# Patient Record
Sex: MALE | Race: BLACK OR AFRICAN AMERICAN | NOT HISPANIC OR LATINO | Employment: OTHER | URBAN - METROPOLITAN AREA
[De-identification: names, ages, dates, MRNs, and addresses within clinical notes are randomized per-mention and may not be internally consistent; named-entity substitution may affect disease eponyms.]

---

## 2017-05-23 ENCOUNTER — HOSPITAL ENCOUNTER (INPATIENT)
Facility: HOSPITAL | Age: 67
LOS: 3 days | Discharge: HOME | End: 2017-05-26
Attending: EMERGENCY MEDICINE | Admitting: INTERNAL MEDICINE

## 2017-05-23 ENCOUNTER — APPOINTMENT (OUTPATIENT)
Dept: CT IMAGING | Facility: HOSPITAL | Age: 67
End: 2017-05-23

## 2017-05-23 ENCOUNTER — APPOINTMENT (OUTPATIENT)
Dept: GENERAL RADIOLOGY | Facility: HOSPITAL | Age: 67
End: 2017-05-23

## 2017-05-23 DIAGNOSIS — K29.20 ALCOHOLIC GASTRITIS WITHOUT BLEEDING, UNSPECIFIED CHRONICITY: ICD-10-CM

## 2017-05-23 DIAGNOSIS — R26.9 GAIT ABNORMALITY: ICD-10-CM

## 2017-05-23 DIAGNOSIS — K70.10 ALCOHOLIC HEPATITIS WITHOUT ASCITES: ICD-10-CM

## 2017-05-23 DIAGNOSIS — F10.929 ALCOHOL INTOXICATION, WITH UNSPECIFIED COMPLICATION (HCC): ICD-10-CM

## 2017-05-23 DIAGNOSIS — F10.20 ALCOHOLISM (HCC): ICD-10-CM

## 2017-05-23 DIAGNOSIS — E87.29 ALCOHOLIC KETOACIDOSIS: Primary | ICD-10-CM

## 2017-05-23 PROBLEM — F15.10 METHAMPHETAMINE ABUSE (HCC): Status: ACTIVE | Noted: 2017-05-23

## 2017-05-23 PROBLEM — R11.2 NAUSEA & VOMITING: Status: ACTIVE | Noted: 2017-05-23

## 2017-05-23 PROBLEM — IMO0001 ALCOHOLISM /ALCOHOL ABUSE: Chronic | Status: ACTIVE | Noted: 2017-05-23

## 2017-05-23 PROBLEM — E87.20 METABOLIC ACIDOSIS: Status: ACTIVE | Noted: 2017-05-23

## 2017-05-23 PROBLEM — K70.0 FATTY LIVER, ALCOHOLIC: Chronic | Status: ACTIVE | Noted: 2017-05-23

## 2017-05-23 PROBLEM — Z72.0 TOBACCO ABUSE: Chronic | Status: ACTIVE | Noted: 2017-05-23

## 2017-05-23 LAB
ALBUMIN SERPL-MCNC: 4.5 G/DL (ref 3.5–5.2)
ALBUMIN/GLOB SERPL: 1.1 G/DL
ALP SERPL-CCNC: 74 U/L (ref 39–117)
ALT SERPL W P-5'-P-CCNC: 103 U/L (ref 1–41)
AMPHET+METHAMPHET UR QL: POSITIVE
ANION GAP SERPL CALCULATED.3IONS-SCNC: 24.6 MMOL/L
APTT PPP: 30.4 SECONDS (ref 22.7–35.4)
AST SERPL-CCNC: 234 U/L (ref 1–40)
BACTERIA UR QL AUTO: NORMAL /HPF
BARBITURATES UR QL SCN: NEGATIVE
BASOPHILS # BLD AUTO: 0.02 10*3/MM3 (ref 0–0.2)
BASOPHILS NFR BLD AUTO: 0.8 % (ref 0–1.5)
BENZODIAZ UR QL SCN: NEGATIVE
BILIRUB SERPL-MCNC: 0.7 MG/DL (ref 0.1–1.2)
BILIRUB UR QL STRIP: NEGATIVE
BUN BLD-MCNC: 11 MG/DL (ref 8–23)
BUN/CREAT SERPL: 9.7 (ref 7–25)
CALCIUM SPEC-SCNC: 10.1 MG/DL (ref 8.6–10.5)
CANNABINOIDS SERPL QL: NEGATIVE
CHLORIDE SERPL-SCNC: 94 MMOL/L (ref 98–107)
CLARITY UR: CLEAR
CO2 SERPL-SCNC: 19.4 MMOL/L (ref 22–29)
COCAINE UR QL: NEGATIVE
COLOR UR: ABNORMAL
CREAT BLD-MCNC: 1.13 MG/DL (ref 0.76–1.27)
DEPRECATED RDW RBC AUTO: 47.5 FL (ref 37–54)
EOSINOPHIL # BLD AUTO: 0.03 10*3/MM3 (ref 0–0.7)
EOSINOPHIL NFR BLD AUTO: 1.1 % (ref 0.3–6.2)
ERYTHROCYTE [DISTWIDTH] IN BLOOD BY AUTOMATED COUNT: 13.8 % (ref 11.5–14.5)
ETHANOL BLD-MCNC: 134 MG/DL (ref 0–10)
ETHANOL UR QL: 0.13 %
GFR SERPL CREATININE-BSD FRML MDRD: 78 ML/MIN/1.73
GLOBULIN UR ELPH-MCNC: 4 GM/DL
GLUCOSE BLD-MCNC: 59 MG/DL (ref 65–99)
GLUCOSE BLDC GLUCOMTR-MCNC: 94 MG/DL (ref 70–130)
GLUCOSE UR STRIP-MCNC: ABNORMAL MG/DL
HCT VFR BLD AUTO: 40.9 % (ref 40.4–52.2)
HGB BLD-MCNC: 13.9 G/DL (ref 13.7–17.6)
HGB UR QL STRIP.AUTO: ABNORMAL
HYALINE CASTS UR QL AUTO: NORMAL /LPF
IMM GRANULOCYTES # BLD: 0 10*3/MM3 (ref 0–0.03)
IMM GRANULOCYTES NFR BLD: 0 % (ref 0–0.5)
INR PPP: 1.07 (ref 0.9–1.1)
KETONES UR QL STRIP: ABNORMAL
LEUKOCYTE ESTERASE UR QL STRIP.AUTO: NEGATIVE
LIPASE SERPL-CCNC: 65 U/L (ref 13–60)
LYMPHOCYTES # BLD AUTO: 1.35 10*3/MM3 (ref 0.9–4.8)
LYMPHOCYTES NFR BLD AUTO: 51.3 % (ref 19.6–45.3)
MAGNESIUM SERPL-MCNC: 2.5 MG/DL (ref 1.6–2.4)
MCH RBC QN AUTO: 32.3 PG (ref 27–32.7)
MCHC RBC AUTO-ENTMCNC: 34 G/DL (ref 32.6–36.4)
MCV RBC AUTO: 94.9 FL (ref 79.8–96.2)
METHADONE UR QL SCN: NEGATIVE
MONOCYTES # BLD AUTO: 0.35 10*3/MM3 (ref 0.2–1.2)
MONOCYTES NFR BLD AUTO: 13.3 % (ref 5–12)
NEUTROPHILS # BLD AUTO: 0.88 10*3/MM3 (ref 1.9–8.1)
NEUTROPHILS NFR BLD AUTO: 33.5 % (ref 42.7–76)
NITRITE UR QL STRIP: NEGATIVE
OPIATES UR QL: NEGATIVE
OXYCODONE UR QL SCN: NEGATIVE
PH UR STRIP.AUTO: 5.5 [PH] (ref 5–8)
PLATELET # BLD AUTO: 123 10*3/MM3 (ref 140–500)
PMV BLD AUTO: 10.3 FL (ref 6–12)
POTASSIUM BLD-SCNC: 4.4 MMOL/L (ref 3.5–5.2)
PROT SERPL-MCNC: 8.5 G/DL (ref 6–8.5)
PROT UR QL STRIP: ABNORMAL
PROTHROMBIN TIME: 13.5 SECONDS (ref 11.7–14.2)
RBC # BLD AUTO: 4.31 10*6/MM3 (ref 4.6–6)
RBC # UR: NORMAL /HPF
REF LAB TEST METHOD: NORMAL
SODIUM BLD-SCNC: 138 MMOL/L (ref 136–145)
SP GR UR STRIP: 1.02 (ref 1–1.03)
SQUAMOUS #/AREA URNS HPF: NORMAL /HPF
TROPONIN T SERPL-MCNC: <0.01 NG/ML (ref 0–0.03)
UROBILINOGEN UR QL STRIP: ABNORMAL
WBC NRBC COR # BLD: 2.63 10*3/MM3 (ref 4.5–10.7)
WBC UR QL AUTO: NORMAL /HPF

## 2017-05-23 PROCEDURE — 80307 DRUG TEST PRSMV CHEM ANLYZR: CPT | Performed by: EMERGENCY MEDICINE

## 2017-05-23 PROCEDURE — 25010000002 ENOXAPARIN PER 10 MG: Performed by: INTERNAL MEDICINE

## 2017-05-23 PROCEDURE — 84484 ASSAY OF TROPONIN QUANT: CPT | Performed by: EMERGENCY MEDICINE

## 2017-05-23 PROCEDURE — 85025 COMPLETE CBC W/AUTO DIFF WBC: CPT | Performed by: EMERGENCY MEDICINE

## 2017-05-23 PROCEDURE — 25810000003 SODIUM CHLORIDE 0.9 % WITH KCL 20 MEQ 20-0.9 MEQ/L-% SOLUTION: Performed by: INTERNAL MEDICINE

## 2017-05-23 PROCEDURE — 83690 ASSAY OF LIPASE: CPT | Performed by: EMERGENCY MEDICINE

## 2017-05-23 PROCEDURE — 94799 UNLISTED PULMONARY SVC/PX: CPT

## 2017-05-23 PROCEDURE — 93010 ELECTROCARDIOGRAM REPORT: CPT | Performed by: INTERNAL MEDICINE

## 2017-05-23 PROCEDURE — 80053 COMPREHEN METABOLIC PANEL: CPT | Performed by: EMERGENCY MEDICINE

## 2017-05-23 PROCEDURE — 93005 ELECTROCARDIOGRAM TRACING: CPT

## 2017-05-23 PROCEDURE — 99284 EMERGENCY DEPT VISIT MOD MDM: CPT

## 2017-05-23 PROCEDURE — 25010000002 LORAZEPAM PER 2 MG: Performed by: INTERNAL MEDICINE

## 2017-05-23 PROCEDURE — 71020 HC CHEST PA AND LATERAL: CPT

## 2017-05-23 PROCEDURE — 36415 COLL VENOUS BLD VENIPUNCTURE: CPT | Performed by: EMERGENCY MEDICINE

## 2017-05-23 PROCEDURE — 85610 PROTHROMBIN TIME: CPT | Performed by: EMERGENCY MEDICINE

## 2017-05-23 PROCEDURE — 0 IOPAMIDOL 61 % SOLUTION: Performed by: EMERGENCY MEDICINE

## 2017-05-23 PROCEDURE — 81001 URINALYSIS AUTO W/SCOPE: CPT | Performed by: EMERGENCY MEDICINE

## 2017-05-23 PROCEDURE — 82962 GLUCOSE BLOOD TEST: CPT

## 2017-05-23 PROCEDURE — 74177 CT ABD & PELVIS W/CONTRAST: CPT

## 2017-05-23 PROCEDURE — 25010000002 THIAMINE PER 100 MG: Performed by: EMERGENCY MEDICINE

## 2017-05-23 PROCEDURE — 85730 THROMBOPLASTIN TIME PARTIAL: CPT | Performed by: EMERGENCY MEDICINE

## 2017-05-23 PROCEDURE — 83735 ASSAY OF MAGNESIUM: CPT | Performed by: INTERNAL MEDICINE

## 2017-05-23 RX ORDER — DEXTROSE, SODIUM CHLORIDE, AND POTASSIUM CHLORIDE 5; .45; .15 G/100ML; G/100ML; G/100ML
125 INJECTION INTRAVENOUS
Status: DISCONTINUED | OUTPATIENT
Start: 2017-05-23 | End: 2017-05-23

## 2017-05-23 RX ORDER — NICOTINE 21 MG/24HR
1 PATCH, TRANSDERMAL 24 HOURS TRANSDERMAL EVERY 24 HOURS
Status: DISCONTINUED | OUTPATIENT
Start: 2017-05-23 | End: 2017-05-26 | Stop reason: HOSPADM

## 2017-05-23 RX ORDER — ONDANSETRON 2 MG/ML
4 INJECTION INTRAMUSCULAR; INTRAVENOUS ONCE
Status: DISCONTINUED | OUTPATIENT
Start: 2017-05-23 | End: 2017-05-26 | Stop reason: HOSPADM

## 2017-05-23 RX ORDER — ACETAMINOPHEN 325 MG/1
650 TABLET ORAL EVERY 6 HOURS PRN
Status: DISCONTINUED | OUTPATIENT
Start: 2017-05-23 | End: 2017-05-26 | Stop reason: HOSPADM

## 2017-05-23 RX ORDER — LORAZEPAM 1 MG/1
2 TABLET ORAL
Status: DISCONTINUED | OUTPATIENT
Start: 2017-05-23 | End: 2017-05-26 | Stop reason: HOSPADM

## 2017-05-23 RX ORDER — NITROGLYCERIN 0.4 MG/1
0.4 TABLET SUBLINGUAL
Status: DISCONTINUED | OUTPATIENT
Start: 2017-05-23 | End: 2017-05-26 | Stop reason: HOSPADM

## 2017-05-23 RX ORDER — MORPHINE SULFATE 2 MG/ML
2 INJECTION, SOLUTION INTRAMUSCULAR; INTRAVENOUS
Status: DISCONTINUED | OUTPATIENT
Start: 2017-05-23 | End: 2017-05-26 | Stop reason: HOSPADM

## 2017-05-23 RX ORDER — SODIUM CHLORIDE 9 MG/ML
125 INJECTION, SOLUTION INTRAVENOUS CONTINUOUS
Status: DISCONTINUED | OUTPATIENT
Start: 2017-05-23 | End: 2017-05-24

## 2017-05-23 RX ORDER — ONDANSETRON 2 MG/ML
4 INJECTION INTRAMUSCULAR; INTRAVENOUS EVERY 6 HOURS PRN
Status: DISCONTINUED | OUTPATIENT
Start: 2017-05-23 | End: 2017-05-26 | Stop reason: HOSPADM

## 2017-05-23 RX ORDER — LORAZEPAM 2 MG/ML
2 INJECTION INTRAMUSCULAR
Status: DISCONTINUED | OUTPATIENT
Start: 2017-05-23 | End: 2017-05-26 | Stop reason: HOSPADM

## 2017-05-23 RX ORDER — BISACODYL 10 MG
10 SUPPOSITORY, RECTAL RECTAL DAILY PRN
Status: DISCONTINUED | OUTPATIENT
Start: 2017-05-23 | End: 2017-05-26 | Stop reason: HOSPADM

## 2017-05-23 RX ORDER — SODIUM CHLORIDE 0.9 % (FLUSH) 0.9 %
10 SYRINGE (ML) INJECTION AS NEEDED
Status: DISCONTINUED | OUTPATIENT
Start: 2017-05-23 | End: 2017-05-26 | Stop reason: HOSPADM

## 2017-05-23 RX ORDER — DIPHENOXYLATE HYDROCHLORIDE AND ATROPINE SULFATE 2.5; .025 MG/1; MG/1
1 TABLET ORAL DAILY
Status: COMPLETED | OUTPATIENT
Start: 2017-05-24 | End: 2017-05-26

## 2017-05-23 RX ORDER — PANTOPRAZOLE SODIUM 40 MG/1
40 TABLET, DELAYED RELEASE ORAL
Status: DISCONTINUED | OUTPATIENT
Start: 2017-05-24 | End: 2017-05-26 | Stop reason: HOSPADM

## 2017-05-23 RX ORDER — DEXTROSE MONOHYDRATE 25 G/50ML
25 INJECTION, SOLUTION INTRAVENOUS ONCE
Status: COMPLETED | OUTPATIENT
Start: 2017-05-23 | End: 2017-05-23

## 2017-05-23 RX ORDER — NALOXONE HCL 0.4 MG/ML
0.4 VIAL (ML) INJECTION
Status: DISCONTINUED | OUTPATIENT
Start: 2017-05-23 | End: 2017-05-26 | Stop reason: HOSPADM

## 2017-05-23 RX ORDER — CALCIUM CARBONATE 200(500)MG
1 TABLET,CHEWABLE ORAL 2 TIMES DAILY PRN
Status: DISCONTINUED | OUTPATIENT
Start: 2017-05-23 | End: 2017-05-26 | Stop reason: HOSPADM

## 2017-05-23 RX ORDER — SODIUM CHLORIDE AND POTASSIUM CHLORIDE 150; 900 MG/100ML; MG/100ML
150 INJECTION, SOLUTION INTRAVENOUS CONTINUOUS
Status: DISCONTINUED | OUTPATIENT
Start: 2017-05-23 | End: 2017-05-24

## 2017-05-23 RX ORDER — FOLIC ACID 1 MG/1
1 TABLET ORAL DAILY
Status: COMPLETED | OUTPATIENT
Start: 2017-05-24 | End: 2017-05-26

## 2017-05-23 RX ORDER — MAGNESIUM HYDROXIDE/ALUMINUM HYDROXICE/SIMETHICONE 120; 1200; 1200 MG/30ML; MG/30ML; MG/30ML
30 SUSPENSION ORAL EVERY 6 HOURS PRN
Status: DISCONTINUED | OUTPATIENT
Start: 2017-05-23 | End: 2017-05-26 | Stop reason: HOSPADM

## 2017-05-23 RX ORDER — FAMOTIDINE 10 MG/ML
20 INJECTION, SOLUTION INTRAVENOUS ONCE
Status: DISCONTINUED | OUTPATIENT
Start: 2017-05-23 | End: 2017-05-25 | Stop reason: SDDI

## 2017-05-23 RX ORDER — LORAZEPAM 2 MG/ML
1 INJECTION INTRAMUSCULAR
Status: DISCONTINUED | OUTPATIENT
Start: 2017-05-23 | End: 2017-05-26 | Stop reason: HOSPADM

## 2017-05-23 RX ORDER — CLONIDINE HYDROCHLORIDE 0.1 MG/1
0.1 TABLET ORAL EVERY 4 HOURS PRN
Status: DISCONTINUED | OUTPATIENT
Start: 2017-05-23 | End: 2017-05-26

## 2017-05-23 RX ORDER — DEXTROSE MONOHYDRATE 25 G/50ML
INJECTION, SOLUTION INTRAVENOUS
Status: COMPLETED
Start: 2017-05-23 | End: 2017-05-23

## 2017-05-23 RX ORDER — LORAZEPAM 1 MG/1
1 TABLET ORAL
Status: DISCONTINUED | OUTPATIENT
Start: 2017-05-23 | End: 2017-05-26 | Stop reason: HOSPADM

## 2017-05-23 RX ORDER — THIAMINE MONONITRATE (VIT B1) 100 MG
100 TABLET ORAL DAILY
Status: DISCONTINUED | OUTPATIENT
Start: 2017-05-26 | End: 2017-05-26 | Stop reason: HOSPADM

## 2017-05-23 RX ORDER — DEXTROSE, SODIUM CHLORIDE, SODIUM LACTATE, POTASSIUM CHLORIDE, AND CALCIUM CHLORIDE 5; .6; .31; .03; .02 G/100ML; G/100ML; G/100ML; G/100ML; G/100ML
125 INJECTION, SOLUTION INTRAVENOUS CONTINUOUS
Status: DISCONTINUED | OUTPATIENT
Start: 2017-05-23 | End: 2017-05-23

## 2017-05-23 RX ORDER — SODIUM CHLORIDE 0.9 % (FLUSH) 0.9 %
1-10 SYRINGE (ML) INJECTION AS NEEDED
Status: DISCONTINUED | OUTPATIENT
Start: 2017-05-23 | End: 2017-05-26 | Stop reason: HOSPADM

## 2017-05-23 RX ORDER — ASPIRIN 325 MG
325 TABLET ORAL ONCE
Status: DISCONTINUED | OUTPATIENT
Start: 2017-05-23 | End: 2017-05-23

## 2017-05-23 RX ADMIN — IOPAMIDOL 85 ML: 612 INJECTION, SOLUTION INTRAVENOUS at 13:40

## 2017-05-23 RX ADMIN — NICOTINE 1 PATCH: 21 PATCH, EXTENDED RELEASE TRANSDERMAL at 20:29

## 2017-05-23 RX ADMIN — LORAZEPAM 2 MG: 2 INJECTION, SOLUTION INTRAMUSCULAR; INTRAVENOUS at 19:04

## 2017-05-23 RX ADMIN — DEXTROSE MONOHYDRATE 25 G: 25 INJECTION, SOLUTION INTRAVENOUS at 13:23

## 2017-05-23 RX ADMIN — ENOXAPARIN SODIUM 40 MG: 40 INJECTION SUBCUTANEOUS at 20:29

## 2017-05-23 RX ADMIN — SODIUM CHLORIDE 1000 ML: 9 INJECTION, SOLUTION INTRAVENOUS at 14:33

## 2017-05-23 RX ADMIN — POTASSIUM CHLORIDE AND SODIUM CHLORIDE 150 ML/HR: 900; 150 INJECTION, SOLUTION INTRAVENOUS at 20:29

## 2017-05-23 RX ADMIN — LORAZEPAM 1 MG: 1 TABLET ORAL at 21:21

## 2017-05-23 RX ADMIN — THIAMINE HYDROCHLORIDE 100 MG: 100 INJECTION, SOLUTION INTRAMUSCULAR; INTRAVENOUS at 15:47

## 2017-05-23 RX ADMIN — SODIUM CHLORIDE, SODIUM LACTATE, POTASSIUM CHLORIDE, CALCIUM CHLORIDE AND DEXTROSE MONOHYDRATE 125 ML/HR: 5; 600; 310; 30; 20 INJECTION, SOLUTION INTRAVENOUS at 15:00

## 2017-05-24 ENCOUNTER — APPOINTMENT (OUTPATIENT)
Dept: ULTRASOUND IMAGING | Facility: HOSPITAL | Age: 67
End: 2017-05-24
Attending: INTERNAL MEDICINE

## 2017-05-24 PROBLEM — R00.0 TACHYCARDIA: Status: ACTIVE | Noted: 2017-05-24

## 2017-05-24 PROBLEM — I25.10 CORONARY ARTERY DISEASE INVOLVING NATIVE CORONARY ARTERY OF NATIVE HEART WITHOUT ANGINA PECTORIS: Chronic | Status: ACTIVE | Noted: 2017-05-24

## 2017-05-24 LAB
ALBUMIN SERPL-MCNC: 4.1 G/DL (ref 3.5–5.2)
ALBUMIN/GLOB SERPL: 1.2 G/DL
ALP SERPL-CCNC: 70 U/L (ref 39–117)
ALT SERPL W P-5'-P-CCNC: 76 U/L (ref 1–41)
ANION GAP SERPL CALCULATED.3IONS-SCNC: 16 MMOL/L
AST SERPL-CCNC: 147 U/L (ref 1–40)
BASOPHILS # BLD AUTO: 0 10*3/MM3 (ref 0–0.2)
BASOPHILS NFR BLD AUTO: 0 % (ref 0–1.5)
BILIRUB SERPL-MCNC: 0.9 MG/DL (ref 0.1–1.2)
BUN BLD-MCNC: 7 MG/DL (ref 8–23)
BUN/CREAT SERPL: 7.2 (ref 7–25)
CALCIUM SPEC-SCNC: 9.2 MG/DL (ref 8.6–10.5)
CHLORIDE SERPL-SCNC: 95 MMOL/L (ref 98–107)
CO2 SERPL-SCNC: 24 MMOL/L (ref 22–29)
CREAT BLD-MCNC: 0.97 MG/DL (ref 0.76–1.27)
DEPRECATED RDW RBC AUTO: 47.5 FL (ref 37–54)
EOSINOPHIL # BLD AUTO: 0.02 10*3/MM3 (ref 0–0.7)
EOSINOPHIL NFR BLD AUTO: 0.7 % (ref 0.3–6.2)
ERYTHROCYTE [DISTWIDTH] IN BLOOD BY AUTOMATED COUNT: 14 % (ref 11.5–14.5)
GFR SERPL CREATININE-BSD FRML MDRD: 93 ML/MIN/1.73
GLOBULIN UR ELPH-MCNC: 3.4 GM/DL
GLUCOSE BLD-MCNC: 123 MG/DL (ref 65–99)
HCT VFR BLD AUTO: 36.7 % (ref 40.4–52.2)
HGB BLD-MCNC: 12.5 G/DL (ref 13.7–17.6)
IMM GRANULOCYTES # BLD: 0 10*3/MM3 (ref 0–0.03)
IMM GRANULOCYTES NFR BLD: 0 % (ref 0–0.5)
LYMPHOCYTES # BLD AUTO: 1.08 10*3/MM3 (ref 0.9–4.8)
LYMPHOCYTES NFR BLD AUTO: 39.9 % (ref 19.6–45.3)
MCH RBC QN AUTO: 32.1 PG (ref 27–32.7)
MCHC RBC AUTO-ENTMCNC: 34.1 G/DL (ref 32.6–36.4)
MCV RBC AUTO: 94.1 FL (ref 79.8–96.2)
MONOCYTES # BLD AUTO: 0.37 10*3/MM3 (ref 0.2–1.2)
MONOCYTES NFR BLD AUTO: 13.7 % (ref 5–12)
NEUTROPHILS # BLD AUTO: 1.24 10*3/MM3 (ref 1.9–8.1)
NEUTROPHILS NFR BLD AUTO: 45.7 % (ref 42.7–76)
PLATELET # BLD AUTO: 104 10*3/MM3 (ref 140–500)
PMV BLD AUTO: 10.3 FL (ref 6–12)
POTASSIUM BLD-SCNC: 4 MMOL/L (ref 3.5–5.2)
PROT SERPL-MCNC: 7.5 G/DL (ref 6–8.5)
RBC # BLD AUTO: 3.9 10*6/MM3 (ref 4.6–6)
SODIUM BLD-SCNC: 135 MMOL/L (ref 136–145)
WBC NRBC COR # BLD: 2.71 10*3/MM3 (ref 4.5–10.7)

## 2017-05-24 PROCEDURE — 25010000002 ENOXAPARIN PER 10 MG: Performed by: INTERNAL MEDICINE

## 2017-05-24 PROCEDURE — 25010000002 THIAMINE PER 100 MG: Performed by: INTERNAL MEDICINE

## 2017-05-24 PROCEDURE — 25810000003 SODIUM CHLORIDE 0.9 % WITH KCL 20 MEQ 20-0.9 MEQ/L-% SOLUTION: Performed by: INTERNAL MEDICINE

## 2017-05-24 PROCEDURE — 80053 COMPREHEN METABOLIC PANEL: CPT | Performed by: INTERNAL MEDICINE

## 2017-05-24 PROCEDURE — 76705 ECHO EXAM OF ABDOMEN: CPT

## 2017-05-24 PROCEDURE — 99222 1ST HOSP IP/OBS MODERATE 55: CPT | Performed by: INTERNAL MEDICINE

## 2017-05-24 PROCEDURE — 93005 ELECTROCARDIOGRAM TRACING: CPT | Performed by: INTERNAL MEDICINE

## 2017-05-24 PROCEDURE — 85025 COMPLETE CBC W/AUTO DIFF WBC: CPT | Performed by: INTERNAL MEDICINE

## 2017-05-24 PROCEDURE — 93010 ELECTROCARDIOGRAM REPORT: CPT | Performed by: INTERNAL MEDICINE

## 2017-05-24 RX ADMIN — FOLIC ACID 1 MG: 1 TABLET ORAL at 09:11

## 2017-05-24 RX ADMIN — LORAZEPAM 1 MG: 1 TABLET ORAL at 04:15

## 2017-05-24 RX ADMIN — LORAZEPAM 1 MG: 1 TABLET ORAL at 20:56

## 2017-05-24 RX ADMIN — POTASSIUM CHLORIDE AND SODIUM CHLORIDE 150 ML/HR: 900; 150 INJECTION, SOLUTION INTRAVENOUS at 03:53

## 2017-05-24 RX ADMIN — LORAZEPAM 1 MG: 1 TABLET ORAL at 09:11

## 2017-05-24 RX ADMIN — ENOXAPARIN SODIUM 40 MG: 40 INJECTION SUBCUTANEOUS at 09:14

## 2017-05-24 RX ADMIN — NICOTINE 1 PATCH: 21 PATCH, EXTENDED RELEASE TRANSDERMAL at 18:02

## 2017-05-24 RX ADMIN — Medication 1 TABLET: at 09:11

## 2017-05-24 RX ADMIN — LORAZEPAM 1 MG: 1 TABLET ORAL at 14:40

## 2017-05-24 RX ADMIN — LORAZEPAM 1 MG: 1 TABLET ORAL at 18:02

## 2017-05-24 RX ADMIN — THIAMINE HYDROCHLORIDE 100 MG: 100 INJECTION, SOLUTION INTRAMUSCULAR; INTRAVENOUS at 09:14

## 2017-05-25 ENCOUNTER — APPOINTMENT (OUTPATIENT)
Dept: CARDIOLOGY | Facility: HOSPITAL | Age: 67
End: 2017-05-25
Attending: INTERNAL MEDICINE

## 2017-05-25 LAB
ALBUMIN SERPL-MCNC: 4.4 G/DL (ref 3.5–5.2)
ALBUMIN/GLOB SERPL: 1.3 G/DL
ALP SERPL-CCNC: 77 U/L (ref 39–117)
ALT SERPL W P-5'-P-CCNC: 68 U/L (ref 1–41)
ANION GAP SERPL CALCULATED.3IONS-SCNC: 12.8 MMOL/L
AST SERPL-CCNC: 109 U/L (ref 1–40)
BASOPHILS # BLD AUTO: 0.01 10*3/MM3 (ref 0–0.2)
BASOPHILS NFR BLD AUTO: 0.4 % (ref 0–1.5)
BH CV ECHO MEAS - ACS: 1.9 CM
BH CV ECHO MEAS - AI DEC SLOPE: 347.5 CM/SEC^2
BH CV ECHO MEAS - AI MAX PG: 85.1 MMHG
BH CV ECHO MEAS - AI MAX VEL: 461 CM/SEC
BH CV ECHO MEAS - AI P1/2T: 388.6 MSEC
BH CV ECHO MEAS - AO MEAN PG (FULL): 1 MMHG
BH CV ECHO MEAS - AO MEAN PG: 2 MMHG
BH CV ECHO MEAS - AO ROOT AREA (BSA CORRECTED): 1.7
BH CV ECHO MEAS - AO ROOT AREA: 8 CM^2
BH CV ECHO MEAS - AO ROOT DIAM: 3.2 CM
BH CV ECHO MEAS - AO V2 MEAN: 61.8 CM/SEC
BH CV ECHO MEAS - AO V2 VTI: 14.8 CM
BH CV ECHO MEAS - BSA(HAYCOCK): 1.9 M^2
BH CV ECHO MEAS - BSA: 1.9 M^2
BH CV ECHO MEAS - BZI_BMI: 23.6 KILOGRAMS/M^2
BH CV ECHO MEAS - BZI_METRIC_HEIGHT: 175.3 CM
BH CV ECHO MEAS - BZI_METRIC_WEIGHT: 72.6 KG
BH CV ECHO MEAS - CONTRAST EF 4CH: 58.3 ML/M^2
BH CV ECHO MEAS - EDV(MOD-SP4): 36 ML
BH CV ECHO MEAS - EF(MOD-SP4): 58.3 %
BH CV ECHO MEAS - ESV(MOD-SP4): 15 ML
BH CV ECHO MEAS - LA DIMENSION: 3.3 CM
BH CV ECHO MEAS - LA/AO: 1
BH CV ECHO MEAS - LAT PEAK E' VEL: 8.7 CM/SEC
BH CV ECHO MEAS - LV DIASTOLIC VOL/BSA (35-75): 19.2 ML/M^2
BH CV ECHO MEAS - LV MEAN PG: 1 MMHG
BH CV ECHO MEAS - LV SYSTOLIC VOL/BSA (12-30): 8 ML/M^2
BH CV ECHO MEAS - LV V1 MEAN: 46 CM/SEC
BH CV ECHO MEAS - LV V1 VTI: 11 CM
BH CV ECHO MEAS - LVLD AP4: 6.8 CM
BH CV ECHO MEAS - LVLS AP4: 5.9 CM
BH CV ECHO MEAS - MED PEAK E' VEL: 3.7 CM/SEC
BH CV ECHO MEAS - MV A DUR: 0.11 SEC
BH CV ECHO MEAS - MV A MAX VEL: 106 CM/SEC
BH CV ECHO MEAS - MV DEC SLOPE: 771 CM/SEC^2
BH CV ECHO MEAS - MV DEC TIME: 0.1 SEC
BH CV ECHO MEAS - MV E MAX VEL: 57.8 CM/SEC
BH CV ECHO MEAS - MV E/A: 0.55
BH CV ECHO MEAS - MV MEAN PG: 3 MMHG
BH CV ECHO MEAS - MV P1/2T MAX VEL: 80.1 CM/SEC
BH CV ECHO MEAS - MV P1/2T: 30.4 MSEC
BH CV ECHO MEAS - MV V2 MEAN: 83 CM/SEC
BH CV ECHO MEAS - MV V2 VTI: 26 CM
BH CV ECHO MEAS - MVA P1/2T LCG: 2.7 CM^2
BH CV ECHO MEAS - MVA(P1/2T): 7.2 CM^2
BH CV ECHO MEAS - PA MAX PG (FULL): 2.5 MMHG
BH CV ECHO MEAS - PA MAX PG: 4.4 MMHG
BH CV ECHO MEAS - PA V2 MAX: 105 CM/SEC
BH CV ECHO MEAS - PULM A REVS DUR: 0.13 SEC
BH CV ECHO MEAS - PULM A REVS VEL: 29.6 CM/SEC
BH CV ECHO MEAS - PULM DIAS VEL: 29.1 CM/SEC
BH CV ECHO MEAS - PULM S/D: 2
BH CV ECHO MEAS - PULM SYS VEL: 57.5 CM/SEC
BH CV ECHO MEAS - PVA(V,A): 2.7 CM^2
BH CV ECHO MEAS - PVA(V,D): 2.7 CM^2
BH CV ECHO MEAS - RAP SYSTOLE: 10 MMHG
BH CV ECHO MEAS - RV MAX PG: 1.9 MMHG
BH CV ECHO MEAS - RV MEAN PG: 1 MMHG
BH CV ECHO MEAS - RV V1 MAX: 69.2 CM/SEC
BH CV ECHO MEAS - RV V1 MEAN: 46.1 CM/SEC
BH CV ECHO MEAS - RV V1 VTI: 12.8 CM
BH CV ECHO MEAS - RVOT AREA: 4.2 CM^2
BH CV ECHO MEAS - RVOT DIAM: 2.3 CM
BH CV ECHO MEAS - RVSP: 26 MMHG
BH CV ECHO MEAS - SI(AO): 63.4 ML/M^2
BH CV ECHO MEAS - SI(MOD-SP4): 11.2 ML/M^2
BH CV ECHO MEAS - SV(AO): 119 ML
BH CV ECHO MEAS - SV(MOD-SP4): 21 ML
BH CV ECHO MEAS - SV(RVOT): 53.2 ML
BH CV ECHO MEAS - TAPSE (>1.6): 1.1 CM2
BH CV ECHO MEAS - TR MAX VEL: 200 CM/SEC
BH CV XLRA - RV BASE: 2.7 CM
BH CV XLRA - RV LENGTH: 6 CM
BH CV XLRA - RV MID: 2.2 CM
BH CV XLRA - TDI S': 8.9 CM/SEC
BILIRUB SERPL-MCNC: 1.1 MG/DL (ref 0.1–1.2)
BUN BLD-MCNC: 7 MG/DL (ref 8–23)
BUN/CREAT SERPL: 6.9 (ref 7–25)
CALCIUM SPEC-SCNC: 10.4 MG/DL (ref 8.6–10.5)
CHLORIDE SERPL-SCNC: 93 MMOL/L (ref 98–107)
CO2 SERPL-SCNC: 26.2 MMOL/L (ref 22–29)
CREAT BLD-MCNC: 1.01 MG/DL (ref 0.76–1.27)
DEPRECATED RDW RBC AUTO: 49.5 FL (ref 37–54)
EOSINOPHIL # BLD AUTO: 0.03 10*3/MM3 (ref 0–0.7)
EOSINOPHIL NFR BLD AUTO: 1.3 % (ref 0.3–6.2)
ERYTHROCYTE [DISTWIDTH] IN BLOOD BY AUTOMATED COUNT: 14.2 % (ref 11.5–14.5)
FOLATE SERPL-MCNC: 13.4 NG/ML (ref 4.78–24.2)
GFR SERPL CREATININE-BSD FRML MDRD: 89 ML/MIN/1.73
GLOBULIN UR ELPH-MCNC: 3.5 GM/DL
GLUCOSE BLD-MCNC: 130 MG/DL (ref 65–99)
HCT VFR BLD AUTO: 39.8 % (ref 40.4–52.2)
HGB BLD-MCNC: 13.7 G/DL (ref 13.7–17.6)
IMM GRANULOCYTES # BLD: 0 10*3/MM3 (ref 0–0.03)
IMM GRANULOCYTES NFR BLD: 0 % (ref 0–0.5)
LEFT ATRIUM VOLUME INDEX: 14.4 ML/M2
LYMPHOCYTES # BLD AUTO: 1.18 10*3/MM3 (ref 0.9–4.8)
LYMPHOCYTES NFR BLD AUTO: 52 % (ref 19.6–45.3)
MCH RBC QN AUTO: 32.9 PG (ref 27–32.7)
MCHC RBC AUTO-ENTMCNC: 34.4 G/DL (ref 32.6–36.4)
MCV RBC AUTO: 95.7 FL (ref 79.8–96.2)
MONOCYTES # BLD AUTO: 0.22 10*3/MM3 (ref 0.2–1.2)
MONOCYTES NFR BLD AUTO: 9.7 % (ref 5–12)
NEUTROPHILS # BLD AUTO: 0.83 10*3/MM3 (ref 1.9–8.1)
NEUTROPHILS NFR BLD AUTO: 36.6 % (ref 42.7–76)
PLATELET # BLD AUTO: 103 10*3/MM3 (ref 140–500)
PLATELETS.RETICULATED NFR BLD AUTO: 8.7 % (ref 0.9–6.5)
PMV BLD AUTO: 10.2 FL (ref 6–12)
POTASSIUM BLD-SCNC: 3.6 MMOL/L (ref 3.5–5.2)
PROT SERPL-MCNC: 7.9 G/DL (ref 6–8.5)
RBC # BLD AUTO: 4.16 10*6/MM3 (ref 4.6–6)
SODIUM BLD-SCNC: 132 MMOL/L (ref 136–145)
VIT B12 BLD-MCNC: 994 PG/ML (ref 211–946)
WBC NRBC COR # BLD: 2.27 10*3/MM3 (ref 4.5–10.7)

## 2017-05-25 PROCEDURE — 82746 ASSAY OF FOLIC ACID SERUM: CPT | Performed by: INTERNAL MEDICINE

## 2017-05-25 PROCEDURE — 25010000002 ENOXAPARIN PER 10 MG: Performed by: INTERNAL MEDICINE

## 2017-05-25 PROCEDURE — 93306 TTE W/DOPPLER COMPLETE: CPT | Performed by: INTERNAL MEDICINE

## 2017-05-25 PROCEDURE — 25010000002 THIAMINE PER 100 MG: Performed by: INTERNAL MEDICINE

## 2017-05-25 PROCEDURE — 93306 TTE W/DOPPLER COMPLETE: CPT

## 2017-05-25 PROCEDURE — 85025 COMPLETE CBC W/AUTO DIFF WBC: CPT | Performed by: INTERNAL MEDICINE

## 2017-05-25 PROCEDURE — 85055 RETICULATED PLATELET ASSAY: CPT | Performed by: INTERNAL MEDICINE

## 2017-05-25 PROCEDURE — 90791 PSYCH DIAGNOSTIC EVALUATION: CPT | Performed by: SOCIAL WORKER

## 2017-05-25 PROCEDURE — 80053 COMPREHEN METABOLIC PANEL: CPT | Performed by: INTERNAL MEDICINE

## 2017-05-25 PROCEDURE — 99222 1ST HOSP IP/OBS MODERATE 55: CPT | Performed by: INTERNAL MEDICINE

## 2017-05-25 PROCEDURE — 99232 SBSQ HOSP IP/OBS MODERATE 35: CPT | Performed by: INTERNAL MEDICINE

## 2017-05-25 PROCEDURE — 82607 VITAMIN B-12: CPT | Performed by: INTERNAL MEDICINE

## 2017-05-25 RX ORDER — METOPROLOL SUCCINATE 25 MG/1
25 TABLET, EXTENDED RELEASE ORAL
Status: DISCONTINUED | OUTPATIENT
Start: 2017-05-25 | End: 2017-05-26 | Stop reason: HOSPADM

## 2017-05-25 RX ADMIN — METOPROLOL SUCCINATE 25 MG: 25 TABLET, FILM COATED, EXTENDED RELEASE ORAL at 12:19

## 2017-05-25 RX ADMIN — LORAZEPAM 1 MG: 1 TABLET ORAL at 20:28

## 2017-05-25 RX ADMIN — THIAMINE HYDROCHLORIDE 100 MG: 100 INJECTION, SOLUTION INTRAMUSCULAR; INTRAVENOUS at 09:54

## 2017-05-25 RX ADMIN — LORAZEPAM 1 MG: 1 TABLET ORAL at 05:46

## 2017-05-25 RX ADMIN — ACETAMINOPHEN 650 MG: 325 TABLET ORAL at 17:42

## 2017-05-25 RX ADMIN — PANTOPRAZOLE SODIUM 40 MG: 40 TABLET, DELAYED RELEASE ORAL at 05:46

## 2017-05-25 RX ADMIN — Medication 1 TABLET: at 09:54

## 2017-05-25 RX ADMIN — NICOTINE 1 PATCH: 21 PATCH, EXTENDED RELEASE TRANSDERMAL at 17:45

## 2017-05-25 RX ADMIN — LORAZEPAM 1 MG: 1 TABLET ORAL at 10:26

## 2017-05-25 RX ADMIN — ENOXAPARIN SODIUM 40 MG: 40 INJECTION SUBCUTANEOUS at 09:54

## 2017-05-25 RX ADMIN — FOLIC ACID 1 MG: 1 TABLET ORAL at 09:54

## 2017-05-26 VITALS
TEMPERATURE: 98.6 F | OXYGEN SATURATION: 98 % | DIASTOLIC BLOOD PRESSURE: 78 MMHG | RESPIRATION RATE: 16 BRPM | BODY MASS INDEX: 23.7 KG/M2 | SYSTOLIC BLOOD PRESSURE: 144 MMHG | HEIGHT: 69 IN | WEIGHT: 160 LBS | HEART RATE: 103 BPM

## 2017-05-26 LAB
ALBUMIN SERPL-MCNC: 3.6 G/DL (ref 3.5–5.2)
ALBUMIN/GLOB SERPL: 1 G/DL
ALP SERPL-CCNC: 62 U/L (ref 39–117)
ALT SERPL W P-5'-P-CCNC: 58 U/L (ref 1–41)
ANION GAP SERPL CALCULATED.3IONS-SCNC: 10.1 MMOL/L
AST SERPL-CCNC: 87 U/L (ref 1–40)
BASOPHILS # BLD AUTO: 0.02 10*3/MM3 (ref 0–0.2)
BASOPHILS NFR BLD AUTO: 0.8 % (ref 0–1.5)
BILIRUB SERPL-MCNC: 0.8 MG/DL (ref 0.1–1.2)
BUN BLD-MCNC: 8 MG/DL (ref 8–23)
BUN/CREAT SERPL: 6.9 (ref 7–25)
CALCIUM SPEC-SCNC: 10.1 MG/DL (ref 8.6–10.5)
CHLORIDE SERPL-SCNC: 100 MMOL/L (ref 98–107)
CO2 SERPL-SCNC: 25.9 MMOL/L (ref 22–29)
CREAT BLD-MCNC: 1.16 MG/DL (ref 0.76–1.27)
DEPRECATED RDW RBC AUTO: 48.2 FL (ref 37–54)
EOSINOPHIL # BLD AUTO: 0.04 10*3/MM3 (ref 0–0.7)
EOSINOPHIL NFR BLD AUTO: 1.7 % (ref 0.3–6.2)
ERYTHROCYTE [DISTWIDTH] IN BLOOD BY AUTOMATED COUNT: 14.2 % (ref 11.5–14.5)
GFR SERPL CREATININE-BSD FRML MDRD: 76 ML/MIN/1.73
GLOBULIN UR ELPH-MCNC: 3.5 GM/DL
GLUCOSE BLD-MCNC: 108 MG/DL (ref 65–99)
HCT VFR BLD AUTO: 35.8 % (ref 40.4–52.2)
HGB BLD-MCNC: 12.1 G/DL (ref 13.7–17.6)
IMM GRANULOCYTES # BLD: 0 10*3/MM3 (ref 0–0.03)
IMM GRANULOCYTES NFR BLD: 0 % (ref 0–0.5)
LYMPHOCYTES # BLD AUTO: 1.12 10*3/MM3 (ref 0.9–4.8)
LYMPHOCYTES NFR BLD AUTO: 46.7 % (ref 19.6–45.3)
MCH RBC QN AUTO: 31.5 PG (ref 27–32.7)
MCHC RBC AUTO-ENTMCNC: 33.8 G/DL (ref 32.6–36.4)
MCV RBC AUTO: 93.2 FL (ref 79.8–96.2)
MONOCYTES # BLD AUTO: 0.33 10*3/MM3 (ref 0.2–1.2)
MONOCYTES NFR BLD AUTO: 13.8 % (ref 5–12)
NEUTROPHILS # BLD AUTO: 0.89 10*3/MM3 (ref 1.9–8.1)
NEUTROPHILS NFR BLD AUTO: 37 % (ref 42.7–76)
PLATELET # BLD AUTO: 92 10*3/MM3 (ref 140–500)
PMV BLD AUTO: 10.9 FL (ref 6–12)
POTASSIUM BLD-SCNC: 3.8 MMOL/L (ref 3.5–5.2)
PROT SERPL-MCNC: 7.1 G/DL (ref 6–8.5)
RBC # BLD AUTO: 3.84 10*6/MM3 (ref 4.6–6)
SODIUM BLD-SCNC: 136 MMOL/L (ref 136–145)
WBC NRBC COR # BLD: 2.4 10*3/MM3 (ref 4.5–10.7)

## 2017-05-26 PROCEDURE — 25010000002 ENOXAPARIN PER 10 MG: Performed by: INTERNAL MEDICINE

## 2017-05-26 PROCEDURE — 80053 COMPREHEN METABOLIC PANEL: CPT | Performed by: INTERNAL MEDICINE

## 2017-05-26 PROCEDURE — 85025 COMPLETE CBC W/AUTO DIFF WBC: CPT | Performed by: INTERNAL MEDICINE

## 2017-05-26 RX ORDER — AMITRIPTYLINE HYDROCHLORIDE 25 MG/1
25 TABLET, FILM COATED ORAL NIGHTLY
Status: DISCONTINUED | OUTPATIENT
Start: 2017-05-26 | End: 2017-05-26

## 2017-05-26 RX ORDER — LANOLIN ALCOHOL/MO/W.PET/CERES
100 CREAM (GRAM) TOPICAL DAILY
Qty: 30 TABLET | Refills: 1 | Status: SHIPPED | OUTPATIENT
Start: 2017-05-26 | End: 2017-05-28

## 2017-05-26 RX ORDER — METOPROLOL SUCCINATE 25 MG/1
25 TABLET, EXTENDED RELEASE ORAL
Qty: 30 TABLET | Refills: 1 | Status: ON HOLD | OUTPATIENT
Start: 2017-05-26 | End: 2019-04-17

## 2017-05-26 RX ADMIN — Medication 1 TABLET: at 08:48

## 2017-05-26 RX ADMIN — ENOXAPARIN SODIUM 40 MG: 40 INJECTION SUBCUTANEOUS at 08:48

## 2017-05-26 RX ADMIN — LORAZEPAM 1 MG: 1 TABLET ORAL at 01:54

## 2017-05-26 RX ADMIN — METOPROLOL SUCCINATE 25 MG: 25 TABLET, FILM COATED, EXTENDED RELEASE ORAL at 08:48

## 2017-05-26 RX ADMIN — LORAZEPAM 1 MG: 1 TABLET ORAL at 05:09

## 2017-05-26 RX ADMIN — FOLIC ACID 1 MG: 1 TABLET ORAL at 08:48

## 2017-05-26 RX ADMIN — PANTOPRAZOLE SODIUM 40 MG: 40 TABLET, DELAYED RELEASE ORAL at 05:09

## 2017-05-26 RX ADMIN — Medication 100 MG: at 08:48

## 2019-04-17 ENCOUNTER — APPOINTMENT (OUTPATIENT)
Dept: GENERAL RADIOLOGY | Facility: HOSPITAL | Age: 69
End: 2019-04-17

## 2019-04-17 ENCOUNTER — APPOINTMENT (OUTPATIENT)
Dept: CT IMAGING | Facility: HOSPITAL | Age: 69
End: 2019-04-17

## 2019-04-17 ENCOUNTER — HOSPITAL ENCOUNTER (OUTPATIENT)
Facility: HOSPITAL | Age: 69
Setting detail: OBSERVATION
Discharge: HOME | End: 2019-04-18
Attending: EMERGENCY MEDICINE | Admitting: HOSPITALIST

## 2019-04-17 DIAGNOSIS — N17.9 ACUTE RENAL FAILURE, UNSPECIFIED ACUTE RENAL FAILURE TYPE (HCC): ICD-10-CM

## 2019-04-17 DIAGNOSIS — R19.7 DIARRHEA, UNSPECIFIED TYPE: ICD-10-CM

## 2019-04-17 DIAGNOSIS — R00.0 SINUS TACHYCARDIA: Primary | ICD-10-CM

## 2019-04-17 DIAGNOSIS — R51.9 NONINTRACTABLE HEADACHE, UNSPECIFIED CHRONICITY PATTERN, UNSPECIFIED HEADACHE TYPE: ICD-10-CM

## 2019-04-17 PROBLEM — E86.0 DEHYDRATION: Status: ACTIVE | Noted: 2019-04-17

## 2019-04-17 PROBLEM — N18.30 CHRONIC KIDNEY DISEASE, STAGE 3: Status: ACTIVE | Noted: 2019-04-17

## 2019-04-17 PROBLEM — K59.1 FUNCTIONAL DIARRHEA: Status: ACTIVE | Noted: 2019-04-17

## 2019-04-17 LAB
ALBUMIN SERPL-MCNC: 4.1 G/DL (ref 3.5–5.2)
ALBUMIN/GLOB SERPL: 1.1 G/DL
ALP SERPL-CCNC: 58 U/L (ref 39–117)
ALT SERPL W P-5'-P-CCNC: 19 U/L (ref 1–41)
AMPHET+METHAMPHET UR QL: NEGATIVE
ANION GAP SERPL CALCULATED.3IONS-SCNC: 16.6 MMOL/L
AST SERPL-CCNC: 25 U/L (ref 1–40)
BARBITURATES UR QL SCN: NEGATIVE
BASOPHILS # BLD AUTO: 0.05 10*3/MM3 (ref 0–0.2)
BASOPHILS NFR BLD AUTO: 0.6 % (ref 0–1.5)
BENZODIAZ UR QL SCN: NEGATIVE
BILIRUB SERPL-MCNC: <0.2 MG/DL (ref 0.2–1.2)
BILIRUB UR QL STRIP: NEGATIVE
BUN BLD-MCNC: 9 MG/DL (ref 8–23)
BUN/CREAT SERPL: 5.3 (ref 7–25)
CALCIUM SPEC-SCNC: 10.7 MG/DL (ref 8.6–10.5)
CANNABINOIDS SERPL QL: NEGATIVE
CHLORIDE SERPL-SCNC: 96 MMOL/L (ref 98–107)
CLARITY UR: CLEAR
CO2 SERPL-SCNC: 25.4 MMOL/L (ref 22–29)
COCAINE UR QL: NEGATIVE
COLOR UR: YELLOW
CREAT BLD-MCNC: 1.7 MG/DL (ref 0.76–1.27)
DEPRECATED RDW RBC AUTO: 49.4 FL (ref 37–54)
EOSINOPHIL # BLD AUTO: 0.06 10*3/MM3 (ref 0–0.4)
EOSINOPHIL NFR BLD AUTO: 0.7 % (ref 0.3–6.2)
ERYTHROCYTE [DISTWIDTH] IN BLOOD BY AUTOMATED COUNT: 13.4 % (ref 12.3–15.4)
ETHANOL BLD-MCNC: <10 MG/DL (ref 0–10)
ETHANOL UR QL: <0.01 %
GFR SERPL CREATININE-BSD FRML MDRD: 49 ML/MIN/1.73
GLOBULIN UR ELPH-MCNC: 3.8 GM/DL
GLUCOSE BLD-MCNC: 95 MG/DL (ref 65–99)
GLUCOSE UR STRIP-MCNC: NEGATIVE MG/DL
HCT VFR BLD AUTO: 39.5 % (ref 37.5–51)
HGB BLD-MCNC: 13.1 G/DL (ref 13–17.7)
HGB UR QL STRIP.AUTO: NEGATIVE
IMM GRANULOCYTES # BLD AUTO: 0.03 10*3/MM3 (ref 0–0.05)
IMM GRANULOCYTES NFR BLD AUTO: 0.3 % (ref 0–0.5)
INR PPP: 1.04 (ref 0.9–1.1)
KETONES UR QL STRIP: NEGATIVE
LEUKOCYTE ESTERASE UR QL STRIP.AUTO: NEGATIVE
LYMPHOCYTES # BLD AUTO: 2.25 10*3/MM3 (ref 0.7–3.1)
LYMPHOCYTES NFR BLD AUTO: 25.2 % (ref 19.6–45.3)
MAGNESIUM SERPL-MCNC: 2.1 MG/DL (ref 1.6–2.4)
MCH RBC QN AUTO: 33.1 PG (ref 26.6–33)
MCHC RBC AUTO-ENTMCNC: 33.2 G/DL (ref 31.5–35.7)
MCV RBC AUTO: 99.7 FL (ref 79–97)
METHADONE UR QL SCN: NEGATIVE
MONOCYTES # BLD AUTO: 0.65 10*3/MM3 (ref 0.1–0.9)
MONOCYTES NFR BLD AUTO: 7.3 % (ref 5–12)
NEUTROPHILS # BLD AUTO: 5.88 10*3/MM3 (ref 1.4–7)
NEUTROPHILS NFR BLD AUTO: 65.9 % (ref 42.7–76)
NITRITE UR QL STRIP: NEGATIVE
NRBC BLD AUTO-RTO: 0 /100 WBC (ref 0–0)
NT-PROBNP SERPL-MCNC: 426 PG/ML (ref 5–900)
OPIATES UR QL: NEGATIVE
OXYCODONE UR QL SCN: NEGATIVE
PH UR STRIP.AUTO: 5.5 [PH] (ref 5–8)
PLATELET # BLD AUTO: 300 10*3/MM3 (ref 140–450)
PMV BLD AUTO: 10 FL (ref 6–12)
POTASSIUM BLD-SCNC: 4 MMOL/L (ref 3.5–5.2)
PROT SERPL-MCNC: 7.9 G/DL (ref 6–8.5)
PROT UR QL STRIP: NEGATIVE
PROTHROMBIN TIME: 13.3 SECONDS (ref 11.7–14.2)
RBC # BLD AUTO: 3.96 10*6/MM3 (ref 4.14–5.8)
SODIUM BLD-SCNC: 138 MMOL/L (ref 136–145)
SP GR UR STRIP: 1.01 (ref 1–1.03)
TROPONIN T SERPL-MCNC: <0.01 NG/ML (ref 0–0.03)
UROBILINOGEN UR QL STRIP: NORMAL
WBC NRBC COR # BLD: 8.92 10*3/MM3 (ref 3.4–10.8)

## 2019-04-17 PROCEDURE — 80307 DRUG TEST PRSMV CHEM ANLYZR: CPT | Performed by: EMERGENCY MEDICINE

## 2019-04-17 PROCEDURE — 71045 X-RAY EXAM CHEST 1 VIEW: CPT

## 2019-04-17 PROCEDURE — 70450 CT HEAD/BRAIN W/O DYE: CPT

## 2019-04-17 PROCEDURE — G0378 HOSPITAL OBSERVATION PER HR: HCPCS

## 2019-04-17 PROCEDURE — 83880 ASSAY OF NATRIURETIC PEPTIDE: CPT | Performed by: EMERGENCY MEDICINE

## 2019-04-17 PROCEDURE — 96361 HYDRATE IV INFUSION ADD-ON: CPT

## 2019-04-17 PROCEDURE — 96360 HYDRATION IV INFUSION INIT: CPT

## 2019-04-17 PROCEDURE — 93005 ELECTROCARDIOGRAM TRACING: CPT | Performed by: EMERGENCY MEDICINE

## 2019-04-17 PROCEDURE — 81003 URINALYSIS AUTO W/O SCOPE: CPT | Performed by: EMERGENCY MEDICINE

## 2019-04-17 PROCEDURE — 80053 COMPREHEN METABOLIC PANEL: CPT | Performed by: EMERGENCY MEDICINE

## 2019-04-17 PROCEDURE — 85025 COMPLETE CBC W/AUTO DIFF WBC: CPT | Performed by: EMERGENCY MEDICINE

## 2019-04-17 PROCEDURE — 85610 PROTHROMBIN TIME: CPT | Performed by: EMERGENCY MEDICINE

## 2019-04-17 PROCEDURE — 83735 ASSAY OF MAGNESIUM: CPT | Performed by: EMERGENCY MEDICINE

## 2019-04-17 PROCEDURE — 93010 ELECTROCARDIOGRAM REPORT: CPT | Performed by: INTERNAL MEDICINE

## 2019-04-17 PROCEDURE — 84484 ASSAY OF TROPONIN QUANT: CPT | Performed by: EMERGENCY MEDICINE

## 2019-04-17 PROCEDURE — 99285 EMERGENCY DEPT VISIT HI MDM: CPT

## 2019-04-17 RX ORDER — ACETAMINOPHEN 325 MG/1
650 TABLET ORAL EVERY 4 HOURS PRN
Status: DISCONTINUED | OUTPATIENT
Start: 2019-04-17 | End: 2019-04-18 | Stop reason: HOSPADM

## 2019-04-17 RX ORDER — SODIUM CHLORIDE 9 MG/ML
125 INJECTION, SOLUTION INTRAVENOUS CONTINUOUS
Status: DISCONTINUED | OUTPATIENT
Start: 2019-04-17 | End: 2019-04-18 | Stop reason: HOSPADM

## 2019-04-17 RX ORDER — SODIUM CHLORIDE 0.9 % (FLUSH) 0.9 %
10 SYRINGE (ML) INJECTION AS NEEDED
Status: DISCONTINUED | OUTPATIENT
Start: 2019-04-17 | End: 2019-04-17

## 2019-04-17 RX ORDER — METOPROLOL SUCCINATE 25 MG/1
25 TABLET, EXTENDED RELEASE ORAL
Status: DISCONTINUED | OUTPATIENT
Start: 2019-04-18 | End: 2019-04-18 | Stop reason: HOSPADM

## 2019-04-17 RX ORDER — ONDANSETRON 2 MG/ML
4 INJECTION INTRAMUSCULAR; INTRAVENOUS EVERY 6 HOURS PRN
Status: DISCONTINUED | OUTPATIENT
Start: 2019-04-17 | End: 2019-04-18 | Stop reason: HOSPADM

## 2019-04-17 RX ORDER — UREA 10 %
3 LOTION (ML) TOPICAL NIGHTLY PRN
Status: DISCONTINUED | OUTPATIENT
Start: 2019-04-17 | End: 2019-04-18 | Stop reason: HOSPADM

## 2019-04-17 RX ORDER — LOPERAMIDE HYDROCHLORIDE 2 MG/1
2 CAPSULE ORAL 4 TIMES DAILY PRN
Status: DISCONTINUED | OUTPATIENT
Start: 2019-04-17 | End: 2019-04-18 | Stop reason: HOSPADM

## 2019-04-17 RX ADMIN — SODIUM CHLORIDE 125 ML/HR: 9 INJECTION, SOLUTION INTRAVENOUS at 23:31

## 2019-04-17 RX ADMIN — SODIUM CHLORIDE 125 ML/HR: 9 INJECTION, SOLUTION INTRAVENOUS at 18:09

## 2019-04-17 RX ADMIN — Medication 3 MG: at 23:29

## 2019-04-17 RX ADMIN — SODIUM CHLORIDE 500 ML: 9 INJECTION, SOLUTION INTRAVENOUS at 16:27

## 2019-04-17 RX ADMIN — SODIUM CHLORIDE 1000 ML: 9 INJECTION, SOLUTION INTRAVENOUS at 18:40

## 2019-04-17 NOTE — ED NOTES
/  Nursing report ED to floor  Yuval A Loja  68 y.o.  male    HPI (triage note):   Chief Complaint   Patient presents with   • Nausea   • Diarrhea       Admitting doctor:   Jimenez Garrett MD    Admitting diagnosis:   The primary encounter diagnosis was Sinus tachycardia. A diagnosis of Acute renal failure, unspecified acute renal failure type (CMS/HCC) was also pertinent to this visit.    Code status:   Current Code Status     Date Active Code Status Order ID Comments User Context       Prior          Allergies:   Atorvastatin and Sertraline    Weight:       04/17/19  1600   Weight: 72.1 kg (159 lb)       Most recent vitals:   Vitals:    04/17/19 1829 04/17/19 1900 04/17/19 1915 04/17/19 1932   BP:    146/75   Pulse: 110 105 108 107   Resp:       Temp:       TempSrc:       SpO2: 99% 100%     Weight:       Height:           Active LDAs/IV Access:   Lines, Drains & Airways    Active LDAs     Name:   Placement date:   Placement time:   Site:   Days:    Peripheral IV 04/17/19 1626 Right Antecubital   04/17/19    1626    Antecubital   less than 1                Labs (abnormal labs have a star):   Labs Reviewed   COMPREHENSIVE METABOLIC PANEL - Abnormal; Notable for the following components:       Result Value    Creatinine 1.70 (*)     Chloride 96 (*)     Calcium 10.7 (*)     Total Bilirubin <0.2 (*)     eGFR   Amer 49 (*)     BUN/Creatinine Ratio 5.3 (*)     All other components within normal limits    Narrative:     GFR Normal >60  Chronic Kidney Disease <60  Kidney Failure <15   CBC WITH AUTO DIFFERENTIAL - Abnormal; Notable for the following components:    RBC 3.96 (*)     MCV 99.7 (*)     MCH 33.1 (*)     All other components within normal limits   MAGNESIUM - Normal   URINE DRUG SCREEN - Normal    Narrative:     Negative Thresholds For Drugs Screened:     Amphetamines               500 ng/ml   Barbiturates               200 ng/ml   Benzodiazepines            100 ng/ml   Cocaine                    300  ng/ml   Methadone                  300 ng/ml   Opiates                    300 ng/ml   Oxycodone                  100 ng/ml   THC                        50 ng/ml    The Normal Value for all drugs tested is negative. This report includes final unconfirmed screening results to be used for medical treatment purposes only. Unconfirmed results must not be used for non-medical purposes such as employment or legal testing. Clinical consideration should be applied to any drug of abuse test, particulary when unconfirmed results are used.   URINALYSIS W/ MICROSCOPIC IF INDICATED (NO CULTURE) - Normal    Narrative:     Urine microscopic not indicated.   BNP (IN-HOUSE) - Normal    Narrative:     Among patients with dyspnea, NT-proBNP is highly sensitive for the detection of acute congestive heart failure. In addition NT-proBNP of <300 pg/ml effectively rules out acute congestive heart failure with 99% negative predictive value.   TROPONIN (IN-HOUSE) - Normal    Narrative:     Troponin T Reference Range:  <= 0.03 ng/mL-   Negative for AMI  >0.03 ng/mL-     Abnormal for myocardial necrosis.  Clinicians would have to utilize clinical acumen, EKG, Troponin and serial changes to determine if it is an Acute Myocardial Infarction or myocardial injury due to an underlying chronic condition.    PROTIME-INR - Normal   ETHANOL   CBC AND DIFFERENTIAL    Narrative:     The following orders were created for panel order CBC & Differential.  Procedure                               Abnormality         Status                     ---------                               -----------         ------                     CBC Auto Differential[[id number]]        Abnormal            Final result                 Please view results for these tests on the individual orders.       EKG:   ECG 12 Lead   Preliminary Result   HEART RATE= 108  bpm   RR Interval= 556  ms   MO Interval= 198  ms   P Horizontal Axis= -51  deg   P Front Axis= 35  deg   QRSD Interval=  "75  ms   QT Interval= 327  ms   QRS Axis= 24  deg   T Wave Axis= 37  deg   - BORDERLINE ECG -   Sinus tachycardia   Borderline T wave abnormalities   Electronically Signed By:    Date and Time of Study: 2019-04-17 16:39:39          Meds given in ED:   Medications   sodium chloride 0.9 % flush 10 mL (not administered)   sodium chloride 0.9 % infusion (125 mL/hr Intravenous New Bag 4/17/19 1809)   sodium chloride 0.9 % bolus 500 mL (0 mL Intravenous Stopped 4/17/19 1808)   sodium chloride 0.9 % bolus 1,000 mL (1,000 mL Intravenous New Bag 4/17/19 1840)       Imaging results:  Ct Head Without Contrast    Result Date: 4/17/2019   No evidence for acute intracranial pathology.  Radiation dose reduction techniques were utilized, including automated exposure control and exposure modulation based on body size.         Ambulatory status:   - ambulates without assistance    Social issues:   Social History     Socioeconomic History   • Marital status: Single     Spouse name: Not on file   • Number of children: Not on file   • Years of education: Not on file   • Highest education level: Not on file   Occupational History     Employer: RETIRED   Tobacco Use   • Smoking status: Current Every Day Smoker     Packs/day: 0.50   • Smokeless tobacco: Never Used   • Tobacco comment: tried to provide education declined irritated w/ attempt smoked \" depends on what I feel like.\"   Substance and Sexual Activity   • Alcohol use: Yes     Alcohol/week: 3.6 oz     Types: 6 Cans of beer per week     Comment: hx changes depending on time questioned    • Drug use: No   • Sexual activity: Defer        Myriam Her RN  04/17/19 1946    "

## 2019-04-17 NOTE — ED PROVIDER NOTES
EMERGENCY DEPARTMENT ENCOUNTER    CHIEF COMPLAINT  Chief Complaint: headache  History given by: patient  History limited by: none  Room Number: N629/1  PMD: Rebecca Miramontes MD      HPI:  Pt is a 68 y.o. male who presents complaining of a intermittent occipital HA for the past 4 weeks. He states that the HA is not abrupt in onset and is not severe in onset. Pt reports that 4 weeks ago he developed BLE weakness. He then developed intermittent nausea, diarrhea (3-4x per day), and the HA. The diarrhea happens intermittently, taking a week off and a week on. Pt also reports episodic vomiting. He denies bloody emesis and bloody stool. He also c/o decreased appetite and decreased PO intake. He denies any prior Hx of similar episodes of Sx. 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 He denies abd pain, trauma, injury, CP, SOA, neuro deficits, or any other complaints. Pt has not seen his PCP for these Sx over the past month. Pt admits to smoking cigarettes. Pt states his last EtOH consumption was 3-4 days ago.    Duration:  About 4 weeks  Onset: gradual  Timing: intermittent  Location: occipital  Radiation: none  Quality: HA  Intensity/Severity: moderate  Progression: worsened  Associated Symptoms: nausea, vomiting (episodic), diarrhea (3-4x per day, one week off, one week on), decreased appetite, BLE weakness, decreased PO intake  Aggravating Factors: increased stress  Alleviating Factors: none  Previous Episodes: none  Treatment before arrival: none    PAST MEDICAL HISTORY  Active Ambulatory Problems     Diagnosis Date Noted   • Alcoholic ketoacidosis 05/23/2017   • Alcoholism /alcohol abuse (CMS/HCC) 05/23/2017   • Methamphetamine abuse (CMS/HCC) 05/23/2017   • Fatty liver, alcoholic 05/23/2017   • Nausea & vomiting 05/23/2017   • Alcoholic hepatitis without ascites 05/23/2017   • Metabolic acidosis 05/23/2017   •  "Tobacco abuse 05/23/2017   • Coronary artery disease involving native coronary artery of native heart without angina pectoris 05/24/2017   • Tachycardia 05/24/2017     Resolved Ambulatory Problems     Diagnosis Date Noted   • No Resolved Ambulatory Problems     Past Medical History:   Diagnosis Date   • Alcohol abuse    • Myocardial infarction (CMS/HCC)    • Pacemaker    • Stroke (CMS/HCC)        PAST SURGICAL HISTORY  Past Surgical History:   Procedure Laterality Date   • PACEMAKER IMPLANTATION         FAMILY HISTORY  No family history on file.    SOCIAL HISTORY  Social History     Socioeconomic History   • Marital status: Single     Spouse name: Not on file   • Number of children: Not on file   • Years of education: Not on file   • Highest education level: Not on file   Occupational History     Employer: RETIRED   Tobacco Use   • Smoking status: Current Every Day Smoker     Packs/day: 0.50   • Smokeless tobacco: Never Used   • Tobacco comment: tried to provide education declined irritated w/ attempt smoked \" depends on what I feel like.\"   Substance and Sexual Activity   • Alcohol use: Yes     Alcohol/week: 3.6 oz     Types: 6 Cans of beer per week     Comment: hx changes depending on time questioned    • Drug use: No   • Sexual activity: Defer       ALLERGIES  Atorvastatin and Sertraline    REVIEW OF SYSTEMS  Review of Systems   Constitutional: Positive for appetite change (decreased). Negative for activity change and fever.        (+) decreased PO intake   HENT: Negative for congestion and sore throat.    Eyes: Negative.    Respiratory: Negative for cough and shortness of breath.    Cardiovascular: Negative for chest pain and leg swelling.   Gastrointestinal: Positive for diarrhea (3-4x per day, week on, week off), nausea and vomiting (episodic). Negative for abdominal pain.   Endocrine: Negative.    Genitourinary: Negative for decreased urine volume and dysuria.   Musculoskeletal: Negative for neck pain. "   Skin: Negative for rash and wound.   Allergic/Immunologic: Negative.    Neurological: Positive for weakness (BLE, x4 weeks). Negative for numbness and headaches.   Hematological: Negative.    Psychiatric/Behavioral: Negative.    All other systems reviewed and are negative.      PHYSICAL EXAM  ED Triage Vitals [04/17/19 1328]   Temp Heart Rate Resp BP SpO2   97.6 °F (36.4 °C) (!) 125 18 127/74 98 %      Temp src Heart Rate Source Patient Position BP Location FiO2 (%)   Tympanic -- -- -- --       Physical Exam   Constitutional: He is oriented to person, place, and time. No distress.   HENT:   Head: Normocephalic and atraumatic.   Eyes: EOM are normal. Pupils are equal, round, and reactive to light.   Neck: Normal range of motion. Neck supple.   Cardiovascular: Regular rhythm, normal heart sounds and intact distal pulses. Tachycardia present.   HR in the 100-110s   Pulmonary/Chest: Effort normal. No respiratory distress. He has wheezes (mild, expiratory).   Abdominal: Soft. There is no tenderness. There is no rebound and no guarding.   Musculoskeletal: Normal range of motion. He exhibits no edema.   Neurological: He is alert and oriented to person, place, and time. He has normal sensation and normal strength. No cranial nerve deficit. Coordination normal.   No focal neuro deficits.  Patient is able to move all extremities   Skin: Skin is warm and dry. No rash noted. No erythema.   Psychiatric: Mood and affect normal.   Nursing note and vitals reviewed.      LAB RESULTS  Lab Results (last 24 hours)     Procedure Component Value Units Date/Time    CBC & Differential [[id number]] Collected:  04/17/19 1626    Specimen:  Blood Updated:  04/17/19 1643    Narrative:       The following orders were created for panel order CBC & Differential.  Procedure                               Abnormality         Status                     ---------                               -----------         ------                     CBC Auto  Differential[[id number]]        Abnormal            Final result                 Please view results for these tests on the individual orders.    Comprehensive Metabolic Panel [[id number]]  (Abnormal) Collected:  04/17/19 1626    Specimen:  Blood Updated:  04/17/19 1720     Glucose 95 mg/dL      BUN 9 mg/dL      Creatinine 1.70 mg/dL      Sodium 138 mmol/L      Potassium 4.0 mmol/L      Chloride 96 mmol/L      CO2 25.4 mmol/L      Calcium 10.7 mg/dL      Total Protein 7.9 g/dL      Albumin 4.10 g/dL      ALT (SGPT) 19 U/L      AST (SGOT) 25 U/L      Comment: Specimen hemolyzed.  Results may be affected.        Alkaline Phosphatase 58 U/L      Total Bilirubin <0.2 mg/dL      eGFR   Amer 49 mL/min/1.73      Globulin 3.8 gm/dL      A/G Ratio 1.1 g/dL      BUN/Creatinine Ratio 5.3     Anion Gap 16.6 mmol/L     Narrative:       GFR Normal >60  Chronic Kidney Disease <60  Kidney Failure <15    Magnesium [[id number]]  (Normal) Collected:  04/17/19 1626    Specimen:  Blood Updated:  04/17/19 1710     Magnesium 2.1 mg/dL     Ethanol [[id number]] Collected:  04/17/19 1626    Specimen:  Blood Updated:  04/17/19 1710     Ethanol <10 mg/dL      Ethanol % <0.010 %     BNP [[id number]]  (Normal) Collected:  04/17/19 1626    Specimen:  Blood Updated:  04/17/19 1708     proBNP 426.0 pg/mL     Narrative:       Among patients with dyspnea, NT-proBNP is highly sensitive for the detection of acute congestive heart failure. In addition NT-proBNP of <300 pg/ml effectively rules out acute congestive heart failure with 99% negative predictive value.    Troponin [[id number]]  (Normal) Collected:  04/17/19 1626    Specimen:  Blood Updated:  04/17/19 1710     Troponin T <0.010 ng/mL     Narrative:       Troponin T Reference Range:  <= 0.03 ng/mL-   Negative for AMI  >0.03 ng/mL-     Abnormal for myocardial necrosis.  Clinicians would have to utilize clinical acumen, EKG, Troponin and serial changes to determine if it is an Acute Myocardial  Infarction or myocardial injury due to an underlying chronic condition.     Protime-INR [[id number]]  (Normal) Collected:  04/17/19 1626    Specimen:  Blood Updated:  04/17/19 1655     Protime 13.3 Seconds      INR 1.04    CBC Auto Differential [[id number]]  (Abnormal) Collected:  04/17/19 1626    Specimen:  Blood Updated:  04/17/19 1643     WBC 8.92 10*3/mm3      RBC 3.96 10*6/mm3      Hemoglobin 13.1 g/dL      Hematocrit 39.5 %      MCV 99.7 fL      MCH 33.1 pg      MCHC 33.2 g/dL      RDW 13.4 %      RDW-SD 49.4 fl      MPV 10.0 fL      Platelets 300 10*3/mm3      Neutrophil % 65.9 %      Lymphocyte % 25.2 %      Monocyte % 7.3 %      Eosinophil % 0.7 %      Basophil % 0.6 %      Immature Grans % 0.3 %      Neutrophils, Absolute 5.88 10*3/mm3      Lymphocytes, Absolute 2.25 10*3/mm3      Monocytes, Absolute 0.65 10*3/mm3      Eosinophils, Absolute 0.06 10*3/mm3      Basophils, Absolute 0.05 10*3/mm3      Immature Grans, Absolute 0.03 10*3/mm3      nRBC 0.0 /100 WBC     Urine Drug Screen - Urine, Clean Catch [[id number]]  (Normal) Collected:  04/17/19 1648    Specimen:  Urine, Clean Catch Updated:  04/17/19 1726     Amphet/Methamphet, Screen Negative     Barbiturates Screen, Urine Negative     Benzodiazepine Screen, Urine Negative     Cocaine Screen, Urine Negative     Opiate Screen Negative     THC, Screen, Urine Negative     Methadone Screen, Urine Negative     Oxycodone Screen, Urine Negative    Narrative:       Negative Thresholds For Drugs Screened:     Amphetamines               500 ng/ml   Barbiturates               200 ng/ml   Benzodiazepines            100 ng/ml   Cocaine                    300 ng/ml   Methadone                  300 ng/ml   Opiates                    300 ng/ml   Oxycodone                  100 ng/ml   THC                        50 ng/ml    The Normal Value for all drugs tested is negative. This report includes final unconfirmed screening results to be used for medical treatment purposes only.  Unconfirmed results must not be used for non-medical purposes such as employment or legal testing. Clinical consideration should be applied to any drug of abuse test, particulary when unconfirmed results are used.    Urinalysis With Microscopic If Indicated (No Culture) - Urine, Clean Catch [[id number]]  (Normal) Collected:  04/17/19 1648    Specimen:  Urine, Clean Catch Updated:  04/17/19 1710     Color, UA Yellow     Appearance, UA Clear     pH, UA 5.5     Specific Gravity, UA 1.012     Glucose, UA Negative     Ketones, UA Negative     Bilirubin, UA Negative     Blood, UA Negative     Protein, UA Negative     Leuk Esterase, UA Negative     Nitrite, UA Negative     Urobilinogen, UA 0.2 E.U./dL    Narrative:       Urine microscopic not indicated.          I ordered the above labs and reviewed the results    RADIOLOGY   Ct Head Without Contrast    Result Date: 4/17/2019  Narrative: CTA HEAD WITHOUT CONTRAST  CLINICAL HISTORY: Intermittent headache. Bilateral lower extremity weakness.  CT scan of the head was obtained with 2 mm axial bone algorithm and 3 mm axial soft tissue algorithm images. No intravenous contrast was administered.  FINDINGS:  There is no evidence for a skull fracture. There is no evidence for an acute extra-axial hemorrhage. Mild diffuse parenchymal atrophic changes are noted. The basal ganglia and thalami are unremarkable in appearance. Changes of chronic small vessel ischemic phenomena are noted.      Impression:  No evidence for acute intracranial pathology.  Radiation dose reduction techniques were utilized, including automated exposure control and exposure modulation based on body size.  This report was finalized on 4/17/2019 8:50 PM by Dr. Keith Beth M.D.      Xr Chest 1 View    Result Date: 4/17/2019  Narrative: ONE VIEW PORTABLE CHEST  HISTORY: Tachycardia.  FINDINGS: The lungs are well-expanded and clear. The heart is top normal in size with a coronary artery stent in place. There is no  acute disease or change from 05/23/2017.  This report was finalized on 4/17/2019 6:03 PM by Dr. Stiven Moore M.D.      I ordered the above noted radiological studies. Interpreted by radiologist. Discussed with radiologist ( ). Reviewed by me in PACS.       PROCEDURES  Procedures  EKG          EKG time: 1639  Rhythm/Rate: Sinus tach 101  QRS, axis: narrow complex, nml axis, PAC is present  ST and T waves: diffuse nonspecific ST and T wave changes  QT nml     Interpreted Contemporaneously by me, independently viewed  Unchanged compared to prior 5/2017      PROGRESS AND CONSULTS  ED Course as of Apr 17 2319 Wed Apr 17, 2019   1830 1 year ago the patient's creatinine was normal Creatinine: (!) 1.70 [MM]   1830 1 year ago the patient's calcium was 10.1 and also previous ones revealed a calcium of 10.4 Calcium: (!) 10.7 [MM]   1830 Calcium: (!) 10.7 [MM]   1834 CO2: 25.4 [MM]   1834 Anion Gap: 16.6 [MM]      ED Course User Index  [MM] Eulalio Rubi MD     1613- Ordered IVF, CXR, labs, and EKG.    1624- Ordered CT head.     1833- BP- 136/77 HR- 115 Temp- 97.6 °F (36.4 °C) (Tympanic) O2 sat- 96%  Rechecked pt. Pt is resting comfortably. Pt has had about 700 cc of fluid at this time. Notified pt of his current lab and imaging results. Discussed the plan to contact [facility] about possible pt admission. Pt understands and agrees with the plan, all questions answered.    1834- Ordered IVF. Placed call to [facility].     1908- Discussed pt with Dr. Garrett ([facility]) who agrees to admit he pt.     1932- BP- 136/77 HR- 108 Temp- 97.6 °F (36.4 °C) (Tympanic) O2 sat- 100%  Rechecked pt. Pt is resting comfortably. Pt's family is now bedside. I updated the family of the pt's condition. Notified pt of my discussion with Dr. Garrett. Discussed the plan to admit the pt for further evaluation and treatment. Pt understands and agrees with the plan, all questions answered.    The patient's headache is not concerning for any emergent etiology.   Patient has not had any fever and the patient is neck is supple with full range of motion.  The headache is gradual in onset and intermittent.  It is not abrupt in onset or severe in onset.  The patient has had no trauma and no neurologic deficit.    MEDICAL DECISION MAKING  Results were reviewed/discussed with the patient and they were also made aware of online access. Pt also made aware that some labs, such as cultures, will not be resulted during ER visit and follow up with PMD is necessary.     MDM  Number of Diagnoses or Management Options  Acute renal failure, unspecified acute renal failure type (CMS/HCC):   Sinus tachycardia:      Amount and/or Complexity of Data Reviewed  Clinical lab tests: ordered and reviewed (Creatinine 1.70)  Tests in the radiology section of CPT®: ordered and reviewed (CXR- negative  CT Head- negative)  Tests in the medicine section of CPT®: reviewed and ordered (See EKG note.)  Discussion of test results with the performing providers: yes (Dr. Beth (Radiologist))  Decide to obtain previous medical records or to obtain history from someone other than the patient: yes  Review and summarize past medical records: yes (Pt has a Hx of EtOH abuse and a Hx of methamphetamine abuse. Pt also has a Hx tachycardia and SVTs. He had an ECHO done in 5/2017 that was unremarkable.     Reviewed Dr. Turcios' (cardiologist) note from May of 2017. )  Discuss the patient with other providers: yes (Dr. Garrett ([facility]))  Independent visualization of images, tracings, or specimens: yes    Patient Progress  Patient progress: stable         DIAGNOSIS  Final diagnoses:   Sinus tachycardia   Acute renal failure, unspecified acute renal failure type (CMS/HCC)   Diarrhea, unspecified type   Nonintractable headache, unspecified chronicity pattern, unspecified headache type       DISPOSITION  ADMISSION    Discussed treatment plan and reason for admission with pt/family and admitting physician.  Pt/family voiced  understanding of the plan for admission for further testing/treatment as needed.     Latest Documented Vital Signs:  As of 11:19 PM  BP- 168/94 HR- 113 Temp- 98.1 °F (36.7 °C) (Oral) O2 sat- 99%    --  Documentation assistance provided by nuha Galeana for Dr. Rubi.  Information recorded by the scribe was done at my direction and has been verified and validated by me.       Carlos Galeana  04/17/19 1933       Eulalio Rubi MD  04/17/19 0473
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
## 2019-04-17 NOTE — ED NOTES
Report received from KENDELL Gutiérrez. Pt resting comfortably, denies pain or nausea. IVF's infusing.     Myriam Her RN  04/17/19 1926

## 2019-04-18 VITALS
SYSTOLIC BLOOD PRESSURE: 130 MMHG | TEMPERATURE: 98.1 F | HEART RATE: 94 BPM | RESPIRATION RATE: 18 BRPM | WEIGHT: 159 LBS | DIASTOLIC BLOOD PRESSURE: 63 MMHG | OXYGEN SATURATION: 100 % | BODY MASS INDEX: 23.55 KG/M2 | HEIGHT: 69 IN

## 2019-04-18 PROBLEM — K59.1 FUNCTIONAL DIARRHEA: Status: RESOLVED | Noted: 2019-04-17 | Resolved: 2019-04-18

## 2019-04-18 PROBLEM — E86.0 DEHYDRATION: Status: RESOLVED | Noted: 2019-04-17 | Resolved: 2019-04-18

## 2019-04-18 PROBLEM — Z91.199 MEDICALLY NONCOMPLIANT: Status: ACTIVE | Noted: 2019-04-18

## 2019-04-18 LAB
ALBUMIN SERPL-MCNC: 3.5 G/DL (ref 3.5–5.2)
ANION GAP SERPL CALCULATED.3IONS-SCNC: 12.4 MMOL/L
BUN BLD-MCNC: 8 MG/DL (ref 8–23)
BUN/CREAT SERPL: 5.9 (ref 7–25)
CALCIUM SPEC-SCNC: 8.8 MG/DL (ref 8.6–10.5)
CHLORIDE SERPL-SCNC: 107 MMOL/L (ref 98–107)
CO2 SERPL-SCNC: 24.6 MMOL/L (ref 22–29)
CREAT BLD-MCNC: 1.35 MG/DL (ref 0.76–1.27)
CRP SERPL-MCNC: 0.27 MG/DL (ref 0–0.5)
ERYTHROCYTE [SEDIMENTATION RATE] IN BLOOD: 30 MM/HR (ref 0–20)
GFR SERPL CREATININE-BSD FRML MDRD: 63 ML/MIN/1.73
GLUCOSE BLD-MCNC: 100 MG/DL (ref 65–99)
PHOSPHATE SERPL-MCNC: 2.6 MG/DL (ref 2.5–4.5)
POTASSIUM BLD-SCNC: 3.8 MMOL/L (ref 3.5–5.2)
SODIUM BLD-SCNC: 144 MMOL/L (ref 136–145)
TSH SERPL DL<=0.05 MIU/L-ACNC: 1.69 MIU/ML (ref 0.27–4.2)

## 2019-04-18 PROCEDURE — 96374 THER/PROPH/DIAG INJ IV PUSH: CPT

## 2019-04-18 PROCEDURE — 96361 HYDRATE IV INFUSION ADD-ON: CPT

## 2019-04-18 PROCEDURE — 85652 RBC SED RATE AUTOMATED: CPT | Performed by: HOSPITALIST

## 2019-04-18 PROCEDURE — 86140 C-REACTIVE PROTEIN: CPT | Performed by: HOSPITALIST

## 2019-04-18 PROCEDURE — 96372 THER/PROPH/DIAG INJ SC/IM: CPT

## 2019-04-18 PROCEDURE — 25010000002 ONDANSETRON PER 1 MG: Performed by: HOSPITALIST

## 2019-04-18 PROCEDURE — 80069 RENAL FUNCTION PANEL: CPT | Performed by: HOSPITALIST

## 2019-04-18 PROCEDURE — G0378 HOSPITAL OBSERVATION PER HR: HCPCS

## 2019-04-18 PROCEDURE — 84443 ASSAY THYROID STIM HORMONE: CPT | Performed by: HOSPITALIST

## 2019-04-18 PROCEDURE — 25010000002 ENOXAPARIN PER 10 MG: Performed by: HOSPITALIST

## 2019-04-18 RX ORDER — METOPROLOL SUCCINATE 25 MG/1
25 TABLET, EXTENDED RELEASE ORAL
Qty: 30 TABLET | Refills: 0
Start: 2019-04-19 | End: 2022-03-17 | Stop reason: HOSPADM

## 2019-04-18 RX ADMIN — ENOXAPARIN SODIUM 40 MG: 40 INJECTION SUBCUTANEOUS at 09:41

## 2019-04-18 RX ADMIN — SODIUM CHLORIDE 125 ML/HR: 9 INJECTION, SOLUTION INTRAVENOUS at 11:15

## 2019-04-18 RX ADMIN — METOPROLOL SUCCINATE 25 MG: 25 TABLET, FILM COATED, EXTENDED RELEASE ORAL at 09:41

## 2019-04-18 RX ADMIN — ONDANSETRON 4 MG: 2 INJECTION INTRAMUSCULAR; INTRAVENOUS at 06:15

## 2019-04-18 NOTE — PLAN OF CARE
Problem: Patient Care Overview  Goal: Plan of Care Review  Outcome: Ongoing (interventions implemented as appropriate)   04/18/19 0618   Coping/Psychosocial   Plan of Care Reviewed With patient   Plan of Care Review   Progress improving   OTHER   Outcome Summary Pt rested on and off. No c/o pain. Zofran given 1x for nausea. Tachy on tele monitor. Will continue to monitor     Goal: Individualization and Mutuality  Outcome: Ongoing (interventions implemented as appropriate)      Problem: Pain, Acute (Adult)  Goal: Identify Related Risk Factors and Signs and Symptoms  Outcome: Outcome(s) achieved Date Met: 04/18/19    Goal: Acceptable Pain Control/Comfort Level  Outcome: Ongoing (interventions implemented as appropriate)

## 2019-04-18 NOTE — ED NOTES
Awaiting transport. Pt declined changing into a gown until he gets upstairs.     Myriam Her RN  04/17/19 2003

## 2019-04-18 NOTE — DISCHARGE SUMMARY
[facility]    Date of Discharge:  4/18/2019    PCP: Rebecca Miramontes MD    Discharge Diagnosis:   Active Hospital Problems    Diagnosis  POA   • **Sinus tachycardia [R00.0]  Yes   • Medically noncompliant [Z91.19]  Not Applicable   • Chronic kidney disease, stage 3 (CMS/HCC) [N18.3]  Yes   • Fatty liver, alcoholic [K70.0]  Yes   • Tobacco abuse [Z72.0]  Yes      Resolved Hospital Problems    Diagnosis Date Resolved POA   • Dehydration [E86.0] 04/18/2019 Yes   • Functional diarrhea [K59.1] 04/18/2019 Yes     Procedures Performed       Consults     Date and Time Order Name Status Description    4/17/2019 2233 [facility] (on-call MD unless specified) Completed         Hospital Course  Please see history and physical for details. Patient is a 68 y.o. male initially admitted by 1 of my partners secondary to sinus tachycardia.  To be honest I am not exactly sure why this patient was admitted for this reason as he is obviously noncompliant with metoprolol and that is the reason why he has tachycardia.  He got some IV fluids over concern for possible dehydration due to diarrhea but I do not really feel those are all that pertinent.  He has had absolutely no diarrhea since being here so previous concern to check stool studies have been unobtained.  Patient was just restarted on Toprol at 25 mg daily and his heart rates already improved into the 90s.  I reviewed a previous discharge summary for myself when he formally was on 50 mg twice daily of Toprol-XL but for now he seems therapeutic at the current level.  When asked if he still drinking alcohol he initially says \"no\" and then he starts to talk about how he has just a couple beers but he does not consume as much so who knows how much he really drinks as this is compounding all and I would not doubt if he has issues of duodenitis from alcohol abuse which then leads to diarrhea.  Regardless this patient was very " adamant about leaving the hospital and he nearly left AMA this morning.  He waited around as I saw him next in rotation and this patient is medically stable for disposition.  Ultimately needs to focus more on lifestyle issues as well as taking his medications and I provided him a 30-day supply of Toprol and he can see his PCP for further recommendations and long-term prescribing.    No family member present with patient on day of discharge.  All questions answered patient prior to discharge and case discussed in multidisciplinary rounds.      Condition on Discharge: Improved.     Temp:  [97.6 °F (36.4 °C)-98.1 °F (36.7 °C)] 98.1 °F (36.7 °C)  Heart Rate:  [] 94  Resp:  [16-18] 18  BP: (127-168)/(63-94) 130/63  Body mass index is 23.48 kg/m².    Physical Exam   Constitutional: He is oriented to person, place, and time. He appears well-developed.   HENT:   Head: Normocephalic.   Eyes: EOM are normal. Pupils are equal, round, and reactive to light.   Neck: Neck supple. No JVD present.   Cardiovascular: Normal rate and regular rhythm.   Pulmonary/Chest: Effort normal and breath sounds normal. No respiratory distress.   Abdominal: Soft. Bowel sounds are normal. He exhibits no distension.   Musculoskeletal: He exhibits no edema.   Neurological: He is alert and oriented to person, place, and time. No cranial nerve deficit.        Discharge Medications      New Medications      Instructions Start Date   aspirin 81 MG tablet   81 mg, Oral, Daily      metoprolol succinate XL 25 MG 24 hr tablet  Commonly known as:  TOPROL-XL   25 mg, Oral, Every 24 Hours Scheduled   Start Date:  4/19/2019             Follow-up Information     Rebecca Miramontes MD .    Specialty:  Internal Medicine  Contact information:  [address]  [place] [id number] [phone]                 Test Results Pending at Discharge     John Esquivel MD  04/18/19  1:10 PM    Discharge time spent greater than 30 minutes.

## 2019-10-26 ENCOUNTER — HOSPITAL ENCOUNTER (EMERGENCY)
Facility: HOSPITAL | Age: 69
Discharge: HOME | End: 2019-10-26
Attending: EMERGENCY MEDICINE | Admitting: EMERGENCY MEDICINE

## 2019-10-26 ENCOUNTER — APPOINTMENT (OUTPATIENT)
Dept: CT IMAGING | Facility: HOSPITAL | Age: 69
End: 2019-10-26

## 2019-10-26 VITALS
WEIGHT: 162.4 LBS | HEART RATE: 109 BPM | TEMPERATURE: 98.9 F | HEIGHT: 69 IN | RESPIRATION RATE: 16 BRPM | DIASTOLIC BLOOD PRESSURE: 86 MMHG | SYSTOLIC BLOOD PRESSURE: 173 MMHG | BODY MASS INDEX: 24.05 KG/M2 | OXYGEN SATURATION: 99 %

## 2019-10-26 DIAGNOSIS — K52.9 CHRONIC DIARRHEA: Primary | ICD-10-CM

## 2019-10-26 DIAGNOSIS — R11.2 NAUSEA AND VOMITING, INTRACTABILITY OF VOMITING NOT SPECIFIED, UNSPECIFIED VOMITING TYPE: ICD-10-CM

## 2019-10-26 LAB
ALBUMIN SERPL-MCNC: 4.2 G/DL (ref 3.5–5.2)
ALBUMIN/GLOB SERPL: 1.3 G/DL
ALP SERPL-CCNC: 61 U/L (ref 39–117)
ALT SERPL W P-5'-P-CCNC: 9 U/L (ref 1–41)
ANION GAP SERPL CALCULATED.3IONS-SCNC: 16.3 MMOL/L (ref 5–15)
AST SERPL-CCNC: 19 U/L (ref 1–40)
BASOPHILS # BLD AUTO: 0.04 10*3/MM3 (ref 0–0.2)
BASOPHILS NFR BLD AUTO: 0.5 % (ref 0–1.5)
BILIRUB SERPL-MCNC: 0.2 MG/DL (ref 0.2–1.2)
BILIRUB UR QL STRIP: NEGATIVE
BUN BLD-MCNC: 13 MG/DL (ref 8–23)
BUN/CREAT SERPL: 8 (ref 7–25)
CALCIUM SPEC-SCNC: 9 MG/DL (ref 8.6–10.5)
CHLORIDE SERPL-SCNC: 100 MMOL/L (ref 98–107)
CLARITY UR: CLEAR
CO2 SERPL-SCNC: 23.7 MMOL/L (ref 22–29)
COLOR UR: YELLOW
CREAT BLD-MCNC: 1.62 MG/DL (ref 0.76–1.27)
DEPRECATED RDW RBC AUTO: 47.5 FL (ref 37–54)
EOSINOPHIL # BLD AUTO: 0.04 10*3/MM3 (ref 0–0.4)
EOSINOPHIL NFR BLD AUTO: 0.5 % (ref 0.3–6.2)
ERYTHROCYTE [DISTWIDTH] IN BLOOD BY AUTOMATED COUNT: 13.1 % (ref 12.3–15.4)
ETHANOL BLD-MCNC: 18 MG/DL (ref 0–10)
ETHANOL UR QL: 0.02 %
GFR SERPL CREATININE-BSD FRML MDRD: 51 ML/MIN/1.73
GLOBULIN UR ELPH-MCNC: 3.3 GM/DL
GLUCOSE BLD-MCNC: 67 MG/DL (ref 65–99)
GLUCOSE UR STRIP-MCNC: NEGATIVE MG/DL
HCT VFR BLD AUTO: 37.6 % (ref 37.5–51)
HGB BLD-MCNC: 12.6 G/DL (ref 13–17.7)
HGB UR QL STRIP.AUTO: NEGATIVE
IMM GRANULOCYTES # BLD AUTO: 0.02 10*3/MM3 (ref 0–0.05)
IMM GRANULOCYTES NFR BLD AUTO: 0.3 % (ref 0–0.5)
KETONES UR QL STRIP: NEGATIVE
LEUKOCYTE ESTERASE UR QL STRIP.AUTO: NEGATIVE
LIPASE SERPL-CCNC: 49 U/L (ref 13–60)
LYMPHOCYTES # BLD AUTO: 1.84 10*3/MM3 (ref 0.7–3.1)
LYMPHOCYTES NFR BLD AUTO: 25.3 % (ref 19.6–45.3)
MAGNESIUM SERPL-MCNC: 2.4 MG/DL (ref 1.6–2.4)
MCH RBC QN AUTO: 33.1 PG (ref 26.6–33)
MCHC RBC AUTO-ENTMCNC: 33.5 G/DL (ref 31.5–35.7)
MCV RBC AUTO: 98.7 FL (ref 79–97)
MONOCYTES # BLD AUTO: 0.56 10*3/MM3 (ref 0.1–0.9)
MONOCYTES NFR BLD AUTO: 7.7 % (ref 5–12)
NEUTROPHILS # BLD AUTO: 4.78 10*3/MM3 (ref 1.7–7)
NEUTROPHILS NFR BLD AUTO: 65.7 % (ref 42.7–76)
NITRITE UR QL STRIP: NEGATIVE
NRBC BLD AUTO-RTO: 0.1 /100 WBC (ref 0–0.2)
PH UR STRIP.AUTO: 5.5 [PH] (ref 5–8)
PLATELET # BLD AUTO: 213 10*3/MM3 (ref 140–450)
PMV BLD AUTO: 9.9 FL (ref 6–12)
POTASSIUM BLD-SCNC: 4.3 MMOL/L (ref 3.5–5.2)
PROT SERPL-MCNC: 7.5 G/DL (ref 6–8.5)
PROT UR QL STRIP: NEGATIVE
RBC # BLD AUTO: 3.81 10*6/MM3 (ref 4.14–5.8)
SODIUM BLD-SCNC: 140 MMOL/L (ref 136–145)
SP GR UR STRIP: 1.02 (ref 1–1.03)
UROBILINOGEN UR QL STRIP: NORMAL
WBC NRBC COR # BLD: 7.28 10*3/MM3 (ref 3.4–10.8)

## 2019-10-26 PROCEDURE — 80053 COMPREHEN METABOLIC PANEL: CPT | Performed by: EMERGENCY MEDICINE

## 2019-10-26 PROCEDURE — 85025 COMPLETE CBC W/AUTO DIFF WBC: CPT | Performed by: EMERGENCY MEDICINE

## 2019-10-26 PROCEDURE — 83735 ASSAY OF MAGNESIUM: CPT | Performed by: EMERGENCY MEDICINE

## 2019-10-26 PROCEDURE — 83690 ASSAY OF LIPASE: CPT | Performed by: EMERGENCY MEDICINE

## 2019-10-26 PROCEDURE — 96374 THER/PROPH/DIAG INJ IV PUSH: CPT

## 2019-10-26 PROCEDURE — 99284 EMERGENCY DEPT VISIT MOD MDM: CPT

## 2019-10-26 PROCEDURE — 25010000002 IOPAMIDOL 61 % SOLUTION: Performed by: EMERGENCY MEDICINE

## 2019-10-26 PROCEDURE — 81003 URINALYSIS AUTO W/O SCOPE: CPT | Performed by: EMERGENCY MEDICINE

## 2019-10-26 PROCEDURE — 25010000002 ONDANSETRON PER 1 MG: Performed by: EMERGENCY MEDICINE

## 2019-10-26 PROCEDURE — 80307 DRUG TEST PRSMV CHEM ANLYZR: CPT | Performed by: EMERGENCY MEDICINE

## 2019-10-26 PROCEDURE — 74177 CT ABD & PELVIS W/CONTRAST: CPT

## 2019-10-26 RX ORDER — ONDANSETRON 2 MG/ML
4 INJECTION INTRAMUSCULAR; INTRAVENOUS ONCE
Status: COMPLETED | OUTPATIENT
Start: 2019-10-26 | End: 2019-10-26

## 2019-10-26 RX ORDER — ONDANSETRON 4 MG/1
4 TABLET, ORALLY DISINTEGRATING ORAL EVERY 8 HOURS PRN
Qty: 10 TABLET | Refills: 0 | Status: SHIPPED | OUTPATIENT
Start: 2019-10-26 | End: 2019-10-29

## 2019-10-26 RX ADMIN — SODIUM CHLORIDE 1000 ML: 9 INJECTION, SOLUTION INTRAVENOUS at 16:26

## 2019-10-26 RX ADMIN — IOPAMIDOL 85 ML: 612 INJECTION, SOLUTION INTRAVENOUS at 17:21

## 2019-10-26 RX ADMIN — ONDANSETRON 4 MG: 2 INJECTION INTRAMUSCULAR; INTRAVENOUS at 17:50

## 2019-12-26 ENCOUNTER — TELEPHONE (OUTPATIENT)
Dept: CARDIOLOGY | Facility: CLINIC | Age: 69
End: 2019-12-26

## 2019-12-26 NOTE — TELEPHONE ENCOUNTER
Pt saw Dr Moralez for consult last 5/23/17. Pt had stress test at [facility] 12/23/19    Thanks  Lanie BRADLEY

## 2019-12-26 NOTE — TELEPHONE ENCOUNTER
Patient called and said he needs to discuss having a stress test at [facility] and is asking if he needs to follow up with .  Will you please call him and let me know if there is anything that I need to schedule?

## 2020-04-18 ENCOUNTER — HOSPITAL ENCOUNTER (OUTPATIENT)
Facility: HOSPITAL | Age: 70
Setting detail: OBSERVATION
Discharge: LEFT BEFORE DISCHARGE | End: 2020-04-19
Attending: EMERGENCY MEDICINE | Admitting: INTERNAL MEDICINE

## 2020-04-18 DIAGNOSIS — Z86.73 HISTORY OF CVA (CEREBROVASCULAR ACCIDENT): ICD-10-CM

## 2020-04-18 DIAGNOSIS — R51.9 NONINTRACTABLE EPISODIC HEADACHE, UNSPECIFIED HEADACHE TYPE: ICD-10-CM

## 2020-04-18 DIAGNOSIS — E87.1 HYPONATREMIA: ICD-10-CM

## 2020-04-18 DIAGNOSIS — R44.1 VISUAL HALLUCINATIONS: Primary | ICD-10-CM

## 2020-04-18 DIAGNOSIS — F10.20 ALCOHOL DEPENDENCE, DAILY USE (HCC): ICD-10-CM

## 2020-04-18 PROBLEM — D72.819 LEUKOPENIA: Status: ACTIVE | Noted: 2020-04-18

## 2020-04-18 PROBLEM — F29 PSYCHOSIS: Status: ACTIVE | Noted: 2020-04-18

## 2020-04-18 PROBLEM — D64.9 SYMPTOMATIC ANEMIA: Status: ACTIVE | Noted: 2020-04-18

## 2020-04-18 PROBLEM — I25.10 CAD (CORONARY ARTERY DISEASE): Status: ACTIVE | Noted: 2020-04-18

## 2020-04-18 PROBLEM — F19.10 SUBSTANCE ABUSE: Status: ACTIVE | Noted: 2020-04-18

## 2020-04-18 PROBLEM — K70.9 ALCOHOLIC LIVER DISEASE: Status: ACTIVE | Noted: 2017-05-23

## 2020-04-18 LAB
ALBUMIN SERPL-MCNC: 3.8 G/DL (ref 3.5–5.2)
ALBUMIN/GLOB SERPL: 1.2 G/DL
ALP SERPL-CCNC: 53 U/L (ref 39–117)
ALT SERPL W P-5'-P-CCNC: 25 U/L (ref 1–41)
AMPHET+METHAMPHET UR QL: POSITIVE
ANION GAP SERPL CALCULATED.3IONS-SCNC: 11.3 MMOL/L (ref 5–15)
AST SERPL-CCNC: 42 U/L (ref 1–40)
BARBITURATES UR QL SCN: NEGATIVE
BASOPHILS # BLD AUTO: 0.03 10*3/MM3 (ref 0–0.2)
BASOPHILS NFR BLD AUTO: 0.9 % (ref 0–1.5)
BENZODIAZ UR QL SCN: NEGATIVE
BILIRUB SERPL-MCNC: 0.2 MG/DL (ref 0.2–1.2)
BILIRUB UR QL STRIP: NEGATIVE
BUN BLD-MCNC: 9 MG/DL (ref 8–23)
BUN/CREAT SERPL: 8.8 (ref 7–25)
CALCIUM SPEC-SCNC: 10.5 MG/DL (ref 8.6–10.5)
CANNABINOIDS SERPL QL: NEGATIVE
CHLORIDE SERPL-SCNC: 87 MMOL/L (ref 98–107)
CLARITY UR: CLEAR
CO2 SERPL-SCNC: 21.7 MMOL/L (ref 22–29)
COCAINE UR QL: NEGATIVE
COLOR UR: YELLOW
CREAT BLD-MCNC: 1.02 MG/DL (ref 0.76–1.27)
DEPRECATED RDW RBC AUTO: 54.7 FL (ref 37–54)
EOSINOPHIL # BLD AUTO: 0.07 10*3/MM3 (ref 0–0.4)
EOSINOPHIL NFR BLD AUTO: 2.2 % (ref 0.3–6.2)
ERYTHROCYTE [DISTWIDTH] IN BLOOD BY AUTOMATED COUNT: 14.1 % (ref 12.3–15.4)
ETHANOL BLD-MCNC: 55 MG/DL (ref 0–10)
ETHANOL UR QL: 0.06 %
FERRITIN SERPL-MCNC: 1295 NG/ML (ref 30–400)
FOLATE SERPL-MCNC: 4.93 NG/ML (ref 4.78–24.2)
GFR SERPL CREATININE-BSD FRML MDRD: 87 ML/MIN/1.73
GLOBULIN UR ELPH-MCNC: 3.3 GM/DL
GLUCOSE BLD-MCNC: 89 MG/DL (ref 65–99)
GLUCOSE UR STRIP-MCNC: NEGATIVE MG/DL
HCT VFR BLD AUTO: 27.8 % (ref 37.5–51)
HGB BLD-MCNC: 9.3 G/DL (ref 13–17.7)
HGB UR QL STRIP.AUTO: NEGATIVE
IMM GRANULOCYTES # BLD AUTO: 0.04 10*3/MM3 (ref 0–0.05)
IMM GRANULOCYTES NFR BLD AUTO: 1.2 % (ref 0–0.5)
INR PPP: 1 (ref 0.9–1.1)
IRON 24H UR-MRATE: 96 MCG/DL (ref 59–158)
IRON SATN MFR SERPL: 36 % (ref 20–50)
KETONES UR QL STRIP: NEGATIVE
LEUKOCYTE ESTERASE UR QL STRIP.AUTO: NEGATIVE
LYMPHOCYTES # BLD AUTO: 0.87 10*3/MM3 (ref 0.7–3.1)
LYMPHOCYTES NFR BLD AUTO: 26.9 % (ref 19.6–45.3)
MAGNESIUM SERPL-MCNC: 1.6 MG/DL (ref 1.6–2.4)
MAGNESIUM SERPL-MCNC: 1.8 MG/DL (ref 1.6–2.4)
MCH RBC QN AUTO: 35.1 PG (ref 26.6–33)
MCHC RBC AUTO-ENTMCNC: 33.5 G/DL (ref 31.5–35.7)
MCV RBC AUTO: 104.9 FL (ref 79–97)
METHADONE UR QL SCN: NEGATIVE
MONOCYTES # BLD AUTO: 0.54 10*3/MM3 (ref 0.1–0.9)
MONOCYTES NFR BLD AUTO: 16.7 % (ref 5–12)
NEUTROPHILS # BLD AUTO: 1.68 10*3/MM3 (ref 1.7–7)
NEUTROPHILS NFR BLD AUTO: 52.1 % (ref 42.7–76)
NITRITE UR QL STRIP: NEGATIVE
NRBC BLD AUTO-RTO: 0 /100 WBC (ref 0–0.2)
OPIATES UR QL: NEGATIVE
OSMOLALITY SERPL: 263 MOSM/KG (ref 280–301)
OXYCODONE UR QL SCN: NEGATIVE
PH UR STRIP.AUTO: 5.5 [PH] (ref 5–8)
PLATELET # BLD AUTO: 179 10*3/MM3 (ref 140–450)
PMV BLD AUTO: 8.9 FL (ref 6–12)
POTASSIUM BLD-SCNC: 5.2 MMOL/L (ref 3.5–5.2)
PROT SERPL-MCNC: 7.1 G/DL (ref 6–8.5)
PROT UR QL STRIP: NEGATIVE
PROTHROMBIN TIME: 12.9 SECONDS (ref 11.7–14.2)
RBC # BLD AUTO: 2.65 10*6/MM3 (ref 4.14–5.8)
RETICS # AUTO: 0.1 10*6/MM3 (ref 0.02–0.13)
RETICS/RBC NFR AUTO: 3.52 % (ref 0.7–1.9)
SODIUM BLD-SCNC: 120 MMOL/L (ref 136–145)
SP GR UR STRIP: 1.01 (ref 1–1.03)
TIBC SERPL-MCNC: 268 MCG/DL (ref 298–536)
TRANSFERRIN SERPL-MCNC: 180 MG/DL (ref 200–360)
TROPONIN T SERPL-MCNC: <0.01 NG/ML (ref 0–0.03)
TSH SERPL DL<=0.05 MIU/L-ACNC: 1.28 UIU/ML (ref 0.27–4.2)
UROBILINOGEN UR QL STRIP: NORMAL
VIT B12 BLD-MCNC: 954 PG/ML (ref 211–946)
WBC NRBC COR # BLD: 3.23 10*3/MM3 (ref 3.4–10.8)

## 2020-04-18 PROCEDURE — 83735 ASSAY OF MAGNESIUM: CPT | Performed by: INTERNAL MEDICINE

## 2020-04-18 PROCEDURE — 82607 VITAMIN B-12: CPT | Performed by: INTERNAL MEDICINE

## 2020-04-18 PROCEDURE — 80307 DRUG TEST PRSMV CHEM ANLYZR: CPT | Performed by: EMERGENCY MEDICINE

## 2020-04-18 PROCEDURE — 85025 COMPLETE CBC W/AUTO DIFF WBC: CPT | Performed by: EMERGENCY MEDICINE

## 2020-04-18 PROCEDURE — 93010 ELECTROCARDIOGRAM REPORT: CPT | Performed by: INTERNAL MEDICINE

## 2020-04-18 PROCEDURE — 96360 HYDRATION IV INFUSION INIT: CPT

## 2020-04-18 PROCEDURE — 84443 ASSAY THYROID STIM HORMONE: CPT | Performed by: INTERNAL MEDICINE

## 2020-04-18 PROCEDURE — 85045 AUTOMATED RETICULOCYTE COUNT: CPT | Performed by: INTERNAL MEDICINE

## 2020-04-18 PROCEDURE — G0378 HOSPITAL OBSERVATION PER HR: HCPCS

## 2020-04-18 PROCEDURE — 90791 PSYCH DIAGNOSTIC EVALUATION: CPT | Performed by: SOCIAL WORKER

## 2020-04-18 PROCEDURE — 83930 ASSAY OF BLOOD OSMOLALITY: CPT | Performed by: INTERNAL MEDICINE

## 2020-04-18 PROCEDURE — 96361 HYDRATE IV INFUSION ADD-ON: CPT

## 2020-04-18 PROCEDURE — 82728 ASSAY OF FERRITIN: CPT | Performed by: INTERNAL MEDICINE

## 2020-04-18 PROCEDURE — 83540 ASSAY OF IRON: CPT | Performed by: INTERNAL MEDICINE

## 2020-04-18 PROCEDURE — 84484 ASSAY OF TROPONIN QUANT: CPT | Performed by: EMERGENCY MEDICINE

## 2020-04-18 PROCEDURE — 82746 ASSAY OF FOLIC ACID SERUM: CPT | Performed by: INTERNAL MEDICINE

## 2020-04-18 PROCEDURE — 81003 URINALYSIS AUTO W/O SCOPE: CPT | Performed by: EMERGENCY MEDICINE

## 2020-04-18 PROCEDURE — 93005 ELECTROCARDIOGRAM TRACING: CPT | Performed by: EMERGENCY MEDICINE

## 2020-04-18 PROCEDURE — 99284 EMERGENCY DEPT VISIT MOD MDM: CPT

## 2020-04-18 PROCEDURE — 85610 PROTHROMBIN TIME: CPT | Performed by: EMERGENCY MEDICINE

## 2020-04-18 PROCEDURE — 80053 COMPREHEN METABOLIC PANEL: CPT | Performed by: EMERGENCY MEDICINE

## 2020-04-18 PROCEDURE — 84466 ASSAY OF TRANSFERRIN: CPT | Performed by: INTERNAL MEDICINE

## 2020-04-18 PROCEDURE — 83735 ASSAY OF MAGNESIUM: CPT | Performed by: EMERGENCY MEDICINE

## 2020-04-18 RX ORDER — SODIUM CHLORIDE 9 MG/ML
125 INJECTION, SOLUTION INTRAVENOUS CONTINUOUS
Status: DISCONTINUED | OUTPATIENT
Start: 2020-04-18 | End: 2020-04-19 | Stop reason: HOSPADM

## 2020-04-18 RX ORDER — FOLIC ACID 1 MG/1
1 TABLET ORAL DAILY
Status: DISCONTINUED | OUTPATIENT
Start: 2020-04-18 | End: 2020-04-19 | Stop reason: HOSPADM

## 2020-04-18 RX ORDER — ASPIRIN 81 MG/1
81 TABLET ORAL DAILY
Status: DISCONTINUED | OUTPATIENT
Start: 2020-04-18 | End: 2020-04-19 | Stop reason: HOSPADM

## 2020-04-18 RX ORDER — METOPROLOL SUCCINATE 25 MG/1
25 TABLET, EXTENDED RELEASE ORAL
Status: DISCONTINUED | OUTPATIENT
Start: 2020-04-18 | End: 2020-04-19 | Stop reason: HOSPADM

## 2020-04-18 RX ORDER — ACETAMINOPHEN 650 MG/1
650 SUPPOSITORY RECTAL EVERY 4 HOURS PRN
Status: DISCONTINUED | OUTPATIENT
Start: 2020-04-18 | End: 2020-04-19 | Stop reason: HOSPADM

## 2020-04-18 RX ORDER — FOLIC ACID 1 MG/1
1 TABLET ORAL ONCE
Status: DISCONTINUED | OUTPATIENT
Start: 2020-04-18 | End: 2020-04-18

## 2020-04-18 RX ORDER — ONDANSETRON 2 MG/ML
4 INJECTION INTRAMUSCULAR; INTRAVENOUS EVERY 6 HOURS PRN
Status: DISCONTINUED | OUTPATIENT
Start: 2020-04-18 | End: 2020-04-19 | Stop reason: HOSPADM

## 2020-04-18 RX ORDER — CHOLECALCIFEROL (VITAMIN D3) 125 MCG
500 CAPSULE ORAL DAILY
Status: DISCONTINUED | OUTPATIENT
Start: 2020-04-18 | End: 2020-04-19 | Stop reason: HOSPADM

## 2020-04-18 RX ORDER — SODIUM CHLORIDE 0.9 % (FLUSH) 0.9 %
10 SYRINGE (ML) INJECTION EVERY 12 HOURS SCHEDULED
Status: DISCONTINUED | OUTPATIENT
Start: 2020-04-18 | End: 2020-04-19 | Stop reason: HOSPADM

## 2020-04-18 RX ORDER — LORAZEPAM 2 MG/ML
1 INJECTION INTRAMUSCULAR
Status: DISCONTINUED | OUTPATIENT
Start: 2020-04-18 | End: 2020-04-19 | Stop reason: HOSPADM

## 2020-04-18 RX ORDER — ACETAMINOPHEN 325 MG/1
650 TABLET ORAL EVERY 4 HOURS PRN
Status: DISCONTINUED | OUTPATIENT
Start: 2020-04-18 | End: 2020-04-19 | Stop reason: HOSPADM

## 2020-04-18 RX ORDER — CHOLECALCIFEROL (VITAMIN D3) 125 MCG
5 CAPSULE ORAL NIGHTLY PRN
Status: DISCONTINUED | OUTPATIENT
Start: 2020-04-18 | End: 2020-04-19 | Stop reason: HOSPADM

## 2020-04-18 RX ORDER — ACETAMINOPHEN 160 MG/5ML
650 SOLUTION ORAL EVERY 4 HOURS PRN
Status: DISCONTINUED | OUTPATIENT
Start: 2020-04-18 | End: 2020-04-19 | Stop reason: HOSPADM

## 2020-04-18 RX ORDER — LORAZEPAM 0.5 MG/1
0.5 TABLET ORAL
Status: DISCONTINUED | OUTPATIENT
Start: 2020-04-18 | End: 2020-04-19 | Stop reason: HOSPADM

## 2020-04-18 RX ORDER — SODIUM CHLORIDE 0.9 % (FLUSH) 0.9 %
10 SYRINGE (ML) INJECTION AS NEEDED
Status: DISCONTINUED | OUTPATIENT
Start: 2020-04-18 | End: 2020-04-19 | Stop reason: HOSPADM

## 2020-04-18 RX ORDER — LORAZEPAM 1 MG/1
1 TABLET ORAL
Status: DISCONTINUED | OUTPATIENT
Start: 2020-04-18 | End: 2020-04-19 | Stop reason: HOSPADM

## 2020-04-18 RX ORDER — LORAZEPAM 2 MG/ML
0.5 INJECTION INTRAMUSCULAR
Status: DISCONTINUED | OUTPATIENT
Start: 2020-04-18 | End: 2020-04-19 | Stop reason: HOSPADM

## 2020-04-18 RX ORDER — THIAMINE MONONITRATE (VIT B1) 100 MG
100 TABLET ORAL DAILY
Status: DISCONTINUED | OUTPATIENT
Start: 2020-04-18 | End: 2020-04-19 | Stop reason: HOSPADM

## 2020-04-18 RX ORDER — DOCUSATE SODIUM 100 MG/1
100 CAPSULE, LIQUID FILLED ORAL 2 TIMES DAILY
Status: DISCONTINUED | OUTPATIENT
Start: 2020-04-18 | End: 2020-04-19 | Stop reason: HOSPADM

## 2020-04-18 RX ORDER — NICOTINE 21 MG/24HR
1 PATCH, TRANSDERMAL 24 HOURS TRANSDERMAL EVERY 24 HOURS
Status: DISCONTINUED | OUTPATIENT
Start: 2020-04-18 | End: 2020-04-19 | Stop reason: HOSPADM

## 2020-04-18 RX ORDER — FAMOTIDINE 20 MG/1
40 TABLET, FILM COATED ORAL DAILY
Status: DISCONTINUED | OUTPATIENT
Start: 2020-04-18 | End: 2020-04-19 | Stop reason: HOSPADM

## 2020-04-18 RX ORDER — BISACODYL 5 MG/1
5 TABLET, DELAYED RELEASE ORAL DAILY PRN
Status: DISCONTINUED | OUTPATIENT
Start: 2020-04-18 | End: 2020-04-19 | Stop reason: HOSPADM

## 2020-04-18 RX ADMIN — Medication 500 MCG: at 20:21

## 2020-04-18 RX ADMIN — SODIUM CHLORIDE 500 ML: 9 INJECTION, SOLUTION INTRAVENOUS at 15:24

## 2020-04-18 RX ADMIN — LORAZEPAM 0.5 MG: 0.5 TABLET ORAL at 23:39

## 2020-04-18 RX ADMIN — FOLIC ACID 1 MG: 1 TABLET ORAL at 20:21

## 2020-04-18 RX ADMIN — SODIUM CHLORIDE, PRESERVATIVE FREE 10 ML: 5 INJECTION INTRAVENOUS at 20:24

## 2020-04-18 RX ADMIN — LORAZEPAM 0.5 MG: 0.5 TABLET ORAL at 20:22

## 2020-04-18 RX ADMIN — METOPROLOL SUCCINATE 25 MG: 25 TABLET, EXTENDED RELEASE ORAL at 20:21

## 2020-04-18 RX ADMIN — FAMOTIDINE 40 MG: 20 TABLET, FILM COATED ORAL at 20:22

## 2020-04-18 RX ADMIN — ASPIRIN 81 MG: 81 TABLET, COATED ORAL at 20:22

## 2020-04-18 RX ADMIN — SODIUM CHLORIDE 125 ML/HR: 9 INJECTION, SOLUTION INTRAVENOUS at 15:25

## 2020-04-18 RX ADMIN — ACETAMINOPHEN 650 MG: 325 TABLET, FILM COATED ORAL at 20:22

## 2020-04-18 RX ADMIN — DOCUSATE SODIUM 100 MG: 100 CAPSULE, LIQUID FILLED ORAL at 20:22

## 2020-04-18 NOTE — ED NOTES
Pt presents to ED from home. Pt complains of a headache over the last few months, but this most recently episode started two days ago. Pt denies N/V. Pt is currently A&Ox3. Pt in a mask upon arrival to ED.      Roxanna Gee, RN  04/18/20 7407

## 2020-04-18 NOTE — ED PROVIDER NOTES
" EMERGENCY DEPARTMENT ENCOUNTER    Room Number:  33/33  Date of encounter:  2020  PCP: Provider, No Known  Historian: Patient      HPI:  Chief Complaint: Visual hallucinations, \"foggy headed \", intermittent headaches  A complete HPI/ROS/PMH/PSH/SH/FH are unobtainable due to: N/A   Context: Hannah Altman is a 69 y.o. male who presents to the ED c/o recurrent visual hallucinations, foggy headed feeling, and intermittent occipital headaches since .  Patient has been to multiple facilities have had recurrent imaging of his head without improvement of symptoms.  He presents today to the emergency department as he is growing weary and very scared to be at home alone due to the visual hallucinations.  He sees people but they do not speak to him.  He does talk to them but gets no response.  No known family history of mental illness.  Patient does admit to drinking 2-3 beers daily.  No recent falls or head injuries.  Headaches are intermittent and generally arise in the occipital area and resolves with caffeine/coffee intake.  He denies ongoing headache at this time.  No exacerbating relieving factors otherwise identified.      MEDICAL HISTORY REVIEW  Radiology:   Ct Head Wo Contrast    Result Date: 3/22/2020  RADIOLOGY REPORT FACILITY: [facility] UNIT/AGE/GENDER: YESI ER AGE:69 Y SEX:M PATIENT NAME/: HANNAH ALTMAN A 1950 UNIT NUMBER: [id number] ACCOUNT NUMBER: [id number] ACCESSION NUMBER: [id number] CT HEAD WO CONTRAST DATE: 2020 INDICATION: Alteration of awareness COMPARISON: 2019 Technique: Standard transaxial CT evaluation of the head was performed. CT scans at this facility use dose modulation, iterative reconstruction and/or weight based dosing when appropriate to reduce radiation dose to as low as reasonably achievable. FINDINGS: There is mild diffuse atrophy. Moderate periventricular predominant areas of decreased attenuation are present which is " nonspecific but likely represents the sequela of microangiopathy. No hemorrhage or hydrocephalus. The gray-white matter differentiation appears maintained without definite CT evidence for infarction in evolution. No evidence for sinusitis or a fracture. There is moderate atherosclerosis of the internal carotid arteries. IMPRESSION: 1. No evidence for recent infarction, hemorrhage, or mass effect. 2. Chronic findings including atrophy, microangiopathy, as well as atherosclerosis Dictated by: Charbel Negron M.D. Images and Report reviewed and interpreted by: Charbel Negron M.D. <PS><Electronically signed by: Charbel Negron M.D.> 03/22/2020 1718 D: 03/22/2020 1715 T: 03/22/2020 1715    EXAMINATION: MRI of the brain without contrast.    DATE: 01/12/2020 at 1824.    COMPARISON: Head CT scans of December 22, 2019 and September 8, 2017.    CLINICAL HISTORY: Persistent tenderness in both ureters since receiving the flu shot on December 6, 2019. Initial encounter.    TECHNIQUE: Standard multisequential multiplanar magnetic resonance imaging study of the brain without contrast.     FINDINGS: Diffusion-weighted images show no evidence of acute infarction.    The ventricles, cortical sulci, and basilar cisterns are all within normal limits. No mass effect or midline shift is present, and there is no evidence of acute intracranial hemorrhage. Areas of increased signal on T2 and FLAIR sequences are present in   the periventricular white matter bilaterally, suggesting small vessel ischemic change. Chronic appearing lacunar infarcts in both thalami are incidentally identified. The gray-white differentiation is well-maintained.    IMPRESSION:   1. No evidence of mass, hemorrhage, or acute infarct.  2. Atrophy, microangiopathy, and prior lacunar infarcts.    Dictated by: Jacoby Barraza M.D.    Images and Report reviewed and interpreted by: Jacoby Barraza M.D.    PAST MEDICAL HISTORY  Active Ambulatory Problems     Diagnosis Date  "Noted   • Alcoholic ketoacidosis 05/23/2017   • Alcoholism /alcohol abuse (CMS/HCC) 05/23/2017   • Methamphetamine abuse (CMS/HCC) 05/23/2017   • Fatty liver, alcoholic 05/23/2017   • Nausea & vomiting 05/23/2017   • Alcoholic hepatitis without ascites 05/23/2017   • Metabolic acidosis 05/23/2017   • Tobacco abuse 05/23/2017   • Coronary artery disease involving native coronary artery of native heart without angina pectoris 05/24/2017   • Tachycardia 05/24/2017   • Sinus tachycardia 04/17/2019   • Chronic kidney disease, stage 3 (CMS/[facility]) 04/17/2019   • Medically noncompliant 04/18/2019     Resolved Ambulatory Problems     Diagnosis Date Noted   • Dehydration 04/17/2019   • Functional diarrhea 04/17/2019     Past Medical History:   Diagnosis Date   • Alcohol abuse    • CAD (coronary artery disease)    • GERD (gastroesophageal reflux disease)    • Hypertensive urgency    • Myocardial infarction (CMS/[facility])    • Pacemaker    • Stroke (CMS/[facility])          PAST SURGICAL HISTORY  Past Surgical History:   Procedure Laterality Date   • PACEMAKER IMPLANTATION           FAMILY HISTORY  History reviewed. No pertinent family history.      SOCIAL HISTORY  Social History     Socioeconomic History   • Marital status: Single     Spouse name: Not on file   • Number of children: Not on file   • Years of education: Not on file   • Highest education level: Not on file   Occupational History     Employer: RETIRED   Tobacco Use   • Smoking status: Current Every Day Smoker     Packs/day: 0.50   • Smokeless tobacco: Never Used   • Tobacco comment: tried to provide education declined irritated w/ attempt smoked \" depends on what I feel like.\"   Substance and Sexual Activity   • Alcohol use: Yes     Alcohol/week: 6.0 standard drinks     Types: 6 Cans of beer per week     Comment: hx changes depending on time questioned    • Drug use: No   • Sexual activity: Defer         ALLERGIES  Atorvastatin and Sertraline        REVIEW OF SYSTEMS  Review of " Systems     All systems reviewed and negative except for those discussed in HPI.       PHYSICAL EXAM    I have reviewed the triage vital signs and nursing notes.    ED Triage Vitals   Temp Heart Rate Resp BP SpO2   04/18/20 1255 04/18/20 1255 04/18/20 1255 04/18/20 1310 04/18/20 1255   96.4 °F (35.8 °C) 86 18 123/61 100 %      Temp src Heart Rate Source Patient Position BP Location FiO2 (%)   04/18/20 1255 04/18/20 1255 04/18/20 1310 04/18/20 1310 --   Tympanic Monitor Lying Right arm        Physical Exam    Physical Exam   Constitutional: No distress.   HENT:  Head: Normocephalic and atraumatic.   Oropharynx: Mucous membranes are moist.   Eyes: No scleral icterus. No conjunctival pallor.  Neck: Painless range of motion noted. Neck supple.   Cardiovascular: Normal rate, regular rhythm and intact distal pulses.  Pulmonary/Chest: No respiratory distress. There are no wheezes, no rhonchi, and no rales.   Abdominal: Soft. There is no tenderness. There is no rebound and no guarding.   Musculoskeletal: Moves all extremities equally. There is no pedal edema or calf tenderness.   Neurological: Alert.  Baseline strength and sensation noted. NIHSS = 0 accounting for patient's chronic right lower extremity motor deficit status post CVA  Skin: Skin is pink, warm, and dry. No pallor.   Psychiatric: Flat affect, mood somewhat depressed  Nursing note and vitals reviewed.    LAB RESULTS  Recent Results (from the past 24 hour(s))   Comprehensive Metabolic Panel    Collection Time: 04/18/20  2:18 PM   Result Value Ref Range    Glucose 89 65 - 99 mg/dL    BUN 9 8 - 23 mg/dL    Creatinine 1.02 0.76 - 1.27 mg/dL    Sodium 120 (L) 136 - 145 mmol/L    Potassium 5.2 3.5 - 5.2 mmol/L    Chloride 87 (L) 98 - 107 mmol/L    CO2 21.7 (L) 22.0 - 29.0 mmol/L    Calcium 10.5 8.6 - 10.5 mg/dL    Total Protein 7.1 6.0 - 8.5 g/dL    Albumin 3.80 3.50 - 5.20 g/dL    ALT (SGPT) 25 1 - 41 U/L    AST (SGOT) 42 (H) 1 - 40 U/L    Alkaline Phosphatase 53 39  - 117 U/L    Total Bilirubin 0.2 0.2 - 1.2 mg/dL    eGFR  African Amer 87 >60 mL/min/1.73    Globulin 3.3 gm/dL    A/G Ratio 1.2 g/dL    BUN/Creatinine Ratio 8.8 7.0 - 25.0    Anion Gap 11.3 5.0 - 15.0 mmol/L   Protime-INR    Collection Time: 04/18/20  2:18 PM   Result Value Ref Range    Protime 12.9 11.7 - 14.2 Seconds    INR 1.00 0.90 - 1.10   Magnesium    Collection Time: 04/18/20  2:18 PM   Result Value Ref Range    Magnesium 1.8 1.6 - 2.4 mg/dL   Ethanol    Collection Time: 04/18/20  2:18 PM   Result Value Ref Range    Ethanol 55 (H) 0 - 10 mg/dL    Ethanol % 0.055 %   CBC Auto Differential    Collection Time: 04/18/20  2:18 PM   Result Value Ref Range    WBC 3.23 (L) 3.40 - 10.80 10*3/mm3    RBC 2.65 (L) 4.14 - 5.80 10*6/mm3    Hemoglobin 9.3 (L) 13.0 - 17.7 g/dL    Hematocrit 27.8 (L) 37.5 - 51.0 %    .9 (H) 79.0 - 97.0 fL    MCH 35.1 (H) 26.6 - 33.0 pg    MCHC 33.5 31.5 - 35.7 g/dL    RDW 14.1 12.3 - 15.4 %    RDW-SD 54.7 (H) 37.0 - 54.0 fl    MPV 8.9 6.0 - 12.0 fL    Platelets 179 140 - 450 10*3/mm3    Neutrophil % 52.1 42.7 - 76.0 %    Lymphocyte % 26.9 19.6 - 45.3 %    Monocyte % 16.7 (H) 5.0 - 12.0 %    Eosinophil % 2.2 0.3 - 6.2 %    Basophil % 0.9 0.0 - 1.5 %    Immature Grans % 1.2 (H) 0.0 - 0.5 %    Neutrophils, Absolute 1.68 (L) 1.70 - 7.00 10*3/mm3    Lymphocytes, Absolute 0.87 0.70 - 3.10 10*3/mm3    Monocytes, Absolute 0.54 0.10 - 0.90 10*3/mm3    Eosinophils, Absolute 0.07 0.00 - 0.40 10*3/mm3    Basophils, Absolute 0.03 0.00 - 0.20 10*3/mm3    Immature Grans, Absolute 0.04 0.00 - 0.05 10*3/mm3    nRBC 0.0 0.0 - 0.2 /100 WBC   Troponin    Collection Time: 04/18/20  2:18 PM   Result Value Ref Range    Troponin T <0.010 0.000 - 0.030 ng/mL   Urinalysis With Microscopic If Indicated (No Culture) - Urine, Clean Catch    Collection Time: 04/18/20  3:25 PM   Result Value Ref Range    Color, UA Yellow Yellow, Straw    Appearance, UA Clear Clear    pH, UA 5.5 5.0 - 8.0    Specific Gravity, UA  1.009 1.005 - 1.030    Glucose, UA Negative Negative    Ketones, UA Negative Negative    Bilirubin, UA Negative Negative    Blood, UA Negative Negative    Protein, UA Negative Negative    Leuk Esterase, UA Negative Negative    Nitrite, UA Negative Negative    Urobilinogen, UA 0.2 E.U./dL 0.2 - 1.0 E.U./dL       Ordered the above labs and independently reviewed the results.        RADIOLOGY  No Radiology Exams Resulted Within Past 24 Hours    I ordered the above noted radiological studies. Reviewed by me and discussed with radiologist.  See dictation for official radiology interpretation.      PROCEDURES    Procedures        MEDICATIONS GIVEN IN ER    Medications   sodium chloride 0.9 % flush 10 mL (has no administration in time range)   sodium chloride 0.9 % bolus 500 mL (500 mL Intravenous New Bag 4/18/20 1524)   sodium chloride 0.9 % infusion (125 mL/hr Intravenous New Bag 4/18/20 1525)   thiamine (VITAMIN B-1) tablet 100 mg (has no administration in time range)   folic acid (FOLVITE) tablet 1 mg (has no administration in time range)         PROGRESS, DATA ANALYSIS, CONSULTS, AND MEDICAL DECISION MAKING    My differential diagnosis for altered mental status includes but is not limited to:  Hypoglycemia, hyperglycemia, DKA, overdose, ethanol intoxication, thiamine deficiency, niacin deficiency, hypothymia, hyperviscosity, Danny’s disease, hyponatremia, hypernatremia, liver failure, kidney failure, hyper or hypothyroid, no insufficiency, hypoxia, hypercarbia, carbon monoxide poisoning, postanoxic encephalopathy, ischemic stroke, intracranial bleed, subarachnoid hemorrhage, brain tumor, closed head injury, epidural hematoma, epidural hematoma, seizure activity, postictal state, syncopal episode, disseminated encephalomyelitis, central pontine myelinolysis, post cardiac arrest, bacterial meningitis, viral meningitis, fungal meningitis, encephalitis, brain abscess, subdural empyema, hysteria, catatonic state,  malingering, hypertensive encephalopathy, vasculitis, TTP, DIC    All labs have been independently reviewed by me.  All radiology studies have been reviewed by me and discussed with radiologist dictating the report.   EKG's independently viewed and interpreted by me.  Discussion below represents my analysis of pertinent findings related to patient's condition, differential diagnosis, treatment plan and final disposition.      ED Course as of Apr 18 1547   Sat Apr 18, 2020   1403 It appears the patient has had a fairly thorough neuroimaging work-up since onset of symptoms 4 months ago with MRI brain in January and CT brain more recently in March.  Given lack of new symptoms, trauma, or focal deficit on exam, no repeat imaging is indicated at this time.  Evaluate for possible medical causes of hallucinations and request psychiatric evaluation.    [RS]   1433 Intermittent anemia noted in EMR.  Patient denied dark stools.   Hemoglobin(!): 9.3 [RS]   1434 EKG           EKG time: 1409  Rhythm/Rate: Sinus with frequent PACs; ventricular rate 80-90  P waves and DE: First-degree AV block  QRS, axis: Narrow QRS complex  ST and T waves: ST elevation lead V1 and V2 is likely secondary to repolarization; QTC within normal limits    Interpreted Contemporaneously by me, independently viewed  Comparison: 4/17/2019      [RS]   1521 Case reviewed with Dr. [facility] who is agreeable to accept the patient for observation admission with telemetry    [RS]      ED Course User Index  [RS] Bennett Jose MD       AS OF 15:47 VITALS:    BP - 154/66  HR - 98  TEMP - 96.4 °F (35.8 °C) (Tympanic)  O2 SATS - 96%        DIAGNOSIS  Final diagnoses:   Visual hallucinations   Nonintractable episodic headache, unspecified headache type   Alcohol dependence, daily use (CMS/[facility])   History of CVA (cerebrovascular accident)   Hyponatremia         DISPOSITION  ADMISSION    Discussed treatment plan and reason for admission with pt/family and admitting  physician.  Pt/family voiced understanding of the plan for admission for further testing/treatment as needed.          Bennett Jose MD  04/18/20 1543

## 2020-04-18 NOTE — ED NOTES
This nurse found a single bed bug on pt's bed. This nurse captured the bed bug and placed it in a sealed container. Per charge nurse this nurse striped pt of his clothes and linens and double bagged them. This nurse gave pt new linens, a new gown, and socks. This nurse did NOT see any other bed bugs when bagging these items. The pt belongings insist of pants, belt, black socks and black shoes. Pt kept on him his cell phone and wallet. Items bagged remain at pt bedside. This nurse notified floor nurse Angelia RN on 5S.     Johana Delgado, KENDELL  04/18/20 1701       Johana Delgado RN  04/18/20 1702

## 2020-04-18 NOTE — PLAN OF CARE
Problem: Patient Care Overview  Goal: Plan of Care Review  Outcome: Ongoing (interventions implemented as appropriate)  Flowsheets (Taken 4/18/2020 1829)  Plan of Care Reviewed With: patient     Pt. Arrived from the ER to the floor. He came in complaining of a headache, the ER nurse and MD said that he was having visual and auditory hallucinations. I have yet to see any of those. Pt. Has been calm and cooperative. VSS.

## 2020-04-18 NOTE — CONSULTS
"68 y/o single  father of 2 adult children seen for \"disposition\" per order.  He came to the ED w/ c/o's of recurrent visual hallucinations, foggy headed feeling and intermittent occipital headaches.  He had an ETOH level of 55 and UDS positive for methamphetamine.  He denies prior hx of drugs yet has had a prior CUD positive for Cocaine.  He denies any SI or HI.  He as been seen by this clinician 2 x's in the past and denies SI or suicide attempt history. When ask by this clinician patient reports that he came to the hospital due to headaches.  He states that he went to [facility] and was given shot for flu.  He reports he has has had headaches and ringing in his ears. He denies any v/ hallucinations for several weeks.  He is up set b/c the doctor game him to high of a dose for the ringing in his ears so he stopped the medication. He does not remember the name.  He denies hx of DT's.  He has had agitation when in the hospital and not been the most involved in the assessment in the past. Today he was engaging.  He does have some frustration due to the medication issue.  He denied any s/s of ETOH w/ drawal.  He reports poor appetite.  He states that he has smoked since he was in his teens. Current rate is one pack in 2 weeks.      Patient states he has had [facility] treatment on [facility] in the past and when questioned about this he states it was AA meetings.  He denies any hx of mental health treatment.      Patient lives in an apartment alone. He has 2 adult children. He has siblings, nephew and children that are supportive. He is retired.      Patient was alert and O x 4. No SI or HI.  No a/v hallucinations noted during the evaluation.  He was pleasant and engaging.  [facility] will follow. Patient is not meeting any IP psych or [facility]QU[facility] treatment requirements at this time.  [facility] will follow briefly.   "

## 2020-04-18 NOTE — H&P
Patient Name:  Yuval Loja  YOB: 1950  MRN:  [id number]  Admit Date:  4/18/2020  Patient Care Team:  Provider, No Known as PCP - Jonny Rivera MD as Referring Physician (Internal Medicine)  Carlos Villareal MD as Consulting Physician (Hematology and Oncology)        Chief complaint.  Headache and seeing people that are not persent on and off for the last several months.  History Present Illness   A 69 years old -American gentleman with a past history significant for alcohol and substance abuse/history of coronary artery disease/stage III chronic renal failure/history of anemia.  Patient presents to the emergency department complaining of a headache since December 2019 and he states that he has over the last 2 weeks starting seeing people that he knows that they are not there he states that those people are known to him like his wife and daughter and they do not talk back.  There is no audible hallucination.  He was concerned about that and presented to the emergency department.  He has been recently evaluated by a CAT scan on 3/8/2020 in an outside facility that shows atrophy and microangiopathy.  An MRI on January 2020 revealed a lacunar infarction cerebral atrophy and microangiopathy but no other abnormalities.  Patient attributes his hallucination to the medication that he has recently started he does not no the name of the medication and he states that he has stopped it.  He denies any loss of consciousness or seizures.  There is no double vision no loss of the vision no slurring of the speech no unilateral numbness or weakness.  In the emergency department he was found to be neurologically intact.  He was found to have lab significant for anemia/leukopenia/hyponatremia and was subsequently admitted.  Patient denies any recent traveling from the [facility].  He denies any ill contact specifically no COVID-19 contacts.        Review of Systems  "  Cardiovascular/respiratory.  No chest pain/no palpitation/no shortness of breath/no cough/no wheeze/no hemoptysis/no lower extremity edema/no paroxysmal nocturnal dyspnea.  .  No dysuria/no hematuria/no urgency/no frequency/no urinary incontinence.  MS.  Positive for weakness but no myalgia.  Constitutional.  No fever or chills positive weakness.  GI.  Positive decreased appetite positive constipation.  No heartburn/no abdominal pain/no diarrhea/no bleeding per rectum/low melena.    Personal History     Past Medical History:   Diagnosis Date   • Alcohol abuse    • CAD (coronary artery disease)    • Chronic kidney disease, stage 3 (CMS/HCC)    • Fatty liver, alcoholic    • GERD (gastroesophageal reflux disease)    • Hypertensive urgency    • Metabolic acidosis    • Myocardial infarction (CMS/HCC)    • Pacemaker     patient has no pacemaker   • Sinus tachycardia    • Stroke (CMS/HCC)      Past Surgical History:   Procedure Laterality Date   • PACEMAKER IMPLANTATION     Please note that the patient states he has no pacer and by physical examination there is no pacer in place.    History reviewed. No pertinent family history.  Social History     Tobacco Use   • Smoking status: Current Every Day Smoker     Packs/day: 0.50   • Smokeless tobacco: Never Used   • Tobacco comment: tried to provide education declined irritated w/ attempt smoked \" depends on what I feel like.\"   Substance Use Topics   • Alcohol use: Yes     Alcohol/week: 6.0 standard drinks     Types: 6 Cans of beer per week     Comment: hx changes depending on time questioned    • Drug use: No   Please note that the patient denies any illicit drug use on questioning however his urine tox screen is positive for amphetamine and he has a past history in the chart of amphetamine use.    No current facility-administered medications on file prior to encounter.      Current Outpatient Medications on File Prior to Encounter   Medication Sig Dispense Refill   • " "aspirin 81 MG tablet Take 1 tablet by mouth Daily.     • metoprolol succinate XL (TOPROL-XL) 25 MG 24 hr tablet Take 1 tablet by mouth Daily. 30 tablet 0     Allergies   Allergen Reactions   • Atorvastatin    • Sertraline Anxiety, Diarrhea and Nausea And Vomiting     Also \"hallucinations\"       Objective    Objective     Vital Signs  Temp:  [96.4 °F (35.8 °C)] 96.4 °F (35.8 °C)  Heart Rate:  [82-98] 98  Resp:  [18] 18  BP: (123-154)/(61-76) 154/66  SpO2:  [90 %-100 %] 96 %  on   ;      Body mass index is 25.11 kg/m².    Physical Exam   Constitutional: He is oriented to person, place, and time. He appears well-developed and well-nourished. No distress.   HENT:   Head: Normocephalic and atraumatic.   Mouth/Throat: Oropharynx is clear and moist. No oropharyngeal exudate.   Eyes: Pupils are equal, round, and reactive to light. Conjunctivae and EOM are normal. No scleral icterus.   Positive small to medium bilateral cataracts/positive bilateral arcus analysis   Neck: Neck supple. No JVD present. No tracheal deviation present.   No carotid bruits   Cardiovascular: Normal rate and regular rhythm.   Murmur heard.  Grade 2 systolic murmur   Pulmonary/Chest: Effort normal and breath sounds normal. No respiratory distress. He has no wheezes. He has no rales.   Poor but clear bilateral air entry   Abdominal: Soft. Bowel sounds are normal. He exhibits no distension and no mass. There is no rebound and no guarding. A hernia is present.   Positive reducible right inguinal hernia that signs of strangulation.   Musculoskeletal: Normal range of motion. He exhibits no edema.   Lymphadenopathy:     He has no cervical adenopathy.   Neurological: He is alert and oriented to person, place, and time.   Intact motor/sensory/cerebellar system with +1 bilateral lower extremity edema.  Gait and Romberg not assessed's.   Skin: Skin is warm and dry. He is not diaphoretic.   Psychiatric: He has a normal mood and affect. His behavior is normal. " Judgment and thought content normal.   Nursing note and vitals reviewed.        Results Review:  I reviewed the patient's new clinical results.  I reviewed the patient's new imaging results and agree with the interpretation.  I reviewed the patient's other test results and agree with the interpretation  I personally viewed and interpreted the patient's EKG/Telemetry data  Discussed with ED provider.    Lab Results (last 24 hours)     Procedure Component Value Units Date/Time    CBC & Differential [[id number]] Collected:  04/18/20 1418    Specimen:  Blood Updated:  04/18/20 1429    Narrative:       The following orders were created for panel order CBC & Differential.  Procedure                               Abnormality         Status                     ---------                               -----------         ------                     CBC Auto Differential[[id number]]        Abnormal            Final result                 Please view results for these tests on the individual orders.    Comprehensive Metabolic Panel [[id number]]  (Abnormal) Collected:  04/18/20 1418    Specimen:  Blood Updated:  04/18/20 1451     Glucose 89 mg/dL      BUN 9 mg/dL      Creatinine 1.02 mg/dL      Sodium 120 mmol/L      Potassium 5.2 mmol/L      Chloride 87 mmol/L      CO2 21.7 mmol/L      Calcium 10.5 mg/dL      Total Protein 7.1 g/dL      Albumin 3.80 g/dL      ALT (SGPT) 25 U/L      AST (SGOT) 42 U/L      Alkaline Phosphatase 53 U/L      Total Bilirubin 0.2 mg/dL      eGFR   Amer 87 mL/min/1.73      Globulin 3.3 gm/dL      A/G Ratio 1.2 g/dL      BUN/Creatinine Ratio 8.8     Anion Gap 11.3 mmol/L     Narrative:       GFR Normal >60  Chronic Kidney Disease <60  Kidney Failure <15      Protime-INR [[id number]]  (Normal) Collected:  04/18/20 1418    Specimen:  Blood Updated:  04/18/20 1438     Protime 12.9 Seconds      INR 1.00    Magnesium [[id number]]  (Normal) Collected:  04/18/20 1418    Specimen:  Blood Updated:  04/18/20  1451     Magnesium 1.8 mg/dL     Ethanol [[id number]]  (Abnormal) Collected:  04/18/20 1418    Specimen:  Blood Updated:  04/18/20 1451     Ethanol 55 mg/dL      Ethanol % 0.055 %     CBC Auto Differential [[id number]]  (Abnormal) Collected:  04/18/20 1418    Specimen:  Blood Updated:  04/18/20 1429     WBC 3.23 10*3/mm3      RBC 2.65 10*6/mm3      Hemoglobin 9.3 g/dL      Hematocrit 27.8 %      .9 fL      MCH 35.1 pg      MCHC 33.5 g/dL      RDW 14.1 %      RDW-SD 54.7 fl      MPV 8.9 fL      Platelets 179 10*3/mm3      Neutrophil % 52.1 %      Lymphocyte % 26.9 %      Monocyte % 16.7 %      Eosinophil % 2.2 %      Basophil % 0.9 %      Immature Grans % 1.2 %      Neutrophils, Absolute 1.68 10*3/mm3      Lymphocytes, Absolute 0.87 10*3/mm3      Monocytes, Absolute 0.54 10*3/mm3      Eosinophils, Absolute 0.07 10*3/mm3      Basophils, Absolute 0.03 10*3/mm3      Immature Grans, Absolute 0.04 10*3/mm3      nRBC 0.0 /100 WBC     Troponin [[id number]]  (Normal) Collected:  04/18/20 1418    Specimen:  Blood Updated:  04/18/20 1534     Troponin T <0.010 ng/mL     Narrative:       Troponin T Reference Range:  <= 0.03 ng/mL-   Negative for AMI  >0.03 ng/mL-     Abnormal for myocardial necrosis.  Clinicians would have to utilize clinical acumen, EKG, Troponin and serial changes to determine if it is an Acute Myocardial Infarction or myocardial injury due to an underlying chronic condition.       Results may be falsely decreased if patient taking Biotin.      Urinalysis With Microscopic If Indicated (No Culture) - Urine, Clean Catch [[id number]]  (Normal) Collected:  04/18/20 1525    Specimen:  Urine, Clean Catch Updated:  04/18/20 1538     Color, UA Yellow     Appearance, UA Clear     pH, UA 5.5     Specific Gravity, UA 1.009     Glucose, UA Negative     Ketones, UA Negative     Bilirubin, UA Negative     Blood, UA Negative     Protein, UA Negative     Leuk Esterase, UA Negative     Nitrite, UA Negative     Urobilinogen,  UA 0.2 E.U./dL    Narrative:       Urine microscopic not indicated.    Urine Drug Screen - Urine, Clean Catch [[id number]]  (Abnormal) Collected:  04/18/20 1525    Specimen:  Urine, Clean Catch Updated:  04/18/20 1624     Amphet/Methamphet, Screen Positive     Barbiturates Screen, Urine Negative     Benzodiazepine Screen, Urine Negative     Cocaine Screen, Urine Negative     Opiate Screen Negative     THC, Screen, Urine Negative     Methadone Screen, Urine Negative     Oxycodone Screen, Urine Negative    Narrative:       Negative Thresholds For Drugs Screened:     Amphetamines               500 ng/ml   Barbiturates               200 ng/ml   Benzodiazepines            100 ng/ml   Cocaine                    300 ng/ml   Methadone                  300 ng/ml   Opiates                    300 ng/ml   Oxycodone                  100 ng/ml   THC                        50 ng/ml    The Normal Value for all drugs tested is negative. This report includes final unconfirmed screening results to be used for medical treatment purposes only. Unconfirmed results must not be used for non-medical purposes such as employment or legal testing. Clinical consideration should be applied to any drug of abuse test, particulary when unconfirmed results are used.          Imaging Results (Last 24 Hours)     ** No results found for the last 24 hours. **          Results for orders placed during the hospital encounter of 05/23/17   Adult Transthoracic Echo Complete    Narrative · Left ventricular systolic function is normal. Calculated EF = 58.3%.   Estimated EF was in agreement with the calculated EF. Normal left   ventricular cavity size and wall thickness noted. All left ventricular   wall segments contract normally. Left ventricular diastolic dysfunction is   noted (grade I) consistent with impaired relaxation.          ECG 12 Lead   Final Result   HEART RATE= 89  bpm   RR Interval= 672  ms   PA Interval= 270  ms   P Horizontal Axis= -83  deg    P Front Axis= -84  deg   QRSD Interval= 79  ms   QT Interval= 373  ms   QRS Axis= 41  deg   T Wave Axis= 61  deg   - ABNORMAL ECG -   Sinus rhythm   Atrial premature complex- new    Prolonged VA interval   Minimal ST elevation, anterior leads   Electronically Signed By: Margarita Mars ([facility]) 18-Apr-2020 15:00:50   Date and Time of Study: 2020-04-18 14:09:45               Assessment/Plan     Active Hospital Problems    Diagnosis  POA   • **Visual hallucinations [R44.1]  Yes   • Psychosis (CMS/HCC) [F29]  Yes   • Hyponatremia [E87.1]  Yes   • CAD (coronary artery disease) [I25.10]  Yes   • Leukopenia [D72.819]  Yes   • Symptomatic anemia [D64.9]  Yes   • Headache [R51]  Yes   • Alcohol dependence (CMS/HCC) [F10.20]  Yes   • Alcoholic liver disease (CMS/HCC) [K70.9]  Yes      Resolved Hospital Problems   No resolved problems to display.           Visual hallucination/psychosis in the context of history of alcohol and substance abuse.  At this time psychiatry will be consulted.  We will correct any other underlying metabolic causes.  Patient with no focal deficits neurologically.  Will monitor neuro check.  Recent MRI and CT scan of the brain were significant for small vessel disease and small lacunar infarction but no other abnormalities.  We will not repeat imaging studies at this time.  Will initiate detoxification.  Hyponatremia.  This is acute.  Patient appears to be euvolemic.  Will check serum osmolarity/TSH/cortisol level.  Will start IV normal saline.  Defer nephrology consult unless the sodium is not improving on the above measures.  Alcoholic liver disease with mild elevation of the liver function test.  Benign GI examination.  Monitor liver function test.  Leukopenia/anemia.  Hemoglobin is worse than the baseline.  Suspect that this is a bone marrow suppression in view of the alcohol and substance abuse.  I will work-up the anemia.  Monitor CBC.  There is no evidence of an acute GI bleed.  History of stage  III chronic renal insufficiency.  Patient is euvolemic.  Renal function is normal today.  History of coronary artery disease.  We will continue aspirin and Toprol.  There is no evidence of an acute angina or congestive heart failure at this time.  VTE prophylaxis.  Patient will be placed on sequential compression devices.  Peptic ulcer disease prophylaxis patient will be placed on Pepcid.      I discussed the patient's findings and my recommendations with patient and emergency room physician.      Code Status -patient full code.       Mc Vines MD  [facility]  04/18/20  17:42

## 2020-04-19 VITALS
BODY MASS INDEX: 24.49 KG/M2 | TEMPERATURE: 99.7 F | RESPIRATION RATE: 17 BRPM | OXYGEN SATURATION: 100 % | HEIGHT: 70 IN | DIASTOLIC BLOOD PRESSURE: 61 MMHG | WEIGHT: 171.08 LBS | SYSTOLIC BLOOD PRESSURE: 120 MMHG | HEART RATE: 98 BPM

## 2020-04-19 LAB
ALBUMIN SERPL-MCNC: 3.2 G/DL (ref 3.5–5.2)
ALBUMIN/GLOB SERPL: 1 G/DL
ALP SERPL-CCNC: 47 U/L (ref 39–117)
ALT SERPL W P-5'-P-CCNC: 18 U/L (ref 1–41)
ANION GAP SERPL CALCULATED.3IONS-SCNC: 9.9 MMOL/L (ref 5–15)
AST SERPL-CCNC: 31 U/L (ref 1–40)
BASOPHILS # BLD AUTO: 0.02 10*3/MM3 (ref 0–0.2)
BASOPHILS NFR BLD AUTO: 0.6 % (ref 0–1.5)
BILIRUB SERPL-MCNC: 0.3 MG/DL (ref 0.2–1.2)
BUN BLD-MCNC: 12 MG/DL (ref 8–23)
BUN/CREAT SERPL: 12.1 (ref 7–25)
CALCIUM SPEC-SCNC: 9.3 MG/DL (ref 8.6–10.5)
CHLORIDE SERPL-SCNC: 91 MMOL/L (ref 98–107)
CO2 SERPL-SCNC: 20.1 MMOL/L (ref 22–29)
CORTIS SERPL-MCNC: 4.27 MCG/DL
CREAT BLD-MCNC: 0.99 MG/DL (ref 0.76–1.27)
DEPRECATED RDW RBC AUTO: 52.1 FL (ref 37–54)
EOSINOPHIL # BLD AUTO: 0.08 10*3/MM3 (ref 0–0.4)
EOSINOPHIL NFR BLD AUTO: 2.4 % (ref 0.3–6.2)
ERYTHROCYTE [DISTWIDTH] IN BLOOD BY AUTOMATED COUNT: 13.8 % (ref 12.3–15.4)
GFR SERPL CREATININE-BSD FRML MDRD: 90 ML/MIN/1.73
GLOBULIN UR ELPH-MCNC: 3.1 GM/DL
GLUCOSE BLD-MCNC: 97 MG/DL (ref 65–99)
HCT VFR BLD AUTO: 26.2 % (ref 37.5–51)
HGB BLD-MCNC: 8.7 G/DL (ref 13–17.7)
IMM GRANULOCYTES # BLD AUTO: 0.04 10*3/MM3 (ref 0–0.05)
IMM GRANULOCYTES NFR BLD AUTO: 1.2 % (ref 0–0.5)
LYMPHOCYTES # BLD AUTO: 1.13 10*3/MM3 (ref 0.7–3.1)
LYMPHOCYTES NFR BLD AUTO: 33.4 % (ref 19.6–45.3)
MCH RBC QN AUTO: 34.3 PG (ref 26.6–33)
MCHC RBC AUTO-ENTMCNC: 33.2 G/DL (ref 31.5–35.7)
MCV RBC AUTO: 103.1 FL (ref 79–97)
MONOCYTES # BLD AUTO: 0.6 10*3/MM3 (ref 0.1–0.9)
MONOCYTES NFR BLD AUTO: 17.8 % (ref 5–12)
NEUTROPHILS # BLD AUTO: 1.51 10*3/MM3 (ref 1.7–7)
NEUTROPHILS NFR BLD AUTO: 44.6 % (ref 42.7–76)
NRBC BLD AUTO-RTO: 0 /100 WBC (ref 0–0.2)
PLATELET # BLD AUTO: 185 10*3/MM3 (ref 140–450)
PMV BLD AUTO: 8.9 FL (ref 6–12)
POTASSIUM BLD-SCNC: 4.5 MMOL/L (ref 3.5–5.2)
PROT SERPL-MCNC: 6.3 G/DL (ref 6–8.5)
RBC # BLD AUTO: 2.54 10*6/MM3 (ref 4.14–5.8)
SODIUM BLD-SCNC: 121 MMOL/L (ref 136–145)
WBC NRBC COR # BLD: 3.38 10*3/MM3 (ref 3.4–10.8)

## 2020-04-19 PROCEDURE — 85025 COMPLETE CBC W/AUTO DIFF WBC: CPT | Performed by: INTERNAL MEDICINE

## 2020-04-19 PROCEDURE — 80053 COMPREHEN METABOLIC PANEL: CPT | Performed by: INTERNAL MEDICINE

## 2020-04-19 PROCEDURE — 82533 TOTAL CORTISOL: CPT | Performed by: INTERNAL MEDICINE

## 2020-04-19 PROCEDURE — G0378 HOSPITAL OBSERVATION PER HR: HCPCS

## 2020-04-19 PROCEDURE — 96361 HYDRATE IV INFUSION ADD-ON: CPT

## 2020-04-19 RX ADMIN — LORAZEPAM 0.5 MG: 0.5 TABLET ORAL at 08:46

## 2020-04-19 RX ADMIN — SODIUM CHLORIDE 125 ML/HR: 9 INJECTION, SOLUTION INTRAVENOUS at 00:50

## 2020-04-19 RX ADMIN — SODIUM CHLORIDE, PRESERVATIVE FREE 10 ML: 5 INJECTION INTRAVENOUS at 08:39

## 2020-04-19 RX ADMIN — FOLIC ACID 1 MG: 1 TABLET ORAL at 08:39

## 2020-04-19 RX ADMIN — Medication 500 MCG: at 08:40

## 2020-04-19 RX ADMIN — SODIUM CHLORIDE 125 ML/HR: 9 INJECTION, SOLUTION INTRAVENOUS at 08:41

## 2020-04-19 RX ADMIN — METOPROLOL SUCCINATE 25 MG: 25 TABLET, EXTENDED RELEASE ORAL at 08:39

## 2020-04-19 RX ADMIN — DOCUSATE SODIUM 100 MG: 100 CAPSULE, LIQUID FILLED ORAL at 08:45

## 2020-04-19 RX ADMIN — Medication 100 MG: at 08:39

## 2020-04-19 RX ADMIN — FAMOTIDINE 40 MG: 20 TABLET, FILM COATED ORAL at 08:40

## 2020-04-19 RX ADMIN — ASPIRIN 81 MG: 81 TABLET, COATED ORAL at 08:40

## 2020-04-19 NOTE — PLAN OF CARE
Problem: Patient Care Overview  Goal: Plan of Care Review  Outcome: Ongoing (interventions implemented as appropriate)  Flowsheets  Taken 4/19/2020 0005  Plan of Care Reviewed With: patient  Taken 4/19/2020 0434  Outcome Summary: Patient admitted due to headache, ER noted visual hallucinations. Psych consult 4/18/20. VVS, RA, ST on monitor. Currently struggling with alcohol withdrawal. No new orders at this time, will continue to monitor.

## 2020-04-19 NOTE — NURSING NOTE
"Access Center follow-up.  RN reports no hallucinations noted by her or by previous RN.  Upon entering room, patient up in room, wanting to walk, denies hallucinations, stated is doing \"fine\".  Does seem slightly confused.  Denies SI.  No hallucinations noted at this time.  Most recent CIWA 8.         AC following.        Addendum: appears patient wants to discharge home, per RN, waiting to see MD.  Asked patient if he needed and outpatient psych services, he declined information on [facility], stating he is aware of some \"classes\" if needed, but would like information on psychiatrist/therapist, he was provided with resources.  He again denied SI and HI.     "

## 2020-04-19 NOTE — PLAN OF CARE
Problem: Patient Care Overview  Goal: Plan of Care Review  Note:   PT eval attempted. Pt has rolling walker at bedside; states he has walker for use at home. Pt initially agreeable to ambulation, but then declined PT due to having IV connected at this time. Educated pt that therapist could bring IV pole along while ambulating in hallway; however, pt continued to refuse mobility. Pt denies need for assistance with mobility at this time. Discussed with RN, Angelia, who states pt possibly discharging home today. Will follow up tomorrow if pt is not discharged home.

## 2020-04-19 NOTE — NURSING NOTE
Pt was very anxious and agitated. Pt was stating that the staff was lying to him and picking at him. That we were trying to keep him tied up and against his will. Pt was here for visual hallucinations and ETOH withdrawal. Pt started to get verbally abusive and shaking his fist at staff and cursing. STAT paged Dr. Miranda.    Pt removed his IV's and started to walk out of his room. I was able to convince him to let me bandage his arm where the IV's were at.  Dr. Miranda called back and said that we were unable to keep him against his will and if he wants to leave let him. I reminded the drLinda Why the patient was here and he said to let him leave. Pt did sign the AMA form and was escorted off the property by security.

## 2020-04-19 NOTE — DISCHARGE SUMMARY
NAME: Yuval Loja ADMIT: 2020   : 1950  PCP: Provider, No Known    MRN: [id number] LOS: 0 days   AGE/SEX: 69 y.o. male  ROOM: S510/1     Date of Admission:  2020  Date of Discharge:  2020    PCP: Provider, No Known    CHIEF COMPLAINT  Headache      DISCHARGE DIAGNOSIS  Active Hospital Problems    Diagnosis  POA   • **Visual hallucinations [R44.1]  Yes   • Psychosis (CMS/HCC) [F29]  Yes   • Hyponatremia [E87.1]  Yes   • CAD (coronary artery disease) [I25.10]  Yes   • Leukopenia [D72.819]  Yes   • Symptomatic anemia [D64.9]  Yes   • Headache [R51]  Yes   • Substance abuse (CMS/HCC) [F19.10]  Yes   • Alcohol dependence (CMS/HCC) [F10.20]  Yes   • Alcoholic liver disease (CMS/HCC) [K70.9]  Yes      Resolved Hospital Problems   No resolved problems to display.       SECONDARY DIAGNOSES  Past Medical History:   Diagnosis Date   • Alcohol abuse    • CAD (coronary artery disease)    • Chronic kidney disease, stage 3 (CMS/HCC)    • Fatty liver, alcoholic    • GERD (gastroesophageal reflux disease)    • Hypertensive urgency    • Metabolic acidosis    • Myocardial infarction (CMS/HCC)    • Pacemaker     patient has no pacemaker   • Sinus tachycardia    • Stroke (CMS/HCC)        CONSULTS       HOSPITAL COURSE  Patient is 69-year-old male with known history of ethanol abuse, CAD, CKD stage 3 was mainly admitted to the hospital for hallucinations.  There was no clear evidence of any infection.  During his hospital stay he was treated with CIWA protocol.  This morning patient was awake and alert and oriented x3 and has decided to leave against medical advise.  I did caution him that untreated withdrawals could lead to seizures and possibly even death and he has verbalized understanding and left the facility.  Again he was oriented x3.      Please note patient was seen examined prior to him leaving against medical advise.    CONDITION ON DISCHARGE  Stable.      DISCHARGE DISPOSITION   Left Against  Medical Advice      DISCHARGE MEDICATIONS       Your medication list      ASK your doctor about these medications      Instructions Last Dose Given Next Dose Due   aspirin 81 MG tablet      Take 1 tablet by mouth Daily.       metoprolol succinate XL 25 MG 24 hr tablet  Commonly known as:  TOPROL-XL      Take 1 tablet by mouth Daily.             No future appointments.  Follow-up Information     Provider, No Known .    Contact information:  [facility] [address] [phone]                   TEST  RESULTS PENDING AT DISCHARGE         Branden Miranda MD  [facility]  04/19/20  17:15

## 2020-05-27 ENCOUNTER — HOSPITAL ENCOUNTER (EMERGENCY)
Facility: HOSPITAL | Age: 70
Discharge: HOME | End: 2020-05-27
Attending: EMERGENCY MEDICINE | Admitting: EMERGENCY MEDICINE

## 2020-05-27 ENCOUNTER — APPOINTMENT (OUTPATIENT)
Dept: GENERAL RADIOLOGY | Facility: HOSPITAL | Age: 70
End: 2020-05-27

## 2020-05-27 VITALS
BODY MASS INDEX: 18.37 KG/M2 | WEIGHT: 124 LBS | DIASTOLIC BLOOD PRESSURE: 84 MMHG | OXYGEN SATURATION: 99 % | SYSTOLIC BLOOD PRESSURE: 166 MMHG | RESPIRATION RATE: 16 BRPM | HEIGHT: 69 IN | TEMPERATURE: 98.7 F | HEART RATE: 103 BPM

## 2020-05-27 DIAGNOSIS — E87.1 HYPONATREMIA: ICD-10-CM

## 2020-05-27 DIAGNOSIS — R53.1 GENERAL WEAKNESS: ICD-10-CM

## 2020-05-27 DIAGNOSIS — F10.10 ALCOHOL ABUSE: Primary | ICD-10-CM

## 2020-05-27 LAB
ALBUMIN SERPL-MCNC: 4.6 G/DL (ref 3.5–5.2)
ALBUMIN/GLOB SERPL: 1.6 G/DL
ALP SERPL-CCNC: 49 U/L (ref 39–117)
ALT SERPL W P-5'-P-CCNC: 12 U/L (ref 1–41)
AMPHET+METHAMPHET UR QL: NEGATIVE
ANION GAP SERPL CALCULATED.3IONS-SCNC: 15.5 MMOL/L (ref 5–15)
AST SERPL-CCNC: 21 U/L (ref 1–40)
BARBITURATES UR QL SCN: NEGATIVE
BASOPHILS # BLD AUTO: 0.03 10*3/MM3 (ref 0–0.2)
BASOPHILS NFR BLD AUTO: 0.6 % (ref 0–1.5)
BENZODIAZ UR QL SCN: NEGATIVE
BILIRUB SERPL-MCNC: <0.2 MG/DL (ref 0.2–1.2)
BILIRUB UR QL STRIP: NEGATIVE
BUN BLD-MCNC: 12 MG/DL (ref 8–23)
BUN/CREAT SERPL: 10.8 (ref 7–25)
CALCIUM SPEC-SCNC: 9.5 MG/DL (ref 8.6–10.5)
CANNABINOIDS SERPL QL: NEGATIVE
CHLORIDE SERPL-SCNC: 89 MMOL/L (ref 98–107)
CLARITY UR: CLEAR
CO2 SERPL-SCNC: 22.5 MMOL/L (ref 22–29)
COCAINE UR QL: NEGATIVE
COLOR UR: YELLOW
CREAT BLD-MCNC: 1.11 MG/DL (ref 0.76–1.27)
DEPRECATED RDW RBC AUTO: 50.6 FL (ref 37–54)
EOSINOPHIL # BLD AUTO: 0.02 10*3/MM3 (ref 0–0.4)
EOSINOPHIL NFR BLD AUTO: 0.4 % (ref 0.3–6.2)
ERYTHROCYTE [DISTWIDTH] IN BLOOD BY AUTOMATED COUNT: 13.3 % (ref 12.3–15.4)
ETHANOL BLD-MCNC: 94 MG/DL (ref 0–10)
ETHANOL UR QL: 0.09 %
GFR SERPL CREATININE-BSD FRML MDRD: 79 ML/MIN/1.73
GLOBULIN UR ELPH-MCNC: 2.9 GM/DL
GLUCOSE BLD-MCNC: 74 MG/DL (ref 65–99)
GLUCOSE UR STRIP-MCNC: NEGATIVE MG/DL
HCT VFR BLD AUTO: 36.1 % (ref 37.5–51)
HGB BLD-MCNC: 12 G/DL (ref 13–17.7)
HGB UR QL STRIP.AUTO: NEGATIVE
IMM GRANULOCYTES # BLD AUTO: 0.02 10*3/MM3 (ref 0–0.05)
IMM GRANULOCYTES NFR BLD AUTO: 0.4 % (ref 0–0.5)
KETONES UR QL STRIP: NEGATIVE
LEUKOCYTE ESTERASE UR QL STRIP.AUTO: NEGATIVE
LYMPHOCYTES # BLD AUTO: 1.09 10*3/MM3 (ref 0.7–3.1)
LYMPHOCYTES NFR BLD AUTO: 23.3 % (ref 19.6–45.3)
MAGNESIUM SERPL-MCNC: 2.2 MG/DL (ref 1.6–2.4)
MCH RBC QN AUTO: 34.7 PG (ref 26.6–33)
MCHC RBC AUTO-ENTMCNC: 33.2 G/DL (ref 31.5–35.7)
MCV RBC AUTO: 104.3 FL (ref 79–97)
METHADONE UR QL SCN: NEGATIVE
MONOCYTES # BLD AUTO: 0.52 10*3/MM3 (ref 0.1–0.9)
MONOCYTES NFR BLD AUTO: 11.1 % (ref 5–12)
NEUTROPHILS # BLD AUTO: 2.99 10*3/MM3 (ref 1.7–7)
NEUTROPHILS NFR BLD AUTO: 64.2 % (ref 42.7–76)
NITRITE UR QL STRIP: NEGATIVE
NRBC BLD AUTO-RTO: 0.2 /100 WBC (ref 0–0.2)
NT-PROBNP SERPL-MCNC: 572.2 PG/ML (ref 5–900)
OPIATES UR QL: NEGATIVE
OXYCODONE UR QL SCN: NEGATIVE
PH UR STRIP.AUTO: 6.5 [PH] (ref 5–8)
PLATELET # BLD AUTO: 289 10*3/MM3 (ref 140–450)
PMV BLD AUTO: 8.8 FL (ref 6–12)
POTASSIUM BLD-SCNC: 3.5 MMOL/L (ref 3.5–5.2)
PROT SERPL-MCNC: 7.5 G/DL (ref 6–8.5)
PROT UR QL STRIP: ABNORMAL
RBC # BLD AUTO: 3.46 10*6/MM3 (ref 4.14–5.8)
SODIUM BLD-SCNC: 127 MMOL/L (ref 136–145)
SP GR UR STRIP: 1.01 (ref 1–1.03)
T4 FREE SERPL-MCNC: 1.12 NG/DL (ref 0.93–1.7)
TROPONIN T SERPL-MCNC: <0.01 NG/ML (ref 0–0.03)
TSH SERPL DL<=0.05 MIU/L-ACNC: 0.38 UIU/ML (ref 0.27–4.2)
UROBILINOGEN UR QL STRIP: ABNORMAL
WBC NRBC COR # BLD: 4.67 10*3/MM3 (ref 3.4–10.8)

## 2020-05-27 PROCEDURE — 85025 COMPLETE CBC W/AUTO DIFF WBC: CPT | Performed by: EMERGENCY MEDICINE

## 2020-05-27 PROCEDURE — 84439 ASSAY OF FREE THYROXINE: CPT | Performed by: EMERGENCY MEDICINE

## 2020-05-27 PROCEDURE — 96366 THER/PROPH/DIAG IV INF ADDON: CPT

## 2020-05-27 PROCEDURE — 96365 THER/PROPH/DIAG IV INF INIT: CPT

## 2020-05-27 PROCEDURE — 80307 DRUG TEST PRSMV CHEM ANLYZR: CPT | Performed by: EMERGENCY MEDICINE

## 2020-05-27 PROCEDURE — 84484 ASSAY OF TROPONIN QUANT: CPT | Performed by: EMERGENCY MEDICINE

## 2020-05-27 PROCEDURE — 93005 ELECTROCARDIOGRAM TRACING: CPT | Performed by: EMERGENCY MEDICINE

## 2020-05-27 PROCEDURE — 93010 ELECTROCARDIOGRAM REPORT: CPT | Performed by: INTERNAL MEDICINE

## 2020-05-27 PROCEDURE — 80053 COMPREHEN METABOLIC PANEL: CPT | Performed by: EMERGENCY MEDICINE

## 2020-05-27 PROCEDURE — 81003 URINALYSIS AUTO W/O SCOPE: CPT | Performed by: EMERGENCY MEDICINE

## 2020-05-27 PROCEDURE — 96375 TX/PRO/DX INJ NEW DRUG ADDON: CPT

## 2020-05-27 PROCEDURE — 83880 ASSAY OF NATRIURETIC PEPTIDE: CPT | Performed by: EMERGENCY MEDICINE

## 2020-05-27 PROCEDURE — 71045 X-RAY EXAM CHEST 1 VIEW: CPT

## 2020-05-27 PROCEDURE — 83735 ASSAY OF MAGNESIUM: CPT | Performed by: EMERGENCY MEDICINE

## 2020-05-27 PROCEDURE — 25010000002 THIAMINE PER 100 MG: Performed by: EMERGENCY MEDICINE

## 2020-05-27 PROCEDURE — 25010000002 MAGNESIUM SULFATE PER 500 MG OF MAGNESIUM: Performed by: EMERGENCY MEDICINE

## 2020-05-27 PROCEDURE — 99284 EMERGENCY DEPT VISIT MOD MDM: CPT

## 2020-05-27 PROCEDURE — 25010000002 ONDANSETRON PER 1 MG: Performed by: EMERGENCY MEDICINE

## 2020-05-27 PROCEDURE — 84443 ASSAY THYROID STIM HORMONE: CPT | Performed by: EMERGENCY MEDICINE

## 2020-05-27 RX ORDER — ONDANSETRON 2 MG/ML
4 INJECTION INTRAMUSCULAR; INTRAVENOUS ONCE
Status: COMPLETED | OUTPATIENT
Start: 2020-05-27 | End: 2020-05-27

## 2020-05-27 RX ADMIN — ONDANSETRON 4 MG: 2 INJECTION INTRAMUSCULAR; INTRAVENOUS at 16:00

## 2020-05-27 RX ADMIN — FOLIC ACID 1000 ML/HR: 5 INJECTION, SOLUTION INTRAMUSCULAR; INTRAVENOUS; SUBCUTANEOUS at 15:42

## 2020-06-06 ENCOUNTER — INPATIENT HOSPITAL (OUTPATIENT)
Dept: URBAN - METROPOLITAN AREA HOSPITAL 107 | Facility: HOSPITAL | Age: 70
End: 2020-06-06
Payer: MEDICARE

## 2020-06-06 DIAGNOSIS — F10.10 ALCOHOL ABUSE, UNCOMPLICATED: ICD-10-CM

## 2020-06-06 DIAGNOSIS — K92.1 MELENA: ICD-10-CM

## 2020-06-06 DIAGNOSIS — D50.0 IRON DEFICIENCY ANEMIA SECONDARY TO BLOOD LOSS (CHRONIC): ICD-10-CM

## 2020-06-06 DIAGNOSIS — K92.0 HEMATEMESIS: ICD-10-CM

## 2020-06-06 PROCEDURE — 99222 1ST HOSP IP/OBS MODERATE 55: CPT | Performed by: INTERNAL MEDICINE

## 2020-06-07 ENCOUNTER — INPATIENT HOSPITAL (OUTPATIENT)
Dept: URBAN - METROPOLITAN AREA HOSPITAL 107 | Facility: HOSPITAL | Age: 70
End: 2020-06-07
Payer: MEDICARE

## 2020-06-07 DIAGNOSIS — K26.9 DUODENAL ULCER, UNSPECIFIED AS ACUTE OR CHRONIC, WITHOUT HEM: ICD-10-CM

## 2020-06-07 DIAGNOSIS — T18.2XXA FOREIGN BODY IN STOMACH, INITIAL ENCOUNTER: ICD-10-CM

## 2020-06-07 DIAGNOSIS — K25.9 GASTRIC ULCER, UNSPECIFIED AS ACUTE OR CHRONIC, WITHOUT HEMO: ICD-10-CM

## 2020-06-07 DIAGNOSIS — K29.70 GASTRITIS, UNSPECIFIED, WITHOUT BLEEDING: ICD-10-CM

## 2020-06-07 DIAGNOSIS — K92.0 HEMATEMESIS: ICD-10-CM

## 2020-06-07 PROCEDURE — 43235 EGD DIAGNOSTIC BRUSH WASH: CPT | Performed by: INTERNAL MEDICINE

## 2020-06-08 ENCOUNTER — INPATIENT HOSPITAL (OUTPATIENT)
Dept: URBAN - METROPOLITAN AREA HOSPITAL 107 | Facility: HOSPITAL | Age: 70
End: 2020-06-08
Payer: MEDICARE

## 2020-06-08 DIAGNOSIS — D50.0 IRON DEFICIENCY ANEMIA SECONDARY TO BLOOD LOSS (CHRONIC): ICD-10-CM

## 2020-06-08 DIAGNOSIS — K26.9 DUODENAL ULCER, UNSPECIFIED AS ACUTE OR CHRONIC, WITHOUT HEM: ICD-10-CM

## 2020-06-08 DIAGNOSIS — K92.1 MELENA: ICD-10-CM

## 2020-06-08 PROCEDURE — 99232 SBSQ HOSP IP/OBS MODERATE 35: CPT | Performed by: PHYSICIAN ASSISTANT

## 2020-06-09 ENCOUNTER — INPATIENT HOSPITAL (OUTPATIENT)
Dept: URBAN - METROPOLITAN AREA HOSPITAL 107 | Facility: HOSPITAL | Age: 70
End: 2020-06-09
Payer: MEDICARE

## 2020-06-09 DIAGNOSIS — K92.1 MELENA: ICD-10-CM

## 2020-06-09 DIAGNOSIS — K92.0 HEMATEMESIS: ICD-10-CM

## 2020-06-09 DIAGNOSIS — K26.3 ACUTE DUODENAL ULCER WITHOUT HEMORRHAGE OR PERFORATION: ICD-10-CM

## 2020-06-09 PROCEDURE — 99232 SBSQ HOSP IP/OBS MODERATE 35: CPT | Performed by: NURSE PRACTITIONER

## 2020-07-01 ENCOUNTER — HOSPITAL ENCOUNTER (EMERGENCY)
Facility: HOSPITAL | Age: 70
Discharge: HOME | End: 2020-07-01
Attending: EMERGENCY MEDICINE | Admitting: EMERGENCY MEDICINE

## 2020-07-01 ENCOUNTER — APPOINTMENT (OUTPATIENT)
Dept: CT IMAGING | Facility: HOSPITAL | Age: 70
End: 2020-07-01

## 2020-07-01 VITALS
DIASTOLIC BLOOD PRESSURE: 57 MMHG | SYSTOLIC BLOOD PRESSURE: 116 MMHG | RESPIRATION RATE: 15 BRPM | WEIGHT: 124 LBS | OXYGEN SATURATION: 100 % | HEART RATE: 99 BPM | TEMPERATURE: 97.4 F | BODY MASS INDEX: 18.79 KG/M2 | HEIGHT: 68 IN

## 2020-07-01 DIAGNOSIS — N18.9 CHRONIC RENAL IMPAIRMENT, UNSPECIFIED CKD STAGE: ICD-10-CM

## 2020-07-01 DIAGNOSIS — R29.898 WEAKNESS OF LEFT LOWER EXTREMITY: Primary | ICD-10-CM

## 2020-07-01 DIAGNOSIS — D64.9 CHRONIC ANEMIA: ICD-10-CM

## 2020-07-01 LAB
ALBUMIN SERPL-MCNC: 4.1 G/DL (ref 3.5–5.2)
ALBUMIN/GLOB SERPL: 1.3 G/DL
ALP SERPL-CCNC: 50 U/L (ref 39–117)
ALT SERPL W P-5'-P-CCNC: 9 U/L (ref 1–41)
ANION GAP SERPL CALCULATED.3IONS-SCNC: 16.9 MMOL/L (ref 5–15)
AST SERPL-CCNC: 23 U/L (ref 1–40)
BASOPHILS # BLD AUTO: 0.02 10*3/MM3 (ref 0–0.2)
BASOPHILS NFR BLD AUTO: 0.5 % (ref 0–1.5)
BILIRUB SERPL-MCNC: 0.3 MG/DL (ref 0.2–1.2)
BUN SERPL-MCNC: 8 MG/DL (ref 8–23)
BUN/CREAT SERPL: 5.1 (ref 7–25)
CALCIUM SPEC-SCNC: 9.6 MG/DL (ref 8.6–10.5)
CHLORIDE SERPL-SCNC: 104 MMOL/L (ref 98–107)
CO2 SERPL-SCNC: 20.1 MMOL/L (ref 22–29)
CREAT SERPL-MCNC: 1.57 MG/DL (ref 0.76–1.27)
DEPRECATED RDW RBC AUTO: 56.8 FL (ref 37–54)
EOSINOPHIL # BLD AUTO: 0.05 10*3/MM3 (ref 0–0.4)
EOSINOPHIL NFR BLD AUTO: 1.1 % (ref 0.3–6.2)
ERYTHROCYTE [DISTWIDTH] IN BLOOD BY AUTOMATED COUNT: 14.9 % (ref 12.3–15.4)
GFR SERPL CREATININE-BSD FRML MDRD: 53 ML/MIN/1.73
GLOBULIN UR ELPH-MCNC: 3.1 GM/DL
GLUCOSE SERPL-MCNC: 76 MG/DL (ref 65–99)
HCT VFR BLD AUTO: 36.7 % (ref 37.5–51)
HGB BLD-MCNC: 11.7 G/DL (ref 13–17.7)
HOLD SPECIMEN: NORMAL
HOLD SPECIMEN: NORMAL
IMM GRANULOCYTES # BLD AUTO: 0.01 10*3/MM3 (ref 0–0.05)
IMM GRANULOCYTES NFR BLD AUTO: 0.2 % (ref 0–0.5)
LYMPHOCYTES # BLD AUTO: 1.42 10*3/MM3 (ref 0.7–3.1)
LYMPHOCYTES NFR BLD AUTO: 32.6 % (ref 19.6–45.3)
MCH RBC QN AUTO: 32.5 PG (ref 26.6–33)
MCHC RBC AUTO-ENTMCNC: 31.9 G/DL (ref 31.5–35.7)
MCV RBC AUTO: 101.9 FL (ref 79–97)
MONOCYTES # BLD AUTO: 0.29 10*3/MM3 (ref 0.1–0.9)
MONOCYTES NFR BLD AUTO: 6.7 % (ref 5–12)
NEUTROPHILS NFR BLD AUTO: 2.56 10*3/MM3 (ref 1.7–7)
NEUTROPHILS NFR BLD AUTO: 58.9 % (ref 42.7–76)
NRBC BLD AUTO-RTO: 0.2 /100 WBC (ref 0–0.2)
PLATELET # BLD AUTO: 232 10*3/MM3 (ref 140–450)
PMV BLD AUTO: 9.4 FL (ref 6–12)
POTASSIUM SERPL-SCNC: 4 MMOL/L (ref 3.5–5.2)
PROT SERPL-MCNC: 7.2 G/DL (ref 6–8.5)
RBC # BLD AUTO: 3.6 10*6/MM3 (ref 4.14–5.8)
SODIUM SERPL-SCNC: 141 MMOL/L (ref 136–145)
WBC # BLD AUTO: 4.35 10*3/MM3 (ref 3.4–10.8)
WHOLE BLOOD HOLD SPECIMEN: NORMAL
WHOLE BLOOD HOLD SPECIMEN: NORMAL

## 2020-07-01 PROCEDURE — 80053 COMPREHEN METABOLIC PANEL: CPT | Performed by: EMERGENCY MEDICINE

## 2020-07-01 PROCEDURE — 99283 EMERGENCY DEPT VISIT LOW MDM: CPT

## 2020-07-01 PROCEDURE — 85025 COMPLETE CBC W/AUTO DIFF WBC: CPT | Performed by: EMERGENCY MEDICINE

## 2020-07-01 PROCEDURE — 70450 CT HEAD/BRAIN W/O DYE: CPT

## 2020-07-01 NOTE — ED NOTES
Pt here for abd pain, LLE pain. States he needs to figure out why his meds aren't working. Pt is a/o x 4. Wants another opinion.     Pt placed in mask at triage, all staff wearing appropriate ppe        Celeste Sweet RN  07/01/20 2737

## 2020-07-01 NOTE — DISCHARGE INSTRUCTIONS
Follow-up with your primary care doctor if symptoms persist.  Return to the emergency department for worsening symptoms, new numbness/tingling, headache, chest pain, fever, or other concern.

## 2020-07-01 NOTE — ED PROVIDER NOTES
" EMERGENCY DEPARTMENT ENCOUNTER    Room Number:  35/35  Date of encounter:  7/1/2020  PCP: Provider, No Known  Historian: Patient     I used full protective equipment while examining this patient.  This includes face mask, gloves and protective eyewear.  I washed my hands before entering the room and immediately upon leaving the room.  Patient was wearing a surgical mask.      HPI:  Chief Complaint: Left leg weakness  A complete HPI/ROS/PMH/PSH/SH/FH are unobtainable due to: None    Context: Yuval Loja is a 69 y.o. male who presents to the ED c/o left leg weakness for the past several weeks.  Patient complains of \"not being able to move my left toes\".  He states his symptoms began after he was started on a multivitamin and Protonix.  He denies any numbness in the left leg or low back .  Patient states he has fallen several times because his left leg \"gives out\".  He denies hitting his head or sustaining any injury.  Nothing makes his symptoms better or worse.  Symptoms are persistent.  He denies fever, chills, chest pain, shortness of breath, abdominal pain, nausea, vomiting, headache, slurred speech, dizziness, or dysuria.  Patient states he has had a stroke in the past that affected the right side.      PAST MEDICAL HISTORY  Active Ambulatory Problems     Diagnosis Date Noted   • Alcoholic ketoacidosis 05/23/2017   • Alcohol dependence (CMS/[facility]) 05/23/2017   • Methamphetamine abuse (CMS/[facility]) 05/23/2017   • Fatty liver, alcoholic 05/23/2017   • Nausea & vomiting 05/23/2017   • Alcoholic liver disease (CMS/[facility]) 05/23/2017   • Metabolic acidosis 05/23/2017   • Tobacco abuse 05/23/2017   • Coronary artery disease involving native coronary artery of native heart without angina pectoris 05/24/2017   • Tachycardia 05/24/2017   • Sinus tachycardia 04/17/2019   • Chronic kidney disease, stage 3 (CMS/[facility]) 04/17/2019   • Medically noncompliant 04/18/2019   • Visual hallucinations 04/18/2020   • Psychosis (CMS/[facility]) " "04/18/2020   • Hyponatremia 04/18/2020   • CAD (coronary artery disease) 04/18/2020   • Leukopenia 04/18/2020   • Symptomatic anemia 04/18/2020   • Headache 04/18/2020   • Substance abuse (CMS/[facility]) 04/18/2020     Resolved Ambulatory Problems     Diagnosis Date Noted   • Dehydration 04/17/2019   • Functional diarrhea 04/17/2019     Past Medical History:   Diagnosis Date   • Alcohol abuse    • GERD (gastroesophageal reflux disease)    • Hypertensive urgency    • Myocardial infarction (CMS/[facility])    • Pacemaker    • Stroke (CMS/[facility])          PAST SURGICAL HISTORY  Past Surgical History:   Procedure Laterality Date   • PACEMAKER IMPLANTATION           FAMILY HISTORY  No family history on file.      SOCIAL HISTORY  Social History     Socioeconomic History   • Marital status: Single     Spouse name: Not on file   • Number of children: Not on file   • Years of education: Not on file   • Highest education level: Not on file   Occupational History     Employer: RETIRED   Tobacco Use   • Smoking status: Current Every Day Smoker     Packs/day: 0.50   • Smokeless tobacco: Never Used   • Tobacco comment: tried to provide education declined irritated w/ attempt smoked \" depends on what I feel like.\"   Substance and Sexual Activity   • Alcohol use: Yes     Alcohol/week: 6.0 standard drinks     Types: 6 Cans of beer per week     Comment: hx changes depending on time questioned    • Drug use: No   • Sexual activity: Defer         ALLERGIES  Atorvastatin; Atorvastatin calcium; Mirtazapine; and Sertraline       REVIEW OF SYSTEMS  Review of Systems      All systems have been reviewed and are negative except as as discussed in the HPI    PHYSICAL EXAM    I have reviewed the triage vital signs and nursing notes.    ED Triage Vitals   Temp Heart Rate Resp BP SpO2   07/01/20 1023 07/01/20 1023 07/01/20 1038 07/01/20 1038 07/01/20 1023   97.4 °F (36.3 °C) 99 15 116/57 99 %      Temp src Heart Rate Source Patient Position BP Location FiO2 (%) "   07/01/20 1023 -- -- -- --   Tympanic           Physical Exam  GENERAL: Awake, alert, no acute distress  HENT: NCAT, nares patent, moist mucous membranes  NECK: supple, no lymphadenopathy, no cervical spine tenderness  EYES: no scleral icterus  CV: regular rhythm, regular rate, no murmur, normal pedal pulses bilaterally  RESPIRATORY: normal effort, clear to auscultation bilaterally  ABDOMEN: soft, nontender, nondistended  MUSCULOSKELETAL: Chest is nontender.  Extremities are nontender and without obvious deformity.  There is normal range of motion in all extremities.  There is no calf tenderness or pedal edema.  No vertebral tenderness of the thoracic or lumbar spine.  NEURO: There is normal strength and sensation in bilateral face and bilateral upper/lower extremities.  Finger-to-nose and heel shin testing is normal bilaterally.  Speech is clear.  No aphasia.  No facial droop.  SKIN: warm, dry, no rash  PSYCH: Normal mood and affect      LAB RESULTS  Recent Results (from the past 24 hour(s))   Light Blue Top    Collection Time: 07/01/20 10:46 AM   Result Value Ref Range    Extra Tube hold for add-on    Green Top (Gel)    Collection Time: 07/01/20 10:46 AM   Result Value Ref Range    Extra Tube Hold for add-ons.    Lavender Top    Collection Time: 07/01/20 10:46 AM   Result Value Ref Range    Extra Tube hold for add-on    Gold Top - SST    Collection Time: 07/01/20 10:46 AM   Result Value Ref Range    Extra Tube Hold for add-ons.    Comprehensive Metabolic Panel    Collection Time: 07/01/20 10:46 AM   Result Value Ref Range    Glucose 76 65 - 99 mg/dL    BUN 8 8 - 23 mg/dL    Creatinine 1.57 (H) 0.76 - 1.27 mg/dL    Sodium 141 136 - 145 mmol/L    Potassium 4.0 3.5 - 5.2 mmol/L    Chloride 104 98 - 107 mmol/L    CO2 20.1 (L) 22.0 - 29.0 mmol/L    Calcium 9.6 8.6 - 10.5 mg/dL    Total Protein 7.2 6.0 - 8.5 g/dL    Albumin 4.10 3.50 - 5.20 g/dL    ALT (SGPT) 9 1 - 41 U/L    AST (SGOT) 23 1 - 40 U/L    Alkaline  Phosphatase 50 39 - 117 U/L    Total Bilirubin 0.3 0.2 - 1.2 mg/dL    eGFR  African Amer 53 (L) >60 mL/min/1.73    Globulin 3.1 gm/dL    A/G Ratio 1.3 g/dL    BUN/Creatinine Ratio 5.1 (L) 7.0 - 25.0    Anion Gap 16.9 (H) 5.0 - 15.0 mmol/L   CBC Auto Differential    Collection Time: 07/01/20 10:46 AM   Result Value Ref Range    WBC 4.35 3.40 - 10.80 10*3/mm3    RBC 3.60 (L) 4.14 - 5.80 10*6/mm3    Hemoglobin 11.7 (L) 13.0 - 17.7 g/dL    Hematocrit 36.7 (L) 37.5 - 51.0 %    .9 (H) 79.0 - 97.0 fL    MCH 32.5 26.6 - 33.0 pg    MCHC 31.9 31.5 - 35.7 g/dL    RDW 14.9 12.3 - 15.4 %    RDW-SD 56.8 (H) 37.0 - 54.0 fl    MPV 9.4 6.0 - 12.0 fL    Platelets 232 140 - 450 10*3/mm3    Neutrophil % 58.9 42.7 - 76.0 %    Lymphocyte % 32.6 19.6 - 45.3 %    Monocyte % 6.7 5.0 - 12.0 %    Eosinophil % 1.1 0.3 - 6.2 %    Basophil % 0.5 0.0 - 1.5 %    Immature Grans % 0.2 0.0 - 0.5 %    Neutrophils, Absolute 2.56 1.70 - 7.00 10*3/mm3    Lymphocytes, Absolute 1.42 0.70 - 3.10 10*3/mm3    Monocytes, Absolute 0.29 0.10 - 0.90 10*3/mm3    Eosinophils, Absolute 0.05 0.00 - 0.40 10*3/mm3    Basophils, Absolute 0.02 0.00 - 0.20 10*3/mm3    Immature Grans, Absolute 0.01 0.00 - 0.05 10*3/mm3    nRBC 0.2 0.0 - 0.2 /100 WBC       Ordered the above labs and independently reviewed the results.      RADIOLOGY  Ct Head Without Contrast    Result Date: 7/1/2020  CT HEAD WITHOUT CONTRAST  HISTORY:  Left lower extremity weakness.  COMPARISON: CT head 04/17/2019.  FINDINGS: The brain ventricles are symmetrical. There is no evidence of hemorrhage, hydrocephalus or of abnormal extra-axial fluid. No focal area of decreased attenuation to suggest acute infarction is identified. Moderate vascular calcification is appreciated.      No evidence of hemorrhage or acute infarction. There is age-appropriate atrophy. Vascular calcification is noted. Further evaluation could be performed with an MRI examination of the brain as indicated.    Radiation dose  "reduction techniques were utilized, including automated exposure control and exposure modulation based on body size.         I ordered the above noted radiological studies. Reviewed by me and discussed with radiologist.  See dictation for official radiology interpretation.      PROCEDURES  Procedures      MEDICATIONS GIVEN IN ER    Medications - No data to display      PROGRESS, DATA ANALYSIS, CONSULTS, AND MEDICAL DECISION MAKING    All labs have been independently reviewed by me.  All radiology studies have been reviewed by me and discussed with radiologist dictating the report.   EKG's independently viewed and interpreted by me.  I have reviewed the nurse's notes, vital signs, past medical history, and medication list.  Discussion below represents my analysis of pertinent findings related to patient's condition, differential diagnosis, treatment plan and final disposition.      ED Course as of Jul 01 1510   Wed Jul 01, 2020   1053 Old records reviewed.  Patient was admitted to [facility] in early June 2020 for acute GI bleeding.  He was transfused 2 units PRBCs.  EGD was performed.  Patient was seen in the emergency department at [facility] 2 days ago for left leg weakness.  Doppler ultrasound of the left leg was negative for SVT or DVT.    [WH]   1203 1.3 two days ago   Creatinine(!): 1.57 [WH]   1203 12.9 two days ago   Hemoglobin(!): 11.7 [WH]   1257 Test results discussed with the patient.  He states he is ready to go home.  He is requesting medication for his \"nerves\".  I informed him that he will need to address that with his primary care physician.    [WH]   1315 Patient had a normal neuro exam.  Head CT did not show any evidence of a stroke.  Labs were relatively stable.  He denied having any low back pain.  Patient denied any injury.  Patient was advised to follow-up with his primary care physician.    [WH]      ED Course User Index  [WH] Moses Holm MD       AS OF 15:10 VITALS:    BP " - 116/57  HR - 99  TEMP - 97.4 °F (36.3 °C) (Tympanic)  O2 SATS - 100%      DIAGNOSIS  Final diagnoses:   Weakness of left lower extremity   Chronic anemia   Chronic renal impairment, unspecified CKD stage         DISPOSITION  Discharge    DISCHARGE    Patient discharged in stable condition.    Reviewed implications of results, diagnosis, meds, responsibility to follow up, warning signs and symptoms of possible worsening, potential complications and reasons to return to ER, including worsening symptoms, vomiting, fever, or other concern.    Patient/Family voiced understanding of above instructions.    Discussed plan for discharge, as there is no emergent indication for admission. Patient referred to primary care provider for BP management due to today's BP. Pt/family is agreeable and understands need for follow up and repeat testing.  Pt is aware that discharge does not mean that nothing is wrong but it indicates no emergency is present that requires admission and they must continue care with follow-up as given below or physician of their choice.     FOLLOW-UP  PATIENT LIAISON [facility] [id number]  [phone]        Your doctor    Schedule an appointment as soon as possible for a visit            Medication List      No changes were made to your prescriptions during this visit.           Please note that this document was completed using voice recognition software     Moses Holm MD  07/01/20 [id number]

## 2020-10-22 ENCOUNTER — HOSPITAL ENCOUNTER (INPATIENT)
Facility: HOSPITAL | Age: 70
LOS: 3 days | Discharge: LEFT BEFORE DISCHARGE | End: 2020-10-25
Attending: EMERGENCY MEDICINE | Admitting: INTERNAL MEDICINE

## 2020-10-22 DIAGNOSIS — N17.9 ACUTE KIDNEY INJURY (HCC): ICD-10-CM

## 2020-10-22 DIAGNOSIS — R53.83 FATIGUE, UNSPECIFIED TYPE: ICD-10-CM

## 2020-10-22 DIAGNOSIS — K92.2 GASTROINTESTINAL HEMORRHAGE, UNSPECIFIED GASTROINTESTINAL HEMORRHAGE TYPE: Primary | ICD-10-CM

## 2020-10-22 DIAGNOSIS — E87.5 HYPERKALEMIA: ICD-10-CM

## 2020-10-22 DIAGNOSIS — Z87.448 HISTORY OF CHRONIC KIDNEY DISEASE: ICD-10-CM

## 2020-10-22 DIAGNOSIS — Z86.79 HISTORY OF CORONARY ARTERY DISEASE: ICD-10-CM

## 2020-10-22 DIAGNOSIS — R53.1 WEAKNESS: ICD-10-CM

## 2020-10-22 DIAGNOSIS — Z72.0 TOBACCO ABUSE: Chronic | ICD-10-CM

## 2020-10-22 DIAGNOSIS — D64.9 ANEMIA, UNSPECIFIED TYPE: ICD-10-CM

## 2020-10-22 LAB
ABO GROUP BLD: NORMAL
ALBUMIN SERPL-MCNC: 4.2 G/DL (ref 3.5–5.2)
ALBUMIN/GLOB SERPL: 1.4 G/DL
ALP SERPL-CCNC: 78 U/L (ref 39–117)
ALT SERPL W P-5'-P-CCNC: 16 U/L (ref 1–41)
ANION GAP SERPL CALCULATED.3IONS-SCNC: 10.6 MMOL/L (ref 5–15)
AST SERPL-CCNC: 21 U/L (ref 1–40)
B PARAPERT DNA SPEC QL NAA+PROBE: NOT DETECTED
B PERT DNA SPEC QL NAA+PROBE: NOT DETECTED
BASOPHILS # BLD AUTO: 0.03 10*3/MM3 (ref 0–0.2)
BASOPHILS NFR BLD AUTO: 0.4 % (ref 0–1.5)
BILIRUB SERPL-MCNC: 0.2 MG/DL (ref 0–1.2)
BLD GP AB SCN SERPL QL: NEGATIVE
BUN SERPL-MCNC: 46 MG/DL (ref 8–23)
BUN/CREAT SERPL: 16.7 (ref 7–25)
C PNEUM DNA NPH QL NAA+NON-PROBE: NOT DETECTED
CALCIUM SPEC-SCNC: 10.2 MG/DL (ref 8.6–10.5)
CHLORIDE SERPL-SCNC: 106 MMOL/L (ref 98–107)
CO2 SERPL-SCNC: 19.4 MMOL/L (ref 22–29)
CREAT SERPL-MCNC: 2.76 MG/DL (ref 0.76–1.27)
DEPRECATED RDW RBC AUTO: 41.1 FL (ref 37–54)
EOSINOPHIL # BLD AUTO: 0.03 10*3/MM3 (ref 0–0.4)
EOSINOPHIL NFR BLD AUTO: 0.4 % (ref 0.3–6.2)
ERYTHROCYTE [DISTWIDTH] IN BLOOD BY AUTOMATED COUNT: 13 % (ref 12.3–15.4)
EXPIRATION DATE: ABNORMAL
FECAL OCCULT BLOOD SCREEN, POC: POSITIVE
FLUAV SUBTYP SPEC NAA+PROBE: NOT DETECTED
FLUBV RNA ISLT QL NAA+PROBE: NOT DETECTED
GFR SERPL CREATININE-BSD FRML MDRD: 28 ML/MIN/1.73
GLOBULIN UR ELPH-MCNC: 2.9 GM/DL
GLUCOSE SERPL-MCNC: 150 MG/DL (ref 65–99)
HADV DNA SPEC NAA+PROBE: NOT DETECTED
HCOV 229E RNA SPEC QL NAA+PROBE: NOT DETECTED
HCOV HKU1 RNA SPEC QL NAA+PROBE: NOT DETECTED
HCOV NL63 RNA SPEC QL NAA+PROBE: NOT DETECTED
HCOV OC43 RNA SPEC QL NAA+PROBE: NOT DETECTED
HCT VFR BLD AUTO: 24.3 % (ref 37.5–51)
HGB BLD-MCNC: 7.9 G/DL (ref 13–17.7)
HMPV RNA NPH QL NAA+NON-PROBE: NOT DETECTED
HPIV1 RNA SPEC QL NAA+PROBE: NOT DETECTED
HPIV2 RNA SPEC QL NAA+PROBE: NOT DETECTED
HPIV3 RNA NPH QL NAA+PROBE: NOT DETECTED
HPIV4 P GENE NPH QL NAA+PROBE: NOT DETECTED
IMM GRANULOCYTES # BLD AUTO: 0.03 10*3/MM3 (ref 0–0.05)
IMM GRANULOCYTES NFR BLD AUTO: 0.4 % (ref 0–0.5)
INR PPP: 1.22 (ref 0.9–1.1)
LYMPHOCYTES # BLD AUTO: 1.43 10*3/MM3 (ref 0.7–3.1)
LYMPHOCYTES NFR BLD AUTO: 20.7 % (ref 19.6–45.3)
Lab: 148
M PNEUMO IGG SER IA-ACNC: NOT DETECTED
MAGNESIUM SERPL-MCNC: 2 MG/DL (ref 1.6–2.4)
MCH RBC QN AUTO: 28.8 PG (ref 26.6–33)
MCHC RBC AUTO-ENTMCNC: 32.5 G/DL (ref 31.5–35.7)
MCV RBC AUTO: 88.7 FL (ref 79–97)
MONOCYTES # BLD AUTO: 0.46 10*3/MM3 (ref 0.1–0.9)
MONOCYTES NFR BLD AUTO: 6.7 % (ref 5–12)
NEGATIVE CONTROL: POSITIVE
NEUTROPHILS NFR BLD AUTO: 4.92 10*3/MM3 (ref 1.7–7)
NEUTROPHILS NFR BLD AUTO: 71.4 % (ref 42.7–76)
NRBC BLD AUTO-RTO: 0.6 /100 WBC (ref 0–0.2)
PLATELET # BLD AUTO: 360 10*3/MM3 (ref 140–450)
PMV BLD AUTO: 9.8 FL (ref 6–12)
POSITIVE CONTROL: POSITIVE
POTASSIUM SERPL-SCNC: 6.3 MMOL/L (ref 3.5–5.2)
PROT SERPL-MCNC: 7.1 G/DL (ref 6–8.5)
PROTHROMBIN TIME: 15.2 SECONDS (ref 11.7–14.2)
RBC # BLD AUTO: 2.74 10*6/MM3 (ref 4.14–5.8)
RH BLD: POSITIVE
RHINOVIRUS RNA SPEC NAA+PROBE: NOT DETECTED
RSV RNA NPH QL NAA+NON-PROBE: NOT DETECTED
SARS-COV-2 RNA NPH QL NAA+NON-PROBE: NOT DETECTED
SODIUM SERPL-SCNC: 136 MMOL/L (ref 136–145)
T&S EXPIRATION DATE: NORMAL
WBC # BLD AUTO: 6.9 10*3/MM3 (ref 3.4–10.8)

## 2020-10-22 PROCEDURE — 0202U NFCT DS 22 TRGT SARS-COV-2: CPT | Performed by: EMERGENCY MEDICINE

## 2020-10-22 PROCEDURE — 86901 BLOOD TYPING SEROLOGIC RH(D): CPT

## 2020-10-22 PROCEDURE — 99284 EMERGENCY DEPT VISIT MOD MDM: CPT

## 2020-10-22 PROCEDURE — 63710000001 INSULIN REGULAR HUMAN PER 5 UNITS: Performed by: EMERGENCY MEDICINE

## 2020-10-22 PROCEDURE — 86900 BLOOD TYPING SEROLOGIC ABO: CPT

## 2020-10-22 PROCEDURE — P9016 RBC LEUKOCYTES REDUCED: HCPCS

## 2020-10-22 PROCEDURE — 25010000002 CALCIUM GLUCONATE PER 10 ML: Performed by: EMERGENCY MEDICINE

## 2020-10-22 PROCEDURE — 86923 COMPATIBILITY TEST ELECTRIC: CPT

## 2020-10-22 PROCEDURE — 85610 PROTHROMBIN TIME: CPT | Performed by: EMERGENCY MEDICINE

## 2020-10-22 PROCEDURE — 82270 OCCULT BLOOD FECES: CPT | Performed by: EMERGENCY MEDICINE

## 2020-10-22 PROCEDURE — 80053 COMPREHEN METABOLIC PANEL: CPT | Performed by: EMERGENCY MEDICINE

## 2020-10-22 PROCEDURE — 36430 TRANSFUSION BLD/BLD COMPNT: CPT

## 2020-10-22 PROCEDURE — 85025 COMPLETE CBC W/AUTO DIFF WBC: CPT | Performed by: EMERGENCY MEDICINE

## 2020-10-22 PROCEDURE — 86900 BLOOD TYPING SEROLOGIC ABO: CPT | Performed by: EMERGENCY MEDICINE

## 2020-10-22 PROCEDURE — 36415 COLL VENOUS BLD VENIPUNCTURE: CPT

## 2020-10-22 PROCEDURE — 83735 ASSAY OF MAGNESIUM: CPT | Performed by: EMERGENCY MEDICINE

## 2020-10-22 PROCEDURE — 86901 BLOOD TYPING SEROLOGIC RH(D): CPT | Performed by: EMERGENCY MEDICINE

## 2020-10-22 PROCEDURE — 86850 RBC ANTIBODY SCREEN: CPT | Performed by: EMERGENCY MEDICINE

## 2020-10-22 RX ORDER — DEXTROSE MONOHYDRATE 25 G/50ML
25 INJECTION, SOLUTION INTRAVENOUS ONCE
Status: COMPLETED | OUTPATIENT
Start: 2020-10-22 | End: 2020-10-22

## 2020-10-22 RX ORDER — ATORVASTATIN CALCIUM 20 MG/1
20 TABLET, FILM COATED ORAL DAILY
COMMUNITY
End: 2022-07-06 | Stop reason: HOSPADM

## 2020-10-22 RX ORDER — PANTOPRAZOLE SODIUM 40 MG/1
40 TABLET, DELAYED RELEASE ORAL DAILY
COMMUNITY
Start: 2020-07-17

## 2020-10-22 RX ORDER — PANTOPRAZOLE SODIUM 40 MG/10ML
80 INJECTION, POWDER, LYOPHILIZED, FOR SOLUTION INTRAVENOUS ONCE
Status: COMPLETED | OUTPATIENT
Start: 2020-10-22 | End: 2020-10-22

## 2020-10-22 RX ORDER — VENLAFAXINE HYDROCHLORIDE 75 MG/1
75 CAPSULE, EXTENDED RELEASE ORAL DAILY
COMMUNITY
Start: 2020-07-17 | End: 2022-03-17 | Stop reason: HOSPADM

## 2020-10-22 RX ORDER — HYDROXYZINE HYDROCHLORIDE 25 MG/1
25 TABLET, FILM COATED ORAL 3 TIMES DAILY PRN
COMMUNITY
Start: 2020-07-17

## 2020-10-22 RX ORDER — FERROUS SULFATE 325(65) MG
325 TABLET ORAL DAILY
COMMUNITY
Start: 2020-07-17 | End: 2022-07-06 | Stop reason: HOSPADM

## 2020-10-22 RX ORDER — LEVOTHYROXINE SODIUM 0.03 MG/1
25 TABLET ORAL DAILY
COMMUNITY
Start: 2020-07-17 | End: 2022-07-06 | Stop reason: HOSPADM

## 2020-10-22 RX ORDER — MIRTAZAPINE 15 MG/1
15 TABLET, FILM COATED ORAL NIGHTLY
Status: ON HOLD | COMMUNITY
Start: 2020-07-30 | End: 2022-03-15

## 2020-10-22 RX ADMIN — DEXTROSE MONOHYDRATE 25 ML: 500 INJECTION PARENTERAL at 20:27

## 2020-10-22 RX ADMIN — INSULIN HUMAN 5 UNITS: 100 INJECTION, SOLUTION PARENTERAL at 20:27

## 2020-10-22 RX ADMIN — SODIUM CHLORIDE 1000 ML: 9 INJECTION, SOLUTION INTRAVENOUS at 20:28

## 2020-10-22 RX ADMIN — CALCIUM GLUCONATE 1 G: 98 INJECTION, SOLUTION INTRAVENOUS at 20:28

## 2020-10-22 RX ADMIN — PANTOPRAZOLE SODIUM 80 MG: 40 INJECTION, POWDER, FOR SOLUTION INTRAVENOUS at 20:27

## 2020-10-23 PROBLEM — N17.9 AKI (ACUTE KIDNEY INJURY): Status: ACTIVE | Noted: 2020-10-23

## 2020-10-23 PROBLEM — E87.5 HYPERKALEMIA: Status: ACTIVE | Noted: 2020-10-23

## 2020-10-23 LAB
ALBUMIN SERPL-MCNC: 3.5 G/DL (ref 3.5–5.2)
ALBUMIN/GLOB SERPL: 1.4 G/DL
ALP SERPL-CCNC: 62 U/L (ref 39–117)
ALT SERPL W P-5'-P-CCNC: 12 U/L (ref 1–41)
AMPHET+METHAMPHET UR QL: NEGATIVE
ANION GAP SERPL CALCULATED.3IONS-SCNC: 5.8 MMOL/L (ref 5–15)
AST SERPL-CCNC: 17 U/L (ref 1–40)
BARBITURATES UR QL SCN: NEGATIVE
BENZODIAZ UR QL SCN: NEGATIVE
BILIRUB SERPL-MCNC: 0.3 MG/DL (ref 0–1.2)
BILIRUB UR QL STRIP: NEGATIVE
BUN SERPL-MCNC: 40 MG/DL (ref 8–23)
BUN/CREAT SERPL: 19.1 (ref 7–25)
CALCIUM SPEC-SCNC: 9.8 MG/DL (ref 8.6–10.5)
CANNABINOIDS SERPL QL: NEGATIVE
CHLORIDE SERPL-SCNC: 111 MMOL/L (ref 98–107)
CLARITY UR: CLEAR
CO2 SERPL-SCNC: 20.2 MMOL/L (ref 22–29)
COCAINE UR QL: NEGATIVE
COLOR UR: YELLOW
CREAT SERPL-MCNC: 2.09 MG/DL (ref 0.76–1.27)
GFR SERPL CREATININE-BSD FRML MDRD: 38 ML/MIN/1.73
GLOBULIN UR ELPH-MCNC: 2.5 GM/DL
GLUCOSE SERPL-MCNC: 79 MG/DL (ref 65–99)
GLUCOSE UR STRIP-MCNC: NEGATIVE MG/DL
HBA1C MFR BLD: 5.2 % (ref 4.8–5.6)
HCT VFR BLD AUTO: 24.6 % (ref 37.5–51)
HCT VFR BLD AUTO: 27.5 % (ref 37.5–51)
HGB BLD-MCNC: 7.9 G/DL (ref 13–17.7)
HGB BLD-MCNC: 8.7 G/DL (ref 13–17.7)
HGB UR QL STRIP.AUTO: NEGATIVE
KETONES UR QL STRIP: ABNORMAL
LEUKOCYTE ESTERASE UR QL STRIP.AUTO: NEGATIVE
METHADONE UR QL SCN: NEGATIVE
NITRITE UR QL STRIP: NEGATIVE
OPIATES UR QL: NEGATIVE
OXYCODONE UR QL SCN: NEGATIVE
PH UR STRIP.AUTO: <=5 [PH] (ref 5–8)
POTASSIUM SERPL-SCNC: 5.5 MMOL/L (ref 3.5–5.2)
POTASSIUM SERPL-SCNC: 5.9 MMOL/L (ref 3.5–5.2)
PROT SERPL-MCNC: 6 G/DL (ref 6–8.5)
PROT UR QL STRIP: NEGATIVE
SODIUM SERPL-SCNC: 137 MMOL/L (ref 136–145)
SP GR UR STRIP: 1.02 (ref 1–1.03)
UROBILINOGEN UR QL STRIP: ABNORMAL

## 2020-10-23 PROCEDURE — 85018 HEMOGLOBIN: CPT | Performed by: NURSE PRACTITIONER

## 2020-10-23 PROCEDURE — 84132 ASSAY OF SERUM POTASSIUM: CPT | Performed by: INTERNAL MEDICINE

## 2020-10-23 PROCEDURE — 81003 URINALYSIS AUTO W/O SCOPE: CPT | Performed by: INTERNAL MEDICINE

## 2020-10-23 PROCEDURE — 80307 DRUG TEST PRSMV CHEM ANLYZR: CPT | Performed by: EMERGENCY MEDICINE

## 2020-10-23 PROCEDURE — 83036 HEMOGLOBIN GLYCOSYLATED A1C: CPT | Performed by: NURSE PRACTITIONER

## 2020-10-23 PROCEDURE — 80053 COMPREHEN METABOLIC PANEL: CPT | Performed by: NURSE PRACTITIONER

## 2020-10-23 PROCEDURE — 85014 HEMATOCRIT: CPT | Performed by: NURSE PRACTITIONER

## 2020-10-23 PROCEDURE — 99222 1ST HOSP IP/OBS MODERATE 55: CPT | Performed by: INTERNAL MEDICINE

## 2020-10-23 RX ORDER — ALUMINA, MAGNESIA, AND SIMETHICONE 2400; 2400; 240 MG/30ML; MG/30ML; MG/30ML
15 SUSPENSION ORAL EVERY 6 HOURS PRN
Status: DISCONTINUED | OUTPATIENT
Start: 2020-10-23 | End: 2020-10-25 | Stop reason: HOSPADM

## 2020-10-23 RX ORDER — ACETAMINOPHEN 325 MG/1
650 TABLET ORAL EVERY 4 HOURS PRN
Status: DISCONTINUED | OUTPATIENT
Start: 2020-10-23 | End: 2020-10-25 | Stop reason: HOSPADM

## 2020-10-23 RX ORDER — NITROGLYCERIN 0.4 MG/1
0.4 TABLET SUBLINGUAL
Status: DISCONTINUED | OUTPATIENT
Start: 2020-10-23 | End: 2020-10-25 | Stop reason: HOSPADM

## 2020-10-23 RX ORDER — ONDANSETRON 2 MG/ML
4 INJECTION INTRAMUSCULAR; INTRAVENOUS EVERY 6 HOURS PRN
Status: DISCONTINUED | OUTPATIENT
Start: 2020-10-23 | End: 2020-10-25 | Stop reason: HOSPADM

## 2020-10-23 RX ORDER — ONDANSETRON 4 MG/1
4 TABLET, FILM COATED ORAL EVERY 6 HOURS PRN
Status: DISCONTINUED | OUTPATIENT
Start: 2020-10-23 | End: 2020-10-25 | Stop reason: HOSPADM

## 2020-10-23 RX ORDER — SODIUM CHLORIDE 9 MG/ML
75 INJECTION, SOLUTION INTRAVENOUS CONTINUOUS
Status: ACTIVE | OUTPATIENT
Start: 2020-10-23 | End: 2020-10-23

## 2020-10-23 RX ORDER — NICOTINE 21 MG/24HR
1 PATCH, TRANSDERMAL 24 HOURS TRANSDERMAL
Status: DISCONTINUED | OUTPATIENT
Start: 2020-10-23 | End: 2020-10-25 | Stop reason: HOSPADM

## 2020-10-23 RX ORDER — ACETAMINOPHEN 650 MG/1
650 SUPPOSITORY RECTAL EVERY 4 HOURS PRN
Status: DISCONTINUED | OUTPATIENT
Start: 2020-10-23 | End: 2020-10-25 | Stop reason: HOSPADM

## 2020-10-23 RX ORDER — BISACODYL 5 MG/1
5 TABLET, DELAYED RELEASE ORAL DAILY PRN
Status: DISCONTINUED | OUTPATIENT
Start: 2020-10-23 | End: 2020-10-25 | Stop reason: HOSPADM

## 2020-10-23 RX ORDER — SODIUM CHLORIDE 0.9 % (FLUSH) 0.9 %
10 SYRINGE (ML) INJECTION AS NEEDED
Status: DISCONTINUED | OUTPATIENT
Start: 2020-10-23 | End: 2020-10-25 | Stop reason: HOSPADM

## 2020-10-23 RX ORDER — ACETAMINOPHEN 160 MG/5ML
650 SOLUTION ORAL EVERY 4 HOURS PRN
Status: DISCONTINUED | OUTPATIENT
Start: 2020-10-23 | End: 2020-10-25 | Stop reason: HOSPADM

## 2020-10-23 RX ORDER — VENLAFAXINE HYDROCHLORIDE 75 MG/1
75 CAPSULE, EXTENDED RELEASE ORAL DAILY
Status: DISCONTINUED | OUTPATIENT
Start: 2020-10-23 | End: 2020-10-25 | Stop reason: HOSPADM

## 2020-10-23 RX ORDER — BISACODYL 10 MG
10 SUPPOSITORY, RECTAL RECTAL DAILY PRN
Status: DISCONTINUED | OUTPATIENT
Start: 2020-10-23 | End: 2020-10-25 | Stop reason: HOSPADM

## 2020-10-23 RX ORDER — UREA 10 %
3 LOTION (ML) TOPICAL NIGHTLY PRN
Status: DISCONTINUED | OUTPATIENT
Start: 2020-10-23 | End: 2020-10-25 | Stop reason: HOSPADM

## 2020-10-23 RX ORDER — LEVOTHYROXINE SODIUM 0.03 MG/1
25 TABLET ORAL DAILY
Status: DISCONTINUED | OUTPATIENT
Start: 2020-10-23 | End: 2020-10-25 | Stop reason: HOSPADM

## 2020-10-23 RX ADMIN — SODIUM CHLORIDE 75 ML/HR: 9 INJECTION, SOLUTION INTRAVENOUS at 10:55

## 2020-10-23 RX ADMIN — SODIUM CHLORIDE, PRESERVATIVE FREE 10 ML: 5 INJECTION INTRAVENOUS at 10:53

## 2020-10-23 RX ADMIN — LEVOTHYROXINE SODIUM 25 MCG: 25 TABLET ORAL at 10:52

## 2020-10-23 NOTE — PLAN OF CARE
"  Problem: Adult Inpatient Plan of Care  Goal: Plan of Care Review  Goal: Absence of Hospital-Acquired Illness or Injury  Intervention: Identify and Manage Fall Risk  Intervention: Prevent Skin Injury  Goal: Optimal Comfort and Wellbeing  Intervention: Provide Person-Centered Care  Goal: Readiness for Transition of Care  Intervention: Mutually Develop Transition Plan   Goal Outcome Evaluation:        Outcome Summary: Patient came in from ER with c/o of feeling bad since starting new medications. Patient had a bag of medications that he would not allow me to take to the safe, because he couldnt \"trust\" people dealing with his medications after this. Per notes patient has had red blood in stool and N/V. Occult blood was postive. Patient on clear liquid diet. Requested food frequently. did not have any c/o of pain or discomfort or N/V. Patient recieved two units of PRBCs, tolerated well. Ordered H&H at 0800. When RN was flushing tubing, RN noticed a bug crawlng on the bed. Rn got another nurse who evaluated the bug and gathered it in a container. House keeping confirmed it was a bed bug. Patient is now in contact percautions. RN followed protocol to the best of her abilities. There is no clean rooms to transfer patient to. Rn double baged patient's belongings, dressed him in a fresh gown, and changed linens. Will ctm  "

## 2020-10-23 NOTE — ED PROVIDER NOTES
EMERGENCY DEPARTMENT ENCOUNTER    Room Number:  28/28  Date of encounter:  10/22/2020  PCP: Provider, No Known  Historian: Patient      HPI:  Chief Complaint: Weakness, fatigue, abdominal pain  A complete HPI/ROS/PMH/PSH/SH/FH are unobtainable due to: None    Context: Yuval Loja is a 69 y.o. male who presents to the ED via [facility] from home with complaints of a couple of weeks of weakness, fatigue, nausea.  Reports several days of bright red blood per rectum.  Reports of lower abdominal pain.  Reports nausea without vomiting.  Patient denies being on any blood thinners, denies chest pain or shortness of breath.  Reports lightheadedness.  Reports decreased p.o. intake and weight loss.      MEDICAL RECORD REVIEW    Chart review in epic shows hospitalization at [facility] on October 6 of 2020 for weakness, was found to have an acute kidney injury with creatinine of 4.2 and potassium of 5.3.  This was believed to be prerenal/volume depletion at that time.  Patient did end up leaving AGAINST MEDICAL ADVICE.    PAST MEDICAL HISTORY  Active Ambulatory Problems     Diagnosis Date Noted   • Alcoholic ketoacidosis 05/23/2017   • Alcohol dependence (CMS/HCC) 05/23/2017   • Methamphetamine abuse (CMS/[facility]) 05/23/2017   • Fatty liver, alcoholic 05/23/2017   • Nausea & vomiting 05/23/2017   • Alcoholic liver disease (CMS/HCC) 05/23/2017   • Metabolic acidosis 05/23/2017   • Tobacco abuse 05/23/2017   • Coronary artery disease involving native coronary artery of native heart without angina pectoris 05/24/2017   • Tachycardia 05/24/2017   • Sinus tachycardia 04/17/2019   • Chronic kidney disease, stage 3 04/17/2019   • Medically noncompliant 04/18/2019   • Visual hallucinations 04/18/2020   • Psychosis (CMS/HCC) 04/18/2020   • Hyponatremia 04/18/2020   • CAD (coronary artery disease) 04/18/2020   • Leukopenia 04/18/2020   • Symptomatic anemia 04/18/2020   • Headache 04/18/2020   • Substance abuse (CMS/[facility]) 04/18/2020  "    Resolved Ambulatory Problems     Diagnosis Date Noted   • Dehydration 04/17/2019   • Functional diarrhea 04/17/2019     Past Medical History:   Diagnosis Date   • Alcohol abuse    • GERD (gastroesophageal reflux disease)    • Hypertensive urgency    • Myocardial infarction (CMS/HCC)    • Pacemaker    • Stroke (CMS/HCC)          PAST SURGICAL HISTORY  Past Surgical History:   Procedure Laterality Date   • PACEMAKER IMPLANTATION           FAMILY HISTORY  No family history on file.      SOCIAL HISTORY  Social History     Socioeconomic History   • Marital status: Single     Spouse name: Not on file   • Number of children: Not on file   • Years of education: Not on file   • Highest education level: Not on file   Occupational History     Employer: RETIRED   Tobacco Use   • Smoking status: Current Every Day Smoker     Packs/day: 0.50   • Smokeless tobacco: Never Used   • Tobacco comment: tried to provide education declined irritated w/ attempt smoked \" depends on what I feel like.\"   Substance and Sexual Activity   • Alcohol use: Yes     Alcohol/week: 6.0 standard drinks     Types: 6 Cans of beer per week     Comment: hx changes depending on time questioned    • Drug use: No   • Sexual activity: Defer         ALLERGIES  Atorvastatin, Atorvastatin calcium, Mirtazapine, and Sertraline        REVIEW OF SYSTEMS  Review of Systems     All systems reviewed and negative except for those discussed in HPI.       PHYSICAL EXAM    I have reviewed the triage vital signs and nursing notes.    ED Triage Vitals   Temp Heart Rate Resp BP SpO2   10/22/20 1838 10/22/20 1838 10/22/20 1838 10/22/20 1838 10/22/20 1838   96.9 °F (36.1 °C) (!) 134 18 120/86 100 %      Temp src Heart Rate Source Patient Position BP Location FiO2 (%)   10/22/20 1838 10/22/20 1851 10/22/20 1851 10/22/20 1851 --   Tympanic Monitor Sitting Right arm        Physical Exam  General: Awake, alert, no acute distress, nontoxic, nondiaphoretic  HEENT: Mucous membranes " tacky, atraumatic, normocephalic, EOMI  Neck: Full ROM  Pulm: Symmetric, nonlabored, lungs CTAB  Cardiovascular: Regular rhythm tachycardia, normal S1/S2, intact distal pulses  GI: Soft, mild generalized lower abdominal tenderness, nondistended, no rebound, no guarding, bowel sounds present  Rectal: No gross red blood on [place], small amount of black tarry stool  MSK: Full ROM, no deformity  Skin: Warm, dry  Neuro: Alert and oriented x 3, GCS 15, moving all extremities, no focal deficits  Psych: Calm, cooperative      Surgical mask, protective eye goggles, and gloves used during this encounter. Patient in surgical mask.      LAB RESULTS  Recent Results (from the past 24 hour(s))   Comprehensive Metabolic Panel    Collection Time: 10/22/20  7:19 PM    Specimen: Blood   Result Value Ref Range    Glucose 150 (H) 65 - 99 mg/dL    BUN 46 (H) 8 - 23 mg/dL    Creatinine 2.76 (H) 0.76 - 1.27 mg/dL    Sodium 136 136 - 145 mmol/L    Potassium 6.3 (C) 3.5 - 5.2 mmol/L    Chloride 106 98 - 107 mmol/L    CO2 19.4 (L) 22.0 - 29.0 mmol/L    Calcium 10.2 8.6 - 10.5 mg/dL    Total Protein 7.1 6.0 - 8.5 g/dL    Albumin 4.20 3.50 - 5.20 g/dL    ALT (SGPT) 16 1 - 41 U/L    AST (SGOT) 21 1 - 40 U/L    Alkaline Phosphatase 78 39 - 117 U/L    Total Bilirubin 0.2 0.0 - 1.2 mg/dL    eGFR  African Amer 28 (L) >60 mL/min/1.73    Globulin 2.9 gm/dL    A/G Ratio 1.4 g/dL    BUN/Creatinine Ratio 16.7 7.0 - 25.0    Anion Gap 10.6 5.0 - 15.0 mmol/L   Magnesium    Collection Time: 10/22/20  7:19 PM    Specimen: Blood   Result Value Ref Range    Magnesium 2.0 1.6 - 2.4 mg/dL   CBC Auto Differential    Collection Time: 10/22/20  7:19 PM    Specimen: Blood   Result Value Ref Range    WBC 6.90 3.40 - 10.80 10*3/mm3    RBC 2.74 (L) 4.14 - 5.80 10*6/mm3    Hemoglobin 7.9 (L) 13.0 - 17.7 g/dL    Hematocrit 24.3 (L) 37.5 - 51.0 %    MCV 88.7 79.0 - 97.0 fL    MCH 28.8 26.6 - 33.0 pg    MCHC 32.5 31.5 - 35.7 g/dL    RDW 13.0 12.3 - 15.4 %    RDW-SD 41.1 37.0 -  54.0 fl    MPV 9.8 6.0 - 12.0 fL    Platelets 360 140 - 450 10*3/mm3    Neutrophil % 71.4 42.7 - 76.0 %    Lymphocyte % 20.7 19.6 - 45.3 %    Monocyte % 6.7 5.0 - 12.0 %    Eosinophil % 0.4 0.3 - 6.2 %    Basophil % 0.4 0.0 - 1.5 %    Immature Grans % 0.4 0.0 - 0.5 %    Neutrophils, Absolute 4.92 1.70 - 7.00 10*3/mm3    Lymphocytes, Absolute 1.43 0.70 - 3.10 10*3/mm3    Monocytes, Absolute 0.46 0.10 - 0.90 10*3/mm3    Eosinophils, Absolute 0.03 0.00 - 0.40 10*3/mm3    Basophils, Absolute 0.03 0.00 - 0.20 10*3/mm3    Immature Grans, Absolute 0.03 0.00 - 0.05 10*3/mm3    nRBC 0.6 (H) 0.0 - 0.2 /100 WBC   Type & Screen    Collection Time: 10/22/20  8:12 PM    Specimen: Blood   Result Value Ref Range    ABO Type B     RH type Positive     Antibody Screen Negative     T&S Expiration Date 10/25/2020 11:59:59 PM    POCT Occult Blood Stool    Collection Time: 10/22/20  8:21 PM    Specimen: Per Rectum; Stool   Result Value Ref Range    Fecal Occult Blood Positive (A) Negative    Lot Number [id number]     Expiration Date 03/31/2021     Positive Control Positive Positive    Negative Control Positive (A) Negative   Prepare RBC, 2 Units    Collection Time: 10/22/20  9:15 PM   Result Value Ref Range    Product Code X1030Q73     Unit Number [id number]     UNIT  ABO B     UNIT  RH POS     Crossmatch Interpretation Compatible     Dispense Status XM     Blood Expiration Date 730042176091     Blood Type Barcode 7300     Product Code [id number]     Unit Number [id number]     UNIT  ABO B     UNIT  RH POS     Crossmatch Interpretation Compatible     Dispense Status XM     Blood Expiration Date 748372011181     Blood Type Barcode 7300        Ordered the above labs and independently reviewed the results.        RADIOLOGY  No Radiology Exams Resulted Within Past 24 Hours          PROCEDURES    Critical Care  Performed by: Dallas Feliciano MD  Authorized by: Dallas Feliciano MD     Critical care provider statement:     Critical care time  (minutes):  35    Critical care time was exclusive of:  Separately billable procedures and treating other patients    Critical care was necessary to treat or prevent imminent or life-threatening deterioration of the following conditions:  Renal failure, metabolic crisis and circulatory failure    Critical care was time spent personally by me on the following activities:  Development of treatment plan with patient or surrogate, discussions with consultants, evaluation of patient's response to treatment, examination of patient, obtaining history from patient or surrogate, ordering and performing treatments and interventions, ordering and review of laboratory studies, ordering and review of radiographic studies, pulse oximetry, re-evaluation of patient's condition and review of old charts          MEDICATIONS GIVEN IN ER    Medications   sodium chloride 0.9 % bolus 1,000 mL (1,000 mL Intravenous New Bag 10/22/20 2028)   pantoprazole (PROTONIX) injection 80 mg (80 mg Intravenous Given 10/22/20 2027)   insulin regular (humuLIN R,novoLIN R) injection 5 Units (5 Units Intravenous Given 10/22/20 2027)   dextrose (D50W) 25 g/ 50mL Intravenous Solution 25 mL (25 mL Intravenous Given 10/22/20 2027)   calcium gluconate 1 g/100 mL (10 mg/mL) NS IVPB (VTB) (1 g Intravenous New Bag 10/22/20 2028)         PROGRESS, DATA ANALYSIS, CONSULTS, AND MEDICAL DECISION MAKING    All labs have been independently reviewed by me.  All radiology studies have been reviewed by me and discussed with radiologist dictating the report.   EKG's independently viewed and interpreted by me.  Discussion below represents my analysis of pertinent findings related to patient's condition, differential diagnosis, treatment plan and final disposition.    Plan for work-up to evaluate for dehydration, renal failure, lecture abnormalities, infectious process, GI bleed, among others.    Patient found to be anemic with progressively worsening anemia the last couple  of weeks, Hemoccult positive stool and I do suspect a GI bleed.  Patient noted to have elevated creatinine which is improving from prior but worse from couple of months ago.  Hyperkalemia also noted.  I do feel this is likely more prerenal injury.  Nephrology is been consulted.  Hyperkalemic protocol has been initiated.  Patient has been ordered IV Protonix, IV fluids, as well.  Heart rate and blood pressure have stabilized.    ED Course as of Oct 22 2131   Thu Oct 22, 2020   2022 Discussed case with Dr. Pearl, Nephrology, discussed patient's clinical course and findings today, nephrology will consult.    [DC]   2039 Discussed with Dr. Vines, hospitalist, discussed patient's clinical course and findings today, treatment modalities, nephrology consult, and need for hospitalization.  Discussed inpatient and telemetry placement.    [DC]      ED Course User Index  [DC] Dallas Feliciano MD       AS OF 21:31 EDT VITALS:    BP - 109/53  HR - 107  TEMP - 96.9 °F (36.1 °C) (Tympanic)  02 SATS - 100%        DIAGNOSIS  Final diagnoses:   Gastrointestinal hemorrhage, unspecified gastrointestinal hemorrhage type   Anemia, unspecified type   Acute kidney injury (CMS/HCC)   Hyperkalemia   Weakness   Fatigue, unspecified type   History of chronic kidney disease   History of coronary artery disease         DISPOSITION  ADMISSION    Discussed treatment plan and reason for admission with pt/family and admitting physician.  Pt/family voiced understanding of the plan for admission for further testing/treatment as needed.                  Dallas Feliciano MD  10/22/20 2133

## 2020-10-23 NOTE — ED PROVIDER NOTES
EMERGENCY DEPARTMENT ENCOUNTER    Room Number:  28/28  Date seen:  10/22/2020  Time seen: 20:09 EDT  PCP: Provider, No Known  Historian: ***      HPI:  Chief Complaint: ***    A complete HPI/ROS/PMH/PSH/SH/FH are unobtainable due to: ***    Context: Yuval Loja is a 69 y.o. male who presents to the ED for evaluation of ***        PAST MEDICAL HISTORY  Active Ambulatory Problems     Diagnosis Date Noted   • Alcoholic ketoacidosis 05/23/2017   • Alcohol dependence (CMS/HCC) 05/23/2017   • Methamphetamine abuse (CMS/HCC) 05/23/2017   • Fatty liver, alcoholic 05/23/2017   • Nausea & vomiting 05/23/2017   • Alcoholic liver disease (CMS/HCC) 05/23/2017   • Metabolic acidosis 05/23/2017   • Tobacco abuse 05/23/2017   • Coronary artery disease involving native coronary artery of native heart without angina pectoris 05/24/2017   • Tachycardia 05/24/2017   • Sinus tachycardia 04/17/2019   • Chronic kidney disease, stage 3 04/17/2019   • Medically noncompliant 04/18/2019   • Visual hallucinations 04/18/2020   • Psychosis (CMS/HCC) 04/18/2020   • Hyponatremia 04/18/2020   • CAD (coronary artery disease) 04/18/2020   • Leukopenia 04/18/2020   • Symptomatic anemia 04/18/2020   • Headache 04/18/2020   • Substance abuse (CMS/HCC) 04/18/2020     Resolved Ambulatory Problems     Diagnosis Date Noted   • Dehydration 04/17/2019   • Functional diarrhea 04/17/2019     Past Medical History:   Diagnosis Date   • Alcohol abuse    • GERD (gastroesophageal reflux disease)    • Hypertensive urgency    • Myocardial infarction (CMS/HCC)    • Pacemaker    • Stroke (CMS/HCC)          PAST SURGICAL HISTORY  Past Surgical History:   Procedure Laterality Date   • PACEMAKER IMPLANTATION           FAMILY HISTORY  No family history on file.      SOCIAL HISTORY  Social History     Socioeconomic History   • Marital status: Single     Spouse name: Not on file   • Number of children: Not on file   • Years of education: Not on file   • Highest education  "level: Not on file   Occupational History     Employer: RETIRED   Tobacco Use   • Smoking status: Current Every Day Smoker     Packs/day: 0.50   • Smokeless tobacco: Never Used   • Tobacco comment: tried to provide education declined irritated w/ attempt smoked \" depends on what I feel like.\"   Substance and Sexual Activity   • Alcohol use: Yes     Alcohol/week: 6.0 standard drinks     Types: 6 Cans of beer per week     Comment: hx changes depending on time questioned    • Drug use: No   • Sexual activity: Defer         ALLERGIES  Atorvastatin, Atorvastatin calcium, Mirtazapine, and Sertraline        REVIEW OF SYSTEMS  Review of Systems   ***  All systems reviewed and negative except for those discussed in HPI.       PHYSICAL EXAM  ED Triage Vitals   Temp Heart Rate Resp BP SpO2   10/22/20 1838 10/22/20 1838 10/22/20 1838 10/22/20 1838 10/22/20 1838   96.9 °F (36.1 °C) (!) 134 18 120/86 100 %      Temp src Heart Rate Source Patient Position BP Location FiO2 (%)   10/22/20 1838 10/22/20 1851 10/22/20 1851 10/22/20 1851 --   Tympanic Monitor Sitting Right arm          GENERAL: ***not distressed  HENT: atraumatic  EYES: no scleral icterus  CV: regular rhythm, regular rate  RESPIRATORY: normal effort  ABDOMEN: soft, nontender  MUSCULOSKELETAL: no deformity  NEURO: alert, moves all extremities, follows commands  SKIN: warm, dry    Vital signs and nursing notes reviewed.          LAB RESULTS  Recent Results (from the past 24 hour(s))   Comprehensive Metabolic Panel    Collection Time: 10/22/20  7:19 PM    Specimen: Blood   Result Value Ref Range    Glucose 150 (H) 65 - 99 mg/dL    BUN 46 (H) 8 - 23 mg/dL    Creatinine 2.76 (H) 0.76 - 1.27 mg/dL    Sodium 136 136 - 145 mmol/L    Potassium 6.3 (C) 3.5 - 5.2 mmol/L    Chloride 106 98 - 107 mmol/L    CO2 19.4 (L) 22.0 - 29.0 mmol/L    Calcium 10.2 8.6 - 10.5 mg/dL    Total Protein 7.1 6.0 - 8.5 g/dL    Albumin 4.20 3.50 - 5.20 g/dL    ALT (SGPT) 16 1 - 41 U/L    AST (SGOT) 21 " 1 - 40 U/L    Alkaline Phosphatase 78 39 - 117 U/L    Total Bilirubin 0.2 0.0 - 1.2 mg/dL    eGFR  African Amer 28 (L) >60 mL/min/1.73    Globulin 2.9 gm/dL    A/G Ratio 1.4 g/dL    BUN/Creatinine Ratio 16.7 7.0 - 25.0    Anion Gap 10.6 5.0 - 15.0 mmol/L   Magnesium    Collection Time: 10/22/20  7:19 PM    Specimen: Blood   Result Value Ref Range    Magnesium 2.0 1.6 - 2.4 mg/dL   CBC Auto Differential    Collection Time: 10/22/20  7:19 PM    Specimen: Blood   Result Value Ref Range    WBC 6.90 3.40 - 10.80 10*3/mm3    RBC 2.74 (L) 4.14 - 5.80 10*6/mm3    Hemoglobin 7.9 (L) 13.0 - 17.7 g/dL    Hematocrit 24.3 (L) 37.5 - 51.0 %    MCV 88.7 79.0 - 97.0 fL    MCH 28.8 26.6 - 33.0 pg    MCHC 32.5 31.5 - 35.7 g/dL    RDW 13.0 12.3 - 15.4 %    RDW-SD 41.1 37.0 - 54.0 fl    MPV 9.8 6.0 - 12.0 fL    Platelets 360 140 - 450 10*3/mm3    Neutrophil % 71.4 42.7 - 76.0 %    Lymphocyte % 20.7 19.6 - 45.3 %    Monocyte % 6.7 5.0 - 12.0 %    Eosinophil % 0.4 0.3 - 6.2 %    Basophil % 0.4 0.0 - 1.5 %    Immature Grans % 0.4 0.0 - 0.5 %    Neutrophils, Absolute 4.92 1.70 - 7.00 10*3/mm3    Lymphocytes, Absolute 1.43 0.70 - 3.10 10*3/mm3    Monocytes, Absolute 0.46 0.10 - 0.90 10*3/mm3    Eosinophils, Absolute 0.03 0.00 - 0.40 10*3/mm3    Basophils, Absolute 0.03 0.00 - 0.20 10*3/mm3    Immature Grans, Absolute 0.03 0.00 - 0.05 10*3/mm3    nRBC 0.6 (H) 0.0 - 0.2 /100 WBC       Ordered the above labs and independently reviewed the results.        RADIOLOGY  No orders to display       I ordered the above noted radiological studies. Reviewed by me and discussed with radiologist.  See dictation for official radiology interpretation.    PROCEDURES  Procedures        MEDICATIONS GIVEN IN ER  Medications - No data to display          PROGRESS AND CONSULTS    DDX includes but not limited to ***          Reviewed pt's history and workup with  ***.  After a bedside evaluation; they agree with the plan of care      Patient was placed in face  mask in first look. Patient was wearing facemask each time I entered the room and throughout our encounter. I wore protective equipment throughout this patient encounter including a face mask, eye shield, gown*** and gloves. Hand hygiene was performed before donning protective equipment and after removal when leaving the room.        DIAGNOSIS  Final diagnoses:   None               Latest Documented Vital Signs:  As of 20:09 EDT  BP- 99/56 HR- 119 Temp- 96.9 °F (36.1 °C) (Tympanic) O2 sat- 96%

## 2020-10-23 NOTE — CONSULTS
"  Referring Provider: Dr Feliciano   Reason for Consultation: CRISTIANO     Subjective     Chief complaint   Chief Complaint   Patient presents with   • Medication Reaction     PT FEELS THAT THE MEDICATIONS THAT HE WAS RECENTLY PUT ON AFTER A [place] ER VISIT ARE TOO STRONG; PT WAS RX'D METOPROLOL, FERROUS SULFATE, MIRTAZAPINE AND VENLAFAXINE; PT C/O WEAKNESS AND NAUSEA; PT WEARING FACE MASK        History of present illness:  70 yo AAM with h/o HTN, CAD, GERD, dyslipidemia who presented last night with gen weakness, fatigue, hematochezia.  Found to have CRISTIANO & hyperkalemia, with BUN/Cr 46/2.7, K 6.3.  Hemoglobin low 7.9.  He had some apparent prerenal azotemia while hospitalized elsewhere in July of this year with Cr 1.7 -> 1.1 at discharge; Cr as low as 0.8 on 6/10/20.  He denies kidney problems in the past.  Reports poor appetite lately, no emesis.  He has been taking some nsaids.  Denies dyspnea or swelling or dysuria.  Takes b-blocker for BP control but no ACE/ARB.  He was transfused 2u PRBC overnight.  Today, Cr down slightly 2.1 and K to 5.9.  Hgb unchanged 7.9.          Past Medical History:   Diagnosis Date   • Alcohol abuse    • CAD (coronary artery disease)    • Chronic kidney disease, stage 3    • Fatty liver, alcoholic    • GERD (gastroesophageal reflux disease)    • Hypertensive urgency    • Metabolic acidosis    • Myocardial infarction (CMS/HCC)    • Pacemaker     patient has no pacemaker   • Sinus tachycardia    • Stroke (CMS/HCC)      Past Surgical History:   Procedure Laterality Date   • PACEMAKER IMPLANTATION       No family history on file.    Social History     Tobacco Use   • Smoking status: Current Every Day Smoker     Packs/day: 0.50   • Smokeless tobacco: Never Used   • Tobacco comment: tried to provide education declined irritated w/ attempt smoked \" depends on what I feel like.\"   Substance Use Topics   • Alcohol use: Not Currently   • Drug use: No     Medications Prior to Admission   Medication Sig " Dispense Refill Last Dose   • ferrous sulfate 325 (65 FE) MG tablet Take 325 mg by mouth Daily.      • levothyroxine (SYNTHROID, LEVOTHROID) 25 MCG tablet Take 25 mcg by mouth Daily.      • mirtazapine (REMERON) 15 MG tablet Take 15 mg by mouth Every Night.      • venlafaxine XR (EFFEXOR-XR) 75 MG 24 hr capsule Take 75 mg by mouth Daily.      • atorvastatin (LIPITOR) 20 MG tablet Take 20 mg by mouth Daily.      • hydrOXYzine (ATARAX) 25 MG tablet Take 25 mg by mouth 3 (Three) Times a Day As Needed for Anxiety.      • metoprolol succinate XL (TOPROL-XL) 25 MG 24 hr tablet Take 1 tablet by mouth Daily. 30 tablet 0    • pantoprazole (PROTONIX) 40 MG EC tablet Take 40 mg by mouth Daily.        Allergies:  Atorvastatin, Atorvastatin calcium, Mirtazapine, and Sertraline    Review of Systems  Pertinent items are noted in HPI.    Objective     Vital Signs  Temp:  [96.9 °F (36.1 °C)-98.9 °F (37.2 °C)] 98.6 °F (37 °C)  Heart Rate:  [] 97  Resp:  [18] 18  BP: ()/(52-96) 117/79         No intake/output data recorded.  I/O last 3 completed shifts:  In: 2400 [P.O.:1000; Blood:300; IV Piggyback:1100]  Out: -     Intake/Output Summary (Last 24 hours) at 10/23/2020 0821  Last data filed at 10/23/2020 0329  Gross per 24 hour   Intake 2400 ml   Output --   Net 2400 ml       Physical Exam:  GEN thin pleasant AAM in no acute distress, alert, comfortable  HEENT NC/AT dry MM  Neck supple no JVD  Lungs CTA bilat no rales  CV RRR no M/G  abd soft NT/ND +BS  vasc no pedal/ankle edema  MS atrophy of LLE relative to RLE; no joint warmth or erythema  Derm normal turgor, no rash  Neuro oriented x3, speech intact     Results Review:  Results for orders placed or performed during the hospital encounter of 10/22/20   Respiratory Panel PCR w/COVID-19(SARS-CoV-2) FROY/MAXINE/JUANJOSE/PAD/COR/MAD/LISA In-House, NP Swab in UTM/VTM, 3-4 HR TAT - Swab, Nasopharynx    Specimen: Nasopharynx; Swab   Result Value Ref Range    ADENOVIRUS, PCR Not Detected  Not Detected    Coronavirus 229E Not Detected Not Detected    Coronavirus HKU1 Not Detected Not Detected    Coronavirus NL63 Not Detected Not Detected    Coronavirus OC43 Not Detected Not Detected    COVID19 Not Detected Not Detected - Ref. Range    Human Metapneumovirus Not Detected Not Detected    Human Rhinovirus/Enterovirus Not Detected Not Detected    Influenza A PCR Not Detected Not Detected    Influenza B PCR Not Detected Not Detected    Parainfluenza Virus 1 Not Detected Not Detected    Parainfluenza Virus 2 Not Detected Not Detected    Parainfluenza Virus 3 Not Detected Not Detected    Parainfluenza Virus 4 Not Detected Not Detected    RSV, PCR Not Detected Not Detected    Bordetella pertussis pcr Not Detected Not Detected    Bordetella parapertussis PCR Not Detected Not Detected    Chlamydophila pneumoniae PCR Not Detected Not Detected    Mycoplasma pneumo by PCR Not Detected Not Detected   Comprehensive Metabolic Panel    Specimen: Blood   Result Value Ref Range    Glucose 150 (H) 65 - 99 mg/dL    BUN 46 (H) 8 - 23 mg/dL    Creatinine 2.76 (H) 0.76 - 1.27 mg/dL    Sodium 136 136 - 145 mmol/L    Potassium 6.3 (C) 3.5 - 5.2 mmol/L    Chloride 106 98 - 107 mmol/L    CO2 19.4 (L) 22.0 - 29.0 mmol/L    Calcium 10.2 8.6 - 10.5 mg/dL    Total Protein 7.1 6.0 - 8.5 g/dL    Albumin 4.20 3.50 - 5.20 g/dL    ALT (SGPT) 16 1 - 41 U/L    AST (SGOT) 21 1 - 40 U/L    Alkaline Phosphatase 78 39 - 117 U/L    Total Bilirubin 0.2 0.0 - 1.2 mg/dL    eGFR  African Amer 28 (L) >60 mL/min/1.73    Globulin 2.9 gm/dL    A/G Ratio 1.4 g/dL    BUN/Creatinine Ratio 16.7 7.0 - 25.0    Anion Gap 10.6 5.0 - 15.0 mmol/L   Magnesium    Specimen: Blood   Result Value Ref Range    Magnesium 2.0 1.6 - 2.4 mg/dL   CBC Auto Differential    Specimen: Blood   Result Value Ref Range    WBC 6.90 3.40 - 10.80 10*3/mm3    RBC 2.74 (L) 4.14 - 5.80 10*6/mm3    Hemoglobin 7.9 (L) 13.0 - 17.7 g/dL    Hematocrit 24.3 (L) 37.5 - 51.0 %    MCV 88.7 79.0 -  97.0 fL    MCH 28.8 26.6 - 33.0 pg    MCHC 32.5 31.5 - 35.7 g/dL    RDW 13.0 12.3 - 15.4 %    RDW-SD 41.1 37.0 - 54.0 fl    MPV 9.8 6.0 - 12.0 fL    Platelets 360 140 - 450 10*3/mm3    Neutrophil % 71.4 42.7 - 76.0 %    Lymphocyte % 20.7 19.6 - 45.3 %    Monocyte % 6.7 5.0 - 12.0 %    Eosinophil % 0.4 0.3 - 6.2 %    Basophil % 0.4 0.0 - 1.5 %    Immature Grans % 0.4 0.0 - 0.5 %    Neutrophils, Absolute 4.92 1.70 - 7.00 10*3/mm3    Lymphocytes, Absolute 1.43 0.70 - 3.10 10*3/mm3    Monocytes, Absolute 0.46 0.10 - 0.90 10*3/mm3    Eosinophils, Absolute 0.03 0.00 - 0.40 10*3/mm3    Basophils, Absolute 0.03 0.00 - 0.20 10*3/mm3    Immature Grans, Absolute 0.03 0.00 - 0.05 10*3/mm3    nRBC 0.6 (H) 0.0 - 0.2 /100 WBC   Protime-INR    Specimen: Blood   Result Value Ref Range    Protime 15.2 (H) 11.7 - 14.2 Seconds    INR 1.22 (H) 0.90 - 1.10   Comprehensive Metabolic Panel    Specimen: Blood   Result Value Ref Range    Glucose 79 65 - 99 mg/dL    BUN 40 (H) 8 - 23 mg/dL    Creatinine 2.09 (H) 0.76 - 1.27 mg/dL    Sodium 137 136 - 145 mmol/L    Potassium 5.9 (H) 3.5 - 5.2 mmol/L    Chloride 111 (H) 98 - 107 mmol/L    CO2 20.2 (L) 22.0 - 29.0 mmol/L    Calcium 9.8 8.6 - 10.5 mg/dL    Total Protein 6.0 6.0 - 8.5 g/dL    Albumin 3.50 3.50 - 5.20 g/dL    ALT (SGPT) 12 1 - 41 U/L    AST (SGOT) 17 1 - 40 U/L    Alkaline Phosphatase 62 39 - 117 U/L    Total Bilirubin 0.3 0.0 - 1.2 mg/dL    eGFR  African Amer 38 (L) >60 mL/min/1.73    Globulin 2.5 gm/dL    A/G Ratio 1.4 g/dL    BUN/Creatinine Ratio 19.1 7.0 - 25.0    Anion Gap 5.8 5.0 - 15.0 mmol/L   Hemoglobin & Hematocrit, Blood    Specimen: Blood   Result Value Ref Range    Hemoglobin 7.9 (L) 13.0 - 17.7 g/dL    Hematocrit 24.6 (L) 37.5 - 51.0 %   Hemoglobin A1c    Specimen: Blood   Result Value Ref Range    Hemoglobin A1C 5.20 4.80 - 5.60 %   POCT Occult Blood Stool    Specimen: Per Rectum; Stool   Result Value Ref Range    Fecal Occult Blood Positive (A) Negative    Lot Number  [id number]     Expiration Date 03/31/2021     Positive Control Positive Positive    Negative Control Positive (A) Negative   Type & Screen    Specimen: Blood   Result Value Ref Range    ABO Type B     RH type Positive     Antibody Screen Negative     T&S Expiration Date 10/25/2020 11:59:59 PM    Prepare RBC, 2 Units   Result Value Ref Range    Product Code C4926A92     Unit Number [id number]     UNIT  ABO B     UNIT  RH POS     Crossmatch Interpretation Compatible     Dispense Status IS     Blood Expiration Date [id number]     Blood Type Barcode 7300     Product Code T4207E01     Unit Number [id number]     UNIT  ABO B     UNIT  RH POS     Crossmatch Interpretation Compatible     Dispense Status XM     Blood Expiration Date 202011242359     Blood Type Barcode 7300      Imaging Results (Last 72 Hours)     ** No results found for the last 72 hours. **            levothyroxine, 25 mcg, Oral, Daily  nicotine, 1 patch, Transdermal, Q24H  venlafaxine XR, 75 mg, Oral, Daily           Assessment/Plan   Non olig CRISTIANO - Cr 2.7, suspect prerenal azotemia due to vol depletion in setting of GIB, poor oral intake, impaired hemodynamic compensation from NSAID use.  Cr down to 2.1 with transfusion.  Had CRISTIANO in July (Cr 1.7 -> 1.1); Cr as low as 0.8 in June.  HCo3 19 -> 20 with normal AG.  No obstructive sx.    Hyperkalemia - due to CRISTIANO + NSAIDs + b-blocker; K 6.3 to 5.9 s/p D50 & insulin  Anemia of ABL - Hgb same (7.9) despite 2u PRBCs  GIB - hematochezia; NSAID use may be factor; GI to see  HTN - BP initially a bit low 90s systolic with GIB, now 110s; metop on hold     Plan  - check UA  - NS IVF x 1 liter   - won't retreat K at this time but recheck at noon  - upon advancement of diet please assure low K restriction  - GI evaluation     Thank you for involving me in pt's care.      Gastrointestinal hemorrhage    Tobacco abuse    Coronary artery disease involving native coronary artery of native heart without angina pectoris     Chronic kidney disease, stage 3    Symptomatic anemia    CRISTIANO (acute kidney injury) (CMS/HCC)    Hyperkalemia        I discussed the patient's findings and my recommendations with patient and nursing staff    Holland Lindsay MD  10/23/20  08:21 EDT      Much of this encounter note is an electronic transcription/translation of spoken language to printed text. The electronic translation of spoken language may permit erroneous, or at times, nonsensical words or phrases to be inadvertently transcribed; Although I have reviewed the note for such errors, some may still exist

## 2020-10-23 NOTE — CONSULTS
"[facility]  Initial Inpatient Consult Note    Referring Provider: Ms. Berg    Reason for Consultation: GI bleeding    Subjective     History of present illness:    69 y.o. male with GI past medical history significant for admission to [facility] 3 months ago with a large GI bleed from a duodenal ulcer treated at that time, admitted yesterday to [facility] with anemia, 3 days of bright red blood per rectum and lower abdominal pain.  Abdominal pain was lower quadrants bilaterally that did not radiate.  Worse with bowel movements.  That has resolved.  There has been no melena, hematemesis.  He has no nausea vomiting.    Found to have acute kidney injury on admission, awaiting creatinine to normalize before bowel prep  No previous colonoscopy in his lifetime    Past Medical History:  Past Medical History:   Diagnosis Date   • Alcohol abuse    • CAD (coronary artery disease)    • Chronic kidney disease, stage 3    • Fatty liver, alcoholic    • GERD (gastroesophageal reflux disease)    • Hypertensive urgency    • Metabolic acidosis    • Myocardial infarction (CMS/HCC)    • Pacemaker     patient has no pacemaker   • Sinus tachycardia    • Stroke (CMS/HCC)      Past Surgical History:  Past Surgical History:   Procedure Laterality Date   • PACEMAKER IMPLANTATION        Social History:   Social History     Tobacco Use   • Smoking status: Current Every Day Smoker     Packs/day: 0.50   • Smokeless tobacco: Never Used   • Tobacco comment: tried to provide education declined irritated w/ attempt smoked \" depends on what I feel like.\"   Substance Use Topics   • Alcohol use: Not Currently      Family History:  No family history on file.    Home Meds:  Medications Prior to Admission   Medication Sig Dispense Refill Last Dose   • ferrous sulfate 325 (65 FE) MG tablet Take 325 mg by mouth Daily.      • levothyroxine (SYNTHROID, LEVOTHROID) 25 MCG tablet Take 25 mcg by mouth Daily.      • " "mirtazapine (REMERON) 15 MG tablet Take 15 mg by mouth Every Night.      • venlafaxine XR (EFFEXOR-XR) 75 MG 24 hr capsule Take 75 mg by mouth Daily.      • atorvastatin (LIPITOR) 20 MG tablet Take 20 mg by mouth Daily.      • hydrOXYzine (ATARAX) 25 MG tablet Take 25 mg by mouth 3 (Three) Times a Day As Needed for Anxiety.      • metoprolol succinate XL (TOPROL-XL) 25 MG 24 hr tablet Take 1 tablet by mouth Daily. 30 tablet 0    • pantoprazole (PROTONIX) 40 MG EC tablet Take 40 mg by mouth Daily.        Current Meds:   levothyroxine, 25 mcg, Oral, Daily  nicotine, 1 patch, Transdermal, Q24H  venlafaxine XR, 75 mg, Oral, Daily      Allergies:  Allergies   Allergen Reactions   • Atorvastatin    • Atorvastatin Calcium Other (See Comments)   • Mirtazapine Other (See Comments)     confused   • Sertraline Anxiety, Diarrhea and Nausea And Vomiting     Also \"hallucinations\"     Review of Systems  He has weakness and fatigue all other systems reviewed and negative     Objective     Vital Signs  Temp:  [96.9 °F (36.1 °C)-98.9 °F (37.2 °C)] 98.6 °F (37 °C)  Heart Rate:  [] 97  Resp:  [18] 18  BP: ()/(52-96) 110/58  Physical Exam:  General Appearance:    Alert, cooperative, in no acute distress   Head:    Normocephalic, without obvious abnormality, atraumatic   Eyes:          conjunctivae and sclerae normal, no   icterus   Throat:   no thrush, oral mucosa moist   Neck:   Supple, no adenopathy   Lungs:     Clear to auscultation bilaterally    Heart:    Regular rhythm and normal rate    Chest Wall:    No abnormalities observed   Abdomen:     Soft, nondistended, nontender; normal bowel sounds   Extremities:   no edema, no redness   Skin:   No bruising or rash   Psychiatric:  normal mood and insight     Results Review:   I reviewed the patient's new clinical results.    Results from last 7 days   Lab Units 10/23/20  0643 10/22/20  1919   WBC 10*3/mm3  --  6.90   HEMOGLOBIN g/dL 7.9* 7.9*   HEMATOCRIT % 24.6* 24.3* "   PLATELETS 10*3/mm3  --  360     Results from last 7 days   Lab Units 10/23/20  0643 10/22/20  1919   SODIUM mmol/L 137 136   POTASSIUM mmol/L 5.9* 6.3*   CHLORIDE mmol/L 111* 106   CO2 mmol/L 20.2* 19.4*   BUN mg/dL 40* 46*   CREATININE mg/dL 2.09* 2.76*   CALCIUM mg/dL 9.8 10.2   BILIRUBIN mg/dL 0.3 0.2   ALK PHOS U/L 62 78   ALT (SGPT) U/L 12 16   AST (SGOT) U/L 17 21   GLUCOSE mg/dL 79 150*     Results from last 7 days   Lab Units 10/22/20  2104   INR  1.22*     Lab Results   Lab Value Date/Time    LIPASE 261 07/08/2020 1120    LIPASE 326 (H) 06/06/2020 1231    LIPASE 220 05/17/2020 1623    LIPASE 65 12/22/2019 1335    LIPASE 49 10/26/2019 1542    LIPASE 65 (H) 05/23/2017 1210    LIPASE 34 06/20/2016 2142    LIPASE 57 09/05/2014 1334       Radiology:  No orders to display       Assessment/Plan   Patient Active Problem List   Diagnosis   • Alcoholic ketoacidosis   • Alcohol dependence (CMS/HCC)   • Methamphetamine abuse (CMS/HCC)   • Fatty liver, alcoholic   • Nausea & vomiting   • Alcoholic liver disease (CMS/HCC)   • Metabolic acidosis   • Tobacco abuse   • Coronary artery disease involving native coronary artery of native heart without angina pectoris   • Tachycardia   • Sinus tachycardia   • Chronic kidney disease, stage 3   • Medically noncompliant   • Visual hallucinations   • Psychosis (CMS/HCC)   • Hyponatremia   • CAD (coronary artery disease)   • Leukopenia   • Symptomatic anemia   • Headache   • Substance abuse (CMS/HCC)   • Gastrointestinal hemorrhage   • CRISTIANO (acute kidney injury) (CMS/HCC)   • Hyperkalemia       Assessment:  1. Bright red blood per rectum 3 days  2. Abdominal pain resolved  3. Duodenal ulcer with bleeding Psychiatric June of this year  4. Acute kidney injury (on top of chronic kidney disease)    Plan:  Plan for colonoscopy for bright red blood per rectum and EGD to check healing of ulcer likely on Sunday when acute kidney injury has resolved  Nephrology is following we will  await their continued efforts to normalize his baseline renal function  Serial hemoglobin  Clear liquid diet okay  Tentatively plan for EGD colonoscopy Sunday, October 25  ·       I discussed the patients findings and my recommendations with patient and nursing staff.    Josiah Zhu MD         g

## 2020-10-23 NOTE — H&P
Patient Name:  Yuval Loja  YOB: 1950  MRN:  [id number]  Admit Date:  10/22/2020  Patient Care Team:  Provider, No Known as PCP - Jonny Rivera MD as Referring Physician (Internal Medicine)  Carlos Villareal MD as Consulting Physician (Hematology and Oncology)      Subjective   History Present Illness     Chief Complaint   Patient presents with   • Medication Reaction     PT FEELS THAT THE MEDICATIONS THAT HE WAS RECENTLY PUT ON AFTER A [facility] ER VISIT ARE TOO STRONG; PT WAS RX'D METOPROLOL, FERROUS SULFATE, MIRTAZAPINE AND VENLAFAXINE; PT C/O WEAKNESS AND NAUSEA; PT WEARING FACE MASK      History of Present Illness   Mr. Loja is a 69 y.o. smoker with a history of anemia, CAD, and CKD stage III that presents to [facility] complaining of weakness, fatigue, and nausea.  Patient reports several days of bright red blood per rectum.  He also complains of lower abdominal pain which resolved on exam.  He states that all of his symptoms started due to a new medication.  When asked what medication he had been prescribed patient has no idea the name of the medication.  Labs obtained in the ED show a glucose of 150, potassium 6.3, creat 2.76, BUN 46, eGFR 28, hemoglobin 7.9, hematocrit 24.3.  Patient did have a guaiac positive stool in the emergency department.  He has been admitted for further evaluation.    Review of Systems   Constitutional: Negative for chills and fever.   HENT: Negative for congestion and rhinorrhea.    Eyes: Negative for photophobia and visual disturbance.   Respiratory: Negative for cough and shortness of breath.    Cardiovascular: Negative for chest pain and palpitations.   Gastrointestinal: Positive for abdominal pain, blood in stool and vomiting. Negative for constipation, diarrhea and nausea.   Endocrine: Negative for cold intolerance and heat intolerance.   Genitourinary: Negative for difficulty urinating and dysuria.   Musculoskeletal:  "Negative for gait problem and joint swelling.   Skin: Negative for rash and wound.   Neurological: Positive for dizziness and light-headedness. Negative for headaches.   Psychiatric/Behavioral: Negative for sleep disturbance and suicidal ideas.        Personal History     Past Medical History:   Diagnosis Date   • Alcohol abuse    • CAD (coronary artery disease)    • Chronic kidney disease, stage 3    • Fatty liver, alcoholic    • GERD (gastroesophageal reflux disease)    • Hypertensive urgency    • Metabolic acidosis    • Myocardial infarction (CMS/HCC)    • Pacemaker     patient has no pacemaker   • Sinus tachycardia    • Stroke (CMS/HCC)      Past Surgical History:   Procedure Laterality Date   • PACEMAKER IMPLANTATION       No family history on file.  Social History     Tobacco Use   • Smoking status: Current Every Day Smoker     Packs/day: 0.50   • Smokeless tobacco: Never Used   • Tobacco comment: tried to provide education declined irritated w/ attempt smoked \" depends on what I feel like.\"   Substance Use Topics   • Alcohol use: Not Currently   • Drug use: No     No current facility-administered medications on file prior to encounter.      Current Outpatient Medications on File Prior to Encounter   Medication Sig Dispense Refill   • ferrous sulfate 325 (65 FE) MG tablet Take 325 mg by mouth Daily.     • levothyroxine (SYNTHROID, LEVOTHROID) 25 MCG tablet Take 25 mcg by mouth Daily.     • mirtazapine (REMERON) 15 MG tablet Take 15 mg by mouth Every Night.     • venlafaxine XR (EFFEXOR-XR) 75 MG 24 hr capsule Take 75 mg by mouth Daily.     • atorvastatin (LIPITOR) 20 MG tablet Take 20 mg by mouth Daily.     • hydrOXYzine (ATARAX) 25 MG tablet Take 25 mg by mouth 3 (Three) Times a Day As Needed for Anxiety.     • metoprolol succinate XL (TOPROL-XL) 25 MG 24 hr tablet Take 1 tablet by mouth Daily. 30 tablet 0   • pantoprazole (PROTONIX) 40 MG EC tablet Take 40 mg by mouth Daily.       Allergies   Allergen " "Reactions   • Atorvastatin    • Atorvastatin Calcium Other (See Comments)   • Mirtazapine Other (See Comments)     confused   • Sertraline Anxiety, Diarrhea and Nausea And Vomiting     Also \"hallucinations\"       Objective    Objective     Vital Signs  Temp:  [96.9 °F (36.1 °C)-98.9 °F (37.2 °C)] 98.9 °F (37.2 °C)  Heart Rate:  [101-134] 111  Resp:  [18] 18  BP: ()/(52-96) 114/52  SpO2:  [96 %-100 %] 100 %  on   ;   Device (Oxygen Therapy): room air  There is no height or weight on file to calculate BMI.    Physical Exam  Constitutional:       General: He is not in acute distress.     Appearance: He is underweight. He is not toxic-appearing.   HENT:      Head: Normocephalic and atraumatic.   Eyes:      General: No scleral icterus.        Right eye: No discharge.         Left eye: No discharge.      Conjunctiva/sclera: Conjunctivae normal.   Neck:      Musculoskeletal: Normal range of motion and neck supple.      Vascular: No JVD.   Cardiovascular:      Rate and Rhythm: Regular rhythm. Tachycardia present.      Heart sounds: Normal heart sounds. No murmur. No friction rub. No gallop.    Pulmonary:      Effort: Pulmonary effort is normal. No respiratory distress.      Breath sounds: Normal breath sounds. No wheezing or rales.   Abdominal:      General: Bowel sounds are normal. There is no distension.      Palpations: Abdomen is soft.      Tenderness: There is no abdominal tenderness. There is no guarding.   Musculoskeletal: Normal range of motion.         General: No tenderness or deformity.   Skin:     General: Skin is warm and dry.      Capillary Refill: Capillary refill takes less than 2 seconds.   Neurological:      Mental Status: He is alert and oriented to person, place, and time.   Psychiatric:         Behavior: Behavior is agitated and withdrawn.      Comments: Guarded         Results Review:  I reviewed the patient's new clinical results.    Lab Results (last 24 hours)     Procedure Component Value " Units Date/Time    CBC & Differential [[id number]]  (Abnormal) Collected: 10/22/20 1919    Specimen: Blood Updated: 10/22/20 1939    Narrative:      The following orders were created for panel order CBC & Differential.  Procedure                               Abnormality         Status                     ---------                               -----------         ------                     CBC Auto Differential[[id number]]        Abnormal            Final result                 Please view results for these tests on the individual orders.    Comprehensive Metabolic Panel [[id number]]  (Abnormal) Collected: 10/22/20 1919    Specimen: Blood Updated: 10/22/20 2006     Glucose 150 mg/dL      BUN 46 mg/dL      Creatinine 2.76 mg/dL      Sodium 136 mmol/L      Potassium 6.3 mmol/L      Comment: Slight hemolysis detected by analyzer. Results may be affected.        Chloride 106 mmol/L      CO2 19.4 mmol/L      Calcium 10.2 mg/dL      Total Protein 7.1 g/dL      Albumin 4.20 g/dL      ALT (SGPT) 16 U/L      AST (SGOT) 21 U/L      Comment: Slight hemolysis detected by analyzer. Results may be affected.        Alkaline Phosphatase 78 U/L      Total Bilirubin 0.2 mg/dL      eGFR  African Amer 28 mL/min/1.73      Globulin 2.9 gm/dL      A/G Ratio 1.4 g/dL      BUN/Creatinine Ratio 16.7     Anion Gap 10.6 mmol/L     Narrative:      GFR Normal >60  Chronic Kidney Disease <60  Kidney Failure <15      Magnesium [[id number]]  (Normal) Collected: 10/22/20 1919    Specimen: Blood Updated: 10/22/20 2002     Magnesium 2.0 mg/dL     CBC Auto Differential [[id number]]  (Abnormal) Collected: 10/22/20 1919    Specimen: Blood Updated: 10/22/20 1939     WBC 6.90 10*3/mm3      RBC 2.74 10*6/mm3      Hemoglobin 7.9 g/dL      Hematocrit 24.3 %      MCV 88.7 fL      MCH 28.8 pg      MCHC 32.5 g/dL      RDW 13.0 %      RDW-SD 41.1 fl      MPV 9.8 fL      Platelets 360 10*3/mm3      Neutrophil % 71.4 %      Lymphocyte % 20.7 %      Monocyte % 6.7 %       Eosinophil % 0.4 %      Basophil % 0.4 %      Immature Grans % 0.4 %      Neutrophils, Absolute 4.92 10*3/mm3      Lymphocytes, Absolute 1.43 10*3/mm3      Monocytes, Absolute 0.46 10*3/mm3      Eosinophils, Absolute 0.03 10*3/mm3      Basophils, Absolute 0.03 10*3/mm3      Immature Grans, Absolute 0.03 10*3/mm3      nRBC 0.6 /100 WBC     POCT Occult Blood Stool [[id number]]  (Abnormal) Collected: 10/22/20 2021    Specimen: Stool from Per Rectum Updated: 10/22/20 2023     Fecal Occult Blood Positive     Lot Number [id number]     Expiration Date 03/31/2021     Positive Control Positive     Negative Control Positive    Protime-INR [[id number]]  (Abnormal) Collected: 10/22/20 2104    Specimen: Blood Updated: 10/22/20 2133     Protime 15.2 Seconds      INR 1.22    COVID PRE-OP / PRE-PROCEDURE SCREENING ORDER (NO ISOLATION) - Swab, Nasopharynx [[id number]]  (Normal) Collected: 10/22/20 2105    Specimen: Swab from Nasopharynx Updated: 10/22/20 2213    Narrative:      The following orders were created for panel order COVID PRE-OP / PRE-PROCEDURE SCREENING ORDER (NO ISOLATION) - Swab, Nasopharynx.  Procedure                               Abnormality         Status                     ---------                               -----------         ------                     Respiratory Panel PCR w/...[[id number]]  Normal              Final result                 Please view results for these tests on the individual orders.    Respiratory Panel PCR w/COVID-19(SARS-CoV-2) FROY/MAXINE/JUANJOSE/PAD/COR/MAD/LISA In-House, NP Swab in [place], 3-4 HR TAT - Swab, Nasopharynx [[id number]]  (Normal) Collected: 10/22/20 2105    Specimen: Swab from Nasopharynx Updated: 10/22/20 2213     ADENOVIRUS, PCR Not Detected     Coronavirus 229E Not Detected     Coronavirus HKU1 Not Detected     Coronavirus NL63 Not Detected     Coronavirus OC43 Not Detected     COVID19 Not Detected     Human Metapneumovirus Not Detected     Human Rhinovirus/Enterovirus Not Detected      Influenza A PCR Not Detected     Influenza B PCR Not Detected     Parainfluenza Virus 1 Not Detected     Parainfluenza Virus 2 Not Detected     Parainfluenza Virus 3 Not Detected     Parainfluenza Virus 4 Not Detected     RSV, PCR Not Detected     Bordetella pertussis pcr Not Detected     Bordetella parapertussis PCR Not Detected     Chlamydophila pneumoniae PCR Not Detected     Mycoplasma pneumo by PCR Not Detected    Narrative:      Fact sheet for providers: https://docs.[employer]/wp-content/uploads/VLW1244-8162-WU8.1-EUA-Provider-Fact-Sheet-3.pdf    Fact sheet for patients: https://docs.[employer]/wp-content/uploads/KKB3310-0520-CE0.1-EUA-Patient-Fact-Sheet-1.pdf          Imaging Results (Last 24 Hours)     ** No results found for the last 24 hours. **          Results for orders placed during the hospital encounter of 05/23/17   Adult Transthoracic Echo Complete    Narrative · Left ventricular systolic function is normal. Calculated EF = 58.3%.   Estimated EF was in agreement with the calculated EF. Normal left   ventricular cavity size and wall thickness noted. All left ventricular   wall segments contract normally. Left ventricular diastolic dysfunction is   noted (grade I) consistent with impaired relaxation.          No orders to display        Assessment/Plan     Active Hospital Problems    Diagnosis  POA   • **Gastrointestinal hemorrhage [K92.2]  Yes   • CRISTIANO (acute kidney injury) (CMS/HCC) [N17.9]  Yes   • Hyperkalemia [E87.5]  Yes   • Symptomatic anemia [D64.9]  Yes   • Chronic kidney disease, stage 3 [N18.30]  Yes   • Coronary artery disease involving native coronary artery of native heart without angina pectoris [I25.10]  Yes   • Tobacco abuse [Z72.0]  Yes      Resolved Hospital Problems   No resolved problems to display.       Mr. Loja is a 69 y.o. smoker with a history of CKD stage III, CAD, and anemia who presents with weakness, fatigue, and nausea secondary to GI hemorrhage.    GI  hemorrhage  -Hemoglobin 7.9, hematocrit 24.3.  Fecal occult blood positive.    -2 units of PRBCs have been written per ED physician.  -GI consult in a.m.  -Trend H&H  -Clear liquid diet    CRISTIANO on CKD stage III  -Creat 2.76, eGFR 28, baseline creat 1.1  -IV hydration  -Trend BMP  -Avoid nephrotoxins  -Consider nephrology consultation if no improvement in creatinine a.m.    Hyperkalemia  -Potassium 6.3 upon admission, treatment given in ED  -BMP in a.m.    History of CAD  -Denies chest pain on exam    Tobacco abuse  -Nicotine patch daily    I discussed the patient's findings and my recommendations with patient.    VTE Prophylaxis - SCDs.  Code Status - Full code.       RICHMOND Almazan  [facility]  10/23/20  01:55 EDT

## 2020-10-24 LAB
ALBUMIN SERPL-MCNC: 3.6 G/DL (ref 3.5–5.2)
ANION GAP SERPL CALCULATED.3IONS-SCNC: 6.5 MMOL/L (ref 5–15)
BASOPHILS # BLD AUTO: 0.04 10*3/MM3 (ref 0–0.2)
BASOPHILS NFR BLD AUTO: 0.7 % (ref 0–1.5)
BH BB BLOOD EXPIRATION DATE: NORMAL
BH BB BLOOD EXPIRATION DATE: NORMAL
BH BB BLOOD TYPE BARCODE: 7300
BH BB BLOOD TYPE BARCODE: 7300
BH BB DISPENSE STATUS: NORMAL
BH BB DISPENSE STATUS: NORMAL
BH BB PRODUCT CODE: NORMAL
BH BB PRODUCT CODE: NORMAL
BH BB UNIT NUMBER: NORMAL
BH BB UNIT NUMBER: NORMAL
BUN SERPL-MCNC: 24 MG/DL (ref 8–23)
BUN/CREAT SERPL: 17.4 (ref 7–25)
CALCIUM SPEC-SCNC: 9.8 MG/DL (ref 8.6–10.5)
CHLORIDE SERPL-SCNC: 115 MMOL/L (ref 98–107)
CO2 SERPL-SCNC: 18.5 MMOL/L (ref 22–29)
CREAT SERPL-MCNC: 1.38 MG/DL (ref 0.76–1.27)
CROSSMATCH INTERPRETATION: NORMAL
CROSSMATCH INTERPRETATION: NORMAL
DEPRECATED RDW RBC AUTO: 41.6 FL (ref 37–54)
EOSINOPHIL # BLD AUTO: 0.08 10*3/MM3 (ref 0–0.4)
EOSINOPHIL NFR BLD AUTO: 1.4 % (ref 0.3–6.2)
ERYTHROCYTE [DISTWIDTH] IN BLOOD BY AUTOMATED COUNT: 13 % (ref 12.3–15.4)
FERRITIN SERPL-MCNC: 915 NG/ML (ref 30–400)
GFR SERPL CREATININE-BSD FRML MDRD: 62 ML/MIN/1.73
GLUCOSE SERPL-MCNC: 101 MG/DL (ref 65–99)
HCT VFR BLD AUTO: 25.3 % (ref 37.5–51)
HCT VFR BLD AUTO: 28.3 % (ref 37.5–51)
HGB BLD-MCNC: 7.9 G/DL (ref 13–17.7)
HGB BLD-MCNC: 9.6 G/DL (ref 13–17.7)
IMM GRANULOCYTES # BLD AUTO: 0.03 10*3/MM3 (ref 0–0.05)
IMM GRANULOCYTES NFR BLD AUTO: 0.5 % (ref 0–0.5)
IRON 24H UR-MRATE: 66 MCG/DL (ref 59–158)
IRON SATN MFR SERPL: 31 % (ref 20–50)
LYMPHOCYTES # BLD AUTO: 1.65 10*3/MM3 (ref 0.7–3.1)
LYMPHOCYTES NFR BLD AUTO: 28.8 % (ref 19.6–45.3)
MCH RBC QN AUTO: 29.8 PG (ref 26.6–33)
MCHC RBC AUTO-ENTMCNC: 33.9 G/DL (ref 31.5–35.7)
MCV RBC AUTO: 87.9 FL (ref 79–97)
MONOCYTES # BLD AUTO: 0.59 10*3/MM3 (ref 0.1–0.9)
MONOCYTES NFR BLD AUTO: 10.3 % (ref 5–12)
NEUTROPHILS NFR BLD AUTO: 3.34 10*3/MM3 (ref 1.7–7)
NEUTROPHILS NFR BLD AUTO: 58.3 % (ref 42.7–76)
NRBC BLD AUTO-RTO: 0.3 /100 WBC (ref 0–0.2)
PHOSPHATE SERPL-MCNC: 2.8 MG/DL (ref 2.5–4.5)
PLATELET # BLD AUTO: 270 10*3/MM3 (ref 140–450)
PMV BLD AUTO: 9 FL (ref 6–12)
POTASSIUM SERPL-SCNC: 4.6 MMOL/L (ref 3.5–5.2)
RBC # BLD AUTO: 3.22 10*6/MM3 (ref 4.14–5.8)
SODIUM SERPL-SCNC: 140 MMOL/L (ref 136–145)
TIBC SERPL-MCNC: 213 MCG/DL (ref 298–536)
TRANSFERRIN SERPL-MCNC: 143 MG/DL (ref 200–360)
UNIT  ABO: NORMAL
UNIT  ABO: NORMAL
UNIT  RH: NORMAL
UNIT  RH: NORMAL
WBC # BLD AUTO: 5.73 10*3/MM3 (ref 3.4–10.8)

## 2020-10-24 PROCEDURE — 85018 HEMOGLOBIN: CPT | Performed by: NURSE PRACTITIONER

## 2020-10-24 PROCEDURE — 82728 ASSAY OF FERRITIN: CPT | Performed by: INTERNAL MEDICINE

## 2020-10-24 PROCEDURE — 86900 BLOOD TYPING SEROLOGIC ABO: CPT

## 2020-10-24 PROCEDURE — 85014 HEMATOCRIT: CPT | Performed by: NURSE PRACTITIONER

## 2020-10-24 PROCEDURE — 99232 SBSQ HOSP IP/OBS MODERATE 35: CPT | Performed by: INTERNAL MEDICINE

## 2020-10-24 PROCEDURE — 85025 COMPLETE CBC W/AUTO DIFF WBC: CPT | Performed by: INTERNAL MEDICINE

## 2020-10-24 PROCEDURE — 84466 ASSAY OF TRANSFERRIN: CPT | Performed by: INTERNAL MEDICINE

## 2020-10-24 PROCEDURE — P9016 RBC LEUKOCYTES REDUCED: HCPCS

## 2020-10-24 PROCEDURE — 83540 ASSAY OF IRON: CPT | Performed by: INTERNAL MEDICINE

## 2020-10-24 PROCEDURE — 36430 TRANSFUSION BLD/BLD COMPNT: CPT

## 2020-10-24 PROCEDURE — 80069 RENAL FUNCTION PANEL: CPT | Performed by: INTERNAL MEDICINE

## 2020-10-24 RX ORDER — METOPROLOL SUCCINATE 50 MG/1
50 TABLET, EXTENDED RELEASE ORAL EVERY 12 HOURS SCHEDULED
Status: DISCONTINUED | OUTPATIENT
Start: 2020-10-24 | End: 2020-10-25 | Stop reason: HOSPADM

## 2020-10-24 RX ORDER — POLYETHYLENE GLYCOL 3350, SODIUM CHLORIDE, POTASSIUM CHLORIDE, SODIUM BICARBONATE, AND SODIUM SULFATE 240; 5.84; 2.98; 6.72; 22.72 G/4L; G/4L; G/4L; G/4L; G/4L
2000 POWDER, FOR SOLUTION ORAL EVERY 8 HOURS SCHEDULED
Status: DISCONTINUED | OUTPATIENT
Start: 2020-10-24 | End: 2020-10-25 | Stop reason: HOSPADM

## 2020-10-24 RX ORDER — METOPROLOL SUCCINATE 25 MG/1
25 TABLET, EXTENDED RELEASE ORAL
Status: DISCONTINUED | OUTPATIENT
Start: 2020-10-24 | End: 2020-10-24

## 2020-10-24 RX ORDER — SODIUM CHLORIDE 9 MG/ML
100 INJECTION, SOLUTION INTRAVENOUS CONTINUOUS
Status: ACTIVE | OUTPATIENT
Start: 2020-10-24 | End: 2020-10-25

## 2020-10-24 RX ADMIN — METOPROLOL SUCCINATE 25 MG: 25 TABLET, EXTENDED RELEASE ORAL at 14:45

## 2020-10-24 RX ADMIN — VENLAFAXINE HYDROCHLORIDE 75 MG: 75 CAPSULE, EXTENDED RELEASE ORAL at 11:05

## 2020-10-24 RX ADMIN — SODIUM CHLORIDE 100 ML/HR: 9 INJECTION, SOLUTION INTRAVENOUS at 16:30

## 2020-10-24 RX ADMIN — LEVOTHYROXINE SODIUM 25 MCG: 25 TABLET ORAL at 11:05

## 2020-10-24 RX ADMIN — METOPROLOL SUCCINATE 50 MG: 50 TABLET, EXTENDED RELEASE ORAL at 20:58

## 2020-10-24 RX ADMIN — PEG-3350 AND ELECTROLYTES 2000 ML: 240; 5.84; 2.98; 6.72; 22.72 POWDER, FOR SOLUTION NASOGASTRIC at 20:58

## 2020-10-24 NOTE — PROGRESS NOTES
Name: Yuval Loja ADMIT: 10/22/2020   : 1950  PCP: Provider, No Known    MRN: [id number] LOS: 2 days   AGE/SEX: 69 y.o. male  ROOM: 18/   Subjective   Chief Complaint   Patient presents with   • Medication Reaction     PT FEELS THAT THE MEDICATIONS THAT HE WAS RECENTLY PUT ON AFTER A [place] ER VISIT ARE TOO STRONG; PT WAS RX'D METOPROLOL, FERROUS SULFATE, MIRTAZAPINE AND VENLAFAXINE; PT C/O WEAKNESS AND NAUSEA; PT WEARING FACE MASK       Appears more alert and comfortable today.  Reports that he does take his metoprolol twice a day and 50 mg dosing.  Also confirmed that Synthroid is 25 mcg a day.    Objective   Vital Signs  Temp:  [98.1 °F (36.7 °C)-99 °F (37.2 °C)] 98.1 °F (36.7 °C)  Heart Rate:  [102-115] 103  Resp:  [18-20] 20  BP: (125-145)/(62-77) 131/62  SpO2:  [97 %-100 %] 97 %  on   ;   Device (Oxygen Therapy): room air  There is no height or weight on file to calculate BMI.    Physical Exam  Vitals signs and nursing note reviewed.   Constitutional:       General: He is not in acute distress.  HENT:      Head: Normocephalic and atraumatic.   Eyes:      General: No scleral icterus.     Extraocular Movements: Extraocular movements intact.   Neck:      Musculoskeletal: Neck supple. No muscular tenderness.   Cardiovascular:      Rate and Rhythm: Regular rhythm. Tachycardia present.      Pulses: Normal pulses.   Pulmonary:      Effort: Pulmonary effort is normal.      Breath sounds: No wheezing.   Abdominal:      General: There is no distension.      Palpations: Abdomen is soft.      Tenderness: There is no abdominal tenderness. There is no guarding or rebound.   Musculoskeletal:         General: No swelling or tenderness.   Skin:     General: Skin is warm and dry.   Neurological:      General: No focal deficit present.      Mental Status: He is alert.   Psychiatric:         Mood and Affect: Mood normal.         Behavior: Behavior normal.         Results Review:       I reviewed the patient's  new clinical results.     I reviewed telemetry/EKG results, rate 120s, appears sinus but had poor baseline.      Results from last 7 days   Lab Units 10/24/20  0640 10/24/20  0029 10/23/20  1559 10/23/20  0643 10/22/20  1919   WBC 10*3/mm3 5.73  --   --   --  6.90   HEMOGLOBIN g/dL 9.6* 7.9* 8.7* 7.9* 7.9*   PLATELETS 10*3/mm3 270  --   --   --  360     Results from last 7 days   Lab Units 10/24/20  0640 10/23/20  1215 10/23/20  0643 10/22/20  1919   SODIUM mmol/L 140  --  137 136   POTASSIUM mmol/L 4.6 5.5* 5.9* 6.3*   CHLORIDE mmol/L 115*  --  111* 106   CO2 mmol/L 18.5*  --  20.2* 19.4*   BUN mg/dL 24*  --  40* 46*   CREATININE mg/dL 1.38*  --  2.09* 2.76*   GLUCOSE mg/dL 101*  --  79 150*   CrCl cannot be calculated (Unknown ideal weight.).  Results from last 7 days   Lab Units 10/24/20  0640 10/23/20  0643 10/22/20  1919   CALCIUM mg/dL 9.8 9.8 10.2   ALBUMIN g/dL 3.60 3.50 4.20   MAGNESIUM mg/dL  --   --  2.0   PHOSPHORUS mg/dL 2.8  --   --      Results from last 7 days   Lab Units 10/22/20  2104   INR  1.22*        levothyroxine, 25 mcg, Oral, Daily  metoprolol succinate XL, 50 mg, Oral, Q12H  nicotine, 1 patch, Transdermal, Q24H  venlafaxine XR, 75 mg, Oral, Daily      sodium chloride, 100 mL/hr, Last Rate: 100 mL/hr (10/24/20 1630)    Diet Clear Liquid    Assessment/Plan      Active Hospital Problems    Diagnosis  POA   • **Gastrointestinal hemorrhage [K92.2]  Yes   • CRISTIANO (acute kidney injury) (CMS/HCC) [N17.9]  Yes   • Hyperkalemia [E87.5]  Yes   • Symptomatic anemia [D64.9]  Yes   • Chronic kidney disease, stage 3 [N18.30]  Yes   • Coronary artery disease involving native coronary artery of native heart without angina pectoris [I25.10]  Yes   • Tobacco abuse [Z72.0]  Yes      Resolved Hospital Problems   No resolved problems to display.       · GIB: Endoscopy planned, maybe tomorrow. GI following.  · Tachycardia: Sinus. Increase back to home dose of metoprolol, placed hold parameters.  · CRISTIANO/Hyperkalemia:  Cr improving. Nephrology following.  · Hypothyroidism: Synthroid  · Altered Sensorium: Improved now that is off atarax. That may have been culprit. Will monitor  · Disposition: TBD    Thomas Lewis MD  [facility]  10/24/20  17:31 EDT    Dictated portions using Dragon dictation software.    During the entire encounter, I was wearing recommended PPE including face mask and eye protection. Hand sanitization was performed prior to entering room and upon exit.

## 2020-10-24 NOTE — PLAN OF CARE
Problem: Adult Inpatient Plan of Care  Goal: Plan of Care Review  Outcome: Ongoing, Not Progressing  Goal: Patient-Specific Goal (Individualized)  Outcome: Ongoing, Not Progressing  Goal: Absence of Hospital-Acquired Illness or Injury  Outcome: Ongoing, Not Progressing  Intervention: Identify and Manage Fall Risk  Intervention: Prevent Skin Injury  Goal: Optimal Comfort and Wellbeing  Outcome: Ongoing, Not Progressing  Intervention: Provide Person-Centered Care  Goal: Readiness for Transition of Care  Outcome: Ongoing, Not Progressing   Goal Outcome Evaluation:        Outcome Summary: Patient extremely irritable and distrustful of staff. States that the doctor has not told him anything. Patient talked about wanting to go home and it was his right to decide. Patient co of a hernia in his stomach that is causing the bleeding but RN did not see a bulging hernia. Patient confused. hgb was 7.9. gave another unit of blood. Tolerated well. Recheck H&H at 0800. will ctm

## 2020-10-24 NOTE — PROGRESS NOTES
NEPHROLOGY PROGRESS NOTE    PATIENT IDENTIFICATION:   Name:  Yuval Loja      MRN:  [id number]     69 y.o.  male             Reason for visit: ARF/Hyperkalemia & GI Bleed    SUBJECTIVE:   Ambulating  Comfortable    OBJECTIVE:  Vitals:    10/24/20 0248 10/24/20 0608 10/24/20 0705 10/24/20 1258   BP: 145/67 125/77 125/63 131/62   BP Location:   Left arm Left arm   Patient Position:   Sitting Lying   Pulse: 115 102 114 103   Resp: 18 18 20 20   Temp: 98.6 °F (37 °C) 98.7 °F (37.1 °C) 98.1 °F (36.7 °C)    TempSrc: Oral  Oral    SpO2:               There is no height or weight on file to calculate BMI.    Intake/Output Summary (Last 24 hours) at 10/24/2020 1448  Last data filed at 10/24/2020 1258  Gross per 24 hour   Intake 660 ml   Output 300 ml   Net 360 ml     Wt Readings from Last 1 Encounters:   07/01/20 1053 56.2 kg (124 lb)     Wt Readings from Last 3 Encounters:   07/01/20 56.2 kg (124 lb)   05/27/20 56.2 kg (124 lb)   04/19/20 77.6 kg (171 lb 1.2 oz)         Exam:  GEN:  No distress, appears stated age  EYES:   Anicteric sclera  ENT:    External ears/nose normal, MM are   NECK:  No adenopathy, JVP   LUNGS: Normal chest on inspection; not labored  CV:  Normal S1S2, without murmur  ABD:  Non-tender, non-distended, no hepatosplenomegaly, +BS  EXT:  No edema; no cyanosis; clubbing    Scheduled meds:  levothyroxine, 25 mcg, Oral, Daily  metoprolol succinate XL, 25 mg, Oral, Q24H  nicotine, 1 patch, Transdermal, Q24H  venlafaxine XR, 75 mg, Oral, Daily      IV meds:                           Data Review:    Results from last 7 days   Lab Units 10/24/20  0640 10/23/20  1215 10/23/20  0643 10/22/20  1919   SODIUM mmol/L 140  --  137 136   POTASSIUM mmol/L 4.6 5.5* 5.9* 6.3*   CHLORIDE mmol/L 115*  --  111* 106   CO2 mmol/L 18.5*  --  20.2* 19.4*   BUN mg/dL 24*  --  40* 46*   CREATININE mg/dL 1.38*  --  2.09* 2.76*   CALCIUM mg/dL 9.8  --  9.8 10.2   BILIRUBIN mg/dL  --   --  0.3 0.2   ALK PHOS U/L  --   --  62  78   ALT (SGPT) U/L  --   --  12 16   AST (SGOT) U/L  --   --  17 21   GLUCOSE mg/dL 101*  --  79 150*     CrCl cannot be calculated (Unknown ideal weight.).      Results from last 7 days   Lab Units 10/24/20  0640 10/22/20  1919   MAGNESIUM mg/dL  --  2.0   PHOSPHORUS mg/dL 2.8  --        Results from last 7 days   Lab Units 10/24/20  0640 10/24/20  0029 10/23/20  1559 10/23/20  0643 10/22/20  1919   WBC 10*3/mm3 5.73  --   --   --  6.90   HEMOGLOBIN g/dL 9.6* 7.9* 8.7* 7.9* 7.9*   PLATELETS 10*3/mm3 270  --   --   --  360       Results from last 7 days   Lab Units 10/22/20  2104   INR  1.22*             ASSESSMENT:     Gastrointestinal hemorrhage    Tobacco abuse    Coronary artery disease involving native coronary artery of native heart without angina pectoris    Chronic kidney disease, stage 3    Symptomatic anemia    CRISTIANO (acute kidney injury) (CMS/HCC)    Hyperkalemia    ARF  Hyperkalemia  GI Bleed/BRBPR    PLAN:  Renal function better-> pre-renal azotemia due to nsaids-> urine dip benign  One more liter of saline today  Monitor labs  Awaits colonoscopy    Darrion Singh MD  10/24/2020    14:48 EDT

## 2020-10-24 NOTE — PROGRESS NOTES
[facility]  Inpatient Progress Note    Reason for Follow Up: Right red blood per rectum, history of peptic ulcer disease    Subjective     Interval History:   No new complaints today he is agreeable to EGD and colonoscopy tomorrow    Current Facility-Administered Medications:   •  acetaminophen (TYLENOL) tablet 650 mg, 650 mg, Oral, Q4H PRN **OR** acetaminophen (TYLENOL) 160 MG/5ML solution 650 mg, 650 mg, Oral, Q4H PRN **OR** acetaminophen (TYLENOL) suppository 650 mg, 650 mg, Rectal, Q4H PRN, Barb Delgado APRN  •  aluminum-magnesium hydroxide-simethicone (MAALOX MAX) 400-400-40 MG/5ML suspension 15 mL, 15 mL, Oral, Q6H PRN, Barb Delgado APRN  •  bisacodyl (DULCOLAX) EC tablet 5 mg, 5 mg, Oral, Daily PRN, Barb Delgado APRN  •  bisacodyl (DULCOLAX) suppository 10 mg, 10 mg, Rectal, Daily PRN, Barb Delgado APRN  •  levothyroxine (SYNTHROID, LEVOTHROID) tablet 25 mcg, 25 mcg, Oral, Daily, Barb Delgado APRN, 25 mcg at 10/24/20 1105  •  melatonin tablet 3 mg, 3 mg, Oral, Nightly PRN, Barb Delgado APRN  •  metoprolol succinate XL (TOPROL-XL) 24 hr tablet 50 mg, 50 mg, Oral, Q12H, Thomas Lewis MD  •  nicotine (NICODERM CQ) 14 MG/24HR patch 1 patch, 1 patch, Transdermal, Q24H, Barb Delgado APRN  •  nitroglycerin (NITROSTAT) SL tablet 0.4 mg, 0.4 mg, Sublingual, Q5 Min PRN, Barb Delgado APRN  •  ondansetron (ZOFRAN) tablet 4 mg, 4 mg, Oral, Q6H PRN **OR** ondansetron (ZOFRAN) injection 4 mg, 4 mg, Intravenous, Q6H PRN, Danny, Jacquetta N, APRN  •  sodium chloride 0.9 % flush 10 mL, 10 mL, Intravenous, PRN, Barb Delgado APRN, 10 mL at 10/23/20 1053  •  sodium chloride 0.9 % infusion, 100 mL/hr, Intravenous, Continuous, Darrion iSngh MD, Last Rate: 100 mL/hr at 10/24/20 1630, 100 mL/hr at 10/24/20 1630  •  venlafaxine XR (EFFEXOR-XR) 24 hr capsule 75 mg, 75 mg, Oral, Daily, Barb Delgado APRN, 75 mg at  10/24/20 1105  Review of Systems:    He has weakness and fatigue all other systems reviewed and negative    Objective     Vital Signs  Temp:  [98.1 °F (36.7 °C)-99 °F (37.2 °C)] 98.1 °F (36.7 °C)  Heart Rate:  [102-115] 103  Resp:  [18-20] 20  BP: (125-145)/(62-77) 131/62  There is no height or weight on file to calculate BMI.    Intake/Output Summary (Last 24 hours) at 10/24/2020 1836  Last data filed at 10/24/2020 1258  Gross per 24 hour   Intake 420 ml   Output 300 ml   Net 120 ml     I/O this shift:  In: 120 [P.O.:120]  Out: -      Physical Exam:   General: patient awake, alert and cooperative   Eyes: Normal lids and lashes, no scleral icterus   Neck: supple, normal ROM   Skin: warm and dry, not jaundiced   Cardiovascular: regular rhythm and rate, no murmurs auscultated   Pulm: clear to auscultation bilaterally, regular and unlabored   Abdomen: soft, nontender, nondistended; normal bowel sounds   Extremities: no rash or edema   Psychiatric: Normal mood and behavior; memory intact     Results Review:     I reviewed the patient's new clinical results.    Results from last 7 days   Lab Units 10/24/20  0640 10/24/20  0029 10/23/20  1559  10/22/20  1919   WBC 10*3/mm3 5.73  --   --   --  6.90   HEMOGLOBIN g/dL 9.6* 7.9* 8.7*   < > 7.9*   HEMATOCRIT % 28.3* 25.3* 27.5*   < > 24.3*   PLATELETS 10*3/mm3 270  --   --   --  360    < > = values in this interval not displayed.     Results from last 7 days   Lab Units 10/24/20  0640 10/23/20  1215 10/23/20  0643 10/22/20  1919   SODIUM mmol/L 140  --  137 136   POTASSIUM mmol/L 4.6 5.5* 5.9* 6.3*   CHLORIDE mmol/L 115*  --  111* 106   CO2 mmol/L 18.5*  --  20.2* 19.4*   BUN mg/dL 24*  --  40* 46*   CREATININE mg/dL 1.38*  --  2.09* 2.76*   CALCIUM mg/dL 9.8  --  9.8 10.2   BILIRUBIN mg/dL  --   --  0.3 0.2   ALK PHOS U/L  --   --  62 78   ALT (SGPT) U/L  --   --  12 16   AST (SGOT) U/L  --   --  17 21   GLUCOSE mg/dL 101*  --  79 150*     Results from last 7 days   Lab Units  10/22/20  2104   INR  1.22*     Lab Results   Lab Value Date/Time    LIPASE 261 07/08/2020 1120    LIPASE 326 (H) 06/06/2020 1231    LIPASE 220 05/17/2020 1623    LIPASE 65 12/22/2019 1335    LIPASE 49 10/26/2019 1542    LIPASE 65 (H) 05/23/2017 1210    LIPASE 34 06/20/2016 2142    LIPASE 57 09/05/2014 1334       Radiology:  No orders to display       Assessment/Plan     Patient Active Problem List   Diagnosis   • Alcoholic ketoacidosis   • Alcohol dependence (CMS/HCC)   • Methamphetamine abuse (CMS/HCC)   • Fatty liver, alcoholic   • Nausea & vomiting   • Alcoholic liver disease (CMS/HCC)   • Metabolic acidosis   • Tobacco abuse   • Coronary artery disease involving native coronary artery of native heart without angina pectoris   • Tachycardia   • Sinus tachycardia   • Chronic kidney disease, stage 3   • Medically noncompliant   • Visual hallucinations   • Psychosis (CMS/HCC)   • Hyponatremia   • CAD (coronary artery disease)   • Leukopenia   • Symptomatic anemia   • Headache   • Substance abuse (CMS/HCC)   • Gastrointestinal hemorrhage   • CRISTIANO (acute kidney injury) (CMS/HCC)   • Hyperkalemia       Assessment:  1. Bright red blood per rectum 3 days  2. Abdominal pain resolved  3. Duodenal ulcer with bleeding [facility] June of this year  4. Acute kidney injury (on top of chronic kidney disease)     Plan:  Plan for colonoscopy for bright red blood per rectum and EGD to check healing of ulcer likely on Sunday when acute kidney injury has resolved  Nephrology is following we will await their continued efforts to normalize his baseline renal function  Serial hemoglobin  Clear liquid diet okay, bowel prep this evening   plan for EGD colonoscopy Sunday, October 25  I discussed the patients findings and my recommendations with patient and nursing staff.    Josiah Zhu MD

## 2020-10-25 VITALS
SYSTOLIC BLOOD PRESSURE: 140 MMHG | HEART RATE: 68 BPM | TEMPERATURE: 98.8 F | RESPIRATION RATE: 18 BRPM | DIASTOLIC BLOOD PRESSURE: 72 MMHG | OXYGEN SATURATION: 97 %

## 2020-10-25 LAB
ALBUMIN SERPL-MCNC: 3.7 G/DL (ref 3.5–5.2)
ALBUMIN/GLOB SERPL: 1.4 G/DL
ALP SERPL-CCNC: 69 U/L (ref 39–117)
ALT SERPL W P-5'-P-CCNC: 11 U/L (ref 1–41)
ANION GAP SERPL CALCULATED.3IONS-SCNC: 10.2 MMOL/L (ref 5–15)
AST SERPL-CCNC: 15 U/L (ref 1–40)
BASOPHILS # BLD AUTO: 0.03 10*3/MM3 (ref 0–0.2)
BASOPHILS NFR BLD AUTO: 0.5 % (ref 0–1.5)
BILIRUB SERPL-MCNC: 0.4 MG/DL (ref 0–1.2)
BUN SERPL-MCNC: 14 MG/DL (ref 8–23)
BUN/CREAT SERPL: 11.9 (ref 7–25)
CALCIUM SPEC-SCNC: 9.7 MG/DL (ref 8.6–10.5)
CHLORIDE SERPL-SCNC: 110 MMOL/L (ref 98–107)
CO2 SERPL-SCNC: 19.8 MMOL/L (ref 22–29)
CREAT SERPL-MCNC: 1.18 MG/DL (ref 0.76–1.27)
DEPRECATED RDW RBC AUTO: 43 FL (ref 37–54)
EOSINOPHIL # BLD AUTO: 0.12 10*3/MM3 (ref 0–0.4)
EOSINOPHIL NFR BLD AUTO: 1.9 % (ref 0.3–6.2)
ERYTHROCYTE [DISTWIDTH] IN BLOOD BY AUTOMATED COUNT: 13.1 % (ref 12.3–15.4)
GFR SERPL CREATININE-BSD FRML MDRD: 74 ML/MIN/1.73
GLOBULIN UR ELPH-MCNC: 2.7 GM/DL
GLUCOSE SERPL-MCNC: 89 MG/DL (ref 65–99)
HCT VFR BLD AUTO: 31.5 % (ref 37.5–51)
HCT VFR BLD AUTO: 31.8 % (ref 37.5–51)
HGB BLD-MCNC: 10.3 G/DL (ref 13–17.7)
HGB BLD-MCNC: 10.5 G/DL (ref 13–17.7)
IMM GRANULOCYTES # BLD AUTO: 0.01 10*3/MM3 (ref 0–0.05)
IMM GRANULOCYTES NFR BLD AUTO: 0.2 % (ref 0–0.5)
INR PPP: 1.26 (ref 0.9–1.1)
LYMPHOCYTES # BLD AUTO: 2.32 10*3/MM3 (ref 0.7–3.1)
LYMPHOCYTES NFR BLD AUTO: 36.7 % (ref 19.6–45.3)
MCH RBC QN AUTO: 29.7 PG (ref 26.6–33)
MCHC RBC AUTO-ENTMCNC: 33 G/DL (ref 31.5–35.7)
MCV RBC AUTO: 89.8 FL (ref 79–97)
MONOCYTES # BLD AUTO: 0.54 10*3/MM3 (ref 0.1–0.9)
MONOCYTES NFR BLD AUTO: 8.5 % (ref 5–12)
NEUTROPHILS NFR BLD AUTO: 3.3 10*3/MM3 (ref 1.7–7)
NEUTROPHILS NFR BLD AUTO: 52.2 % (ref 42.7–76)
NRBC BLD AUTO-RTO: 0.3 /100 WBC (ref 0–0.2)
PHOSPHATE SERPL-MCNC: 3 MG/DL (ref 2.5–4.5)
PLATELET # BLD AUTO: 287 10*3/MM3 (ref 140–450)
PMV BLD AUTO: 8.9 FL (ref 6–12)
POTASSIUM SERPL-SCNC: 4.7 MMOL/L (ref 3.5–5.2)
PROT SERPL-MCNC: 6.4 G/DL (ref 6–8.5)
PROTHROMBIN TIME: 15.6 SECONDS (ref 11.7–14.2)
RBC # BLD AUTO: 3.54 10*6/MM3 (ref 4.14–5.8)
SODIUM SERPL-SCNC: 140 MMOL/L (ref 136–145)
WBC # BLD AUTO: 6.32 10*3/MM3 (ref 3.4–10.8)

## 2020-10-25 PROCEDURE — 85025 COMPLETE CBC W/AUTO DIFF WBC: CPT | Performed by: INTERNAL MEDICINE

## 2020-10-25 PROCEDURE — 99232 SBSQ HOSP IP/OBS MODERATE 35: CPT | Performed by: INTERNAL MEDICINE

## 2020-10-25 PROCEDURE — 80053 COMPREHEN METABOLIC PANEL: CPT | Performed by: INTERNAL MEDICINE

## 2020-10-25 PROCEDURE — 85610 PROTHROMBIN TIME: CPT | Performed by: INTERNAL MEDICINE

## 2020-10-25 PROCEDURE — 84100 ASSAY OF PHOSPHORUS: CPT | Performed by: INTERNAL MEDICINE

## 2020-10-25 RX ORDER — SODIUM CHLORIDE 9 MG/ML
100 INJECTION, SOLUTION INTRAVENOUS CONTINUOUS
Status: DISCONTINUED | OUTPATIENT
Start: 2020-10-25 | End: 2020-10-25 | Stop reason: HOSPADM

## 2020-10-25 RX ADMIN — LEVOTHYROXINE SODIUM 25 MCG: 25 TABLET ORAL at 09:06

## 2020-10-25 RX ADMIN — METOPROLOL SUCCINATE 50 MG: 50 TABLET, EXTENDED RELEASE ORAL at 09:06

## 2020-10-25 RX ADMIN — Medication 1 PATCH: at 09:06

## 2020-10-25 NOTE — NURSING NOTE
Patient disoriented to time and place, so RN called daughter for informed consent. Was obtained on 10/24/2020. Patient refusing to drink Golytely, and refusing an IV. IV currently out since day shift

## 2020-10-25 NOTE — NURSING NOTE
"RN had daughter call patient to try and convince him to drink Golytely and allow RN to start on IV. Daughter was unsuccessful. Patient then came out to the nurses station to find RN. Stated that he was just trying to pick on RN and then patted RN on back at the waist. Once RN got him to his room and in bed RN sat down with patient and tried to educate him about why he needs to take his bowel prep and if he does not then the doctor has ordered for him to have an enema. He stated he was not doing either that all he needed to do was drink three cups of Golytely and be done. He then told the RN that he should just hide the bowel prep and RN should tell the doctor that he drank it. Rn explained to him that was not going to happen and that we needed to follow doctors orders or he was not going to get the procedure done. As Rn was leaving the room he shouted \"I'm crazy about you!\" Patient's behavior is becoming inappropriate. RN educated to be appropriate with staff. RN placed bed alarm on patient and will continue to try to get patient to drink bowel prep.    "

## 2020-10-25 NOTE — PROGRESS NOTES
Name: Yuval Loja ADMIT: 10/22/2020   : 1950  PCP: Provider, No Known    MRN: [id number] LOS: 3 days   AGE/SEX: 69 y.o. male  ROOM: S418/1   Subjective   Chief Complaint   Patient presents with   • Medication Reaction     PT FEELS THAT THE MEDICATIONS THAT HE WAS RECENTLY PUT ON AFTER A [place] ER VISIT ARE TOO STRONG; PT WAS RX'D METOPROLOL, FERROUS SULFATE, MIRTAZAPINE AND VENLAFAXINE; PT C/O WEAKNESS AND NAUSEA; PT WEARING FACE MASK       Patient had signed AMA paperwork earlier because he did not want to have endoscopy. Family was contacted and came to hospital. They were concerned because he was not acting usual self and may have been having hallucinations. Patient is saying he is aware of chance of rebleeding and wants to leave it in god's hands if he does or not. No visions or voices currently.    Spoke with his daughter outside of room and she was tearful because she has seen him do this cycle multiple times where he goes to hospital for something and then does not want treatment and leaves. She reports that he has had visions voices previously but also had used substances in the past. We discussed his current stable hemoglobin and GI recommendations. Also discussed options for mental health including taking out 72 hour hold if she felt he was danger to self and not making appropriate decisions. Did discuss that if we took out a hold then he may need medication or restraint but also that family could stay with him to help keeping him calm. We also discussed at home if he seems to be danger to himself or others, then they can take out a MIW or if they are more concerned about his mental status then can take him to [facility] where there is emergency psychiatry unit for triage. Patient was calm and cooperative in the room but was religiously focused. She was going to discuss options with him and with her sister who is also present and decide whether they are going to take him home to monitor or pursue  further psychiatric evaluation here. In the interim, I did consult access center and psychiatry so if he stays inpatient, we can get additional evaluations. Family was very nice and I understand the difficult situation they are in.    Objective   Vital Signs  Temp:  [98.7 °F (37.1 °C)-98.9 °F (37.2 °C)] 98.8 °F (37.1 °C)  Heart Rate:  [68] 68  Resp:  [18-20] 18  BP: (131-140)/(67-72) 140/72     on   ;   Device (Oxygen Therapy): room air  There is no height or weight on file to calculate BMI.    Physical Exam  Vitals signs and nursing note reviewed.   Constitutional:       General: He is not in acute distress.     Comments: frail   HENT:      Head: Normocephalic and atraumatic.   Eyes:      General: No scleral icterus.     Extraocular Movements: Extraocular movements intact.   Neck:      Musculoskeletal: Neck supple. No muscular tenderness.   Cardiovascular:      Rate and Rhythm: Normal rate and regular rhythm.      Pulses: Normal pulses.   Pulmonary:      Effort: Pulmonary effort is normal.      Breath sounds: No wheezing.   Abdominal:      General: There is no distension.      Palpations: Abdomen is soft.      Tenderness: There is no abdominal tenderness. There is no guarding or rebound.   Musculoskeletal:         General: No swelling or tenderness.   Skin:     General: Skin is warm and dry.   Neurological:      General: No focal deficit present.      Mental Status: He is alert.   Psychiatric:         Behavior: Behavior normal.      Comments: Religiously focused         Results Review:       I reviewed the patient's new clinical results.    Results from last 7 days   Lab Units 10/25/20  0509 10/24/20  2350 10/24/20  0640 10/24/20  0029  10/22/20  1919   WBC 10*3/mm3 6.32  --  5.73  --   --  6.90   HEMOGLOBIN g/dL 10.5* 10.3* 9.6* 7.9*   < > 7.9*   PLATELETS 10*3/mm3 287  --  270  --   --  360    < > = values in this interval not displayed.     Results from last 7 days   Lab Units 10/25/20  0509 10/24/20  0640  10/23/20  1215 10/23/20  0643 10/22/20  1919   SODIUM mmol/L 140 140  --  137 136   POTASSIUM mmol/L 4.7 4.6 5.5* 5.9* 6.3*   CHLORIDE mmol/L 110* 115*  --  111* 106   CO2 mmol/L 19.8* 18.5*  --  20.2* 19.4*   BUN mg/dL 14 24*  --  40* 46*   CREATININE mg/dL 1.18 1.38*  --  2.09* 2.76*   GLUCOSE mg/dL 89 101*  --  79 150*   CrCl cannot be calculated (Unknown ideal weight.).  Results from last 7 days   Lab Units 10/25/20  0509 10/24/20  0640 10/23/20  0643 10/22/20  1919   CALCIUM mg/dL 9.7 9.8 9.8 10.2   ALBUMIN g/dL 3.70 3.60 3.50 4.20   MAGNESIUM mg/dL  --   --   --  2.0   PHOSPHORUS mg/dL 3.0 2.8  --   --      Results from last 7 days   Lab Units 10/25/20  0509 10/22/20  2104   INR  1.26* 1.22*        levothyroxine, 25 mcg, Oral, Daily  metoprolol succinate XL, 50 mg, Oral, Q12H  nicotine, 1 patch, Transdermal, Q24H  polyethylene glycol with electrolytes, 2,000 mL, Oral, Q8H  venlafaxine XR, 75 mg, Oral, Daily      sodium chloride, 100 mL/hr    Diet Clear Liquid    Assessment/Plan      Active Hospital Problems    Diagnosis  POA   • **Gastrointestinal hemorrhage [K92.2]  Yes   • CRISTIANO (acute kidney injury) (CMS/HCC) [N17.9]  Yes   • Hyperkalemia [E87.5]  Yes   • Symptomatic anemia [D64.9]  Yes   • Chronic kidney disease, stage 3 [N18.30]  Yes   • Coronary artery disease involving native coronary artery of native heart without angina pectoris [I25.10]  Yes   • Tobacco abuse [Z72.0]  Yes      Resolved Hospital Problems   No resolved problems to display.       · GIB: Endoscopy planned, patient did not take prep. Hgb stable. GI following.  · Tachycardia: Sinus. Improved now on home metoprolol.  · CRISTIANO/Hyperkalemia: Resolved. 2/2 NSAID, dehydration. Nephrology following.  · Hypothyroidism: Synthroid  · Altered Sensorium: See above for discussion. Access consulted. Psychiatry consult depending on patient and family decision.  · Disposition: TBD    Thomas Lewis MD  [facility]  10/25/20  13:32  EDT    Dictated portions using Dragon dictation software.    During the entire encounter, I was wearing recommended PPE including face mask and eye protection. Hand sanitization was performed prior to entering room and upon exit.

## 2020-10-25 NOTE — PROGRESS NOTES
[facility]  Inpatient Progress Note    Reason for Follow Up: Anemia history of peptic ulcer disease, bright red blood per rectum    Subjective     Interval History:   No further bleeding  I will cancel his procedure today he only drank 1 small cup of bowel prep, I asked him why and he said he did not know he had to drink all of it    Current Facility-Administered Medications:   •  acetaminophen (TYLENOL) tablet 650 mg, 650 mg, Oral, Q4H PRN **OR** acetaminophen (TYLENOL) 160 MG/5ML solution 650 mg, 650 mg, Oral, Q4H PRN **OR** acetaminophen (TYLENOL) suppository 650 mg, 650 mg, Rectal, Q4H PRN, Barb Delgado APRN  •  aluminum-magnesium hydroxide-simethicone (MAALOX MAX) 400-400-40 MG/5ML suspension 15 mL, 15 mL, Oral, Q6H PRN, Barb Delgado APRN  •  bisacodyl (DULCOLAX) EC tablet 5 mg, 5 mg, Oral, Daily PRN, Barb Delgado APRN  •  bisacodyl (DULCOLAX) suppository 10 mg, 10 mg, Rectal, Daily PRN, Barb Delgado APRN  •  levothyroxine (SYNTHROID, LEVOTHROID) tablet 25 mcg, 25 mcg, Oral, Daily, Barb Delgado APRN, 25 mcg at 10/25/20 0906  •  melatonin tablet 3 mg, 3 mg, Oral, Nightly PRN, Barb Delgado APRN  •  metoprolol succinate XL (TOPROL-XL) 24 hr tablet 50 mg, 50 mg, Oral, Q12H, Thomas Lewis MD, 50 mg at 10/25/20 0906  •  nicotine (NICODERM CQ) 14 MG/24HR patch 1 patch, 1 patch, Transdermal, Q24H, Barb Delgado APRN, 1 patch at 10/25/20 0906  •  nitroglycerin (NITROSTAT) SL tablet 0.4 mg, 0.4 mg, Sublingual, Q5 Min PRN, Barb Delgado APRN  •  ondansetron (ZOFRAN) tablet 4 mg, 4 mg, Oral, Q6H PRN **OR** ondansetron (ZOFRAN) injection 4 mg, 4 mg, Intravenous, Q6H PRN, Barb Delgado APRN  •  polyethylene glycol with electrolytes (GOLYTELY) solution 2,000 mL, 2,000 mL, Oral, Q8H, Josiah Zhu MD, 2,000 mL at 10/24/20 2058  •  sodium chloride 0.9 % flush 10 mL, 10 mL, Intravenous, PRN, Barb Delgado APRN, 10 mL at  10/23/20 1053  •  venlafaxine XR (EFFEXOR-XR) 24 hr capsule 75 mg, 75 mg, Oral, Daily, DannyAmadeoann MURO, APRN, 75 mg at 10/24/20 1105  Review of Systems:    He has weakness of fatigue all other systems reviewed and negative    Objective     Vital Signs  Temp:  [98.7 °F (37.1 °C)-98.9 °F (37.2 °C)] 98.8 °F (37.1 °C)  Heart Rate:  [] 68  Resp:  [18-20] 18  BP: (131-140)/(62-72) 140/72  There is no height or weight on file to calculate BMI.    Intake/Output Summary (Last 24 hours) at 10/25/2020 1245  Last data filed at 10/24/2020 1258  Gross per 24 hour   Intake 120 ml   Output --   Net 120 ml     No intake/output data recorded.     Physical Exam:   General: patient awake, alert and cooperative   Eyes: Normal lids and lashes, no scleral icterus   Neck: supple, normal ROM   Skin: warm and dry, not jaundiced   Cardiovascular: regular rhythm and rate, no murmurs auscultated   Pulm: clear to auscultation bilaterally, regular and unlabored   Abdomen: soft, nontender, nondistended; normal bowel sounds   Extremities: no rash or edema   Psychiatric: Normal mood and behavior; memory intact     Results Review:     I reviewed the patient's new clinical results.    Results from last 7 days   Lab Units 10/25/20  0509 10/24/20  2350 10/24/20  0640  10/22/20  1919   WBC 10*3/mm3 6.32  --  5.73  --  6.90   HEMOGLOBIN g/dL 10.5* 10.3* 9.6*   < > 7.9*   HEMATOCRIT % 31.8* 31.5* 28.3*   < > 24.3*   PLATELETS 10*3/mm3 287  --  270  --  360    < > = values in this interval not displayed.     Results from last 7 days   Lab Units 10/25/20  0509 10/24/20  0640 10/23/20  1215 10/23/20  0643 10/22/20  1919   SODIUM mmol/L 140 140  --  137 136   POTASSIUM mmol/L 4.7 4.6 5.5* 5.9* 6.3*   CHLORIDE mmol/L 110* 115*  --  111* 106   CO2 mmol/L 19.8* 18.5*  --  20.2* 19.4*   BUN mg/dL 14 24*  --  40* 46*   CREATININE mg/dL 1.18 1.38*  --  2.09* 2.76*   CALCIUM mg/dL 9.7 9.8  --  9.8 10.2   BILIRUBIN mg/dL 0.4  --   --  0.3 0.2   ALK PHOS U/L  69  --   --  62 78   ALT (SGPT) U/L 11  --   --  12 16   AST (SGOT) U/L 15  --   --  17 21   GLUCOSE mg/dL 89 101*  --  79 150*     Results from last 7 days   Lab Units 10/25/20  0509 10/22/20  2104   INR  1.26* 1.22*     Lab Results   Lab Value Date/Time    LIPASE 261 07/08/2020 1120    LIPASE 326 (H) 06/06/2020 1231    LIPASE 220 05/17/2020 1623    LIPASE 65 12/22/2019 1335    LIPASE 49 10/26/2019 1542    LIPASE 65 (H) 05/23/2017 1210    LIPASE 34 06/20/2016 2142    LIPASE 57 09/05/2014 1334       Radiology:  No orders to display       Assessment/Plan     Patient Active Problem List   Diagnosis   • Alcoholic ketoacidosis   • Alcohol dependence (CMS/HCC)   • Methamphetamine abuse (CMS/HCC)   • Fatty liver, alcoholic   • Nausea & vomiting   • Alcoholic liver disease (CMS/HCC)   • Metabolic acidosis   • Tobacco abuse   • Coronary artery disease involving native coronary artery of native heart without angina pectoris   • Tachycardia   • Sinus tachycardia   • Chronic kidney disease, stage 3   • Medically noncompliant   • Visual hallucinations   • Psychosis (CMS/HCC)   • Hyponatremia   • CAD (coronary artery disease)   • Leukopenia   • Symptomatic anemia   • Headache   • Substance abuse (CMS/HCC)   • Gastrointestinal hemorrhage   • CRISTIANO (acute kidney injury) (CMS/HCC)   • Hyperkalemia       Assessment:  1. Bright red blood per rectum 3 days ago  2. Abdominal pain resolved  3. Duodenal ulcer with bleeding [facility] June of this year  4. Acute kidney injury (on top of chronic kidney disease)     Plan:  Plan for colonoscopy for bright red blood per rectum and EGD to check healing of ulcer tomorrow as he did not drink the bowel prep last night, I encouraged him to drink the entire gallon slowly over the next 24 hours mixing it with what ever clear liquid he enjoys consuming  Nephrology is following we will await their continued efforts to normalize his baseline renal function  Serial hemoglobin  Clear liquid diet okay,  bowel prep this evening   plan for EGD colonoscopy Monday, October 26  I discussed the patients findings and my recommendations with patient and nursing staff.    Josiah Zhu MD

## 2020-10-25 NOTE — PLAN OF CARE
Problem: Adult Inpatient Plan of Care  Goal: Plan of Care Review  Outcome: Ongoing, Not Progressing  Goal: Patient-Specific Goal (Individualized)  Outcome: Ongoing, Not Progressing  Goal: Absence of Hospital-Acquired Illness or Injury  Outcome: Ongoing, Not Progressing  Intervention: Identify and Manage Fall Risk  Intervention: Prevent Skin Injury  Goal: Optimal Comfort and Wellbeing  Outcome: Ongoing, Not Progressing  Intervention: Provide Person-Centered Care  Goal: Readiness for Transition of Care  Outcome: Ongoing, Not Progressing     Problem: Fall Injury Risk  Goal: Absence of Fall and Fall-Related Injury  Outcome: Ongoing, Not Progressing  Intervention: Identify and Manage Contributors to Fall Injury Risk  Intervention: Promote Injury-Free Environment   Goal Outcome Evaluation:  Plan of Care Reviewed With: patient     Outcome Summary: Patient refused all interventions tonight. Patient wants to have EGD and colonoscopy but stated he doesnt need the golytely to have the procedure done. Patient's daughter told RN to wait until morning but RN explained he has to have half the jug finsihed by 0600 and started on the other half after. IV was not placed either because he refused to be stuck. will ctm

## 2020-10-25 NOTE — PROGRESS NOTES
Patient not in room. Renal function improving w/ IV fluids. CRISTIANO likely due to nsaid use.Will check in tomorrow.Plan for GI scopes noted for am.

## 2020-10-26 NOTE — DISCHARGE SUMMARY
Date of Admission: 10/22/2020  Date of Discharge:  10/26/2020  Primary Care Physician: Provider, No Known     Discharge Diagnosis:  Active Hospital Problems    Diagnosis  POA   • **Gastrointestinal hemorrhage [K92.2]  Yes   • CRISTIANO (acute kidney injury) (CMS/HCC) [N17.9]  Yes   • Hyperkalemia [E87.5]  Yes   • Symptomatic anemia [D64.9]  Yes   • Chronic kidney disease, stage 3 [N18.30]  Yes   • Coronary artery disease involving native coronary artery of native heart without angina pectoris [I25.10]  Yes   • Tobacco abuse [Z72.0]  Yes      Resolved Hospital Problems   No resolved problems to display.       Presenting Problem/History of Present Illness:  Hyperkalemia [E87.5]  Weakness [R53.1]  History of coronary artery disease [Z86.79]  Acute kidney injury (CMS/HCC) [N17.9]  History of chronic kidney disease [Z87.448]  Fatigue, unspecified type [R53.83]  Anemia, unspecified type [D64.9]  Gastrointestinal hemorrhage, unspecified gastrointestinal hemorrhage type [K92.2]     Mr. Loja is a 69 y.o. smoker with a history of anemia, CAD, and CKD stage III that presents to [facility] complaining of weakness, fatigue, and nausea.  Patient reports several days of bright red blood per rectum.  He also complains of lower abdominal pain which resolved on exam.  He states that all of his symptoms started due to a new medication.  When asked what medication he had been prescribed patient has no idea the name of the medication.  Labs obtained in the ED show a glucose of 150, potassium 6.3, creat 2.76, BUN 46, eGFR 28, hemoglobin 7.9, hematocrit 24.3.  Patient did have a guaiac positive stool in the emergency department.  He has been admitted for further evaluation.    Hospital Course:  The patient is a 69 y.o. male who presented with GI bleed, CRISTIANO, tachycardia.  He was admitted and gastroenterology and nephrology were consulted.  He did require transfusion and after the transfusion his hemoglobin stabilized.  Overt  GI losses had stopped at that point.  Plan was for endoscopy but the patient did not want to take the prep or undergo procedure.  He signed out AGAINST MEDICAL ADVICE.  I had a long discussion with the patient and his family after he signed the AMA paperwork but when his family came to the hospital.  See progress note for details.    His CRISTIANO solved with fluids and stopping NSAIDs.  He should stay off of NSAIDs going forward.  His tachycardia improved once we were able to determine his home dose of metoprolol and this was resumed.  He was in sinus tachycardia and did not have any evidence of atrial fibrillation.  He has hypothyroidism and was maintained on Synthroid.  He was religiously focused but was able to understand his medical conditions and relay his wishes.  After additional discussion with his daughter after I had a family discussion they decided to take him home.  As stated during my discussion with we did discuss emergency psych at [facility] and [facility] should his mentation worsen.         Consults:   Consults     Date and Time Order Name Status Description    10/23/2020 0015 Inpatient Gastroenterology Consult Completed     10/22/2020 2028 [facility] (on-call MD unless specified) Details Completed     10/22/2020 2015 Nephrology (on -call MD unless specified) Completed            Discharge Disposition:  Left Against Medical Advice    Discharge Medications:     Discharge Medications      ASK your doctor about these medications      Instructions Start Date   atorvastatin 20 MG tablet  Commonly known as: LIPITOR   20 mg, Oral, Daily      ferrous sulfate 325 (65 FE) MG tablet   325 mg, Oral, Daily      hydrOXYzine 25 MG tablet  Commonly known as: ATARAX   25 mg, Oral, 3 Times Daily PRN      levothyroxine 25 MCG tablet  Commonly known as: SYNTHROID, LEVOTHROID   25 mcg, Oral, Daily      metoprolol succinate XL 25 MG 24 hr tablet  Commonly known as: TOPROL-XL   25 mg, Oral, Every 24 Hours Scheduled       mirtazapine 15 MG tablet  Commonly known as: REMERON   15 mg, Oral, Nightly      pantoprazole 40 MG EC tablet  Commonly known as: PROTONIX   40 mg, Oral, Daily      venlafaxine XR 75 MG 24 hr capsule  Commonly known as: EFFEXOR-XR   75 mg, Oral, Daily             Discharge Diet: AMA    Activity at Discharge: AMA    Follow-up Appointments:  Follow-up Information     Provider, No Known .    Contact information:  [place] [id number] [phone]                   Test Results Pending at Discharge:       Thomas Lewis MD  10/26/20  16:37 EDT    Time Spent on Discharge Activities: >30 minutes    Dictated portions using Dragon dictation software.  During the entire encounter, I was wearing recommended PPE including face mask and eye protection. Hand sanitization was performed prior to entering room and upon exit.

## 2020-10-29 NOTE — PROGRESS NOTES
Case Management Discharge Note      Final Note: AMA         Selected Continued Care - Discharged on 10/25/2020 Admission date: 10/22/2020 - Discharge disposition: Left Against Medical Advice    Destination    No services have been selected for the patient.              Durable Medical Equipment    No services have been selected for the patient.              Dialysis/Infusion    No services have been selected for the patient.              Home Medical Care    No services have been selected for the patient.              Therapy    No services have been selected for the patient.              Community Resources    No services have been selected for the patient.                       Final Discharge Disposition Code: 07 - left AMA

## 2022-03-14 ENCOUNTER — APPOINTMENT (OUTPATIENT)
Dept: CT IMAGING | Facility: HOSPITAL | Age: 72
End: 2022-03-14

## 2022-03-14 ENCOUNTER — HOSPITAL ENCOUNTER (INPATIENT)
Facility: HOSPITAL | Age: 72
LOS: 2 days | Discharge: HOME | End: 2022-03-17
Attending: EMERGENCY MEDICINE | Admitting: HOSPITALIST

## 2022-03-14 DIAGNOSIS — R10.84 GENERALIZED ABDOMINAL PAIN: Primary | ICD-10-CM

## 2022-03-14 DIAGNOSIS — N28.9 ACUTE RENAL INSUFFICIENCY: ICD-10-CM

## 2022-03-14 DIAGNOSIS — I48.91 ATRIAL FIBRILLATION WITH RAPID VENTRICULAR RESPONSE: ICD-10-CM

## 2022-03-14 DIAGNOSIS — R19.7 NAUSEA VOMITING AND DIARRHEA: ICD-10-CM

## 2022-03-14 DIAGNOSIS — R11.2 NAUSEA VOMITING AND DIARRHEA: ICD-10-CM

## 2022-03-14 DIAGNOSIS — I48.91 NEW ONSET ATRIAL FIBRILLATION: ICD-10-CM

## 2022-03-14 LAB
ALBUMIN SERPL-MCNC: 4.7 G/DL (ref 3.5–5.2)
ALBUMIN/GLOB SERPL: 1.3 G/DL
ALP SERPL-CCNC: 70 U/L (ref 39–117)
ALT SERPL W P-5'-P-CCNC: 10 U/L (ref 1–41)
ANION GAP SERPL CALCULATED.3IONS-SCNC: 16 MMOL/L (ref 5–15)
AST SERPL-CCNC: 22 U/L (ref 1–40)
BASOPHILS # BLD AUTO: 0.03 10*3/MM3 (ref 0–0.2)
BASOPHILS NFR BLD AUTO: 0.4 % (ref 0–1.5)
BILIRUB SERPL-MCNC: 0.2 MG/DL (ref 0–1.2)
BILIRUB UR QL STRIP: NEGATIVE
BUN SERPL-MCNC: 24 MG/DL (ref 8–23)
BUN/CREAT SERPL: 12.9 (ref 7–25)
CALCIUM SPEC-SCNC: 11.7 MG/DL (ref 8.6–10.5)
CHLORIDE SERPL-SCNC: 88 MMOL/L (ref 98–107)
CLARITY UR: CLEAR
CO2 SERPL-SCNC: 30 MMOL/L (ref 22–29)
COLOR UR: YELLOW
CREAT SERPL-MCNC: 1.86 MG/DL (ref 0.76–1.27)
DEPRECATED RDW RBC AUTO: 44.7 FL (ref 37–54)
EGFRCR SERPLBLD CKD-EPI 2021: 37.7 ML/MIN/1.73
EOSINOPHIL # BLD AUTO: 0.04 10*3/MM3 (ref 0–0.4)
EOSINOPHIL NFR BLD AUTO: 0.5 % (ref 0.3–6.2)
ERYTHROCYTE [DISTWIDTH] IN BLOOD BY AUTOMATED COUNT: 12.5 % (ref 12.3–15.4)
GLOBULIN UR ELPH-MCNC: 3.5 GM/DL
GLUCOSE SERPL-MCNC: 143 MG/DL (ref 65–99)
GLUCOSE UR STRIP-MCNC: NEGATIVE MG/DL
HCT VFR BLD AUTO: 38.4 % (ref 37.5–51)
HGB BLD-MCNC: 13.1 G/DL (ref 13–17.7)
HGB UR QL STRIP.AUTO: NEGATIVE
HOLD SPECIMEN: NORMAL
HOLD SPECIMEN: NORMAL
IMM GRANULOCYTES # BLD AUTO: 0.03 10*3/MM3 (ref 0–0.05)
IMM GRANULOCYTES NFR BLD AUTO: 0.4 % (ref 0–0.5)
KETONES UR QL STRIP: NEGATIVE
LEUKOCYTE ESTERASE UR QL STRIP.AUTO: NEGATIVE
LIPASE SERPL-CCNC: 42 U/L (ref 13–60)
LYMPHOCYTES # BLD AUTO: 1.19 10*3/MM3 (ref 0.7–3.1)
LYMPHOCYTES NFR BLD AUTO: 14.7 % (ref 19.6–45.3)
MCH RBC QN AUTO: 33.6 PG (ref 26.6–33)
MCHC RBC AUTO-ENTMCNC: 34.1 G/DL (ref 31.5–35.7)
MCV RBC AUTO: 98.5 FL (ref 79–97)
MONOCYTES # BLD AUTO: 0.98 10*3/MM3 (ref 0.1–0.9)
MONOCYTES NFR BLD AUTO: 12.1 % (ref 5–12)
NEUTROPHILS NFR BLD AUTO: 5.83 10*3/MM3 (ref 1.7–7)
NEUTROPHILS NFR BLD AUTO: 71.9 % (ref 42.7–76)
NITRITE UR QL STRIP: NEGATIVE
NRBC BLD AUTO-RTO: 0.1 /100 WBC (ref 0–0.2)
PH UR STRIP.AUTO: 6 [PH] (ref 5–8)
PLATELET # BLD AUTO: 324 10*3/MM3 (ref 140–450)
PMV BLD AUTO: 9 FL (ref 6–12)
POTASSIUM SERPL-SCNC: 3.6 MMOL/L (ref 3.5–5.2)
PROT SERPL-MCNC: 8.2 G/DL (ref 6–8.5)
PROT UR QL STRIP: ABNORMAL
RBC # BLD AUTO: 3.9 10*6/MM3 (ref 4.14–5.8)
SODIUM SERPL-SCNC: 134 MMOL/L (ref 136–145)
SP GR UR STRIP: 1.02 (ref 1–1.03)
UROBILINOGEN UR QL STRIP: ABNORMAL
WBC NRBC COR # BLD: 8.1 10*3/MM3 (ref 3.4–10.8)
WHOLE BLOOD HOLD SPECIMEN: NORMAL
WHOLE BLOOD HOLD SPECIMEN: NORMAL

## 2022-03-14 PROCEDURE — 80307 DRUG TEST PRSMV CHEM ANLYZR: CPT | Performed by: NURSE PRACTITIONER

## 2022-03-14 PROCEDURE — 99284 EMERGENCY DEPT VISIT MOD MDM: CPT

## 2022-03-14 PROCEDURE — 25010000002 MORPHINE PER 10 MG: Performed by: EMERGENCY MEDICINE

## 2022-03-14 PROCEDURE — 80053 COMPREHEN METABOLIC PANEL: CPT | Performed by: EMERGENCY MEDICINE

## 2022-03-14 PROCEDURE — 81001 URINALYSIS AUTO W/SCOPE: CPT | Performed by: EMERGENCY MEDICINE

## 2022-03-14 PROCEDURE — 83690 ASSAY OF LIPASE: CPT | Performed by: EMERGENCY MEDICINE

## 2022-03-14 PROCEDURE — 85025 COMPLETE CBC W/AUTO DIFF WBC: CPT | Performed by: EMERGENCY MEDICINE

## 2022-03-14 PROCEDURE — 74176 CT ABD & PELVIS W/O CONTRAST: CPT

## 2022-03-14 PROCEDURE — 25010000002 ONDANSETRON PER 1 MG: Performed by: EMERGENCY MEDICINE

## 2022-03-14 RX ORDER — ONDANSETRON 2 MG/ML
4 INJECTION INTRAMUSCULAR; INTRAVENOUS ONCE
Status: COMPLETED | OUTPATIENT
Start: 2022-03-14 | End: 2022-03-14

## 2022-03-14 RX ORDER — MORPHINE SULFATE 2 MG/ML
4 INJECTION, SOLUTION INTRAMUSCULAR; INTRAVENOUS ONCE
Status: COMPLETED | OUTPATIENT
Start: 2022-03-14 | End: 2022-03-14

## 2022-03-14 RX ORDER — SODIUM CHLORIDE 0.9 % (FLUSH) 0.9 %
10 SYRINGE (ML) INJECTION AS NEEDED
Status: DISCONTINUED | OUTPATIENT
Start: 2022-03-14 | End: 2022-03-17 | Stop reason: HOSPADM

## 2022-03-14 RX ADMIN — MORPHINE SULFATE 4 MG: 2 INJECTION, SOLUTION INTRAMUSCULAR; INTRAVENOUS at 22:12

## 2022-03-14 RX ADMIN — ONDANSETRON 4 MG: 2 INJECTION INTRAMUSCULAR; INTRAVENOUS at 23:43

## 2022-03-14 RX ADMIN — SODIUM CHLORIDE 1000 ML: 9 INJECTION, SOLUTION INTRAVENOUS at 22:10

## 2022-03-14 RX ADMIN — ONDANSETRON 4 MG: 2 INJECTION INTRAMUSCULAR; INTRAVENOUS at 22:11

## 2022-03-14 NOTE — ED NOTES
PT presents to ED via EMS from home. Pt reports V/D and back pain after eating three raw eggs this morning. PT is A&O4, able to ambulate, and in a mask at this time.     Patient was placed in face mask during first look triage.  Patient was wearing a face mask throughout encounter.  I wore personal protective equipment throughout the encounter.  Hand hygiene was performed before and after patient encounter.

## 2022-03-15 PROBLEM — J44.9 COPD (CHRONIC OBSTRUCTIVE PULMONARY DISEASE): Status: ACTIVE | Noted: 2022-03-15

## 2022-03-15 PROBLEM — R19.7 NAUSEA VOMITING AND DIARRHEA: Status: ACTIVE | Noted: 2017-05-23

## 2022-03-15 PROBLEM — R10.84 GENERALIZED ABDOMINAL PAIN: Status: ACTIVE | Noted: 2022-03-15

## 2022-03-15 PROBLEM — I48.91 A-FIB: Status: ACTIVE | Noted: 2022-03-15

## 2022-03-15 PROBLEM — F19.11 HISTORY OF DRUG ABUSE (HCC): Status: ACTIVE | Noted: 2022-03-15

## 2022-03-15 PROBLEM — R10.31 RIGHT LOWER QUADRANT ABDOMINAL PAIN: Status: ACTIVE | Noted: 2022-03-15

## 2022-03-15 PROBLEM — K40.90 RIGHT INGUINAL HERNIA: Status: ACTIVE | Noted: 2022-03-15

## 2022-03-15 PROBLEM — K59.00 CONSTIPATION: Status: ACTIVE | Noted: 2022-03-15

## 2022-03-15 LAB
AMPHET+METHAMPHET UR QL: NEGATIVE
ANION GAP SERPL CALCULATED.3IONS-SCNC: 13 MMOL/L (ref 5–15)
BACTERIA UR QL AUTO: ABNORMAL /HPF
BARBITURATES UR QL SCN: NEGATIVE
BASOPHILS # BLD AUTO: 0.04 10*3/MM3 (ref 0–0.2)
BASOPHILS NFR BLD AUTO: 0.5 % (ref 0–1.5)
BENZODIAZ UR QL SCN: NEGATIVE
BUN SERPL-MCNC: 23 MG/DL (ref 8–23)
BUN/CREAT SERPL: 13.9 (ref 7–25)
CALCIUM SPEC-SCNC: 10.7 MG/DL (ref 8.6–10.5)
CANNABINOIDS SERPL QL: NEGATIVE
CHLORIDE SERPL-SCNC: 91 MMOL/L (ref 98–107)
CO2 SERPL-SCNC: 32 MMOL/L (ref 22–29)
COCAINE UR QL: NEGATIVE
CREAT SERPL-MCNC: 1.66 MG/DL (ref 0.76–1.27)
DEPRECATED RDW RBC AUTO: 47.9 FL (ref 37–54)
EGFRCR SERPLBLD CKD-EPI 2021: 43.3 ML/MIN/1.73
EOSINOPHIL # BLD AUTO: 0.03 10*3/MM3 (ref 0–0.4)
EOSINOPHIL NFR BLD AUTO: 0.4 % (ref 0.3–6.2)
ERYTHROCYTE [DISTWIDTH] IN BLOOD BY AUTOMATED COUNT: 13 % (ref 12.3–15.4)
ETHANOL BLD-MCNC: <10 MG/DL (ref 0–10)
ETHANOL UR QL: <0.01 %
GLUCOSE SERPL-MCNC: 143 MG/DL (ref 65–99)
HCT VFR BLD AUTO: 37.1 % (ref 37.5–51)
HGB BLD-MCNC: 12.4 G/DL (ref 13–17.7)
HYALINE CASTS UR QL AUTO: ABNORMAL /LPF
IMM GRANULOCYTES # BLD AUTO: 0.02 10*3/MM3 (ref 0–0.05)
IMM GRANULOCYTES NFR BLD AUTO: 0.2 % (ref 0–0.5)
INR PPP: 0.91 (ref 0.9–1.1)
LYMPHOCYTES # BLD AUTO: 0.98 10*3/MM3 (ref 0.7–3.1)
LYMPHOCYTES NFR BLD AUTO: 11.7 % (ref 19.6–45.3)
MCH RBC QN AUTO: 33.3 PG (ref 26.6–33)
MCHC RBC AUTO-ENTMCNC: 33.4 G/DL (ref 31.5–35.7)
MCV RBC AUTO: 99.7 FL (ref 79–97)
METHADONE UR QL SCN: NEGATIVE
MONOCYTES # BLD AUTO: 1.14 10*3/MM3 (ref 0.1–0.9)
MONOCYTES NFR BLD AUTO: 13.6 % (ref 5–12)
NEUTROPHILS NFR BLD AUTO: 6.2 10*3/MM3 (ref 1.7–7)
NEUTROPHILS NFR BLD AUTO: 73.6 % (ref 42.7–76)
NRBC BLD AUTO-RTO: 0 /100 WBC (ref 0–0.2)
OPIATES UR QL: POSITIVE
OXYCODONE UR QL SCN: NEGATIVE
PLATELET # BLD AUTO: 303 10*3/MM3 (ref 140–450)
PMV BLD AUTO: 9.2 FL (ref 6–12)
POTASSIUM SERPL-SCNC: 3.6 MMOL/L (ref 3.5–5.2)
PROTHROMBIN TIME: 12.2 SECONDS (ref 11.7–14.2)
QT INTERVAL: 348 MS
RBC # BLD AUTO: 3.72 10*6/MM3 (ref 4.14–5.8)
RBC # UR STRIP: ABNORMAL /HPF
REF LAB TEST METHOD: ABNORMAL
SARS-COV-2 RNA PNL SPEC NAA+PROBE: NOT DETECTED
SODIUM SERPL-SCNC: 136 MMOL/L (ref 136–145)
SQUAMOUS #/AREA URNS HPF: ABNORMAL /HPF
TSH SERPL DL<=0.05 MIU/L-ACNC: 1.7 UIU/ML (ref 0.27–4.2)
WBC # UR STRIP: ABNORMAL /HPF
WBC NRBC COR # BLD: 8.41 10*3/MM3 (ref 3.4–10.8)

## 2022-03-15 PROCEDURE — 63710000001 THIAMINE 100 MG TABLET: Performed by: NURSE PRACTITIONER

## 2022-03-15 PROCEDURE — A9270 NON-COVERED ITEM OR SERVICE: HCPCS | Performed by: NURSE PRACTITIONER

## 2022-03-15 PROCEDURE — 85610 PROTHROMBIN TIME: CPT | Performed by: NURSE PRACTITIONER

## 2022-03-15 PROCEDURE — 82077 ASSAY SPEC XCP UR&BREATH IA: CPT | Performed by: NURSE PRACTITIONER

## 2022-03-15 PROCEDURE — 63710000001 PANTOPRAZOLE 40 MG TABLET DELAYED-RELEASE: Performed by: NURSE PRACTITIONER

## 2022-03-15 PROCEDURE — 63710000001 POLYETHYLENE GLYCOL 17 G PACK: Performed by: NURSE PRACTITIONER

## 2022-03-15 PROCEDURE — 25010000002 ENOXAPARIN PER 10 MG: Performed by: NURSE PRACTITIONER

## 2022-03-15 PROCEDURE — 93005 ELECTROCARDIOGRAM TRACING: CPT | Performed by: EMERGENCY MEDICINE

## 2022-03-15 PROCEDURE — G0378 HOSPITAL OBSERVATION PER HR: HCPCS

## 2022-03-15 PROCEDURE — 99221 1ST HOSP IP/OBS SF/LOW 40: CPT | Performed by: STUDENT IN AN ORGANIZED HEALTH CARE EDUCATION/TRAINING PROGRAM

## 2022-03-15 PROCEDURE — 80048 BASIC METABOLIC PNL TOTAL CA: CPT | Performed by: NURSE PRACTITIONER

## 2022-03-15 PROCEDURE — 93010 ELECTROCARDIOGRAM REPORT: CPT | Performed by: INTERNAL MEDICINE

## 2022-03-15 PROCEDURE — 85025 COMPLETE CBC W/AUTO DIFF WBC: CPT | Performed by: NURSE PRACTITIONER

## 2022-03-15 PROCEDURE — 63710000001 DOCUSATE SODIUM 100 MG CAPSULE: Performed by: NURSE PRACTITIONER

## 2022-03-15 PROCEDURE — 90791 PSYCH DIAGNOSTIC EVALUATION: CPT

## 2022-03-15 PROCEDURE — 84443 ASSAY THYROID STIM HORMONE: CPT | Performed by: INTERNAL MEDICINE

## 2022-03-15 PROCEDURE — 25010000002 ONDANSETRON PER 1 MG: Performed by: NURSE PRACTITIONER

## 2022-03-15 PROCEDURE — 63710000001 FERROUS SULFATE 325 (65 FE) MG TABLET: Performed by: NURSE PRACTITIONER

## 2022-03-15 PROCEDURE — 99222 1ST HOSP IP/OBS MODERATE 55: CPT | Performed by: INTERNAL MEDICINE

## 2022-03-15 PROCEDURE — 25010000002 PROCHLORPERAZINE 10 MG/2ML SOLUTION: Performed by: NURSE PRACTITIONER

## 2022-03-15 PROCEDURE — 63710000001 ATORVASTATIN 20 MG TABLET: Performed by: NURSE PRACTITIONER

## 2022-03-15 PROCEDURE — 87635 SARS-COV-2 COVID-19 AMP PRB: CPT | Performed by: EMERGENCY MEDICINE

## 2022-03-15 RX ORDER — SODIUM CHLORIDE 0.9 % (FLUSH) 0.9 %
10 SYRINGE (ML) INJECTION EVERY 12 HOURS SCHEDULED
Status: DISCONTINUED | OUTPATIENT
Start: 2022-03-15 | End: 2022-03-17 | Stop reason: HOSPADM

## 2022-03-15 RX ORDER — LORAZEPAM 2 MG/ML
1 INJECTION INTRAMUSCULAR
Status: DISCONTINUED | OUTPATIENT
Start: 2022-03-15 | End: 2022-03-17 | Stop reason: HOSPADM

## 2022-03-15 RX ORDER — ACETAMINOPHEN 650 MG/1
650 SUPPOSITORY RECTAL EVERY 4 HOURS PRN
Status: DISCONTINUED | OUTPATIENT
Start: 2022-03-15 | End: 2022-03-17 | Stop reason: HOSPADM

## 2022-03-15 RX ORDER — PANTOPRAZOLE SODIUM 40 MG/1
40 TABLET, DELAYED RELEASE ORAL DAILY
Status: DISCONTINUED | OUTPATIENT
Start: 2022-03-15 | End: 2022-03-17 | Stop reason: HOSPADM

## 2022-03-15 RX ORDER — SODIUM CHLORIDE 9 MG/ML
75 INJECTION, SOLUTION INTRAVENOUS CONTINUOUS
Status: DISCONTINUED | OUTPATIENT
Start: 2022-03-15 | End: 2022-03-17

## 2022-03-15 RX ORDER — SODIUM CHLORIDE 9 MG/ML
100 INJECTION, SOLUTION INTRAVENOUS CONTINUOUS
Status: DISCONTINUED | OUTPATIENT
Start: 2022-03-15 | End: 2022-03-15

## 2022-03-15 RX ORDER — DIPHENOXYLATE HYDROCHLORIDE AND ATROPINE SULFATE 2.5; .025 MG/1; MG/1
1 TABLET ORAL DAILY
Status: DISCONTINUED | OUTPATIENT
Start: 2022-03-16 | End: 2022-03-17 | Stop reason: HOSPADM

## 2022-03-15 RX ORDER — DOCUSATE SODIUM 100 MG/1
100 CAPSULE, LIQUID FILLED ORAL 2 TIMES DAILY
Status: DISCONTINUED | OUTPATIENT
Start: 2022-03-15 | End: 2022-03-17 | Stop reason: HOSPADM

## 2022-03-15 RX ORDER — HYDROXYZINE HYDROCHLORIDE 25 MG/1
25 TABLET, FILM COATED ORAL 3 TIMES DAILY PRN
Status: DISCONTINUED | OUTPATIENT
Start: 2022-03-15 | End: 2022-03-17 | Stop reason: HOSPADM

## 2022-03-15 RX ORDER — ACETAMINOPHEN 325 MG/1
650 TABLET ORAL EVERY 4 HOURS PRN
Status: DISCONTINUED | OUTPATIENT
Start: 2022-03-15 | End: 2022-03-17 | Stop reason: HOSPADM

## 2022-03-15 RX ORDER — ATORVASTATIN CALCIUM 20 MG/1
20 TABLET, FILM COATED ORAL DAILY
Status: DISCONTINUED | OUTPATIENT
Start: 2022-03-15 | End: 2022-03-17 | Stop reason: HOSPADM

## 2022-03-15 RX ORDER — LORAZEPAM 1 MG/1
1 TABLET ORAL
Status: DISCONTINUED | OUTPATIENT
Start: 2022-03-15 | End: 2022-03-17 | Stop reason: HOSPADM

## 2022-03-15 RX ORDER — FOLIC ACID 1 MG/1
1 TABLET ORAL DAILY
Status: DISCONTINUED | OUTPATIENT
Start: 2022-03-16 | End: 2022-03-17 | Stop reason: HOSPADM

## 2022-03-15 RX ORDER — NITROGLYCERIN 0.4 MG/1
0.4 TABLET SUBLINGUAL
Status: DISCONTINUED | OUTPATIENT
Start: 2022-03-15 | End: 2022-03-17 | Stop reason: HOSPADM

## 2022-03-15 RX ORDER — FERROUS SULFATE 325(65) MG
325 TABLET ORAL DAILY
Status: DISCONTINUED | OUTPATIENT
Start: 2022-03-15 | End: 2022-03-17 | Stop reason: HOSPADM

## 2022-03-15 RX ORDER — POLYETHYLENE GLYCOL 3350 17 G/17G
17 POWDER, FOR SOLUTION ORAL DAILY
Status: DISCONTINUED | OUTPATIENT
Start: 2022-03-15 | End: 2022-03-17 | Stop reason: HOSPADM

## 2022-03-15 RX ORDER — LORAZEPAM 1 MG/1
0.5 TABLET ORAL
Status: DISCONTINUED | OUTPATIENT
Start: 2022-03-15 | End: 2022-03-17 | Stop reason: HOSPADM

## 2022-03-15 RX ORDER — ACETAMINOPHEN 160 MG/5ML
650 SOLUTION ORAL EVERY 4 HOURS PRN
Status: DISCONTINUED | OUTPATIENT
Start: 2022-03-15 | End: 2022-03-17 | Stop reason: HOSPADM

## 2022-03-15 RX ORDER — SODIUM CHLORIDE 0.9 % (FLUSH) 0.9 %
10 SYRINGE (ML) INJECTION AS NEEDED
Status: DISCONTINUED | OUTPATIENT
Start: 2022-03-15 | End: 2022-03-17 | Stop reason: HOSPADM

## 2022-03-15 RX ORDER — ONDANSETRON 2 MG/ML
4 INJECTION INTRAMUSCULAR; INTRAVENOUS EVERY 6 HOURS PRN
Status: DISCONTINUED | OUTPATIENT
Start: 2022-03-15 | End: 2022-03-17 | Stop reason: HOSPADM

## 2022-03-15 RX ORDER — PROCHLORPERAZINE EDISYLATE 5 MG/ML
5 INJECTION INTRAMUSCULAR; INTRAVENOUS EVERY 6 HOURS PRN
Status: DISCONTINUED | OUTPATIENT
Start: 2022-03-15 | End: 2022-03-17 | Stop reason: HOSPADM

## 2022-03-15 RX ORDER — LORAZEPAM 2 MG/ML
0.5 INJECTION INTRAMUSCULAR
Status: DISCONTINUED | OUTPATIENT
Start: 2022-03-15 | End: 2022-03-17 | Stop reason: HOSPADM

## 2022-03-15 RX ADMIN — POLYETHYLENE GLYCOL 3350 17 G: 17 POWDER, FOR SOLUTION ORAL at 18:29

## 2022-03-15 RX ADMIN — METOPROLOL TARTRATE 5 MG: 5 INJECTION INTRAVENOUS at 18:29

## 2022-03-15 RX ADMIN — DOCUSATE SODIUM 100 MG: 100 CAPSULE, LIQUID FILLED ORAL at 20:20

## 2022-03-15 RX ADMIN — SODIUM CHLORIDE 75 ML/HR: 9 INJECTION, SOLUTION INTRAVENOUS at 15:11

## 2022-03-15 RX ADMIN — Medication 100 MG: at 12:24

## 2022-03-15 RX ADMIN — Medication 10 ML: at 20:20

## 2022-03-15 RX ADMIN — ENOXAPARIN SODIUM 70 MG: 100 INJECTION SUBCUTANEOUS at 10:31

## 2022-03-15 RX ADMIN — METOPROLOL TARTRATE 5 MG: 5 INJECTION INTRAVENOUS at 12:24

## 2022-03-15 RX ADMIN — METOPROLOL TARTRATE 5 MG: 1 INJECTION, SOLUTION INTRAVENOUS at 01:16

## 2022-03-15 RX ADMIN — PROCHLORPERAZINE EDISYLATE 5 MG: 5 INJECTION INTRAMUSCULAR; INTRAVENOUS at 03:22

## 2022-03-15 RX ADMIN — ENOXAPARIN SODIUM 70 MG: 100 INJECTION SUBCUTANEOUS at 20:20

## 2022-03-15 RX ADMIN — Medication 10 ML: at 10:31

## 2022-03-15 RX ADMIN — PANTOPRAZOLE SODIUM 40 MG: 40 TABLET, DELAYED RELEASE ORAL at 12:25

## 2022-03-15 RX ADMIN — ONDANSETRON 4 MG: 2 INJECTION INTRAMUSCULAR; INTRAVENOUS at 08:02

## 2022-03-15 RX ADMIN — ATORVASTATIN CALCIUM 20 MG: 20 TABLET, FILM COATED ORAL at 12:24

## 2022-03-15 RX ADMIN — DOCUSATE SODIUM 100 MG: 100 CAPSULE, LIQUID FILLED ORAL at 12:25

## 2022-03-15 RX ADMIN — SODIUM CHLORIDE 100 ML/HR: 9 INJECTION, SOLUTION INTRAVENOUS at 03:24

## 2022-03-15 RX ADMIN — METOPROLOL TARTRATE 5 MG: 5 INJECTION INTRAVENOUS at 20:20

## 2022-03-15 RX ADMIN — PROCHLORPERAZINE EDISYLATE 5 MG: 5 INJECTION INTRAMUSCULAR; INTRAVENOUS at 09:10

## 2022-03-15 RX ADMIN — Medication 10 ML: at 03:23

## 2022-03-15 RX ADMIN — FERROUS SULFATE TAB 325 MG (65 MG ELEMENTAL FE) 325 MG: 325 (65 FE) TAB at 12:24

## 2022-03-15 NOTE — CONSULTS
"General Surgery consult    Summary:    Mr. Yuval Loja is a 71 y.o. year old gentleman with a reducible right inguinal hernia.  Discussed that this is unlikely the cause of his abdominal pain, nausea, and diarrhea.  Would not proceed with any type of urgent repair at this time.  Will be available as needed.    Chief Complaint:    Abdominal pain, nausea    History of Present Illness:    Mr. Yuval Loja is a 71 y.o. year old gentleman who presented to the ER with abdominal pain, nausea, and diarrhea.  He seems confused and intermittently disoriented.  He states he has been eating.  He states he has had an ulcer operation but has no incisions that I can appreciate.    Past Medical History:   Past Medical History:   Diagnosis Date   • Alcohol abuse    • CAD (coronary artery disease)    • Chronic kidney disease, stage 3 (HCC)    • Fatty liver, alcoholic    • GERD (gastroesophageal reflux disease)    • Hypertensive urgency    • Metabolic acidosis    • Myocardial infarction (HCC)    • Pacemaker     patient has no pacemaker   • Sinus tachycardia    • Stroke (HCC)       Past Surgical History:    Past Surgical History:   Procedure Laterality Date   • PACEMAKER IMPLANTATION       Family History:    History reviewed. No pertinent family history.    Social History:    Social History     Socioeconomic History   • Marital status: Single   Tobacco Use   • Smoking status: Current Every Day Smoker     Packs/day: 0.50   • Smokeless tobacco: Never Used   • Tobacco comment: tried to provide education declined irritated w/ attempt smoked \" depends on what I feel like.\"   Substance and Sexual Activity   • Alcohol use: Not Currently   • Drug use: No   • Sexual activity: Defer     Allergies:   Allergies   Allergen Reactions   • Mirtazapine Other (See Comments)     confused   • Sertraline Anxiety, Diarrhea and Nausea And Vomiting     Also \"hallucinations\"     Medications:     Current Facility-Administered Medications:   •  " acetaminophen (TYLENOL) tablet 650 mg, 650 mg, Oral, Q4H PRN **OR** acetaminophen (TYLENOL) 160 MG/5ML solution 650 mg, 650 mg, Oral, Q4H PRN **OR** acetaminophen (TYLENOL) suppository 650 mg, 650 mg, Rectal, Q4H PRN, Marla Gates APRN  •  atorvastatin (LIPITOR) tablet 20 mg, 20 mg, Oral, Daily, Trinh Davis APRN, 20 mg at 03/15/22 1224  •  docusate sodium (COLACE) capsule 100 mg, 100 mg, Oral, BID, Trinh Davis APRN, 100 mg at 03/15/22 1225  •  enoxaparin (LOVENOX) syringe 70 mg, 1 mg/kg, Subcutaneous, Q12H, Marla Gates APRN, 70 mg at 03/15/22 1031  •  ferrous sulfate tablet 325 mg, 325 mg, Oral, Daily, Trinh Davis APRN, 325 mg at 03/15/22 1224  •  [START ON 3/16/2022] thiamine (VITAMIN B-1) tablet 100 mg, 100 mg, Oral, Daily **AND** [START ON 3/16/2022] multivitamin (THERAGRAN) tablet 1 tablet, 1 tablet, Oral, Daily **AND** [START ON 3/16/2022] folic acid (FOLVITE) tablet 1 mg, 1 mg, Oral, Daily, Trinh Davis APRN  •  hydrOXYzine (ATARAX) tablet 25 mg, 25 mg, Oral, TID PRN, Trinh Davis APRN  •  LORazepam (ATIVAN) tablet 0.5 mg, 0.5 mg, Oral, Q2H PRN **OR** LORazepam (ATIVAN) injection 0.5 mg, 0.5 mg, Intravenous, Q2H PRN **OR** LORazepam (ATIVAN) tablet 1 mg, 1 mg, Oral, Q1H PRN **OR** LORazepam (ATIVAN) injection 1 mg, 1 mg, Intravenous, Q1H PRN **OR** LORazepam (ATIVAN) injection 1 mg, 1 mg, Intravenous, Q15 Min PRN **OR** LORazepam (ATIVAN) injection 1 mg, 1 mg, Intramuscular, Q15 Min PRN, Trinh Davis APRN  •  metoprolol tartrate (LOPRESSOR) injection 5 mg, 5 mg, Intravenous, Q4H, Celeste Aguayo MD, 5 mg at 03/15/22 1224  •  nitroglycerin (NITROSTAT) SL tablet 0.4 mg, 0.4 mg, Sublingual, Q5 Min PRN, Marla Gates APRN  •  ondansetron (ZOFRAN) injection 4 mg, 4 mg, Intravenous, Q6H PRN, Marla Gates APRN, 4 mg at 03/15/22 0802  •  pantoprazole (PROTONIX) EC tablet 40 mg, 40 mg, Oral, Daily, Trinh Davis APRN, 40 mg at 03/15/22 1225  •  Pharmacy to Dose  enoxaparin (LOVENOX), , Does not apply, Continuous PRN, Marla Gates APRBHASKAR  •  polyethylene glycol (MIRALAX) packet 17 g, 17 g, Oral, Daily, Trinh Davis APRN  •  prochlorperazine (COMPAZINE) injection 5 mg, 5 mg, Intravenous, Q6H PRN, Marla Gates APRN, 5 mg at 03/15/22 0910  •  sodium chloride 0.9 % flush 10 mL, 10 mL, Intravenous, PRN, Kev Molina MD  •  sodium chloride 0.9 % flush 10 mL, 10 mL, Intravenous, Q12H, Marla Gates APRN, 10 mL at 03/15/22 1031  •  sodium chloride 0.9 % flush 10 mL, 10 mL, Intravenous, PRN, Marla Gates APRN  •  sodium chloride 0.9 % infusion, 75 mL/hr, Intravenous, Continuous, Trinh Davis APRN, Last Rate: 75 mL/hr at 03/15/22 1511, 75 mL/hr at 03/15/22 1511    Radiology/Endoscopy:    • CT abdomen and pelvis reviewed; right inguinal hernia with bowel, nonobstructed    Labs:    • White blood cell count 8 hemoglobin 12 platelets 303 creatinine 1.6    Review of Systems:   Influenza-like illness: no fever, no  cough, no  sore throat, no  body aches, no loss of sense of taste or smell, no known exposure to person with Covid-19.  Constitutional: Negative for fevers or chills  HENT: Negative for hearing loss or runny nose  Eyes: Negative for vision changes or scleral icterus  Respiratory: Negative for cough or shortness of breath  Cardiovascular: Negative for chest pain or heart palpitations  Gastrointestinal: Positive for abdominal pain, nausea, diarrhea; negative for vomiting, constipation, melena, or hematochezia  Genitourinary: Negative for hematuria or dysuria  Musculoskeletal: Negative for joint swelling or gait instability  Neurologic: Negative for tremors or seizures  Psychiatric: Negative for suicidal ideations or depression  All other systems reviewed and negative    Physical Exam:   • Constitutional: Well-developed, well-nourished, no acute distress  • Vital signs: T 98.2 HR 91 RR 16 O2 98% /84  • Eyes: Conjunctiva normal, sclera  nonicteric  • ENMT: Hearing grossly normal, oral mucosa moist  • Neck: Supple, trachea midline  • Respiratory: Clear to auscultation, normal inspiratory effort  • Cardiovascular: Regular rate, no peripheral edema, no jugular venous distention  • Gastrointestinal: Soft, nontender, reducible right inguinal hernia   • Skin:  Warm, dry, no rash on visualized skin surfaces  • Musculoskeletal: Symmetric strength, normal gait  • Psychiatric: Alert and oriented ×3, normal affect     SARAH BUCIO M.D.  General and Endoscopic Surgery  [facility], [place], operated by [facility]    [address] [place] [address], [address]  [place], [place], [facility]  P: [phone]  F: [phone]

## 2022-03-15 NOTE — ED NOTES
"Pt reports he \"hasn't felt well in years.\" Reports he has \"eaten raw eggs for years to help my stomach because I can't keep anything down.\" Pt states \"when you get old like me it's hard to stand at the stove and cook.\"   "

## 2022-03-15 NOTE — ED PROVIDER NOTES
EMERGENCY DEPARTMENT ENCOUNTER    CHIEF COMPLAINT  Chief Complaint: Abdominal pain  History given by: Patient  History limited by: None  Room Number: 43/43  PMD: Alessia Prescott APRN      HPI:  Pt is a 71 y.o. male who presents complaining of generalized abdominal discomfort that has been present for the past 2 to 3 weeks.  The patient states that he was seen at [facility] about the same symptoms recently and was told his work-up was unremarkable.  He does have associated nausea/vomiting/diarrhea but denies fever/chills, chest pain, shortness of breath, or back pain.  He does state that the pain on occasion radiates upward into the right shoulder.  The patient does report that he occasionally eats raw eggs to help settle his stomach.    Duration: Ongoing for the past 2 to 3 weeks  Onset: Gradual  Location: Generalized abdomen  Radiation: Into right shoulder  Quality: Dull/cramping  Intensity/Severity: Moderate  Progression: Worsening  Associated Symptoms: Nausea/vomiting/diarrhea  Aggravating Factors: None  Alleviating Factors: None  Previous Episodes: Yes, has been seen and evaluated at another ER as well as with his primary care provider  Treatment before arrival: None    PAST MEDICAL HISTORY  Active Ambulatory Problems     Diagnosis Date Noted   • Alcoholic ketoacidosis 05/23/2017   • Alcohol dependence (HCC) 05/23/2017   • Methamphetamine abuse (HCC) 05/23/2017   • Fatty liver, alcoholic 05/23/2017   • Nausea & vomiting 05/23/2017   • Alcoholic liver disease (HCC) 05/23/2017   • Metabolic acidosis 05/23/2017   • Tobacco abuse 05/23/2017   • Coronary artery disease involving native coronary artery of native heart without angina pectoris 05/24/2017   • Tachycardia 05/24/2017   • Sinus tachycardia 04/17/2019   • Chronic kidney disease, stage 3 04/17/2019   • Medically noncompliant 04/18/2019   • Visual hallucinations 04/18/2020   • Psychosis (HCC) 04/18/2020   • Hyponatremia 04/18/2020   • CAD (coronary  "artery disease) 04/18/2020   • Leukopenia 04/18/2020   • Symptomatic anemia 04/18/2020   • Headache 04/18/2020   • Substance abuse (HCC) 04/18/2020   • Gastrointestinal hemorrhage 10/22/2020   • CRISTIANO (acute kidney injury) (HCC) 10/23/2020   • Hyperkalemia 10/23/2020     Resolved Ambulatory Problems     Diagnosis Date Noted   • Dehydration 04/17/2019   • Functional diarrhea 04/17/2019     Past Medical History:   Diagnosis Date   • Alcohol abuse    • GERD (gastroesophageal reflux disease)    • Hypertensive urgency    • Myocardial infarction (HCC)    • Pacemaker    • Stroke (HCC)        PAST SURGICAL HISTORY  Past Surgical History:   Procedure Laterality Date   • PACEMAKER IMPLANTATION         FAMILY HISTORY  History reviewed. No pertinent family history.    SOCIAL HISTORY  Social History     Socioeconomic History   • Marital status: Single   Tobacco Use   • Smoking status: Current Every Day Smoker     Packs/day: 0.50   • Smokeless tobacco: Never Used   • Tobacco comment: tried to provide education declined irritated w/ attempt smoked \" depends on what I feel like.\"   Substance and Sexual Activity   • Alcohol use: Not Currently   • Drug use: No   • Sexual activity: Defer       ALLERGIES  Atorvastatin, Atorvastatin calcium, Mirtazapine, and Sertraline    REVIEW OF SYSTEMS  Review of Systems   Constitutional: Negative for activity change, appetite change and fever.   HENT: Negative for congestion and sore throat.    Eyes: Negative.    Respiratory: Negative for cough and shortness of breath.    Cardiovascular: Negative for chest pain and leg swelling.   Gastrointestinal: Positive for abdominal pain, diarrhea, nausea and vomiting.   Endocrine: Negative.    Genitourinary: Negative for decreased urine volume and dysuria.   Musculoskeletal: Negative for neck pain.   Skin: Negative for rash and wound.   Allergic/Immunologic: Negative.    Neurological: Negative for weakness, numbness and headaches.   Hematological: Negative.  "   Psychiatric/Behavioral: Negative.    All other systems reviewed and are negative.      PHYSICAL EXAM  ED Triage Vitals   Temp Heart Rate Resp BP SpO2   03/14/22 1855 03/14/22 1854 03/14/22 1854 03/14/22 1854 03/14/22 1854   98.2 °F (36.8 °C) 102 18 144/71 99 %      Temp src Heart Rate Source Patient Position BP Location FiO2 (%)   03/14/22 1855 03/14/22 1854 03/14/22 1854 03/14/22 1854 --   Tympanic Monitor Sitting Right arm        Physical Exam  Vitals and nursing note reviewed.   Constitutional:       General: He is not in acute distress.  HENT:      Head: Normocephalic and atraumatic.   Eyes:      Pupils: Pupils are equal, round, and reactive to light.   Cardiovascular:      Rate and Rhythm: Normal rate and regular rhythm.      Heart sounds: Normal heart sounds.   Pulmonary:      Effort: Pulmonary effort is normal. No respiratory distress.      Breath sounds: Normal breath sounds.   Abdominal:      Palpations: Abdomen is soft.      Tenderness: There is generalized abdominal tenderness. There is no guarding or rebound.   Musculoskeletal:         General: Normal range of motion.      Cervical back: Normal range of motion and neck supple.   Skin:     General: Skin is warm and dry.   Neurological:      Mental Status: He is alert and oriented to person, place, and time.      Sensory: Sensation is intact.   Psychiatric:         Mood and Affect: Mood and affect normal.         LAB RESULTS  Lab Results (last 24 hours)     Procedure Component Value Units Date/Time    CBC & Differential [[id number]]  (Abnormal) Collected: 03/14/22 2130    Specimen: Blood Updated: 03/14/22 2159    Narrative:      The following orders were created for panel order CBC & Differential.  Procedure                               Abnormality         Status                     ---------                               -----------         ------                     CBC Auto Differential[[id number]]        Abnormal            Final result                  Please view results for these tests on the individual orders.    Comprehensive Metabolic Panel [[id number]]  (Abnormal) Collected: 03/14/22 2130    Specimen: Blood Updated: 03/14/22 2225     Glucose 143 mg/dL      BUN 24 mg/dL      Creatinine 1.86 mg/dL      Sodium 134 mmol/L      Potassium 3.6 mmol/L      Chloride 88 mmol/L      CO2 30.0 mmol/L      Calcium 11.7 mg/dL      Total Protein 8.2 g/dL      Albumin 4.70 g/dL      ALT (SGPT) 10 U/L      AST (SGOT) 22 U/L      Alkaline Phosphatase 70 U/L      Total Bilirubin 0.2 mg/dL      Globulin 3.5 gm/dL      A/G Ratio 1.3 g/dL      BUN/Creatinine Ratio 12.9     Anion Gap 16.0 mmol/L      eGFR 37.7 mL/min/1.73      Comment: National Kidney Foundation and American Society of Nephrology (ASN) Task Force recommended calculation based on the Chronic Kidney Disease Epidemiology Collaboration (CKD-EPI) equation refit without adjustment for race.       Narrative:      GFR Normal >60  Chronic Kidney Disease <60  Kidney Failure <15      Lipase [[id number]]  (Normal) Collected: 03/14/22 2130    Specimen: Blood Updated: 03/14/22 2225     Lipase 42 U/L     CBC Auto Differential [[id number]]  (Abnormal) Collected: 03/14/22 2130    Specimen: Blood Updated: 03/14/22 2159     WBC 8.10 10*3/mm3      RBC 3.90 10*6/mm3      Hemoglobin 13.1 g/dL      Hematocrit 38.4 %      MCV 98.5 fL      MCH 33.6 pg      MCHC 34.1 g/dL      RDW 12.5 %      RDW-SD 44.7 fl      MPV 9.0 fL      Platelets 324 10*3/mm3      Neutrophil % 71.9 %      Lymphocyte % 14.7 %      Monocyte % 12.1 %      Eosinophil % 0.5 %      Basophil % 0.4 %      Immature Grans % 0.4 %      Neutrophils, Absolute 5.83 10*3/mm3      Lymphocytes, Absolute 1.19 10*3/mm3      Monocytes, Absolute 0.98 10*3/mm3      Eosinophils, Absolute 0.04 10*3/mm3      Basophils, Absolute 0.03 10*3/mm3      Immature Grans, Absolute 0.03 10*3/mm3      nRBC 0.1 /100 WBC     Urinalysis With Microscopic If Indicated (No Culture) - Urine, Clean Catch  [[id number]]  (Abnormal) Collected: 03/14/22 2335    Specimen: Urine, Clean Catch Updated: 03/14/22 2354     Color, UA Yellow     Appearance, UA Clear     pH, UA 6.0     Specific Gravity, UA 1.025     Glucose, UA Negative     Ketones, UA Negative     Bilirubin, UA Negative     Blood, UA Negative     Protein, UA 30 mg/dL (1+)     Leuk Esterase, UA Negative     Nitrite, UA Negative     Urobilinogen, UA 0.2 E.U./dL    Urinalysis, Microscopic Only - Urine, Clean Catch [[id number]]  (Abnormal) Collected: 03/14/22 2335    Specimen: Urine, Clean Catch Updated: 03/15/22 0003     RBC, UA 0-2 /HPF      WBC, UA 0-2 /HPF      Bacteria, UA Trace /HPF      Squamous Epithelial Cells, UA 7-12 /HPF      Hyaline Casts, UA 7-12 /LPF      Methodology Manual Light Microscopy    COVID PRE-OP / PRE-PROCEDURE SCREENING ORDER (NO ISOLATION) - Swab, Nasopharynx [[id number]]  (Normal) Collected: 03/15/22 0042    Specimen: Swab from Nasopharynx Updated: 03/15/22 0114    Narrative:      The following orders were created for panel order COVID PRE-OP / PRE-PROCEDURE SCREENING ORDER (NO ISOLATION) - Swab, Nasopharynx.  Procedure                               Abnormality         Status                     ---------                               -----------         ------                     COVID-19,[facility]-HOUSE...[[id number]]  Normal              Final result                 Please view results for these tests on the individual orders.    COVID-19,[facility] IN-HOU[facility] CEPHEID/RAMONA NP SWAB IN TRANSPORT MEDIA 8-12 HR TAT - Swab, Nasopharynx [[id number]]  (Normal) Collected: 03/15/22 0042    Specimen: Swab from Nasopharynx Updated: 03/15/22 0114     COVID19 Not Detected    Narrative:      Fact sheet for providers: https://www.fda.gov/media/055556/download    Fact sheet for patients: https://www.fda.gov/media/897374/download    Test performed by PCR.          I ordered the above labs and reviewed the results    RADIOLOGY  CT Abdomen Pelvis Without Contrast    Final Result         Electronically signed by Vini Cronin MD on 03-14-22 at 2354           I ordered the above noted radiological studies. Interpreted by radiologist. Reviewed by me in PACS.       PROCEDURES  Procedures  EKG          EKG time: 0038  Rhythm/Rate: atrial fibrillation with RVR, 121  P waves and TN: absent  QRS, axis: nml, nml   ST and T waves: nml     Interpreted Contemporaneously by me, independently viewed  changed compared to prior 5/27/20      PROGRESS AND CONSULTS     The patient was wearing a facemask upon entrance into the room and remained in such throughout their visit.  I was wearing PPE including a facemask, eye protection, as well as gloves at any point entering the room and throughout the visit    0015  On reevaluation, the patient currently now looks like he is in atrial fibrillation with rapid ventricular response.  Looking through his history, I do not see a previous history of that.  We will obtain an EKG.  We will continue to fluid hydrate and give the patient a IV dose of Lopressor.  I did inform him that secondary to the dehydration and acute kidney injury, that we would be admitting him to the hospital, especially now that he has a new onset atrial fibrillation.  The patient is in agreement with the plan and all questions have been answered.    0025  Case discussed with RICHMOND Quijano for [facility], who agrees to admit the patient to the hospital for Dr. Chance.      MEDICAL DECISION MAKING  Results were reviewed/discussed with the patient and they were also made aware of online access. Pt also made aware that some labs, such as cultures, will not be resulted during ER visit and follow up with PMD is necessary.     MDM  Number of Diagnoses or Management Options     Amount and/or Complexity of Data Reviewed  Clinical lab tests: ordered and reviewed  Tests in the radiology section of CPT®: ordered and reviewed  Tests in the medicine section of CPT®: ordered and  reviewed  Review and summarize past medical records: yes (Upon medical records review, the patient was last seen and evaluated on 3/9/2022 secondary to diarrhea)  Discuss the patient with other providers: yes (RICHMOND Quijano for [facility], who will admit the patient for Dr. Chance)  Independent visualization of images, tracings, or specimens: yes (CT scan of the abdomen and pelvis showing diverticulosis, a right-sided inguinal hernia, cholelithiasis without acute cholecystitis.)           DIAGNOSIS  Final diagnoses:   Generalized abdominal pain   Nausea vomiting and diarrhea   New onset atrial fibrillation (HCC)   Atrial fibrillation with rapid ventricular response (HCC)   Acute renal insufficiency       DISPOSITION  ADMISSION    Discussed treatment plan and reason for admission with pt/family and admitting physician.  Pt/family voiced understanding of the plan for admission for further testing/treatment as needed.         Latest Documented Vital Signs:  As of 02:24 EDT  BP- 171/98 HR- 87 Temp- 98.2 °F (36.8 °C) (Tympanic) O2 sat- 96%         Kev Molina MD  03/15/22 0224

## 2022-03-15 NOTE — CONSULTS
" Patient is a 71 year old  male evaluated by [facility] due to ETOH. Upon entering room patient is resting in bed. He appears restless and cannot get comfortable during evaluation, frequent repositioning due to abdominal pain. He is cooperative. His affect is flat, his answers are short. He is disoriented to date, but able to recall month, year, POTUS, situation. Unable to perform serial additions. He denies overuse of alcohol, stating \"I don't have a problem with alcohol, I don't do heavy drugs or anything like that.\" UDS positive for opiates and  He states \"I have a beer every 2 or 3 days\" last drink \"a couple days ago.\" Per primary RN, she discussed alcohol use with daughter, whom states patient drinks daily, at least 4 beers. She also states he has went inpatient for detox before, stating he can become confused and combative. He defers speaking further about his use of alcohol, denying DTs or inpatient treatment, although per chart he has been seen by [facility] in 2017 and in 2020 for alcohol use, (in 2020 experiencing visual hallucinations, denies mental health history). He takes Atarax and Effexor, he states this \"works beautifully\" with anxiety, which he rates 6/10, \"I'm always anxious to do things\" and citing his health as the source of his anxiety. He has been experiencing nightmares for the past 3 months, he thinks may be medication related but cannot distinguish which medication it could be. Nightmares are not consistently regarding the same topic, but they are disturbing to him. He denies SI/HI/AH/VH currently. He denies inpatient treatment for detox or mental health, denies family psychiatric history. Current CIWA 11 due to restlessness, anxiety, N/V, slight tremors noted.    He states he receives about 6 hours of sleep per night. His appetite is typically okay, but due to N/V he has not ate today. He denies depression. 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 He has been arrested for \"money\" but declines to detail. Denies DUI. Denies h/o abuse/trauma. [employer]ism [employer] preference. He enjoys \"cars\" as a hobby. He declines any resources for use of alcohol or other mental health purposes for anxiety. He would like us to follow him, stating \"it's encouraging to talk to someone.\" [facility] will continue to follow and re-assess if he would be open to discussing outpatient resources.  "

## 2022-03-15 NOTE — PROGRESS NOTES
"[facility] Clinical Pharmacy Services: Enoxaparin Consult    Yuval Loja has a pharmacy consult to dose enoxaparin per Marla FRAGOSO's request.     Indication: Atrial Fibrillation  Home Anticoagulation: None listed on PTA med list     Relevant clinical data and objective history reviewed:  71 y.o. male 172.7 cm (68\") 65.8 kg (145 lb)   Body mass index is 22.05 kg/m².  Estimated Creatinine Clearance: 36.9 mL/min (A) (by C-G formula based on SCr of 1.66 mg/dL (H)).    Assessment/Plan    Will start patient on  70 (1mg/kg) subcutaneous every 12 hours, adjusted for renal function. Consult order will be discontinued but pharmacy will continue to follow.     Dallas Rodriguez, formerly [facility]  Clinical Pharmacist    "

## 2022-03-15 NOTE — CASE MANAGEMENT/SOCIAL WORK
Notified this am that patient has non-par insurance (asmitted at 0032). Spoke w/ patient. He chose to be admitted here and stated that if he needed to have surgery, he wants to go to [facility]. (no evident need for surgery at this time per record or per patient). Notice of Non-coverage signed. Copy given to patient and original placed in  basket

## 2022-03-15 NOTE — H&P
"    Patient Name:  Yuval Loja  YOB: 1950  MRN:  [id number]  Admit Date:  3/14/2022  Patient Care Team:  Alessia Prescott APRN as PCP - General (Family Medicine)  Jonny Bains MD as Referring Physician (Internal Medicine)  Carlos Villareal MD as Consulting Physician (Hematology and Oncology)      Subjective   History Present Illness     Chief Complaint   Patient presents with   • Nausea   • Diarrhea     History of Present Illness   Mr. Loja is a 71 y.o. smoker with a history of alcohol abuse, substance abuse, CAD, HTN, CKD3 and noncompliance that presents to [facility] complaining of nausea and diarrhea. The patient states that he has had intermittent nausea, vomiting as well as diarrhea for the last several months. From review of records, it seems this is more of a chronic issue, but acutely worsened over the last few weeks. He apparently ate some raw eggs yesterday that made his symptoms worse and he came to the ED for further care.   The patient reports nausea, vomiting and diarrhea that happens several times a week, but no necessarily every day. He states when he is not nauseated or vomiting his appetite is good.  He states he has had some associated right lower quadrant pain that goes into his right groin as well as radiates to the right side of his flank. He states the pain gets better if he pushes on his right groin. He states his diarrhea is intermittent as well and non-bloody. He has a history of suspected GI bleeding felt related to NSAID use as well as prior alcohol abuse, but left here AMA in 2020 when he was scheduled to have an EGD and colonoscopy at that time. His hemoglobin at this time is 12 and he states he \"doesn't drink as much as I used to.\" He reports a couple beers a few times a week and denies any illicit drug abuse despite a prior history of meth use. He does smoke occasional cigarettes as well.    The patient denies any recent chest pain, " "dyspnea, cough, fever or chills. He denies any dysuria. He reportedly lives alone, but does have a history of leaving frequently AMA with his prior history of alcohol abuse with withdrawal. He does have CAD and reports a history of irregular heart beat and states, \"That's why I am on the Toprol.\" He reports compliance with the medication but denies any regular follow up with Cardiology.   In the ED, he was hemodynamically stable with the exception of tachycardia and new found atrial fibrillation with RVR. He was given Lopressor and started on fluids. Lab work showed a creatinine of 1.86, normal LFTs and WBC 8.10. Initial hemoglobin was 13.1 and occult stool was negative in the ED. CT A/P showed COPD, fatty liver, cholelithiasis, nonspecific stranding around both kidneys, moderate stool and right inguinal hernia with non-dilated bowel loops. He is being admitted for further care. Cardiology was consulted.    Review of Systems   Constitutional: Negative for activity change and fever.   HENT: Negative for congestion and ear pain.    Eyes: Negative for pain and discharge.   Respiratory: Negative for cough and shortness of breath.    Cardiovascular: Negative for chest pain and leg swelling.   Gastrointestinal: Positive for abdominal pain, diarrhea, nausea and vomiting. Negative for abdominal distention.   Genitourinary: Positive for flank pain. Negative for difficulty urinating and dysuria.   Musculoskeletal: Positive for back pain. Negative for arthralgias and gait problem.   Skin: Negative for color change and pallor.   Neurological: Negative for dizziness and light-headedness.   Psychiatric/Behavioral: Negative for confusion. The patient is not nervous/anxious.       Personal History     Past Medical History:   Diagnosis Date   • Alcohol abuse    • CAD (coronary artery disease)    • Chronic kidney disease, stage 3 (HCC)    • Fatty liver, alcoholic    • GERD (gastroesophageal reflux disease)    • Hypertensive urgency  " "  • Metabolic acidosis    • Myocardial infarction (HCC)    • Pacemaker     patient has no pacemaker   • Sinus tachycardia    • Stroke (HCC)      Past Surgical History:   Procedure Laterality Date   • PACEMAKER IMPLANTATION       History reviewed. No pertinent family history.  Social History     Tobacco Use   • Smoking status: Current Every Day Smoker     Packs/day: 0.50   • Smokeless tobacco: Never Used   • Tobacco comment: tried to provide education declined irritated w/ attempt smoked \" depends on what I feel like.\"   Substance Use Topics   • Alcohol use: Not Currently   • Drug use: No     Medications Prior to Admission   Medication Sig Dispense Refill Last Dose   • atorvastatin (LIPITOR) 20 MG tablet Take 20 mg by mouth Daily.   3/14/2022 at Unknown time   • ferrous sulfate 325 (65 FE) MG tablet Take 325 mg by mouth Daily.   3/14/2022 at Unknown time   • hydrOXYzine (ATARAX) 25 MG tablet Take 25 mg by mouth 3 (Three) Times a Day As Needed for Anxiety.      • levothyroxine (SYNTHROID, LEVOTHROID) 25 MCG tablet Take 25 mcg by mouth Daily.      • metoprolol succinate XL (TOPROL-XL) 25 MG 24 hr tablet Take 1 tablet by mouth Daily. 30 tablet 0    • pantoprazole (PROTONIX) 40 MG EC tablet Take 40 mg by mouth Daily. Patient states he takes this PRN      • venlafaxine XR (EFFEXOR-XR) 75 MG 24 hr capsule Take 75 mg by mouth Daily.        Allergies:    Allergies   Allergen Reactions   • Mirtazapine Other (See Comments)     confused   • Sertraline Anxiety, Diarrhea and Nausea And Vomiting     Also \"hallucinations\"       Objective    Objective     Vital Signs  Temp:  [97.8 °F (36.6 °C)-98.2 °F (36.8 °C)] 97.8 °F (36.6 °C)  Heart Rate:  [] 106  Resp:  [16-18] 16  BP: (138-188)/(71-98) 188/71  SpO2:  [92 %-99 %] 98 %  on   ;   Device (Oxygen Therapy): room air  Body mass index is 22.05 kg/m².    Physical Exam  Vitals and nursing note reviewed.   Constitutional:       Appearance: He is ill-appearing. He is not " toxic-appearing.   HENT:      Head: Normocephalic and atraumatic.      Right Ear: External ear normal.      Left Ear: External ear normal.      Nose: Nose normal.   Cardiovascular:      Rate and Rhythm: Tachycardia present. Rhythm irregular.      Pulses: Normal pulses.      Heart sounds: Normal heart sounds.   Pulmonary:      Effort: Pulmonary effort is normal. No respiratory distress.      Breath sounds: Normal breath sounds.   Abdominal:      General: Bowel sounds are normal. There is no distension.      Palpations: Abdomen is soft.      Tenderness: There is abdominal tenderness.   Musculoskeletal:         General: No swelling or tenderness. Normal range of motion.      Cervical back: Normal range of motion and neck supple.   Skin:     General: Skin is warm and dry.      Findings: No bruising.   Neurological:      General: No focal deficit present.      Mental Status: He is alert and oriented to person, place, and time.      Sensory: No sensory deficit.      Motor: Weakness present.      Coordination: Coordination normal.   Psychiatric:         Mood and Affect: Mood normal.         Behavior: Behavior normal.        Results Review:  I reviewed the patient's new clinical results.  I reviewed the patient's new imaging results and agree with the interpretation.  I reviewed the patient's other test results and agree with the interpretation  I personally viewed and interpreted the patient's EKG/Telemetry data    Lab Results (last 24 hours)     Procedure Component Value Units Date/Time    CBC & Differential [[id number]]  (Abnormal) Collected: 03/14/22 2130    Specimen: Blood Updated: 03/14/22 2159    Narrative:      The following orders were created for panel order CBC & Differential.  Procedure                               Abnormality         Status                     ---------                               -----------         ------                     CBC Auto Differential[[id number]]        Abnormal            Final  result                 Please view results for these tests on the individual orders.    Comprehensive Metabolic Panel [[id number]]  (Abnormal) Collected: 03/14/22 2130    Specimen: Blood Updated: 03/14/22 2225     Glucose 143 mg/dL      BUN 24 mg/dL      Creatinine 1.86 mg/dL      Sodium 134 mmol/L      Potassium 3.6 mmol/L      Chloride 88 mmol/L      CO2 30.0 mmol/L      Calcium 11.7 mg/dL      Total Protein 8.2 g/dL      Albumin 4.70 g/dL      ALT (SGPT) 10 U/L      AST (SGOT) 22 U/L      Alkaline Phosphatase 70 U/L      Total Bilirubin 0.2 mg/dL      Globulin 3.5 gm/dL      A/G Ratio 1.3 g/dL      BUN/Creatinine Ratio 12.9     Anion Gap 16.0 mmol/L      eGFR 37.7 mL/min/1.73      Comment: National Kidney Foundation and American Society of Nephrology (ASN) Task Force recommended calculation based on the Chronic Kidney Disease Epidemiology Collaboration (CKD-EPI) equation refit without adjustment for race.       Narrative:      GFR Normal >60  Chronic Kidney Disease <60  Kidney Failure <15      Lipase [[id number]]  (Normal) Collected: 03/14/22 2130    Specimen: Blood Updated: 03/14/22 2225     Lipase 42 U/L     CBC Auto Differential [[id number]]  (Abnormal) Collected: 03/14/22 2130    Specimen: Blood Updated: 03/14/22 2159     WBC 8.10 10*3/mm3      RBC 3.90 10*6/mm3      Hemoglobin 13.1 g/dL      Hematocrit 38.4 %      MCV 98.5 fL      MCH 33.6 pg      MCHC 34.1 g/dL      RDW 12.5 %      RDW-SD 44.7 fl      MPV 9.0 fL      Platelets 324 10*3/mm3      Neutrophil % 71.9 %      Lymphocyte % 14.7 %      Monocyte % 12.1 %      Eosinophil % 0.5 %      Basophil % 0.4 %      Immature Grans % 0.4 %      Neutrophils, Absolute 5.83 10*3/mm3      Lymphocytes, Absolute 1.19 10*3/mm3      Monocytes, Absolute 0.98 10*3/mm3      Eosinophils, Absolute 0.04 10*3/mm3      Basophils, Absolute 0.03 10*3/mm3      Immature Grans, Absolute 0.03 10*3/mm3      nRBC 0.1 /100 WBC     Urinalysis With Microscopic If Indicated (No Culture) -  Urine, Clean Catch [[id number]]  (Abnormal) Collected: 03/14/22 2335    Specimen: Urine, Clean Catch Updated: 03/14/22 2354     Color, UA Yellow     Appearance, UA Clear     pH, UA 6.0     Specific Gravity, UA 1.025     Glucose, UA Negative     Ketones, UA Negative     Bilirubin, UA Negative     Blood, UA Negative     Protein, UA 30 mg/dL (1+)     Leuk Esterase, UA Negative     Nitrite, UA Negative     Urobilinogen, UA 0.2 E.U./dL    Urinalysis, Microscopic Only - Urine, Clean Catch [[id number]]  (Abnormal) Collected: 03/14/22 2335    Specimen: Urine, Clean Catch Updated: 03/15/22 0003     RBC, UA 0-2 /HPF      WBC, UA 0-2 /HPF      Bacteria, UA Trace /HPF      Squamous Epithelial Cells, UA 7-12 /HPF      Hyaline Casts, UA 7-12 /LPF      Methodology Manual Light Microscopy    Urine Drug Screen - Urine, Clean Catch [[id number]]  (Abnormal) Collected: 03/14/22 2335    Specimen: Urine, Clean Catch Updated: 03/15/22 1010     Amphet/Methamphet, Screen Negative     Barbiturates Screen, Urine Negative     Benzodiazepine Screen, Urine Negative     Cocaine Screen, Urine Negative     Opiate Screen Positive     THC, Screen, Urine Negative     Methadone Screen, Urine Negative     Oxycodone Screen, Urine Negative    Narrative:      Negative Thresholds Per Drugs Screened:    Amphetamines                 500 ng/ml  Barbiturates                 200 ng/ml  Benzodiazepines              100 ng/ml  Cocaine                      300 ng/ml  Methadone                    300 ng/ml  Opiates                      300 ng/ml  Oxycodone                    100 ng/ml  THC                           50 ng/ml    The Normal Value for all drugs tested is negative. This report includes final unconfirmed screening results to be used for medical treatment purposes only. Unconfirmed results must not be used for non-medical purposes such as employment or legal testing. Clinical consideration should be applied to any drug of abuse test, particularly when  unconfirmed results are used.            COVID PRE-OP / PRE-PROCEDURE SCREENING ORDER (NO ISOLATION) - Swab, Nasopharynx [[id number]]  (Normal) Collected: 03/15/22 0042    Specimen: Swab from Nasopharynx Updated: 03/15/22 0114    Narrative:      The following orders were created for panel order COVID PRE-OP / PRE-PROCEDURE SCREENING ORDER (NO ISOLATION) - Swab, Nasopharynx.  Procedure                               Abnormality         Status                     ---------                               -----------         ------                     COVID-19,[facility]HOUSE...[[id number]]  Normal              Final result                 Please view results for these tests on the individual orders.    COVID-19,[facility]-HOU[facility] CEPHEID/RAMONA NP SWAB IN TRANSPORT MEDIA 8-12 HR TAT - Swab, Nasopharynx [[id number]]  (Normal) Collected: 03/15/22 0042    Specimen: Swab from Nasopharynx Updated: 03/15/22 0114     COVID19 Not Detected    Narrative:      Fact sheet for providers: https://www.fda.gov/media/049078/download    Fact sheet for patients: https://www.fda.gov/media/785432/download    Test performed by PCR.    Basic Metabolic Panel [[id number]]  (Abnormal) Collected: 03/15/22 0407    Specimen: Blood Updated: 03/15/22 0507     Glucose 143 mg/dL      BUN 23 mg/dL      Creatinine 1.66 mg/dL      Sodium 136 mmol/L      Potassium 3.6 mmol/L      Chloride 91 mmol/L      CO2 32.0 mmol/L      Calcium 10.7 mg/dL      BUN/Creatinine Ratio 13.9     Anion Gap 13.0 mmol/L      eGFR 43.3 mL/min/1.73      Comment: National Kidney Foundation and American Society of Nephrology (ASN) Task Force recommended calculation based on the Chronic Kidney Disease Epidemiology Collaboration (CKD-EPI) equation refit without adjustment for race.       Narrative:      GFR Normal >60  Chronic Kidney Disease <60  Kidney Failure <15      CBC Auto Differential [[id number]]  (Abnormal) Collected: 03/15/22 0407    Specimen: Blood Updated: 03/15/22 0446     WBC 8.41  10*3/mm3      RBC 3.72 10*6/mm3      Hemoglobin 12.4 g/dL      Hematocrit 37.1 %      MCV 99.7 fL      MCH 33.3 pg      MCHC 33.4 g/dL      RDW 13.0 %      RDW-SD 47.9 fl      MPV 9.2 fL      Platelets 303 10*3/mm3      Neutrophil % 73.6 %      Lymphocyte % 11.7 %      Monocyte % 13.6 %      Eosinophil % 0.4 %      Basophil % 0.5 %      Immature Grans % 0.2 %      Neutrophils, Absolute 6.20 10*3/mm3      Lymphocytes, Absolute 0.98 10*3/mm3      Monocytes, Absolute 1.14 10*3/mm3      Eosinophils, Absolute 0.03 10*3/mm3      Basophils, Absolute 0.04 10*3/mm3      Immature Grans, Absolute 0.02 10*3/mm3      nRBC 0.0 /100 WBC     Protime-INR [[id number]]  (Normal) Collected: 03/15/22 0407    Specimen: Blood Updated: 03/15/22 0500     Protime 12.2 Seconds      INR 0.91    Ethanol [[id number]] Collected: 03/15/22 0407    Specimen: Blood Updated: 03/15/22 0911     Ethanol <10 mg/dL      Ethanol % <0.010 %     TSH [[id number]]  (Normal) Collected: 03/15/22 0407    Specimen: Blood Updated: 03/15/22 1109     TSH 1.700 uIU/mL           Imaging Results (Last 24 Hours)     Procedure Component Value Units Date/Time    CT Abdomen Pelvis Without Contrast [[id number]] Collected: 03/14/22 2354     Updated: 03/14/22 2354    Narrative:        Patient: HANNAH ALTMAN  Time Out: 23:54  Exam(s): CT ABDOMEN + PELVIS Without Contrast     EXAM:    CT Abdomen and Pelvis Without Intravenous Contrast    CLINICAL HISTORY:     Reason for exam: Abdominal pain, acute, nonlocalized.    TECHNIQUE:    Axial computed tomography images of the abdomen and pelvis without   intravenous contrast.  CTDI is 12.80 mGy and DLP is 646.5 mGy-cm.  This   CT exam was performed according to the principle of ALARA (As Low As   Reasonably Achievable) by using one or more of the following dose   reduction techniques: automated exposure control, adjustment of the mA   and or kV according to patient size, and or use of iterative   reconstruction technique.    COMPARISON:     10 26 2019.    FINDINGS:    Lung bases:  COPD, scarring, and atelectasis noted at the lung bases.    Heart:  Heart is normal in size.     ABDOMEN:    Liver:  Diffuse fatty liver.    Gallbladder and bile ducts:  Cholelithiasis.  No ductal dilation.    Pancreas:  The head, body, tail of the pancreas are unremarkable.  No   ductal dilation.    Spleen:  Spleen is normal in contour.    Adrenals:  0.5 cm probable left adrenal adenoma.  No follow-up is   necessary.  The right adrenal gland is unremarkable.    Kidneys and ureters:  Nonspecific stranding about the perinephric   spaces without hydronephrosis.    Stomach and bowel:  Moderate quantity of ingested material in the   stomach.  Moderate quantity of stool throughout the colon without bowel   obstruction.  A 6.5 x 4.6 x 5.9 cm right inguinal hernia is noted   containing nondilated bowel loops.  No mucosal thickening.     PELVIS:    Appendix:  The appendix is seen on sagittal image 77 and is   unremarkable.    Bladder:  Unremarkable.  No stones.    Reproductive:  Unremarkable as visualized.     ABDOMEN and PELVIS:    Intraperitoneal space:  Unremarkable.  No free air.  No significant   fluid collection.    Bones joints:  No spondylolysis.  No acute fracture.  No dislocation.    Soft tissues:  Ischiorectal fat is clean.    Vasculature:  Unremarkable.  No abdominal aortic aneurysm.    Lymph nodes:  No pelvic or inguinal lymphadenopathy.    IMPRESSION:       1.  COPD.  2.  Fatty liver.  3.  Cholelithiasis.  4.  Nonspecific stranding about the perinephric spaces bilaterally.  5.  The appendix is unremarkable.  6.  Moderate quantity of stool throughout the colon without bowel   obstruction.  7.  Diverticulosis without diverticulitis.  8.  Right inguinal hernia containing nondilated bowel loops.  Clinical   correlation is nevertheless advised to assess for the possibility of   early incarceration, particularly given the patient's history.      Impression:           Electronically signed by Vini Cronin MD on 03-14-22 at 2354          Results for orders placed during the hospital encounter of 05/23/17    Adult Transthoracic Echo Complete    Interpretation Summary  · Left ventricular systolic function is normal. Calculated EF = 58.3%. Estimated EF was in agreement with the calculated EF. Normal left ventricular cavity size and wall thickness noted. All left ventricular wall segments contract normally. Left ventricular diastolic dysfunction is noted (grade I) consistent with impaired relaxation.      ECG 12 Lead   Final Result   HEART RATE= 121  bpm   RR Interval= 497  ms   FL Interval=   ms   P Horizontal Axis=   deg   P Front Axis=   deg   QRSD Interval= 74  ms   QT Interval= 348  ms   QRS Axis= 43  deg   T Wave Axis= 49  deg   - ABNORMAL ECG -   Atrial fibrillation   Borderline prolonged QT interval   SINCE PREVIOUS TRACING,  AFIB WITH RVR IS NEW   Electronically Signed By: Moiz Cummings ([facility]) 15-Mar-2022 09:46:07   Date and Time of Study: 2022-03-15 00:38:47           Assessment/Plan     Active Hospital Problems    Diagnosis  POA   • **New onset atrial fibrillation (HCC) [I48.91]  Yes   • Right lower quadrant abdominal pain [R10.31]  Yes   • History of drug abuse (HCC) [F19.11]  Yes   • COPD (chronic obstructive pulmonary disease) (HCC) [J44.9]  Yes   • Right inguinal hernia [K40.90]  Yes   • Constipation [K59.00]  Yes   • CRISTIANO (acute kidney injury) (HCC) [N17.9]  Yes   • CAD (coronary artery disease) [I25.10]  Yes   • Medically noncompliant [Z91.19]  Not Applicable   • Chronic kidney disease, stage 3 [N18.30]  Yes   • Fatty liver, alcoholic [K70.0]  Yes   • Nausea vomiting and diarrhea [R11.2, R19.7]  Yes     Mr. Loja is a 71 year old male who presented to the hospital with complaints of nausea, vomiting and diarrhea in addition to right lower quadrant pain that has been intermittent and chronic in nature but worse over the last few weeks/days. He was noted to  be in atrial fibrillation with RVR, which was new, in addition had CRISTIANO and admitted for further care.    · New onset atrial fibrillation/CAD: Cardiology consulted. On IV Lopressor until can reliably be transitioned to PO with ongoing GI issues. Lovenox (full dosing) on board. Will need to monitor for any bleeding while on this. Echo has been ordered.   · Right lower abdominal pain/nausea/vomiting/diarrhea: CT A/P with stool burden, fatty liver, gallstones, right inguinal hernia. All nonspecific issues, but will ask general surgery to see regarding the right inguinal hernia and RLQ pain. GI panel ordered. He has had concerns for GI bleeding in the past, but has left AMA prior to scopes. Will resume oral PPI. No current concerns for active GI bleeding so will monitor for now. Hemoglobin is very stable. Thought to have some GI bleeding related to NSAID use and alcohol abuse in the past. LFTs and bilirubin are normal. Supportive care for N/V/D for now. IVF in place.  · CRISTIANO on CKD3: Prior baseline all over the place as he had multiple bouts of CRISTIANO. Seems like he is closer to 1.1 to 1.2 when doing well. Currently 1.6. Will hydrate for now and monitor.  · Constipation: Some overflow diarrhea at play? Will add bowel regimen and monitor.  · Fatty liver: May need GI to see for the above pending what surgery says.  · COPD: No known prior diagnosis. Respiratory status stable. COPD noted imaging.  · History of drug abuse/alcohol abuse: Unsure the reliability of his drinking reports. Will cover with vitamin replacement and alcohol withdrawal protocol. Ask ACCESS to see. UDS positive for opiates (given morphine in Ed) and ethanol negative.    · I discussed the patients findings and my recommendations with patient and nursing staff.    VTE Prophylaxis - Pharmacy to dose Lovenox.  Code Status - Full code.       RICHMOND Myers  [facility]  03/15/22  12:01 EDT

## 2022-03-15 NOTE — PROGRESS NOTES
Clinical Pharmacy Services: Medication History    Yuval Loja is a 71 y.o. male presenting to [facility] for Generalized abdominal pain [R10.84]  Acute renal insufficiency [N28.9]  New onset atrial fibrillation (HCC) [I48.91]  Atrial fibrillation with rapid ventricular response (HCC) [I48.91]  Nausea vomiting and diarrhea [R11.2, R19.7]    He  has a past medical history of Alcohol abuse, CAD (coronary artery disease), Chronic kidney disease, stage 3 (HCC), Fatty liver, alcoholic, GERD (gastroesophageal reflux disease), Hypertensive urgency, Metabolic acidosis, Myocardial infarction (HCC), Pacemaker, Sinus tachycardia, and Stroke (HCC).    Allergies as of 03/14/2022 - Reviewed 03/14/2022   Allergen Reaction Noted   • Atorvastatin  09/30/2013   • Atorvastatin calcium Other (See Comments) 09/30/2013   • Mirtazapine Other (See Comments) 05/14/2020   • Sertraline Anxiety, Diarrhea, and Nausea And Vomiting 10/04/2013       Medication information was obtained from: Patient and Pharmacy  Pharmacy and Phone Number: [facility] [phone]    Prior to Admission Medications       Prescriptions Last Dose Informant Patient Reported? Taking?    atorvastatin (LIPITOR) 20 MG tablet 3/14/2022 Self Yes Yes    Take 20 mg by mouth Daily.    ferrous sulfate 325 (65 FE) MG tablet 3/14/2022 Self Yes Yes    Take 325 mg by mouth Daily.    hydrOXYzine (ATARAX) 25 MG tablet  Self Yes No    Take 25 mg by mouth 3 (Three) Times a Day As Needed for Anxiety.    metoprolol succinate XL (TOPROL-XL) 25 MG 24 hr tablet   No No    Take 1 tablet by mouth Daily.    pantoprazole (PROTONIX) 40 MG EC tablet  Self Yes No    Take 40 mg by mouth Daily. Patient states he takes this PRN               Medication notes: Patient reports that he no longer takes levothyroxine or venlafaxine. He mentioned being out of refills on some of his medications as well. When I called his preferred pharmacy that he reported, the pharmacy technician informed me  that he has not had anything filled since June 2021. Based on this information, I am not sure how compliant the patient actually is with taking his medications. He also reported taking pantoprazole PRN rather than scheduled.   Lastly, I clarified allergies since atorvastatin was listed as an allergy but also on the list of medications that the patient is taking. He denies any reactions to the atorvastatin. This was removed from his allergy list.    This medication list is complete to the best of my knowledge as of 3/15/2022    Please call if questions.    Lorraine Man  PGY2 Pharmacy resident  3/15/2022 10:22 EDT

## 2022-03-15 NOTE — PLAN OF CARE
Pt. In A-fib with RVR this a.m., IV lopressor given, much improved, HR low 100s.  VS otherwise WNL.  Pt. Receiving IVFs.  Pt. On CIWA protocol, will monitor.  Pt. Encouraged to turn Q2h.  Pt. Up with standby assist adequate UOP.  Pt. Resting comfortably at present, will continue to monitor closely.      Problem: Adult Inpatient Plan of Care  Goal: Plan of Care Review  Outcome: Ongoing, Progressing  Flowsheets (Taken 3/15/2022 1933)  Progress: improving  Plan of Care Reviewed With: patient

## 2022-03-15 NOTE — CONSULTS
Patient Name: Yuval Loja  :1950  71 y.o.    Date of Admission: 3/14/2022  Encounter Provider: Celeste Aguayo MD  Date of Encounter Visit: 03/15/22  Place of Service: [facility] CARDIOLOGY  Referring Provider: Wade Chance MD  Patient Care Team:  Ken[place], RICHMOND Aggarwal as PCP - General (Family Medicine)  Jonny Bains MD as Referring Physician (Internal Medicine)  Carlos Villareal MD as Consulting Physician (Hematology and Oncology)      Chief complaint: Abdominal pain, nausea/vomiting/diarrhea    Reason for consult: Afib w/RVR    History of Present Illness:    This patient is to follow with Dr. Jennings but has not been seen by her since .  He saw Dr. Moralez as a hospital consult in  for sinus tachycardia likely secondary to alcohol withdrawal.  He has a history of SVT and had an EP study and AV ashwin reentrant tachycardia ablation in .  He has a history of stroke.  He had a myocardial infarction in  and had a stent to the right coronary artery.  He had a heart attack in  and had a stent to the proximal LAD.  The circumflex had 30% disease in the right coronary was 100% occluded.    He came to the emergency room on 2022 with abdominal pain, nausea, vomiting and diarrhea for 2 to 3 weeks.  He apparently went out again for the same complaint and work-up in the emergency department was unremarkable he was discharged.  Here his work-up shows some acute kidney injury.    We were consulted because of atrial fibrillation with rapid ventricular response and a heart rate of 121 bpm.  He was given IV fluids and a dose of IV Lopressor.    His biggest complaint right now is nausea.  He said he has had problems with diarrhea.  He is unaware that his heart is going fast.  No chest pain                 Previous cardiac testing:     Echocardiogram 2017:  · Left ventricular systolic function is normal. Calculated EF = 58.3%. Estimated EF was in  agreement with the calculated EF. Normal left ventricular cavity size and wall thickness noted. All left ventricular wall segments contract normally. Left ventricular diastolic dysfunction is noted (grade I) consistent with impaired relaxation.        Past Medical History:   Diagnosis Date   • Alcohol abuse    • CAD (coronary artery disease)    • Chronic kidney disease, stage 3 (HCC)    • Fatty liver, alcoholic    • GERD (gastroesophageal reflux disease)    • Hypertensive urgency    • Metabolic acidosis    • Myocardial infarction (HCC)    • Pacemaker     patient has no pacemaker   • Sinus tachycardia    • Stroke (HCC)        Past Surgical History:   Procedure Laterality Date   • PACEMAKER IMPLANTATION           Prior to Admission medications    Medication Sig Start Date End Date Taking? Authorizing Provider   atorvastatin (LIPITOR) 20 MG tablet Take 20 mg by mouth Daily.    Sarbjit Paris MD   ferrous sulfate 325 (65 FE) MG tablet Take 325 mg by mouth Daily. 7/17/20   Sarbjit Paris MD   hydrOXYzine (ATARAX) 25 MG tablet Take 25 mg by mouth 3 (Three) Times a Day As Needed for Anxiety. 7/17/20   Sarbjit Paris MD   levothyroxine (SYNTHROID, LEVOTHROID) 25 MCG tablet Take 25 mcg by mouth Daily. 7/17/20   Sarbjit Paris MD   metoprolol succinate XL (TOPROL-XL) 25 MG 24 hr tablet Take 1 tablet by mouth Daily. 4/19/19   John Esquivel MD   mirtazapine (REMERON) 15 MG tablet Take 15 mg by mouth Every Night. 7/30/20 7/30/21  Sarbjit Paris MD   pantoprazole (PROTONIX) 40 MG EC tablet Take 40 mg by mouth Daily. 7/17/20   Sarbjit Paris MD   venlafaxine XR (EFFEXOR-XR) 75 MG 24 hr capsule Take 75 mg by mouth Daily. 7/17/20   Sarbjit Paris MD       Allergies   Allergen Reactions   • Atorvastatin    • Atorvastatin Calcium Other (See Comments)   • Mirtazapine Other (See Comments)     confused   • Sertraline Anxiety, Diarrhea and Nausea And Vomiting     Also  "\"hallucinations\"       Social History     Socioeconomic History   • Marital status: Single   Tobacco Use   • Smoking status: Current Every Day Smoker     Packs/day: 0.50   • Smokeless tobacco: Never Used   • Tobacco comment: tried to provide education declined irritated w/ attempt smoked \" depends on what I feel like.\"   Substance and Sexual Activity   • Alcohol use: Not Currently   • Drug use: No   • Sexual activity: Defer       History reviewed. No pertinent family history.    REVIEW OF SYSTEMS:   All other systems reviewed and negative.        Objective:     Vitals:    03/15/22 0158 03/15/22 0201 03/15/22 0455 03/15/22 0741   BP: 168/90 171/98  (!) 188/71   BP Location: Left arm Left arm  Right arm   Patient Position: Lying Lying  Lying   Pulse:  87  106   Resp:    16   Temp:    97.8 °F (36.6 °C)   TempSrc:    Oral   SpO2:    98%   Weight:   65.8 kg (145 lb)    Height:   172.7 cm (68\")      Body mass index is 22.05 kg/m².  No intake or output data in the 24 hours ending 03/15/22 0944    Constitutional: He is oriented to person, place, and time. He appears well-developed. He does not appear ill.   HENT:   Head: Normocephalic and atraumatic. Head is without contusion.   Right Ear: Hearing normal. No drainage.   Left Ear: Hearing normal. No drainage.   Nose: No nasal deformity. No epistaxis.   Eyes: Lids are normal. Right eye exhibits no exudate. Left eye exhibits no exudate.  Neck: No JVD present. Carotid bruit is not present. No tracheal deviation present. No thyroid mass and no thyromegaly present.   Cardiovascular: Irregularly irregular  Pulmonary/Chest: Effort normal and breath sounds normal.   Abdominal: Soft. Normal appearance and bowel sounds are normal. There is no tenderness.   Musculoskeletal: Normal range of motion.        Right shoulder: He exhibits no deformity.        Left shoulder: He exhibits no deformity.   Neurological: He is alert and oriented to person, place, and time. He has normal strength. "   Skin: Skin is warm, dry and intact. No rash noted.   Psychiatric: He has a normal mood and affect. His behavior is normal. Thought content normal.   Vitals reviewed      Lab Review:     Results from last 7 days   Lab Units 03/15/22  0407 03/14/22  2130   SODIUM mmol/L 136 134*   POTASSIUM mmol/L 3.6 3.6   CHLORIDE mmol/L 91* 88*   CO2 mmol/L 32.0* 30.0*   BUN mg/dL 23 24*   CREATININE mg/dL 1.66* 1.86*   CALCIUM mg/dL 10.7* 11.7*   BILIRUBIN mg/dL  --  0.2   ALK PHOS U/L  --  70   ALT (SGPT) U/L  --  10   AST (SGOT) U/L  --  22   GLUCOSE mg/dL 143* 143*         Results from last 7 days   Lab Units 03/15/22  0407   WBC 10*3/mm3 8.41   HEMOGLOBIN g/dL 12.4*   HEMATOCRIT % 37.1*   PLATELETS 10*3/mm3 303     Results from last 7 days   Lab Units 03/15/22  0407   INR  0.91             I personally viewed and interpreted the patient's EKG/Telemetry data.    Admission EKG 03/15/2022:      Previous EKG 05/27/2020:          Assessment and Plan:       1.  New onset of atrial fibrillation with tachycardia.  Started on Lovenox.  Check TSH.  Check echocardiogram.  Schedule IV metoprolol until his GI issues are under better control.  2.  Acute kidney injury  3.  Abdominal pain with nausea and vomiting.  4.  Hypertension  5.  Coronary disease with the known chronic total occlusion to the right coronary artery at the site of a prior stent as well as a stent to the proximal LAD.  No anginal symptoms.    Celeste Aguayo MD  03/15/22  09:44 EDT

## 2022-03-16 ENCOUNTER — APPOINTMENT (OUTPATIENT)
Dept: CARDIOLOGY | Facility: HOSPITAL | Age: 72
End: 2022-03-16

## 2022-03-16 LAB
ALBUMIN SERPL-MCNC: 4.3 G/DL (ref 3.5–5.2)
ALBUMIN/GLOB SERPL: 1.4 G/DL
ALP SERPL-CCNC: 67 U/L (ref 39–117)
ALT SERPL W P-5'-P-CCNC: 9 U/L (ref 1–41)
ANION GAP SERPL CALCULATED.3IONS-SCNC: 7.3 MMOL/L (ref 5–15)
AORTIC DIMENSIONLESS INDEX: 0.7 (DI)
AST SERPL-CCNC: 21 U/L (ref 1–40)
BH CV ECHO MEAS - AO MAX PG: 6.2 MMHG
BH CV ECHO MEAS - AO MEAN PG: 2.9 MMHG
BH CV ECHO MEAS - AO ROOT DIAM: 2.7 CM
BH CV ECHO MEAS - AO V2 MAX: 124.9 CM/SEC
BH CV ECHO MEAS - AO V2 VTI: 21.8 CM
BH CV ECHO MEAS - AVA(I,D): 2.3 CM2
BH CV ECHO MEAS - EDV(CUBED): 91.1 ML
BH CV ECHO MEAS - EDV(MOD-SP2): 68 ML
BH CV ECHO MEAS - EDV(MOD-SP4): 67 ML
BH CV ECHO MEAS - EF(MOD-BP): 55.1 %
BH CV ECHO MEAS - EF(MOD-SP2): 54.4 %
BH CV ECHO MEAS - EF(MOD-SP4): 55.2 %
BH CV ECHO MEAS - ESV(CUBED): 16 ML
BH CV ECHO MEAS - ESV(MOD-SP2): 31 ML
BH CV ECHO MEAS - ESV(MOD-SP4): 30 ML
BH CV ECHO MEAS - FS: 44 %
BH CV ECHO MEAS - IVS/LVPW: 1.05 CM
BH CV ECHO MEAS - IVSD: 0.94 CM
BH CV ECHO MEAS - LAT PEAK E' VEL: 10.3 CM/SEC
BH CV ECHO MEAS - LV DIASTOLIC VOL/BSA (35-75): 37.6 CM2
BH CV ECHO MEAS - LV MASS(C)D: 135.9 GRAMS
BH CV ECHO MEAS - LV MAX PG: 2.8 MMHG
BH CV ECHO MEAS - LV MEAN PG: 1.47 MMHG
BH CV ECHO MEAS - LV SYSTOLIC VOL/BSA (12-30): 16.8 CM2
BH CV ECHO MEAS - LV V1 MAX: 83.2 CM/SEC
BH CV ECHO MEAS - LV V1 VTI: 14.9 CM
BH CV ECHO MEAS - LVIDD: 4.5 CM
BH CV ECHO MEAS - LVIDS: 2.5 CM
BH CV ECHO MEAS - LVOT AREA: 3.4 CM2
BH CV ECHO MEAS - LVOT DIAM: 2.07 CM
BH CV ECHO MEAS - LVPWD: 0.89 CM
BH CV ECHO MEAS - MED PEAK E' VEL: 6.2 CM/SEC
BH CV ECHO MEAS - MV A DUR: 0.1 SEC
BH CV ECHO MEAS - MV A MAX VEL: 117 CM/SEC
BH CV ECHO MEAS - MV DEC SLOPE: 405.7 CM/SEC2
BH CV ECHO MEAS - MV DEC TIME: 185 MSEC
BH CV ECHO MEAS - MV E MAX VEL: 69.7 CM/SEC
BH CV ECHO MEAS - MV E/A: 0.6
BH CV ECHO MEAS - MV MAX PG: 6.2 MMHG
BH CV ECHO MEAS - MV MEAN PG: 2.6 MMHG
BH CV ECHO MEAS - MV V2 VTI: 18.1 CM
BH CV ECHO MEAS - MVA(VTI): 2.8 CM2
BH CV ECHO MEAS - PA ACC TIME: 0.08 SEC
BH CV ECHO MEAS - PA PR(ACCEL): 41 MMHG
BH CV ECHO MEAS - PA V2 MAX: 120.1 CM/SEC
BH CV ECHO MEAS - RAP SYSTOLE: 3 MMHG
BH CV ECHO MEAS - RV MAX PG: 2.7 MMHG
BH CV ECHO MEAS - RV V1 MAX: 82.7 CM/SEC
BH CV ECHO MEAS - RV V1 VTI: 13.7 CM
BH CV ECHO MEAS - RVSP: 36.9 MMHG
BH CV ECHO MEAS - SI(MOD-SP2): 20.8 ML/M2
BH CV ECHO MEAS - SI(MOD-SP4): 20.8 ML/M2
BH CV ECHO MEAS - SV(LVOT): 50.2 ML
BH CV ECHO MEAS - SV(MOD-SP2): 37 ML
BH CV ECHO MEAS - SV(MOD-SP4): 37 ML
BH CV ECHO MEAS - TAPSE (>1.6): 2 CM
BH CV ECHO MEAS - TR MAX PG: 33.9 MMHG
BH CV ECHO MEAS - TR MAX VEL: 291.1 CM/SEC
BH CV ECHO MEASUREMENTS AVERAGE E/E' RATIO: 8.45
BH CV XLRA - RV BASE: 3.2 CM
BH CV XLRA - RV MID: 2.9 CM
BH CV XLRA - TDI S': 14.1 CM/SEC
BILIRUB SERPL-MCNC: 0.2 MG/DL (ref 0–1.2)
BUN SERPL-MCNC: 17 MG/DL (ref 8–23)
BUN/CREAT SERPL: 10 (ref 7–25)
CALCIUM SPEC-SCNC: 10.9 MG/DL (ref 8.6–10.5)
CHLORIDE SERPL-SCNC: 98 MMOL/L (ref 98–107)
CO2 SERPL-SCNC: 32.7 MMOL/L (ref 22–29)
CREAT SERPL-MCNC: 1.7 MG/DL (ref 0.76–1.27)
DEPRECATED RDW RBC AUTO: 43.2 FL (ref 37–54)
EGFRCR SERPLBLD CKD-EPI 2021: 42 ML/MIN/1.73
ERYTHROCYTE [DISTWIDTH] IN BLOOD BY AUTOMATED COUNT: 11.9 % (ref 12.3–15.4)
FOLATE SERPL-MCNC: >20 NG/ML (ref 4.78–24.2)
GLOBULIN UR ELPH-MCNC: 3.1 GM/DL
GLUCOSE SERPL-MCNC: 95 MG/DL (ref 65–99)
HBA1C MFR BLD: 4.8 % (ref 4.8–5.6)
HCT VFR BLD AUTO: 35.1 % (ref 37.5–51)
HGB BLD-MCNC: 11.8 G/DL (ref 13–17.7)
LEFT ATRIUM VOLUME INDEX: 17.4 ML/M2
MAXIMAL PREDICTED HEART RATE: 147 BPM
MCH RBC QN AUTO: 33.1 PG (ref 26.6–33)
MCHC RBC AUTO-ENTMCNC: 33.6 G/DL (ref 31.5–35.7)
MCV RBC AUTO: 98.6 FL (ref 79–97)
PLATELET # BLD AUTO: 317 10*3/MM3 (ref 140–450)
PMV BLD AUTO: 9.2 FL (ref 6–12)
POTASSIUM SERPL-SCNC: 4 MMOL/L (ref 3.5–5.2)
PROT SERPL-MCNC: 7.4 G/DL (ref 6–8.5)
PTH-INTACT SERPL-MCNC: 57.4 PG/ML (ref 15–65)
RBC # BLD AUTO: 3.56 10*6/MM3 (ref 4.14–5.8)
SODIUM SERPL-SCNC: 138 MMOL/L (ref 136–145)
STRESS TARGET HR: 125 BPM
VIT B12 BLD-MCNC: >2000 PG/ML (ref 211–946)
WBC NRBC COR # BLD: 5.21 10*3/MM3 (ref 3.4–10.8)

## 2022-03-16 PROCEDURE — G0378 HOSPITAL OBSERVATION PER HR: HCPCS

## 2022-03-16 PROCEDURE — 82746 ASSAY OF FOLIC ACID SERUM: CPT | Performed by: NURSE PRACTITIONER

## 2022-03-16 PROCEDURE — A9270 NON-COVERED ITEM OR SERVICE: HCPCS | Performed by: NURSE PRACTITIONER

## 2022-03-16 PROCEDURE — 93306 TTE W/DOPPLER COMPLETE: CPT | Performed by: INTERNAL MEDICINE

## 2022-03-16 PROCEDURE — 63710000001 POLYETHYLENE GLYCOL 17 G PACK: Performed by: NURSE PRACTITIONER

## 2022-03-16 PROCEDURE — 63710000001 FERROUS SULFATE 325 (65 FE) MG TABLET: Performed by: NURSE PRACTITIONER

## 2022-03-16 PROCEDURE — 83036 HEMOGLOBIN GLYCOSYLATED A1C: CPT | Performed by: NURSE PRACTITIONER

## 2022-03-16 PROCEDURE — 63710000001 ATENOLOL 50 MG TABLET: Performed by: NURSE PRACTITIONER

## 2022-03-16 PROCEDURE — 63710000001 ATORVASTATIN 20 MG TABLET: Performed by: NURSE PRACTITIONER

## 2022-03-16 PROCEDURE — 80053 COMPREHEN METABOLIC PANEL: CPT | Performed by: NURSE PRACTITIONER

## 2022-03-16 PROCEDURE — 93306 TTE W/DOPPLER COMPLETE: CPT

## 2022-03-16 PROCEDURE — 63710000001 THIAMINE 100 MG TABLET: Performed by: NURSE PRACTITIONER

## 2022-03-16 PROCEDURE — 63710000001 PANTOPRAZOLE 40 MG TABLET DELAYED-RELEASE: Performed by: NURSE PRACTITIONER

## 2022-03-16 PROCEDURE — 63710000001 RIVAROXABAN 20 MG TABLET: Performed by: NURSE PRACTITIONER

## 2022-03-16 PROCEDURE — 82607 VITAMIN B-12: CPT | Performed by: NURSE PRACTITIONER

## 2022-03-16 PROCEDURE — 63710000001 HYDROXYZINE 25 MG TABLET: Performed by: NURSE PRACTITIONER

## 2022-03-16 PROCEDURE — 63710000001 MULTIVITAMIN TABLET: Performed by: NURSE PRACTITIONER

## 2022-03-16 PROCEDURE — 99232 SBSQ HOSP IP/OBS MODERATE 35: CPT | Performed by: NURSE PRACTITIONER

## 2022-03-16 PROCEDURE — 63710000001 DOCUSATE SODIUM 100 MG CAPSULE: Performed by: NURSE PRACTITIONER

## 2022-03-16 PROCEDURE — 83970 ASSAY OF PARATHORMONE: CPT | Performed by: INTERNAL MEDICINE

## 2022-03-16 PROCEDURE — 85027 COMPLETE CBC AUTOMATED: CPT | Performed by: NURSE PRACTITIONER

## 2022-03-16 PROCEDURE — 63710000001 FOLIC ACID 1 MG TABLET: Performed by: NURSE PRACTITIONER

## 2022-03-16 RX ORDER — ATENOLOL 50 MG/1
50 TABLET ORAL EVERY 12 HOURS SCHEDULED
Status: DISCONTINUED | OUTPATIENT
Start: 2022-03-16 | End: 2022-03-17

## 2022-03-16 RX ORDER — CALCITONIN SALMON 200 [IU]/.09ML
1 SPRAY, METERED NASAL DAILY
Status: DISCONTINUED | OUTPATIENT
Start: 2022-03-16 | End: 2022-03-17 | Stop reason: HOSPADM

## 2022-03-16 RX ADMIN — DOCUSATE SODIUM 100 MG: 100 CAPSULE, LIQUID FILLED ORAL at 20:04

## 2022-03-16 RX ADMIN — RIVAROXABAN 20 MG: 20 TABLET, FILM COATED ORAL at 18:30

## 2022-03-16 RX ADMIN — Medication 100 MG: at 08:27

## 2022-03-16 RX ADMIN — PANTOPRAZOLE SODIUM 40 MG: 40 TABLET, DELAYED RELEASE ORAL at 08:27

## 2022-03-16 RX ADMIN — FOLIC ACID 1 MG: 1 TABLET ORAL at 08:26

## 2022-03-16 RX ADMIN — HYDROXYZINE HYDROCHLORIDE 25 MG: 25 TABLET ORAL at 20:04

## 2022-03-16 RX ADMIN — METOPROLOL TARTRATE 5 MG: 5 INJECTION INTRAVENOUS at 08:27

## 2022-03-16 RX ADMIN — Medication 1 TABLET: at 08:27

## 2022-03-16 RX ADMIN — Medication 10 ML: at 20:04

## 2022-03-16 RX ADMIN — Medication 10 ML: at 11:32

## 2022-03-16 RX ADMIN — SODIUM CHLORIDE 75 ML/HR: 9 INJECTION, SOLUTION INTRAVENOUS at 08:39

## 2022-03-16 RX ADMIN — ATENOLOL 50 MG: 50 TABLET ORAL at 11:32

## 2022-03-16 RX ADMIN — DOCUSATE SODIUM 100 MG: 100 CAPSULE, LIQUID FILLED ORAL at 08:26

## 2022-03-16 RX ADMIN — POLYETHYLENE GLYCOL 3350 17 G: 17 POWDER, FOR SOLUTION ORAL at 08:27

## 2022-03-16 RX ADMIN — ATORVASTATIN CALCIUM 20 MG: 20 TABLET, FILM COATED ORAL at 08:27

## 2022-03-16 RX ADMIN — FERROUS SULFATE TAB 325 MG (65 MG ELEMENTAL FE) 325 MG: 325 (65 FE) TAB at 08:26

## 2022-03-16 RX ADMIN — ATENOLOL 50 MG: 50 TABLET ORAL at 20:04

## 2022-03-16 NOTE — PLAN OF CARE
Goal Outcome Evaluation:  Plan of Care Reviewed With: patient, daughter        Progress: improving  Outcome Evaluation: patient aox3 d/o to situation forgetful at times which increases at night.  Daughter states this has been ongoing for sometime and would like to have this evaluated as patient lives at home alone and she does not live very close to him.  Patient admited for AFIB RVR.  Converted to SR after several doses of IV lopressor and now PO atenolol.  Nephrology consulted today. Patient up adlib steady on his feet.  CTM closely. Expect dc over the next 1-2 days.

## 2022-03-16 NOTE — CONSULTS
[facility] Consult Note      Patient Name: Yuval Loja  : 1950  MRN: [id number]  Primary Care Physician:  Alessia Prescott APRN  Referring Physician: Wade Chance MD  Date of admission: 3/14/2022    Subjective     Reason for Consult:  Cristiano vs CRISTIANO/CKD    HPI:   Yuval Loja is a 71 y.o. male who denies prior history of kidney disease, though who has had multiple bouts of CRISTIANO and likely has CKD 2 with baseline SCR 1.1-1.2, admitted 3/14 for further evaluation of N/V for the preceding one week.  SCR 1.9 on arrival.  New-onset atrial fibrillation also noted with rapid rate (120s).  Full PMH outlined below; reportedly abuses alcohol, though he denies this.     Hospitalization: IVF given, though not infusing now.  Appetite better, and tolerating diet; states he has had no N/V today.  Cardiology evaluating his rapid atrial fibrillation.  General Surgery is also evaluated for his reducible right inguinal hernia.    · Abdominal pain has improved  · No urinary complaints  · No fever or chills.  No shortness of breath  · States weight has been stable for the past 6 months  · Denies orthostatic symptoms    Review of Systems:   14 point review of systems is otherwise negative except for mentioned above on HPI    Personal History     Past Medical History:   Diagnosis Date   • Alcohol abuse    • CAD (coronary artery disease)    • Chronic kidney disease, stage 3 (HCC)    • Fatty liver, alcoholic    • GERD (gastroesophageal reflux disease)    • Hypertensive urgency    • Metabolic acidosis    • Myocardial infarction (HCC)    • Pacemaker     patient has no pacemaker   • Sinus tachycardia    • Stroke (HCC)        Past Surgical History:   Procedure Laterality Date   • PACEMAKER IMPLANTATION         Family History: family history is not on file.    Social History:  reports that he has been smoking. He has been smoking about 0.50 packs per day. He has never used smokeless tobacco. He reports  "previous alcohol use. He reports that he does not use drugs.    Home Medications:  Prior to Admission medications    Medication Sig Start Date End Date Taking? Authorizing Provider   atorvastatin (LIPITOR) 20 MG tablet Take 20 mg by mouth Daily.   Yes Sarbjit Paris MD   ferrous sulfate 325 (65 FE) MG tablet Take 325 mg by mouth Daily. 7/17/20  Yes Sarbjit Paris MD   hydrOXYzine (ATARAX) 25 MG tablet Take 25 mg by mouth 3 (Three) Times a Day As Needed for Anxiety. 7/17/20   Sarbjit Paris MD   levothyroxine (SYNTHROID, LEVOTHROID) 25 MCG tablet Take 25 mcg by mouth Daily. 7/17/20   Sarbjit Paris MD   metoprolol succinate XL (TOPROL-XL) 25 MG 24 hr tablet Take 1 tablet by mouth Daily. 4/19/19   John Esquivel MD   pantoprazole (PROTONIX) 40 MG EC tablet Take 40 mg by mouth Daily. Patient states he takes this PRN 7/17/20   Sarbjit Paris MD   venlafaxine XR (EFFEXOR-XR) 75 MG 24 hr capsule Take 75 mg by mouth Daily. 7/17/20   Sarbjit Paris MD       Allergies:  Allergies   Allergen Reactions   • Mirtazapine Other (See Comments)     confused   • Sertraline Anxiety, Diarrhea and Nausea And Vomiting     Also \"hallucinations\"       Objective     Vitals:   Temp:  [98 °F (36.7 °C)-98.3 °F (36.8 °C)] 98 °F (36.7 °C)  Heart Rate:  [] 79  Resp:  [16] 16  BP: (128-172)/(61-97) 130/61    Intake/Output Summary (Last 24 hours) at 3/16/2022 1530  Last data filed at 3/16/2022 0839  Gross per 24 hour   Intake 990 ml   Output 200 ml   Net 790 ml       Physical Exam:   Constitutional: Awake, alert, NAD, thin, chronically ill  HEENT: Sclera anicteric, no conjunctival injection  Neck: Supple, + carotid bruits, trachea at midline, no JVD  Respiratory: Coarse BS nonlabored respiration  Cardiovascular: RRR, no murmurs, no rubs or gallops, no carotid bruit  Gastrointestinal: Soft, BS +, nontender and nondistended  : No palpable bladder  Musculoskeletal: No edema; + " clubbing  Psychiatric: Appropriate affect, cooperative, oriented  Neurologic: moving all extremities, normal speech and mental status  Skin: Warm and dry       Scheduled Meds:     atenolol, 50 mg, Oral, Q12H  atorvastatin, 20 mg, Oral, Daily  docusate sodium, 100 mg, Oral, BID  ferrous sulfate, 325 mg, Oral, Daily  thiamine, 100 mg, Oral, Daily   And  multivitamin, 1 tablet, Oral, Daily   And  folic acid, 1 mg, Oral, Daily  pantoprazole, 40 mg, Oral, Daily  polyethylene glycol, 17 g, Oral, Daily  rivaroxaban, 20 mg, Oral, Daily With Dinner  sodium chloride, 10 mL, Intravenous, Q12H      IV Meds:   sodium chloride, 75 mL/hr, Last Rate: 75 mL/hr (03/16/22 0839)        Results Reviewed:   I have personally reviewed the results from the time of this admission to 3/16/2022 15:30 EDT     Lab Results   Component Value Date    GLUCOSE 95 03/16/2022    CALCIUM 10.9 (H) 03/16/2022     03/16/2022    K 4.0 03/16/2022    CO2 32.7 (H) 03/16/2022    CL 98 03/16/2022    BUN 17 03/16/2022    CREATININE 1.70 (H) 03/16/2022    EGFRIFAFRI 74 10/25/2020    BCR 10.0 03/16/2022    ANIONGAP 7.3 03/16/2022      Lab Results   Component Value Date    MG 2.0 10/22/2020    PHOS 3.0 10/25/2020    ALBUMIN 4.30 03/16/2022           Assessment / Plan     ASSESSMENT:  1.  CRISTIANO versus CRISTIANO/CKD, with the latter favored.  SCR stable since arrival.  Likely prerenal from N/V and rapid atrial fibrillation compromising renal perfusion.  Potassium and anion gap are normal.  Hypercalcemia, slightly better than at admission, possibly on the basis of volume contraction.  No obvious offender drugs being taken at home.  Urinalysis with specific gravity 1.025, 30 mg additional protein, and no RBCs.  No hydronephrosis by CT scan 3/14  2.  N/V, resolving  3.  Rapid atrial fibrillation, new-onset  4.  Right inguinal hernia, reducible.  No plans for surgical intervention at this time  5.  Anemia  6.  Suspected alcohol abuse: Though he reports drinking only a few  beers per week, daughter states patient drinks easily 4 beers daily  7.  Fatty liver by CT scan 3/14  8.  Tobacco abuse, active    PLAN:  1.  Continue IVF  2.  Check 25-hydroxy vitamin D level and PTH, and screen for paraproteinemia  3.  Intranasal calcitonin today and tomorrow  4.  Surveillance labs    Thank you for involving us in the care of Yuval Loja.  Please feel free to call with any questions.    Thomas Weiner MD  03/16/22  15:30 EDT    [facility]  [phone]      Much of this encounter note is an electronic transcription/translation of spoken language to printed text. The electronic translation of spoken language may permit erroneous, or at times, nonsensical words or phrases to be inadvertently transcribed; Although I have reviewed the note for such errors, some may still exist.

## 2022-03-16 NOTE — PROGRESS NOTES
"DAILY PROGRESS NOTE  [facility]    Patient Identification:  Name: Yuval Loja  Age: 71 y.o.  Sex: male  :  1950  MRN: [id number]         Primary Care Physician: Alessia Prescott APRN      Subjective  Patient presently with no complaints.  All GI symptoms have resolved.  No palpitations, shortness of air or chest pain or lightheadedness.    Objective:  General Appearance:  Comfortable, well-appearing, in no acute distress and not in pain.    Vital signs: (most recent): Blood pressure 130/61, pulse 79, temperature 98 °F (36.7 °C), resp. rate 16, height 172.7 cm (68\"), weight 65.8 kg (145 lb), SpO2 98 %.    Lungs:  Normal effort and normal respiratory rate.  Breath sounds clear to auscultation.    Heart: Normal rate.  Regular rhythm.  (Monitor now showing normal sinus rhythm.)  Abdomen: Abdomen is soft and non-distended.  Bowel sounds are normal.   There is no abdominal tenderness.     Extremities: There is no dependent edema.    Neurological: Patient is alert and oriented to person, place and time.    Skin:  Warm and dry.                Vital signs in last 24 hours:  Temp:  [98 °F (36.7 °C)-98.3 °F (36.8 °C)] 98 °F (36.7 °C)  Heart Rate:  [] 79  Resp:  [16] 16  BP: (128-172)/(61-97) 130/61    Intake/Output:    Intake/Output Summary (Last 24 hours) at 3/16/2022 1423  Last data filed at 3/16/2022 0839  Gross per 24 hour   Intake 990 ml   Output 200 ml   Net 790 ml         Results from last 7 days   Lab Units 22  0913 03/15/22  0407 22  2130   WBC 10*3/mm3 5.21 8.41 8.10   HEMOGLOBIN g/dL 11.8* 12.4* 13.1   PLATELETS 10*3/mm3 317 303 324     Results from last 7 days   Lab Units 22  0913 03/15/22  0407 22  2130   SODIUM mmol/L 138 136 134*   POTASSIUM mmol/L 4.0 3.6 3.6   CHLORIDE mmol/L 98 91* 88*   CO2 mmol/L 32.7* 32.0* 30.0*   BUN mg/dL 17 23 24*   CREATININE mg/dL 1.70* 1.66* 1.86*   GLUCOSE mg/dL 95 143* 143*   Estimated Creatinine Clearance: 36 mL/min (A) " (by C-G formula based on SCr of 1.7 mg/dL (H)).  Results from last 7 days   Lab Units 03/16/22  0913 03/15/22  0407 03/14/22  2130   CALCIUM mg/dL 10.9* 10.7* 11.7*   ALBUMIN g/dL 4.30  --  4.70     Results from last 7 days   Lab Units 03/16/22  0913 03/14/22  2130   ALBUMIN g/dL 4.30 4.70   BILIRUBIN mg/dL 0.2 0.2   ALK PHOS U/L 67 70   AST (SGOT) U/L 21 22   ALT (SGPT) U/L 9 10       Assessment:  Paroxysmal atrial fibrillation: Now back in normal sinus rhythm.  Now on oral beta-blockers as well as anticoagulants.  Echocardiogram reviewed.  Cardiology input appreciated.  CRISTIANO versus worsening CKD: Creatinine 1.18 in October 2020.  Presented in 1.86.  Still remaining 1.7 presently appears euvolemic.  Continue gentle IV fluids for the time being and request nephrology opinion.  Hypercalcemia: Mild.  Check phosphate, parathyroid hormone levels.  Abdominal pain, reported nausea vomiting: Resolved.  Exam and work-up both benign.  COPD: Clinically stable.  Coronary artery disease: Clinically stable.  History of alcohol and drug abuse:        Plan:  Please see above.      Moo Ahn MD  3/16/2022  14:23 EDT

## 2022-03-16 NOTE — PROGRESS NOTES
"    Patient Name: Yuval Loja  :1950  71 y.o.      Patient Care Team:  Alessia Prescott APRN as PCP - General (Family Medicine)  Jonny Bains MD as Referring Physician (Internal Medicine)  Carlos Villareal MD as Consulting Physician (Hematology and Oncology)    Chief Complaint: follow up new onset atrial fibrillation     Interval History: he feels better today. Nausea/diarrhea improved. Tolerating diet. Taking oral meds. Feels his heart racing. Refused Iv lopressor over night.       Objective   Vital Signs  Temp:  [98.2 °F (36.8 °C)-98.3 °F (36.8 °C)] 98.3 °F (36.8 °C)  Heart Rate:  [] 113  Resp:  [16] 16  BP: (128-172)/(62-97) 158/67    Intake/Output Summary (Last 24 hours) at 3/16/2022 0947  Last data filed at 3/16/2022 0839  Gross per 24 hour   Intake 990 ml   Output 200 ml   Net 790 ml     Flowsheet Rows    Flowsheet Row First Filed Value   Admission Height 172.7 cm (68\") Documented at 03/15/2022 0455   Admission Weight 65.8 kg (145 lb) Documented at 03/15/2022 0455          Physical Exam:   General Appearance:    Alert, cooperative, in no acute distress   Lungs:     Clear to auscultation.  Normal respiratory effort and rate.      Heart:    Regular rhythm and normal rate, normal S1 and S2, no murmurs, gallops or rubs.     Chest Wall:    No abnormalities observed   Abdomen:     Soft, nontender, positive bowel sounds.     Extremities:   no cyanosis, clubbing or edema.  No marked joint deformities.  Adequate musculoskeletal strength.       Results Review:    Results from last 7 days   Lab Units 03/15/22  0407   SODIUM mmol/L 136   POTASSIUM mmol/L 3.6   CHLORIDE mmol/L 91*   CO2 mmol/L 32.0*   BUN mg/dL 23   CREATININE mg/dL 1.66*   GLUCOSE mg/dL 143*   CALCIUM mg/dL 10.7*         Results from last 7 days   Lab Units 03/15/22  0407   WBC 10*3/mm3 8.41   HEMOGLOBIN g/dL 12.4*   HEMATOCRIT % 37.1*   PLATELETS 10*3/mm3 303     Results from last 7 days   Lab Units 03/15/22  0407   INR  0.91 "                       Medication Review:   atenolol, 50 mg, Oral, Q12H  atorvastatin, 20 mg, Oral, Daily  docusate sodium, 100 mg, Oral, BID  ferrous sulfate, 325 mg, Oral, Daily  thiamine, 100 mg, Oral, Daily   And  multivitamin, 1 tablet, Oral, Daily   And  folic acid, 1 mg, Oral, Daily  pantoprazole, 40 mg, Oral, Daily  polyethylene glycol, 17 g, Oral, Daily  rivaroxaban, 20 mg, Oral, Daily With Dinner  sodium chloride, 10 mL, Intravenous, Q12H         sodium chloride, 75 mL/hr, Last Rate: 75 mL/hr (03/16/22 0839)        Assessment/Plan   1. New onset atrial fibrillation with RVR - rate remains elevated. Refused IV lopressor overnight. Tolerating diet. Will change to atenolol (was on metop succinate at home). Stop lovenox. Add rivaroxaban (no surgical indications at this time). He denies falls or history of bleeding problems. Hgb stable. He has been on warfarin in the past. TSH normal.    2. Abdominal pain with nausea/diarrhea. Evaluated by general surgery for hernia. Reducible. No surgery planned.    3. EtOH use - He denied heavy use to me. He was seen by access center. Daughter reports daily use 3-4 beers.     4. History of SVT, prior EP study, AVNRT ablation    5. Coronary artery disease with prior myocardial infarction. Stent to prox LAD 2006. 30% circ disease,  of right coronary artery. Continue atorvastatin and beta blocker. He denies angina currently. Would probable benefit from aspirin however, given his alcohol use, I think risk is too high for dual therapy.     6. History of stroke.     Change to oral beta blocker. Discussed risk of OAC and he is agreeable. He is somewhat of a difficult historian. I do worry about medication and follow up compliance. He will need to be monitored closely.    RICHMOND Gaona  [facility]  03/16/22  09:47 EDT

## 2022-03-17 VITALS
BODY MASS INDEX: 21.98 KG/M2 | TEMPERATURE: 98.5 F | DIASTOLIC BLOOD PRESSURE: 88 MMHG | HEART RATE: 73 BPM | OXYGEN SATURATION: 98 % | SYSTOLIC BLOOD PRESSURE: 121 MMHG | RESPIRATION RATE: 16 BRPM | WEIGHT: 145 LBS | HEIGHT: 68 IN

## 2022-03-17 LAB
25(OH)D3 SERPL-MCNC: 64 NG/ML (ref 30–100)
ALBUMIN SERPL-MCNC: 3.8 G/DL (ref 3.5–5.2)
ANION GAP SERPL CALCULATED.3IONS-SCNC: 7 MMOL/L (ref 5–15)
BUN SERPL-MCNC: 16 MG/DL (ref 8–23)
BUN/CREAT SERPL: 10 (ref 7–25)
CA-I BLD-MCNC: 5.8 MG/DL (ref 4.6–5.4)
CA-I SERPL ISE-MCNC: 1.45 MMOL/L (ref 1.15–1.35)
CALCIUM SPEC-SCNC: 10.1 MG/DL (ref 8.6–10.5)
CHLORIDE SERPL-SCNC: 97 MMOL/L (ref 98–107)
CO2 SERPL-SCNC: 32 MMOL/L (ref 22–29)
CREAT SERPL-MCNC: 1.6 MG/DL (ref 0.76–1.27)
EGFRCR SERPLBLD CKD-EPI 2021: 45.2 ML/MIN/1.73
GLUCOSE SERPL-MCNC: 130 MG/DL (ref 65–99)
PHOSPHATE SERPL-MCNC: 2.8 MG/DL (ref 2.5–4.5)
POTASSIUM SERPL-SCNC: 4.2 MMOL/L (ref 3.5–5.2)
SODIUM SERPL-SCNC: 136 MMOL/L (ref 136–145)

## 2022-03-17 PROCEDURE — A9270 NON-COVERED ITEM OR SERVICE: HCPCS | Performed by: NURSE PRACTITIONER

## 2022-03-17 PROCEDURE — 99232 SBSQ HOSP IP/OBS MODERATE 35: CPT | Performed by: NURSE PRACTITIONER

## 2022-03-17 PROCEDURE — 63710000001 FOLIC ACID 1 MG TABLET: Performed by: NURSE PRACTITIONER

## 2022-03-17 PROCEDURE — 84155 ASSAY OF PROTEIN SERUM: CPT | Performed by: INTERNAL MEDICINE

## 2022-03-17 PROCEDURE — 63710000001 ATENOLOL 50 MG TABLET: Performed by: NURSE PRACTITIONER

## 2022-03-17 PROCEDURE — 82306 VITAMIN D 25 HYDROXY: CPT | Performed by: INTERNAL MEDICINE

## 2022-03-17 PROCEDURE — 86334 IMMUNOFIX E-PHORESIS SERUM: CPT | Performed by: INTERNAL MEDICINE

## 2022-03-17 PROCEDURE — 63710000001 THIAMINE 100 MG TABLET: Performed by: NURSE PRACTITIONER

## 2022-03-17 PROCEDURE — 63710000001 FERROUS SULFATE 325 (65 FE) MG TABLET: Performed by: NURSE PRACTITIONER

## 2022-03-17 PROCEDURE — 63710000001 PANTOPRAZOLE 40 MG TABLET DELAYED-RELEASE: Performed by: NURSE PRACTITIONER

## 2022-03-17 PROCEDURE — 82784 ASSAY IGA/IGD/IGG/IGM EACH: CPT | Performed by: INTERNAL MEDICINE

## 2022-03-17 PROCEDURE — 83521 IG LIGHT CHAINS FREE EACH: CPT | Performed by: INTERNAL MEDICINE

## 2022-03-17 PROCEDURE — 80069 RENAL FUNCTION PANEL: CPT | Performed by: HOSPITALIST

## 2022-03-17 PROCEDURE — 63710000001 ATORVASTATIN 20 MG TABLET: Performed by: NURSE PRACTITIONER

## 2022-03-17 PROCEDURE — 63710000001 POLYETHYLENE GLYCOL 17 G PACK: Performed by: NURSE PRACTITIONER

## 2022-03-17 PROCEDURE — 84165 PROTEIN E-PHORESIS SERUM: CPT | Performed by: INTERNAL MEDICINE

## 2022-03-17 PROCEDURE — 82330 ASSAY OF CALCIUM: CPT | Performed by: HOSPITALIST

## 2022-03-17 PROCEDURE — 63710000001 DOCUSATE SODIUM 100 MG CAPSULE: Performed by: NURSE PRACTITIONER

## 2022-03-17 PROCEDURE — G0378 HOSPITAL OBSERVATION PER HR: HCPCS

## 2022-03-17 PROCEDURE — 63710000001 MULTIVITAMIN TABLET: Performed by: NURSE PRACTITIONER

## 2022-03-17 RX ORDER — ATENOLOL 100 MG/1
100 TABLET ORAL
Qty: 30 TABLET | Refills: 0 | Status: SHIPPED | OUTPATIENT
Start: 2022-03-18

## 2022-03-17 RX ORDER — ATENOLOL 50 MG/1
100 TABLET ORAL
Status: DISCONTINUED | OUTPATIENT
Start: 2022-03-18 | End: 2022-03-17 | Stop reason: HOSPADM

## 2022-03-17 RX ORDER — ATENOLOL 50 MG/1
50 TABLET ORAL ONCE
Status: COMPLETED | OUTPATIENT
Start: 2022-03-17 | End: 2022-03-17

## 2022-03-17 RX ADMIN — Medication 10 ML: at 08:07

## 2022-03-17 RX ADMIN — DOCUSATE SODIUM 100 MG: 100 CAPSULE, LIQUID FILLED ORAL at 08:05

## 2022-03-17 RX ADMIN — Medication 100 MG: at 08:05

## 2022-03-17 RX ADMIN — Medication 1 TABLET: at 08:05

## 2022-03-17 RX ADMIN — ATORVASTATIN CALCIUM 20 MG: 20 TABLET, FILM COATED ORAL at 08:05

## 2022-03-17 RX ADMIN — ATENOLOL 50 MG: 50 TABLET ORAL at 12:40

## 2022-03-17 RX ADMIN — FOLIC ACID 1 MG: 1 TABLET ORAL at 08:05

## 2022-03-17 RX ADMIN — POLYETHYLENE GLYCOL 3350 17 G: 17 POWDER, FOR SOLUTION ORAL at 08:04

## 2022-03-17 RX ADMIN — ATENOLOL 50 MG: 50 TABLET ORAL at 08:05

## 2022-03-17 RX ADMIN — PANTOPRAZOLE SODIUM 40 MG: 40 TABLET, DELAYED RELEASE ORAL at 08:05

## 2022-03-17 RX ADMIN — FERROUS SULFATE TAB 325 MG (65 MG ELEMENTAL FE) 325 MG: 325 (65 FE) TAB at 08:05

## 2022-03-17 NOTE — CASE MANAGEMENT/SOCIAL WORK
Discharge Planning Assessment  [facility]     Patient Name: Yuval Loja  MRN: [id number]  Today's Date: 3/17/2022    Admit Date: 3/14/2022     Discharge Needs Assessment    No documentation.                Discharge Plan     Row Name 03/17/22 1510       Plan    Plan Comments Entered room, introduced self and explained role w/PPE In place on self and patient; verified information on facesheet; patient lives in a house alone, is independent w/ADL's; uses a cane or walker at times, still drives; patient presented to the ED with abd pain and atrial fibrillation; RX are filled at [facility] but plans to use meds to bed; Family plans to  patient upon d/c. No anticipated needs at this time.              Continued Care and Services - Admitted Since 3/14/2022    Coordination has not been started for this encounter.       Expected Discharge Date and Time     Expected Discharge Date Expected Discharge Time    Mar 17, 2022          Demographic Summary     Row Name 03/17/22 1763       General Information    Admission Type inpatient    Arrived From home    Required Notices Provided Important Message from Medicare    Reason for Consult discharge planning    Preferred Language English       Contact Information    Permission Granted to Share Info With                Functional Status     Row Name 03/17/22 1344       Functional Status    Usual Activity Tolerance moderate    Current Activity Tolerance moderate       Functional Status, IADL    Medications independent    Meal Preparation independent    Housekeeping independent    Laundry independent    Shopping independent       Mental Status    General Appearance WDL WDL       Mental Status Summary    Recent Changes in Mental Status/Cognitive Functioning no changes       Employment/    Employment Status retired               Psychosocial    No documentation.                Abuse/Neglect    No documentation.                Legal    No documentation.                 Substance Abuse    No documentation.                Patient Forms    No documentation.                   Reina Knight RN

## 2022-03-17 NOTE — PROGRESS NOTES
"DAILY PROGRESS NOTE  [facility]    Patient Identification:  Name: Yuval Loja  Age: 71 y.o.  Sex: male  :  1950  MRN: [id number]         Primary Care Physician: Alessia Prescott APRN      Subjective  Patient presently with no acute complaints.  Feels well.  Very eager to return home.  Spoke with his daughter who is concerned that time she appears forgetful.    Objective:  General Appearance:  Comfortable, well-appearing, in no acute distress and not in pain.    Vital signs: (most recent): Blood pressure 121/88, pulse 73, temperature 98.5 °F (36.9 °C), temperature source Oral, resp. rate 16, height 172.7 cm (68\"), weight 65.8 kg (145 lb), SpO2 98 %.    Lungs:  Normal effort and normal respiratory rate.  Breath sounds clear to auscultation.    Heart: Normal rate.  Regular rhythm.    Extremities: There is no dependent edema.    Neurological: Patient is alert and oriented to person, place and time.    Skin:  Warm and dry.                Vital signs in last 24 hours:  Temp:  [98 °F (36.7 °C)-99.1 °F (37.3 °C)] 98.5 °F (36.9 °C)  Heart Rate:  [73-81] 73  Resp:  [16] 16  BP: (121-130)/(61-88) 121/88    Intake/Output:    Intake/Output Summary (Last 24 hours) at 3/17/2022 1031  Last data filed at 3/17/2022 0830  Gross per 24 hour   Intake 360 ml   Output --   Net 360 ml         Results from last 7 days   Lab Units 22  0913 03/15/22  04022  2130   WBC 10*3/mm3 5.21 8.41 8.10   HEMOGLOBIN g/dL 11.8* 12.4* 13.1   PLATELETS 10*3/mm3 317 303 324     Results from last 7 days   Lab Units 22  0913 03/15/22  0407 22  2130   SODIUM mmol/L 138 136 134*   POTASSIUM mmol/L 4.0 3.6 3.6   CHLORIDE mmol/L 98 91* 88*   CO2 mmol/L 32.7* 32.0* 30.0*   BUN mg/dL 17  24*   CREATININE mg/dL 1.70* 1.66* 1.86*   GLUCOSE mg/dL 95 143* 143*   Estimated Creatinine Clearance: 36 mL/min (A) (by C-G formula based on SCr of 1.7 mg/dL (H)).  Results from last 7 days   Lab Units 22  0913 " 03/15/22  0407 03/14/22  2130   CALCIUM mg/dL 10.9* 10.7* 11.7*   ALBUMIN g/dL 4.30  --  4.70     Results from last 7 days   Lab Units 03/16/22  0913 03/14/22  2130   ALBUMIN g/dL 4.30 4.70   BILIRUBIN mg/dL 0.2 0.2   ALK PHOS U/L 67 70   AST (SGOT) U/L 21 22   ALT (SGPT) U/L 9 10       Assessment:  Paroxysmal atrial fibrillation: Now back in normal sinus rhythm.  Now on oral beta-blockers as well as Xarelto.  Echocardiogram reviewed.  Cardiology input appreciated.  CRISTIANO versus worsening CKD: Creatinine 1.18 in October 2020.  Presented in 1.86.  Today's labs are still pending.  Nephrology's input greatly appreciated.    Hypercalcemia: Mild.    Parathyroid hormone normal.  Remaining labs still pending.    Abdominal pain, reported nausea vomiting: Resolved.  Exam and work-up both benign.  COPD: Clinically stable.  Coronary artery disease: Clinically stable.  History of alcohol and drug abuse:      Plan:  Please see above.  Still awaiting today's labs.  Discussed with his daughter over the phone with the patient's permission.  If labs are stable then okay for discharge today from my point of view.  Discussed with RN.    Moo Ahn MD  3/17/2022  10:31 EDT

## 2022-03-17 NOTE — PROGRESS NOTES
[facility] Progress Note      Patient Name: Yuval Loja  : 1950  MRN: [id number]  Primary Care Physician:  Alessia Prescott APRN  Date of admission: 3/14/2022    Subjective     Interval History:   Nausea and vomiting have resolved; very hungry for breakfast  No shortness of breath  Eager to go home  Morning labs not yet drawn    Review of Systems:   As noted above    Objective     Vitals:   Temp:  [98 °F (36.7 °C)-99.1 °F (37.3 °C)] 98.5 °F (36.9 °C)  Heart Rate:  [73-81] 73  Resp:  [16] 16  BP: (121-130)/(61-88) 121/88    Intake/Output Summary (Last 24 hours) at 3/17/2022 0829  Last data filed at 3/16/2022 0839  Gross per 24 hour   Intake 990 ml   Output --   Net 990 ml       Physical Exam:    Constitutional: Awake, alert, NAD, thin, chronically ill  HEENT: Sclera anicteric, no conjunctival injection  Neck: Supple, + carotid bruits, trachea at midline, no JVD  Respiratory: Coarse BS nonlabored respiration on room air  Cardiovascular: RRR, no murmurs, no rubs or gallops  Gastrointestinal: Soft, BS+, nontender and nondistended  : No palpable bladder  Musculoskeletal: No edema; +clubbing  Psychiatric: Appropriate affect, cooperative, oriented  Neurologic: moving all extremities, normal speech and mental status  Skin: Warm and dry     Scheduled Meds:     atenolol, 50 mg, Oral, Q12H  atorvastatin, 20 mg, Oral, Daily  calcitonin (salmon), 1 spray, Alternating Nares, Daily  docusate sodium, 100 mg, Oral, BID  ferrous sulfate, 325 mg, Oral, Daily  thiamine, 100 mg, Oral, Daily   And  multivitamin, 1 tablet, Oral, Daily   And  folic acid, 1 mg, Oral, Daily  pantoprazole, 40 mg, Oral, Daily  polyethylene glycol, 17 g, Oral, Daily  rivaroxaban, 15 mg, Oral, Daily With Dinner  sodium chloride, 10 mL, Intravenous, Q12H      IV Meds:   sodium chloride, 75 mL/hr, Last Rate: 75 mL/hr (22 0839)        Results Reviewed:   I have personally reviewed the results from the time of this  admission to 3/17/2022 08:29 EDT     Results from last 7 days   Lab Units 03/16/22  0913 03/15/22  0407 03/14/22  2130   SODIUM mmol/L 138 136 134*   POTASSIUM mmol/L 4.0 3.6 3.6   CHLORIDE mmol/L 98 91* 88*   CO2 mmol/L 32.7* 32.0* 30.0*   BUN mg/dL 17 23 24*   CREATININE mg/dL 1.70* 1.66* 1.86*   CALCIUM mg/dL 10.9* 10.7* 11.7*   BILIRUBIN mg/dL 0.2  --  0.2   ALK PHOS U/L 67  --  70   ALT (SGPT) U/L 9  --  10   AST (SGOT) U/L 21  --  22   GLUCOSE mg/dL 95 143* 143*       Estimated Creatinine Clearance: 36 mL/min (A) (by C-G formula based on SCr of 1.7 mg/dL (H)).                Results from last 7 days   Lab Units 03/16/22  0913 03/15/22  0407 03/14/22  2130   WBC 10*3/mm3 5.21 8.41 8.10   HEMOGLOBIN g/dL 11.8* 12.4* 13.1   PLATELETS 10*3/mm3 317 303 324       Results from last 7 days   Lab Units 03/15/22  0407   INR  0.91       Assessment / Plan     ASSESSMENT:  1.  CRISTIANO versus CRISTIANO/CKD, with UOP not recorded, labs pending this morning.  CKD is suspected.   CRISTIANO likely prerenal from N/V and rapid atrial fibrillation compromising renal perfusion. Potassium and anion gap are normal. Hypercalcemia, slightly better than at admission, possibly on the basis of volume contraction.  No obvious offender drugs being taken at home.  Urinalysis with specific gravity 1.025, 30 mg additional protein, and no RBCs.  No hydronephrosis by CT scan 3/14  2.  N/V, resolved  3.  Rapid atrial fibrillation, new-onset; in NSR this morning  4.  Right inguinal hernia, reducible.  No plans for surgical intervention at this time  5.  Anemia  6.  Suspected alcohol abuse: Though he reports drinking only a few beers per week, daughter states patient drinks easily 4 beers daily  7.  Fatty liver by CT scan 3/14  8.  Tobacco abuse, active    PLAN:  1.  Await morning labs  2.  Provided serum creatinine is the same or better, no objection to discharge from renal view  3.  Will follow up on paraproteinemia studies  4.  Will stop IVF as he is eating and  drinking well now  5.  Will arrange follow-up in our office within 2 weeks    Thank you for involving us in the care of Yuval Loja.  Please feel free to call with any questions.    Thomas Weiner MD  03/17/22  08:29 EDT    [facility]  [phone]      Much of this encounter note is an electronic transcription/translation of spoken language to printed text. The electronic translation of spoken language may permit erroneous, or at times, nonsensical words or phrases to be inadvertently transcribed; Although I have reviewed the note for such errors, some may still exist.

## 2022-03-17 NOTE — PROGRESS NOTES
"    Patient Name: Yuval Loja  :1950  71 y.o.      Patient Care Team:  Alessia Prescott APRN as PCP - General (Family Medicine)  Jonny Bains MD as Referring Physician (Internal Medicine)  Carlos Villareal MD as Consulting Physician (Hematology and Oncology)    Chief Complaint: follow up new onset atrial fibrillation     Interval History: he spontaneously converted to NSR yesterday. He feels well today. He wants to go home.        Objective   Vital Signs  Temp:  [98 °F (36.7 °C)-99.1 °F (37.3 °C)] 98.5 °F (36.9 °C)  Heart Rate:  [73-81] 73  Resp:  [16] 16  BP: (121-130)/(61-88) 121/88  No intake or output data in the 24 hours ending 22 1005  Flowsheet Rows    Flowsheet Row First Filed Value   Admission Height 172.7 cm (68\") Documented at 03/15/2022 0455   Admission Weight 65.8 kg (145 lb) Documented at 03/15/2022 0455          Physical Exam:   General Appearance:    Alert, cooperative, in no acute distress   Lungs:     Clear to auscultation.  Normal respiratory effort and rate.      Heart:    Regular rhythm and normal rate, normal S1 and S2, no murmurs, gallops or rubs.     Chest Wall:    No abnormalities observed   Abdomen:     Soft, nontender, positive bowel sounds.     Extremities:   no cyanosis, clubbing or edema.  No marked joint deformities.  Adequate musculoskeletal strength.       Results Review:    Results from last 7 days   Lab Units 22  0913   SODIUM mmol/L 138   POTASSIUM mmol/L 4.0   CHLORIDE mmol/L 98   CO2 mmol/L 32.7*   BUN mg/dL 17   CREATININE mg/dL 1.70*   GLUCOSE mg/dL 95   CALCIUM mg/dL 10.9*         Results from last 7 days   Lab Units 22  0913   WBC 10*3/mm3 5.21   HEMOGLOBIN g/dL 11.8*   HEMATOCRIT % 35.1*   PLATELETS 10*3/mm3 317     Results from last 7 days   Lab Units 03/15/22  0407   INR  0.91                       Medication Review:   atenolol, 50 mg, Oral, Q12H  atorvastatin, 20 mg, Oral, Daily  calcitonin (salmon), 1 spray, Alternating Nares, " Daily  docusate sodium, 100 mg, Oral, BID  ferrous sulfate, 325 mg, Oral, Daily  thiamine, 100 mg, Oral, Daily   And  multivitamin, 1 tablet, Oral, Daily   And  folic acid, 1 mg, Oral, Daily  pantoprazole, 40 mg, Oral, Daily  polyethylene glycol, 17 g, Oral, Daily  rivaroxaban, 15 mg, Oral, Daily With Dinner  sodium chloride, 10 mL, Intravenous, Q12H              Assessment/Plan   1. New onset atrial fibrillation with RVR - now back in SR. Doing well on atenolol. Added rivaroxaban yesterday. He denies falls or history of bleeding problems. Hgb stable. He has been on warfarin in the past. TSH normal.    2. Abdominal pain with nausea/diarrhea. Evaluated by general surgery for hernia. Reducible. No surgery planned.    3. EtOH use - He denied heavy use to me. He was seen by access center. Daughter reports daily use 3-4 beers.     4. History of SVT, prior EP study, AVNRT ablation    5. Coronary artery disease with prior myocardial infarction. Stent to prox LAD 2006. 30% circ disease,  of right coronary artery. Continue atorvastatin and beta blocker. He denies angina currently. Would probably benefit from aspirin however, given his alcohol use, I think risk is too high for dual therapy.     6. History of stroke.     7. CRISTIANO on chronic kidney disease - nephrology saw.    Doing well with atenolol and rivaroxaban.   Will change atenolol to once a day for easier compliance.   I do worry about follow up and medication compliance with him. I reiterated the importance of follow up and he stated understanding.   I will have him follow up in 2 weeks with NP.  Ok for dc from cardiac standpoint.    RICHMOND Gaona  [facility]  03/17/22  10:05 EDT

## 2022-03-17 NOTE — DISCHARGE SUMMARY
PHYSICIAN DISCHARGE SUMMARY                                                                        Psychiatric    Patient Identification:  Name: Yuval Loja  Age: 71 y.o.  Sex: male  :  1950  MRN: [id number]  Primary Care Physician: Alessia Prescott APRN    Admit date: 3/14/2022  Discharge date and time: 3/17/2022     Discharged Condition: good    Discharge Diagnoses:   Paroxysmal atrial fibrillation: Beta-blocker dose increased and anticoagulation started.  Follow-up with cardiology..    CRISTIANO versus worsening CKD: Most likely the latter.  Patient to follow-up with nephrology on discharge.    Hypercalcemia: Mild.    Abdominal pain, reported nausea vomiting: Resolved.  Exam and work-up both benign.  COPD: Clinically stable.  Coronary artery disease:   History of alcohol and drug abuse:      Hospital Course:  Pleasant 71-year-old gentleman who presents complaint of nausea vomiting diarrhea and abdominal pain.  Please H&P for full details.  Abdominal symptoms actually abated on their own and his work-up was benign.  However, on presentation he was also in atrial fibrillation with a rapid ventricular response.  Patient was admitted to a monitored unit.  Cardiology consultation was obtained.  Beta-blocker dose was increased and he reverted back to normal sinus rhythm on his own.  Echocardiogram showed mild to moderate aortic regurgitation but otherwise is unremarkable.  Anticoagulation is recommended.  There is also history of coronary artery disease but there is concerned that the combination of an aspirin with anticoagulation would be too high risk in this particular patient.  In addition he was noted to have a BUN/creatinine above his previous baseline.  Is uncertain whether this represented CRISTIANO on top of chronic kidney disease or just a worsening of his baseline renal function.  Nephrology consultation was obtained.  With  "hydration there is a very mild improvement in his creatinine.  Nephrology will continue to follow him as an outpatient.  CT scan of the abdomen did not show any unusual urologic issues.  Is also noted to have a mild hypercalcemia.  Phosphate parathyroid levels are within normal limits.  There is minimal improvement with hydration.  This will also be continued to be followed as an outpatient.  Presently zi afebrile vital signs stable and very eager to return home which I think would be very reasonable.    Addendum:  I am informed that the admission status of this patient was an error and that it should be an observation patient.  Electronically signed by Moo Ahn MD, 03/17/22, 3:49 PM EDT.    Consults:     Consults     Date and Time Order Name Status Description    3/16/2022  2:33 PM Inpatient Nephrology Consult      3/15/2022 11:29 AM Inpatient General Surgery Consult Completed     3/15/2022  2:50 AM Inpatient Cardiology Consult Completed     3/15/2022 12:14 AM [facility] (on-call MD unless specified) Details              Discharge Exam:  General Appearance:  Comfortable, well-appearing, in no acute distress and not in pain.    Vital signs: (most recent): Blood pressure 121/88, pulse 73, temperature 98.5 °F (36.9 °C), temperature source Oral, resp. rate 16, height 172.7 cm (68\"), weight 65.8 kg (145 lb), SpO2 98 %.    Lungs:  Normal effort and normal respiratory rate.  Breath sounds clear to auscultation.    Heart: Normal rate.  Regular rhythm.    Extremities: There is no dependent edema.    Neurological: Patient is alert and oriented to person, place and time.    Skin:  Warm and dry.       Disposition:  Home    Patient Instructions:      Discharge Medications      New Medications      Instructions Start Date   atenolol 100 MG tablet  Commonly known as: TENORMIN   100 mg, Oral, Every 24 Hours Scheduled   Start Date: March 18, 2022     rivaroxaban 15 MG tablet  Commonly known as: XARELTO   15 mg, Oral, Daily " With Dinner         Continue These Medications      Instructions Start Date   atorvastatin 20 MG tablet  Commonly known as: LIPITOR   20 mg, Oral, Daily      ferrous sulfate 325 (65 FE) MG tablet   325 mg, Oral, Daily      hydrOXYzine 25 MG tablet  Commonly known as: ATARAX   25 mg, Oral, 3 Times Daily PRN      levothyroxine 25 MCG tablet  Commonly known as: SYNTHROID, LEVOTHROID   25 mcg, Oral, Daily      pantoprazole 40 MG EC tablet  Commonly known as: PROTONIX   40 mg, Oral, Daily, Patient states he takes this PRN         Stop These Medications    metoprolol succinate XL 25 MG 24 hr tablet  Commonly known as: TOPROL-XL     venlafaxine XR 75 MG 24 hr capsule  Commonly known as: EFFEXOR-XR          Diet Instructions     Diet: Regular      Discharge Diet: Regular        No future appointments.  Additional Instructions for the Follow-ups that You Need to Schedule     Discharge Follow-up with PCP   As directed       Currently Documented PCP:    Alessia Prescott APRN    PCP Phone Number:    [phone]     Follow Up Details: 1 week         Discharge Follow-up with Specialty: Cardiology.  Nephrology.   As directed      Specialty: Cardiology.  Nephrology.    Follow Up Details: As directed.            Follow-up Information     Alessia Prescott APRN .    Specialty: Family Medicine  Why: 1 week  Contact information:  [address]  [place] [address]  [phone]                       Discharge Order (From admission, onward)     Start     Ordered    03/17/22 1242  Discharge patient  Once        Expected Discharge Date: 03/17/22    Discharge Disposition: Home or Self Care    Physician of Record for Attribution - Please select from Treatment Team: ROBSON JASON [[id number]]    Review needed by CMO to determine Physician of Record: No       Question Answer Comment   Physician of Record for Attribution - Please select from Treatment Team ROBSON JASON    Review needed by CMO to determine Physician of  Record No        03/17/22 1247                  Total time spent discharging patient including evaluation,post hospitalization follow up,  medication and post hospitalization instructions and education total time exceeds 30 minutes.    Signed:  Moo Ahn MD  3/17/2022  12:48 EDT    EMR Dragon/Transcription disclaimer:   Much of this encounter note is an electronic transcription/translation of spoken language to printed text. The electronic translation of spoken language may permit erroneous, or at times, nonsensical words or phrases to be inadvertently transcribed; Although I have reviewed the note for such errors, some may still exist.

## 2022-03-18 LAB
ALBUMIN SERPL ELPH-MCNC: 3.2 G/DL (ref 2.9–4.4)
ALBUMIN/GLOB SERPL: 1.2 {RATIO} (ref 0.7–1.7)
ALPHA1 GLOB SERPL ELPH-MCNC: 0.3 G/DL (ref 0–0.4)
ALPHA2 GLOB SERPL ELPH-MCNC: 0.8 G/DL (ref 0.4–1)
B-GLOBULIN SERPL ELPH-MCNC: 0.7 G/DL (ref 0.7–1.3)
GAMMA GLOB SERPL ELPH-MCNC: 0.9 G/DL (ref 0.4–1.8)
GLOBULIN SER-MCNC: 2.7 G/DL (ref 2.2–3.9)
IGA SERPL-MCNC: 232 MG/DL (ref 61–437)
IGG SERPL-MCNC: 971 MG/DL (ref 603–1613)
IGM SERPL-MCNC: 72 MG/DL (ref 15–143)
INTERPRETATION SERPL IEP-IMP: ABNORMAL
KAPPA LC FREE SER-MCNC: 33.2 MG/L (ref 3.3–19.4)
KAPPA LC FREE/LAMBDA FREE SER: 1.32 {RATIO} (ref 0.26–1.65)
LABORATORY COMMENT REPORT: ABNORMAL
LAMBDA LC FREE SERPL-MCNC: 25.1 MG/L (ref 5.7–26.3)
M PROTEIN SERPL ELPH-MCNC: ABNORMAL G/DL
PROT SERPL-MCNC: 5.9 G/DL (ref 6–8.5)

## 2022-06-25 ENCOUNTER — APPOINTMENT (OUTPATIENT)
Dept: GENERAL RADIOLOGY | Facility: HOSPITAL | Age: 72
End: 2022-06-25

## 2022-06-25 ENCOUNTER — HOSPITAL ENCOUNTER (INPATIENT)
Facility: HOSPITAL | Age: 72
LOS: 11 days | Discharge: HOME HEALTH | End: 2022-07-06
Attending: EMERGENCY MEDICINE | Admitting: ORTHOPAEDIC SURGERY

## 2022-06-25 ENCOUNTER — APPOINTMENT (OUTPATIENT)
Dept: CT IMAGING | Facility: HOSPITAL | Age: 72
End: 2022-06-25

## 2022-06-25 DIAGNOSIS — S72.001A CLOSED FRACTURE OF NECK OF RIGHT FEMUR, INITIAL ENCOUNTER: Primary | ICD-10-CM

## 2022-06-25 LAB
ALBUMIN SERPL-MCNC: 4.1 G/DL (ref 3.5–5.2)
ALBUMIN/GLOB SERPL: 1.5 G/DL
ALP SERPL-CCNC: 62 U/L (ref 39–117)
ALT SERPL W P-5'-P-CCNC: 25 U/L (ref 1–41)
ANION GAP SERPL CALCULATED.3IONS-SCNC: 16 MMOL/L (ref 5–15)
APTT PPP: 31.9 SECONDS (ref 22.7–35.4)
AST SERPL-CCNC: 37 U/L (ref 1–40)
BASOPHILS # BLD AUTO: 0.02 10*3/MM3 (ref 0–0.2)
BASOPHILS NFR BLD AUTO: 0.4 % (ref 0–1.5)
BILIRUB SERPL-MCNC: 0.3 MG/DL (ref 0–1.2)
BUN SERPL-MCNC: 32 MG/DL (ref 8–23)
BUN/CREAT SERPL: 14.2 (ref 7–25)
CALCIUM SPEC-SCNC: 9.8 MG/DL (ref 8.6–10.5)
CHLORIDE SERPL-SCNC: 98 MMOL/L (ref 98–107)
CO2 SERPL-SCNC: 25 MMOL/L (ref 22–29)
CREAT SERPL-MCNC: 2.25 MG/DL (ref 0.76–1.27)
DEPRECATED RDW RBC AUTO: 41.2 FL (ref 37–54)
EGFRCR SERPLBLD CKD-EPI 2021: 30 ML/MIN/1.73
EOSINOPHIL # BLD AUTO: 0.15 10*3/MM3 (ref 0–0.4)
EOSINOPHIL NFR BLD AUTO: 3.1 % (ref 0.3–6.2)
ERYTHROCYTE [DISTWIDTH] IN BLOOD BY AUTOMATED COUNT: 11.7 % (ref 12.3–15.4)
ETHANOL BLD-MCNC: 101 MG/DL (ref 0–10)
ETHANOL UR QL: 0.1 %
GLOBULIN UR ELPH-MCNC: 2.7 GM/DL
GLUCOSE SERPL-MCNC: 98 MG/DL (ref 65–99)
HCT VFR BLD AUTO: 36.3 % (ref 37.5–51)
HGB BLD-MCNC: 11.8 G/DL (ref 13–17.7)
IMM GRANULOCYTES # BLD AUTO: 0.02 10*3/MM3 (ref 0–0.05)
IMM GRANULOCYTES NFR BLD AUTO: 0.4 % (ref 0–0.5)
INR PPP: 1.05 (ref 0.9–1.1)
LYMPHOCYTES # BLD AUTO: 2.07 10*3/MM3 (ref 0.7–3.1)
LYMPHOCYTES NFR BLD AUTO: 42.2 % (ref 19.6–45.3)
MCH RBC QN AUTO: 31.5 PG (ref 26.6–33)
MCHC RBC AUTO-ENTMCNC: 32.5 G/DL (ref 31.5–35.7)
MCV RBC AUTO: 96.8 FL (ref 79–97)
MONOCYTES # BLD AUTO: 0.58 10*3/MM3 (ref 0.1–0.9)
MONOCYTES NFR BLD AUTO: 11.8 % (ref 5–12)
NEUTROPHILS NFR BLD AUTO: 2.06 10*3/MM3 (ref 1.7–7)
NEUTROPHILS NFR BLD AUTO: 42.1 % (ref 42.7–76)
NRBC BLD AUTO-RTO: 0 /100 WBC (ref 0–0.2)
PLATELET # BLD AUTO: 167 10*3/MM3 (ref 140–450)
PMV BLD AUTO: 9.3 FL (ref 6–12)
POTASSIUM SERPL-SCNC: 3.8 MMOL/L (ref 3.5–5.2)
PROT SERPL-MCNC: 6.8 G/DL (ref 6–8.5)
PROTHROMBIN TIME: 13.6 SECONDS (ref 11.7–14.2)
QT INTERVAL: 398 MS
RBC # BLD AUTO: 3.75 10*6/MM3 (ref 4.14–5.8)
SODIUM SERPL-SCNC: 139 MMOL/L (ref 136–145)
WBC NRBC COR # BLD: 4.9 10*3/MM3 (ref 3.4–10.8)

## 2022-06-25 PROCEDURE — 85730 THROMBOPLASTIN TIME PARTIAL: CPT | Performed by: EMERGENCY MEDICINE

## 2022-06-25 PROCEDURE — 71045 X-RAY EXAM CHEST 1 VIEW: CPT

## 2022-06-25 PROCEDURE — 25010000002 ONDANSETRON PER 1 MG: Performed by: EMERGENCY MEDICINE

## 2022-06-25 PROCEDURE — 85610 PROTHROMBIN TIME: CPT | Performed by: EMERGENCY MEDICINE

## 2022-06-25 PROCEDURE — 73502 X-RAY EXAM HIP UNI 2-3 VIEWS: CPT

## 2022-06-25 PROCEDURE — 86901 BLOOD TYPING SEROLOGIC RH(D): CPT | Performed by: EMERGENCY MEDICINE

## 2022-06-25 PROCEDURE — 86900 BLOOD TYPING SEROLOGIC ABO: CPT | Performed by: EMERGENCY MEDICINE

## 2022-06-25 PROCEDURE — 70450 CT HEAD/BRAIN W/O DYE: CPT

## 2022-06-25 PROCEDURE — 93005 ELECTROCARDIOGRAM TRACING: CPT | Performed by: EMERGENCY MEDICINE

## 2022-06-25 PROCEDURE — 93010 ELECTROCARDIOGRAM REPORT: CPT | Performed by: INTERNAL MEDICINE

## 2022-06-25 PROCEDURE — U0003 INFECTIOUS AGENT DETECTION BY NUCLEIC ACID (DNA OR RNA); SEVERE ACUTE RESPIRATORY SYNDROME CORONAVIRUS 2 (SARS-COV-2) (CORONAVIRUS DISEASE [COVID-19]), AMPLIFIED PROBE TECHNIQUE, MAKING USE OF HIGH THROUGHPUT TECHNOLOGIES AS DESCRIBED BY CMS-2020-01-R: HCPCS | Performed by: HOSPITALIST

## 2022-06-25 PROCEDURE — 85025 COMPLETE CBC W/AUTO DIFF WBC: CPT | Performed by: EMERGENCY MEDICINE

## 2022-06-25 PROCEDURE — 82077 ASSAY SPEC XCP UR&BREATH IA: CPT | Performed by: EMERGENCY MEDICINE

## 2022-06-25 PROCEDURE — 80053 COMPREHEN METABOLIC PANEL: CPT | Performed by: EMERGENCY MEDICINE

## 2022-06-25 PROCEDURE — 36415 COLL VENOUS BLD VENIPUNCTURE: CPT

## 2022-06-25 PROCEDURE — 25010000002 HYDROMORPHONE 1 MG/ML SOLUTION: Performed by: EMERGENCY MEDICINE

## 2022-06-25 PROCEDURE — 86850 RBC ANTIBODY SCREEN: CPT | Performed by: EMERGENCY MEDICINE

## 2022-06-25 PROCEDURE — 99284 EMERGENCY DEPT VISIT MOD MDM: CPT

## 2022-06-25 RX ORDER — HYDROXYZINE HYDROCHLORIDE 25 MG/1
50 TABLET, FILM COATED ORAL ONCE
Status: COMPLETED | OUTPATIENT
Start: 2022-06-25 | End: 2022-06-25

## 2022-06-25 RX ORDER — ONDANSETRON 2 MG/ML
4 INJECTION INTRAMUSCULAR; INTRAVENOUS ONCE
Status: COMPLETED | OUTPATIENT
Start: 2022-06-25 | End: 2022-06-25

## 2022-06-25 RX ORDER — SODIUM CHLORIDE 0.9 % (FLUSH) 0.9 %
10 SYRINGE (ML) INJECTION AS NEEDED
Status: DISCONTINUED | OUTPATIENT
Start: 2022-06-25 | End: 2022-07-06 | Stop reason: HOSPADM

## 2022-06-25 RX ADMIN — ONDANSETRON 4 MG: 2 INJECTION INTRAMUSCULAR; INTRAVENOUS at 23:03

## 2022-06-25 RX ADMIN — HYDROXYZINE HYDROCHLORIDE 50 MG: 25 TABLET ORAL at 22:59

## 2022-06-25 RX ADMIN — Medication 1 MG: at 23:02

## 2022-06-26 LAB
ABO GROUP BLD: NORMAL
BLD GP AB SCN SERPL QL: NEGATIVE
CHLORIDE UR-SCNC: <20 MMOL/L
CREAT UR-MCNC: 154.3 MG/DL
NT-PROBNP SERPL-MCNC: 706 PG/ML (ref 0–900)
PROT ?TM UR-MCNC: 47.7 MG/DL
PROT/CREAT UR: 309.1 MG/G CREA (ref 0–200)
RH BLD: POSITIVE
SARS-COV-2 RNA RESP QL NAA+PROBE: NOT DETECTED
SODIUM UR-SCNC: 26 MMOL/L
T&S EXPIRATION DATE: NORMAL

## 2022-06-26 PROCEDURE — 25010000002 MORPHINE PER 10 MG: Performed by: HOSPITALIST

## 2022-06-26 PROCEDURE — 25010000002 SODIUM CHLORIDE 0.9 % WITH KCL 20 MEQ 20-0.9 MEQ/L-% SOLUTION: Performed by: HOSPITALIST

## 2022-06-26 PROCEDURE — 82436 ASSAY OF URINE CHLORIDE: CPT | Performed by: INTERNAL MEDICINE

## 2022-06-26 PROCEDURE — 84300 ASSAY OF URINE SODIUM: CPT | Performed by: INTERNAL MEDICINE

## 2022-06-26 PROCEDURE — 82570 ASSAY OF URINE CREATININE: CPT | Performed by: INTERNAL MEDICINE

## 2022-06-26 PROCEDURE — 84156 ASSAY OF PROTEIN URINE: CPT | Performed by: INTERNAL MEDICINE

## 2022-06-26 PROCEDURE — 83880 ASSAY OF NATRIURETIC PEPTIDE: CPT | Performed by: HOSPITALIST

## 2022-06-26 PROCEDURE — 99222 1ST HOSP IP/OBS MODERATE 55: CPT | Performed by: INTERNAL MEDICINE

## 2022-06-26 RX ORDER — SODIUM CHLORIDE AND POTASSIUM CHLORIDE 150; 900 MG/100ML; MG/100ML
75 INJECTION, SOLUTION INTRAVENOUS CONTINUOUS
Status: DISCONTINUED | OUTPATIENT
Start: 2022-06-26 | End: 2022-06-28

## 2022-06-26 RX ORDER — ATORVASTATIN CALCIUM 20 MG/1
20 TABLET, FILM COATED ORAL NIGHTLY
Status: DISCONTINUED | OUTPATIENT
Start: 2022-06-26 | End: 2022-06-26

## 2022-06-26 RX ORDER — HYDROXYZINE 50 MG/1
50 TABLET, FILM COATED ORAL ONCE
Status: DISCONTINUED | OUTPATIENT
Start: 2022-06-26 | End: 2022-06-26

## 2022-06-26 RX ORDER — ONDANSETRON 2 MG/ML
4 INJECTION INTRAMUSCULAR; INTRAVENOUS EVERY 6 HOURS PRN
Status: DISCONTINUED | OUTPATIENT
Start: 2022-06-26 | End: 2022-07-06

## 2022-06-26 RX ORDER — HYDROXYZINE HYDROCHLORIDE 25 MG/1
25 TABLET, FILM COATED ORAL 3 TIMES DAILY PRN
Status: DISCONTINUED | OUTPATIENT
Start: 2022-06-26 | End: 2022-06-28

## 2022-06-26 RX ORDER — FOLIC ACID 1 MG/1
1 TABLET ORAL DAILY
Status: DISCONTINUED | OUTPATIENT
Start: 2022-06-26 | End: 2022-07-06 | Stop reason: HOSPADM

## 2022-06-26 RX ORDER — ASPIRIN 81 MG/1
81 TABLET, CHEWABLE ORAL DAILY
Status: DISCONTINUED | OUTPATIENT
Start: 2022-06-26 | End: 2022-06-27 | Stop reason: ALTCHOICE

## 2022-06-26 RX ORDER — PANTOPRAZOLE SODIUM 40 MG/1
40 TABLET, DELAYED RELEASE ORAL
Status: DISCONTINUED | OUTPATIENT
Start: 2022-06-26 | End: 2022-06-26

## 2022-06-26 RX ORDER — LORAZEPAM 2 MG/ML
1 INJECTION INTRAMUSCULAR EVERY 4 HOURS PRN
Status: DISCONTINUED | OUTPATIENT
Start: 2022-06-26 | End: 2022-06-26

## 2022-06-26 RX ORDER — PANTOPRAZOLE SODIUM 40 MG/1
40 TABLET, DELAYED RELEASE ORAL DAILY
Status: DISCONTINUED | OUTPATIENT
Start: 2022-06-26 | End: 2022-06-26

## 2022-06-26 RX ORDER — LORAZEPAM 2 MG/ML
0.5 INJECTION INTRAMUSCULAR EVERY 4 HOURS PRN
Status: DISCONTINUED | OUTPATIENT
Start: 2022-06-26 | End: 2022-06-27

## 2022-06-26 RX ORDER — SUCRALFATE 1 G/1
1 TABLET ORAL
Status: DISCONTINUED | OUTPATIENT
Start: 2022-06-26 | End: 2022-07-06 | Stop reason: HOSPADM

## 2022-06-26 RX ORDER — DIPHENOXYLATE HYDROCHLORIDE AND ATROPINE SULFATE 2.5; .025 MG/1; MG/1
1 TABLET ORAL DAILY
Status: DISCONTINUED | OUTPATIENT
Start: 2022-06-26 | End: 2022-07-06 | Stop reason: HOSPADM

## 2022-06-26 RX ORDER — SUCRALFATE ORAL 1 G/10ML
1 SUSPENSION ORAL 2 TIMES DAILY
COMMUNITY
End: 2022-07-06 | Stop reason: HOSPADM

## 2022-06-26 RX ORDER — FAMOTIDINE 20 MG/1
20 TABLET, FILM COATED ORAL 2 TIMES DAILY
Status: DISCONTINUED | OUTPATIENT
Start: 2022-06-26 | End: 2022-07-06 | Stop reason: HOSPADM

## 2022-06-26 RX ORDER — ATORVASTATIN CALCIUM 20 MG/1
20 TABLET, FILM COATED ORAL DAILY
Status: DISCONTINUED | OUTPATIENT
Start: 2022-06-26 | End: 2022-06-26

## 2022-06-26 RX ORDER — ATORVASTATIN CALCIUM 20 MG/1
10 TABLET, FILM COATED ORAL NIGHTLY
Status: DISCONTINUED | OUTPATIENT
Start: 2022-06-26 | End: 2022-07-06 | Stop reason: HOSPADM

## 2022-06-26 RX ORDER — MORPHINE SULFATE 4 MG/ML
2 INJECTION, SOLUTION INTRAMUSCULAR; INTRAVENOUS EVERY 4 HOURS PRN
Status: DISCONTINUED | OUTPATIENT
Start: 2022-06-26 | End: 2022-06-30

## 2022-06-26 RX ORDER — ATENOLOL 50 MG/1
100 TABLET ORAL
Status: DISCONTINUED | OUTPATIENT
Start: 2022-06-26 | End: 2022-07-06 | Stop reason: HOSPADM

## 2022-06-26 RX ADMIN — ATORVASTATIN CALCIUM 10 MG: 20 TABLET, FILM COATED ORAL at 20:23

## 2022-06-26 RX ADMIN — Medication 100 MG: at 20:23

## 2022-06-26 RX ADMIN — MORPHINE SULFATE 2 MG: 4 INJECTION, SOLUTION INTRAMUSCULAR; INTRAVENOUS at 14:31

## 2022-06-26 RX ADMIN — FOLIC ACID 1 MG: 1 TABLET ORAL at 20:23

## 2022-06-26 RX ADMIN — MORPHINE SULFATE 2 MG: 4 INJECTION, SOLUTION INTRAMUSCULAR; INTRAVENOUS at 20:22

## 2022-06-26 RX ADMIN — MORPHINE SULFATE 2 MG: 4 INJECTION, SOLUTION INTRAMUSCULAR; INTRAVENOUS at 10:26

## 2022-06-26 RX ADMIN — HYDROXYZINE HYDROCHLORIDE 25 MG: 25 TABLET ORAL at 12:16

## 2022-06-26 RX ADMIN — HYDROXYZINE HYDROCHLORIDE 25 MG: 25 TABLET ORAL at 22:38

## 2022-06-26 RX ADMIN — SUCRALFATE 1 G: 1 TABLET ORAL at 20:23

## 2022-06-26 RX ADMIN — SUCRALFATE 1 G: 1 TABLET ORAL at 12:16

## 2022-06-26 RX ADMIN — MORPHINE SULFATE 2 MG: 4 INJECTION, SOLUTION INTRAMUSCULAR; INTRAVENOUS at 05:19

## 2022-06-26 RX ADMIN — FAMOTIDINE 20 MG: 20 TABLET ORAL at 20:22

## 2022-06-26 RX ADMIN — Medication 1 TABLET: at 20:23

## 2022-06-26 RX ADMIN — ASPIRIN 81 MG: 81 TABLET, CHEWABLE ORAL at 12:16

## 2022-06-26 RX ADMIN — POTASSIUM CHLORIDE AND SODIUM CHLORIDE 75 ML/HR: 900; 150 INJECTION, SOLUTION INTRAVENOUS at 14:31

## 2022-06-26 RX ADMIN — HYDROXYZINE HYDROCHLORIDE 25 MG: 25 TABLET ORAL at 17:03

## 2022-06-26 RX ADMIN — ATENOLOL 100 MG: 50 TABLET ORAL at 12:16

## 2022-06-26 RX ADMIN — SUCRALFATE 1 G: 1 TABLET ORAL at 17:03

## 2022-06-27 ENCOUNTER — ANESTHESIA (OUTPATIENT)
Dept: PERIOP | Facility: HOSPITAL | Age: 72
End: 2022-06-27

## 2022-06-27 ENCOUNTER — APPOINTMENT (OUTPATIENT)
Dept: GENERAL RADIOLOGY | Facility: HOSPITAL | Age: 72
End: 2022-06-27

## 2022-06-27 ENCOUNTER — ANESTHESIA EVENT (OUTPATIENT)
Dept: PERIOP | Facility: HOSPITAL | Age: 72
End: 2022-06-27

## 2022-06-27 PROBLEM — Z96.641 STATUS POST RIGHT HIP REPLACEMENT: Status: ACTIVE | Noted: 2022-06-27

## 2022-06-27 PROBLEM — S72.001A CLOSED FRACTURE OF NECK OF RIGHT FEMUR, INITIAL ENCOUNTER (HCC): Status: RESOLVED | Noted: 2022-06-25 | Resolved: 2022-06-27

## 2022-06-27 LAB
ALBUMIN SERPL-MCNC: 3.9 G/DL (ref 3.5–5.2)
ALBUMIN/GLOB SERPL: 1.1 G/DL
ALP SERPL-CCNC: 68 U/L (ref 39–117)
ALT SERPL W P-5'-P-CCNC: 22 U/L (ref 1–41)
ANION GAP SERPL CALCULATED.3IONS-SCNC: 9 MMOL/L (ref 5–15)
AST SERPL-CCNC: 37 U/L (ref 1–40)
BASOPHILS # BLD AUTO: 0.02 10*3/MM3 (ref 0–0.2)
BASOPHILS NFR BLD AUTO: 0.3 % (ref 0–1.5)
BILIRUB SERPL-MCNC: 0.6 MG/DL (ref 0–1.2)
BUN SERPL-MCNC: 23 MG/DL (ref 8–23)
BUN/CREAT SERPL: 16.9 (ref 7–25)
CALCIUM SPEC-SCNC: 10 MG/DL (ref 8.6–10.5)
CHLORIDE SERPL-SCNC: 102 MMOL/L (ref 98–107)
CHOLEST SERPL-MCNC: 174 MG/DL (ref 0–200)
CO2 SERPL-SCNC: 29 MMOL/L (ref 22–29)
CREAT SERPL-MCNC: 1.36 MG/DL (ref 0.76–1.27)
DEPRECATED RDW RBC AUTO: 40.6 FL (ref 37–54)
EGFRCR SERPLBLD CKD-EPI 2021: 54.9 ML/MIN/1.73
EOSINOPHIL # BLD AUTO: 0.05 10*3/MM3 (ref 0–0.4)
EOSINOPHIL NFR BLD AUTO: 0.7 % (ref 0.3–6.2)
ERYTHROCYTE [DISTWIDTH] IN BLOOD BY AUTOMATED COUNT: 11.6 % (ref 12.3–15.4)
GLOBULIN UR ELPH-MCNC: 3.5 GM/DL
GLUCOSE SERPL-MCNC: 123 MG/DL (ref 65–99)
HBA1C MFR BLD: 4.6 % (ref 4.8–5.6)
HCT VFR BLD AUTO: 36.3 % (ref 37.5–51)
HDLC SERPL-MCNC: 108 MG/DL (ref 40–60)
HGB BLD-MCNC: 11.9 G/DL (ref 13–17.7)
IRON 24H UR-MRATE: 15 MCG/DL (ref 59–158)
IRON SATN MFR SERPL: 6 % (ref 20–50)
LDLC SERPL CALC-MCNC: 48 MG/DL (ref 0–100)
LDLC/HDLC SERPL: 0.42 {RATIO}
LYMPHOCYTES # BLD AUTO: 0.96 10*3/MM3 (ref 0.7–3.1)
LYMPHOCYTES NFR BLD AUTO: 13.3 % (ref 19.6–45.3)
MAGNESIUM SERPL-MCNC: 2 MG/DL (ref 1.6–2.4)
MCH RBC QN AUTO: 31.7 PG (ref 26.6–33)
MCHC RBC AUTO-ENTMCNC: 32.8 G/DL (ref 31.5–35.7)
MCV RBC AUTO: 96.8 FL (ref 79–97)
MONOCYTES # BLD AUTO: 0.27 10*3/MM3 (ref 0.1–0.9)
MONOCYTES NFR BLD AUTO: 3.7 % (ref 5–12)
NEUTROPHILS NFR BLD AUTO: 5.88 10*3/MM3 (ref 1.7–7)
NEUTROPHILS NFR BLD AUTO: 81.6 % (ref 42.7–76)
PHOSPHATE SERPL-MCNC: 2.3 MG/DL (ref 2.5–4.5)
PLATELET # BLD AUTO: 150 10*3/MM3 (ref 140–450)
PMV BLD AUTO: 9.8 FL (ref 6–12)
POTASSIUM SERPL-SCNC: 4.5 MMOL/L (ref 3.5–5.2)
PROT SERPL-MCNC: 7.4 G/DL (ref 6–8.5)
RBC # BLD AUTO: 3.75 10*6/MM3 (ref 4.14–5.8)
SODIUM SERPL-SCNC: 140 MMOL/L (ref 136–145)
TIBC SERPL-MCNC: 250 MCG/DL (ref 298–536)
TRANSFERRIN SERPL-MCNC: 168 MG/DL (ref 200–360)
TRIGL SERPL-MCNC: 105 MG/DL (ref 0–150)
TSH SERPL DL<=0.05 MIU/L-ACNC: 1.83 UIU/ML (ref 0.27–4.2)
URATE SERPL-MCNC: 8.7 MG/DL (ref 3.4–7)
VLDLC SERPL-MCNC: 18 MG/DL (ref 5–40)
WBC NRBC COR # BLD: 7.21 10*3/MM3 (ref 3.4–10.8)

## 2022-06-27 PROCEDURE — 83540 ASSAY OF IRON: CPT | Performed by: INTERNAL MEDICINE

## 2022-06-27 PROCEDURE — 76000 FLUOROSCOPY <1 HR PHYS/QHP: CPT

## 2022-06-27 PROCEDURE — 84466 ASSAY OF TRANSFERRIN: CPT | Performed by: INTERNAL MEDICINE

## 2022-06-27 PROCEDURE — 25010000002 ROPIVACAINE PER 1 MG: Performed by: ORTHOPAEDIC SURGERY

## 2022-06-27 PROCEDURE — 25010000002 NEOSTIGMINE 5 MG/10ML SOLUTION: Performed by: ANESTHESIOLOGY

## 2022-06-27 PROCEDURE — 83735 ASSAY OF MAGNESIUM: CPT | Performed by: INTERNAL MEDICINE

## 2022-06-27 PROCEDURE — 25010000002 HYDROMORPHONE PER 4 MG: Performed by: ORTHOPAEDIC SURGERY

## 2022-06-27 PROCEDURE — 25010000002 CLONIDINE PER 1 MG: Performed by: ORTHOPAEDIC SURGERY

## 2022-06-27 PROCEDURE — 25010000002 DEXAMETHASONE PER 1 MG: Performed by: NURSE ANESTHETIST, CERTIFIED REGISTERED

## 2022-06-27 PROCEDURE — 73502 X-RAY EXAM HIP UNI 2-3 VIEWS: CPT

## 2022-06-27 PROCEDURE — 25010000002 LORAZEPAM PER 2 MG: Performed by: ORTHOPAEDIC SURGERY

## 2022-06-27 PROCEDURE — 25010000002 SODIUM CHLORIDE 0.9 % WITH KCL 20 MEQ 20-0.9 MEQ/L-% SOLUTION: Performed by: HOSPITALIST

## 2022-06-27 PROCEDURE — 25010000002 PROPOFOL 10 MG/ML EMULSION: Performed by: NURSE ANESTHETIST, CERTIFIED REGISTERED

## 2022-06-27 PROCEDURE — 25010000002 MORPHINE PER 10 MG: Performed by: HOSPITALIST

## 2022-06-27 PROCEDURE — 0SR904A REPLACEMENT OF RIGHT HIP JOINT WITH CERAMIC ON POLYETHYLENE SYNTHETIC SUBSTITUTE, UNCEMENTED, OPEN APPROACH: ICD-10-PCS | Performed by: ORTHOPAEDIC SURGERY

## 2022-06-27 PROCEDURE — 85025 COMPLETE CBC W/AUTO DIFF WBC: CPT | Performed by: INTERNAL MEDICINE

## 2022-06-27 PROCEDURE — 73501 X-RAY EXAM HIP UNI 1 VIEW: CPT

## 2022-06-27 PROCEDURE — 84550 ASSAY OF BLOOD/URIC ACID: CPT | Performed by: INTERNAL MEDICINE

## 2022-06-27 PROCEDURE — 84100 ASSAY OF PHOSPHORUS: CPT | Performed by: INTERNAL MEDICINE

## 2022-06-27 PROCEDURE — 80053 COMPREHEN METABOLIC PANEL: CPT | Performed by: HOSPITALIST

## 2022-06-27 PROCEDURE — C1776 JOINT DEVICE (IMPLANTABLE): HCPCS | Performed by: ORTHOPAEDIC SURGERY

## 2022-06-27 PROCEDURE — 25010000002 FENTANYL CITRATE (PF) 50 MCG/ML SOLUTION: Performed by: ANESTHESIOLOGY

## 2022-06-27 PROCEDURE — 80061 LIPID PANEL: CPT | Performed by: HOSPITALIST

## 2022-06-27 PROCEDURE — 25010000002 ONDANSETRON PER 1 MG: Performed by: ANESTHESIOLOGY

## 2022-06-27 PROCEDURE — 25010000002 FENTANYL CITRATE (PF) 50 MCG/ML SOLUTION: Performed by: NURSE ANESTHETIST, CERTIFIED REGISTERED

## 2022-06-27 PROCEDURE — 83036 HEMOGLOBIN GLYCOSYLATED A1C: CPT | Performed by: HOSPITALIST

## 2022-06-27 PROCEDURE — 84443 ASSAY THYROID STIM HORMONE: CPT | Performed by: HOSPITALIST

## 2022-06-27 PROCEDURE — 25010000002 CEFAZOLIN IN DEXTROSE 2-4 GM/100ML-% SOLUTION: Performed by: ORTHOPAEDIC SURGERY

## 2022-06-27 DEVICE — TOTL HIP COP DEPUY 9641334: Type: IMPLANTABLE DEVICE | Site: HIP | Status: FUNCTIONAL

## 2022-06-27 DEVICE — BIOLOX DELTA CERAMIC FEMORAL HEAD +8.5 36MM DIA 12/14 TAPER
Type: IMPLANTABLE DEVICE | Site: HIP | Status: FUNCTIONAL
Brand: BIOLOX DELTA

## 2022-06-27 DEVICE — KNOTLESS TISSUE CONTROL DEVICE, VIOLET UNIDIRECTIONAL (ANTIBACTERIAL) SYNTHETIC ABSORBABLE DEVICE
Type: IMPLANTABLE DEVICE | Site: HIP | Status: FUNCTIONAL
Brand: STRATAFIX

## 2022-06-27 DEVICE — PINNACLE POROCOAT ACETABULAR SHELL SECTOR II 54MM OD
Type: IMPLANTABLE DEVICE | Site: HIP | Status: FUNCTIONAL
Brand: PINNACLE POROCOAT

## 2022-06-27 DEVICE — ACTIS DUOFIX HIP PROSTHESIS (FEMORAL STEM 12/14 TAPER CEMENTLESS SIZE 6 STD COLLAR)  CE
Type: IMPLANTABLE DEVICE | Site: HIP | Status: FUNCTIONAL
Brand: ACTIS

## 2022-06-27 DEVICE — PINNACLE HIP SOLUTIONS ALTRX POLYETHYLENE ACETABULAR LINER NEUTRAL 36MM ID 54MM OD
Type: IMPLANTABLE DEVICE | Site: HIP | Status: FUNCTIONAL
Brand: PINNACLE ALTRX

## 2022-06-27 DEVICE — SUT FW #2 W/TPR NDL 1/2 CIR 38IN 97CM 26.5MM BLU: Type: IMPLANTABLE DEVICE | Site: HIP | Status: FUNCTIONAL

## 2022-06-27 RX ORDER — SODIUM CHLORIDE 0.9 % (FLUSH) 0.9 %
3-10 SYRINGE (ML) INJECTION AS NEEDED
Status: DISCONTINUED | OUTPATIENT
Start: 2022-06-27 | End: 2022-06-27 | Stop reason: HOSPADM

## 2022-06-27 RX ORDER — FENTANYL CITRATE 50 UG/ML
50 INJECTION, SOLUTION INTRAMUSCULAR; INTRAVENOUS
Status: DISCONTINUED | OUTPATIENT
Start: 2022-06-27 | End: 2022-06-27 | Stop reason: HOSPADM

## 2022-06-27 RX ORDER — LORAZEPAM 2 MG/ML
1 INJECTION INTRAMUSCULAR
Status: DISCONTINUED | OUTPATIENT
Start: 2022-06-27 | End: 2022-06-27 | Stop reason: SDUPTHER

## 2022-06-27 RX ORDER — DIPHENHYDRAMINE HCL 25 MG
25 CAPSULE ORAL
Status: DISCONTINUED | OUTPATIENT
Start: 2022-06-27 | End: 2022-06-27 | Stop reason: HOSPADM

## 2022-06-27 RX ORDER — EPHEDRINE SULFATE 50 MG/ML
5 INJECTION, SOLUTION INTRAVENOUS ONCE AS NEEDED
Status: DISCONTINUED | OUTPATIENT
Start: 2022-06-27 | End: 2022-06-27 | Stop reason: HOSPADM

## 2022-06-27 RX ORDER — LIDOCAINE HYDROCHLORIDE 10 MG/ML
0.5 INJECTION, SOLUTION EPIDURAL; INFILTRATION; INTRACAUDAL; PERINEURAL ONCE AS NEEDED
Status: DISCONTINUED | OUTPATIENT
Start: 2022-06-27 | End: 2022-06-27 | Stop reason: HOSPADM

## 2022-06-27 RX ORDER — LORAZEPAM 2 MG/ML
0.5 INJECTION INTRAMUSCULAR
Status: DISCONTINUED | OUTPATIENT
Start: 2022-06-27 | End: 2022-06-30

## 2022-06-27 RX ORDER — HYDROCODONE BITARTRATE AND ACETAMINOPHEN 7.5; 325 MG/1; MG/1
1 TABLET ORAL ONCE AS NEEDED
Status: DISCONTINUED | OUTPATIENT
Start: 2022-06-27 | End: 2022-06-27 | Stop reason: HOSPADM

## 2022-06-27 RX ORDER — FLUMAZENIL 0.1 MG/ML
0.2 INJECTION INTRAVENOUS AS NEEDED
Status: DISCONTINUED | OUTPATIENT
Start: 2022-06-27 | End: 2022-06-27 | Stop reason: HOSPADM

## 2022-06-27 RX ORDER — LORAZEPAM 0.5 MG/1
0.5 TABLET ORAL
Status: DISCONTINUED | OUTPATIENT
Start: 2022-06-27 | End: 2022-06-27 | Stop reason: SDUPTHER

## 2022-06-27 RX ORDER — LORAZEPAM 0.5 MG/1
0.5 TABLET ORAL EVERY 6 HOURS
Status: DISCONTINUED | OUTPATIENT
Start: 2022-06-28 | End: 2022-06-28

## 2022-06-27 RX ORDER — SODIUM CHLORIDE 0.9 % (FLUSH) 0.9 %
3 SYRINGE (ML) INJECTION EVERY 12 HOURS SCHEDULED
Status: DISCONTINUED | OUTPATIENT
Start: 2022-06-27 | End: 2022-06-27 | Stop reason: HOSPADM

## 2022-06-27 RX ORDER — SODIUM CHLORIDE 9 MG/ML
50 INJECTION, SOLUTION INTRAVENOUS CONTINUOUS
Status: ACTIVE | OUTPATIENT
Start: 2022-06-27 | End: 2022-06-27

## 2022-06-27 RX ORDER — NALOXONE HCL 0.4 MG/ML
0.2 VIAL (ML) INJECTION AS NEEDED
Status: DISCONTINUED | OUTPATIENT
Start: 2022-06-27 | End: 2022-06-27 | Stop reason: HOSPADM

## 2022-06-27 RX ORDER — DEXAMETHASONE SODIUM PHOSPHATE 10 MG/ML
INJECTION INTRAMUSCULAR; INTRAVENOUS AS NEEDED
Status: DISCONTINUED | OUTPATIENT
Start: 2022-06-27 | End: 2022-06-27 | Stop reason: SURG

## 2022-06-27 RX ORDER — LABETALOL HYDROCHLORIDE 5 MG/ML
5 INJECTION, SOLUTION INTRAVENOUS
Status: DISCONTINUED | OUTPATIENT
Start: 2022-06-27 | End: 2022-06-27 | Stop reason: HOSPADM

## 2022-06-27 RX ORDER — FAMOTIDINE 10 MG/ML
20 INJECTION, SOLUTION INTRAVENOUS ONCE
Status: DISCONTINUED | OUTPATIENT
Start: 2022-06-27 | End: 2022-06-27 | Stop reason: HOSPADM

## 2022-06-27 RX ORDER — HYDRALAZINE HYDROCHLORIDE 20 MG/ML
5 INJECTION INTRAMUSCULAR; INTRAVENOUS
Status: DISCONTINUED | OUTPATIENT
Start: 2022-06-27 | End: 2022-06-27 | Stop reason: HOSPADM

## 2022-06-27 RX ORDER — LORAZEPAM 1 MG/1
1 TABLET ORAL
Status: DISCONTINUED | OUTPATIENT
Start: 2022-06-27 | End: 2022-06-27 | Stop reason: SDUPTHER

## 2022-06-27 RX ORDER — FAMOTIDINE 10 MG/ML
20 INJECTION, SOLUTION INTRAVENOUS ONCE
Status: COMPLETED | OUTPATIENT
Start: 2022-06-27 | End: 2022-06-27

## 2022-06-27 RX ORDER — LORAZEPAM 0.5 MG/1
0.5 TABLET ORAL
Status: DISCONTINUED | OUTPATIENT
Start: 2022-06-27 | End: 2022-06-30

## 2022-06-27 RX ORDER — CEFAZOLIN SODIUM 2 G/100ML
2 INJECTION, SOLUTION INTRAVENOUS ONCE
Status: COMPLETED | OUTPATIENT
Start: 2022-06-27 | End: 2022-06-27

## 2022-06-27 RX ORDER — LORAZEPAM 2 MG/ML
0.5 INJECTION INTRAMUSCULAR
Status: DISCONTINUED | OUTPATIENT
Start: 2022-06-27 | End: 2022-06-27 | Stop reason: SDUPTHER

## 2022-06-27 RX ORDER — ACETAMINOPHEN 10 MG/ML
INJECTION, SOLUTION INTRAVENOUS AS NEEDED
Status: DISCONTINUED | OUTPATIENT
Start: 2022-06-27 | End: 2022-06-27 | Stop reason: SURG

## 2022-06-27 RX ORDER — LIDOCAINE HYDROCHLORIDE 20 MG/ML
INJECTION, SOLUTION INFILTRATION; PERINEURAL AS NEEDED
Status: DISCONTINUED | OUTPATIENT
Start: 2022-06-27 | End: 2022-06-27 | Stop reason: SURG

## 2022-06-27 RX ORDER — ACETAMINOPHEN 500 MG
1000 TABLET ORAL EVERY 8 HOURS
Status: DISCONTINUED | OUTPATIENT
Start: 2022-06-27 | End: 2022-06-28

## 2022-06-27 RX ORDER — HYDROMORPHONE HYDROCHLORIDE 1 MG/ML
0.5 INJECTION, SOLUTION INTRAMUSCULAR; INTRAVENOUS; SUBCUTANEOUS
Status: DISCONTINUED | OUTPATIENT
Start: 2022-06-27 | End: 2022-06-27 | Stop reason: HOSPADM

## 2022-06-27 RX ORDER — UREA 10 %
1 LOTION (ML) TOPICAL NIGHTLY PRN
Status: DISCONTINUED | OUTPATIENT
Start: 2022-06-27 | End: 2022-07-06

## 2022-06-27 RX ORDER — TRANEXAMIC ACID 100 MG/ML
INJECTION, SOLUTION INTRAVENOUS AS NEEDED
Status: DISCONTINUED | OUTPATIENT
Start: 2022-06-27 | End: 2022-06-27 | Stop reason: SURG

## 2022-06-27 RX ORDER — LORAZEPAM 1 MG/1
2 TABLET ORAL
Status: DISCONTINUED | OUTPATIENT
Start: 2022-06-27 | End: 2022-06-30

## 2022-06-27 RX ORDER — NEOSTIGMINE METHYLSULFATE 0.5 MG/ML
INJECTION, SOLUTION INTRAVENOUS AS NEEDED
Status: DISCONTINUED | OUTPATIENT
Start: 2022-06-27 | End: 2022-06-27 | Stop reason: SURG

## 2022-06-27 RX ORDER — ONDANSETRON 2 MG/ML
INJECTION INTRAMUSCULAR; INTRAVENOUS AS NEEDED
Status: DISCONTINUED | OUTPATIENT
Start: 2022-06-27 | End: 2022-06-27 | Stop reason: SURG

## 2022-06-27 RX ORDER — MAGNESIUM HYDROXIDE 1200 MG/15ML
LIQUID ORAL AS NEEDED
Status: DISCONTINUED | OUTPATIENT
Start: 2022-06-27 | End: 2022-06-27 | Stop reason: HOSPADM

## 2022-06-27 RX ORDER — ROCURONIUM BROMIDE 10 MG/ML
INJECTION, SOLUTION INTRAVENOUS AS NEEDED
Status: DISCONTINUED | OUTPATIENT
Start: 2022-06-27 | End: 2022-06-27 | Stop reason: SURG

## 2022-06-27 RX ORDER — PROPOFOL 10 MG/ML
VIAL (ML) INTRAVENOUS AS NEEDED
Status: DISCONTINUED | OUTPATIENT
Start: 2022-06-27 | End: 2022-06-27 | Stop reason: SURG

## 2022-06-27 RX ORDER — LORAZEPAM 1 MG/1
1 TABLET ORAL
Status: DISCONTINUED | OUTPATIENT
Start: 2022-06-27 | End: 2022-06-30

## 2022-06-27 RX ORDER — LORAZEPAM 2 MG/ML
1 INJECTION INTRAMUSCULAR
Status: DISCONTINUED | OUTPATIENT
Start: 2022-06-27 | End: 2022-06-28

## 2022-06-27 RX ORDER — HYDROCODONE BITARTRATE AND ACETAMINOPHEN 7.5; 325 MG/1; MG/1
1 TABLET ORAL EVERY 4 HOURS PRN
Status: DISCONTINUED | OUTPATIENT
Start: 2022-06-27 | End: 2022-07-06

## 2022-06-27 RX ORDER — GLYCOPYRROLATE 0.2 MG/ML
INJECTION INTRAMUSCULAR; INTRAVENOUS AS NEEDED
Status: DISCONTINUED | OUTPATIENT
Start: 2022-06-27 | End: 2022-06-27 | Stop reason: SURG

## 2022-06-27 RX ORDER — DIPHENHYDRAMINE HYDROCHLORIDE 50 MG/ML
12.5 INJECTION INTRAMUSCULAR; INTRAVENOUS
Status: DISCONTINUED | OUTPATIENT
Start: 2022-06-27 | End: 2022-06-27 | Stop reason: HOSPADM

## 2022-06-27 RX ORDER — MIDAZOLAM HYDROCHLORIDE 1 MG/ML
0.5 INJECTION INTRAMUSCULAR; INTRAVENOUS
Status: DISCONTINUED | OUTPATIENT
Start: 2022-06-27 | End: 2022-06-27 | Stop reason: HOSPADM

## 2022-06-27 RX ORDER — ASPIRIN 81 MG/1
81 TABLET ORAL EVERY 12 HOURS SCHEDULED
Status: DISCONTINUED | OUTPATIENT
Start: 2022-06-27 | End: 2022-06-30

## 2022-06-27 RX ORDER — BISACODYL 5 MG/1
10 TABLET, DELAYED RELEASE ORAL DAILY PRN
Status: DISCONTINUED | OUTPATIENT
Start: 2022-06-28 | End: 2022-07-06

## 2022-06-27 RX ORDER — LORAZEPAM 2 MG/ML
2 INJECTION INTRAMUSCULAR
Status: DISCONTINUED | OUTPATIENT
Start: 2022-06-27 | End: 2022-06-28

## 2022-06-27 RX ORDER — CEFAZOLIN SODIUM 2 G/100ML
2 INJECTION, SOLUTION INTRAVENOUS EVERY 8 HOURS
Status: COMPLETED | OUTPATIENT
Start: 2022-06-27 | End: 2022-06-28

## 2022-06-27 RX ORDER — PROMETHAZINE HYDROCHLORIDE 25 MG/1
25 TABLET ORAL ONCE AS NEEDED
Status: DISCONTINUED | OUTPATIENT
Start: 2022-06-27 | End: 2022-06-27 | Stop reason: HOSPADM

## 2022-06-27 RX ORDER — OXYCODONE AND ACETAMINOPHEN 7.5; 325 MG/1; MG/1
1 TABLET ORAL EVERY 4 HOURS PRN
Status: DISCONTINUED | OUTPATIENT
Start: 2022-06-27 | End: 2022-06-27 | Stop reason: HOSPADM

## 2022-06-27 RX ORDER — DOCUSATE SODIUM 100 MG/1
100 CAPSULE, LIQUID FILLED ORAL 2 TIMES DAILY
Status: DISCONTINUED | OUTPATIENT
Start: 2022-06-27 | End: 2022-07-06 | Stop reason: HOSPADM

## 2022-06-27 RX ORDER — SODIUM CHLORIDE, SODIUM LACTATE, POTASSIUM CHLORIDE, CALCIUM CHLORIDE 600; 310; 30; 20 MG/100ML; MG/100ML; MG/100ML; MG/100ML
9 INJECTION, SOLUTION INTRAVENOUS CONTINUOUS
Status: DISCONTINUED | OUTPATIENT
Start: 2022-06-27 | End: 2022-06-27

## 2022-06-27 RX ORDER — FENTANYL CITRATE 50 UG/ML
INJECTION, SOLUTION INTRAMUSCULAR; INTRAVENOUS AS NEEDED
Status: DISCONTINUED | OUTPATIENT
Start: 2022-06-27 | End: 2022-06-27 | Stop reason: SURG

## 2022-06-27 RX ORDER — HYDROMORPHONE HYDROCHLORIDE 1 MG/ML
0.5 INJECTION, SOLUTION INTRAMUSCULAR; INTRAVENOUS; SUBCUTANEOUS
Status: DISCONTINUED | OUTPATIENT
Start: 2022-06-27 | End: 2022-06-28

## 2022-06-27 RX ORDER — ONDANSETRON 2 MG/ML
4 INJECTION INTRAMUSCULAR; INTRAVENOUS ONCE AS NEEDED
Status: DISCONTINUED | OUTPATIENT
Start: 2022-06-27 | End: 2022-06-27 | Stop reason: HOSPADM

## 2022-06-27 RX ORDER — HYDROCODONE BITARTRATE AND ACETAMINOPHEN 7.5; 325 MG/1; MG/1
2 TABLET ORAL EVERY 4 HOURS PRN
Status: DISCONTINUED | OUTPATIENT
Start: 2022-06-27 | End: 2022-06-28

## 2022-06-27 RX ORDER — LORAZEPAM 0.5 MG/1
0.5 TABLET ORAL EVERY 8 HOURS
Status: DISCONTINUED | OUTPATIENT
Start: 2022-06-29 | End: 2022-06-28

## 2022-06-27 RX ORDER — NALOXONE HCL 0.4 MG/ML
0.1 VIAL (ML) INJECTION
Status: DISCONTINUED | OUTPATIENT
Start: 2022-06-27 | End: 2022-06-28

## 2022-06-27 RX ORDER — PROMETHAZINE HYDROCHLORIDE 25 MG/1
25 SUPPOSITORY RECTAL ONCE AS NEEDED
Status: DISCONTINUED | OUTPATIENT
Start: 2022-06-27 | End: 2022-06-27 | Stop reason: HOSPADM

## 2022-06-27 RX ORDER — IBUPROFEN 600 MG/1
600 TABLET ORAL ONCE AS NEEDED
Status: DISCONTINUED | OUTPATIENT
Start: 2022-06-27 | End: 2022-06-27 | Stop reason: HOSPADM

## 2022-06-27 RX ADMIN — CEFAZOLIN SODIUM 2 G: 2 INJECTION, SOLUTION INTRAVENOUS at 13:40

## 2022-06-27 RX ADMIN — ONDANSETRON 4 MG: 2 INJECTION INTRAMUSCULAR; INTRAVENOUS at 15:19

## 2022-06-27 RX ADMIN — MORPHINE SULFATE 2 MG: 4 INJECTION, SOLUTION INTRAMUSCULAR; INTRAVENOUS at 06:02

## 2022-06-27 RX ADMIN — FENTANYL CITRATE 50 MCG: 50 INJECTION INTRAMUSCULAR; INTRAVENOUS at 14:20

## 2022-06-27 RX ADMIN — ROCURONIUM BROMIDE 10 MG: 50 INJECTION INTRAVENOUS at 14:45

## 2022-06-27 RX ADMIN — LORAZEPAM 1 MG: 2 INJECTION, SOLUTION INTRAMUSCULAR; INTRAVENOUS at 23:48

## 2022-06-27 RX ADMIN — GLYCOPYRROLATE 0.6 MG: 0.2 INJECTION INTRAMUSCULAR; INTRAVENOUS at 15:19

## 2022-06-27 RX ADMIN — SODIUM CHLORIDE, POTASSIUM CHLORIDE, SODIUM LACTATE AND CALCIUM CHLORIDE 9 ML/HR: 600; 310; 30; 20 INJECTION, SOLUTION INTRAVENOUS at 12:35

## 2022-06-27 RX ADMIN — FENTANYL CITRATE 50 MCG: 50 INJECTION INTRAMUSCULAR; INTRAVENOUS at 14:30

## 2022-06-27 RX ADMIN — MORPHINE SULFATE 2 MG: 4 INJECTION, SOLUTION INTRAMUSCULAR; INTRAVENOUS at 01:22

## 2022-06-27 RX ADMIN — POTASSIUM CHLORIDE AND SODIUM CHLORIDE 75 ML/HR: 900; 150 INJECTION, SOLUTION INTRAVENOUS at 04:04

## 2022-06-27 RX ADMIN — ACETAMINOPHEN 1000 MG: 10 INJECTION, SOLUTION INTRAVENOUS at 14:11

## 2022-06-27 RX ADMIN — LORAZEPAM 0.5 MG: 2 INJECTION, SOLUTION INTRAMUSCULAR; INTRAVENOUS at 19:45

## 2022-06-27 RX ADMIN — LIDOCAINE HYDROCHLORIDE 80 MG: 20 INJECTION, SOLUTION INFILTRATION; PERINEURAL at 13:51

## 2022-06-27 RX ADMIN — ATENOLOL 100 MG: 50 TABLET ORAL at 09:19

## 2022-06-27 RX ADMIN — FENTANYL CITRATE 50 MCG: 50 INJECTION INTRAMUSCULAR; INTRAVENOUS at 15:38

## 2022-06-27 RX ADMIN — PROPOFOL 100 MG: 10 INJECTION, EMULSION INTRAVENOUS at 13:51

## 2022-06-27 RX ADMIN — FENTANYL CITRATE 50 MCG: 50 INJECTION INTRAMUSCULAR; INTRAVENOUS at 15:57

## 2022-06-27 RX ADMIN — FAMOTIDINE 20 MG: 10 INJECTION, SOLUTION INTRAVENOUS at 12:32

## 2022-06-27 RX ADMIN — NEOSTIGMINE METHYLSULFATE 3 MG: 0.5 INJECTION INTRAVENOUS at 15:19

## 2022-06-27 RX ADMIN — TRANEXAMIC ACID 1000 MG: 1 INJECTION, SOLUTION INTRAVENOUS at 14:15

## 2022-06-27 RX ADMIN — HYDROMORPHONE HYDROCHLORIDE 0.5 MG: 1 INJECTION, SOLUTION INTRAMUSCULAR; INTRAVENOUS; SUBCUTANEOUS at 23:04

## 2022-06-27 RX ADMIN — FENTANYL CITRATE 50 MCG: 50 INJECTION INTRAMUSCULAR; INTRAVENOUS at 13:51

## 2022-06-27 RX ADMIN — SUGAMMADEX 200 MG: 100 INJECTION, SOLUTION INTRAVENOUS at 15:24

## 2022-06-27 RX ADMIN — ROCURONIUM BROMIDE 35 MG: 50 INJECTION INTRAVENOUS at 13:52

## 2022-06-27 RX ADMIN — CEFAZOLIN SODIUM 2 G: 2 INJECTION, SOLUTION INTRAVENOUS at 22:45

## 2022-06-27 RX ADMIN — FENTANYL CITRATE 50 MCG: 50 INJECTION INTRAMUSCULAR; INTRAVENOUS at 12:20

## 2022-06-27 RX ADMIN — DEXAMETHASONE SODIUM PHOSPHATE 10 MG: 10 INJECTION INTRAMUSCULAR; INTRAVENOUS at 14:17

## 2022-06-27 RX ADMIN — HYDROXYZINE HYDROCHLORIDE 25 MG: 25 TABLET ORAL at 23:26

## 2022-06-27 NOTE — ANESTHESIA POSTPROCEDURE EVALUATION
"Patient: Yuval JORGE Loja    Procedure Summary     Date: 06/27/22 Room / Location: [facility] OR [address] [place] [address] MAIN OR    Anesthesia Start: 1347 Anesthesia Stop: 1550    Procedure: TOTAL HIP ARTHROPLASTY ANTERIOR WITH HANA TABLE (Right Hip) Diagnosis:       Closed fracture of neck of right femur (HCC)      (Closed fracture of neck of right femur)    Surgeons: Willian Quiles MD Provider: Yvette Colon MD    Anesthesia Type: general ASA Status: 4          Anesthesia Type: general    Vitals  Vitals Value Taken Time   /69 06/27/22 1716   Temp 37.2 °C (98.9 °F) 06/27/22 1550   Pulse 72 06/27/22 1719   Resp 16 06/27/22 1700   SpO2 97 % 06/27/22 1719   Vitals shown include unvalidated device data.        Post Anesthesia Care and Evaluation    Patient location during evaluation: PACU  Patient participation: complete - patient participated  Level of consciousness: awake  Pain management: adequate    Airway patency: patent  Anesthetic complications: No anesthetic complications    Cardiovascular status: acceptable  Respiratory status: acceptable  Hydration status: acceptable    Comments: /62   Pulse 76   Temp 37.2 °C (98.9 °F) (Oral)   Resp 16   Ht 175.3 cm (69\")   Wt 65.6 kg (144 lb 9.6 oz)   SpO2 98%   BMI 21.35 kg/m²       "

## 2022-06-27 NOTE — ANESTHESIA PREPROCEDURE EVALUATION
Anesthesia Evaluation                  Airway   Mallampati: II  TM distance: >3 FB  Neck ROM: full  No difficulty expected  Dental    (+) upper dentures and lower dentures    Pulmonary - normal exam   (+) a smoker, COPD,   Cardiovascular     Patient on routine beta blocker  Rhythm: irregular    (+) hypertension, past MI , CAD, dysrhythmias Atrial Fib,       Neuro/Psych  (+) CVA, headaches, psychiatric history,    GI/Hepatic/Renal/Endo    (+)  GERD, GI bleeding , liver disease, renal disease CRI,     Musculoskeletal     Abdominal    Substance History   (+) alcohol use, drug use     OB/GYN          Other                        Anesthesia Plan    ASA 4     general     intravenous induction     Anesthetic plan, risks, benefits, and alternatives have been provided, discussed and informed consent has been obtained with: patient.        CODE STATUS:    Level Of Support Discussed With: Patient  Code Status (Patient has no pulse and is not breathing): CPR (Attempt to Resuscitate)  Medical Interventions (Patient has pulse or is breathing): Full Support

## 2022-06-27 NOTE — ANESTHESIA PROCEDURE NOTES
Airway  Urgency: elective    Date/Time: 6/27/2022 1:54 PM  Airway not difficult    General Information and Staff    Patient location during procedure: OR  CRNA/CAA: Darlene Mojica CRNA    Indications and Patient Condition    Preoxygenated: yes  MILS maintained throughout  Mask difficulty assessment: 1 - vent by mask    Final Airway Details  Final airway type: endotracheal airway      Successful airway: ETT  Cuffed: yes   Successful intubation technique: direct laryngoscopy  Endotracheal tube insertion site: oral  Blade: Jennie  Blade size: 4  ETT size (mm): 7.5  Cormack-Lehane Classification: grade IIa - partial view of glottis  Placement verified by: chest auscultation and capnometry   Measured from: teeth  ETT/EBT  to teeth (cm): 23  Number of attempts at approach: 1  Assessment: lips, teeth, and gum same as pre-op and atraumatic intubation    Additional Comments  On bed. Teeth, tongue, lips, and gums in preop condition. VSS throughout. Easy mask/easy airway. Dentures removed-c/o RN

## 2022-06-28 LAB
ALBUMIN SERPL-MCNC: 3.5 G/DL (ref 3.5–5.2)
ANION GAP SERPL CALCULATED.3IONS-SCNC: 13 MMOL/L (ref 5–15)
BASOPHILS # BLD AUTO: 0.01 10*3/MM3 (ref 0–0.2)
BASOPHILS NFR BLD AUTO: 0.1 % (ref 0–1.5)
BUN SERPL-MCNC: 23 MG/DL (ref 8–23)
BUN/CREAT SERPL: 16.3 (ref 7–25)
CALCIUM SPEC-SCNC: 9.4 MG/DL (ref 8.6–10.5)
CHLORIDE SERPL-SCNC: 103 MMOL/L (ref 98–107)
CO2 SERPL-SCNC: 24 MMOL/L (ref 22–29)
CREAT SERPL-MCNC: 1.41 MG/DL (ref 0.76–1.27)
DEPRECATED RDW RBC AUTO: 38.7 FL (ref 37–54)
EGFRCR SERPLBLD CKD-EPI 2021: 52.6 ML/MIN/1.73
EOSINOPHIL # BLD AUTO: 0 10*3/MM3 (ref 0–0.4)
EOSINOPHIL NFR BLD AUTO: 0 % (ref 0.3–6.2)
ERYTHROCYTE [DISTWIDTH] IN BLOOD BY AUTOMATED COUNT: 11.3 % (ref 12.3–15.4)
GLUCOSE SERPL-MCNC: 132 MG/DL (ref 65–99)
HCT VFR BLD AUTO: 31.2 % (ref 37.5–51)
HGB BLD-MCNC: 10.3 G/DL (ref 13–17.7)
IMM GRANULOCYTES # BLD AUTO: 0.05 10*3/MM3 (ref 0–0.05)
IMM GRANULOCYTES NFR BLD AUTO: 0.5 % (ref 0–0.5)
LYMPHOCYTES # BLD AUTO: 0.61 10*3/MM3 (ref 0.7–3.1)
LYMPHOCYTES NFR BLD AUTO: 6.4 % (ref 19.6–45.3)
MAGNESIUM SERPL-MCNC: 1.9 MG/DL (ref 1.6–2.4)
MCH RBC QN AUTO: 31.3 PG (ref 26.6–33)
MCHC RBC AUTO-ENTMCNC: 33 G/DL (ref 31.5–35.7)
MCV RBC AUTO: 94.8 FL (ref 79–97)
MONOCYTES # BLD AUTO: 0.51 10*3/MM3 (ref 0.1–0.9)
MONOCYTES NFR BLD AUTO: 5.3 % (ref 5–12)
NEUTROPHILS NFR BLD AUTO: 8.37 10*3/MM3 (ref 1.7–7)
NEUTROPHILS NFR BLD AUTO: 87.7 % (ref 42.7–76)
NRBC BLD AUTO-RTO: 0 /100 WBC (ref 0–0.2)
PHOSPHATE SERPL-MCNC: 3.3 MG/DL (ref 2.5–4.5)
PLATELET # BLD AUTO: 131 10*3/MM3 (ref 140–450)
PMV BLD AUTO: 10.2 FL (ref 6–12)
POTASSIUM SERPL-SCNC: 5.1 MMOL/L (ref 3.5–5.2)
RBC # BLD AUTO: 3.29 10*6/MM3 (ref 4.14–5.8)
SODIUM SERPL-SCNC: 140 MMOL/L (ref 136–145)
URATE SERPL-MCNC: 9.5 MG/DL (ref 3.4–7)
WBC NRBC COR # BLD: 9.55 10*3/MM3 (ref 3.4–10.8)

## 2022-06-28 PROCEDURE — 80069 RENAL FUNCTION PANEL: CPT | Performed by: ORTHOPAEDIC SURGERY

## 2022-06-28 PROCEDURE — 84550 ASSAY OF BLOOD/URIC ACID: CPT | Performed by: ORTHOPAEDIC SURGERY

## 2022-06-28 PROCEDURE — 85025 COMPLETE CBC W/AUTO DIFF WBC: CPT | Performed by: ORTHOPAEDIC SURGERY

## 2022-06-28 PROCEDURE — 25010000002 CEFAZOLIN IN DEXTROSE 2-4 GM/100ML-% SOLUTION: Performed by: ORTHOPAEDIC SURGERY

## 2022-06-28 PROCEDURE — 83735 ASSAY OF MAGNESIUM: CPT | Performed by: ORTHOPAEDIC SURGERY

## 2022-06-28 PROCEDURE — 99232 SBSQ HOSP IP/OBS MODERATE 35: CPT | Performed by: NURSE PRACTITIONER

## 2022-06-28 RX ORDER — ALLOPURINOL 100 MG/1
100 TABLET ORAL DAILY
Status: DISCONTINUED | OUTPATIENT
Start: 2022-06-28 | End: 2022-07-06 | Stop reason: HOSPADM

## 2022-06-28 RX ORDER — HYDROCODONE BITARTRATE AND ACETAMINOPHEN 7.5; 325 MG/1; MG/1
1 TABLET ORAL EVERY 4 HOURS PRN
Status: DISCONTINUED | OUTPATIENT
Start: 2022-06-28 | End: 2022-06-28

## 2022-06-28 RX ADMIN — ATORVASTATIN CALCIUM 10 MG: 20 TABLET, FILM COATED ORAL at 20:02

## 2022-06-28 RX ADMIN — SUCRALFATE 1 G: 1 TABLET ORAL at 20:02

## 2022-06-28 RX ADMIN — Medication 1 MG: at 23:03

## 2022-06-28 RX ADMIN — DOCUSATE SODIUM 100 MG: 100 CAPSULE, LIQUID FILLED ORAL at 20:02

## 2022-06-28 RX ADMIN — FAMOTIDINE 20 MG: 20 TABLET ORAL at 20:02

## 2022-06-28 RX ADMIN — ATENOLOL 100 MG: 50 TABLET ORAL at 08:49

## 2022-06-28 RX ADMIN — HYDROCODONE BITARTRATE AND ACETAMINOPHEN 1 TABLET: 7.5; 325 TABLET ORAL at 20:04

## 2022-06-28 RX ADMIN — ACETAMINOPHEN 1000 MG: 500 TABLET ORAL at 14:55

## 2022-06-28 RX ADMIN — ACETAMINOPHEN 1000 MG: 500 TABLET ORAL at 06:49

## 2022-06-28 RX ADMIN — HYDROXYZINE HYDROCHLORIDE 25 MG: 25 TABLET ORAL at 14:55

## 2022-06-28 RX ADMIN — CEFAZOLIN SODIUM 2 G: 2 INJECTION, SOLUTION INTRAVENOUS at 05:24

## 2022-06-28 RX ADMIN — ASPIRIN 81 MG: 81 TABLET, COATED ORAL at 20:02

## 2022-06-28 RX ADMIN — HYDROXYZINE HYDROCHLORIDE 25 MG: 25 TABLET ORAL at 06:49

## 2022-06-28 RX ADMIN — ALLOPURINOL 100 MG: 100 TABLET ORAL at 17:41

## 2022-06-28 RX ADMIN — ASPIRIN 81 MG: 81 TABLET, COATED ORAL at 08:50

## 2022-06-29 LAB
ALBUMIN SERPL-MCNC: 2.9 G/DL (ref 3.5–5.2)
ANION GAP SERPL CALCULATED.3IONS-SCNC: 8.2 MMOL/L (ref 5–15)
BASOPHILS # BLD AUTO: 0.02 10*3/MM3 (ref 0–0.2)
BASOPHILS NFR BLD AUTO: 0.3 % (ref 0–1.5)
BUN SERPL-MCNC: 27 MG/DL (ref 8–23)
BUN/CREAT SERPL: 18.6 (ref 7–25)
CALCIUM SPEC-SCNC: 9.7 MG/DL (ref 8.6–10.5)
CHLORIDE SERPL-SCNC: 105 MMOL/L (ref 98–107)
CO2 SERPL-SCNC: 25.8 MMOL/L (ref 22–29)
CREAT SERPL-MCNC: 1.45 MG/DL (ref 0.76–1.27)
DEPRECATED RDW RBC AUTO: 40.1 FL (ref 37–54)
EGFRCR SERPLBLD CKD-EPI 2021: 50.9 ML/MIN/1.73
EOSINOPHIL # BLD AUTO: 0.08 10*3/MM3 (ref 0–0.4)
EOSINOPHIL NFR BLD AUTO: 1.2 % (ref 0.3–6.2)
ERYTHROCYTE [DISTWIDTH] IN BLOOD BY AUTOMATED COUNT: 11.4 % (ref 12.3–15.4)
GLUCOSE SERPL-MCNC: 139 MG/DL (ref 65–99)
HCT VFR BLD AUTO: 29.3 % (ref 37.5–51)
HGB BLD-MCNC: 9.2 G/DL (ref 13–17.7)
IMM GRANULOCYTES # BLD AUTO: 0.01 10*3/MM3 (ref 0–0.05)
IMM GRANULOCYTES NFR BLD AUTO: 0.2 % (ref 0–0.5)
LYMPHOCYTES # BLD AUTO: 1.65 10*3/MM3 (ref 0.7–3.1)
LYMPHOCYTES NFR BLD AUTO: 25 % (ref 19.6–45.3)
MAGNESIUM SERPL-MCNC: 2 MG/DL (ref 1.6–2.4)
MCH RBC QN AUTO: 30.8 PG (ref 26.6–33)
MCHC RBC AUTO-ENTMCNC: 31.4 G/DL (ref 31.5–35.7)
MCV RBC AUTO: 98 FL (ref 79–97)
MONOCYTES # BLD AUTO: 0.52 10*3/MM3 (ref 0.1–0.9)
MONOCYTES NFR BLD AUTO: 7.9 % (ref 5–12)
NEUTROPHILS NFR BLD AUTO: 4.31 10*3/MM3 (ref 1.7–7)
NEUTROPHILS NFR BLD AUTO: 65.4 % (ref 42.7–76)
NRBC BLD AUTO-RTO: 0 /100 WBC (ref 0–0.2)
PHOSPHATE SERPL-MCNC: 2.8 MG/DL (ref 2.5–4.5)
PLATELET # BLD AUTO: 134 10*3/MM3 (ref 140–450)
PMV BLD AUTO: 9.9 FL (ref 6–12)
POTASSIUM SERPL-SCNC: 4.4 MMOL/L (ref 3.5–5.2)
RBC # BLD AUTO: 2.99 10*6/MM3 (ref 4.14–5.8)
SODIUM SERPL-SCNC: 139 MMOL/L (ref 136–145)
URATE SERPL-MCNC: 8.7 MG/DL (ref 3.4–7)
WBC NRBC COR # BLD: 6.59 10*3/MM3 (ref 3.4–10.8)

## 2022-06-29 PROCEDURE — 84550 ASSAY OF BLOOD/URIC ACID: CPT | Performed by: INTERNAL MEDICINE

## 2022-06-29 PROCEDURE — 97166 OT EVAL MOD COMPLEX 45 MIN: CPT

## 2022-06-29 PROCEDURE — 97535 SELF CARE MNGMENT TRAINING: CPT

## 2022-06-29 PROCEDURE — 83735 ASSAY OF MAGNESIUM: CPT | Performed by: INTERNAL MEDICINE

## 2022-06-29 PROCEDURE — 85025 COMPLETE CBC W/AUTO DIFF WBC: CPT | Performed by: HOSPITALIST

## 2022-06-29 PROCEDURE — 99232 SBSQ HOSP IP/OBS MODERATE 35: CPT | Performed by: NURSE PRACTITIONER

## 2022-06-29 PROCEDURE — 97530 THERAPEUTIC ACTIVITIES: CPT

## 2022-06-29 PROCEDURE — 97161 PT EVAL LOW COMPLEX 20 MIN: CPT

## 2022-06-29 PROCEDURE — 80069 RENAL FUNCTION PANEL: CPT | Performed by: INTERNAL MEDICINE

## 2022-06-29 RX ADMIN — DOCUSATE SODIUM 100 MG: 100 CAPSULE, LIQUID FILLED ORAL at 20:38

## 2022-06-29 RX ADMIN — Medication 1 TABLET: at 10:31

## 2022-06-29 RX ADMIN — ASPIRIN 81 MG: 81 TABLET, COATED ORAL at 10:31

## 2022-06-29 RX ADMIN — ALLOPURINOL 100 MG: 100 TABLET ORAL at 10:31

## 2022-06-29 RX ADMIN — Medication 100 MG: at 10:31

## 2022-06-29 RX ADMIN — FAMOTIDINE 20 MG: 20 TABLET ORAL at 10:31

## 2022-06-29 RX ADMIN — SUCRALFATE 1 G: 1 TABLET ORAL at 17:39

## 2022-06-29 RX ADMIN — HYDROCODONE BITARTRATE AND ACETAMINOPHEN 1 TABLET: 7.5; 325 TABLET ORAL at 10:35

## 2022-06-29 RX ADMIN — HYDROCODONE BITARTRATE AND ACETAMINOPHEN 1 TABLET: 7.5; 325 TABLET ORAL at 17:39

## 2022-06-29 RX ADMIN — HYDROCODONE BITARTRATE AND ACETAMINOPHEN 1 TABLET: 7.5; 325 TABLET ORAL at 06:13

## 2022-06-29 RX ADMIN — DOCUSATE SODIUM 100 MG: 100 CAPSULE, LIQUID FILLED ORAL at 10:31

## 2022-06-29 RX ADMIN — FOLIC ACID 1 MG: 1 TABLET ORAL at 10:31

## 2022-06-29 RX ADMIN — SUCRALFATE 1 G: 1 TABLET ORAL at 10:31

## 2022-06-29 RX ADMIN — ATORVASTATIN CALCIUM 10 MG: 20 TABLET, FILM COATED ORAL at 20:38

## 2022-06-29 RX ADMIN — FAMOTIDINE 20 MG: 20 TABLET ORAL at 20:38

## 2022-06-29 RX ADMIN — ASPIRIN 81 MG: 81 TABLET, COATED ORAL at 20:38

## 2022-06-29 RX ADMIN — ATENOLOL 100 MG: 50 TABLET ORAL at 10:31

## 2022-06-29 RX ADMIN — SUCRALFATE 1 G: 1 TABLET ORAL at 06:14

## 2022-06-29 RX ADMIN — SUCRALFATE 1 G: 1 TABLET ORAL at 20:38

## 2022-06-29 RX ADMIN — HYDROCODONE BITARTRATE AND ACETAMINOPHEN 1 TABLET: 7.5; 325 TABLET ORAL at 22:39

## 2022-06-30 LAB
ALBUMIN SERPL-MCNC: 3.3 G/DL (ref 3.5–5.2)
ANION GAP SERPL CALCULATED.3IONS-SCNC: 12.8 MMOL/L (ref 5–15)
ANION GAP SERPL CALCULATED.3IONS-SCNC: 8 MMOL/L (ref 5–15)
BASOPHILS # BLD AUTO: 0.02 10*3/MM3 (ref 0–0.2)
BASOPHILS NFR BLD AUTO: 0.3 % (ref 0–1.5)
BUN SERPL-MCNC: 22 MG/DL (ref 8–23)
BUN SERPL-MCNC: 24 MG/DL (ref 8–23)
BUN/CREAT SERPL: 18.6 (ref 7–25)
BUN/CREAT SERPL: 19.5 (ref 7–25)
CALCIUM SPEC-SCNC: 10.3 MG/DL (ref 8.6–10.5)
CALCIUM SPEC-SCNC: 10.3 MG/DL (ref 8.6–10.5)
CHLORIDE SERPL-SCNC: 104 MMOL/L (ref 98–107)
CHLORIDE SERPL-SCNC: 104 MMOL/L (ref 98–107)
CO2 SERPL-SCNC: 25.2 MMOL/L (ref 22–29)
CO2 SERPL-SCNC: 28 MMOL/L (ref 22–29)
CREAT SERPL-MCNC: 1.18 MG/DL (ref 0.76–1.27)
CREAT SERPL-MCNC: 1.23 MG/DL (ref 0.76–1.27)
DEPRECATED RDW RBC AUTO: 40.9 FL (ref 37–54)
EGFRCR SERPLBLD CKD-EPI 2021: 62 ML/MIN/1.73
EGFRCR SERPLBLD CKD-EPI 2021: 65.2 ML/MIN/1.73
EOSINOPHIL # BLD AUTO: 0.14 10*3/MM3 (ref 0–0.4)
EOSINOPHIL NFR BLD AUTO: 2.2 % (ref 0.3–6.2)
ERYTHROCYTE [DISTWIDTH] IN BLOOD BY AUTOMATED COUNT: 11.4 % (ref 12.3–15.4)
GLUCOSE SERPL-MCNC: 118 MG/DL (ref 65–99)
GLUCOSE SERPL-MCNC: 98 MG/DL (ref 65–99)
HCT VFR BLD AUTO: 31.2 % (ref 37.5–51)
HGB BLD-MCNC: 9.8 G/DL (ref 13–17.7)
IMM GRANULOCYTES # BLD AUTO: 0.03 10*3/MM3 (ref 0–0.05)
IMM GRANULOCYTES NFR BLD AUTO: 0.5 % (ref 0–0.5)
LYMPHOCYTES # BLD AUTO: 1.91 10*3/MM3 (ref 0.7–3.1)
LYMPHOCYTES NFR BLD AUTO: 30.3 % (ref 19.6–45.3)
MAGNESIUM SERPL-MCNC: 1.8 MG/DL (ref 1.6–2.4)
MCH RBC QN AUTO: 30.8 PG (ref 26.6–33)
MCHC RBC AUTO-ENTMCNC: 31.4 G/DL (ref 31.5–35.7)
MCV RBC AUTO: 98.1 FL (ref 79–97)
MONOCYTES # BLD AUTO: 0.68 10*3/MM3 (ref 0.1–0.9)
MONOCYTES NFR BLD AUTO: 10.8 % (ref 5–12)
NEUTROPHILS NFR BLD AUTO: 3.53 10*3/MM3 (ref 1.7–7)
NEUTROPHILS NFR BLD AUTO: 55.9 % (ref 42.7–76)
NRBC BLD AUTO-RTO: 0 /100 WBC (ref 0–0.2)
PHOSPHATE SERPL-MCNC: 3.3 MG/DL (ref 2.5–4.5)
PLATELET # BLD AUTO: 168 10*3/MM3 (ref 140–450)
PMV BLD AUTO: 9.6 FL (ref 6–12)
POTASSIUM SERPL-SCNC: 3.9 MMOL/L (ref 3.5–5.2)
POTASSIUM SERPL-SCNC: 5.5 MMOL/L (ref 3.5–5.2)
RBC # BLD AUTO: 3.18 10*6/MM3 (ref 4.14–5.8)
SODIUM SERPL-SCNC: 140 MMOL/L (ref 136–145)
SODIUM SERPL-SCNC: 142 MMOL/L (ref 136–145)
URATE SERPL-MCNC: 7.5 MG/DL (ref 3.4–7)
WBC NRBC COR # BLD: 6.31 10*3/MM3 (ref 3.4–10.8)

## 2022-06-30 PROCEDURE — 80048 BASIC METABOLIC PNL TOTAL CA: CPT | Performed by: INTERNAL MEDICINE

## 2022-06-30 PROCEDURE — 80069 RENAL FUNCTION PANEL: CPT | Performed by: INTERNAL MEDICINE

## 2022-06-30 PROCEDURE — 83735 ASSAY OF MAGNESIUM: CPT | Performed by: INTERNAL MEDICINE

## 2022-06-30 PROCEDURE — 84550 ASSAY OF BLOOD/URIC ACID: CPT | Performed by: INTERNAL MEDICINE

## 2022-06-30 PROCEDURE — 97110 THERAPEUTIC EXERCISES: CPT

## 2022-06-30 PROCEDURE — 99232 SBSQ HOSP IP/OBS MODERATE 35: CPT | Performed by: NURSE PRACTITIONER

## 2022-06-30 PROCEDURE — 85025 COMPLETE CBC W/AUTO DIFF WBC: CPT | Performed by: INTERNAL MEDICINE

## 2022-06-30 RX ORDER — HYDROXYZINE HYDROCHLORIDE 25 MG/1
25 TABLET, FILM COATED ORAL 3 TIMES DAILY PRN
Status: DISCONTINUED | OUTPATIENT
Start: 2022-06-30 | End: 2022-07-06

## 2022-06-30 RX ADMIN — FAMOTIDINE 20 MG: 20 TABLET ORAL at 09:29

## 2022-06-30 RX ADMIN — FOLIC ACID 1 MG: 1 TABLET ORAL at 09:29

## 2022-06-30 RX ADMIN — ASPIRIN 81 MG: 81 TABLET, COATED ORAL at 09:31

## 2022-06-30 RX ADMIN — APIXABAN 5 MG: 5 TABLET, FILM COATED ORAL at 12:38

## 2022-06-30 RX ADMIN — APIXABAN 5 MG: 5 TABLET, FILM COATED ORAL at 20:29

## 2022-06-30 RX ADMIN — Medication 100 MG: at 09:29

## 2022-06-30 RX ADMIN — HYDROCODONE BITARTRATE AND ACETAMINOPHEN 1 TABLET: 7.5; 325 TABLET ORAL at 12:38

## 2022-06-30 RX ADMIN — ATORVASTATIN CALCIUM 10 MG: 20 TABLET, FILM COATED ORAL at 20:29

## 2022-06-30 RX ADMIN — HYDROCODONE BITARTRATE AND ACETAMINOPHEN 1 TABLET: 7.5; 325 TABLET ORAL at 20:29

## 2022-06-30 RX ADMIN — SUCRALFATE 1 G: 1 TABLET ORAL at 20:29

## 2022-06-30 RX ADMIN — SUCRALFATE 1 G: 1 TABLET ORAL at 06:35

## 2022-06-30 RX ADMIN — ATENOLOL 100 MG: 50 TABLET ORAL at 09:29

## 2022-06-30 RX ADMIN — DOCUSATE SODIUM 100 MG: 100 CAPSULE, LIQUID FILLED ORAL at 09:29

## 2022-06-30 RX ADMIN — HYDROCODONE BITARTRATE AND ACETAMINOPHEN 1 TABLET: 7.5; 325 TABLET ORAL at 05:06

## 2022-06-30 RX ADMIN — SUCRALFATE 1 G: 1 TABLET ORAL at 16:14

## 2022-06-30 RX ADMIN — HYDROXYZINE HYDROCHLORIDE 25 MG: 25 TABLET ORAL at 16:13

## 2022-06-30 RX ADMIN — FAMOTIDINE 20 MG: 20 TABLET ORAL at 20:29

## 2022-06-30 RX ADMIN — DOCUSATE SODIUM 100 MG: 100 CAPSULE, LIQUID FILLED ORAL at 20:24

## 2022-06-30 RX ADMIN — SUCRALFATE 1 G: 1 TABLET ORAL at 12:38

## 2022-06-30 RX ADMIN — HYDROXYZINE HYDROCHLORIDE 25 MG: 25 TABLET ORAL at 22:24

## 2022-06-30 RX ADMIN — Medication 1 TABLET: at 09:29

## 2022-06-30 RX ADMIN — ALLOPURINOL 100 MG: 100 TABLET ORAL at 09:29

## 2022-07-01 LAB
ANION GAP SERPL CALCULATED.3IONS-SCNC: 7 MMOL/L (ref 5–15)
BASOPHILS # BLD AUTO: 0.04 10*3/MM3 (ref 0–0.2)
BASOPHILS NFR BLD AUTO: 0.8 % (ref 0–1.5)
BUN SERPL-MCNC: 20 MG/DL (ref 8–23)
BUN/CREAT SERPL: 17.7 (ref 7–25)
CALCIUM SPEC-SCNC: 10.7 MG/DL (ref 8.6–10.5)
CHLORIDE SERPL-SCNC: 103 MMOL/L (ref 98–107)
CO2 SERPL-SCNC: 27 MMOL/L (ref 22–29)
CREAT SERPL-MCNC: 1.13 MG/DL (ref 0.76–1.27)
DEPRECATED RDW RBC AUTO: 40.9 FL (ref 37–54)
EGFRCR SERPLBLD CKD-EPI 2021: 68.6 ML/MIN/1.73
EOSINOPHIL # BLD AUTO: 0.15 10*3/MM3 (ref 0–0.4)
EOSINOPHIL NFR BLD AUTO: 3.1 % (ref 0.3–6.2)
ERYTHROCYTE [DISTWIDTH] IN BLOOD BY AUTOMATED COUNT: 11.6 % (ref 12.3–15.4)
GLUCOSE SERPL-MCNC: 107 MG/DL (ref 65–99)
HCT VFR BLD AUTO: 29 % (ref 37.5–51)
HGB BLD-MCNC: 9.2 G/DL (ref 13–17.7)
IMM GRANULOCYTES # BLD AUTO: 0.02 10*3/MM3 (ref 0–0.05)
IMM GRANULOCYTES NFR BLD AUTO: 0.4 % (ref 0–0.5)
LYMPHOCYTES # BLD AUTO: 1.28 10*3/MM3 (ref 0.7–3.1)
LYMPHOCYTES NFR BLD AUTO: 26.2 % (ref 19.6–45.3)
MCH RBC QN AUTO: 31.2 PG (ref 26.6–33)
MCHC RBC AUTO-ENTMCNC: 31.7 G/DL (ref 31.5–35.7)
MCV RBC AUTO: 98.3 FL (ref 79–97)
MONOCYTES # BLD AUTO: 0.56 10*3/MM3 (ref 0.1–0.9)
MONOCYTES NFR BLD AUTO: 11.5 % (ref 5–12)
NEUTROPHILS NFR BLD AUTO: 2.84 10*3/MM3 (ref 1.7–7)
NEUTROPHILS NFR BLD AUTO: 58 % (ref 42.7–76)
NRBC BLD AUTO-RTO: 0 /100 WBC (ref 0–0.2)
PLATELET # BLD AUTO: 189 10*3/MM3 (ref 140–450)
PMV BLD AUTO: 9.4 FL (ref 6–12)
POTASSIUM SERPL-SCNC: 4.4 MMOL/L (ref 3.5–5.2)
RBC # BLD AUTO: 2.95 10*6/MM3 (ref 4.14–5.8)
SODIUM SERPL-SCNC: 137 MMOL/L (ref 136–145)
WBC NRBC COR # BLD: 4.89 10*3/MM3 (ref 3.4–10.8)

## 2022-07-01 PROCEDURE — 97110 THERAPEUTIC EXERCISES: CPT

## 2022-07-01 PROCEDURE — 80048 BASIC METABOLIC PNL TOTAL CA: CPT | Performed by: HOSPITALIST

## 2022-07-01 PROCEDURE — 85025 COMPLETE CBC W/AUTO DIFF WBC: CPT | Performed by: HOSPITALIST

## 2022-07-01 RX ORDER — SODIUM CHLORIDE 9 MG/ML
75 INJECTION, SOLUTION INTRAVENOUS CONTINUOUS
Status: DISCONTINUED | OUTPATIENT
Start: 2022-07-01 | End: 2022-07-02

## 2022-07-01 RX ADMIN — FAMOTIDINE 20 MG: 20 TABLET ORAL at 08:44

## 2022-07-01 RX ADMIN — HYDROCODONE BITARTRATE AND ACETAMINOPHEN 1 TABLET: 7.5; 325 TABLET ORAL at 20:54

## 2022-07-01 RX ADMIN — APIXABAN 5 MG: 5 TABLET, FILM COATED ORAL at 08:43

## 2022-07-01 RX ADMIN — FAMOTIDINE 20 MG: 20 TABLET ORAL at 20:53

## 2022-07-01 RX ADMIN — Medication 100 MG: at 08:44

## 2022-07-01 RX ADMIN — ATORVASTATIN CALCIUM 10 MG: 20 TABLET, FILM COATED ORAL at 20:53

## 2022-07-01 RX ADMIN — ATENOLOL 100 MG: 50 TABLET ORAL at 08:44

## 2022-07-01 RX ADMIN — ALLOPURINOL 100 MG: 100 TABLET ORAL at 08:44

## 2022-07-01 RX ADMIN — DOCUSATE SODIUM 100 MG: 100 CAPSULE, LIQUID FILLED ORAL at 08:44

## 2022-07-01 RX ADMIN — HYDROXYZINE HYDROCHLORIDE 25 MG: 25 TABLET ORAL at 08:44

## 2022-07-01 RX ADMIN — Medication 1 TABLET: at 08:43

## 2022-07-01 RX ADMIN — FOLIC ACID 1 MG: 1 TABLET ORAL at 08:44

## 2022-07-01 RX ADMIN — APIXABAN 5 MG: 5 TABLET, FILM COATED ORAL at 20:53

## 2022-07-01 RX ADMIN — HYDROCODONE BITARTRATE AND ACETAMINOPHEN 1 TABLET: 7.5; 325 TABLET ORAL at 05:42

## 2022-07-01 RX ADMIN — DOCUSATE SODIUM 100 MG: 100 CAPSULE, LIQUID FILLED ORAL at 20:53

## 2022-07-01 RX ADMIN — SUCRALFATE 1 G: 1 TABLET ORAL at 20:53

## 2022-07-01 RX ADMIN — SUCRALFATE 1 G: 1 TABLET ORAL at 08:43

## 2022-07-01 NOTE — PLAN OF CARE
Problem: Adult Inpatient Plan of Care  Goal: Absence of Hospital-Acquired Illness or Injury  Intervention: Identify and Manage Fall Risk  Recent Flowsheet Documentation  Taken 7/1/2022 0712 by Page Pacheco RN  Safety Promotion/Fall Prevention:   safety round/check completed   fall prevention program maintained  Taken 6/30/2022 1800 by Page Pacheco RN  Safety Promotion/Fall Prevention:   safety round/check completed   fall prevention program maintained  Intervention: Prevent Skin Injury  Recent Flowsheet Documentation  Taken 7/1/2022 0712 by Page Pacheco RN  Body Position: position changed independently  Taken 6/30/2022 1800 by Page Pacheco RN  Body Position: position changed independently  Intervention: Prevent and Manage VTE (Venous Thromboembolism) Risk  Recent Flowsheet Documentation  Taken 6/30/2022 1800 by Page Pacheco RN  Activity Management: up in chair  Goal: Absence of Hospital-Acquired Illness or Injury  Intervention: Identify and Manage Fall Risk  Recent Flowsheet Documentation  Taken 7/1/2022 0712 by Page Pacheco RN  Safety Promotion/Fall Prevention:   safety round/check completed   fall prevention program maintained  Taken 6/30/2022 1800 by Page Pacheco RN  Safety Promotion/Fall Prevention:   safety round/check completed   fall prevention program maintained  Intervention: Prevent Skin Injury  Recent Flowsheet Documentation  Taken 7/1/2022 0712 by Page Pacheco RN  Body Position: position changed independently  Taken 6/30/2022 1800 by Page Pacheco RN  Body Position: position changed independently  Intervention: Prevent and Manage VTE (Venous Thromboembolism) Risk  Recent Flowsheet Documentation  Taken 6/30/2022 1800 by Page Pacheco RN  Activity Management: up in chair     Problem: Fall Injury Risk  Goal: Absence of Fall and Fall-Related Injury  Intervention: Promote Injury-Free Environment  Recent Flowsheet Documentation  Taken 7/1/2022 0712 by Page Pacheco RN  Safety  Promotion/Fall Prevention:   safety round/check completed   fall prevention program maintained  Taken 6/30/2022 1800 by Page Pacheco RN  Safety Promotion/Fall Prevention:   safety round/check completed   fall prevention program maintained     Problem: Restraint, Nonbehavioral (Nonviolent)  Goal: Absence of Harm or Injury  Intervention: Protect Skin and Joint Integrity  Recent Flowsheet Documentation  Taken 7/1/2022 0712 by Page Pacheco RN  Body Position: position changed independently  Taken 6/30/2022 1800 by Page Pacheco RN  Body Position: position changed independently     Problem: Restraint, Nonbehavioral (Nonviolent)  Goal: Absence of Harm or Injury  Intervention: Protect Skin and Joint Integrity  Recent Flowsheet Documentation  Taken 7/1/2022 0712 by Page Pacheco RN  Body Position: position changed independently  Taken 6/30/2022 1800 by Page Pacheco RN  Body Position: position changed independently     Problem: Fall Injury Risk  Goal: Absence of Fall and Fall-Related Injury  Intervention: Promote Injury-Free Environment  Recent Flowsheet Documentation  Taken 7/1/2022 0712 by Page Pacheco RN  Safety Promotion/Fall Prevention:   safety round/check completed   fall prevention program maintained  Taken 6/30/2022 1800 by Page Pacheco RN  Safety Promotion/Fall Prevention:   safety round/check completed   fall prevention program maintained     Problem: Adult Inpatient Plan of Care  Goal: Absence of Hospital-Acquired Illness or Injury  Intervention: Identify and Manage Fall Risk  Recent Flowsheet Documentation  Taken 7/1/2022 0712 by Page Pacheco RN  Safety Promotion/Fall Prevention:   safety round/check completed   fall prevention program maintained  Taken 6/30/2022 1800 by Page Pacheco RN  Safety Promotion/Fall Prevention:   safety round/check completed   fall prevention program maintained  Intervention: Prevent Skin Injury  Recent Flowsheet Documentation  Taken 7/1/2022 0712 by Junior  KENDELL Abel  Body Position: position changed independently  Taken 6/30/2022 1800 by Page Pacheco RN  Body Position: position changed independently  Intervention: Prevent and Manage VTE (Venous Thromboembolism) Risk  Recent Flowsheet Documentation  Taken 6/30/2022 1800 by Page Pacheco RN  Activity Management: up in chair     Problem: Functional Ability Impaired (Orthopaedic Fracture)  Goal: Optimal Functional Ability  Intervention: Optimize Functional Ability  Recent Flowsheet Documentation  Taken 6/30/2022 1800 by Page Pacheco RN  Activity Management: up in chair   Goal Outcome Evaluation:

## 2022-07-01 NOTE — PROGRESS NOTES
"Daily progress note    Chief complaint  Doing same  No new complaints  Denies chest pain shortness of breath palpitation    History of present illness  71-year-old -American male with history of coronary artery disease hypertension chronic kidney disease stage III and CVA in the past brought to the emergency room with right hip injury after the mechanical fall.  Patient denies any chest pain dizziness shortness of breath palpitation prior to the fall.  Patient work-up in ER revealed right femur neck fracture admit for management.  Patient is a poor historian unable to give detailed history most of the history obtained from the chart nursing staff old record and family.  Patient has no fever chills cough congestion night sweats weight loss or weight gain and wants to get the surgery done as soon as possible.     REVIEW OF SYSTEMS  Unremarkable except weakness     PHYSICAL EXAM  Blood pressure 147/67, pulse 74, temperature 98.5 °F (36.9 °C), temperature source Oral, resp. rate 18, height 175.3 cm (69\"), weight 65.6 kg (144 lb 9.6 oz), SpO2 99 %.    GENERAL: Awake and alert, nontoxic-appearing.    HENT: nares patent, NCAT   EYES: no scleral icterus  CV: regular rhythm, regular rate S1-S2  RESPIRATORY: normal effort moving air bilaterally  ABDOMEN: soft nontender nondistended bowel sounds positive  MUSCULOSKELETAL: The right leg is shortened and externally rotated with tenderness in the right hip.  It is neurovascular intact with normal strength and sensation.  NEURO: alert, moves all extremities, follows commands  SKIN: warm, dry     LAB RESULTS  Lab Results (last 24 hours)     Procedure Component Value Units Date/Time    Basic Metabolic Panel [[id number]]  (Abnormal) Collected: 07/01/22 1134    Specimen: Blood Updated: 07/01/22 1209     Glucose 107 mg/dL      BUN 20 mg/dL      Creatinine 1.13 mg/dL      Sodium 137 mmol/L      Potassium 4.4 mmol/L      Chloride 103 mmol/L      CO2 27.0 mmol/L      Calcium 10.7 " mg/dL      BUN/Creatinine Ratio 17.7     Anion Gap 7.0 mmol/L      eGFR 68.6 mL/min/1.73      Comment: National Kidney Foundation and American Society of Nephrology (ASN) Task Force recommended calculation based on the Chronic Kidney Disease Epidemiology Collaboration (CKD-EPI) equation refit without adjustment for race.       Narrative:      GFR Normal >60  Chronic Kidney Disease <60  Kidney Failure <15      CBC & Differential [[id number]]  (Abnormal) Collected: 07/01/22 1134    Specimen: Blood Updated: 07/01/22 1147    Narrative:      The following orders were created for panel order CBC & Differential.  Procedure                               Abnormality         Status                     ---------                               -----------         ------                     CBC Auto Differential[[id number]]        Abnormal            Final result                 Please view results for these tests on the individual orders.    CBC Auto Differential [[id number]]  (Abnormal) Collected: 07/01/22 1134    Specimen: Blood Updated: 07/01/22 1147     WBC 4.89 10*3/mm3      RBC 2.95 10*6/mm3      Hemoglobin 9.2 g/dL      Hematocrit 29.0 %      MCV 98.3 fL      MCH 31.2 pg      MCHC 31.7 g/dL      RDW 11.6 %      RDW-SD 40.9 fl      MPV 9.4 fL      Platelets 189 10*3/mm3      Neutrophil % 58.0 %      Lymphocyte % 26.2 %      Monocyte % 11.5 %      Eosinophil % 3.1 %      Basophil % 0.8 %      Immature Grans % 0.4 %      Neutrophils, Absolute 2.84 10*3/mm3      Lymphocytes, Absolute 1.28 10*3/mm3      Monocytes, Absolute 0.56 10*3/mm3      Eosinophils, Absolute 0.15 10*3/mm3      Basophils, Absolute 0.04 10*3/mm3      Immature Grans, Absolute 0.02 10*3/mm3      nRBC 0.0 /100 WBC         Imaging Results (Last 24 Hours)     ** No results found for the last 24 hours. **             ECG 12 Lead  Component   Ref Range & Units 1 d ago 3 mo ago   QT Interval   ms 398  348    Resulting Agency  ECG  ECG             HEART RATE= 87   bpm  RR Interval= 686  ms  NH Interval= 196  ms  P Horizontal Axis= -44  deg  P Front Axis= 47  deg  QRSD Interval= 80  ms  QT Interval= 398  ms  QRS Axis= 62  deg  T Wave Axis= 47  deg  - ABNORMAL ECG -  Sinus rhythm  Supraventricular bigeminy  Borderline prolonged QT interval  af resolved             Current Facility-Administered Medications:   •  allopurinol (ZYLOPRIM) tablet 100 mg, 100 mg, Oral, Daily, Chino Cole MD, 100 mg at 07/01/22 0844  •  apixaban (ELIQUIS) tablet 5 mg, 5 mg, Oral, Q12H, Rhea Ocampo APRN, 5 mg at 07/01/22 0843  •  atenolol (TENORMIN) tablet 100 mg, 100 mg, Oral, Q24H, Willian Quiles MD, 100 mg at 07/01/22 0844  •  atorvastatin (LIPITOR) tablet 10 mg, 10 mg, Oral, Nightly, Willian Quiles MD, 10 mg at 06/30/22 2029  •  bisacodyl (DULCOLAX) EC tablet 10 mg, 10 mg, Oral, Daily PRN, Willian Quiles MD  •  docusate sodium (COLACE) capsule 100 mg, 100 mg, Oral, BID, Willian Quiles MD, 100 mg at 07/01/22 0844  •  famotidine (PEPCID) tablet 20 mg, 20 mg, Oral, BID, Willian Quiles MD, 20 mg at 07/01/22 0844  •  folic acid (FOLVITE) tablet 1 mg, 1 mg, Oral, Daily, Willian Quiles MD, 1 mg at 07/01/22 0844  •  HYDROcodone-acetaminophen (NORCO) 7.5-325 MG per tablet 1 tablet, 1 tablet, Oral, Q4H PRN, Willian Quiles MD, 1 tablet at 07/01/22 0542  •  hydrOXYzine (ATARAX) tablet 25 mg, 25 mg, Oral, TID PRN, Chino Cole MD, 25 mg at 07/01/22 0844  •  melatonin tablet 1 mg, 1 mg, Oral, Nightly PRN, Willian Quiles MD, 1 mg at 06/28/22 2303  •  multivitamin (THERAGRAN) tablet 1 tablet, 1 tablet, Oral, Daily, Willian Quiles MD, 1 tablet at 07/01/22 0843  •  ondansetron (ZOFRAN) injection 4 mg, 4 mg, Intravenous, Q6H PRN, Willian Quiles MD  •  [COMPLETED] Insert peripheral IV, , , Once **AND** sodium chloride 0.9 % flush 10 mL, 10 mL, Intravenous, PRN, Willian Quiles MD  •  sucralfate (CARAFATE)  tablet 1 g, 1 g, Oral, 4x Daily AC & at Bedtime, Willian Quiles MD, 1 g at 07/01/22 0843  •  thiamine (VITAMIN B-1) tablet 100 mg, 100 mg, Oral, Daily, Willian Quiles MD, 100 mg at 07/01/22 0844    ASSESSMENT  Acute right femoral neck fracture s/p right total hip arthroplasty  Hypercalcemia  Alcohol intoxication  Tobacco abuse  Coronary artery disease  Hypertension  Hyperlipidemia  Acute kidney injury  Chronic kidney disease stage III  Gastroesophageal reflux disease    PLAN  CPM  IV fluid and recheck BMP in a.m.  Postop care  Pain management  Detox medications  Eliquis per cardiology  Alcohol withdrawal and DTs precautions  Adjust home medications  Stress ulcer DVT prophylaxis  Cardiology and orthopedic consult appreciated  Nephrology to follow patient  Supportive care  PT/OT  Discussed with nursing staff  Subacute rehab once bed available    JOLIE BUNDY MD

## 2022-07-01 NOTE — PLAN OF CARE
Goal Outcome Evaluation:               Pt is a post op day 4 of a rt total hip.Dressing is clean dry and intact. Pt is alert and oriented x4,but has moments where he seems frustrated and angry. Pt refusing for labs to be drawn.Pt voiding via the urinal. Voiding function intact. Pt is resting at this time,will continue to monitor

## 2022-07-01 NOTE — PLAN OF CARE
Goal Outcome Evaluation:               Pt is a post op day 4 of a rt total hip.Dressing is clean dry and intact. Pt continues with the PO pain meds that provide relief. Pt was up in  chair for part of the shift. Pt voiding via the urinal. CIWA in place. Pt resting at this time,will continue to monitor

## 2022-07-01 NOTE — PLAN OF CARE
Goal Outcome Evaluation:              Outcome Evaluation: Assumed care at 1600. AxO x4. VSS. NVI. Drsg CDI. Up with assist x1 and walker. Worked with PT, see note. Plans to d/c pending. Educated pt on pain management, verbalized understanding. All needs met at this time.

## 2022-07-01 NOTE — THERAPY TREATMENT NOTE
Patient Name: Yuval Loja  : 1950    MRN: [id number]                              Today's Date: 2022       Admit Date: 2022    Visit Dx:     ICD-10-CM ICD-9-CM   1. Closed fracture of neck of right femur, initial encounter ([facility])  S72.001A 820.8     Patient Active Problem List   Diagnosis   • Alcoholic ketoacidosis   • Alcohol dependence (HCC)   • Methamphetamine abuse (HCC)   • Fatty liver, alcoholic   • Nausea vomiting and diarrhea   • Alcoholic liver disease (HCC)   • Metabolic acidosis   • Tobacco abuse   • Coronary artery disease involving native coronary artery of native heart without angina pectoris   • Tachycardia   • Sinus tachycardia   • Chronic kidney disease, stage 3   • Medically noncompliant   • Visual hallucinations   • Psychosis (HCC)   • Hyponatremia   • CAD (coronary artery disease)   • Leukopenia   • Symptomatic anemia   • Headache   • Substance abuse (HCC)   • Gastrointestinal hemorrhage   • CRISTIANO (acute kidney injury) (HCC)   • Hyperkalemia   • Right lower quadrant abdominal pain   • New onset atrial fibrillation (HCC)   • History of drug abuse (HCC)   • COPD (chronic obstructive pulmonary disease) (HCC)   • Right inguinal hernia   • Constipation   • Status post right hip replacement     Past Medical History:   Diagnosis Date   • Alcohol abuse    • CAD (coronary artery disease)    • Chronic kidney disease, stage 3 (HCC)    • Fatty liver, alcoholic    • GERD (gastroesophageal reflux disease)    • Hypertensive urgency    • Metabolic acidosis    • Myocardial infarction (HCC)    • Sinus tachycardia    • Stroke (HCC)      Past Surgical History:   Procedure Laterality Date   • TOTAL HIP ARTHROPLASTY Right 2022    Procedure: TOTAL HIP ARTHROPLASTY ANTERIOR WITH HANA TABLE;  Surgeon: Willian Quiles MD;  [facility];  Service: Orthopedics;  Laterality: Right;  mile Bhatt      General Information     Row Name 22 1143          Physical Therapy Time  and Intention    Document Type therapy note (daily note)  -EJ     Mode of Treatment physical therapy  -     Row Name 07/01/22 1143          General Information    Existing Precautions/Restrictions fall;hip, anterior;right  -EJ           User Key  (r) = Recorded By, (t) = Taken By, (c) = Cosigned By    Initials Name Provider Type    Serena Frances, PT Physical Therapist               Mobility     Row Name 07/01/22 1144          Sit-Stand Transfer    Sit-Stand Izard (Transfers) verbal cues;standby assist;contact guard  -EJ     Assistive Device (Sit-Stand Transfers) walker, front-wheeled  -EJ     Row Name 07/01/22 1144          Gait/Stairs (Locomotion)    Izard Level (Gait) verbal cues;contact guard  -EJ     Assistive Device (Gait) walker, front-wheeled  -EJ     Distance in Feet (Gait) 80  -EJ     Deviations/Abnormal Patterns (Gait) alicia decreased;antalgic;stride length decreased  -EJ     Bilateral Gait Deviations forward flexed posture  -EJ           User Key  (r) = Recorded By, (t) = Taken By, (c) = Cosigned By    Initials Name Provider Type    Serena Frances PT Physical Therapist               Obj/Interventions    No documentation.                Goals/Plan    No documentation.                Clinical Impression     Row Name 07/01/22 1144          Pain    Posttreatment Pain Rating 6/10  -EJ     Pain Location - Side/Orientation Right  -EJ     Pain Location - hip  -EJ     Pain Intervention(s) Repositioned;Ambulation/increased activity  -     Row Name 07/01/22 1144          Plan of Care Review    Plan of Care Reviewed With patient  -EJ     Outcome Evaluation Pt agreeable to PT this am. He is up in chair upon arrival to room. Pt does complain of hip soreness, but is doing well. He ambulated approx 80 ft w Rwx and CGA. He states he is feeling a little weaker than yesterday because he did not eat breakfast today. Otherwise, pt doing well. Pt back in chair at end of session.  -     Row  Name 07/01/22 1144          Positioning and Restraints    Pre-Treatment Position sitting in chair/recliner  -EJ     Post Treatment Position chair  -EJ     In Chair notified nsg;reclined;call light within reach;encouraged to call for assist;exit alarm on  -EJ           User Key  (r) = Recorded By, (t) = Taken By, (c) = Cosigned By    Initials Name Provider Type    Serena Frances, PT Physical Therapist               Outcome Measures     Row Name 07/01/22 1147 07/01/22 0712       How much help from another person do you currently need...    Turning from your back to your side while in flat bed without using bedrails? 3  -EJ 3  -BC    Moving from lying on back to sitting on the side of a flat bed without bedrails? 3  -EJ 3  -BC    Moving to and from a bed to a chair (including a wheelchair)? 3  -EJ 2  -BC    Standing up from a chair using your arms (e.g., wheelchair, bedside chair)? 3  -EJ 1  -BC    Climbing 3-5 steps with a railing? 3  -EJ 1  -BC    To walk in hospital room? 3  -EJ 1  -BC    AM-PAC 6 Clicks Score (PT) 18  -EJ 11  -BC    Highest level of mobility 6 --> Walked 10 steps or more  -EJ 4 --> Transferred to chair/commode  -BC          User Key  (r) = Recorded By, (t) = Taken By, (c) = Cosigned By    Initials Name Provider Type    Serena Frances, PT Physical Therapist    Page Baldwin RN Registered Nurse                             Physical Therapy Education                 Title: PT OT SLP Therapies (In Progress)     Topic: Physical Therapy (Done)     Point: Mobility training (Done)     Learning Progress Summary           Patient Acceptance, E,TB,D, VU,NR by CRISTIANO at 7/1/2022 1148    Acceptance, E, VU by MORRIS at 7/1/2022 0500    Acceptance, E,TB,D, VU,NR by CRISTIANO at 6/30/2022 1130    Acceptance, E,TB, VU,NR by FRANKIE at 6/29/2022 0938    Acceptance, E, VU by MORRIS at 7/1/2022 0500                   Point: Home exercise program (Done)     Learning Progress Summary           Patient Acceptance,  E, VU by  at 7/1/2022 0500    Acceptance, E,TB, VU,NR by CB at 6/29/2022 0938    Acceptance, E, VU by  at 7/1/2022 0500                   Point: Body mechanics (Done)     Learning Progress Summary           Patient Acceptance, E, VU by  at 7/1/2022 0500    Acceptance, E,TB, VU,NR by CB at 6/29/2022 0938    Acceptance, E, VU by  at 7/1/2022 0500                   Point: Precautions (Done)     Learning Progress Summary           Patient Acceptance, E, VU by  at 7/1/2022 0500    Acceptance, E,TB, VU,NR by CB at 6/29/2022 0938    Acceptance, E, VU by  at 7/1/2022 0500                               User Key     Initials Effective Dates Name Provider Type [facility] 06/16/21 -  Serena Carrillo, PT Physical Therapist PT     06/16/21 -  Shonna Moreno, RN Registered Nurse Nurse     10/22/21 -  Lida Simons PT Physical Therapist PT              PT Recommendation and Plan     Plan of Care Reviewed With: patient  Progress: improving  Outcome Evaluation: Pt agreeable to PT this am. He is up in chair upon arrival to room. Pt does complain of hip soreness, but is doing well. He ambulated approx 80 ft w Rwx and CGA. He states he is feeling a little weaker than yesterday because he did not eat breakfast today. Otherwise, pt doing well. Pt back in chair at end of session.     Time Calculation:    PT Charges     Row Name 07/01/22 1148             Time Calculation    Start Time 1132  -EJ      Stop Time 1148  -EJ      Time Calculation (min) 16 min  -EJ      PT Received On 07/01/22  -EJ      PT - Next Appointment 07/02/22  -EJ            User Key  (r) = Recorded By, (t) = Taken By, (c) = Cosigned By    Initials Name Provider Type    EJ Serena Carrillo, PT Physical Therapist              Therapy Charges for Today     Code Description Service Date Service Provider Modifiers Qty    18842040941 HC PT THER PROC EA 15 MIN 6/30/2022 Serena Carrillo, PT GP 1     [id number]  PT THER PROC EA 15 MIN 7/1/2022 Serena Carrillo, PT GP 1          PT G-Codes  Outcome Measure Options: AM-PAC 6 Clicks Daily Activity (OT)  AM-PAC 6 Clicks Score (PT): 18  AM-PAC 6 Clicks Score (OT): 17    Serena Carrillo, PT  7/1/2022

## 2022-07-01 NOTE — PLAN OF CARE
Goal Outcome Evaluation:  Plan of Care Reviewed With: patient        Progress: improving  Outcome Evaluation: Pt agreeable to PT this am. He is up in chair upon arrival to room. Pt does complain of hip soreness, but is doing well. He ambulated approx 80 ft w Rwx and CGA. He states he is feeling a little weaker than yesterday because he did not eat breakfast today. Otherwise, pt doing well. Pt back in chair at end of session.

## 2022-07-02 LAB
ANION GAP SERPL CALCULATED.3IONS-SCNC: 8 MMOL/L (ref 5–15)
BUN SERPL-MCNC: 18 MG/DL (ref 8–23)
BUN/CREAT SERPL: 17.1 (ref 7–25)
CALCIUM SPEC-SCNC: 9.9 MG/DL (ref 8.6–10.5)
CHLORIDE SERPL-SCNC: 103 MMOL/L (ref 98–107)
CO2 SERPL-SCNC: 29 MMOL/L (ref 22–29)
CREAT SERPL-MCNC: 1.05 MG/DL (ref 0.76–1.27)
EGFRCR SERPLBLD CKD-EPI 2021: 75 ML/MIN/1.73
GLUCOSE SERPL-MCNC: 148 MG/DL (ref 65–99)
POTASSIUM SERPL-SCNC: 4.3 MMOL/L (ref 3.5–5.2)
SODIUM SERPL-SCNC: 140 MMOL/L (ref 136–145)

## 2022-07-02 PROCEDURE — 97110 THERAPEUTIC EXERCISES: CPT

## 2022-07-02 PROCEDURE — 80048 BASIC METABOLIC PNL TOTAL CA: CPT | Performed by: HOSPITALIST

## 2022-07-02 RX ADMIN — DOCUSATE SODIUM 100 MG: 100 CAPSULE, LIQUID FILLED ORAL at 19:41

## 2022-07-02 RX ADMIN — FAMOTIDINE 20 MG: 20 TABLET ORAL at 07:40

## 2022-07-02 RX ADMIN — HYDROCODONE BITARTRATE AND ACETAMINOPHEN 1 TABLET: 7.5; 325 TABLET ORAL at 10:45

## 2022-07-02 RX ADMIN — HYDROCODONE BITARTRATE AND ACETAMINOPHEN 1 TABLET: 7.5; 325 TABLET ORAL at 15:46

## 2022-07-02 RX ADMIN — ATORVASTATIN CALCIUM 10 MG: 20 TABLET, FILM COATED ORAL at 19:41

## 2022-07-02 RX ADMIN — Medication 100 MG: at 07:41

## 2022-07-02 RX ADMIN — DOCUSATE SODIUM 100 MG: 100 CAPSULE, LIQUID FILLED ORAL at 07:40

## 2022-07-02 RX ADMIN — FAMOTIDINE 20 MG: 20 TABLET ORAL at 19:41

## 2022-07-02 RX ADMIN — HYDROCODONE BITARTRATE AND ACETAMINOPHEN 1 TABLET: 7.5; 325 TABLET ORAL at 06:46

## 2022-07-02 RX ADMIN — APIXABAN 5 MG: 5 TABLET, FILM COATED ORAL at 07:40

## 2022-07-02 RX ADMIN — FOLIC ACID 1 MG: 1 TABLET ORAL at 07:41

## 2022-07-02 RX ADMIN — APIXABAN 5 MG: 5 TABLET, FILM COATED ORAL at 19:41

## 2022-07-02 RX ADMIN — SUCRALFATE 1 G: 1 TABLET ORAL at 11:53

## 2022-07-02 RX ADMIN — ALLOPURINOL 100 MG: 100 TABLET ORAL at 07:41

## 2022-07-02 RX ADMIN — SUCRALFATE 1 G: 1 TABLET ORAL at 07:40

## 2022-07-02 RX ADMIN — SUCRALFATE 1 G: 1 TABLET ORAL at 19:41

## 2022-07-02 RX ADMIN — ATENOLOL 100 MG: 50 TABLET ORAL at 07:41

## 2022-07-02 RX ADMIN — SUCRALFATE 1 G: 1 TABLET ORAL at 15:46

## 2022-07-02 RX ADMIN — HYDROXYZINE HYDROCHLORIDE 25 MG: 25 TABLET ORAL at 19:37

## 2022-07-02 RX ADMIN — Medication 1 TABLET: at 07:40

## 2022-07-02 RX ADMIN — HYDROCODONE BITARTRATE AND ACETAMINOPHEN 1 TABLET: 7.5; 325 TABLET ORAL at 19:37

## 2022-07-02 NOTE — THERAPY TREATMENT NOTE
Patient Name: Yuval Loja  : 1950    MRN: [id number]                              Today's Date: 2022       Admit Date: 2022    Visit Dx:     ICD-10-CM ICD-9-CM   1. Closed fracture of neck of right femur, initial encounter ([facility])  S72.001A 820.8     Patient Active Problem List   Diagnosis   • Alcoholic ketoacidosis   • Alcohol dependence (HCC)   • Methamphetamine abuse (HCC)   • Fatty liver, alcoholic   • Nausea vomiting and diarrhea   • Alcoholic liver disease (HCC)   • Metabolic acidosis   • Tobacco abuse   • Coronary artery disease involving native coronary artery of native heart without angina pectoris   • Tachycardia   • Sinus tachycardia   • Chronic kidney disease, stage 3   • Medically noncompliant   • Visual hallucinations   • Psychosis (HCC)   • Hyponatremia   • CAD (coronary artery disease)   • Leukopenia   • Symptomatic anemia   • Headache   • Substance abuse (HCC)   • Gastrointestinal hemorrhage   • CRISTIANO (acute kidney injury) (HCC)   • Hyperkalemia   • Right lower quadrant abdominal pain   • New onset atrial fibrillation (HCC)   • History of drug abuse (HCC)   • COPD (chronic obstructive pulmonary disease) (HCC)   • Right inguinal hernia   • Constipation   • Status post right hip replacement     Past Medical History:   Diagnosis Date   • Alcohol abuse    • CAD (coronary artery disease)    • Chronic kidney disease, stage 3 (HCC)    • Fatty liver, alcoholic    • GERD (gastroesophageal reflux disease)    • Hypertensive urgency    • Metabolic acidosis    • Myocardial infarction (HCC)    • Sinus tachycardia    • Stroke (HCC)      Past Surgical History:   Procedure Laterality Date   • TOTAL HIP ARTHROPLASTY Right 2022    Procedure: TOTAL HIP ARTHROPLASTY ANTERIOR WITH HANA TABLE;  Surgeon: Willian Quiles MD;  Location: [facility];  Service: Orthopedics;  Laterality: Right;  mile Bhatt      General Information     Row Name 22 [id number]          Physical Therapy Time  and Intention    Document Type therapy note (daily note)  -     Mode of Treatment individual therapy;physical therapy  -[facility] Name 07/02/22 1728          General Information    Patient Profile Reviewed yes  -     Existing Precautions/Restrictions fall;hip, anterior;right  -[facility] Name 07/02/22 1728          Living Environment    People in Home alone  -[facility] Name 07/02/22 1728          Cognition    Orientation Status (Cognition) oriented x 4  -[facility] Name 07/02/22 1728          Safety Issues, Functional Mobility    Safety Issues Affecting Function (Mobility) awareness of need for assistance;insight into deficits/self-awareness;judgment;positioning of assistive device;problem-solving;safety precaution awareness  -     Impairments Affecting Function (Mobility) balance;endurance/activity tolerance;pain;strength;range of motion (ROM)  -           User Key  (r) = Recorded By, (t) = Taken By, (c) = Cosigned By    Initials Name Provider Type    Ceelste Nascimento PTA Physical Therapist Assistant               Mobility     [facility] Name 07/02/22 1729          Bed Mobility    Comment, (Bed Mobility) in chair  -[facility] Name 07/02/22 1729          Sit-Stand Transfer    Sit-Stand [place] (Transfers) minimum assist (75% patient effort);contact guard;verbal cues  req 2 attempts, off balance first try  -     Assistive Device (Sit-Stand Transfers) walker, front-wheeled  -[facility] Name 07/02/22 1729          Gait/Stairs (Locomotion)    [place] Level (Gait) contact guard;verbal cues  -     Assistive Device (Gait) walker, front-wheeled  -     Distance in Feet (Gait) 65ft, pain limiting  -     Deviations/Abnormal Patterns (Gait) antalgic;alicia decreased;base of support, narrow;stride length decreased  -     Bilateral Gait Deviations forward flexed posture  -           User Key  (r) = Recorded By, (t) = Taken By, (c) = Cosigned By    Initials Name Provider Type    Celeste Nascimento  LAUREL Physical Therapist Assistant               Obj/Interventions     Row Name 07/02/22 1731          Motor Skills    Therapeutic Exercise hip  per protocol x10 reps , assist for hip abd/add  -           User Key  (r) = Recorded By, (t) = Taken By, (c) = Cosigned By    Initials Name Provider Type    Celeste Nascimento PTA Physical Therapist Assistant               Goals/Plan    No documentation.                Clinical Impression     Row Name 07/02/22 1732          Pain    Pretreatment Pain Rating 7/10  -     Posttreatment Pain Rating 8/10  -JM     Pain Location - Side/Orientation Right  -     Pain Location - hip  -     Pain Intervention(s) Medication (See MAR);Repositioned;Ambulation/increased activity;Rest  -     Row Name 07/02/22 1732          Plan of Care Review    Plan of Care Reviewed With patient  -     Outcome Evaluation Pt agreed to PT session, did slightly decr dist to 65ft, but pain limiting, up in chair most of day as pt prefers; vijay exer w/assist ; [place] SNU at [place]  -     Row Name 07/02/22 1732          Therapy Assessment/Plan (PT)    Rehab Potential (PT) good, to achieve stated therapy goals  -     Criteria for Skilled Interventions Met (PT) yes  -     Row Name 07/02/22 1732          Vital Signs    O2 Delivery Pre Treatment room air  -     Row Name 07/02/22 1732          Positioning and Restraints    Pre-Treatment Position sitting in chair/recliner  -     Post Treatment Position chair  -     In Chair reclined;call light within reach;encouraged to call for assist;notified nsg  no alarm upon entry, pt declined, within staff view, nsg states he calls out for assist  -           User Key  (r) = Recorded By, (t) = Taken By, (c) = Cosigned By    Initials Name Provider Type    Celeste Nascimento PTA Physical Therapist Assistant               Outcome Measures     Row Name 07/02/22 1736          How much help from another person do you currently need...    Turning from your back to  your side while in flat bed without using bedrails? 3  -JM     Moving from lying on back to sitting on the side of a flat bed without bedrails? 3  -JM     Moving to and from a bed to a chair (including a wheelchair)? 3  -JM     Standing up from a chair using your arms (e.g., wheelchair, bedside chair)? 3  -JM     Climbing 3-5 steps with a railing? 2  -JM     To walk in hospital room? 3  -JM     AM-PAC 6 Clicks Score (PT) 17  -JM     Highest level of mobility 5 --> Static standing  -JM           User Key  (r) = Recorded By, (t) = Taken By, (c) = Cosigned By    Initials Name Provider Type    Celeste Nascimento PTA Physical Therapist Assistant                             Physical Therapy Education                 Title: PT OT SLP Therapies (In Progress)     Topic: Physical Therapy (Done)     Point: Mobility training (Done)     Learning Progress Summary           Patient Eager, E,TB,D, VU,NR by ALBERTO at 7/2/2022 1736    Acceptance, E,TB,D, VU,NR by CRISTIANO at 7/1/2022 1148    Acceptance, E, VU by MORRIS at 7/1/2022 0500    Acceptance, E,TB,D, VU,NR by CRISTIANO at 6/30/2022 1130    Acceptance, E,TB, VU,NR by FRANKIE at 6/29/2022 0938    Acceptance, E, VU by MORRIS at 7/1/2022 0500                   Point: Home exercise program (Done)     Learning Progress Summary           Patient Eager, E,TB,D, VU,NR by ALBERTO at 7/2/2022 1736    Acceptance, E, VU by MORRIS at 7/1/2022 0500    Acceptance, E,TB, VU,NR by FRANKIE at 6/29/2022 0938    Acceptance, E, VU by MORRIS at 7/1/2022 0500                   Point: Body mechanics (Done)     Learning Progress Summary           Patient Eager, E,TB,D, VU,NR by ALBERTO at 7/2/2022 1736    Acceptance, E, VU by MORRIS at 7/1/2022 0500    Acceptance, E,TB, VU,NR by FRANKIE at 6/29/2022 0938    Acceptance, E, VU by MORRIS at 7/1/2022 0500                   Point: Precautions (Done)     Learning Progress Summary           Patient Eager, E,TB,D, VU,NR by ALBERTO at 7/2/2022 1736    Acceptance, E, VU by MORRIS at 7/1/2022  0500    Acceptance, E,TB, VU,NR by FRANKIE at 6/29/2022 0938    Acceptance, E, VU by MORRIS at 7/1/2022 0500                               User Key     Initials Effective Dates Name Provider Type Discipline     03/07/18 -  Celeste Molina PTA Physical Therapist Assistant PT    EJ 06/16/21 -  Serena Carrillo PT Physical Therapist PT    JF 06/16/21 -  Shonna Moreno, RN Registered Nurse Nurse    CB 10/22/21 -  Lida Simons PT Physical Therapist PT              PT Recommendation and Plan     Plan of Care Reviewed With: patient  Outcome Evaluation: Pt agreed to PT session, did slightly decr dist to 65ft, but pain limiting, up in chair most of day as pt prefers; vijay exer w/assist ; plans SNU at [place]     Time Calculation:    PT Charges     Row Name 07/02/22 1737             Time Calculation    Start Time 1315  -      Stop Time 1335  -      Time Calculation (min) 20 min  -      PT Received On 07/02/22  -      PT - Next Appointment 07/05/22  -            User Key  (r) = Recorded By, (t) = Taken By, (c) = Cosigned By    Initials Name Provider Type    Celeste Nascimento PTA Physical Therapist Assistant              Therapy Charges for Today     Code Description Service Date Service Provider Modifiers Qty    67011390264 HC PT THER PROC EA 15 MIN 7/2/2022 Celeste Molina PTA GP 1          PT G-Codes  Outcome Measure Options: AM-PAC 6 Clicks Daily Activity (OT)  AM-PAC 6 Clicks Score (PT): 17  AM-PAC 6 Clicks Score (OT): 17    Celeste Molina PTA  7/2/2022

## 2022-07-02 NOTE — PLAN OF CARE
Goal Outcome Evaluation:  Plan of Care Reviewed With: patient        Progress: improving  Outcome Evaluation: vss. ra. ambulates with x1 assist and walker. voiding per urinal/brp. pain tolerated with po prn meds. dc plan tbd at this time. educated on fall prevention.

## 2022-07-02 NOTE — PLAN OF CARE
Goal Outcome Evaluation:  Plan of Care Reviewed With: patient           Outcome Evaluation: Pt agreed to PT session, did slightly decr dist to 65ft, but pain limiting, up in chair most of day as pt prefers; vijay exer w/assist ; [place] SNU at [place]    Patient was wearing a face mask during this therapy encounter. Therapist used appropriate personal protective equipment including eye protection, mask, and gloves.  Mask used was standard procedure mask. Appropriate PPE was worn during the entire therapy session. Hand hygiene was completed before and after therapy session. Patient is not in enhanced droplet precautions.     164.9

## 2022-07-02 NOTE — PROGRESS NOTES
"Daily progress note    Chief complaint  Doing same  No new complaints  Denies chest pain shortness of breath palpitation    History of present illness  71-year-old -American male with history of coronary artery disease hypertension chronic kidney disease stage III and CVA in the past brought to the emergency room with right hip injury after the mechanical fall.  Patient denies any chest pain dizziness shortness of breath palpitation prior to the fall.  Patient work-up in ER revealed right femur neck fracture admit for management.  Patient is a poor historian unable to give detailed history most of the history obtained from the chart nursing staff old record and family.  Patient has no fever chills cough congestion night sweats weight loss or weight gain and wants to get the surgery done as soon as possible.     REVIEW OF SYSTEMS  Unremarkable except weakness     PHYSICAL EXAM  Blood pressure 118/57, pulse 73, temperature 97.8 °F (36.6 °C), temperature source Oral, resp. rate 16, height 175.3 cm (69\"), weight 65.6 kg (144 lb 9.6 oz), SpO2 97 %.    GENERAL: Awake and alert, nontoxic-appearing.    HENT: nares patent, NCAT   EYES: no scleral icterus  CV: regular rhythm, regular rate S1-S2  RESPIRATORY: normal effort moving air bilaterally  ABDOMEN: soft nontender nondistended bowel sounds positive  MUSCULOSKELETAL: The right leg is shortened and externally rotated with tenderness in the right hip.  It is neurovascular intact with normal strength and sensation.  NEURO: alert, moves all extremities, follows commands  SKIN: warm, dry     LAB RESULTS  Lab Results (last 24 hours)     Procedure Component Value Units Date/Time    Basic Metabolic Panel [[id number]]  (Abnormal) Collected: 07/02/22 0406    Specimen: Blood Updated: 07/02/22 0512     Glucose 148 mg/dL      BUN 18 mg/dL      Creatinine 1.05 mg/dL      Sodium 140 mmol/L      Potassium 4.3 mmol/L      Chloride 103 mmol/L      CO2 29.0 mmol/L      Calcium 9.9 " mg/dL      BUN/Creatinine Ratio 17.1     Anion Gap 8.0 mmol/L      eGFR 75.0 mL/min/1.73      Comment: National Kidney Foundation and American Society of Nephrology (ASN) Task Force recommended calculation based on the Chronic Kidney Disease Epidemiology Collaboration (CKD-EPI) equation refit without adjustment for race.       Narrative:      GFR Normal >60  Chronic Kidney Disease <60  Kidney Failure <15          Imaging Results (Last 24 Hours)     ** No results found for the last 24 hours. **             ECG 12 Lead  Component   Ref Range & Units 1 d ago 3 mo ago   QT Interval   ms 398  348    Resulting Agency [facility] ECG  ECG             HEART RATE= 87  bpm  RR Interval= 686  ms  KS Interval= 196  ms  P Horizontal Axis= -44  deg  P Front Axis= 47  deg  QRSD Interval= 80  ms  QT Interval= 398  ms  QRS Axis= 62  deg  T Wave Axis= 47  deg  - ABNORMAL ECG -  Sinus rhythm  Supraventricular bigeminy  Borderline prolonged QT interval  af resolved             Current Facility-Administered Medications:   •  allopurinol (ZYLOPRIM) tablet 100 mg, 100 mg, Oral, Daily, Chino Cole MD, 100 mg at 07/02/22 0741  •  apixaban (ELIQUIS) tablet 5 mg, 5 mg, Oral, Q12H, Rhea Ocampo APRN, 5 mg at 07/02/22 0740  •  atenolol (TENORMIN) tablet 100 mg, 100 mg, Oral, Q24H, Willian Quiles MD, 100 mg at 07/02/22 0741  •  atorvastatin (LIPITOR) tablet 10 mg, 10 mg, Oral, Nightly, Willian Quiles MD, 10 mg at 07/01/22 2053  •  bisacodyl (DULCOLAX) EC tablet 10 mg, 10 mg, Oral, Daily PRN, Willian Quiles MD  •  docusate sodium (COLACE) capsule 100 mg, 100 mg, Oral, BID, Willian Quiles MD, 100 mg at 07/02/22 0740  •  famotidine (PEPCID) tablet 20 mg, 20 mg, Oral, BID, Willian Quiles MD, 20 mg at 07/02/22 0740  •  folic acid (FOLVITE) tablet 1 mg, 1 mg, Oral, Daily, Willian Quiles MD, 1 mg at 07/02/22 0741  •  HYDROcodone-acetaminophen (NORCO) 7.5-325 MG per tablet 1 tablet, 1 tablet,  Oral, Q4H PRN, Willian Quiles MD, 1 tablet at 07/02/22 1045  •  hydrOXYzine (ATARAX) tablet 25 mg, 25 mg, Oral, TID PRN, Jolie Cole MD, 25 mg at 07/01/22 0844  •  melatonin tablet 1 mg, 1 mg, Oral, Nightly PRN, Willian Quiles MD, 1 mg at 06/28/22 2303  •  multivitamin (THERAGRAN) tablet 1 tablet, 1 tablet, Oral, Daily, Willian Quiles MD, 1 tablet at 07/02/22 0740  •  ondansetron (ZOFRAN) injection 4 mg, 4 mg, Intravenous, Q6H PRN, Willian Quiles MD  •  [COMPLETED] Insert peripheral IV, , , Once **AND** sodium chloride 0.9 % flush 10 mL, 10 mL, Intravenous, PRN, Willian Quiles MD  •  sodium chloride 0.9 % infusion, 75 mL/hr, Intravenous, Continuous, Jolie Cole MD  •  sucralfate (CARAFATE) tablet 1 g, 1 g, Oral, 4x Daily AC & at Bedtime, Willian Quiles MD, 1 g at 07/02/22 1153  •  thiamine (VITAMIN B-1) tablet 100 mg, 100 mg, Oral, Daily, Willian Quiles MD, 100 mg at 07/02/22 0741    ASSESSMENT  Acute right femoral neck fracture s/p right total hip arthroplasty  Hypercalcemia resolved  Alcohol intoxication  Tobacco abuse  Coronary artery disease  Hypertension  Hyperlipidemia  Acute kidney injury  Chronic kidney disease stage III  Gastroesophageal reflux disease    PLAN  CPM  Discussed IV fluid  Postop care  Pain management  Detox medications  Eliquis   Alcohol withdrawal and DTs precautions  Adjust home medications  Stress ulcer DVT prophylaxis  Cardiology and orthopedic consult appreciated  Nephrology to follow patient  Supportive care  PT/OT  Discussed with nursing staff  Subacute rehab once bed available    JOLIE COLE MD

## 2022-07-02 NOTE — PLAN OF CARE
Goal Outcome Evaluation:           Progress: improving   Pt is a post op day 5 of a rt total hip, anterior approach. Dressing is clean dry and intact. Pt continues with the Optifoam dressing. Pt was upset at the beginning of the shift because he was not informed that he was changing rooms. Pt can be anxious at times. Pt feels the doctors are not doing their job. Pt voiding via the urinal, voiding function intact. Pt continues with PO pain meds that provide relief. CIWA protocol in place. Pt is resting at this time, will continue to monitor

## 2022-07-03 RX ADMIN — APIXABAN 5 MG: 5 TABLET, FILM COATED ORAL at 20:07

## 2022-07-03 RX ADMIN — HYDROCODONE BITARTRATE AND ACETAMINOPHEN 1 TABLET: 7.5; 325 TABLET ORAL at 09:07

## 2022-07-03 RX ADMIN — APIXABAN 5 MG: 5 TABLET, FILM COATED ORAL at 09:07

## 2022-07-03 RX ADMIN — ATENOLOL 100 MG: 50 TABLET ORAL at 09:07

## 2022-07-03 RX ADMIN — HYDROCODONE BITARTRATE AND ACETAMINOPHEN 1 TABLET: 7.5; 325 TABLET ORAL at 15:15

## 2022-07-03 RX ADMIN — HYDROXYZINE HYDROCHLORIDE 25 MG: 25 TABLET ORAL at 09:16

## 2022-07-03 RX ADMIN — HYDROXYZINE HYDROCHLORIDE 25 MG: 25 TABLET ORAL at 20:07

## 2022-07-03 RX ADMIN — ATORVASTATIN CALCIUM 10 MG: 20 TABLET, FILM COATED ORAL at 20:07

## 2022-07-03 RX ADMIN — HYDROCODONE BITARTRATE AND ACETAMINOPHEN 1 TABLET: 7.5; 325 TABLET ORAL at 20:07

## 2022-07-03 RX ADMIN — FAMOTIDINE 20 MG: 20 TABLET ORAL at 09:07

## 2022-07-03 RX ADMIN — SUCRALFATE 1 G: 1 TABLET ORAL at 06:51

## 2022-07-03 RX ADMIN — FOLIC ACID 1 MG: 1 TABLET ORAL at 09:07

## 2022-07-03 RX ADMIN — FAMOTIDINE 20 MG: 20 TABLET ORAL at 20:07

## 2022-07-03 RX ADMIN — ALLOPURINOL 100 MG: 100 TABLET ORAL at 09:07

## 2022-07-03 RX ADMIN — Medication 1 TABLET: at 09:07

## 2022-07-03 RX ADMIN — SUCRALFATE 1 G: 1 TABLET ORAL at 20:07

## 2022-07-03 RX ADMIN — Medication 100 MG: at 09:07

## 2022-07-03 NOTE — PLAN OF CARE
Goal Outcome Evaluation:  Plan of Care Reviewed With: patient        Progress: improving  Outcome Evaluation: VSS, RA, SL, up with assist of 1, voiding per urinal, BM 7/2, reconsulted CCP since they have not seen patient since before surgery on 6/27, pain tolerable, educated on BP monitoring, to rehab possible Tuesday

## 2022-07-03 NOTE — PROGRESS NOTES
"Daily progress note    Chief complaint  Doing same  No new complaints  Denies chest pain shortness of breath palpitation    History of present illness  71-year-old -American male with history of coronary artery disease hypertension chronic kidney disease stage III and CVA in the past brought to the emergency room with right hip injury after the mechanical fall.  Patient denies any chest pain dizziness shortness of breath palpitation prior to the fall.  Patient work-up in ER revealed right femur neck fracture admit for management.  Patient is a poor historian unable to give detailed history most of the history obtained from the chart nursing staff old record and family.  Patient has no fever chills cough congestion night sweats weight loss or weight gain and wants to get the surgery done as soon as possible.     REVIEW OF SYSTEMS  Unremarkable except weakness     PHYSICAL EXAM  Blood pressure 150/60, pulse 85, temperature 98.9 °F (37.2 °C), resp. rate 16, height 175.3 cm (69\"), weight 65.6 kg (144 lb 9.6 oz), SpO2 91 %.    GENERAL: Awake and alert, nontoxic-appearing.    HENT: nares patent, NCAT   EYES: no scleral icterus  CV: regular rhythm, regular rate S1-S2  RESPIRATORY: normal effort moving air bilaterally  ABDOMEN: soft nontender nondistended bowel sounds positive  MUSCULOSKELETAL: The right leg is shortened and externally rotated with tenderness in the right hip.  It is neurovascular intact with normal strength and sensation.  NEURO: alert, moves all extremities, follows commands  SKIN: warm, dry     LAB RESULTS  Lab Results (last 24 hours)     ** No results found for the last 24 hours. **        Imaging Results (Last 24 Hours)     ** No results found for the last 24 hours. **             ECG 12 Lead  Component   Ref Range & Units 1 d ago 3 mo ago   QT Interval   ms 398  348    Resulting Agency  [facility]  ECG             HEART RATE= 87  bpm  RR Interval= 686  ms  DC Interval= 196  ms  P Horizontal Axis= " -44  deg  P Front Axis= 47  deg  QRSD Interval= 80  ms  QT Interval= 398  ms  QRS Axis= 62  deg  T Wave Axis= 47  deg  - ABNORMAL ECG -  Sinus rhythm  Supraventricular bigeminy  Borderline prolonged QT interval  af resolved             Current Facility-Administered Medications:   •  allopurinol (ZYLOPRIM) tablet 100 mg, 100 mg, Oral, Daily, hCino Cole MD, 100 mg at 07/03/22 0907  •  apixaban (ELIQUIS) tablet 5 mg, 5 mg, Oral, Q12H, Rhea Ocampo APRN, 5 mg at 07/03/22 0907  •  atenolol (TENORMIN) tablet 100 mg, 100 mg, Oral, Q24H, Willian Quiles MD, 100 mg at 07/03/22 0907  •  atorvastatin (LIPITOR) tablet 10 mg, 10 mg, Oral, Nightly, Willian Quiles MD, 10 mg at 07/02/22 1941  •  bisacodyl (DULCOLAX) EC tablet 10 mg, 10 mg, Oral, Daily PRN, Willian Quiles MD  •  docusate sodium (COLACE) capsule 100 mg, 100 mg, Oral, BID, Willian Quiles MD, 100 mg at 07/02/22 1941  •  famotidine (PEPCID) tablet 20 mg, 20 mg, Oral, BID, Willian Quiles MD, 20 mg at 07/03/22 0907  •  folic acid (FOLVITE) tablet 1 mg, 1 mg, Oral, Daily, Willian Quiles MD, 1 mg at 07/03/22 0907  •  HYDROcodone-acetaminophen (NORCO) 7.5-325 MG per tablet 1 tablet, 1 tablet, Oral, Q4H PRN, Willian Quiles MD, 1 tablet at 07/03/22 0907  •  hydrOXYzine (ATARAX) tablet 25 mg, 25 mg, Oral, TID PRN, Chino Cole MD, 25 mg at 07/03/22 0916  •  melatonin tablet 1 mg, 1 mg, Oral, Nightly PRN, Willian Quiles MD, 1 mg at 06/28/22 2303  •  multivitamin (THERAGRAN) tablet 1 tablet, 1 tablet, Oral, Daily, Willian Quiles MD, 1 tablet at 07/03/22 0907  •  ondansetron (ZOFRAN) injection 4 mg, 4 mg, Intravenous, Q6H PRN, Willian Quiles MD  •  [COMPLETED] Insert peripheral IV, , , Once **AND** sodium chloride 0.9 % flush 10 mL, 10 mL, Intravenous, PRN, Willian Quiles MD  •  sucralfate (CARAFATE) tablet 1 g, 1 g, Oral, 4x Daily AC & at Bedtime, Etta  Willian ELIZABETH MD, 1 g at 07/03/22 0651  •  thiamine (VITAMIN B-1) tablet 100 mg, 100 mg, Oral, Daily, Willian Quiles MD, 100 mg at 07/03/22 0907    ASSESSMENT  Acute right femoral neck fracture s/p right total hip arthroplasty  Hypercalcemia resolved  Alcohol intoxication  Tobacco abuse  Coronary artery disease  Hypertension  Hyperlipidemia  Acute kidney injury  Chronic kidney disease stage III  Gastroesophageal reflux disease    PLAN  CPM  Discussed IV fluid  Postop care  Pain management  Detox medications  Eliquis   Alcohol withdrawal and DTs precautions  Adjust home medications  Stress ulcer DVT prophylaxis  Cardiology and orthopedic consult appreciated  Nephrology to follow patient  Supportive care  PT/OT  Discussed with nursing staff  Subacute rehab once bed available    JOLIE BUNDY MD

## 2022-07-03 NOTE — PROGRESS NOTES
Continued Stay Note  [facility]     Patient Name: Yuval Loja  MRN: [id number]  Today's Date: 7/3/2022    Admit Date: 6/25/2022     Discharge Plan     Row Name 07/03/22 1733       Plan    Plan Comments CCP to f/u with [facility] 7/5 to obtain precert, ready for d/c,               Discharge Codes    No documentation.               Expected Discharge Date and Time     Expected Discharge Date Expected Discharge Time    Jun 30, 2022             Kitty Lockhart RN

## 2022-07-04 PROCEDURE — 97110 THERAPEUTIC EXERCISES: CPT

## 2022-07-04 RX ORDER — DIAPER,BRIEF,INFANT-TODD,DISP
1 EACH MISCELLANEOUS EVERY 12 HOURS PRN
Status: DISCONTINUED | OUTPATIENT
Start: 2022-07-04 | End: 2022-07-06

## 2022-07-04 RX ADMIN — ATORVASTATIN CALCIUM 10 MG: 20 TABLET, FILM COATED ORAL at 22:03

## 2022-07-04 RX ADMIN — ALLOPURINOL 100 MG: 100 TABLET ORAL at 09:03

## 2022-07-04 RX ADMIN — HYDROXYZINE HYDROCHLORIDE 25 MG: 25 TABLET ORAL at 22:04

## 2022-07-04 RX ADMIN — BISACODYL 10 MG: 5 TABLET ORAL at 17:05

## 2022-07-04 RX ADMIN — DOCUSATE SODIUM 100 MG: 100 CAPSULE, LIQUID FILLED ORAL at 22:04

## 2022-07-04 RX ADMIN — HYDROCODONE BITARTRATE AND ACETAMINOPHEN 1 TABLET: 7.5; 325 TABLET ORAL at 22:49

## 2022-07-04 RX ADMIN — Medication 100 MG: at 09:03

## 2022-07-04 RX ADMIN — HYDROXYZINE HYDROCHLORIDE 25 MG: 25 TABLET ORAL at 06:41

## 2022-07-04 RX ADMIN — HYDROCODONE BITARTRATE AND ACETAMINOPHEN 1 TABLET: 7.5; 325 TABLET ORAL at 18:01

## 2022-07-04 RX ADMIN — HYDROXYZINE HYDROCHLORIDE 25 MG: 25 TABLET ORAL at 16:17

## 2022-07-04 RX ADMIN — FAMOTIDINE 20 MG: 20 TABLET ORAL at 09:03

## 2022-07-04 RX ADMIN — FOLIC ACID 1 MG: 1 TABLET ORAL at 09:03

## 2022-07-04 RX ADMIN — HYDROCODONE BITARTRATE AND ACETAMINOPHEN 1 TABLET: 7.5; 325 TABLET ORAL at 09:04

## 2022-07-04 RX ADMIN — DOCUSATE SODIUM 100 MG: 100 CAPSULE, LIQUID FILLED ORAL at 09:03

## 2022-07-04 RX ADMIN — SUCRALFATE 1 G: 1 TABLET ORAL at 06:39

## 2022-07-04 RX ADMIN — HYDROCODONE BITARTRATE AND ACETAMINOPHEN 1 TABLET: 7.5; 325 TABLET ORAL at 02:53

## 2022-07-04 RX ADMIN — SUCRALFATE 1 G: 1 TABLET ORAL at 22:05

## 2022-07-04 RX ADMIN — FAMOTIDINE 20 MG: 20 TABLET ORAL at 22:04

## 2022-07-04 RX ADMIN — APIXABAN 5 MG: 5 TABLET, FILM COATED ORAL at 09:03

## 2022-07-04 RX ADMIN — APIXABAN 5 MG: 5 TABLET, FILM COATED ORAL at 22:05

## 2022-07-04 RX ADMIN — SUCRALFATE 1 G: 1 TABLET ORAL at 17:30

## 2022-07-04 RX ADMIN — ATENOLOL 100 MG: 50 TABLET ORAL at 09:03

## 2022-07-04 RX ADMIN — Medication 1 TABLET: at 09:03

## 2022-07-04 RX ADMIN — SUCRALFATE 1 G: 1 TABLET ORAL at 11:56

## 2022-07-04 NOTE — PROGRESS NOTES
Adult Nutrition  Assessment/PES    Patient Name:  Yuval Loja  YOB: 1950  MRN: [id number]  Admit Date:  6/25/2022    Assessment Date:  7/4/2022    Comments:  F/u MST 2. BMI: 21.3 (normal).    Pt on regular diet with Boost Plus BID. % intake documented. 65 average intake x 5 meals. Pt reported he eats well when he likes the meal. Pt likes the breakfast here. Pt likes the Boost Plus. Encouraged meal intake. Continue to follow per protocol.      Reason for Assessment     Row Name 07/04/22 1016          Reason for Assessment    Reason For Assessment follow-up protocol                Nutrition/Diet History     Row Name 07/04/22 1016          Nutrition/Diet History    Typical Intake (Food/Fluid/EN/PN) % intake. Pt reported he eats well when he likes the meal. Pt likes the breakfast here. Pt likes the Boost Plus.                  Labs/Tests/Procedures/Meds     Row Name 07/04/22 1017          Labs/Procedures/Meds    Lab Results Reviewed reviewed     Lab Results Comments glucose (high), albumin (low)            Diagnostic Tests/Procedures    Diagnostic Test/Procedure Reviewed reviewed            Medications    Pertinent Medications Reviewed reviewed     Pertinent Medications Comments allopurinol, eliquis, tenormin, lipitor, colace, pepcid, folic acid, multivitamin, carafate, thiamine, prn: norco, atarax, melatonin                    Nutrition Prescription Ordered     Row Name 07/04/22 1021          Nutrition Prescription PO    Current PO Diet Regular     Supplement Boost Plus (Ensure Enlive, Ensure Plus)     Supplement Frequency 2 times a day                Evaluation of Received Nutrient/Fluid Intake     Row Name 07/04/22 1021          PO Evaluation    % PO Intake %, 65% average intake x 5 meals                     Problem/Interventions:           Intervention Goal     Row Name 07/04/22 1022          Intervention Goal    General Maintain nutrition;Improved nutrition related lab(s);Meet  [FreeTextEntry1] : \par \par Forgetfulness\par Referral for neurology provided today\par \par Depression\par cont Lexapro 10mg daily\par \par Hx of High Risk for DM\par Reinforced the importance of low carb/low sugar diet\par we'll check glucose and HgA1c today\par \par Hx of Hypothyroidism\par cont current meds\par we'll check TSH/T4 \par \par Hx of HTN, CAD\par cont current medications\par Follows with cardiology\par Reinforced the importance of low sodium, low fat diet and physical activity\par \par Hx of Hypercholesterolemia\par continue current meds\par Reinforced the importance of low fat/low cholesterol diet and physical activity\par \par Hx of COPD, Lung Ca\par Continue current meds\par follows with pulmonary\par \par blood work ordered\par \par follow up in one week for lab results\par \par \par  nutritional needs for age/condition;Disease management/therapy     PO Tolerate PO;Meet estimated needs;Increase intake     Weight Maintain weight                Nutrition Intervention     Row Name 07/04/22 1022          Nutrition Intervention    RD/Tech Action Care plan reviewd;Follow Tx progress;Encourage intake                  Education/Evaluation     Row Name 07/04/22 1022          Education    Education Will Instruct as appropriate            Monitor/Evaluation    Monitor Per protocol;I&O;PO intake;Supplement intake;Pertinent labs;Weight;Skin status;Symptoms                 Electronically signed by:  Marni Garcia RD  07/04/22 10:22 EDT

## 2022-07-04 NOTE — PLAN OF CARE
Goal Outcome Evaluation:  Plan of Care Reviewed With: patient        Progress: improving  Outcome Evaluation: pt agreed to PT session, incr amb to 75ft, assisted to BR during rx; pt just req cues for hand placement initially, then verbalized ; plans SNU at [place], but wants home; lives alone, unsafe at present unless help available 24/7    Patient was wearing a face mask during this therapy encounter. Therapist used appropriate personal protective equipment including eye protection, mask, and gloves.  Mask used was standard procedure mask. Appropriate PPE was worn during the entire therapy session. Hand hygiene was completed before and after therapy session. Patient is not in enhanced droplet precautions.

## 2022-07-04 NOTE — THERAPY TREATMENT NOTE
Patient Name: Yuval Loja  : 1950    MRN: [id number]                              Today's Date: 2022       Admit Date: 2022    Visit Dx:     ICD-10-CM ICD-9-CM   1. Closed fracture of neck of right femur, initial encounter ([facility])  S72.001A 820.8     Patient Active Problem List   Diagnosis   • Alcoholic ketoacidosis   • Alcohol dependence (HCC)   • Methamphetamine abuse (HCC)   • Fatty liver, alcoholic   • Nausea vomiting and diarrhea   • Alcoholic liver disease (HCC)   • Metabolic acidosis   • Tobacco abuse   • Coronary artery disease involving native coronary artery of native heart without angina pectoris   • Tachycardia   • Sinus tachycardia   • Chronic kidney disease, stage 3   • Medically noncompliant   • Visual hallucinations   • Psychosis (HCC)   • Hyponatremia   • CAD (coronary artery disease)   • Leukopenia   • Symptomatic anemia   • Headache   • Substance abuse (HCC)   • Gastrointestinal hemorrhage   • CRISTIANO (acute kidney injury) (HCC)   • Hyperkalemia   • Right lower quadrant abdominal pain   • New onset atrial fibrillation (HCC)   • History of drug abuse (HCC)   • COPD (chronic obstructive pulmonary disease) (HCC)   • Right inguinal hernia   • Constipation   • Status post right hip replacement     Past Medical History:   Diagnosis Date   • Alcohol abuse    • CAD (coronary artery disease)    • Chronic kidney disease, stage 3 (HCC)    • Fatty liver, alcoholic    • GERD (gastroesophageal reflux disease)    • Hypertensive urgency    • Metabolic acidosis    • Myocardial infarction (HCC)    • Sinus tachycardia    • Stroke (HCC)      Past Surgical History:   Procedure Laterality Date   • TOTAL HIP ARTHROPLASTY Right 2022    Procedure: TOTAL HIP ARTHROPLASTY ANTERIOR WITH HANA TABLE;  Surgeon: Willian Quiles MD;  Location: [facility];  Service: Orthopedics;  Laterality: Right;  mile Bhatt      General Information     Row Name 22 0905          Physical Therapy Time  and Intention    Document Type therapy note (daily note)  -     Mode of Treatment individual therapy;physical therapy  -[facility] Name 07/04/22 0905          General Information    Patient Profile Reviewed yes  -     Existing Precautions/Restrictions fall;hip, anterior;right  -[facility] Name 07/04/22 0905          Living Environment    People in Home alone  -[facility] Name 07/04/22 0905          Safety Issues, Functional Mobility    Safety Issues Affecting Function (Mobility) awareness of need for assistance;insight into deficits/self-awareness;judgment;problem-solving;safety precaution awareness  -     Impairments Affecting Function (Mobility) pain;range of motion (ROM);strength  -           User Key  (r) = Recorded By, (t) = Taken By, (c) = Cosigned By    Initials Name Provider Type    Celeste Nascimento PTA Physical Therapist Assistant               Mobility     [facility] Name 07/04/22 0907          Bed Mobility    Comment, (Bed Mobility) in chair  -[facility] Name 07/04/22 0907          Sit-Stand Transfer    Sit-Stand [place] (Transfers) contact guard;verbal cues  cues for safe hand placement  -     Assistive Device (Sit-Stand Transfers) walker, front-wheeled  -[facility] Name 07/04/22 0907          Gait/Stairs (Locomotion)    [place] Level (Gait) contact guard;verbal cues  -     Assistive Device (Gait) walker, front-wheeled  -     Distance in Feet (Gait) 75ft, slight LOB w/turns  -     Deviations/Abnormal Patterns (Gait) antalgic;base of support, narrow;alicia decreased;stride length decreased  -     Bilateral Gait Deviations forward flexed posture  -JM     Row Name 07/04/22 0907          Mobility    Extremity Weight-bearing Status right lower extremity  -     Right Lower Extremity (Weight-bearing Status) weight-bearing as tolerated (WBAT)  -           User Key  (r) = Recorded By, (t) = Taken By, (c) = Cosigned By    Initials Name Provider Type    Celeste Nascimento PTA  Physical Therapist Assistant               Obj/Interventions     Row Name 07/04/22 0909          Motor Skills    Therapeutic Exercise --  not perf at present due to need for BR  -           User Key  (r) = Recorded By, (t) = Taken By, (c) = Cosigned By    Initials Name Provider Type    Celeste Nascimento PTA Physical Therapist Assistant               Goals/Plan    No documentation.                Clinical Impression     [facility] Name 07/04/22 0909          Pain    Pretreatment Pain Rating 2/10  -     Posttreatment Pain Rating 5/10  -     Pain Location - Side/Orientation Right  -     Pain Location - hip  -     Pre/Posttreatment Pain Comment stiffness, swelling reported  -     Pain Intervention(s) Medication (See MAR);Repositioned;Elevated;Rest  -[facility] Name 07/04/22 0909          Plan of Care Review    Plan of Care Reviewed With patient  -     Progress improving  -     Outcome Evaluation pt agreed to PT session, incr amb to 75ft, assisted to BR during rx; pt just req cues for hand placement initially, then verbalized ; plans SNU at [place], but wants home; lives alone, unsafe at present unless help available 24/7  -[facility] Name 07/04/22 0909          Therapy Assessment/Plan (PT)    Rehab Potential (PT) good, to achieve stated therapy goals  -     Criteria for Skilled Interventions Met (PT) yes  -[facility] Name 07/04/22 0909          Vital Signs    O2 Delivery Pre Treatment room air  -[facility] 07/04/22 0909          Positioning and Restraints    Pre-Treatment Position standing in room  just standing w/nsg  -JM     Post Treatment Position chair  -     In Chair reclined;call light within reach;encouraged to call for assist;exit alarm on;notified nsg  -           User Key  (r) = Recorded By, (t) = Taken By, (c) = Cosigned By    Initials Name Provider Type    Celeste Nascimento PTA Physical Therapist Assistant               Outcome Measures     Row Name 07/04/22 0913          How much help  from another person do you currently need...    Turning from your back to your side while in flat bed without using bedrails? 3  -JM     Moving from lying on back to sitting on the side of a flat bed without bedrails? 2  -JM     Moving to and from a bed to a chair (including a wheelchair)? 3  -JM     Standing up from a chair using your arms (e.g., wheelchair, bedside chair)? 3  -JM     Climbing 3-5 steps with a railing? 2  -JM     To walk in hospital room? 3  -JM     AM-PAC 6 Clicks Score (PT) 16  -JM     Highest level of mobility 5 --> Static standing  -JM           User Key  (r) = Recorded By, (t) = Taken By, (c) = Cosigned By    Initials Name Provider Type    Celeste Nascimento PTA Physical Therapist Assistant                             Physical Therapy Education                 Title: PT OT SLP Therapies (In Progress)     Topic: Physical Therapy (Done)     Point: Mobility training (Done)     Learning Progress Summary           Patient Eager, E,TB,D, VU by ALBERTO at 7/4/2022 0913    Eager, E,TB,D, VU,NR by ALBERTO at 7/2/2022 1736    Acceptance, E,TB,D, VU,NR by CRISTIANO at 7/1/2022 1148    Acceptance, E, VU by MORRIS at 7/1/2022 0500    Acceptance, E,TB,D, VU,NR by CRISTIANO at 6/30/2022 1130    Acceptance, E,TB, VU,NR by FRANKIE at 6/29/2022 0938    Acceptance, E, VU by MORRIS at 7/1/2022 0500                   Point: Home exercise program (Done)     Learning Progress Summary           Patient Eager, E,TB,D, VU by ALBERTO at 7/4/2022 0913    Eager, E,TB,D, VU,NR by ALBERTO at 7/2/2022 1736    Acceptance, E, VU by MORRIS at 7/1/2022 0500    Acceptance, E,TB, VU,NR by FRANKIE at 6/29/2022 0938    Acceptance, E, VU by MORRIS at 7/1/2022 0500                   Point: Body mechanics (Done)     Learning Progress Summary           Patient Eager, E,TB,D, VU by ALBERTO at 7/4/2022 0913    Eager, E,TB,D, VU,NR by ALBERTO at 7/2/2022 1736    Acceptance, CHER, MUSA by MORRIS at 7/1/2022 0500    Acceptance, E,MUSA RENTERIA,NR by FRANKIE at 6/29/2022 0938    Acceptance,  E, VU by MORRIS at 7/1/2022 0500                   Point: Precautions (Done)     Learning Progress Summary           Patient Eager, E,TB,D, VU by ALBERTO at 7/4/2022 0913    Eager, E,TB,D, VU,NR by ALBERTO at 7/2/2022 1736    Acceptance, E, VU by MORRIS at 7/1/2022 0500    Acceptance, E,TB, VU,NR by FRANKIE at 6/29/2022 0938    Acceptance, E, VU by MORRIS at 7/1/2022 0500                               User Key     Initials Effective Dates Name Provider Type Discipline     03/07/18 -  Celeste Molina PTA Physical Therapist Assistant PT    EJ 06/16/21 -  Serena Carrillo PT Physical Therapist PT     06/16/21 -  Shonna Moreno, RN Registered Nurse Nurse     10/22/21 -  Lida Simons, BI Physical Therapist PT              PT Recommendation and Plan     Plan of Care Reviewed With: patient  Progress: improving  Outcome Evaluation: pt agreed to PT session, incr amb to 75ft, assisted to BR during rx; pt just req cues for hand placement initially, then verbalized ; plans SNU at [place], but wants home; lives alone, unsafe at present unless help available 24/7     Time Calculation:    PT Charges     Row Name 07/04/22 0914             Time Calculation    Start Time 0840  -      Stop Time 0905  -      Time Calculation (min) 25 min  -      PT Received On 07/04/22  -      PT - Next Appointment 07/05/22  -            User Key  (r) = Recorded By, (t) = Taken By, (c) = Cosigned By    Initials Name Provider Type     Celeste Molina PTA Physical Therapist Assistant              Therapy Charges for Today     Code Description Service Date Service Provider Modifiers Qty    76518746729 HC PT THER PROC EA 15 MIN 7/4/2022 Celeste Molina PTA GP 2          PT G-Codes  Outcome Measure Options: AM-PAC 6 Clicks Daily Activity (OT)  AM-PAC 6 Clicks Score (PT): 16  AM-PAC 6 Clicks Score (OT): 17    Celeste Molina PTA  7/4/2022

## 2022-07-04 NOTE — PLAN OF CARE
Goal Outcome Evaluation:           Progress: improving  Outcome Evaluation: 70 y/o s/POD 7 R SUZANNE. Pt up w/ assist x1 to chair. Voiding function intact via urinal and toilet. Neuro checks WNL, c/o some tingling in RLE. Optifoam dsg c/d/i. Pain management offered this shift. C/O irritation and constipation, see MAR. PRN atarax given for anxiety. Waffle cushion provided for skin breakdown prophylactic. Needs met. VSS. RA. Educated on importance of shifting in chair. Plan to d/c to rehab tomorrow. WIll CTM

## 2022-07-04 NOTE — PLAN OF CARE
Goal Outcome Evaluation:  Plan of Care Reviewed With: patient        Progress: improving  Outcome Evaluation: Pt remains stable. Ambulates with walker use and 1 assist. Voiding adequately per BRP/urinal. Last BM 7/2. Dressing to hip incision is cdi. CIWAA score 0. Atarax given PRN and pain well controlled with Norco. Educated on COPD management. Precert pending for rehab upon dc/ [facility].

## 2022-07-04 NOTE — PROGRESS NOTES
"Daily progress note    Chief complaint  Doing better  No new complaints  Denies chest pain shortness of breath palpitation    History of present illness  71-year-old -American male with history of coronary artery disease hypertension chronic kidney disease stage III and CVA in the past brought to the emergency room with right hip injury after the mechanical fall.  Patient denies any chest pain dizziness shortness of breath palpitation prior to the fall.  Patient work-up in ER revealed right femur neck fracture admit for management.  Patient is a poor historian unable to give detailed history most of the history obtained from the chart nursing staff old record and family.  Patient has no fever chills cough congestion night sweats weight loss or weight gain and wants to get the surgery done as soon as possible.     REVIEW OF SYSTEMS  Unremarkable except weakness     PHYSICAL EXAM  Blood pressure 114/55, pulse 68, temperature 98.4 °F (36.9 °C), temperature source Oral, resp. rate 16, height 175.3 cm (69\"), weight 65.6 kg (144 lb 9.6 oz), SpO2 100 %.    GENERAL: Awake and alert, nontoxic-appearing.    HENT: nares patent, NCAT   EYES: no scleral icterus  CV: regular rhythm, regular rate S1-S2  RESPIRATORY: normal effort moving air bilaterally  ABDOMEN: soft nontender nondistended bowel sounds positive  MUSCULOSKELETAL: The right leg is shortened and externally rotated with tenderness in the right hip.  It is neurovascular intact with normal strength and sensation.  NEURO: alert, moves all extremities, follows commands  SKIN: warm, dry     LAB RESULTS  Lab Results (last 24 hours)     ** No results found for the last 24 hours. **        Imaging Results (Last 24 Hours)     ** No results found for the last 24 hours. **             ECG 12 Lead  Component   Ref Range & Units 1 d ago 3 mo ago   QT Interval   ms 398  348    Resulting Agency  ECG  ECG             HEART RATE= 87  bpm  RR Interval= 686  ms  GA Interval= " 196  ms  P Horizontal Axis= -44  deg  P Front Axis= 47  deg  QRSD Interval= 80  ms  QT Interval= 398  ms  QRS Axis= 62  deg  T Wave Axis= 47  deg  - ABNORMAL ECG -  Sinus rhythm  Supraventricular bigeminy  Borderline prolonged QT interval  af resolved             Current Facility-Administered Medications:   •  allopurinol (ZYLOPRIM) tablet 100 mg, 100 mg, Oral, Daily, Chino Cole MD, 100 mg at 07/04/22 0903  •  apixaban (ELIQUIS) tablet 5 mg, 5 mg, Oral, Q12H, Rhea Ocampo APRN, 5 mg at 07/04/22 0903  •  atenolol (TENORMIN) tablet 100 mg, 100 mg, Oral, Q24H, Willian Quiles MD, 100 mg at 07/04/22 0903  •  atorvastatin (LIPITOR) tablet 10 mg, 10 mg, Oral, Nightly, Willian Quiles MD, 10 mg at 07/03/22 2007  •  bisacodyl (DULCOLAX) EC tablet 10 mg, 10 mg, Oral, Daily PRN, Willian Quiles MD  •  docusate sodium (COLACE) capsule 100 mg, 100 mg, Oral, BID, Willian Quiles MD, 100 mg at 07/04/22 0903  •  famotidine (PEPCID) tablet 20 mg, 20 mg, Oral, BID, Willian Quiles MD, 20 mg at 07/04/22 0903  •  folic acid (FOLVITE) tablet 1 mg, 1 mg, Oral, Daily, Willian Quiles MD, 1 mg at 07/04/22 0903  •  HYDROcodone-acetaminophen (NORCO) 7.5-325 MG per tablet 1 tablet, 1 tablet, Oral, Q4H PRN, Chino Cole MD, 1 tablet at 07/04/22 0904  •  hydrocortisone 1 % cream 1 application, 1 application, Topical, Q12H PRN, Chino Cole MD  •  hydrOXYzine (ATARAX) tablet 25 mg, 25 mg, Oral, TID PRN, Chino Cole MD, 25 mg at 07/04/22 0641  •  melatonin tablet 1 mg, 1 mg, Oral, Nightly PRN, Willian Quiles MD, 1 mg at 06/28/22 2303  •  multivitamin (THERAGRAN) tablet 1 tablet, 1 tablet, Oral, Daily, Willian Quiles MD, 1 tablet at 07/04/22 0903  •  ondansetron (ZOFRAN) injection 4 mg, 4 mg, Intravenous, Q6H PRN, Willian Quiles MD  •  [COMPLETED] Insert peripheral IV, , , Once **AND** sodium chloride 0.9 % flush 10 mL, 10 mL, Intravenous, PRN, Etta,  Willian ELIZABETH MD  •  sucralfate (CARAFATE) tablet 1 g, 1 g, Oral, 4x Daily AC & at Bedtime, Willian Quiles MD, 1 g at 07/04/22 1156  •  thiamine (VITAMIN B-1) tablet 100 mg, 100 mg, Oral, Daily, Willian Quiles MD, 100 mg at 07/04/22 0903    ASSESSMENT  Acute right femoral neck fracture s/p right total hip arthroplasty  Hypercalcemia resolved  Alcohol intoxication  Tobacco abuse  Coronary artery disease  Hypertension  Hyperlipidemia  Acute kidney injury  Chronic kidney disease stage III  Gastroesophageal reflux disease    PLAN  CPM  Discussed IV fluid  Postop care  Pain management  Detox medications  Eliquis   Alcohol withdrawal and DTs precautions  Adjust home medications  Stress ulcer DVT prophylaxis  Cardiology and orthopedic consult appreciated  Nephrology to follow patient  Supportive care  PT/OT  Discussed with nursing staff  Subacute rehab once bed available    JOLIE BUNDY MD

## 2022-07-05 ENCOUNTER — HOME HEALTH ADMISSION (OUTPATIENT)
Dept: HOME HEALTH SERVICES | Facility: HOME HEALTHCARE | Age: 72
End: 2022-07-05

## 2022-07-05 PROCEDURE — 97110 THERAPEUTIC EXERCISES: CPT

## 2022-07-05 PROCEDURE — 97530 THERAPEUTIC ACTIVITIES: CPT

## 2022-07-05 RX ADMIN — Medication 100 MG: at 08:12

## 2022-07-05 RX ADMIN — DOCUSATE SODIUM 100 MG: 100 CAPSULE, LIQUID FILLED ORAL at 08:12

## 2022-07-05 RX ADMIN — HYDROCODONE BITARTRATE AND ACETAMINOPHEN 1 TABLET: 7.5; 325 TABLET ORAL at 20:08

## 2022-07-05 RX ADMIN — Medication 1 TABLET: at 08:12

## 2022-07-05 RX ADMIN — SUCRALFATE 1 G: 1 TABLET ORAL at 20:05

## 2022-07-05 RX ADMIN — ATORVASTATIN CALCIUM 10 MG: 20 TABLET, FILM COATED ORAL at 20:05

## 2022-07-05 RX ADMIN — HYDROCORTISONE 1 APPLICATION: 1 CREAM TOPICAL at 11:55

## 2022-07-05 RX ADMIN — HYDROXYZINE HYDROCHLORIDE 25 MG: 25 TABLET ORAL at 08:12

## 2022-07-05 RX ADMIN — FOLIC ACID 1 MG: 1 TABLET ORAL at 08:12

## 2022-07-05 RX ADMIN — HYDROXYZINE HYDROCHLORIDE 25 MG: 25 TABLET ORAL at 20:08

## 2022-07-05 RX ADMIN — ATENOLOL 100 MG: 50 TABLET ORAL at 08:12

## 2022-07-05 RX ADMIN — Medication 1 MG: at 20:08

## 2022-07-05 RX ADMIN — HYDROCODONE BITARTRATE AND ACETAMINOPHEN 1 TABLET: 7.5; 325 TABLET ORAL at 15:29

## 2022-07-05 RX ADMIN — HYDROCODONE BITARTRATE AND ACETAMINOPHEN 1 TABLET: 7.5; 325 TABLET ORAL at 08:12

## 2022-07-05 RX ADMIN — DOCUSATE SODIUM 100 MG: 100 CAPSULE, LIQUID FILLED ORAL at 20:05

## 2022-07-05 RX ADMIN — HYDROCODONE BITARTRATE AND ACETAMINOPHEN 1 TABLET: 7.5; 325 TABLET ORAL at 11:53

## 2022-07-05 RX ADMIN — SUCRALFATE 1 G: 1 TABLET ORAL at 17:09

## 2022-07-05 RX ADMIN — ALLOPURINOL 100 MG: 100 TABLET ORAL at 08:12

## 2022-07-05 RX ADMIN — SUCRALFATE 1 G: 1 TABLET ORAL at 08:12

## 2022-07-05 RX ADMIN — FAMOTIDINE 20 MG: 20 TABLET ORAL at 20:05

## 2022-07-05 RX ADMIN — APIXABAN 5 MG: 5 TABLET, FILM COATED ORAL at 20:05

## 2022-07-05 RX ADMIN — Medication 1 MG: at 01:20

## 2022-07-05 RX ADMIN — APIXABAN 5 MG: 5 TABLET, FILM COATED ORAL at 08:12

## 2022-07-05 RX ADMIN — FAMOTIDINE 20 MG: 20 TABLET ORAL at 08:12

## 2022-07-05 NOTE — THERAPY TREATMENT NOTE
Patient Name: Yuval Loja  : 1950    MRN: [id number]                              Today's Date: 2022       Admit Date: 2022    Visit Dx:     ICD-10-CM ICD-9-CM   1. Closed fracture of neck of right femur, initial encounter ([facility]ly [facility])  S72.001A 820.8     Patient Active Problem List   Diagnosis   • Alcoholic ketoacidosis   • Alcohol dependence (HCC)   • Methamphetamine abuse (HCC)   • Fatty liver, alcoholic   • Nausea vomiting and diarrhea   • Alcoholic liver disease (HCC)   • Metabolic acidosis   • Tobacco abuse   • Coronary artery disease involving native coronary artery of native heart without angina pectoris   • Tachycardia   • Sinus tachycardia   • Chronic kidney disease, stage 3   • Medically noncompliant   • Visual hallucinations   • Psychosis (HCC)   • Hyponatremia   • CAD (coronary artery disease)   • Leukopenia   • Symptomatic anemia   • Headache   • Substance abuse (HCC)   • Gastrointestinal hemorrhage   • CRISTIANO (acute kidney injury) (HCC)   • Hyperkalemia   • Right lower quadrant abdominal pain   • New onset atrial fibrillation (HCC)   • History of drug abuse (HCC)   • COPD (chronic obstructive pulmonary disease) (HCC)   • Right inguinal hernia   • Constipation   • Status post right hip replacement     Past Medical History:   Diagnosis Date   • Alcohol abuse    • CAD (coronary artery disease)    • Chronic kidney disease, stage 3 (HCC)    • Fatty liver, alcoholic    • GERD (gastroesophageal reflux disease)    • Hypertensive urgency    • Metabolic acidosis    • Myocardial infarction (HCC)    • Sinus tachycardia    • Stroke (HCC)      Past Surgical History:   Procedure Laterality Date   • TOTAL HIP ARTHROPLASTY Right 2022    Procedure: TOTAL HIP ARTHROPLASTY ANTERIOR WITH HANA TABLE;  Surgeon: Willian Quiles MD;  Location: [facility];  Service: Orthopedics;  Laterality: Right;  mile Bhatt      General Information     Row Name 22 1429          Physical Therapy Time  and Intention    Document Type therapy note (daily note)  -     Mode of Treatment physical therapy  -     Row Name 07/05/22 1429          General Information    Existing Precautions/Restrictions fall  -     Row Name 07/05/22 1429          Cognition    Orientation Status (Cognition) oriented x 4  -           User Key  (r) = Recorded By, (t) = Taken By, (c) = Cosigned By    Initials Name Provider Type     Darlene Molina PTA Physical Therapist Assistant               Mobility     Row Name 07/05/22 1430          Bed Mobility    Comment, (Bed Mobility) up in chair  -     Row Name 07/05/22 1430          Sit-Stand Transfer    Sit-Stand [place] (Transfers) standby assist  -     Assistive Device (Sit-Stand Transfers) walker, front-wheeled  -     Row Name 07/05/22 1430          Gait/Stairs (Locomotion)    [place] Level (Gait) standby assist  -     Assistive Device (Gait) walker, front-wheeled  -     Distance in Feet (Gait) 90ft x2  -     Deviations/Abnormal Patterns (Gait) alicia decreased;stride length decreased  -     Bilateral Gait Deviations forward flexed posture  -           User Key  (r) = Recorded By, (t) = Taken By, (c) = Cosigned By    Initials Name Provider Type     Darlene Molina PTA Physical Therapist Assistant               Obj/Interventions     Row Name 07/05/22 1431          Motor Skills    Therapeutic Exercise --  R THR protocol x10 reps  -           User Key  (r) = Recorded By, (t) = Taken By, (c) = Cosigned By    Initials Name Provider Type     Darlene Molina PTA Physical Therapist Assistant               Goals/Plan    No documentation.                Clinical Impression     [facility] Name 07/05/22 1432          Pain    Pretreatment Pain Rating 1/10  -     Posttreatment Pain Rating 3/10  -     Pain Location - groin  -     Pain Intervention(s) Repositioned;Ambulation/increased activity;Rest  -[facility] Name 07/05/22 1432          Positioning and  Restraints    Pre-Treatment Position sitting in chair/recliner  -SM     Post Treatment Position chair  -SM     In Chair reclined;call light within reach;encouraged to call for assist  -           User Key  (r) = Recorded By, (t) = Taken By, (c) = Cosigned By    Initials Name Provider Type    Darlene Campos PTA Physical Therapist Assistant               Outcome Measures     Row Name 07/05/22 1433 07/05/22 0815       How much help from another person do you currently need...    Turning from your back to your side while in flat bed without using bedrails? 3  -SM 3  -KAMILLE    Moving from lying on back to sitting on the side of a flat bed without bedrails? 3  -SM 2  -KAMILLE    Moving to and from a bed to a chair (including a wheelchair)? 3  -SM 3  -KAMILLE    Standing up from a chair using your arms (e.g., wheelchair, bedside chair)? 3  -SM 3  -KAMILLE    Climbing 3-5 steps with a railing? 3  -SM 2  -KAMILLE    To walk in hospital room? 3  -SM 3  -KAMILLE    AM-PAC 6 Clicks Score (PT) 18  -SM 16  -KAMILLE    Highest level of mobility 6 --> Walked 10 steps or more  - 5 --> Static standing  -KAMILLE    Row Name 07/05/22 1433 07/05/22 1206       Functional Assessment    Outcome Measure Options AM-PAC 6 Clicks Basic Mobility (PT)  - AM-PAC 6 Clicks Daily Activity (OT)  -[facility]          User Key  (r) = Recorded By, (t) = Taken By, (c) = Cosigned By    Initials Name Provider Type    Darlene Campos PTA Physical Therapist Assistant    Carlos Biggs, RN Registered Nurse    Darlene Quigley OT Occupational Therapist                             Physical Therapy Education                 Title: PT OT SLP Therapies (In Progress)     Topic: Physical Therapy (Done)     Point: Mobility training (Done)     Learning Progress Summary           Patient Acceptance, E,TB,D, VU,NR by KIMBERLY at 7/5/2022 1433    Eager, E,TB,D, VU by ALBERTO at 7/4/2022 0913    Eager, E,TB,D, VU,NR by ALBERTO at 7/2/2022 1736    Acceptance, E,TB,D, VU,NR by CRISTIANO at 7/1/2022 1148     Acceptance, E, VU by MORRIS at 7/1/2022 0500    Acceptance, E,TB,D, VU,NR by CRISTIANO at 6/30/2022 1130    Acceptance, E,TB, VU,NR by CB at 6/29/2022 0938    Acceptance, E, VU by JF at 7/1/2022 0500                   Point: Home exercise program (Done)     Learning Progress Summary           Patient Acceptance, E,TB,D, VU,NR by  at 7/5/2022 1433    Eager, E,TB,D, VU by ALBERTO at 7/4/2022 0913    Eager, E,TB,D, VU,NR by ALBERTO at 7/2/2022 1736    Acceptance, E, VU by MORRIS at 7/1/2022 0500    Acceptance, E,TB, VU,NR by CB at 6/29/2022 0938    Acceptance, E, VU by MORRIS at 7/1/2022 0500                   Point: Body mechanics (Done)     Learning Progress Summary           Patient Acceptance, E,TB,D, VU,NR by  at 7/5/2022 1433    Eager, E,TB,D, VU by ALBERTO at 7/4/2022 0913    Eager, E,TB,D, VU,NR by ALBERTO at 7/2/2022 1736    Acceptance, E, VU by MORRIS at 7/1/2022 0500    Acceptance, E,TB, VU,NR by CB at 6/29/2022 0938    Acceptance, E, VU by MORRIS at 7/1/2022 0500                   Point: Precautions (Done)     Learning Progress Summary           Patient Acceptance, E,TB,D, VU,NR by  at 7/5/2022 1433    Eager, E,TB,D, VU by ALBERTO at 7/4/2022 0913    Eager, E,TB,D, VU,NR by ALBERTO at 7/2/2022 1736    Acceptance, E, VU by MORRIS at 7/1/2022 0500    Acceptance, E,TB, VU,NR by FRANKIE at 6/29/2022 0938    Acceptance, E, VU by MORRIS at 7/1/2022 0500                               User Key     Initials Effective Dates Name Provider Type Discipline    ALBERTO 03/07/18 -  Celeste Molina PTA Physical Therapist Assistant PT    CRISTIANO 06/16/21 -  Serena Carrillo, PT Physical Therapist PT    SM 03/07/18 -  Darlene Molina, LAUREL Physical Therapist Assistant PT    JF 06/16/21 -  Shonna Moreno, RN Registered Nurse Nurse    CB 10/22/21 -  Lida Simons, BI Physical Therapist PT              PT Recommendation and Plan     Plan of Care Reviewed With: patient  Progress: improving  Outcome Evaluation: Pt tolerated treatment well this  date. Increased gait distance to 90ft x2 w/ Rw and SBA. Instructed pt on R hip exercises, w/ minimal pain reported in groin. Encouraged pt to ambulate w/ nsg in hallway during the day.     Time Calculation:    PT Charges     Row Name 07/05/22 1435             Time Calculation    Start Time 0906  -      Stop Time 0929  -      Time Calculation (min) 23 min  -      PT Received On 07/05/22  -      PT - Next Appointment 07/06/22  -            User Key  (r) = Recorded By, (t) = Taken By, (c) = Cosigned By    Initials Name Provider Type     Darlene Molina PTA Physical Therapist Assistant              Therapy Charges for Today     Code Description Service Date Service Provider Modifiers Qty    14188940012 HC PT THER PROC EA 15 MIN 7/5/2022 Darlene Molina PTA GP 1    29068951761 HC PT THERAPEUTIC ACT EA 15 MIN 7/5/2022 Darlene Molina PTA GP 1          PT G-Codes  Outcome Measure Options: AM-PAC 6 Clicks Basic Mobility (PT)  AM-PAC 6 Clicks Score (PT): 18  AM-PAC 6 Clicks Score (OT): 16    Darlene Molina PTA  7/5/2022

## 2022-07-05 NOTE — PLAN OF CARE
Goal Outcome Evaluation:           Progress: improving  Outcome Evaluation: 70 y/o s/POD 8 R SUZANNE. Pt confused, AO x2 this shift. Pt up w/ assist x1 to chair. Voiding function intact via BRP. Neuro checks WNL, no c/o numbness/tingling. Optifoam dsg c/d/i. Pain managed via PO pills. PRN atarax given for anxiety. Pt has gotten OOB multiple times w/o assistance. Educated pt on waiting for staff member, yet continues to refuse. C/O constipation, small BM noted this shift. Needs met. VSS. RA. Plan to d/c Home w/ HH via family transport tomorrow. WIll CTM

## 2022-07-05 NOTE — PLAN OF CARE
"Goal Outcome Evaluation:  Plan of Care Reviewed With: patient           Outcome Evaluation: Pt was seen today by OT. He was found in odd position- supine in bed lying lying horizontal with with his head and LEs hanging off bed. Pt reports he was \"stretching\". OT assisted pt to chair for comfort CGA with rwx. Pt declines further mobility today as he reports \"i've already walked\". Pt fully dressed today. He is requesting to change into non slip socks and remove his current socks and shoes. OT attempted to work on this with pt. He reports he is unable to complete 2/2 his hip soreness but also declines attempting to use AE today. He is agreeable again to have OT visually show him how to complete with AE but declines hands on education. He lives alone. Continued to recommend SNF at [place].    OT wore appropriate PPE and completed hand hygiene.   "

## 2022-07-05 NOTE — PLAN OF CARE
Goal Outcome Evaluation:  Plan of Care Reviewed With: patient        Progress: improving  Outcome Evaluation: Pt tolerated treatment well this date. Increased gait distance to 90ft x2 w/ Rw and SBA. Instructed pt on R hip exercises, w/ minimal pain reported in groin. Encouraged pt to ambulate w/ nsg in hallway during the day.

## 2022-07-05 NOTE — CASE MANAGEMENT/SOCIAL WORK
Continued Stay Note  [facility]     Patient Name: Yuval Loja  MRN: [id number]  Today's Date: 7/5/2022    Admit Date: 6/25/2022     Discharge Plan     Row Name 07/05/22 1557       Plan    Plan home with [facility]    Plan Comments Spoke with Felipe/Yessi Bhakta and Jesus has denied pre-cert, but they have offered a peer to peer ([phone], option 5 by 0900 tomorrow).  PT notes reviewed and patient walked 90 ft x2 today with stand by assist.  Spoke with patient and explained that it appears more appropriate that he return home at [facility] with HH.  He is agreeable.  [facility] unable to accept as they are out of network.  Referral sent to [facility].  Patient's nephew updated by telephone and will transport patient home tomorrow.  Usha/hospitalist coordinator updated and will notify MD. Cira Briggs RN               Discharge Codes    No documentation.               Expected Discharge Date and Time     Expected Discharge Date Expected Discharge Time    Jul 5, 2022             Cira Briggs, RN

## 2022-07-05 NOTE — DISCHARGE PLACEMENT REQUEST
"Yuval Altman (71 y.o. Male)             Date of Birth   1950    Social Security Number       Address   [address] [place] [address]    Home Phone   [phone]    MRN   [id number]       [facility]    Marital Status   Single                            Admission Date   6/25/22    Admission Type   Emergency    Admitting Provider   Chino Cole MD    Attending Provider   Chino Cole MD    Department, Room/Bed   [address], P888/1       Discharge Date       Discharge Disposition       Discharge Destination                               Attending Provider: Chino Cole MD    Allergies: Mirtazapine, Sertraline    Isolation: None   Infection: None   Code Status: CPR   Advance Care Planning Activity    Ht: 175.3 cm (69\")   Wt: 65.6 kg (144 lb 9.6 oz)    Admission Cmt: None   Principal Problem: None                Active Insurance as of 6/25/2022     Primary Coverage     Payor Plan Insurance Group Employer/Plan Group    HUMANA MEDICARE REPLACEMENT [facility] NON PAR [id number]     Payor Plan Address Payor Plan Phone Number Payor Plan Fax Number Effective Dates       1/1/2022 - None Entered    Subscriber Name Subscriber Birth Date Member ID       YUVAL ALTMAN 1950 [id number]                 Emergency Contacts      (Rel.) Home Phone Work Phone Mobile Phone    Yaniv(Nephew) Carlos (Relative) [phone] -- --    Torri Altman (Daughter) -- [phone]              "

## 2022-07-05 NOTE — PROGRESS NOTES
"Daily progress note    Chief complaint  Doing same  No new complaints  Denies chest pain shortness of breath palpitation    History of present illness  71-year-old -American male with history of coronary artery disease hypertension chronic kidney disease stage III and CVA in the past brought to the emergency room with right hip injury after the mechanical fall.  Patient denies any chest pain dizziness shortness of breath palpitation prior to the fall.  Patient work-up in ER revealed right femur neck fracture admit for management.  Patient is a poor historian unable to give detailed history most of the history obtained from the chart nursing staff old record and family.  Patient has no fever chills cough congestion night sweats weight loss or weight gain and wants to get the surgery done as soon as possible.     REVIEW OF SYSTEMS  Unremarkable      PHYSICAL EXAM  Blood pressure 151/59, pulse 80, temperature 97.5 °F (36.4 °C), temperature source Oral, resp. rate 16, height 175.3 cm (69\"), weight 65.6 kg (144 lb 9.6 oz), SpO2 99 %.    GENERAL: Awake and alert, nontoxic-appearing.    HENT: nares patent, NCAT   EYES: no scleral icterus  CV: regular rhythm, regular rate S1-S2  RESPIRATORY: normal effort moving air bilaterally  ABDOMEN: soft nontender nondistended bowel sounds positive  MUSCULOSKELETAL: The right leg is shortened and externally rotated with tenderness in the right hip.  It is neurovascular intact with normal strength and sensation.  NEURO: alert, moves all extremities, follows commands  SKIN: warm, dry     LAB RESULTS  Lab Results (last 24 hours)     ** No results found for the last 24 hours. **        Imaging Results (Last 24 Hours)     ** No results found for the last 24 hours. **             ECG 12 Lead  Component   Ref Range & Units 1 d ago 3 mo ago   QT Interval   ms 398  348    Resulting Agency  ECG  ECG             HEART RATE= 87  bpm  RR Interval= 686  ms  AZ Interval= 196  ms  P " Horizontal Axis= -44  deg  P Front Axis= 47  deg  QRSD Interval= 80  ms  QT Interval= 398  ms  QRS Axis= 62  deg  T Wave Axis= 47  deg  - ABNORMAL ECG -  Sinus rhythm  Supraventricular bigeminy  Borderline prolonged QT interval  af resolved             Current Facility-Administered Medications:   •  allopurinol (ZYLOPRIM) tablet 100 mg, 100 mg, Oral, Daily, Chino Cole MD, 100 mg at 07/05/22 0812  •  apixaban (ELIQUIS) tablet 5 mg, 5 mg, Oral, Q12H, Rhea Ocampo APRN, 5 mg at 07/05/22 0812  •  atenolol (TENORMIN) tablet 100 mg, 100 mg, Oral, Q24H, Willian Quiles MD, 100 mg at 07/05/22 0812  •  atorvastatin (LIPITOR) tablet 10 mg, 10 mg, Oral, Nightly, Willian Quiles MD, 10 mg at 07/04/22 2203  •  bisacodyl (DULCOLAX) EC tablet 10 mg, 10 mg, Oral, Daily PRN, Willian Quiles MD, 10 mg at 07/04/22 1705  •  docusate sodium (COLACE) capsule 100 mg, 100 mg, Oral, BID, Willian Quiles MD, 100 mg at 07/05/22 0812  •  famotidine (PEPCID) tablet 20 mg, 20 mg, Oral, BID, Willian Quiles MD, 20 mg at 07/05/22 0812  •  folic acid (FOLVITE) tablet 1 mg, 1 mg, Oral, Daily, Willian Quiles MD, 1 mg at 07/05/22 0812  •  HYDROcodone-acetaminophen (NORCO) 7.5-325 MG per tablet 1 tablet, 1 tablet, Oral, Q4H PRN, Chino Cole MD, 1 tablet at 07/05/22 1153  •  hydrocortisone 1 % cream 1 application, 1 application, Topical, Q12H PRN, Chino Cole MD, 1 application at 07/05/22 1155  •  hydrOXYzine (ATARAX) tablet 25 mg, 25 mg, Oral, TID PRN, Chino Cole MD, 25 mg at 07/05/22 0812  •  melatonin tablet 1 mg, 1 mg, Oral, Nightly PRN, Willian Quiles MD, 1 mg at 07/05/22 0120  •  multivitamin (THERAGRAN) tablet 1 tablet, 1 tablet, Oral, Daily, Willian Quiles MD, 1 tablet at 07/05/22 0812  •  ondansetron (ZOFRAN) injection 4 mg, 4 mg, Intravenous, Q6H PRN, Willian Quiles MD  •  [COMPLETED] Insert peripheral IV, , , Once **AND** sodium chloride 0.9 %  flush 10 mL, 10 mL, Intravenous, PRN, Willian Quiles MD  •  sucralfate (CARAFATE) tablet 1 g, 1 g, Oral, 4x Daily AC & at Bedtime, Willian Quiles MD, 1 g at 07/05/22 0812  •  thiamine (VITAMIN B-1) tablet 100 mg, 100 mg, Oral, Daily, Willian Quiles MD, 100 mg at 07/05/22 0812    ASSESSMENT  Acute right femoral neck fracture s/p right total hip arthroplasty  Hypercalcemia resolved  Alcohol intoxication  Tobacco abuse  Coronary artery disease  Hypertension  Hyperlipidemia  Acute kidney injury  Chronic kidney disease stage III  Gastroesophageal reflux disease    PLAN  CPM  Medically stable  Postop care  Pain management  Detox medications  Eliquis   Adjust home medications  Stress ulcer DVT prophylaxis  Supportive care  PT/OT  Discussed with nursing staff  Subacute rehab once bed available    JOLIE BUNDY MD

## 2022-07-05 NOTE — THERAPY TREATMENT NOTE
Patient Name: Yuval Loja  : 1950    MRN: [id number]                              Today's Date: 2022       Admit Date: 2022    Visit Dx:     ICD-10-CM ICD-9-CM   1. Closed fracture of neck of right femur, initial encounter (Formerly [facility])  S72.001A 820.8     Patient Active Problem List   Diagnosis   • Alcoholic ketoacidosis   • Alcohol dependence (HCC)   • Methamphetamine abuse (HCC)   • Fatty liver, alcoholic   • Nausea vomiting and diarrhea   • Alcoholic liver disease (HCC)   • Metabolic acidosis   • Tobacco abuse   • Coronary artery disease involving native coronary artery of native heart without angina pectoris   • Tachycardia   • Sinus tachycardia   • Chronic kidney disease, stage 3   • Medically noncompliant   • Visual hallucinations   • Psychosis (HCC)   • Hyponatremia   • CAD (coronary artery disease)   • Leukopenia   • Symptomatic anemia   • Headache   • Substance abuse (HCC)   • Gastrointestinal hemorrhage   • CRISTIANO (acute kidney injury) (HCC)   • Hyperkalemia   • Right lower quadrant abdominal pain   • New onset atrial fibrillation (HCC)   • History of drug abuse (HCC)   • COPD (chronic obstructive pulmonary disease) (HCC)   • Right inguinal hernia   • Constipation   • Status post right hip replacement     Past Medical History:   Diagnosis Date   • Alcohol abuse    • CAD (coronary artery disease)    • Chronic kidney disease, stage 3 (HCC)    • Fatty liver, alcoholic    • GERD (gastroesophageal reflux disease)    • Hypertensive urgency    • Metabolic acidosis    • Myocardial infarction (HCC)    • Sinus tachycardia    • Stroke (HCC)      Past Surgical History:   Procedure Laterality Date   • TOTAL HIP ARTHROPLASTY Right 2022    Procedure: TOTAL HIP ARTHROPLASTY ANTERIOR WITH HANA TABLE;  Surgeon: Willian Quiles MD;  Location: [facility];  Service: Orthopedics;  Laterality: Right;  mile Bhatt      General Information     Row Name 22 1133          OT Time and Intention     Document Type therapy note (daily note)  -     Mode of Treatment occupational therapy;individual therapy  -[facility] Name 07/05/22 1133          General Information    Patient Profile Reviewed yes  -     Existing Precautions/Restrictions fall;hip, anterior;right  WBAT  -[facility] Name 07/05/22 1133          Cognition    Orientation Status (Cognition) oriented x 3  -[facility] Name 07/05/22 1133          Safety Issues, Functional Mobility    Safety Issues Affecting Function (Mobility) safety precautions follow-through/compliance;safety precaution awareness;insight into deficits/self-awareness;awareness of need for assistance  -     Impairments Affecting Function (Mobility) balance;pain;endurance/activity tolerance;strength;range of motion (ROM)  -           User Key  (r) = Recorded By, (t) = Taken By, (c) = Cosigned By    Initials Name Provider Type     Darlene Dinh OT Occupational Therapist                 Mobility/ADL's     [facility] Name 07/05/22 1134          Bed Mobility    Bed Mobility supine-sit  -     Supine-Sit [place] (Bed Mobility) contact guard  -SM     Row Name 07/05/22 1134          Transfers    Comment, (Transfers) cues for safety  -     Sit-Stand [place] (Transfers) contact guard;verbal cues  -SM     Row Name 07/05/22 1134          Sit-Stand Transfer    Assistive Device (Sit-Stand Transfers) walker, front-wheeled  -[facility] Name 07/05/22 1134          Functional Mobility    Functional Mobility- Comment Pt declines further mobility today as he reports he has already been up walking  -[facility] Name 07/05/22 1134          Mobility    Right Lower Extremity (Weight-bearing Status) weight-bearing as tolerated (WBAT)  -[facility] Name 07/05/22 1134          Lower Body Dressing Assessment/Training    Crook Level (Lower Body Dressing) lower body dressing skills;don;socks;dependent (less than 25% patient effort);shoes/slippers  -     Position (Lower Body Dressing)  "supported sitting  -SM     Comment, (Lower Body Dressing) Pt declines use of AE or attempting to complete himself, only requesting OT to complete but allows OT to provide visual demo for use of AE.  -SM           User Key  (r) = Recorded By, (t) = Taken By, (c) = Cosigned By    Initials Name Provider Type    Darlene Miller OT Occupational Therapist               Obj/Interventions     Row Name 07/05/22 1137          Balance    Static Standing Balance contact guard  -SM     Dynamic Standing Balance contact guard  -SM     Position/Device Used, Standing Balance supported;walker, rolling  -SM     Comment, Balance limited standing participation/ADL participation today.  -SM           User Key  (r) = Recorded By, (t) = Taken By, (c) = Cosigned By    Initials Name Provider Type    Darlene Miller OT Occupational Therapist               Goals/Plan    No documentation.                Clinical Impression     Row Name 07/05/22 1202          Pain Assessment    Pre/Posttreatment Pain Comment Pt doesn't rate pain but reports it at \"sore\", OT provided ice  -     Pain Intervention(s) Repositioned;Cold applied  -     Row Name 07/05/22 1202          Plan of Care Review    Plan of Care Reviewed With patient  -SM     Outcome Evaluation Pt was seen today by OT. He was found in odd position- supine in bed lying lying horizontal with with his head and LEs hanging off bed. Pt reports he was \"stretching\". OT assisted pt to chair for comfort CGA with rwx. Pt declines further mobility today as he reports \"i've already walked\". Pt fully dressed today. He is requesting to change into non slip socks and remove his current socks and shoes. OT attempted to work on this with pt. He reports he is unable to complete 2/2 his hip soreness but also declines attempting to use AE today. He is agreeable again to have OT visually show him how to complete with AE but declines hands on education. He lives alone. Continued to recommend SNF at " dc.  -SM     Row Name 07/05/22 1202          Positioning and Restraints    Pre-Treatment Position in bed  -SM     Post Treatment Position chair  -SM     In Chair reclined;call light within reach;encouraged to call for assist;exit alarm on;notified nsg  -SM           User Key  (r) = Recorded By, (t) = Taken By, (c) = Cosigned By    Initials Name Provider Type    Darlene Miller OT Occupational Therapist               Outcome Measures     Row Name 07/05/22 1206          How much help from another is currently needed...    Putting on and taking off regular lower body clothing? 1  -SM     Bathing (including washing, rinsing, and drying) 2  -SM     Toileting (which includes using toilet bed pan or urinal) 3  -SM     Putting on and taking off regular upper body clothing 3  -SM     Taking care of personal grooming (such as brushing teeth) 3  -SM     Eating meals 4  -SM     AM-PAC 6 Clicks Score (OT) 16  -SM     Row Name 07/05/22 0815          How much help from another person do you currently need...    Turning from your back to your side while in flat bed without using bedrails? 3  -KAMILLE     Moving from lying on back to sitting on the side of a flat bed without bedrails? 2  -KAMILLE     Moving to and from a bed to a chair (including a wheelchair)? 3  -KAMILLE     Standing up from a chair using your arms (e.g., wheelchair, bedside chair)? 3  -KAMILLE     Climbing 3-5 steps with a railing? 2  -KAMILLE     To walk in hospital room? 3  -KAMILLE     AM-PAC 6 Clicks Score (PT) 16  -KAMILLE     Highest level of mobility 5 --> Static standing  -KAMILLE     Row Name 07/05/22 1206          Functional Assessment    Outcome Measure Options AM-PAC 6 Clicks Daily Activity (OT)  -           User Key  (r) = Recorded By, (t) = Taken By, (c) = Cosigned By    Initials Name Provider Type    Carlos Biggs, RN Registered Nurse    Darlene Miller OT Occupational Therapist                Occupational Therapy Education                 Title: PT OT SLP Therapies (In  "Progress)     Topic: Occupational Therapy (In Progress)     Point: ADL training (Done)     Description:   Instruct learner(s) on proper safety adaptation and remediation techniques during self care or transfers.   Instruct in proper use of assistive devices.              Learning Progress Summary           Patient Acceptance, E, VU by  at 6/29/2022 1226    Comment: safe technique for ADLs and transfers with rwx and AE.                   Point: Home exercise program (Not Started)     Description:   Instruct learner(s) on appropriate technique for monitoring, assisting and/or progressing therapeutic exercises/activities.              Learner Progress:  Not documented in this visit.          Point: Precautions (Done)     Description:   Instruct learner(s) on prescribed precautions during self-care and functional transfers.              Learning Progress Summary           Patient Acceptance, E, VU by  at 6/29/2022 1226    Comment: safe technique for ADLs and transfers with rwx and AE.                   Point: Body mechanics (Not Started)     Description:   Instruct learner(s) on proper positioning and spine alignment during self-care, functional mobility activities and/or exercises.              Learner Progress:  Not documented in this visit.                      User Key     Initials Effective Dates Name Provider Type Discipline     04/02/20 -  Darlene Dinh, OT Occupational Therapist OT              OT Recommendation and Plan  Planned Therapy Interventions (OT): adaptive equipment training, BADL retraining, functional balance retraining, occupation/activity based interventions, transfer/mobility retraining, patient/caregiver education/training  Plan of Care Review  Plan of Care Reviewed With: patient  Outcome Evaluation: Pt was seen today by OT. He was found in odd position- supine in bed lying lying horizontal with with his head and LEs hanging off bed. Pt reports he was \"stretching\". OT assisted pt to " "chair for comfort CGA with rwx. Pt declines further mobility today as he reports \"i've already walked\". Pt fully dressed today. He is requesting to change into non slip socks and remove his current socks and shoes. OT attempted to work on this with pt. He reports he is unable to complete 2/2 his hip soreness but also declines attempting to use AE today. He is agreeable again to have OT visually show him how to complete with AE but declines hands on education. He lives alone. Continued to recommend SNF at [place].     Time Calculation:    Time Calculation- OT     Row Name 07/05/22 1206             Time Calculation- OT    OT Start Time 1101  -SM      OT Stop Time 1116  -SM      OT Time Calculation (min) 15 min  -SM      Total Timed Code Minutes- OT 15 minute(s)  -SM      OT Received On 07/05/22  -      OT - Next Appointment 07/06/22  -              Timed Charges    16022 - OT Therapeutic Activity Minutes 15  -SM              Total Minutes    Timed Charges Total Minutes 15  -SM       Total Minutes 15  -SM            User Key  (r) = Recorded By, (t) = Taken By, (c) = Cosigned By    Initials Name Provider Type     Darlene Dinh OT Occupational Therapist              Therapy Charges for Today     Code Description Service Date Service Provider Modifiers Qty    [id number]  OT THERAPEUTIC ACT EA 15 MIN 7/5/2022 Darlene Dinh OT GO 1               Darlene Dinh OT  7/5/2022  "

## 2022-07-06 ENCOUNTER — READMISSION MANAGEMENT (OUTPATIENT)
Dept: CALL CENTER | Facility: HOSPITAL | Age: 72
End: 2022-07-06

## 2022-07-06 VITALS
WEIGHT: 144.6 LBS | BODY MASS INDEX: 21.42 KG/M2 | OXYGEN SATURATION: 96 % | SYSTOLIC BLOOD PRESSURE: 122 MMHG | RESPIRATION RATE: 16 BRPM | DIASTOLIC BLOOD PRESSURE: 62 MMHG | TEMPERATURE: 98.7 F | HEART RATE: 76 BPM | HEIGHT: 69 IN

## 2022-07-06 RX ORDER — MULTIVIT/IRON SULF/FOLIC ACID 15MG-0.4MG
1 TABLET ORAL DAILY
Qty: 30 TABLET | Refills: 0 | Status: SHIPPED | OUTPATIENT
Start: 2022-07-07 | End: 2022-08-06

## 2022-07-06 RX ORDER — SUCRALFATE 1 G/1
1 TABLET ORAL
Qty: 120 TABLET | Refills: 0 | Status: SHIPPED | OUTPATIENT
Start: 2022-07-06 | End: 2022-08-06

## 2022-07-06 RX ORDER — DOCUSATE SODIUM 100 MG/1
100 CAPSULE, LIQUID FILLED ORAL 2 TIMES DAILY
Qty: 18 CAPSULE | Refills: 0 | Status: SHIPPED | OUTPATIENT
Start: 2022-07-06 | End: 2022-07-16

## 2022-07-06 RX ORDER — ATORVASTATIN CALCIUM 10 MG/1
10 TABLET, FILM COATED ORAL NIGHTLY
Qty: 30 TABLET | Refills: 0 | Status: SHIPPED | OUTPATIENT
Start: 2022-07-06 | End: 2022-08-06

## 2022-07-06 RX ORDER — ALLOPURINOL 100 MG/1
100 TABLET ORAL DAILY
Qty: 30 TABLET | Refills: 0 | Status: SHIPPED | OUTPATIENT
Start: 2022-07-07 | End: 2022-08-06

## 2022-07-06 RX ORDER — FOLIC ACID 1 MG/1
1 TABLET ORAL DAILY
Qty: 30 TABLET | Refills: 0 | Status: SHIPPED | OUTPATIENT
Start: 2022-07-07 | End: 2022-08-06

## 2022-07-06 RX ADMIN — FOLIC ACID 1 MG: 1 TABLET ORAL at 08:34

## 2022-07-06 RX ADMIN — FAMOTIDINE 20 MG: 20 TABLET ORAL at 08:35

## 2022-07-06 RX ADMIN — Medication 1 TABLET: at 08:35

## 2022-07-06 RX ADMIN — Medication 100 MG: at 08:34

## 2022-07-06 RX ADMIN — APIXABAN 5 MG: 5 TABLET, FILM COATED ORAL at 08:34

## 2022-07-06 RX ADMIN — BISACODYL 10 MG: 5 TABLET ORAL at 09:10

## 2022-07-06 RX ADMIN — DOCUSATE SODIUM 100 MG: 100 CAPSULE, LIQUID FILLED ORAL at 08:35

## 2022-07-06 RX ADMIN — ALLOPURINOL 100 MG: 100 TABLET ORAL at 08:34

## 2022-07-06 RX ADMIN — SUCRALFATE 1 G: 1 TABLET ORAL at 08:34

## 2022-07-06 RX ADMIN — ATENOLOL 100 MG: 50 TABLET ORAL at 08:34

## 2022-07-06 RX ADMIN — HYDROCODONE BITARTRATE AND ACETAMINOPHEN 1 TABLET: 7.5; 325 TABLET ORAL at 03:51

## 2022-07-06 NOTE — DISCHARGE SUMMARY
Discharge summary    Date of admission 6/25/2022  Date of discharge 7/16/2022    Final diagnosis  Acute right femoral neck fracture s/p right total hip arthroplasty  Hypercalcemia resolved  S/p alcohol intoxication  Tobacco abuse  Coronary artery disease  Hypertension  Hyperlipidemia  Acute kidney injury resolved  Gastroesophageal reflux disease    Discharge medications    Current Facility-Administered Medications:   •  allopurinol (ZYLOPRIM) tablet 100 mg, 100 mg, Oral, Daily, Chino Cole MD, 100 mg at 07/06/22 0834  •  apixaban (ELIQUIS) tablet 5 mg, 5 mg, Oral, Q12H, Rhea Ocampo APRN, 5 mg at 07/06/22 0834  •  atenolol (TENORMIN) tablet 100 mg, 100 mg, Oral, Q24H, Willian Quiles MD, 100 mg at 07/06/22 0834  •  atorvastatin (LIPITOR) tablet 10 mg, 10 mg, Oral, Nightly, Willian Quiles MD, 10 mg at 07/05/22 2005  •  docusate sodium (COLACE) capsule 100 mg, 100 mg, Oral, BID, Willian Quiles MD, 100 mg at 07/06/22 0835  •  famotidine (PEPCID) tablet 20 mg, 20 mg, Oral, BID, Willian Quiles MD, 20 mg at 07/06/22 0835  •  folic acid (FOLVITE) tablet 1 mg, 1 mg, Oral, Daily, Willian Quiles MD, 1 mg at 07/06/22 0834  •  multivitamin (THERAGRAN) tablet 1 tablet, 1 tablet, Oral, Daily, Willian Quiles MD, 1 tablet at 07/06/22 0835  •  [COMPLETED] Insert peripheral IV, , , Once **AND** sodium chloride 0.9 % flush 10 mL, 10 mL, Intravenous, PRN, Willian Quiles MD  •  sucralfate (CARAFATE) tablet 1 g, 1 g, Oral, 4x Daily AC & at Bedtime, Willian Quiles MD, 1 g at 07/06/22 0834  •  thiamine (VITAMIN B-1) tablet 100 mg, 100 mg, Oral, Daily, Willian Quiles MD, 100 mg at 07/06/22 0851    Current Outpatient Medications:   •  [START ON 7/7/2022] allopurinol (ZYLOPRIM) 100 MG tablet, Take 1 tablet by mouth Daily for 30 days., Disp: 30 tablet, Rfl: 0  •  apixaban (ELIQUIS) 5 MG tablet tablet, Take 1 tablet by mouth Every 12 (Twelve) Hours for  30 days. Indications: Atrial Fibrillation, Post Surgical - Hip, Disp: 60 tablet, Rfl: 0  •  atorvastatin (LIPITOR) 10 MG tablet, Take 1 tablet by mouth Every Night for 30 days., Disp: 30 tablet, Rfl: 0  •  docusate sodium (COLACE) 100 MG capsule, Take 1 capsule by mouth 2 (Two) Times a Day for 18 doses., Disp: 18 capsule, Rfl: 0  •  [START ON 7/7/2022] folic acid (FOLVITE) 1 MG tablet, Take 1 tablet by mouth Daily for 30 days., Disp: 30 tablet, Rfl: 0  •  [START ON 7/7/2022] Multiple Vitamins-Iron (multivitamin with iron) tablet tablet, Take 1 tablet by mouth Daily for 30 days., Disp: 30 tablet, Rfl: 0  •  sucralfate (CARAFATE) 1 g tablet, Take 1 tablet by mouth 4 (Four) Times a Day Before Meals & at Bedtime for 30 days., Disp: 120 tablet, Rfl: 0  •  [START ON 7/7/2022] thiamine (VITAMIN B-1) 100 MG tablet  tablet, Take 1 tablet by mouth Daily for 30 days., Disp: 30 tablet, Rfl: 0  •  atenolol (TENORMIN) 100 MG tablet, Take 1 tablet by mouth Daily., Disp: 30 tablet, Rfl: 0  •  hydrOXYzine (ATARAX) 25 MG tablet, Take 25 mg by mouth 3 (Three) Times a Day As Needed for Anxiety., Disp: , Rfl:   •  pantoprazole (PROTONIX) 40 MG EC tablet, Take 40 mg by mouth Daily., Disp: , Rfl:      Consults obtained  Cardiology  Nephrology   Orthopedic surgery  Nutrition    Procedures  Right total hip arthroplasty    Hospital course  71-year-old -American male with history of multiple medical problem including coronary artery disease hypertension hyperlipidemia and CVA who smokes and drinks admitted to emergency room after the mechanical fall with right hip pain and work-up in ER revealed right femoral neck fracture admit for management.  Patient also found to be intoxicated.  Patient admitted treated with bedrest pain management and supportive care.  Patient also received detox and CIWA protocol.  Patient has acute kidney injury also followed by nephrology.  Patient after stabilization underwent hip arthroplasty without any  complication.  Postoperatively patient has slow improvement and work with PT OT and today he walked more than 200 feet and wants to go home as his subacute rehab admission denied.  Patient wants to go home with family support and home health which is arranged.  Patient discharged home in stable condition.  Patient medication adjusted during this hospitalization.  Patient cleared for discharge from cardiology nephrology and orthopedic surgery.    Discharge diet regular    Activity as tolerated    Medication as above    Follow-up with prime doctor in 1 week and follow-up with cardiology nephrology and orthopedic surgery per their instruction and take medication as directed.    JOLIE BUNDY MD

## 2022-07-06 NOTE — PLAN OF CARE
Goal Outcome Evaluation:  Plan of Care Reviewed With: patient        Progress: improving  Outcome Evaluation: VSS, RA, IV out, up without assistance-patient instructed to call for help, refusing bed and chair alarms, BM this shift, pain tolerable, educated on BP monitoring, home with HH today

## 2022-07-06 NOTE — CASE MANAGEMENT/SOCIAL WORK
Continued Stay Note  [facility]     Patient Name: Yuval Loja  MRN: [id number]  Today's Date: 7/6/2022    Admit Date: 6/25/2022     Discharge Plan     Row Name 07/06/22 1024       Plan    Plan Home with [facility] .    Patient/Family in Agreement with Plan yes    Plan Comments Received a call from [facility] who has accepted the pt and will follow. Updated the patient and his nephew/Carlos who are agreeable with the d/c plan. Carlos plans to transport the patient home at d/c. No other needs identified at this time.               Discharge Codes    No documentation.               Expected Discharge Date and Time     Expected Discharge Date Expected Discharge Time    Jul 6, 2022             Ann Marie DE PAZ RN

## 2022-07-06 NOTE — PROGRESS NOTES
"Daily progress note    Chief complaint  Doing better  No new complaints  Walking all over  Wants to go home  Denies chest pain shortness of breath palpitation    History of present illness  71-year-old -American male with history of coronary artery disease hypertension chronic kidney disease stage III and CVA in the past brought to the emergency room with right hip injury after the mechanical fall.  Patient denies any chest pain dizziness shortness of breath palpitation prior to the fall.  Patient work-up in ER revealed right femur neck fracture admit for management.  Patient is a poor historian unable to give detailed history most of the history obtained from the chart nursing staff old record and family.  Patient has no fever chills cough congestion night sweats weight loss or weight gain and wants to get the surgery done as soon as possible.     REVIEW OF SYSTEMS  Unremarkable      PHYSICAL EXAM  Blood pressure 122/62, pulse 76, temperature 98.7 °F (37.1 °C), temperature source Oral, resp. rate 16, height 175.3 cm (69\"), weight 65.6 kg (144 lb 9.6 oz), SpO2 96 %.    GENERAL: Awake and alert, nontoxic-appearing.    HENT: nares patent, NCAT   EYES: no scleral icterus  CV: regular rhythm, regular rate S1-S2  RESPIRATORY: normal effort moving air bilaterally  ABDOMEN: soft nontender nondistended bowel sounds positive  MUSCULOSKELETAL: The right leg is shortened and externally rotated with tenderness in the right hip.  It is neurovascular intact with normal strength and sensation.  NEURO: alert, moves all extremities, follows commands  SKIN: warm, dry     LAB RESULTS  Lab Results (last 24 hours)     ** No results found for the last 24 hours. **        Imaging Results (Last 24 Hours)     ** No results found for the last 24 hours. **             ECG 12 Lead  Component   Ref Range & Units 1 d ago 3 mo ago   QT Interval   ms 398  348    Resulting Agency  ECG  ECG             HEART RATE= 87  bpm  RR Interval= " 686  ms  NM Interval= 196  ms  P Horizontal Axis= -44  deg  P Front Axis= 47  deg  QRSD Interval= 80  ms  QT Interval= 398  ms  QRS Axis= 62  deg  T Wave Axis= 47  deg  - ABNORMAL ECG -  Sinus rhythm  Supraventricular bigeminy  Borderline prolonged QT interval  af resolved             Current Facility-Administered Medications:   •  allopurinol (ZYLOPRIM) tablet 100 mg, 100 mg, Oral, Daily, Chino Cole MD, 100 mg at 07/06/22 0834  •  apixaban (ELIQUIS) tablet 5 mg, 5 mg, Oral, Q12H, Rhea Ocampo APRN, 5 mg at 07/06/22 0834  •  atenolol (TENORMIN) tablet 100 mg, 100 mg, Oral, Q24H, Willian Quiles MD, 100 mg at 07/06/22 0834  •  atorvastatin (LIPITOR) tablet 10 mg, 10 mg, Oral, Nightly, Willian Quiles MD, 10 mg at 07/05/22 2005  •  bisacodyl (DULCOLAX) EC tablet 10 mg, 10 mg, Oral, Daily PRN, Willian Quiles MD, 10 mg at 07/06/22 0910  •  docusate sodium (COLACE) capsule 100 mg, 100 mg, Oral, BID, Willian Quiles MD, 100 mg at 07/06/22 0835  •  famotidine (PEPCID) tablet 20 mg, 20 mg, Oral, BID, Willian Quiles MD, 20 mg at 07/06/22 0835  •  folic acid (FOLVITE) tablet 1 mg, 1 mg, Oral, Daily, Willian Quiles MD, 1 mg at 07/06/22 0834  •  HYDROcodone-acetaminophen (NORCO) 7.5-325 MG per tablet 1 tablet, 1 tablet, Oral, Q4H PRN, Chino Cole MD, 1 tablet at 07/06/22 0351  •  hydrocortisone 1 % cream 1 application, 1 application, Topical, Q12H PRN, Chino Cole MD, 1 application at 07/05/22 1155  •  hydrOXYzine (ATARAX) tablet 25 mg, 25 mg, Oral, TID PRN, Chino Cole MD, 25 mg at 07/05/22 2008  •  melatonin tablet 1 mg, 1 mg, Oral, Nightly PRN, Willian Quiles MD, 1 mg at 07/05/22 2008  •  multivitamin (THERAGRAN) tablet 1 tablet, 1 tablet, Oral, Daily, Willian Quiles MD, 1 tablet at 07/06/22 0835  •  ondansetron (ZOFRAN) injection 4 mg, 4 mg, Intravenous, Q6H PRN, Willian Quiles MD  •  [COMPLETED] Insert peripheral IV, , , Once  **AND** sodium chloride 0.9 % flush 10 mL, 10 mL, Intravenous, PRN, Willian Quiles MD  •  sucralfate (CARAFATE) tablet 1 g, 1 g, Oral, 4x Daily AC & at Bedtime, Willian Quiles MD, 1 g at 07/06/22 0834  •  thiamine (VITAMIN B-1) tablet 100 mg, 100 mg, Oral, Daily, Willian Quiles MD, 100 mg at 07/06/22 0834    ASSESSMENT  Acute right femoral neck fracture s/p right total hip arthroplasty  Hypercalcemia resolved  Alcohol intoxication  Tobacco abuse  Coronary artery disease  Hypertension  Hyperlipidemia  Acute kidney injury  Chronic kidney disease stage III  Gastroesophageal reflux disease    PLAN  Discharge home with family support and home health  Discharge summary dictated    JOLIE BUNDY MD

## 2022-07-06 NOTE — NURSING NOTE
Pt getting restless and agitated about not being dc yet. Dr. Cole was notified about getting dc orders placed. Pt decided to leave AMA, pt signed papers and a page was placed to Dr. Cole    Pt left AMA and was picked up by Carlos Purvis.    Dr. Cole came up and did the pt dc and sent his prescriptions to [facility].     RN called family and told them that pt medications could be picked up from our pharmacy.

## 2022-07-07 ENCOUNTER — READMISSION MANAGEMENT (OUTPATIENT)
Dept: CALL CENTER | Facility: HOSPITAL | Age: 72
End: 2022-07-07

## 2022-07-07 NOTE — CASE MANAGEMENT/SOCIAL WORK
Case Management Discharge Note      Final Note: Home with [facility].         Selected Continued Care - Discharged on 7/6/2022 Admission date: 6/25/2022 - Discharge disposition: Home or Self Care    Destination    No services have been selected for the patient.              Durable Medical Equipment    No services have been selected for the patient.              Dialysis/Infusion    No services have been selected for the patient.              Home Medical Care Coordination complete.    Service Provider Selected Services Address Phone Fax Patient Preferred    [facility] - [facility] [address] [place] [id number] [phone] --          Therapy    No services have been selected for the patient.              Community Resources    No services have been selected for the patient.              Community & DME    No services have been selected for the patient.                       Final Discharge Disposition Code: 06 - home with home health care

## 2022-07-07 NOTE — OUTREACH NOTE
Prep Survey    Flowsheet Row Responses   [facility] patient discharged [facility]? [facility]   Is LACE score < 7 ? No   Emergency Room discharge w/ pulse ox? No   Eligibility Readm Mgmt   Discharge diagnosis Right total hip arthroplasty   Does the patient have one of the following disease processes/diagnoses(primary or secondary)? Total Joint Replacement   Does the patient have Home health ordered? Yes   What is the Home health agency?  [facility]   Is there a DME ordered? No   Prep survey completed? Yes          MARY CARMEN JORGE - Registered Nurse

## 2022-07-15 ENCOUNTER — READMISSION MANAGEMENT (OUTPATIENT)
Dept: CALL CENTER | Facility: HOSPITAL | Age: 72
End: 2022-07-15

## 2022-07-15 NOTE — OUTREACH NOTE
Total Joint Week 2 Survey    Flowsheet Row Responses   [facility] facility patient discharged from? [place]   Does the patient have one of the following disease processes/diagnoses(primary or secondary)? Total Joint Replacement   Joint surgery performed? Hip   Week 2 attempt successful? Yes   Call start time 1504   Rescheduled Rescheduled-pt requested  [nephew jacquelyn spoekn to patient today.  Advised to try back.]   Call end time 1505          MARY CARMEN JORGE - Registered Nurse

## 2022-07-18 ENCOUNTER — APPOINTMENT (OUTPATIENT)
Dept: GENERAL RADIOLOGY | Facility: HOSPITAL | Age: 72
End: 2022-07-18

## 2022-07-18 ENCOUNTER — HOSPITAL ENCOUNTER (EMERGENCY)
Facility: HOSPITAL | Age: 72
Discharge: LEFT BEFORE DISCHARGE | End: 2022-07-18

## 2022-07-18 VITALS
OXYGEN SATURATION: 98 % | HEART RATE: 88 BPM | DIASTOLIC BLOOD PRESSURE: 52 MMHG | RESPIRATION RATE: 18 BRPM | SYSTOLIC BLOOD PRESSURE: 116 MMHG | TEMPERATURE: 96.7 F

## 2022-07-18 PROCEDURE — 99211 OFF/OP EST MAY X REQ PHY/QHP: CPT

## 2022-07-18 NOTE — ED NOTES
Pt was called for x-ray and called again for blood draw. Pt was also called by triage RN with no answer.

## 2022-07-18 NOTE — ED NOTES
All triage performed with this RN wearing appropriate PPE.  Pt placed in mask upon arrival to ED.  Patient c/o weakness that started today. He had a hip replacement a few weeks ago and reports it gets stiff but he is able to walk.

## 2022-07-19 ENCOUNTER — APPOINTMENT (OUTPATIENT)
Dept: GENERAL RADIOLOGY | Facility: HOSPITAL | Age: 72
End: 2022-07-19

## 2022-07-19 ENCOUNTER — APPOINTMENT (OUTPATIENT)
Dept: CARDIOLOGY | Facility: HOSPITAL | Age: 72
End: 2022-07-19

## 2022-07-19 ENCOUNTER — READMISSION MANAGEMENT (OUTPATIENT)
Dept: CALL CENTER | Facility: HOSPITAL | Age: 72
End: 2022-07-19

## 2022-07-19 ENCOUNTER — HOSPITAL ENCOUNTER (INPATIENT)
Facility: HOSPITAL | Age: 72
LOS: 4 days | Discharge: HOME | End: 2022-07-23
Attending: EMERGENCY MEDICINE | Admitting: HOSPITALIST

## 2022-07-19 ENCOUNTER — APPOINTMENT (OUTPATIENT)
Dept: CT IMAGING | Facility: HOSPITAL | Age: 72
End: 2022-07-19

## 2022-07-19 DIAGNOSIS — R41.82 ALTERED MENTAL STATUS, UNSPECIFIED ALTERED MENTAL STATUS TYPE: ICD-10-CM

## 2022-07-19 DIAGNOSIS — M25.551 RIGHT HIP PAIN: Primary | ICD-10-CM

## 2022-07-19 DIAGNOSIS — F15.10 METHAMPHETAMINE ABUSE: ICD-10-CM

## 2022-07-19 DIAGNOSIS — R77.8 TROPONIN LEVEL ELEVATED: ICD-10-CM

## 2022-07-19 DIAGNOSIS — D68.9 COAGULOPATHY: ICD-10-CM

## 2022-07-19 LAB
ALBUMIN SERPL-MCNC: 3.9 G/DL (ref 3.5–5.2)
ALBUMIN/GLOB SERPL: 1.3 G/DL
ALP SERPL-CCNC: 112 U/L (ref 39–117)
ALT SERPL W P-5'-P-CCNC: 24 U/L (ref 1–41)
AMPHET+METHAMPHET UR QL: POSITIVE
ANION GAP SERPL CALCULATED.3IONS-SCNC: 11 MMOL/L (ref 5–15)
AST SERPL-CCNC: 53 U/L (ref 1–40)
BACTERIA UR QL AUTO: NORMAL /HPF
BARBITURATES UR QL SCN: NEGATIVE
BASOPHILS # BLD AUTO: 0.02 10*3/MM3 (ref 0–0.2)
BASOPHILS NFR BLD AUTO: 0.5 % (ref 0–1.5)
BENZODIAZ UR QL SCN: NEGATIVE
BH CV LOWER VASCULAR RIGHT COMMON FEMORAL AUGMENT: NORMAL
BH CV LOWER VASCULAR RIGHT COMMON FEMORAL COMPETENT: NORMAL
BH CV LOWER VASCULAR RIGHT COMMON FEMORAL COMPRESS: NORMAL
BH CV LOWER VASCULAR RIGHT COMMON FEMORAL PHASIC: NORMAL
BH CV LOWER VASCULAR RIGHT COMMON FEMORAL SPONT: NORMAL
BH CV LOWER VASCULAR RIGHT DISTAL FEMORAL COMPRESS: NORMAL
BH CV LOWER VASCULAR RIGHT GASTRONEMIUS COMPRESS: NORMAL
BH CV LOWER VASCULAR RIGHT GREATER SAPH AK COMPRESS: NORMAL
BH CV LOWER VASCULAR RIGHT GREATER SAPH BK COMPRESS: NORMAL
BH CV LOWER VASCULAR RIGHT LESSER SAPH COMPRESS: NORMAL
BH CV LOWER VASCULAR RIGHT MID FEMORAL AUGMENT: NORMAL
BH CV LOWER VASCULAR RIGHT MID FEMORAL COMPETENT: NORMAL
BH CV LOWER VASCULAR RIGHT MID FEMORAL COMPRESS: NORMAL
BH CV LOWER VASCULAR RIGHT MID FEMORAL PHASIC: NORMAL
BH CV LOWER VASCULAR RIGHT MID FEMORAL SPONT: NORMAL
BH CV LOWER VASCULAR RIGHT PERONEAL COMPRESS: NORMAL
BH CV LOWER VASCULAR RIGHT POPLITEAL AUGMENT: NORMAL
BH CV LOWER VASCULAR RIGHT POPLITEAL COMPETENT: NORMAL
BH CV LOWER VASCULAR RIGHT POPLITEAL COMPRESS: NORMAL
BH CV LOWER VASCULAR RIGHT POPLITEAL PHASIC: NORMAL
BH CV LOWER VASCULAR RIGHT POPLITEAL SPONT: NORMAL
BH CV LOWER VASCULAR RIGHT POSTERIOR TIBIAL COMPRESS: NORMAL
BH CV LOWER VASCULAR RIGHT PROFUNDA FEMORAL COMPRESS: NORMAL
BH CV LOWER VASCULAR RIGHT PROXIMAL FEMORAL COMPRESS: NORMAL
BH CV LOWER VASCULAR RIGHT SAPHENOFEMORAL JUNCTION COMPRESS: NORMAL
BILIRUB SERPL-MCNC: 0.5 MG/DL (ref 0–1.2)
BILIRUB UR QL STRIP: NEGATIVE
BUN SERPL-MCNC: 20 MG/DL (ref 8–23)
BUN/CREAT SERPL: 12.8 (ref 7–25)
CALCIUM SPEC-SCNC: 9.9 MG/DL (ref 8.6–10.5)
CANNABINOIDS SERPL QL: NEGATIVE
CHLORIDE SERPL-SCNC: 104 MMOL/L (ref 98–107)
CLARITY UR: CLEAR
CO2 SERPL-SCNC: 25 MMOL/L (ref 22–29)
COCAINE UR QL: NEGATIVE
COLOR UR: YELLOW
CREAT SERPL-MCNC: 1.56 MG/DL (ref 0.76–1.27)
D DIMER PPP FEU-MCNC: 2.63 MCGFEU/ML (ref 0–0.49)
DEPRECATED RDW RBC AUTO: 45.8 FL (ref 37–54)
EGFRCR SERPLBLD CKD-EPI 2021: 46.6 ML/MIN/1.73
EOSINOPHIL # BLD AUTO: 0.05 10*3/MM3 (ref 0–0.4)
EOSINOPHIL NFR BLD AUTO: 1.2 % (ref 0.3–6.2)
ERYTHROCYTE [DISTWIDTH] IN BLOOD BY AUTOMATED COUNT: 13.3 % (ref 12.3–15.4)
ETHANOL BLD-MCNC: <10 MG/DL (ref 0–10)
ETHANOL UR QL: <0.01 %
GLOBULIN UR ELPH-MCNC: 3 GM/DL
GLUCOSE SERPL-MCNC: 81 MG/DL (ref 65–99)
GLUCOSE UR STRIP-MCNC: NEGATIVE MG/DL
HCT VFR BLD AUTO: 29.2 % (ref 37.5–51)
HGB BLD-MCNC: 9.6 G/DL (ref 13–17.7)
HGB UR QL STRIP.AUTO: NEGATIVE
HYALINE CASTS UR QL AUTO: NORMAL /LPF
IMM GRANULOCYTES # BLD AUTO: 0.01 10*3/MM3 (ref 0–0.05)
IMM GRANULOCYTES NFR BLD AUTO: 0.2 % (ref 0–0.5)
INR PPP: 1.51 (ref 0.9–1.1)
KETONES UR QL STRIP: ABNORMAL
LEUKOCYTE ESTERASE UR QL STRIP.AUTO: NEGATIVE
LYMPHOCYTES # BLD AUTO: 1.13 10*3/MM3 (ref 0.7–3.1)
LYMPHOCYTES NFR BLD AUTO: 26.4 % (ref 19.6–45.3)
MAGNESIUM SERPL-MCNC: 1.8 MG/DL (ref 1.6–2.4)
MAXIMAL PREDICTED HEART RATE: 147 BPM
MCH RBC QN AUTO: 31.3 PG (ref 26.6–33)
MCHC RBC AUTO-ENTMCNC: 32.9 G/DL (ref 31.5–35.7)
MCV RBC AUTO: 95.1 FL (ref 79–97)
METHADONE UR QL SCN: NEGATIVE
MONOCYTES # BLD AUTO: 0.5 10*3/MM3 (ref 0.1–0.9)
MONOCYTES NFR BLD AUTO: 11.7 % (ref 5–12)
NEUTROPHILS NFR BLD AUTO: 2.57 10*3/MM3 (ref 1.7–7)
NEUTROPHILS NFR BLD AUTO: 60 % (ref 42.7–76)
NITRITE UR QL STRIP: NEGATIVE
NRBC BLD AUTO-RTO: 0.2 /100 WBC (ref 0–0.2)
OPIATES UR QL: NEGATIVE
OXYCODONE UR QL SCN: NEGATIVE
PH UR STRIP.AUTO: 6.5 [PH] (ref 5–8)
PLATELET # BLD AUTO: 198 10*3/MM3 (ref 140–450)
PMV BLD AUTO: 9 FL (ref 6–12)
POTASSIUM SERPL-SCNC: 4.6 MMOL/L (ref 3.5–5.2)
PROT SERPL-MCNC: 6.9 G/DL (ref 6–8.5)
PROT UR QL STRIP: ABNORMAL
PROTHROMBIN TIME: 17.9 SECONDS (ref 11.7–14.2)
QT INTERVAL: 390 MS
RBC # BLD AUTO: 3.07 10*6/MM3 (ref 4.14–5.8)
RBC # UR STRIP: NORMAL /HPF
REF LAB TEST METHOD: NORMAL
SARS-COV-2 RNA RESP QL NAA+PROBE: NOT DETECTED
SODIUM SERPL-SCNC: 140 MMOL/L (ref 136–145)
SP GR UR STRIP: 1.01 (ref 1–1.03)
SQUAMOUS #/AREA URNS HPF: NORMAL /HPF
STRESS TARGET HR: 125 BPM
TROPONIN T SERPL-MCNC: 0.06 NG/ML (ref 0–0.03)
TROPONIN T SERPL-MCNC: 0.09 NG/ML (ref 0–0.03)
UROBILINOGEN UR QL STRIP: ABNORMAL
WBC # UR STRIP: NORMAL /HPF
WBC NRBC COR # BLD: 4.28 10*3/MM3 (ref 3.4–10.8)

## 2022-07-19 PROCEDURE — 99284 EMERGENCY DEPT VISIT MOD MDM: CPT

## 2022-07-19 PROCEDURE — 25010000002 ONDANSETRON PER 1 MG: Performed by: HOSPITALIST

## 2022-07-19 PROCEDURE — 93005 ELECTROCARDIOGRAM TRACING: CPT | Performed by: INTERNAL MEDICINE

## 2022-07-19 PROCEDURE — 80307 DRUG TEST PRSMV CHEM ANLYZR: CPT | Performed by: EMERGENCY MEDICINE

## 2022-07-19 PROCEDURE — 84484 ASSAY OF TROPONIN QUANT: CPT | Performed by: EMERGENCY MEDICINE

## 2022-07-19 PROCEDURE — 85610 PROTHROMBIN TIME: CPT | Performed by: EMERGENCY MEDICINE

## 2022-07-19 PROCEDURE — 82077 ASSAY SPEC XCP UR&BREATH IA: CPT | Performed by: EMERGENCY MEDICINE

## 2022-07-19 PROCEDURE — 73552 X-RAY EXAM OF FEMUR 2/>: CPT

## 2022-07-19 PROCEDURE — U0003 INFECTIOUS AGENT DETECTION BY NUCLEIC ACID (DNA OR RNA); SEVERE ACUTE RESPIRATORY SYNDROME CORONAVIRUS 2 (SARS-COV-2) (CORONAVIRUS DISEASE [COVID-19]), AMPLIFIED PROBE TECHNIQUE, MAKING USE OF HIGH THROUGHPUT TECHNOLOGIES AS DESCRIBED BY CMS-2020-01-R: HCPCS | Performed by: EMERGENCY MEDICINE

## 2022-07-19 PROCEDURE — 81001 URINALYSIS AUTO W/SCOPE: CPT | Performed by: EMERGENCY MEDICINE

## 2022-07-19 PROCEDURE — 93010 ELECTROCARDIOGRAM REPORT: CPT | Performed by: INTERNAL MEDICINE

## 2022-07-19 PROCEDURE — 80053 COMPREHEN METABOLIC PANEL: CPT | Performed by: EMERGENCY MEDICINE

## 2022-07-19 PROCEDURE — 99222 1ST HOSP IP/OBS MODERATE 55: CPT | Performed by: INTERNAL MEDICINE

## 2022-07-19 PROCEDURE — 84484 ASSAY OF TROPONIN QUANT: CPT | Performed by: INTERNAL MEDICINE

## 2022-07-19 PROCEDURE — 93005 ELECTROCARDIOGRAM TRACING: CPT | Performed by: EMERGENCY MEDICINE

## 2022-07-19 PROCEDURE — 25010000002 DIAZEPAM PER 5 MG: Performed by: HOSPITALIST

## 2022-07-19 PROCEDURE — 73502 X-RAY EXAM HIP UNI 2-3 VIEWS: CPT

## 2022-07-19 PROCEDURE — 85025 COMPLETE CBC W/AUTO DIFF WBC: CPT | Performed by: EMERGENCY MEDICINE

## 2022-07-19 PROCEDURE — 93971 EXTREMITY STUDY: CPT

## 2022-07-19 PROCEDURE — 85379 FIBRIN DEGRADATION QUANT: CPT | Performed by: INTERNAL MEDICINE

## 2022-07-19 PROCEDURE — 83735 ASSAY OF MAGNESIUM: CPT | Performed by: EMERGENCY MEDICINE

## 2022-07-19 PROCEDURE — 70450 CT HEAD/BRAIN W/O DYE: CPT

## 2022-07-19 RX ORDER — SUCRALFATE 1 G/1
1 TABLET ORAL
Status: DISCONTINUED | OUTPATIENT
Start: 2022-07-19 | End: 2022-07-23 | Stop reason: HOSPADM

## 2022-07-19 RX ORDER — FOLIC ACID 1 MG/1
1 TABLET ORAL DAILY
Status: DISCONTINUED | OUTPATIENT
Start: 2022-07-19 | End: 2022-07-19

## 2022-07-19 RX ORDER — CLONIDINE HYDROCHLORIDE 0.1 MG/1
0.1 TABLET ORAL EVERY 6 HOURS
Status: DISCONTINUED | OUTPATIENT
Start: 2022-07-19 | End: 2022-07-21

## 2022-07-19 RX ORDER — ATORVASTATIN CALCIUM 20 MG/1
10 TABLET, FILM COATED ORAL NIGHTLY
Status: DISCONTINUED | OUTPATIENT
Start: 2022-07-19 | End: 2022-07-23 | Stop reason: HOSPADM

## 2022-07-19 RX ORDER — ALLOPURINOL 100 MG/1
100 TABLET ORAL DAILY
Status: DISCONTINUED | OUTPATIENT
Start: 2022-07-19 | End: 2022-07-23 | Stop reason: HOSPADM

## 2022-07-19 RX ORDER — PANTOPRAZOLE SODIUM 40 MG/1
40 TABLET, DELAYED RELEASE ORAL
Status: DISCONTINUED | OUTPATIENT
Start: 2022-07-19 | End: 2022-07-23 | Stop reason: HOSPADM

## 2022-07-19 RX ORDER — METOPROLOL SUCCINATE 25 MG/1
50 TABLET, EXTENDED RELEASE ORAL DAILY
COMMUNITY
Start: 2022-03-28 | End: 2022-07-23 | Stop reason: HOSPADM

## 2022-07-19 RX ORDER — SODIUM CHLORIDE 0.9 % (FLUSH) 0.9 %
10 SYRINGE (ML) INJECTION AS NEEDED
Status: DISCONTINUED | OUTPATIENT
Start: 2022-07-19 | End: 2022-07-23 | Stop reason: HOSPADM

## 2022-07-19 RX ORDER — DIAZEPAM 5 MG/ML
2.5 INJECTION, SOLUTION INTRAMUSCULAR; INTRAVENOUS EVERY 8 HOURS
Status: COMPLETED | OUTPATIENT
Start: 2022-07-20 | End: 2022-07-21

## 2022-07-19 RX ORDER — DIPHENOXYLATE HYDROCHLORIDE AND ATROPINE SULFATE 2.5; .025 MG/1; MG/1
1 TABLET ORAL DAILY
Status: DISCONTINUED | OUTPATIENT
Start: 2022-07-19 | End: 2022-07-23 | Stop reason: HOSPADM

## 2022-07-19 RX ORDER — DOCUSATE SODIUM 100 MG/1
100 CAPSULE, LIQUID FILLED ORAL 2 TIMES DAILY
Status: DISCONTINUED | OUTPATIENT
Start: 2022-07-19 | End: 2022-07-23 | Stop reason: HOSPADM

## 2022-07-19 RX ORDER — LORAZEPAM 1 MG/1
1 TABLET ORAL
Status: DISCONTINUED | OUTPATIENT
Start: 2022-07-19 | End: 2022-07-22

## 2022-07-19 RX ORDER — LORAZEPAM 0.5 MG/1
0.5 TABLET ORAL EVERY 8 HOURS
Status: COMPLETED | OUTPATIENT
Start: 2022-07-20 | End: 2022-07-21

## 2022-07-19 RX ORDER — DIAZEPAM 5 MG/ML
5 INJECTION, SOLUTION INTRAMUSCULAR; INTRAVENOUS
Status: DISCONTINUED | OUTPATIENT
Start: 2022-07-19 | End: 2022-07-22

## 2022-07-19 RX ORDER — ONDANSETRON 2 MG/ML
4 INJECTION INTRAMUSCULAR; INTRAVENOUS EVERY 6 HOURS PRN
Status: DISCONTINUED | OUTPATIENT
Start: 2022-07-19 | End: 2022-07-23

## 2022-07-19 RX ORDER — PANTOPRAZOLE SODIUM 40 MG/1
40 TABLET, DELAYED RELEASE ORAL DAILY
Status: DISCONTINUED | OUTPATIENT
Start: 2022-07-19 | End: 2022-07-19

## 2022-07-19 RX ORDER — HYDROXYZINE HYDROCHLORIDE 25 MG/1
25 TABLET, FILM COATED ORAL 3 TIMES DAILY PRN
Status: DISCONTINUED | OUTPATIENT
Start: 2022-07-19 | End: 2022-07-23

## 2022-07-19 RX ORDER — FOLIC ACID 1 MG/1
1 TABLET ORAL DAILY
Status: DISCONTINUED | OUTPATIENT
Start: 2022-07-19 | End: 2022-07-23 | Stop reason: HOSPADM

## 2022-07-19 RX ORDER — ONDANSETRON 4 MG/1
4 TABLET, FILM COATED ORAL EVERY 6 HOURS PRN
Status: DISCONTINUED | OUTPATIENT
Start: 2022-07-19 | End: 2022-07-22

## 2022-07-19 RX ORDER — LORAZEPAM 0.5 MG/1
0.5 TABLET ORAL
Status: DISCONTINUED | OUTPATIENT
Start: 2022-07-19 | End: 2022-07-22

## 2022-07-19 RX ORDER — SODIUM CHLORIDE 9 MG/ML
75 INJECTION, SOLUTION INTRAVENOUS CONTINUOUS
Status: DISCONTINUED | OUTPATIENT
Start: 2022-07-19 | End: 2022-07-20

## 2022-07-19 RX ORDER — ATENOLOL 50 MG/1
100 TABLET ORAL
Status: DISCONTINUED | OUTPATIENT
Start: 2022-07-19 | End: 2022-07-23 | Stop reason: HOSPADM

## 2022-07-19 RX ORDER — DIAZEPAM 5 MG/ML
2.5 INJECTION, SOLUTION INTRAMUSCULAR; INTRAVENOUS EVERY 6 HOURS
Status: COMPLETED | OUTPATIENT
Start: 2022-07-19 | End: 2022-07-20

## 2022-07-19 RX ORDER — LORAZEPAM 0.5 MG/1
0.5 TABLET ORAL EVERY 6 HOURS
Status: DISPENSED | OUTPATIENT
Start: 2022-07-19 | End: 2022-07-20

## 2022-07-19 RX ORDER — NICOTINE 21 MG/24HR
1 PATCH, TRANSDERMAL 24 HOURS TRANSDERMAL EVERY 24 HOURS
Status: DISCONTINUED | OUTPATIENT
Start: 2022-07-19 | End: 2022-07-23 | Stop reason: HOSPADM

## 2022-07-19 RX ORDER — DIAZEPAM 5 MG/ML
2.5 INJECTION, SOLUTION INTRAMUSCULAR; INTRAVENOUS
Status: DISCONTINUED | OUTPATIENT
Start: 2022-07-19 | End: 2022-07-22

## 2022-07-19 RX ORDER — DOCUSATE SODIUM 100 MG/1
100 CAPSULE, LIQUID FILLED ORAL 2 TIMES DAILY
COMMUNITY

## 2022-07-19 RX ADMIN — CLONIDINE HYDROCHLORIDE 0.1 MG: 0.1 TABLET ORAL at 20:16

## 2022-07-19 RX ADMIN — DOCUSATE SODIUM 100 MG: 100 CAPSULE, LIQUID FILLED ORAL at 20:16

## 2022-07-19 RX ADMIN — SODIUM CHLORIDE 75 ML/HR: 9 INJECTION, SOLUTION INTRAVENOUS at 18:10

## 2022-07-19 RX ADMIN — PANTOPRAZOLE SODIUM 40 MG: 40 TABLET, DELAYED RELEASE ORAL at 17:17

## 2022-07-19 RX ADMIN — HYDROXYZINE HYDROCHLORIDE 25 MG: 25 TABLET ORAL at 15:07

## 2022-07-19 RX ADMIN — LORAZEPAM 0.5 MG: 0.5 TABLET ORAL at 17:16

## 2022-07-19 RX ADMIN — ATORVASTATIN CALCIUM 10 MG: 20 TABLET, FILM COATED ORAL at 20:16

## 2022-07-19 RX ADMIN — DIAZEPAM 2.5 MG: 5 INJECTION, SOLUTION INTRAMUSCULAR; INTRAVENOUS at 19:38

## 2022-07-19 RX ADMIN — ONDANSETRON 4 MG: 2 INJECTION INTRAMUSCULAR; INTRAVENOUS at 18:00

## 2022-07-19 RX ADMIN — APIXABAN 5 MG: 5 TABLET, FILM COATED ORAL at 20:16

## 2022-07-19 RX ADMIN — Medication 100 MG: at 20:16

## 2022-07-19 RX ADMIN — Medication 1 MG: at 20:16

## 2022-07-19 RX ADMIN — SUCRALFATE 1 G: 1 TABLET ORAL at 20:16

## 2022-07-19 RX ADMIN — Medication 1 TABLET: at 20:16

## 2022-07-19 NOTE — OUTREACH NOTE
Total Joint Week 2 Survey    Flowsheet Row Responses   [facility] facility patient discharged from? [place]   Does the patient have one of the following disease processes/diagnoses(primary or secondary)? Total Joint Replacement   Joint surgery performed? Hip   Week 2 attempt successful? No   Unsuccessful attempts --  [currently in ER]   Rescheduled Revoked          URSZULA Jones Registered Nurse

## 2022-07-20 ENCOUNTER — APPOINTMENT (OUTPATIENT)
Dept: GENERAL RADIOLOGY | Facility: HOSPITAL | Age: 72
End: 2022-07-20

## 2022-07-20 ENCOUNTER — APPOINTMENT (OUTPATIENT)
Dept: NUCLEAR MEDICINE | Facility: HOSPITAL | Age: 72
End: 2022-07-20

## 2022-07-20 LAB
ALBUMIN SERPL-MCNC: 3.1 G/DL (ref 3.5–5.2)
ALBUMIN/GLOB SERPL: 1.2 G/DL
ALP SERPL-CCNC: 91 U/L (ref 39–117)
ALT SERPL W P-5'-P-CCNC: 16 U/L (ref 1–41)
ANION GAP SERPL CALCULATED.3IONS-SCNC: 13.4 MMOL/L (ref 5–15)
AST SERPL-CCNC: 34 U/L (ref 1–40)
BASOPHILS # BLD AUTO: 0.01 10*3/MM3 (ref 0–0.2)
BASOPHILS NFR BLD AUTO: 0.3 % (ref 0–1.5)
BILIRUB SERPL-MCNC: 0.7 MG/DL (ref 0–1.2)
BUN SERPL-MCNC: 15 MG/DL (ref 8–23)
BUN/CREAT SERPL: 11.2 (ref 7–25)
CALCIUM SPEC-SCNC: 9.3 MG/DL (ref 8.6–10.5)
CHLORIDE SERPL-SCNC: 106 MMOL/L (ref 98–107)
CO2 SERPL-SCNC: 26.6 MMOL/L (ref 22–29)
CREAT SERPL-MCNC: 1.34 MG/DL (ref 0.76–1.27)
DEPRECATED RDW RBC AUTO: 44.1 FL (ref 37–54)
EGFRCR SERPLBLD CKD-EPI 2021: 55.9 ML/MIN/1.73
EOSINOPHIL # BLD AUTO: 0.11 10*3/MM3 (ref 0–0.4)
EOSINOPHIL NFR BLD AUTO: 3.3 % (ref 0.3–6.2)
ERYTHROCYTE [DISTWIDTH] IN BLOOD BY AUTOMATED COUNT: 13 % (ref 12.3–15.4)
GLOBULIN UR ELPH-MCNC: 2.5 GM/DL
GLUCOSE SERPL-MCNC: 88 MG/DL (ref 65–99)
HCT VFR BLD AUTO: 25.4 % (ref 37.5–51)
HGB BLD-MCNC: 8.3 G/DL (ref 13–17.7)
IMM GRANULOCYTES # BLD AUTO: 0.01 10*3/MM3 (ref 0–0.05)
IMM GRANULOCYTES NFR BLD AUTO: 0.3 % (ref 0–0.5)
LYMPHOCYTES # BLD AUTO: 1.49 10*3/MM3 (ref 0.7–3.1)
LYMPHOCYTES NFR BLD AUTO: 44.2 % (ref 19.6–45.3)
MCH RBC QN AUTO: 30.7 PG (ref 26.6–33)
MCHC RBC AUTO-ENTMCNC: 32.7 G/DL (ref 31.5–35.7)
MCV RBC AUTO: 94.1 FL (ref 79–97)
MONOCYTES # BLD AUTO: 0.49 10*3/MM3 (ref 0.1–0.9)
MONOCYTES NFR BLD AUTO: 14.5 % (ref 5–12)
NEUTROPHILS NFR BLD AUTO: 1.26 10*3/MM3 (ref 1.7–7)
NEUTROPHILS NFR BLD AUTO: 37.4 % (ref 42.7–76)
NRBC BLD AUTO-RTO: 0.6 /100 WBC (ref 0–0.2)
PLATELET # BLD AUTO: 149 10*3/MM3 (ref 140–450)
PMV BLD AUTO: 9.5 FL (ref 6–12)
POTASSIUM SERPL-SCNC: 4.5 MMOL/L (ref 3.5–5.2)
PROT SERPL-MCNC: 5.6 G/DL (ref 6–8.5)
QT INTERVAL: 387 MS
RBC # BLD AUTO: 2.7 10*6/MM3 (ref 4.14–5.8)
SODIUM SERPL-SCNC: 146 MMOL/L (ref 136–145)
WBC NRBC COR # BLD: 3.37 10*3/MM3 (ref 3.4–10.8)

## 2022-07-20 PROCEDURE — A9540 TC99M MAA: HCPCS | Performed by: HOSPITALIST

## 2022-07-20 PROCEDURE — 85025 COMPLETE CBC W/AUTO DIFF WBC: CPT | Performed by: HOSPITALIST

## 2022-07-20 PROCEDURE — 97166 OT EVAL MOD COMPLEX 45 MIN: CPT

## 2022-07-20 PROCEDURE — 97162 PT EVAL MOD COMPLEX 30 MIN: CPT

## 2022-07-20 PROCEDURE — 97530 THERAPEUTIC ACTIVITIES: CPT

## 2022-07-20 PROCEDURE — 25010000002 DIAZEPAM PER 5 MG: Performed by: HOSPITALIST

## 2022-07-20 PROCEDURE — 0 TECHNETIUM ALBUMIN AGGREGATED: Performed by: HOSPITALIST

## 2022-07-20 PROCEDURE — 97535 SELF CARE MNGMENT TRAINING: CPT

## 2022-07-20 PROCEDURE — 78580 LUNG PERFUSION IMAGING: CPT

## 2022-07-20 PROCEDURE — 71046 X-RAY EXAM CHEST 2 VIEWS: CPT

## 2022-07-20 PROCEDURE — 80053 COMPREHEN METABOLIC PANEL: CPT | Performed by: HOSPITALIST

## 2022-07-20 PROCEDURE — 99232 SBSQ HOSP IP/OBS MODERATE 35: CPT | Performed by: INTERNAL MEDICINE

## 2022-07-20 RX ORDER — SODIUM CHLORIDE 450 MG/100ML
75 INJECTION, SOLUTION INTRAVENOUS CONTINUOUS
Status: DISCONTINUED | OUTPATIENT
Start: 2022-07-20 | End: 2022-07-21

## 2022-07-20 RX ADMIN — Medication 1 TABLET: at 10:02

## 2022-07-20 RX ADMIN — CLONIDINE HYDROCHLORIDE 0.1 MG: 0.1 TABLET ORAL at 20:59

## 2022-07-20 RX ADMIN — APIXABAN 5 MG: 5 TABLET, FILM COATED ORAL at 10:02

## 2022-07-20 RX ADMIN — SUCRALFATE 1 G: 1 TABLET ORAL at 16:18

## 2022-07-20 RX ADMIN — LORAZEPAM 0.5 MG: 0.5 TABLET ORAL at 06:37

## 2022-07-20 RX ADMIN — SODIUM CHLORIDE 75 ML/HR: 4.5 INJECTION, SOLUTION INTRAVENOUS at 13:20

## 2022-07-20 RX ADMIN — DIAZEPAM 2.5 MG: 5 INJECTION INTRAMUSCULAR; INTRAVENOUS at 20:59

## 2022-07-20 RX ADMIN — LORAZEPAM 0.5 MG: 0.5 TABLET ORAL at 00:43

## 2022-07-20 RX ADMIN — SUCRALFATE 1 G: 1 TABLET ORAL at 13:16

## 2022-07-20 RX ADMIN — SUCRALFATE 1 G: 1 TABLET ORAL at 20:59

## 2022-07-20 RX ADMIN — ATORVASTATIN CALCIUM 10 MG: 20 TABLET, FILM COATED ORAL at 20:58

## 2022-07-20 RX ADMIN — Medication 100 MG: at 10:02

## 2022-07-20 RX ADMIN — CLONIDINE HYDROCHLORIDE 0.1 MG: 0.1 TABLET ORAL at 10:02

## 2022-07-20 RX ADMIN — DIAZEPAM 2.5 MG: 5 INJECTION, SOLUTION INTRAMUSCULAR; INTRAVENOUS at 10:02

## 2022-07-20 RX ADMIN — KIT FOR THE PREPARATION OF TECHNETIUM TC 99M ALBUMIN AGGREGATED 1 DOSE: 2.5 INJECTION, POWDER, FOR SOLUTION INTRAVENOUS at 08:37

## 2022-07-20 RX ADMIN — DIAZEPAM 2.5 MG: 5 INJECTION, SOLUTION INTRAMUSCULAR; INTRAVENOUS at 02:47

## 2022-07-20 RX ADMIN — Medication 1 MG: at 10:02

## 2022-07-20 RX ADMIN — LORAZEPAM 0.5 MG: 0.5 TABLET ORAL at 13:16

## 2022-07-20 RX ADMIN — DOCUSATE SODIUM 100 MG: 100 CAPSULE, LIQUID FILLED ORAL at 10:02

## 2022-07-20 RX ADMIN — DIAZEPAM 2.5 MG: 5 INJECTION, SOLUTION INTRAMUSCULAR; INTRAVENOUS at 16:11

## 2022-07-20 RX ADMIN — SUCRALFATE 1 G: 1 TABLET ORAL at 10:02

## 2022-07-20 RX ADMIN — PANTOPRAZOLE SODIUM 40 MG: 40 TABLET, DELAYED RELEASE ORAL at 10:02

## 2022-07-20 RX ADMIN — PANTOPRAZOLE SODIUM 40 MG: 40 TABLET, DELAYED RELEASE ORAL at 16:19

## 2022-07-20 RX ADMIN — CLONIDINE HYDROCHLORIDE 0.1 MG: 0.1 TABLET ORAL at 02:48

## 2022-07-20 RX ADMIN — ALLOPURINOL 100 MG: 100 TABLET ORAL at 10:02

## 2022-07-20 RX ADMIN — APIXABAN 5 MG: 5 TABLET, FILM COATED ORAL at 20:58

## 2022-07-21 ENCOUNTER — APPOINTMENT (OUTPATIENT)
Dept: CARDIOLOGY | Facility: HOSPITAL | Age: 72
End: 2022-07-21

## 2022-07-21 ENCOUNTER — HOME HEALTH ADMISSION (OUTPATIENT)
Dept: HOME HEALTH SERVICES | Facility: HOME HEALTHCARE | Age: 72
End: 2022-07-21

## 2022-07-21 LAB
ALBUMIN SERPL-MCNC: 2.9 G/DL (ref 3.5–5.2)
ALBUMIN/GLOB SERPL: 1.3 G/DL
ALP SERPL-CCNC: 85 U/L (ref 39–117)
ALT SERPL W P-5'-P-CCNC: 16 U/L (ref 1–41)
ANION GAP SERPL CALCULATED.3IONS-SCNC: 6 MMOL/L (ref 5–15)
AORTIC DIMENSIONLESS INDEX: 0.9 (DI)
AST SERPL-CCNC: 27 U/L (ref 1–40)
BASOPHILS # BLD AUTO: 0.01 10*3/MM3 (ref 0–0.2)
BASOPHILS NFR BLD AUTO: 0.3 % (ref 0–1.5)
BH CV ECHO MEAS - AI P1/2T: 403.3 MSEC
BH CV ECHO MEAS - AO MAX PG: 4.4 MMHG
BH CV ECHO MEAS - AO MEAN PG: 2.05 MMHG
BH CV ECHO MEAS - AO ROOT DIAM: 2.9 CM
BH CV ECHO MEAS - AO V2 MAX: 105.2 CM/SEC
BH CV ECHO MEAS - AO V2 VTI: 21.6 CM
BH CV ECHO MEAS - AVA(I,D): 2.9 CM2
BH CV ECHO MEAS - EDV(CUBED): 79.1 ML
BH CV ECHO MEAS - EDV(MOD-SP2): 126 ML
BH CV ECHO MEAS - EDV(MOD-SP4): 118 ML
BH CV ECHO MEAS - EF(MOD-BP): 56.9 %
BH CV ECHO MEAS - EF(MOD-SP2): 54 %
BH CV ECHO MEAS - EF(MOD-SP4): 60.2 %
BH CV ECHO MEAS - ESV(CUBED): 20.3 ML
BH CV ECHO MEAS - ESV(MOD-SP2): 58 ML
BH CV ECHO MEAS - ESV(MOD-SP4): 47 ML
BH CV ECHO MEAS - FS: 36.4 %
BH CV ECHO MEAS - IVS/LVPW: 0.88 CM
BH CV ECHO MEAS - IVSD: 1.01 CM
BH CV ECHO MEAS - LAT PEAK E' VEL: 9.7 CM/SEC
BH CV ECHO MEAS - LV DIASTOLIC VOL/BSA (35-75): 65.1 CM2
BH CV ECHO MEAS - LV MASS(C)D: 158.3 GRAMS
BH CV ECHO MEAS - LV MAX PG: 3.9 MMHG
BH CV ECHO MEAS - LV MEAN PG: 1.75 MMHG
BH CV ECHO MEAS - LV SYSTOLIC VOL/BSA (12-30): 25.9 CM2
BH CV ECHO MEAS - LV V1 MAX: 98.5 CM/SEC
BH CV ECHO MEAS - LV V1 VTI: 20.3 CM
BH CV ECHO MEAS - LVIDD: 4.3 CM
BH CV ECHO MEAS - LVIDS: 2.7 CM
BH CV ECHO MEAS - LVOT AREA: 3.1 CM2
BH CV ECHO MEAS - LVOT DIAM: 1.97 CM
BH CV ECHO MEAS - LVPWD: 1.15 CM
BH CV ECHO MEAS - MED PEAK E' VEL: 5.9 CM/SEC
BH CV ECHO MEAS - MR MAX PG: 50.8 MMHG
BH CV ECHO MEAS - MR MAX VEL: 356.3 CM/SEC
BH CV ECHO MEAS - MV A DUR: 0.08 SEC
BH CV ECHO MEAS - MV A MAX VEL: 90.8 CM/SEC
BH CV ECHO MEAS - MV DEC SLOPE: 656.1 CM/SEC2
BH CV ECHO MEAS - MV DEC TIME: 0.15 MSEC
BH CV ECHO MEAS - MV E MAX VEL: 98.5 CM/SEC
BH CV ECHO MEAS - MV E/A: 1.08
BH CV ECHO MEAS - MV MAX PG: 5.3 MMHG
BH CV ECHO MEAS - MV MEAN PG: 2.7 MMHG
BH CV ECHO MEAS - MV P1/2T: 52.4 MSEC
BH CV ECHO MEAS - MV V2 VTI: 29.4 CM
BH CV ECHO MEAS - MVA(P1/2T): 4.2 CM2
BH CV ECHO MEAS - MVA(VTI): 2.12 CM2
BH CV ECHO MEAS - PA ACC TIME: 0.11 SEC
BH CV ECHO MEAS - PA PR(ACCEL): 31.5 MMHG
BH CV ECHO MEAS - PA V2 MAX: 93.5 CM/SEC
BH CV ECHO MEAS - RV MAX PG: 1.76 MMHG
BH CV ECHO MEAS - RV V1 MAX: 66.4 CM/SEC
BH CV ECHO MEAS - RV V1 VTI: 18.2 CM
BH CV ECHO MEAS - SI(MOD-SP2): 37.5 ML/M2
BH CV ECHO MEAS - SI(MOD-SP4): 39.2 ML/M2
BH CV ECHO MEAS - SV(LVOT): 62.2 ML
BH CV ECHO MEAS - SV(MOD-SP2): 68 ML
BH CV ECHO MEAS - SV(MOD-SP4): 71 ML
BH CV ECHO MEAS - TAPSE (>1.6): 1.9 CM
BH CV ECHO MEAS - TR MAX PG: 23.1 MMHG
BH CV ECHO MEAS - TR MAX VEL: 240.3 CM/SEC
BH CV ECHO MEASUREMENTS AVERAGE E/E' RATIO: 12.63
BH CV XLRA - RV BASE: 3 CM
BH CV XLRA - RV LENGTH: 7.8 CM
BH CV XLRA - RV MID: 2.4 CM
BH CV XLRA - TDI S': 10.2 CM/SEC
BILIRUB SERPL-MCNC: 0.3 MG/DL (ref 0–1.2)
BUN SERPL-MCNC: 13 MG/DL (ref 8–23)
BUN/CREAT SERPL: 10 (ref 7–25)
CALCIUM SPEC-SCNC: 8.7 MG/DL (ref 8.6–10.5)
CHLORIDE SERPL-SCNC: 110 MMOL/L (ref 98–107)
CO2 SERPL-SCNC: 26 MMOL/L (ref 22–29)
CREAT SERPL-MCNC: 1.3 MG/DL (ref 0.76–1.27)
DEPRECATED RDW RBC AUTO: 46 FL (ref 37–54)
EGFRCR SERPLBLD CKD-EPI 2021: 58 ML/MIN/1.73
EOSINOPHIL # BLD AUTO: 0.1 10*3/MM3 (ref 0–0.4)
EOSINOPHIL NFR BLD AUTO: 2.8 % (ref 0.3–6.2)
ERYTHROCYTE [DISTWIDTH] IN BLOOD BY AUTOMATED COUNT: 13.1 % (ref 12.3–15.4)
GLOBULIN UR ELPH-MCNC: 2.3 GM/DL
GLUCOSE SERPL-MCNC: 146 MG/DL (ref 65–99)
HCT VFR BLD AUTO: 24.1 % (ref 37.5–51)
HGB BLD-MCNC: 7.5 G/DL (ref 13–17.7)
IMM GRANULOCYTES # BLD AUTO: 0.01 10*3/MM3 (ref 0–0.05)
IMM GRANULOCYTES NFR BLD AUTO: 0.3 % (ref 0–0.5)
LEFT ATRIUM VOLUME INDEX: 21 ML/M2
LYMPHOCYTES # BLD AUTO: 1.5 10*3/MM3 (ref 0.7–3.1)
LYMPHOCYTES NFR BLD AUTO: 41.9 % (ref 19.6–45.3)
MAXIMAL PREDICTED HEART RATE: 147 BPM
MCH RBC QN AUTO: 30.4 PG (ref 26.6–33)
MCHC RBC AUTO-ENTMCNC: 31.1 G/DL (ref 31.5–35.7)
MCV RBC AUTO: 97.6 FL (ref 79–97)
MONOCYTES # BLD AUTO: 0.47 10*3/MM3 (ref 0.1–0.9)
MONOCYTES NFR BLD AUTO: 13.1 % (ref 5–12)
NEUTROPHILS NFR BLD AUTO: 1.49 10*3/MM3 (ref 1.7–7)
NEUTROPHILS NFR BLD AUTO: 41.6 % (ref 42.7–76)
NRBC BLD AUTO-RTO: 0.3 /100 WBC (ref 0–0.2)
PLATELET # BLD AUTO: 120 10*3/MM3 (ref 140–450)
PMV BLD AUTO: 9.1 FL (ref 6–12)
POTASSIUM SERPL-SCNC: 4 MMOL/L (ref 3.5–5.2)
PROT SERPL-MCNC: 5.2 G/DL (ref 6–8.5)
RBC # BLD AUTO: 2.47 10*6/MM3 (ref 4.14–5.8)
SODIUM SERPL-SCNC: 142 MMOL/L (ref 136–145)
STRESS TARGET HR: 125 BPM
WBC NRBC COR # BLD: 3.58 10*3/MM3 (ref 3.4–10.8)

## 2022-07-21 PROCEDURE — 93306 TTE W/DOPPLER COMPLETE: CPT | Performed by: INTERNAL MEDICINE

## 2022-07-21 PROCEDURE — 85025 COMPLETE CBC W/AUTO DIFF WBC: CPT | Performed by: HOSPITALIST

## 2022-07-21 PROCEDURE — 80053 COMPREHEN METABOLIC PANEL: CPT | Performed by: HOSPITALIST

## 2022-07-21 PROCEDURE — 93306 TTE W/DOPPLER COMPLETE: CPT

## 2022-07-21 PROCEDURE — 99232 SBSQ HOSP IP/OBS MODERATE 35: CPT | Performed by: NURSE PRACTITIONER

## 2022-07-21 PROCEDURE — 97530 THERAPEUTIC ACTIVITIES: CPT

## 2022-07-21 PROCEDURE — 25010000002 DIAZEPAM PER 5 MG: Performed by: HOSPITALIST

## 2022-07-21 PROCEDURE — 25010000002 PERFLUTREN (DEFINITY) 8.476 MG IN SODIUM CHLORIDE (PF) 0.9 % 10 ML INJECTION: Performed by: INTERNAL MEDICINE

## 2022-07-21 RX ADMIN — LORAZEPAM 0.5 MG: 0.5 TABLET ORAL at 09:08

## 2022-07-21 RX ADMIN — Medication 1 TABLET: at 09:00

## 2022-07-21 RX ADMIN — LORAZEPAM 0.5 MG: 0.5 TABLET ORAL at 00:24

## 2022-07-21 RX ADMIN — PANTOPRAZOLE SODIUM 40 MG: 40 TABLET, DELAYED RELEASE ORAL at 16:57

## 2022-07-21 RX ADMIN — DIAZEPAM 2.5 MG: 5 INJECTION INTRAMUSCULAR; INTRAVENOUS at 12:09

## 2022-07-21 RX ADMIN — DIAZEPAM 2.5 MG: 5 INJECTION INTRAMUSCULAR; INTRAVENOUS at 03:40

## 2022-07-21 RX ADMIN — HYDROXYZINE HYDROCHLORIDE 25 MG: 25 TABLET ORAL at 22:09

## 2022-07-21 RX ADMIN — ALLOPURINOL 100 MG: 100 TABLET ORAL at 09:00

## 2022-07-21 RX ADMIN — SUCRALFATE 1 G: 1 TABLET ORAL at 12:09

## 2022-07-21 RX ADMIN — CLONIDINE HYDROCHLORIDE 0.1 MG: 0.1 TABLET ORAL at 09:01

## 2022-07-21 RX ADMIN — ATORVASTATIN CALCIUM 10 MG: 20 TABLET, FILM COATED ORAL at 22:02

## 2022-07-21 RX ADMIN — SUCRALFATE 1 G: 1 TABLET ORAL at 16:57

## 2022-07-21 RX ADMIN — SUCRALFATE 1 G: 1 TABLET ORAL at 09:00

## 2022-07-21 RX ADMIN — PERFLUTREN 2 ML: 6.52 INJECTION, SUSPENSION INTRAVENOUS at 08:50

## 2022-07-21 RX ADMIN — SUCRALFATE 1 G: 1 TABLET ORAL at 22:03

## 2022-07-21 RX ADMIN — APIXABAN 5 MG: 5 TABLET, FILM COATED ORAL at 09:01

## 2022-07-21 RX ADMIN — DOCUSATE SODIUM 100 MG: 100 CAPSULE, LIQUID FILLED ORAL at 09:00

## 2022-07-21 RX ADMIN — Medication 100 MG: at 09:00

## 2022-07-21 RX ADMIN — ATENOLOL 100 MG: 50 TABLET ORAL at 09:00

## 2022-07-21 RX ADMIN — Medication 1 MG: at 09:00

## 2022-07-21 RX ADMIN — PANTOPRAZOLE SODIUM 40 MG: 40 TABLET, DELAYED RELEASE ORAL at 09:00

## 2022-07-21 RX ADMIN — CLONIDINE HYDROCHLORIDE 0.1 MG: 0.1 TABLET ORAL at 03:39

## 2022-07-21 RX ADMIN — APIXABAN 5 MG: 5 TABLET, FILM COATED ORAL at 22:03

## 2022-07-22 LAB
ALBUMIN SERPL-MCNC: 3 G/DL (ref 3.5–5.2)
ALBUMIN/GLOB SERPL: 1.2 G/DL
ALP SERPL-CCNC: 87 U/L (ref 39–117)
ALT SERPL W P-5'-P-CCNC: 15 U/L (ref 1–41)
ANION GAP SERPL CALCULATED.3IONS-SCNC: 7 MMOL/L (ref 5–15)
AST SERPL-CCNC: 25 U/L (ref 1–40)
BASOPHILS # BLD AUTO: 0.01 10*3/MM3 (ref 0–0.2)
BASOPHILS NFR BLD AUTO: 0.3 % (ref 0–1.5)
BILIRUB SERPL-MCNC: 0.2 MG/DL (ref 0–1.2)
BUN SERPL-MCNC: 12 MG/DL (ref 8–23)
BUN/CREAT SERPL: 9.7 (ref 7–25)
CALCIUM SPEC-SCNC: 9.1 MG/DL (ref 8.6–10.5)
CHLORIDE SERPL-SCNC: 109 MMOL/L (ref 98–107)
CO2 SERPL-SCNC: 25 MMOL/L (ref 22–29)
CREAT SERPL-MCNC: 1.24 MG/DL (ref 0.76–1.27)
DEPRECATED RDW RBC AUTO: 46.8 FL (ref 37–54)
EGFRCR SERPLBLD CKD-EPI 2021: 61.4 ML/MIN/1.73
EOSINOPHIL # BLD AUTO: 0.09 10*3/MM3 (ref 0–0.4)
EOSINOPHIL NFR BLD AUTO: 2.3 % (ref 0.3–6.2)
ERYTHROCYTE [DISTWIDTH] IN BLOOD BY AUTOMATED COUNT: 13.1 % (ref 12.3–15.4)
GLOBULIN UR ELPH-MCNC: 2.5 GM/DL
GLUCOSE SERPL-MCNC: 103 MG/DL (ref 65–99)
HCT VFR BLD AUTO: 24.3 % (ref 37.5–51)
HGB BLD-MCNC: 7.6 G/DL (ref 13–17.7)
LYMPHOCYTES # BLD AUTO: 1.53 10*3/MM3 (ref 0.7–3.1)
LYMPHOCYTES NFR BLD AUTO: 38.3 % (ref 19.6–45.3)
MCH RBC QN AUTO: 30.6 PG (ref 26.6–33)
MCHC RBC AUTO-ENTMCNC: 31.3 G/DL (ref 31.5–35.7)
MCV RBC AUTO: 98 FL (ref 79–97)
MONOCYTES # BLD AUTO: 0.46 10*3/MM3 (ref 0.1–0.9)
MONOCYTES NFR BLD AUTO: 11.5 % (ref 5–12)
NEUTROPHILS NFR BLD AUTO: 1.9 10*3/MM3 (ref 1.7–7)
NEUTROPHILS NFR BLD AUTO: 47.6 % (ref 42.7–76)
PLATELET # BLD AUTO: 123 10*3/MM3 (ref 140–450)
PMV BLD AUTO: 9.7 FL (ref 6–12)
POTASSIUM SERPL-SCNC: 4.2 MMOL/L (ref 3.5–5.2)
PROT SERPL-MCNC: 5.5 G/DL (ref 6–8.5)
RBC # BLD AUTO: 2.48 10*6/MM3 (ref 4.14–5.8)
SODIUM SERPL-SCNC: 141 MMOL/L (ref 136–145)
WBC NRBC COR # BLD: 3.99 10*3/MM3 (ref 3.4–10.8)

## 2022-07-22 PROCEDURE — 85025 COMPLETE CBC W/AUTO DIFF WBC: CPT | Performed by: HOSPITALIST

## 2022-07-22 PROCEDURE — 80053 COMPREHEN METABOLIC PANEL: CPT | Performed by: HOSPITALIST

## 2022-07-22 PROCEDURE — 99232 SBSQ HOSP IP/OBS MODERATE 35: CPT | Performed by: NURSE PRACTITIONER

## 2022-07-22 PROCEDURE — 97530 THERAPEUTIC ACTIVITIES: CPT

## 2022-07-22 RX ORDER — LORAZEPAM 2 MG/ML
0.5 INJECTION INTRAMUSCULAR EVERY 4 HOURS PRN
Status: DISCONTINUED | OUTPATIENT
Start: 2022-07-22 | End: 2022-07-22

## 2022-07-22 RX ORDER — LORAZEPAM 0.5 MG/1
0.5 TABLET ORAL EVERY 4 HOURS PRN
Status: DISCONTINUED | OUTPATIENT
Start: 2022-07-22 | End: 2022-07-23

## 2022-07-22 RX ORDER — LORAZEPAM 2 MG/ML
1 INJECTION INTRAMUSCULAR EVERY 4 HOURS PRN
Status: DISCONTINUED | OUTPATIENT
Start: 2022-07-22 | End: 2022-07-22

## 2022-07-22 RX ADMIN — ATENOLOL 100 MG: 50 TABLET ORAL at 08:45

## 2022-07-22 RX ADMIN — DOCUSATE SODIUM 100 MG: 100 CAPSULE, LIQUID FILLED ORAL at 20:15

## 2022-07-22 RX ADMIN — SUCRALFATE 1 G: 1 TABLET ORAL at 20:16

## 2022-07-22 RX ADMIN — Medication 100 MG: at 08:45

## 2022-07-22 RX ADMIN — PANTOPRAZOLE SODIUM 40 MG: 40 TABLET, DELAYED RELEASE ORAL at 08:45

## 2022-07-22 RX ADMIN — PANTOPRAZOLE SODIUM 40 MG: 40 TABLET, DELAYED RELEASE ORAL at 17:55

## 2022-07-22 RX ADMIN — Medication 1 MG: at 08:45

## 2022-07-22 RX ADMIN — SUCRALFATE 1 G: 1 TABLET ORAL at 17:55

## 2022-07-22 RX ADMIN — Medication 1 TABLET: at 08:45

## 2022-07-22 RX ADMIN — APIXABAN 5 MG: 5 TABLET, FILM COATED ORAL at 20:16

## 2022-07-22 RX ADMIN — HYDROXYZINE HYDROCHLORIDE 25 MG: 25 TABLET ORAL at 20:15

## 2022-07-22 RX ADMIN — SUCRALFATE 1 G: 1 TABLET ORAL at 08:45

## 2022-07-22 RX ADMIN — LORAZEPAM 0.5 MG: 0.5 TABLET ORAL at 23:17

## 2022-07-22 RX ADMIN — ALLOPURINOL 100 MG: 100 TABLET ORAL at 08:45

## 2022-07-22 RX ADMIN — DOCUSATE SODIUM 100 MG: 100 CAPSULE, LIQUID FILLED ORAL at 08:45

## 2022-07-22 RX ADMIN — APIXABAN 5 MG: 5 TABLET, FILM COATED ORAL at 08:45

## 2022-07-23 ENCOUNTER — READMISSION MANAGEMENT (OUTPATIENT)
Dept: CALL CENTER | Facility: HOSPITAL | Age: 72
End: 2022-07-23

## 2022-07-23 VITALS
BODY MASS INDEX: 21.77 KG/M2 | HEIGHT: 69 IN | DIASTOLIC BLOOD PRESSURE: 61 MMHG | SYSTOLIC BLOOD PRESSURE: 130 MMHG | TEMPERATURE: 99 F | OXYGEN SATURATION: 99 % | HEART RATE: 77 BPM | WEIGHT: 147 LBS | RESPIRATION RATE: 18 BRPM

## 2022-07-23 LAB
ALBUMIN SERPL-MCNC: 3.2 G/DL (ref 3.5–5.2)
ALBUMIN/GLOB SERPL: 1.3 G/DL
ALP SERPL-CCNC: 87 U/L (ref 39–117)
ALT SERPL W P-5'-P-CCNC: 18 U/L (ref 1–41)
ANION GAP SERPL CALCULATED.3IONS-SCNC: 5.8 MMOL/L (ref 5–15)
AST SERPL-CCNC: 26 U/L (ref 1–40)
BASOPHILS # BLD AUTO: 0.01 10*3/MM3 (ref 0–0.2)
BASOPHILS NFR BLD AUTO: 0.3 % (ref 0–1.5)
BILIRUB SERPL-MCNC: 0.4 MG/DL (ref 0–1.2)
BUN SERPL-MCNC: 7 MG/DL (ref 8–23)
BUN/CREAT SERPL: 5.7 (ref 7–25)
CALCIUM SPEC-SCNC: 9.4 MG/DL (ref 8.6–10.5)
CHLORIDE SERPL-SCNC: 106 MMOL/L (ref 98–107)
CO2 SERPL-SCNC: 25.2 MMOL/L (ref 22–29)
CREAT SERPL-MCNC: 1.23 MG/DL (ref 0.76–1.27)
DEPRECATED RDW RBC AUTO: 45.5 FL (ref 37–54)
EGFRCR SERPLBLD CKD-EPI 2021: 62 ML/MIN/1.73
EOSINOPHIL # BLD AUTO: 0.12 10*3/MM3 (ref 0–0.4)
EOSINOPHIL NFR BLD AUTO: 3.2 % (ref 0.3–6.2)
ERYTHROCYTE [DISTWIDTH] IN BLOOD BY AUTOMATED COUNT: 13.1 % (ref 12.3–15.4)
GLOBULIN UR ELPH-MCNC: 2.5 GM/DL
GLUCOSE SERPL-MCNC: 83 MG/DL (ref 65–99)
HCT VFR BLD AUTO: 24.5 % (ref 37.5–51)
HGB BLD-MCNC: 8 G/DL (ref 13–17.7)
LYMPHOCYTES # BLD AUTO: 1.58 10*3/MM3 (ref 0.7–3.1)
LYMPHOCYTES NFR BLD AUTO: 42.7 % (ref 19.6–45.3)
MCH RBC QN AUTO: 31.4 PG (ref 26.6–33)
MCHC RBC AUTO-ENTMCNC: 32.7 G/DL (ref 31.5–35.7)
MCV RBC AUTO: 96.1 FL (ref 79–97)
MONOCYTES # BLD AUTO: 0.4 10*3/MM3 (ref 0.1–0.9)
MONOCYTES NFR BLD AUTO: 10.8 % (ref 5–12)
NEUTROPHILS NFR BLD AUTO: 1.59 10*3/MM3 (ref 1.7–7)
NEUTROPHILS NFR BLD AUTO: 43 % (ref 42.7–76)
PLATELET # BLD AUTO: 131 10*3/MM3 (ref 140–450)
PMV BLD AUTO: 9.6 FL (ref 6–12)
POTASSIUM SERPL-SCNC: 3.8 MMOL/L (ref 3.5–5.2)
PROT SERPL-MCNC: 5.7 G/DL (ref 6–8.5)
RBC # BLD AUTO: 2.55 10*6/MM3 (ref 4.14–5.8)
SODIUM SERPL-SCNC: 137 MMOL/L (ref 136–145)
WBC NRBC COR # BLD: 3.7 10*3/MM3 (ref 3.4–10.8)

## 2022-07-23 PROCEDURE — 85025 COMPLETE CBC W/AUTO DIFF WBC: CPT | Performed by: HOSPITALIST

## 2022-07-23 PROCEDURE — 80053 COMPREHEN METABOLIC PANEL: CPT | Performed by: HOSPITALIST

## 2022-07-23 RX ADMIN — PANTOPRAZOLE SODIUM 40 MG: 40 TABLET, DELAYED RELEASE ORAL at 08:38

## 2022-07-23 RX ADMIN — SUCRALFATE 1 G: 1 TABLET ORAL at 12:31

## 2022-07-23 RX ADMIN — ALLOPURINOL 100 MG: 100 TABLET ORAL at 08:36

## 2022-07-23 RX ADMIN — Medication 1 TABLET: at 08:36

## 2022-07-23 RX ADMIN — APIXABAN 5 MG: 5 TABLET, FILM COATED ORAL at 08:36

## 2022-07-23 RX ADMIN — SUCRALFATE 1 G: 1 TABLET ORAL at 08:36

## 2022-07-23 RX ADMIN — DOCUSATE SODIUM 100 MG: 100 CAPSULE, LIQUID FILLED ORAL at 08:37

## 2022-07-23 RX ADMIN — Medication 100 MG: at 08:36

## 2022-07-23 RX ADMIN — ATENOLOL 100 MG: 50 TABLET ORAL at 08:36

## 2022-07-23 RX ADMIN — Medication 1 MG: at 08:36

## 2022-07-24 NOTE — OUTREACH NOTE
Prep Survey    Flowsheet Row Responses   [facility] facility patient discharged from? [place]   Is LACE score < 7 ? No   Emergency Room discharge w/ pulse ox? No   Eligibility Not Eligible   What are the reasons patient is not eligible? --  [etoh]   Does the patient have one of the following disease processes/diagnoses(primary or secondary)? Other   Prep survey completed? Yes          NICK BRADLEY - Registered Nurse

## 2022-09-24 ENCOUNTER — HOSPITAL ENCOUNTER (EMERGENCY)
Facility: HOSPITAL | Age: 72
Discharge: HOME | End: 2022-09-24
Attending: EMERGENCY MEDICINE | Admitting: EMERGENCY MEDICINE

## 2022-09-24 ENCOUNTER — APPOINTMENT (OUTPATIENT)
Dept: GENERAL RADIOLOGY | Facility: HOSPITAL | Age: 72
End: 2022-09-24

## 2022-09-24 VITALS
SYSTOLIC BLOOD PRESSURE: 143 MMHG | DIASTOLIC BLOOD PRESSURE: 88 MMHG | OXYGEN SATURATION: 100 % | HEART RATE: 95 BPM | HEIGHT: 69 IN | RESPIRATION RATE: 18 BRPM | BODY MASS INDEX: 21.77 KG/M2 | TEMPERATURE: 98 F | WEIGHT: 147 LBS

## 2022-09-24 DIAGNOSIS — S62.524A CLOSED NONDISPLACED FRACTURE OF DISTAL PHALANX OF RIGHT THUMB, INITIAL ENCOUNTER: ICD-10-CM

## 2022-09-24 DIAGNOSIS — S70.01XA CONTUSION OF RIGHT HIP, INITIAL ENCOUNTER: Primary | ICD-10-CM

## 2022-09-24 PROCEDURE — 99284 EMERGENCY DEPT VISIT MOD MDM: CPT

## 2022-09-24 PROCEDURE — 73140 X-RAY EXAM OF FINGER(S): CPT

## 2022-09-24 PROCEDURE — 73502 X-RAY EXAM HIP UNI 2-3 VIEWS: CPT

## 2022-09-24 RX ORDER — HYDROCODONE BITARTRATE AND ACETAMINOPHEN 5; 325 MG/1; MG/1
1 TABLET ORAL ONCE
Status: COMPLETED | OUTPATIENT
Start: 2022-09-24 | End: 2022-09-24

## 2022-09-24 RX ADMIN — HYDROCODONE BITARTRATE AND ACETAMINOPHEN 1 TABLET: 5; 325 TABLET ORAL at 17:46

## 2022-09-24 NOTE — ED NOTES
Pt has ride coming to pick him up. Pt wheeled out to lobby to wait for ride. Pt alert/oriented and in no distress. Triage nurses notified.

## 2022-09-24 NOTE — ED NOTES
[place] [facility], c/o hip pain right side. Surgery on that hip in April. Pt is non ambulatory, and not able to move toes, but can move foot. ETOH on board.     Patient was placed in face mask during first look triage.  Patient was wearing a face mask throughout encounter.  I wore personal protective equipment throughout the encounter.  Hand hygiene was performed before and after patient encounter.

## 2022-09-24 NOTE — ED PROVIDER NOTES
" EMERGENCY DEPARTMENT ENCOUNTER    Room Number:  18/18  Date of encounter:  9/24/2022  PCP: Alessia Prescott APRN  Historian: Pt and triage nursing report    Patient was placed in face mask during triage process. Patient was wearing facemask when I entered the room and throughout our encounter. I wore full protective equipment throughout this patient encounter including a face mask, eye protection, and gloves. Hand hygiene was performed before donning protective equipment and again following doffing of PPE after leaving the room.    HPI:  Chief Complaint: Right hip and thumb pain status post fall  A complete HPI/ROS/PMH/PSH/SH/FH are unobtainable due to: N/A   Context: Yuval Loja is a 71 y.o. male who presents to the ED c/o right hip and thumb pain status post fall.  Patient reports he turned around quickly and fell landing on his right side.  Patient complains of right hip pain and some swelling and discomfort of his right thumb.  Denies any chest pain, shortness of breath, syncope or near syncope, abdominal pain, nausea or vomiting.  Patient reports history of right hip arthroplasty just few months ago.  He tells me that its not uncommon if he turns right too quickly when turning around, he will fall.  This is what he tells me happened today.  Pain is mild to moderate and worse with certain movements.  No other injuries reported.    During collection of review of systems, the patient also notes that he had some \" flare up\" of hemorrhoids recently and is desirous of a prescription hemorrhoid cream.      MEDICAL HISTORY REVIEW  EMR reviewed:    Date of admission 7/19/2022  Date of discharge 7/23/2022  Final diagnosis  Left leg pain and right hip pain resolved  Elevated troponin no MI  Substance abuse  Alcohol abuse  Tobacco abuse  Coronary artery disease  Hypertension  Hyperlipidemia  Chronic anemia  Gastroesophageal reflux disease    PAST MEDICAL HISTORY  Active Ambulatory Problems     Diagnosis Date Noted   • " Alcoholic ketoacidosis 05/23/2017   • Alcohol dependence (HCC) 05/23/2017   • Methamphetamine abuse ([facility]) 05/23/2017   • Fatty liver, alcoholic 05/23/2017   • Nausea vomiting and diarrhea 05/23/2017   • Alcoholic liver disease (HCC) 05/23/2017   • Metabolic acidosis 05/23/2017   • Tobacco abuse 05/23/2017   • Coronary artery disease involving native coronary artery of native heart without angina pectoris 05/24/2017   • Tachycardia 05/24/2017   • Sinus tachycardia 04/17/2019   • Chronic kidney disease, stage 3 04/17/2019   • Medically noncompliant 04/18/2019   • Visual hallucinations 04/18/2020   • Psychosis ([facility]) 04/18/2020   • Hyponatremia 04/18/2020   • CAD (coronary artery disease) 04/18/2020   • Leukopenia 04/18/2020   • Symptomatic anemia 04/18/2020   • Headache 04/18/2020   • Substance abuse ([facility]) 04/18/2020   • Gastrointestinal hemorrhage 10/22/2020   • CRISTIANO (acute kidney injury) ([facility]) 10/23/2020   • Hyperkalemia 10/23/2020   • Right lower quadrant abdominal pain 03/15/2022   • New onset atrial fibrillation ([facility]) 03/15/2022   • History of drug abuse ([facility]) 03/15/2022   • COPD (chronic obstructive pulmonary disease) ([facility]) 03/15/2022   • Right inguinal hernia 03/15/2022   • Constipation 03/15/2022   • Status post right hip replacement 06/27/2022   • Right hip pain 07/19/2022     Resolved Ambulatory Problems     Diagnosis Date Noted   • Dehydration 04/17/2019   • Functional diarrhea 04/17/2019   • Closed fracture of neck of right femur, initial encounter ([facility]) 06/25/2022     Past Medical History:   Diagnosis Date   • Alcohol abuse    • Arthritis    • Disease of thyroid gland    • Elevated cholesterol    • GERD (gastroesophageal reflux disease)    • History of transfusion    • Hypertensive urgency    • Myocardial infarction ([facility])    • Sleep apnea    • Stroke ([facility])          PAST SURGICAL HISTORY  Past Surgical History:   Procedure Laterality Date   • BACK SURGERY     • CARDIAC CATHETERIZATION     • COLONOSCOPY     •  "ENDOSCOPY     • FRACTURE SURGERY     • JOINT REPLACEMENT     • SKIN BIOPSY     • TOTAL HIP ARTHROPLASTY Right 06/27/2022    Procedure: TOTAL HIP ARTHROPLASTY ANTERIOR WITH HANA TABLE;  Surgeon: Willian Quiles MD;  Location: [facility] OR;  Service: Orthopedics;  Laterality: Right;  Depuy, carli. hana         FAMILY HISTORY  History reviewed. No pertinent family history.      SOCIAL HISTORY  Social History     Socioeconomic History   • Marital status: Single   Tobacco Use   • Smoking status: Current Every Day Smoker     Packs/day: 0.50   • Smokeless tobacco: Never Used   • Tobacco comment: tried to provide education declined irritated w/ attempt smoked \" depends on what I feel like.\"   Substance and Sexual Activity   • Alcohol use: Not Currently   • Drug use: No   • Sexual activity: Defer         ALLERGIES  Mirtazapine and Sertraline        REVIEW OF SYSTEMS  Review of Systems     All systems reviewed and negative except for those discussed in HPI.       PHYSICAL EXAM    I have reviewed the triage vital signs and nursing notes.    ED Triage Vitals [09/24/22 1319]   Temp Heart Rate Resp BP SpO2   98 °F (36.7 °C) 84 18 136/82 98 %      Temp src Heart Rate Source Patient Position BP Location FiO2 (%)   Tympanic Monitor -- -- --       Physical Exam    Physical Exam   Constitutional: No distress.  Pleasant.  HENT:  Head: Normocephalic and atraumatic.   Oropharynx: Mucous membranes are moist.   Eyes: No scleral icterus. No conjunctival pallor.  Neck: Painless range of motion noted. Neck supple.   Cardiovascular: Pink warm and well-perfused with strong right radial pulse..  Pulmonary/Chest: No respiratory distress.  No tachypnea or increased work of breathing.   Abdominal: Soft. There is no tenderness.  Musculoskeletal: Some discomfort with extension of the right hip but no shortening, click or pop.  No significant discomfort with internal and external rotation at the right hip.  Small mount of swelling and edema " right thumb though full range of motion appears intact with normal cap refill and sensation.  Neurological: Alert.  Baseline strength and sensation noted.   Skin: Skin is pink, warm, and dry. No pallor.   Psychiatric: Mood and affect normal.   Nursing note and vitals reviewed.    LAB RESULTS  No results found for this or any previous visit (from the past 24 hour(s)).    Ordered the above labs and independently reviewed the results.        RADIOLOGY  XR Finger 2+ View Right    Result Date: 9/24/2022  XR FINGER 2+ VW RIGHT-clinical: Fell with pain  FINDINGS: There is a fracture demonstrated at the base of the distal phalanx right thumb. This located along the dorsum to involve a portion of the articular surface.Mild retraction of the fracture fragment. The proximal phalanx satisfactory appearance. There is first metacarpal phalangeal joint narrowing. There is a second and third metacarpal phalangeal joint narrowing noted incidentally.  No soft tissue gas to radiopaque foreign body seen.  CONCLUSION: Fracture at the base of the distal phalanx right thumb as described above.  This report was finalized on 9/24/2022 5:10 PM by Dr. Kris Hernandez M.D.      XR Hip With or Without Pelvis 2 - 3 View Right    Result Date: 9/24/2022  XR HIP W OR WO PELVIS 2-3 VIEW RIGHT-clinical: Right hip pain  COMPARISON examination 7/19/2022  FINDINGS: No malalignment or loosening of the total right hip replacement prosthesis. No acute osseous abnormality. Vascular calcifications within the soft tissues.  CONCLUSION: No change in the appearance of the right total hip replacement prosthesis, no acute osseous abnormality has developed.  This report was finalized on 9/24/2022 4:04 PM by Dr. Kris Hernandez M.D.        I ordered the above noted radiological studies. Reviewed by me and discussed with radiologist.  See dictation for official radiology interpretation.      PROCEDURES    Procedures        MEDICATIONS GIVEN IN ER    Medications    HYDROcodone-acetaminophen (NORCO) 5-325 MG per tablet 1 tablet (has no administration in time range)         PROGRESS, DATA ANALYSIS, CONSULTS, AND MEDICAL DECISION MAKING    My diagnosis for lower extremity pain and injury includes but is not limited to hip fracture, femur fracture, hip dislocation, hip contusion, hip sprain, hip strain, pelvic fracture, ischio-tibial band pain, ischio-tibial band bursitis, knee sprain, patella dislocation, knee dislocation, internal derangement of knee, fractures of the femur, tibia, fibula, ankle, foot and digits, ankle sprain, ankle dislocation, Lisfranc fracture, fracture dislocations of the digits, pulmonary embolism, claudication, peripheral vascular disease, gout, osteoarthritis, rheumatoid arthritis, bursitis, septic joint, poly-rheumatica, polyarthralgia and other inflammatory or infectious disease processes.      All labs have been independently reviewed by me.  All radiology studies have been reviewed by me and discussed with radiologist dictating the report.   EKG's independently viewed and interpreted by me.  Discussion below represents my analysis of pertinent findings related to patient's condition, differential diagnosis, treatment plan and final disposition.      ED Course as of 09/24/22 1743   Sat Sep 24, 2022   1742 Patient ambulated successfully in the ED.  His thumb has been splinted.  He is prepared for discharge. [RS]      ED Course User Index  [RS] Bennett Jose MD       AS OF 17:43 EDT VITALS:    BP - 143/88  HR - 90  TEMP - 98 °F (36.7 °C) (Tympanic)  O2 SATS - 99%        DIAGNOSIS  Final diagnoses:   Contusion of right hip, initial encounter   Closed nondisplaced fracture of distal phalanx of right thumb, initial encounter         DISPOSITION  DISCHARGE    Patient discharged in stable condition.    Reviewed implications of results, diagnosis, meds, responsibility to follow up, warning signs and symptoms of possible worsening, potential complications  and reasons to return to ER.    Patient/Family voiced understanding of above instructions.    Discussed plan for discharge, as there is no emergent indication for admission. Patient referred to primary care provider for regular health maintenance. Pt/family is agreeable and understands need for follow up and possible repeat testing.  Pt is aware that discharge does not mean that nothing is wrong but it indicates no emergency is present that requires admission and they must continue care with follow-up as given below or physician of their choice.     FOLLOW-UP  Alessia Prescott, [place]  [address]  [address]  [facility] [address]  [phone]    Schedule an appointment as soon as possible for a visit   As needed    River Benitez MD  [address]  [address]  [facility] [place] [address]  [phone]    Schedule an appointment as soon as possible for a visit in 3 days  Follow-up on broken thumb         Medication List      No changes were made to your prescriptions during this visit.            Bennett Jose MD  09/24/22 1704

## 2023-04-10 ENCOUNTER — HOSPITAL ENCOUNTER (INPATIENT)
Facility: HOSPITAL | Age: 73
LOS: 8 days | Discharge: HOME | End: 2023-04-18
Attending: EMERGENCY MEDICINE | Admitting: HOSPITALIST
Payer: MEDICARE

## 2023-04-10 ENCOUNTER — APPOINTMENT (OUTPATIENT)
Dept: GENERAL RADIOLOGY | Facility: HOSPITAL | Age: 73
End: 2023-04-10
Payer: MEDICARE

## 2023-04-10 DIAGNOSIS — Z87.09 HISTORY OF COPD: ICD-10-CM

## 2023-04-10 DIAGNOSIS — N28.9 ACUTE ON CHRONIC RENAL INSUFFICIENCY: ICD-10-CM

## 2023-04-10 DIAGNOSIS — N18.9 ACUTE ON CHRONIC RENAL INSUFFICIENCY: ICD-10-CM

## 2023-04-10 DIAGNOSIS — R00.1 SINUS BRADYCARDIA: Primary | ICD-10-CM

## 2023-04-10 DIAGNOSIS — E87.1 HYPONATREMIA: ICD-10-CM

## 2023-04-10 DIAGNOSIS — Z86.79 HISTORY OF CORONARY ARTERY DISEASE: ICD-10-CM

## 2023-04-10 DIAGNOSIS — R11.2 NAUSEA AND VOMITING, UNSPECIFIED VOMITING TYPE: ICD-10-CM

## 2023-04-10 LAB
ALBUMIN SERPL-MCNC: 3.5 G/DL (ref 3.5–5.2)
ALBUMIN SERPL-MCNC: 3.9 G/DL (ref 3.5–5.2)
ALBUMIN/GLOB SERPL: 1.4 G/DL
ALP SERPL-CCNC: 103 U/L (ref 39–117)
ALP SERPL-CCNC: 138 U/L (ref 39–117)
ALT SERPL W P-5'-P-CCNC: 248 U/L (ref 1–41)
ALT SERPL W P-5'-P-CCNC: 448 U/L (ref 1–41)
AMPHET+METHAMPHET UR QL: NEGATIVE
ANION GAP SERPL CALCULATED.3IONS-SCNC: 22.6 MMOL/L (ref 5–15)
ANION GAP SERPL CALCULATED.3IONS-SCNC: 30.4 MMOL/L (ref 5–15)
APAP SERPL-MCNC: 10 MCG/ML (ref 0–30)
ARTERIAL PATENCY WRIST A: ABNORMAL
AST SERPL-CCNC: 447 U/L (ref 1–40)
AST SERPL-CCNC: 995 U/L (ref 1–40)
ATMOSPHERIC PRESS: 759.9 MMHG
BARBITURATES UR QL SCN: NEGATIVE
BASE EXCESS BLDA CALC-SCNC: -26.9 MMOL/L (ref 0–2)
BASOPHILS # BLD AUTO: 0.01 10*3/MM3 (ref 0–0.2)
BASOPHILS # BLD AUTO: 0.01 10*3/MM3 (ref 0–0.2)
BASOPHILS NFR BLD AUTO: 0.2 % (ref 0–1.5)
BASOPHILS NFR BLD AUTO: 0.2 % (ref 0–1.5)
BDY SITE: ABNORMAL
BENZODIAZ UR QL SCN: NEGATIVE
BILIRUB CONJ SERPL-MCNC: 0.3 MG/DL (ref 0–0.3)
BILIRUB INDIRECT SERPL-MCNC: 0.3 MG/DL
BILIRUB SERPL-MCNC: 0.6 MG/DL (ref 0–1.2)
BILIRUB SERPL-MCNC: 2 MG/DL (ref 0–1.2)
BUN SERPL-MCNC: 38 MG/DL (ref 8–23)
BUN SERPL-MCNC: 45 MG/DL (ref 8–23)
BUN/CREAT SERPL: 11.8 (ref 7–25)
BUN/CREAT SERPL: 12.6 (ref 7–25)
CALCIUM SPEC-SCNC: 8.1 MG/DL (ref 8.6–10.5)
CALCIUM SPEC-SCNC: 9.5 MG/DL (ref 8.6–10.5)
CANNABINOIDS SERPL QL: NEGATIVE
CHLORIDE SERPL-SCNC: 90 MMOL/L (ref 98–107)
CHLORIDE SERPL-SCNC: 91 MMOL/L (ref 98–107)
CO2 SERPL-SCNC: 8.6 MMOL/L (ref 22–29)
CO2 SERPL-SCNC: 9.4 MMOL/L (ref 22–29)
COCAINE UR QL: NEGATIVE
CREAT SERPL-MCNC: 3.01 MG/DL (ref 0.76–1.27)
CREAT SERPL-MCNC: 3.8 MG/DL (ref 0.76–1.27)
D-LACTATE SERPL-SCNC: 11.6 MMOL/L (ref 0.5–2)
D-LACTATE SERPL-SCNC: 9 MMOL/L (ref 0.5–2)
DEPRECATED RDW RBC AUTO: 52.6 FL (ref 37–54)
DEPRECATED RDW RBC AUTO: 55.6 FL (ref 37–54)
EGFRCR SERPLBLD CKD-EPI 2021: 15.9 ML/MIN/1.73
EGFRCR SERPLBLD CKD-EPI 2021: 21 ML/MIN/1.73
EOSINOPHIL # BLD AUTO: 0 10*3/MM3 (ref 0–0.4)
EOSINOPHIL # BLD AUTO: 0 10*3/MM3 (ref 0–0.4)
EOSINOPHIL NFR BLD AUTO: 0 % (ref 0.3–6.2)
EOSINOPHIL NFR BLD AUTO: 0 % (ref 0.3–6.2)
ERYTHROCYTE [DISTWIDTH] IN BLOOD BY AUTOMATED COUNT: 15 % (ref 12.3–15.4)
ERYTHROCYTE [DISTWIDTH] IN BLOOD BY AUTOMATED COUNT: 15.6 % (ref 12.3–15.4)
ETHANOL BLD-MCNC: 21 MG/DL (ref 0–10)
ETHANOL UR QL: 0.02 %
FOLATE SERPL-MCNC: >20 NG/ML (ref 4.78–24.2)
GAS FLOW AIRWAY: 8 LPM
GEN 5 2HR TROPONIN T REFLEX: 51 NG/L
GLOBULIN UR ELPH-MCNC: 2.5 GM/DL
GLUCOSE BLDC GLUCOMTR-MCNC: 146 MG/DL (ref 70–130)
GLUCOSE BLDC GLUCOMTR-MCNC: 150 MG/DL (ref 70–130)
GLUCOSE BLDC GLUCOMTR-MCNC: 285 MG/DL (ref 70–130)
GLUCOSE BLDC GLUCOMTR-MCNC: 434 MG/DL (ref 70–130)
GLUCOSE SERPL-MCNC: 266 MG/DL (ref 65–99)
GLUCOSE SERPL-MCNC: 90 MG/DL (ref 65–99)
HAV IGM SERPL QL IA: NORMAL
HBV CORE IGM SERPL QL IA: NORMAL
HBV SURFACE AG SERPL QL IA: NORMAL
HCO3 BLDA-SCNC: 5.2 MMOL/L (ref 22–28)
HCT VFR BLD AUTO: 29 % (ref 37.5–51)
HCT VFR BLD AUTO: 32.3 % (ref 37.5–51)
HCV AB SER DONR QL: NORMAL
HGB BLD-MCNC: 10.7 G/DL (ref 13–17.7)
HGB BLD-MCNC: 9.2 G/DL (ref 13–17.7)
IMM GRANULOCYTES # BLD AUTO: 0.02 10*3/MM3 (ref 0–0.05)
IMM GRANULOCYTES # BLD AUTO: 0.04 10*3/MM3 (ref 0–0.05)
IMM GRANULOCYTES NFR BLD AUTO: 0.3 % (ref 0–0.5)
IMM GRANULOCYTES NFR BLD AUTO: 0.7 % (ref 0–0.5)
INR PPP: 1.4 (ref 0.9–1.1)
IRON 24H UR-MRATE: 202 MCG/DL (ref 59–158)
IRON SATN MFR SERPL: 78 % (ref 20–50)
LYMPHOCYTES # BLD AUTO: 0.59 10*3/MM3 (ref 0.7–3.1)
LYMPHOCYTES # BLD AUTO: 0.62 10*3/MM3 (ref 0.7–3.1)
LYMPHOCYTES NFR BLD AUTO: 10.2 % (ref 19.6–45.3)
LYMPHOCYTES NFR BLD AUTO: 9.6 % (ref 19.6–45.3)
MAGNESIUM SERPL-MCNC: 2.3 MG/DL (ref 1.6–2.4)
MCH RBC QN AUTO: 31.4 PG (ref 26.6–33)
MCH RBC QN AUTO: 31.8 PG (ref 26.6–33)
MCHC RBC AUTO-ENTMCNC: 31.7 G/DL (ref 31.5–35.7)
MCHC RBC AUTO-ENTMCNC: 33.1 G/DL (ref 31.5–35.7)
MCV RBC AUTO: 95.8 FL (ref 79–97)
MCV RBC AUTO: 99 FL (ref 79–97)
METHADONE UR QL SCN: NEGATIVE
MODALITY: ABNORMAL
MONOCYTES # BLD AUTO: 0.39 10*3/MM3 (ref 0.1–0.9)
MONOCYTES # BLD AUTO: 0.7 10*3/MM3 (ref 0.1–0.9)
MONOCYTES NFR BLD AUTO: 11.5 % (ref 5–12)
MONOCYTES NFR BLD AUTO: 6.3 % (ref 5–12)
NEUTROPHILS NFR BLD AUTO: 4.72 10*3/MM3 (ref 1.7–7)
NEUTROPHILS NFR BLD AUTO: 5.12 10*3/MM3 (ref 1.7–7)
NEUTROPHILS NFR BLD AUTO: 77.8 % (ref 42.7–76)
NEUTROPHILS NFR BLD AUTO: 83.2 % (ref 42.7–76)
NRBC BLD AUTO-RTO: 0.5 /100 WBC (ref 0–0.2)
NRBC BLD AUTO-RTO: 0.5 /100 WBC (ref 0–0.2)
NT-PROBNP SERPL-MCNC: 5209 PG/ML (ref 0–900)
OPIATES UR QL: NEGATIVE
OSMOLALITY SERPL: 297 MOSM/KG (ref 280–301)
OSMOLALITY UR: 282 MOSM/KG
OXYCODONE UR QL SCN: NEGATIVE
PCO2 BLDA: 27.6 MM HG (ref 35–45)
PH BLDA: 6.88 PH UNITS (ref 7.35–7.45)
PLATELET # BLD AUTO: 194 10*3/MM3 (ref 140–450)
PLATELET # BLD AUTO: 237 10*3/MM3 (ref 140–450)
PMV BLD AUTO: 9.6 FL (ref 6–12)
PMV BLD AUTO: 9.8 FL (ref 6–12)
PO2 BLDA: 92 MM HG (ref 80–100)
POTASSIUM SERPL-SCNC: 5.2 MMOL/L (ref 3.5–5.2)
POTASSIUM SERPL-SCNC: 5.2 MMOL/L (ref 3.5–5.2)
PROCALCITONIN SERPL-MCNC: 0.98 NG/ML (ref 0–0.25)
PROT SERPL-MCNC: 6 G/DL (ref 6–8.5)
PROT SERPL-MCNC: 7.2 G/DL (ref 6–8.5)
PROTHROMBIN TIME: 17.3 SECONDS (ref 11.7–14.2)
QT INTERVAL: 582 MS
QT INTERVAL: 610 MS
RBC # BLD AUTO: 2.93 10*6/MM3 (ref 4.14–5.8)
RBC # BLD AUTO: 3.37 10*6/MM3 (ref 4.14–5.8)
SAO2 % BLDCOA: 88.3 % (ref 92–99)
SODIUM SERPL-SCNC: 123 MMOL/L (ref 136–145)
SODIUM SERPL-SCNC: 129 MMOL/L (ref 136–145)
T4 FREE SERPL-MCNC: 0.98 NG/DL (ref 0.93–1.7)
TIBC SERPL-MCNC: 259 MCG/DL (ref 298–536)
TRANSFERRIN SERPL-MCNC: 174 MG/DL (ref 200–360)
TROPONIN T DELTA: -20 NG/L
TROPONIN T SERPL HS-MCNC: 71 NG/L
VIT B12 BLD-MCNC: 1508 PG/ML (ref 211–946)
WBC NRBC COR # BLD: 6.07 10*3/MM3 (ref 3.4–10.8)
WBC NRBC COR # BLD: 6.15 10*3/MM3 (ref 3.4–10.8)

## 2023-04-10 PROCEDURE — 93010 ELECTROCARDIOGRAM REPORT: CPT | Performed by: INTERNAL MEDICINE

## 2023-04-10 PROCEDURE — 36415 COLL VENOUS BLD VENIPUNCTURE: CPT

## 2023-04-10 PROCEDURE — 25010000002 DOPAMINE PER 40 MG

## 2023-04-10 PROCEDURE — 25010000002 CEFTRIAXONE PER 250 MG: Performed by: INTERNAL MEDICINE

## 2023-04-10 PROCEDURE — 93005 ELECTROCARDIOGRAM TRACING: CPT | Performed by: HOSPITALIST

## 2023-04-10 PROCEDURE — 82248 BILIRUBIN DIRECT: CPT | Performed by: EMERGENCY MEDICINE

## 2023-04-10 PROCEDURE — 63710000001 ONDANSETRON ODT 4 MG TABLET DISPERSIBLE: Performed by: EMERGENCY MEDICINE

## 2023-04-10 PROCEDURE — 80053 COMPREHEN METABOLIC PANEL: CPT | Performed by: EMERGENCY MEDICINE

## 2023-04-10 PROCEDURE — 82962 GLUCOSE BLOOD TEST: CPT

## 2023-04-10 PROCEDURE — 84145 PROCALCITONIN (PCT): CPT | Performed by: INTERNAL MEDICINE

## 2023-04-10 PROCEDURE — 80307 DRUG TEST PRSMV CHEM ANLYZR: CPT | Performed by: INTERNAL MEDICINE

## 2023-04-10 PROCEDURE — 25010000002 PROPOFOL 10 MG/ML EMULSION: Performed by: INTERNAL MEDICINE

## 2023-04-10 PROCEDURE — 82803 BLOOD GASES ANY COMBINATION: CPT

## 2023-04-10 PROCEDURE — 94002 VENT MGMT INPAT INIT DAY: CPT

## 2023-04-10 PROCEDURE — C1751 CATH, INF, PER/CENT/MIDLINE: HCPCS | Performed by: INTERNAL MEDICINE

## 2023-04-10 PROCEDURE — 83880 ASSAY OF NATRIURETIC PEPTIDE: CPT | Performed by: HOSPITALIST

## 2023-04-10 PROCEDURE — 02HV33Z INSERTION OF INFUSION DEVICE INTO SUPERIOR VENA CAVA, PERCUTANEOUS APPROACH: ICD-10-PCS | Performed by: INTERNAL MEDICINE

## 2023-04-10 PROCEDURE — 80143 DRUG ASSAY ACETAMINOPHEN: CPT | Performed by: INTERNAL MEDICINE

## 2023-04-10 PROCEDURE — 36600 WITHDRAWAL OF ARTERIAL BLOOD: CPT

## 2023-04-10 PROCEDURE — 83735 ASSAY OF MAGNESIUM: CPT | Performed by: EMERGENCY MEDICINE

## 2023-04-10 PROCEDURE — 33216 INSERT 1 ELECTRODE PM-DEFIB: CPT | Performed by: INTERNAL MEDICINE

## 2023-04-10 PROCEDURE — 94799 UNLISTED PULMONARY SVC/PX: CPT

## 2023-04-10 PROCEDURE — C1894 INTRO/SHEATH, NON-LASER: HCPCS | Performed by: INTERNAL MEDICINE

## 2023-04-10 PROCEDURE — 85025 COMPLETE CBC W/AUTO DIFF WBC: CPT | Performed by: INTERNAL MEDICINE

## 2023-04-10 PROCEDURE — 0BH17EZ INSERTION OF ENDOTRACHEAL AIRWAY INTO TRACHEA, VIA NATURAL OR ARTIFICIAL OPENING: ICD-10-PCS | Performed by: HOSPITALIST

## 2023-04-10 PROCEDURE — 80074 ACUTE HEPATITIS PANEL: CPT | Performed by: INTERNAL MEDICINE

## 2023-04-10 PROCEDURE — 02HK3JZ INSERTION OF PACEMAKER LEAD INTO RIGHT VENTRICLE, PERCUTANEOUS APPROACH: ICD-10-PCS | Performed by: INTERNAL MEDICINE

## 2023-04-10 PROCEDURE — 83935 ASSAY OF URINE OSMOLALITY: CPT | Performed by: INTERNAL MEDICINE

## 2023-04-10 PROCEDURE — 73502 X-RAY EXAM HIP UNI 2-3 VIEWS: CPT

## 2023-04-10 PROCEDURE — 87040 BLOOD CULTURE FOR BACTERIA: CPT | Performed by: INTERNAL MEDICINE

## 2023-04-10 PROCEDURE — 5A1223Z PERFORMANCE OF CARDIAC PACING, CONTINUOUS: ICD-10-PCS | Performed by: INTERNAL MEDICINE

## 2023-04-10 PROCEDURE — 84439 ASSAY OF FREE THYROXINE: CPT | Performed by: INTERNAL MEDICINE

## 2023-04-10 PROCEDURE — 36556 INSERT NON-TUNNEL CV CATH: CPT | Performed by: INTERNAL MEDICINE

## 2023-04-10 PROCEDURE — 99222 1ST HOSP IP/OBS MODERATE 55: CPT | Performed by: INTERNAL MEDICINE

## 2023-04-10 PROCEDURE — 74018 RADEX ABDOMEN 1 VIEW: CPT

## 2023-04-10 PROCEDURE — 25010000002 DOPAMINE PER 40 MG: Performed by: INTERNAL MEDICINE

## 2023-04-10 PROCEDURE — 99285 EMERGENCY DEPT VISIT HI MDM: CPT

## 2023-04-10 PROCEDURE — 82746 ASSAY OF FOLIC ACID SERUM: CPT | Performed by: INTERNAL MEDICINE

## 2023-04-10 PROCEDURE — 25010000002 ENOXAPARIN PER 10 MG: Performed by: INTERNAL MEDICINE

## 2023-04-10 PROCEDURE — 93005 ELECTROCARDIOGRAM TRACING: CPT | Performed by: EMERGENCY MEDICINE

## 2023-04-10 PROCEDURE — 5A1945Z RESPIRATORY VENTILATION, 24-96 CONSECUTIVE HOURS: ICD-10-PCS | Performed by: HOSPITALIST

## 2023-04-10 PROCEDURE — 84484 ASSAY OF TROPONIN QUANT: CPT | Performed by: EMERGENCY MEDICINE

## 2023-04-10 PROCEDURE — 25010000002 THIAMINE PER 100 MG: Performed by: INTERNAL MEDICINE

## 2023-04-10 PROCEDURE — 71045 X-RAY EXAM CHEST 1 VIEW: CPT

## 2023-04-10 PROCEDURE — 25010000002 ATROPINE SULFATE: Performed by: INTERNAL MEDICINE

## 2023-04-10 PROCEDURE — 82077 ASSAY SPEC XCP UR&BREATH IA: CPT | Performed by: EMERGENCY MEDICINE

## 2023-04-10 PROCEDURE — 25010000002 LORAZEPAM PER 2 MG: Performed by: INTERNAL MEDICINE

## 2023-04-10 PROCEDURE — 85025 COMPLETE CBC W/AUTO DIFF WBC: CPT | Performed by: EMERGENCY MEDICINE

## 2023-04-10 PROCEDURE — 82607 VITAMIN B-12: CPT | Performed by: INTERNAL MEDICINE

## 2023-04-10 PROCEDURE — 83930 ASSAY OF BLOOD OSMOLALITY: CPT | Performed by: INTERNAL MEDICINE

## 2023-04-10 PROCEDURE — 84466 ASSAY OF TRANSFERRIN: CPT | Performed by: INTERNAL MEDICINE

## 2023-04-10 PROCEDURE — 83540 ASSAY OF IRON: CPT | Performed by: INTERNAL MEDICINE

## 2023-04-10 PROCEDURE — 83605 ASSAY OF LACTIC ACID: CPT | Performed by: INTERNAL MEDICINE

## 2023-04-10 PROCEDURE — 85610 PROTHROMBIN TIME: CPT | Performed by: INTERNAL MEDICINE

## 2023-04-10 RX ORDER — LORAZEPAM 2 MG/ML
1 INJECTION INTRAMUSCULAR
Status: DISCONTINUED | OUTPATIENT
Start: 2023-04-10 | End: 2023-04-15

## 2023-04-10 RX ORDER — LORAZEPAM 2 MG/ML
0.5 INJECTION INTRAMUSCULAR EVERY 8 HOURS
Status: COMPLETED | OUTPATIENT
Start: 2023-04-11 | End: 2023-04-12

## 2023-04-10 RX ORDER — AMOXICILLIN 250 MG
2 CAPSULE ORAL 2 TIMES DAILY
Status: DISCONTINUED | OUTPATIENT
Start: 2023-04-10 | End: 2023-04-18 | Stop reason: HOSPADM

## 2023-04-10 RX ORDER — ONDANSETRON 4 MG/1
4 TABLET, ORALLY DISINTEGRATING ORAL ONCE
Status: COMPLETED | OUTPATIENT
Start: 2023-04-10 | End: 2023-04-10

## 2023-04-10 RX ORDER — LORAZEPAM 2 MG/ML
2 INJECTION INTRAMUSCULAR
Status: DISCONTINUED | OUTPATIENT
Start: 2023-04-10 | End: 2023-04-15

## 2023-04-10 RX ORDER — BISACODYL 10 MG
10 SUPPOSITORY, RECTAL RECTAL DAILY PRN
Status: DISCONTINUED | OUTPATIENT
Start: 2023-04-10 | End: 2023-04-14

## 2023-04-10 RX ORDER — POLYETHYLENE GLYCOL 3350 17 G/17G
17 POWDER, FOR SOLUTION ORAL DAILY PRN
Status: DISCONTINUED | OUTPATIENT
Start: 2023-04-10 | End: 2023-04-14

## 2023-04-10 RX ORDER — FENTANYL CITRATE 50 UG/ML
50 INJECTION, SOLUTION INTRAMUSCULAR; INTRAVENOUS
Status: DISCONTINUED | OUTPATIENT
Start: 2023-04-10 | End: 2023-04-13

## 2023-04-10 RX ORDER — NICOTINE POLACRILEX 4 MG
15 LOZENGE BUCCAL
Status: DISCONTINUED | OUTPATIENT
Start: 2023-04-10 | End: 2023-04-11

## 2023-04-10 RX ORDER — PANTOPRAZOLE SODIUM 40 MG/1
40 TABLET, DELAYED RELEASE ORAL ONCE
Status: DISCONTINUED | OUTPATIENT
Start: 2023-04-10 | End: 2023-04-10

## 2023-04-10 RX ORDER — PANTOPRAZOLE SODIUM 40 MG/1
40 TABLET, DELAYED RELEASE ORAL ONCE
Status: COMPLETED | OUTPATIENT
Start: 2023-04-10 | End: 2023-04-10

## 2023-04-10 RX ORDER — BISACODYL 5 MG/1
5 TABLET, DELAYED RELEASE ORAL DAILY PRN
Status: DISCONTINUED | OUTPATIENT
Start: 2023-04-10 | End: 2023-04-14

## 2023-04-10 RX ORDER — ONDANSETRON 4 MG/1
4 TABLET, ORALLY DISINTEGRATING ORAL ONCE
Status: DISCONTINUED | OUTPATIENT
Start: 2023-04-10 | End: 2023-04-10

## 2023-04-10 RX ORDER — DOPAMINE HYDROCHLORIDE 160 MG/100ML
2-20 INJECTION, SOLUTION INTRAVENOUS
Status: DISCONTINUED | OUTPATIENT
Start: 2023-04-10 | End: 2023-04-11

## 2023-04-10 RX ORDER — SUCRALFATE ORAL 1 G/10ML
1 SUSPENSION ORAL 4 TIMES DAILY
COMMUNITY
End: 2023-04-18 | Stop reason: HOSPADM

## 2023-04-10 RX ORDER — LORAZEPAM 2 MG/ML
0.5 INJECTION INTRAMUSCULAR EVERY 6 HOURS
Status: DISPENSED | OUTPATIENT
Start: 2023-04-10 | End: 2023-04-11

## 2023-04-10 RX ORDER — SODIUM CHLORIDE 9 MG/ML
40 INJECTION, SOLUTION INTRAVENOUS AS NEEDED
Status: DISCONTINUED | OUTPATIENT
Start: 2023-04-10 | End: 2023-04-15

## 2023-04-10 RX ORDER — NOREPINEPHRINE BITARTRATE 0.03 MG/ML
.02-.3 INJECTION, SOLUTION INTRAVENOUS
Status: DISCONTINUED | OUTPATIENT
Start: 2023-04-10 | End: 2023-04-13

## 2023-04-10 RX ORDER — SODIUM CHLORIDE 0.9 % (FLUSH) 0.9 %
10 SYRINGE (ML) INJECTION AS NEEDED
Status: DISCONTINUED | OUTPATIENT
Start: 2023-04-10 | End: 2023-04-18 | Stop reason: HOSPADM

## 2023-04-10 RX ORDER — IBUPROFEN 600 MG/1
1 TABLET ORAL
Status: DISCONTINUED | OUTPATIENT
Start: 2023-04-10 | End: 2023-04-11

## 2023-04-10 RX ORDER — SODIUM CHLORIDE 0.9 % (FLUSH) 0.9 %
10 SYRINGE (ML) INJECTION AS NEEDED
Status: DISCONTINUED | OUTPATIENT
Start: 2023-04-10 | End: 2023-04-11

## 2023-04-10 RX ORDER — SODIUM CHLORIDE 0.9 % (FLUSH) 0.9 %
10 SYRINGE (ML) INJECTION EVERY 12 HOURS SCHEDULED
Status: DISCONTINUED | OUTPATIENT
Start: 2023-04-11 | End: 2023-04-11

## 2023-04-10 RX ORDER — LORAZEPAM 2 MG/ML
0.5 INJECTION INTRAMUSCULAR
Status: DISCONTINUED | OUTPATIENT
Start: 2023-04-10 | End: 2023-04-15

## 2023-04-10 RX ORDER — LIDOCAINE HYDROCHLORIDE 20 MG/ML
INJECTION, SOLUTION INFILTRATION; PERINEURAL
Status: COMPLETED | OUTPATIENT
Start: 2023-04-10 | End: 2023-04-10

## 2023-04-10 RX ORDER — ONDANSETRON 2 MG/ML
4 INJECTION INTRAMUSCULAR; INTRAVENOUS EVERY 6 HOURS PRN
Status: DISCONTINUED | OUTPATIENT
Start: 2023-04-10 | End: 2023-04-15

## 2023-04-10 RX ORDER — FAMOTIDINE 10 MG/ML
20 INJECTION, SOLUTION INTRAVENOUS EVERY 12 HOURS SCHEDULED
Status: DISCONTINUED | OUTPATIENT
Start: 2023-04-10 | End: 2023-04-10 | Stop reason: DRUGHIGH

## 2023-04-10 RX ORDER — PHENYLEPHRINE HYDROCHLORIDE 10 MG/ML
200 INJECTION INTRAVENOUS ONCE
Status: DISCONTINUED | OUTPATIENT
Start: 2023-04-10 | End: 2023-04-10

## 2023-04-10 RX ORDER — PHENYLEPHRINE HCL IN 0.9% NACL 1 MG/10 ML
200 SYRINGE (ML) INTRAVENOUS ONCE
Status: DISCONTINUED | OUTPATIENT
Start: 2023-04-10 | End: 2023-04-11

## 2023-04-10 RX ORDER — LORAZEPAM 1 MG/1
1 TABLET ORAL
Status: DISCONTINUED | OUTPATIENT
Start: 2023-04-10 | End: 2023-04-15

## 2023-04-10 RX ORDER — LORAZEPAM 0.5 MG/1
0.5 TABLET ORAL
Status: DISCONTINUED | OUTPATIENT
Start: 2023-04-10 | End: 2023-04-15

## 2023-04-10 RX ORDER — FAMOTIDINE 10 MG/ML
20 INJECTION, SOLUTION INTRAVENOUS NIGHTLY
Status: DISCONTINUED | OUTPATIENT
Start: 2023-04-10 | End: 2023-04-11

## 2023-04-10 RX ORDER — LIDOCAINE HYDROCHLORIDE 20 MG/ML
INJECTION, SOLUTION INFILTRATION; PERINEURAL
Status: DISCONTINUED | OUTPATIENT
Start: 2023-04-10 | End: 2023-04-10 | Stop reason: HOSPADM

## 2023-04-10 RX ORDER — SODIUM CHLORIDE 0.9 % (FLUSH) 0.9 %
10 SYRINGE (ML) INJECTION EVERY 12 HOURS SCHEDULED
Status: DISCONTINUED | OUTPATIENT
Start: 2023-04-10 | End: 2023-04-14

## 2023-04-10 RX ORDER — CHLORHEXIDINE GLUCONATE 0.12 MG/ML
15 RINSE ORAL EVERY 12 HOURS SCHEDULED
Status: DISCONTINUED | OUTPATIENT
Start: 2023-04-10 | End: 2023-04-13

## 2023-04-10 RX ORDER — PANTOPRAZOLE SODIUM 40 MG/10ML
40 INJECTION, POWDER, LYOPHILIZED, FOR SOLUTION INTRAVENOUS
Status: DISCONTINUED | OUTPATIENT
Start: 2023-04-11 | End: 2023-04-13

## 2023-04-10 RX ORDER — ENOXAPARIN SODIUM 100 MG/ML
30 INJECTION SUBCUTANEOUS EVERY 24 HOURS
Status: DISCONTINUED | OUTPATIENT
Start: 2023-04-10 | End: 2023-04-13

## 2023-04-10 RX ORDER — DEXTROSE MONOHYDRATE 25 G/50ML
10-50 INJECTION, SOLUTION INTRAVENOUS
Status: DISCONTINUED | OUTPATIENT
Start: 2023-04-10 | End: 2023-04-11

## 2023-04-10 RX ORDER — LORAZEPAM 1 MG/1
2 TABLET ORAL
Status: DISCONTINUED | OUTPATIENT
Start: 2023-04-10 | End: 2023-04-15

## 2023-04-10 RX ORDER — DOPAMINE HYDROCHLORIDE 160 MG/100ML
INJECTION, SOLUTION INTRAVENOUS
Status: COMPLETED
Start: 2023-04-10 | End: 2023-04-10

## 2023-04-10 RX ORDER — SODIUM CHLORIDE 9 MG/ML
40 INJECTION, SOLUTION INTRAVENOUS AS NEEDED
Status: DISCONTINUED | OUTPATIENT
Start: 2023-04-10 | End: 2023-04-11

## 2023-04-10 RX ADMIN — PROPOFOL INJECTABLE EMULSION 10 MCG/KG/MIN: 10 INJECTION, EMULSION INTRAVENOUS at 18:12

## 2023-04-10 RX ADMIN — SODIUM BICARBONATE 150 MEQ: 84 INJECTION, SOLUTION INTRAVENOUS at 15:47

## 2023-04-10 RX ADMIN — DOPAMINE HYDROCHLORIDE 10 MCG/KG/MIN: 160 INJECTION, SOLUTION INTRAVENOUS at 15:25

## 2023-04-10 RX ADMIN — PANTOPRAZOLE SODIUM 40 MG: 40 TABLET, DELAYED RELEASE ORAL at 10:31

## 2023-04-10 RX ADMIN — THIAMINE HYDROCHLORIDE 300 MG: 100 INJECTION, SOLUTION INTRAMUSCULAR; INTRAVENOUS at 19:49

## 2023-04-10 RX ADMIN — CEFTRIAXONE SODIUM 1 G: 1 INJECTION, POWDER, FOR SOLUTION INTRAMUSCULAR; INTRAVENOUS at 20:14

## 2023-04-10 RX ADMIN — SODIUM BICARBONATE 150 MEQ: 84 INJECTION, SOLUTION INTRAVENOUS at 20:52

## 2023-04-10 RX ADMIN — ENOXAPARIN SODIUM 30 MG: 100 INJECTION SUBCUTANEOUS at 18:16

## 2023-04-10 RX ADMIN — SODIUM BICARBONATE 50 MEQ: 84 INJECTION INTRAVENOUS at 15:52

## 2023-04-10 RX ADMIN — LORAZEPAM 0.5 MG: 2 INJECTION INTRAMUSCULAR; INTRAVENOUS at 22:12

## 2023-04-10 RX ADMIN — DOPAMINE HYDROCHLORIDE 20 MCG/KG/MIN: 160 INJECTION, SOLUTION INTRAVENOUS at 20:33

## 2023-04-10 RX ADMIN — SODIUM CHLORIDE 500 ML: 9 INJECTION, SOLUTION INTRAVENOUS at 12:04

## 2023-04-10 RX ADMIN — Medication 10 ML: at 20:21

## 2023-04-10 RX ADMIN — ONDANSETRON 4 MG: 4 TABLET, ORALLY DISINTEGRATING ORAL at 12:04

## 2023-04-10 RX ADMIN — CHLORHEXIDINE GLUCONATE 0.12% ORAL RINSE 15 ML: 1.2 LIQUID ORAL at 20:21

## 2023-04-10 RX ADMIN — ATROPINE SULFATE 1 MG: 0.1 INJECTION, SOLUTION INTRAVENOUS at 15:36

## 2023-04-10 RX ADMIN — FOLIC ACID 1 MG: 5 INJECTION, SOLUTION INTRAMUSCULAR; INTRAVENOUS; SUBCUTANEOUS at 20:18

## 2023-04-10 RX ADMIN — INSULIN HUMAN 7.5 UNITS/HR: 1 INJECTION, SOLUTION INTRAVENOUS at 23:59

## 2023-04-10 NOTE — PROGRESS NOTES
Clinical Pharmacy Services: Medication History    Yuval Loja is a 72 y.o. male presenting to [facility] for   Chief Complaint   Patient presents with   • Fall       He  has a past medical history of Alcohol abuse, Arthritis, CAD (coronary artery disease), Chronic kidney disease, stage 3, COPD (chronic obstructive pulmonary disease), Disease of thyroid gland, Elevated cholesterol, Fatty liver, alcoholic, GERD (gastroesophageal reflux disease), History of transfusion, Hypertensive urgency, Metabolic acidosis, Myocardial infarction, Sinus tachycardia, Sleep apnea, and Stroke.    Allergies as of 04/10/2023 - Reviewed 04/10/2023   Allergen Reaction Noted   • Mirtazapine Other (See Comments) 05/14/2020   • Sertraline Anxiety, Diarrhea, and Nausea And Vomiting 10/04/2013       Medication information was obtained from: Patient/Pharmacy  Pharmacy and Phone Number:   [facility] [place] [place] - [address] - [phone]  - [phone]   [address] [facility] [address]  Phone: [phone] Fax: [phone]    [id number] [place], [place] - [address] AT [facility] - [phone]  - [phone]   [address] [facility] [address]  Phone: [phone] Fax: [phone]    [facility] [place]  [address] [place] [id number]  Phone: [phone] Fax: [phone]        Prior to Admission Medications     Prescriptions Last Dose Informant Patient Reported? Taking?    hydrOXYzine (ATARAX) 25 MG tablet 4/9/2023 Pharmacy, Self Yes Yes    Take 1 tablet by mouth 4 (Four) Times a Day As Needed for Anxiety.    metoprolol tartrate (LOPRESSOR) 25 MG tablet 4/9/2023 Pharmacy, Self Yes Yes    Take 1 tablet by mouth 2 (Two) Times a Day.    pantoprazole (PROTONIX) 40 MG EC tablet 4/9/2023 Pharmacy, Self Yes Yes    Take 1 tablet by mouth Daily.    sucralfate (Carafate) 1 GM/10ML suspension Past Week  Self Yes Yes    Take 10 mL by mouth 4 (Four) Times a Day.    atenolol (TENORMIN) 100 MG tablet  Pharmacy No No    Take 1 tablet by mouth Daily.    docusate sodium (COLACE) 100 MG capsule  Medication Bottle Yes No    Take 1 capsule by mouth 2 (Two) Times a Day.            Medication notes:     This medication list is complete to the best of my knowledge as of 4/10/2023    Please call if questions.    Arsenio Munroe  Medication History Technician  [phone]    4/10/2023 12:37 EDT

## 2023-04-10 NOTE — ED NOTES
Pt has chronic hip pain.  This am he tripped and fell.  No loc.  No blood thinners.  Right hip pain and body aches.  He has had hip pain post surgery x 1 year    Patient was placed in face mask during first look triage.  Patient was wearing a face mask throughout encounter.  I wore personal protective equipment throughout the encounter.  Hand hygiene was performed before and after patient encounter.

## 2023-04-10 NOTE — PROGRESS NOTES
"[facility] Clinical Pharmacy Services: Enoxaparin Consult    Yuval Loja has a pharmacy consult to dose prophylactic enoxaparin per Dr. Saucedo's request.     Indication: VTE Prophylaxis  Home Anticoagulation: none     Relevant clinical data and objective history reviewed:  72 y.o. male 175.3 cm (69\") 66.7 kg (147 lb)   Body mass index is 21.71 kg/m².   Results from last 7 days   Lab Units 04/10/23  1140   PLATELETS 10*3/mm3 237     Estimated Creatinine Clearance: 20.3 mL/min (A) (by C-G formula based on SCr of 3.01 mg/dL (H)).    Assessment/Plan    Will start patient on 30mg subcutaneous every 24 hours, adjusted for renal function. Consult order will be discontinued but pharmacy will continue to follow.     Antionette Raza, [facility]  Clinical Pharmacist    "

## 2023-04-10 NOTE — ED NOTES
"..Nursing report ED to floor  Yuval Loja  72 y.o.  male    HPI :   Chief Complaint   Patient presents with    Fall       Admitting doctor:   Chino Cole MD    Admitting diagnosis:   The primary encounter diagnosis was Sinus bradycardia. Diagnoses of Hyponatremia, Acute on chronic renal insufficiency, Nausea and vomiting, unspecified vomiting type, History of COPD, and History of coronary artery disease were also pertinent to this visit.    Code status:   Current Code Status       Date Active Code Status Order ID Comments User Context       Prior            Allergies:   Mirtazapine and Sertraline    Isolation:   No active isolations    Intake and Output  No intake or output data in the 24 hours ending 04/10/23 1203    Weight:       04/10/23  0818   Weight: 66.7 kg (147 lb)       Most recent vitals:   Vitals:    04/10/23 0818 04/10/23 0900 04/10/23 1013   BP: 112/46     Pulse: 56 (!) 40 (!) 40   Resp: 16     Temp: 98.8 °F (37.1 °C)     TempSrc: Tympanic     SpO2: 99% 96% 94%   Weight: 66.7 kg (147 lb)     Height: 175.3 cm (69\")         Active LDAs/IV Access:   Lines, Drains & Airways       Active LDAs       None                    Labs (abnormal labs have a star):   Labs Reviewed   BASIC METABOLIC PANEL - Abnormal; Notable for the following components:       Result Value    BUN 38 (*)     Creatinine 3.01 (*)     Sodium 123 (*)     Chloride 91 (*)     CO2 9.4 (*)     Anion Gap 22.6 (*)     eGFR 21.0 (*)     All other components within normal limits    Narrative:     GFR Normal >60  Chronic Kidney Disease <60  Kidney Failure <15    The GFR formula is only valid for adults with stable renal function between ages 18 and 70.   CBC WITH AUTO DIFFERENTIAL - Abnormal; Notable for the following components:    RBC 3.37 (*)     Hemoglobin 10.7 (*)     Hematocrit 32.3 (*)     Neutrophil % 77.8 (*)     Lymphocyte % 10.2 (*)     Eosinophil % 0.0 (*)     Lymphocytes, Absolute 0.62 (*)     nRBC 0.5 (*)     All other components " within normal limits   TROPONIN - Abnormal; Notable for the following components:    HS Troponin T 71 (*)     All other components within normal limits    Narrative:     High Sensitive Troponin T Reference Range:  <10.0 ng/L- Negative Female for AMI  <15.0 ng/L- Negative Male for AMI  >=10 - Abnormal Female indicating possible myocardial injury.  >=15 - Abnormal Male indicating possible myocardial injury.   Clinicians would have to utilize clinical acumen, EKG, Troponin, and serial changes to determine if it is an Acute Myocardial Infarction or myocardial injury due to an underlying chronic condition.        ETHANOL - Abnormal; Notable for the following components:    Ethanol 21 (*)     All other components within normal limits   HEPATIC FUNCTION PANEL - Abnormal; Notable for the following components:    ALT (SGPT) 248 (*)     AST (SGOT) 447 (*)     All other components within normal limits   MAGNESIUM - Normal   HIGH SENSITIVITIY TROPONIN T 2HR   CBC AND DIFFERENTIAL    Narrative:     The following orders were created for panel order CBC & Differential.  Procedure                               Abnormality         Status                     ---------                               -----------         ------                     CBC Auto Differential[[id number]]        Abnormal            Final result                 Please view results for these tests on the individual orders.       EKG:   ECG 12 Lead Bradycardia   Preliminary Result   HEART RATE= 39  bpm   RR Interval= 1538  ms   NH Interval= 188  ms   P Horizontal Axis= -44  deg   P Front Axis= 78  deg   QRSD Interval= 82  ms   QT Interval= 582  ms   QRS Axis= 48  deg   T Wave Axis= 25  deg   - OTHERWISE NORMAL ECG -   Sinus bradycardia   Electronically Signed By:    Date and Time of Study: 2023-04-10 09:53:33          Meds given in ED:   Medications   ondansetron ODT (ZOFRAN-ODT) disintegrating tablet 4 mg (has no administration in time range)   sodium chloride 0.9  "% bolus 500 mL (has no administration in time range)   pantoprazole (PROTONIX) EC tablet 40 mg (40 mg Oral Given 4/10/23 1031)       Imaging results:  No radiology results for the last day    Ambulatory status:   - x 2 assist     Social issues:   Social History     Socioeconomic History    Marital status: Single   Tobacco Use    Smoking status: Every Day     Packs/day: 0.50     Types: Cigarettes    Smokeless tobacco: Never    Tobacco comments:     tried to provide education declined irritated w/ attempt smoked \" depends on what I feel like.\"   Substance and Sexual Activity    Alcohol use: Not Currently    Drug use: No    Sexual activity: Defer              Charo Pulido RN  04/10/23 12:03 EDT       "

## 2023-04-10 NOTE — NURSING NOTE
Upon arrival to CICU the patient was transcutaneously paced. Dr Saucedo at bedside. Paged Dr. Maddox. Dr Maddox oreded a 2 ml push of anne at bedside in route to cath lab. Spoke with Ludivina in Pharmacy and unable to place order in computer on her end.

## 2023-04-10 NOTE — CONSULTS
Date of Consultation: 04/10/23    Referral Provider: Chino Cole MD     Reason for Consultation: Sinus bradycardia.    Encounter Provider: Paul Schwarz MD    Group of Service: [facility]     Patient Name: Yuval Loja    :1950    Chief complaint: Generalized malaise and nausea.    History of Present Illness:      This is a very pleasant 72 year-old male with a history of chronic kidney disease, prior stroke, and coronary artery disease status post stenting to the RCA in  and the LAD in .    He is accompanied by his daughter in the room.  He tripped and fell this morning landing on his right hip.  There is no obvious fracture as he did have a right hip replacement in 2022.  However, he has been having significant nausea and malaise.  When I initially saw him in the ER, his heart rate was in the 30s, although his blood pressure was stable with systolic readings in the 140s.  He had not felt significantly lightheaded, and did not have any syncope.  He did not have any chest pain.  He had multiple lab abnormalities, including a sodium of 123, worsening kidney failure with a creatinine of 3.01 and a GFR of 21, and elevated transaminases with an ALT of 248 and AST 47.  His ethanol level was also elevated at 21.  He initially was admitted to the floor after I saw him as he was fairly hemodynamically stable from a blood pressure standpoint.  However, afterwards, his blood pressure evidently dropped significantly, and he was moved to the ICU with transcutaneous pacing.  My partner, Dr. Maddox, is assessing him currently for a transvenous pacemaker.    Past Medical History:   Diagnosis Date   • Alcohol abuse    • Arthritis    • CAD (coronary artery disease)    • Chronic kidney disease, stage 3    • COPD (chronic obstructive pulmonary disease)    • Disease of thyroid gland    • Elevated cholesterol    • Fatty liver, alcoholic    • GERD (gastroesophageal reflux disease)    •  "History of transfusion    • Hypertensive urgency    • Metabolic acidosis    • Myocardial infarction    • Sinus tachycardia    • Sleep apnea    • Stroke          Past Surgical History:   Procedure Laterality Date   • BACK SURGERY     • CARDIAC CATHETERIZATION     • COLONOSCOPY     • ENDOSCOPY     • FRACTURE SURGERY     • JOINT REPLACEMENT     • SKIN BIOPSY     • TOTAL HIP ARTHROPLASTY Right 06/27/2022    Procedure: TOTAL HIP ARTHROPLASTY ANTERIOR WITH HANA TABLE;  Surgeon: Willian Quiles MD;  Location: [facility] OR;  Service: Orthopedics;  Laterality: Right;  Depuy, carli. hana         Allergies   Allergen Reactions   • Mirtazapine Other (See Comments)     confused   • Sertraline Anxiety, Diarrhea and Nausea And Vomiting     Also \"hallucinations\"         No current facility-administered medications on file prior to encounter.     Current Outpatient Medications on File Prior to Encounter   Medication Sig Dispense Refill   • hydrOXYzine (ATARAX) 25 MG tablet Take 1 tablet by mouth 4 (Four) Times a Day As Needed for Anxiety.     • metoprolol tartrate (LOPRESSOR) 25 MG tablet Take 1 tablet by mouth 2 (Two) Times a Day.     • pantoprazole (PROTONIX) 40 MG EC tablet Take 1 tablet by mouth Daily.     • sucralfate (Carafate) 1 GM/10ML suspension Take 10 mL by mouth 4 (Four) Times a Day.     • atenolol (TENORMIN) 100 MG tablet Take 1 tablet by mouth Daily. 30 tablet 0   • docusate sodium (COLACE) 100 MG capsule Take 1 capsule by mouth 2 (Two) Times a Day.           Social History     Socioeconomic History   • Marital status: Single   Tobacco Use   • Smoking status: Every Day     Packs/day: 0.50     Types: Cigarettes   • Smokeless tobacco: Never   • Tobacco comments:     tried to provide education declined irritated w/ attempt smoked \" depends on what I feel like.\"   Vaping Use   • Vaping Use: Never used   Substance and Sexual Activity   • Alcohol use: Not Currently   • Drug use: No   • Sexual activity: Defer " "        History reviewed. No pertinent family history.    REVIEW OF SYSTEMS:   Pertinent positives are noted in the HPI above.  Otherwise, all other systems were reviewed, and are negative.     Objective:     Vitals:    04/10/23 1301 04/10/23 1349 04/10/23 1457 04/10/23 1500   BP: 104/45 103/43  (!) 65/42   BP Location:  Left arm     Patient Position:  Lying     Pulse: (!) 35 (!) 32 (!) 33    Resp:  18     Temp:  97.7 °F (36.5 °C)     TempSrc:  Oral     SpO2: 93%      Weight:       Height:         Body mass index is 21.71 kg/m².  Flowsheet Rows    Flowsheet Row First Filed Value   Admission Height 175.3 cm (69\") Documented at 04/10/2023 0818   Admission Weight 66.7 kg (147 lb) Documented at 04/10/2023 0818           General:    No acute distress, alert and oriented x4, pleasant                   Head:    Normocephalic, atraumatic.   Eyes:          Conjunctivae and sclerae normal, no icterus.   Throat:   No oral lesions, no thrush, oral mucosa moist.    Neck:   Supple, trachea midline.   Lungs:     Clear to auscultation bilaterally     Heart:    Regular rhythm and bradycardic rate.  No murmurs, gallops, or rubs noted.   Abdomen:     Soft, non-tender, non-distended, positive bowel sounds.    Extremities:   No clubbing, cyanosis, or edema.     Pulses:   Pulses palpable and equal bilaterally.    Skin:   No bleeding or rash.   Neuro:   Non-focal.     Psychiatric:   Normal mood and affect.                 Lab Review:                Results from last 7 days   Lab Units 04/10/23  1021   SODIUM mmol/L 123*   POTASSIUM mmol/L 5.2   CHLORIDE mmol/L 91*   CO2 mmol/L 9.4*   BUN mg/dL 38*   CREATININE mg/dL 3.01*   GLUCOSE mg/dL 90   CALCIUM mg/dL 9.5     Results from last 7 days   Lab Units 04/10/23  1245 04/10/23  1021   HSTROP T ng/L 51* 71*     Results from last 7 days   Lab Units 04/10/23  1140   WBC 10*3/mm3 6.07   HEMOGLOBIN g/dL 10.7*   HEMATOCRIT % 32.3*   PLATELETS 10*3/mm3 237             Results from last 7 days   Lab " Units 04/10/23  1021   MAGNESIUM mg/dL 2.3           EKG (reviewed by me personally): Sinus bradycardia.      Assessment:   1.  Generalized weakness and malaise, multifactorial  2.  Significant nausea  3.  Acute kidney injury with stage III chronic kidney disease  4.  Hyponatremia  5.  Elevated high-sensitivity troponin, consistent with renal injury  6.  Excessive alcohol intake with elevated alcohol level  7.  Transaminitis  8.  Symptomatic sinus bradycardia  9.  History of paroxysmal atrial fibrillation  10.  History of prior stroke  11.  Coronary artery disease, status post stenting to the RCA in 2000 and in the LAD in 2006    Plan:       I suspect that the bradycardia is related to his acute on chronic kidney injury.  Additionally, he has been significantly nauseated, and this may be contributing to the bradycardia as well.  His metoprolol obviously will be held.  When I initially saw him earlier today, he was much more stable in the ER with stable blood pressure.  However, he decompensated on the floor afterwards, and he became more severely bradycardic.  He is currently being transcutaneously paced in the ICU by Dr. Saucedo, and my interventional colleague (Dr. Maddox) is seeing him for placement of a transvenous pacemaker urgently.  We will also get an echocardiogram when he is more stable.  I do not feel that the high-sensitivity troponin is from a true episode of ACS or a type II NSTEMI.  This is very likely secondary to his worsening kidney failure.    Thank you very much for this consult.    David Schwarz MD

## 2023-04-10 NOTE — CASE MANAGEMENT/SOCIAL WORK
"Discharge Planning Assessment  [facility]     Patient Name: Yuval Loja  MRN: [id number]  Today's Date: 4/10/2023    Admit Date: 4/10/2023        Discharge Needs Assessment    No documentation.                Discharge Plan     Row Name 04/10/23 1133       Plan    Plan Comments Notified ER provider of patient w/non par insurance- ER provider advises patient will need admission- he is \"too critical\" for transfer. Entered room, introduced self and explained role; informed patient of non par insurance status- daughter at bedside; patient verbalized understanding of provider advising of no transfer at this time due to status.              Continued Care and Services - Admitted Since 4/10/2023    Coordination has not been started for this encounter.          Demographic Summary    No documentation.                Functional Status    No documentation.                Psychosocial    No documentation.                Abuse/Neglect    No documentation.                Legal    No documentation.                Substance Abuse    No documentation.                Patient Forms    No documentation.                   Reina Knight RN    "

## 2023-04-10 NOTE — H&P
History and physical    Primary care physician      Chief complaint  Right hip pain    History of present illness  72-year-old -American male with history of coronary artery disease hypertension hyperlipidemia chronic anemia chronic kidney disease stage III and gastroesophageal reflux disease who also abuse alcohol and tobacco presented to [facility] emergency room after mechanical fall when he tripped and fell on the right hip with complaint of right hip pain.  Patient denies any headache dizziness lightheadedness chest pain shortness of breath abdominal pain nausea vomiting diarrhea.  Patient evaluated in ER admitted to the floor with acute kidney injury and also found to have sinus bradycardia with hyponatremia.  Patient admitted and on the floor his blood pressure dropped and more bradycardic and rapid response called.  Patient transferred to the unit intubated and provided with supportive care and taken to the Cath Lab for pacemaker placement    PAST MEDICAL HISTORY  • Alcohol abuse     • Arthritis     • Disease of thyroid gland     • Elevated cholesterol     • GERD (gastroesophageal reflux disease)     • History of transfusion     • Hypertensive urgency     • Myocardial infarction     • Sleep apnea     • Stroke        PAST SURGICAL HISTORY              Procedure Laterality Date   • BACK SURGERY       • CARDIAC CATHETERIZATION       • COLONOSCOPY       • ENDOSCOPY       • FRACTURE SURGERY       • JOINT REPLACEMENT       • SKIN BIOPSY       • TOTAL HIP ARTHROPLASTY Right 06/27/2022     Procedure: TOTAL HIP ARTHROPLASTY ANTERIOR WITH HANA TABLE;  Surgeon: Willian Quiles MD;  Location: [facility];  Service: Orthopedics;  Laterality: Right;  Depuy, carli. hana         FAMILY HISTORY  History reviewed. No pertinent family history.     SOCIAL HISTORY                 Socioeconomic History   • Marital status: Single   Tobacco Use   • Smoking status: Every Day       Packs/day:  "0.50       Types: Cigarettes   • Smokeless tobacco: Never   • Tobacco comments:       tried to provide education declined irritated w/ attempt smoked \" depends on what I feel like.\"   Vaping Use   • Vaping Use: Never used   Substance and Sexual Activity   • Alcohol use: Not Currently   • Drug use: No   • Sexual activity: Defer        ALLERGIES  Mirtazapine and Sertraline  Home medications reviewed     REVIEW OF SYSTEMS  All systems reviewed and negative except for those discussed in HPI.      PHYSICAL EXAM   Blood pressure (!) 65/42, pulse (!) 33, temperature 97.7 °F (36.5 °C), temperature source Oral, resp. rate 18, height 175.3 cm (69\"), weight 66.7 kg (147 lb), SpO2 93 %.    General: On vent  HEENT: Mucous membranes moist, atraumatic,  Neck: Supple  Pulm: Moving air bilaterally  Cardiovascular: Regular rhythm bradycardia, intact distal pulses, no peripheral edema  GI: Soft, nontender, nondistended, no rebound, no guarding, bowel sounds present  MSK: Full ROM, no deformity  Skin: Warm, dry  Psych: Calm, cooperative     LAB RESULTS  Lab Results (last 24 hours)     Procedure Component Value Units Date/Time    T4, Free [[id number]] Collected: 04/10/23 1245    Specimen: Blood Updated: 04/10/23 1557    Blood Gas, Arterial - [[id number]]  (Abnormal) Collected: 04/10/23 1540    Specimen: Arterial Blood Updated: 04/10/23 1556     Site Arterial: left brachial     Xavier's Test N/A     pH, Arterial 6.884 pH units      Comment: Meter: [id number] : [id number] Moises NoriegaCritical:Notify Dr gonzalez (10-Apr-23 15:54:43)Read back ok        pCO2, Arterial 27.6 mm Hg      pO2, Arterial 92.0 mm Hg      HCO3, Arterial 5.2 mmol/L      Base Excess, Arterial -26.9 mmol/L      O2 Saturation Calculated 88.3 %      Barometric Pressure for Blood Gas 759.9 mmHg      Modality Cannula     Flow Rate 8 lpm     POC Glucose Once [[id number]]  (Abnormal) Collected: 04/10/23 1516    Specimen: Blood Updated: 04/10/23 1518     Glucose 146 mg/dL "      Comment: Meter: [id number] : [id number] Jesus IBARRA       POC Glucose Once [[id number]]  (Abnormal) Collected: 04/10/23 1452    Specimen: Blood Updated: 04/10/23 1454     Glucose 150 mg/dL      Comment: Meter: [id number] : [id number] Woody IBARRA       High Sensitivity Troponin T 2Hr [[id number]]  (Abnormal) Collected: 04/10/23 1245    Specimen: Blood Updated: 04/10/23 1323     HS Troponin T 51 ng/L      Troponin T Delta -20 ng/L     Narrative:      High Sensitive Troponin T Reference Range:  <10.0 ng/L- Negative Female for AMI  <15.0 ng/L- Negative Male for AMI  >=10 - Abnormal Female indicating possible myocardial injury.  >=15 - Abnormal Male indicating possible myocardial injury.   Clinicians would have to utilize clinical acumen, EKG, Troponin, and serial changes to determine if it is an Acute Myocardial Infarction or myocardial injury due to an underlying chronic condition.         Ethanol [[id number]]  (Abnormal) Collected: 04/10/23 1021    Specimen: Blood Updated: 04/10/23 1154     Ethanol 21 mg/dL      Ethanol % 0.021 %     Hepatic Function Panel [[id number]]  (Abnormal) Collected: 04/10/23 1021    Specimen: Blood Updated: 04/10/23 1154     Total Protein 7.2 g/dL      Albumin 3.9 g/dL      ALT (SGPT) 248 U/L      AST (SGOT) 447 U/L      Alkaline Phosphatase 103 U/L      Total Bilirubin 0.6 mg/dL      Bilirubin, Direct 0.3 mg/dL      Bilirubin, Indirect 0.3 mg/dL     CBC & Differential [[id number]]  (Abnormal) Collected: 04/10/23 1140    Specimen: Blood Updated: 04/10/23 1151    Narrative:      The following orders were created for panel order CBC & Differential.  Procedure                               Abnormality         Status                     ---------                               -----------         ------                     CBC Auto Differential[[id number]]        Abnormal            Final result                 Please view results for these tests on the individual orders.     CBC Auto Differential [[id number]]  (Abnormal) Collected: 04/10/23 1140    Specimen: Blood Updated: 04/10/23 1151     WBC 6.07 10*3/mm3      RBC 3.37 10*6/mm3      Hemoglobin 10.7 g/dL      Hematocrit 32.3 %      MCV 95.8 fL      MCH 31.8 pg      MCHC 33.1 g/dL      RDW 15.0 %      RDW-SD 52.6 fl      MPV 9.6 fL      Platelets 237 10*3/mm3      Neutrophil % 77.8 %      Lymphocyte % 10.2 %      Monocyte % 11.5 %      Eosinophil % 0.0 %      Basophil % 0.2 %      Immature Grans % 0.3 %      Neutrophils, Absolute 4.72 10*3/mm3      Lymphocytes, Absolute 0.62 10*3/mm3      Monocytes, Absolute 0.70 10*3/mm3      Eosinophils, Absolute 0.00 10*3/mm3      Basophils, Absolute 0.01 10*3/mm3      Immature Grans, Absolute 0.02 10*3/mm3      nRBC 0.5 /100 WBC     High Sensitivity Troponin T [[id number]]  (Abnormal) Collected: 04/10/23 1021    Specimen: Blood Updated: 04/10/23 1134     HS Troponin T 71 ng/L     Narrative:      High Sensitive Troponin T Reference Range:  <10.0 ng/L- Negative Female for AMI  <15.0 ng/L- Negative Male for AMI  >=10 - Abnormal Female indicating possible myocardial injury.  >=15 - Abnormal Male indicating possible myocardial injury.   Clinicians would have to utilize clinical acumen, EKG, Troponin, and serial changes to determine if it is an Acute Myocardial Infarction or myocardial injury due to an underlying chronic condition.         Magnesium [[id number]]  (Normal) Collected: 04/10/23 1021    Specimen: Blood Updated: 04/10/23 1117     Magnesium 2.3 mg/dL     Basic Metabolic Panel [[id number]]  (Abnormal) Collected: 04/10/23 1021    Specimen: Blood Updated: 04/10/23 1051     Glucose 90 mg/dL      BUN 38 mg/dL      Creatinine 3.01 mg/dL      Sodium 123 mmol/L      Potassium 5.2 mmol/L      Chloride 91 mmol/L      CO2 9.4 mmol/L      Calcium 9.5 mg/dL      BUN/Creatinine Ratio 12.6     Anion Gap 22.6 mmol/L      eGFR 21.0 mL/min/1.73     Narrative:      GFR Normal >60  Chronic Kidney Disease <60  Kidney  Failure <15    The GFR formula is only valid for adults with stable renal function between ages 18 and 70.          Imaging Results (Last 24 Hours)     Procedure Component Value Units Date/Time    XR Hip With or Without Pelvis 2 - 3 View Right [[id number]] Collected: 04/10/23 0854     Updated: 04/10/23 0858    Narrative:      XR HIP W OR WO PELVIS 2-3 VIEW RIGHT-  04/10/2023     HISTORY: Fell, right hip pain.     A right hip prosthesis is seen in good position. There is vascular  calcification.     No fractures or other acute abnormalities are seen in the pelvis and  right hip.     This report was finalized on 4/10/2023 8:55 AM by Dr. Unruly Finley M.D.              ECG 12 Lead            Component  Ref Range & Units 15:25  (4/10/23) 09:53  (4/10/23) 8 mo ago  (7/19/22) 8 mo ago  (7/19/22) 9 mo ago  (6/25/22) 1 yr ago  (3/15/22)   QT Interval  ms 610 P  582 P  387  390  398  348    Resulting Agency  ECG  ECG  ECG  ECG  ECG  ECG             HEART RATE= 30  bpm  RR Interval= 2000  ms  VT Interval= 197  ms  P Horizontal Axis=   deg  P Front Axis= 0  deg  QRSD Interval= 115  ms  QT Interval= 610  ms  QRS Axis= 55  deg  T Wave Axis= 19  deg  - ABNORMAL ECG -  Sinus bradycardia  Atrial premature complexes  Nonspecific intraventricular conduction delay             Current Facility-Administered Medications:   •  DOPamine 400 mg in 250 mL D5W infusion, 2-20 mcg/kg/min, Intravenous, Titrated, Pavan Saucedo Jr., MD, Last Rate: 25 mL/hr at 04/10/23 1525, 10 mcg/kg/min at 04/10/23 1525  •  lidocaine (XYLOCAINE) 2% injection, , , Code / Trauma / Sedation Medication, IreneVaibhav MD, 5 mL at 04/10/23 1633  •  [MAR Hold] Phenylephrine HCl (Pressors) solution prefilled syringe 200 mcg, 200 mcg, Intravenous, Once, Pavan Saucedo Jr., MD  •  sodium bicarbonate 150 mEq/1000 mL D5W infusion, 150 mEq, Intravenous, Continuous, Pavan Saucedo Jr., MD, Last Rate: 200 mL/hr at 04/10/23 1547, 150 mEq  at 04/10/23 1547  •  thiamine (B-1) 100 mg in sodium chloride 0.9 % 100 mL IVPB, 100 mg, Intravenous, Daily, Jolie Cole MD     ASSESSMENT  Acute kidney injury  Hypotensive shock  Symptomatic sinus bradycardia  Hyponatremia  Alcohol hepatitis  History of paroxysmal atrial fibrillation  Coronary artery disease status post PCI  History of hypertension with low blood pressure  Hyperlipidemia  Alcohol abuse  Tobacco abuse  History of prior CVA  Chronic kidney disease stage III  Gastroesophageal reflux disease    PLAN  Admit to ICU  Vent support  Emergent pacemaker placement  IVF  IV thiamine  Vasopressors as needed  Alcohol withdrawal and DTs precautions  Detox medications  CIWA protocol if needed  Cardiology pulmonary and nephrology to follow patient  Discontinue beta-blocker  Stress ulcer DVT prophylaxis   Supportive care  Patient is full code  Discussed with family nursing staff  Follow closely and further recommendation according to hospital course    JOLIE COLE MD

## 2023-04-10 NOTE — ED PROVIDER NOTES
EMERGENCY DEPARTMENT ENCOUNTER    Room Number:  S617/1  Date of encounter:  4/10/2023  PCP: Alessia Prescott APRN  Historian: Patient      HPI:  Chief Complaint: Right hip pain  A complete HPI/ROS/PMH/PSH/SH/FH are unobtainable due to: None    Context: Yuval Loja is a 72 y.o. male who presents to the ED via [facility] from home after he tripped and fell this morning landing on his right hip.  Patient is 1 year status post right hip replacement.  Has been having chronic pain since then.  Denies head injury or LOC, denies any recent illnesses.  Denies any headache, chest pain, shortness breath, abdominal pain, nausea, vomiting.  Denies any dizziness or lightheadedness.  Patient lives at home alone.      MEDICAL RECORD REVIEW    External (non-ED) record review: Adult transthoracic echo July 21, 2022 shows ejection fraction of 57% with normal diastolic function    PAST MEDICAL HISTORY  Active Ambulatory Problems     Diagnosis Date Noted   • Alcoholic ketoacidosis 05/23/2017   • Alcohol dependence 05/23/2017   • Methamphetamine abuse 05/23/2017   • Fatty liver, alcoholic 05/23/2017   • Nausea vomiting and diarrhea 05/23/2017   • Alcoholic liver disease 05/23/2017   • Metabolic acidosis 05/23/2017   • Tobacco abuse 05/23/2017   • Coronary artery disease involving native coronary artery of native heart without angina pectoris 05/24/2017   • Tachycardia 05/24/2017   • Sinus tachycardia 04/17/2019   • Chronic kidney disease, stage 3 04/17/2019   • Medically noncompliant 04/18/2019   • Visual hallucinations 04/18/2020   • Psychosis 04/18/2020   • Hyponatremia 04/18/2020   • CAD (coronary artery disease) 04/18/2020   • Leukopenia 04/18/2020   • Symptomatic anemia 04/18/2020   • Headache 04/18/2020   • Substance abuse 04/18/2020   • Gastrointestinal hemorrhage 10/22/2020   • CRISTIANO (acute kidney injury) (HCC) 10/23/2020   • Hyperkalemia 10/23/2020   • Right lower quadrant abdominal pain 03/15/2022   • New onset  "atrial fibrillation 03/15/2022   • History of drug abuse 03/15/2022   • COPD (chronic obstructive pulmonary disease) 03/15/2022   • Right inguinal hernia 03/15/2022   • Constipation 03/15/2022   • Status post right hip replacement 06/27/2022   • Right hip pain 07/19/2022     Resolved Ambulatory Problems     Diagnosis Date Noted   • Dehydration 04/17/2019   • Functional diarrhea 04/17/2019   • Closed fracture of neck of right femur, initial encounter 06/25/2022     Past Medical History:   Diagnosis Date   • Alcohol abuse    • Arthritis    • Disease of thyroid gland    • Elevated cholesterol    • GERD (gastroesophageal reflux disease)    • History of transfusion    • Hypertensive urgency    • Myocardial infarction    • Sleep apnea    • Stroke          PAST SURGICAL HISTORY  Past Surgical History:   Procedure Laterality Date   • BACK SURGERY     • CARDIAC CATHETERIZATION     • COLONOSCOPY     • ENDOSCOPY     • FRACTURE SURGERY     • JOINT REPLACEMENT     • SKIN BIOPSY     • TOTAL HIP ARTHROPLASTY Right 06/27/2022    Procedure: TOTAL HIP ARTHROPLASTY ANTERIOR WITH HANA TABLE;  Surgeon: Willian Quiles MD;  Location: [facility];  Service: Orthopedics;  Laterality: Right;  Depuy, carli. hana         FAMILY HISTORY  History reviewed. No pertinent family history.      SOCIAL HISTORY  Social History     Socioeconomic History   • Marital status: Single   Tobacco Use   • Smoking status: Every Day     Packs/day: 0.50     Types: Cigarettes   • Smokeless tobacco: Never   • Tobacco comments:     tried to provide education declined irritated w/ attempt smoked \" depends on what I feel like.\"   Vaping Use   • Vaping Use: Never used   Substance and Sexual Activity   • Alcohol use: Not Currently   • Drug use: No   • Sexual activity: Defer         ALLERGIES  Mirtazapine and Sertraline        REVIEW OF SYSTEMS  Review of Systems     All systems reviewed and negative except for those discussed in HPI.       PHYSICAL EXAM    I " have reviewed the triage vital signs and nursing notes.    ED Triage Vitals [04/10/23 0818]   Temp Heart Rate Resp BP SpO2   98.8 °F (37.1 °C) 56 16 112/46 99 %      Temp src Heart Rate Source Patient Position BP Location FiO2 (%)   Tympanic Monitor -- -- --       Physical Exam  General: No acute distress, nontoxic  HEENT: Mucous membranes moist, atraumatic, EOMI  Neck: Full ROM  Pulm: Symmetric chest rise, nonlabored, lungs CTAB  Cardiovascular: Regular rhythm bradycardia, intact distal pulses, no peripheral edema  GI: Soft, nontender, nondistended, no rebound, no guarding, bowel sounds present  MSK: Full ROM, no deformity  Skin: Warm, dry  Neuro: Awake, alert, oriented x 4, GCS 15, moving all extremities, no focal deficits  Psych: Calm, cooperative      N95, protective eye goggles, and gloves used during this encounter. Patient in surgical mask.      LAB RESULTS  Recent Results (from the past 24 hour(s))   ECG 12 Lead Bradycardia    Collection Time: 04/10/23  9:53 AM   Result Value Ref Range    QT Interval 582 ms   Basic Metabolic Panel    Collection Time: 04/10/23 10:21 AM    Specimen: Blood   Result Value Ref Range    Glucose 90 65 - 99 mg/dL    BUN 38 (H) 8 - 23 mg/dL    Creatinine 3.01 (H) 0.76 - 1.27 mg/dL    Sodium 123 (L) 136 - 145 mmol/L    Potassium 5.2 3.5 - 5.2 mmol/L    Chloride 91 (L) 98 - 107 mmol/L    CO2 9.4 (L) 22.0 - 29.0 mmol/L    Calcium 9.5 8.6 - 10.5 mg/dL    BUN/Creatinine Ratio 12.6 7.0 - 25.0    Anion Gap 22.6 (H) 5.0 - 15.0 mmol/L    eGFR 21.0 (L) >60.0 mL/min/1.73   Magnesium    Collection Time: 04/10/23 10:21 AM    Specimen: Blood   Result Value Ref Range    Magnesium 2.3 1.6 - 2.4 mg/dL   High Sensitivity Troponin T    Collection Time: 04/10/23 10:21 AM    Specimen: Blood   Result Value Ref Range    HS Troponin T 71 (C) <15 ng/L   Ethanol    Collection Time: 04/10/23 10:21 AM    Specimen: Blood   Result Value Ref Range    Ethanol 21 (H) 0 - 10 mg/dL    Ethanol % 0.021 %   Hepatic  Function Panel    Collection Time: 04/10/23 10:21 AM    Specimen: Blood   Result Value Ref Range    Total Protein 7.2 6.0 - 8.5 g/dL    Albumin 3.9 3.5 - 5.2 g/dL    ALT (SGPT) 248 (H) 1 - 41 U/L    AST (SGOT) 447 (H) 1 - 40 U/L    Alkaline Phosphatase 103 39 - 117 U/L    Total Bilirubin 0.6 0.0 - 1.2 mg/dL    Bilirubin, Direct 0.3 0.0 - 0.3 mg/dL    Bilirubin, Indirect 0.3 mg/dL   CBC Auto Differential    Collection Time: 04/10/23 11:40 AM    Specimen: Blood   Result Value Ref Range    WBC 6.07 3.40 - 10.80 10*3/mm3    RBC 3.37 (L) 4.14 - 5.80 10*6/mm3    Hemoglobin 10.7 (L) 13.0 - 17.7 g/dL    Hematocrit 32.3 (L) 37.5 - 51.0 %    MCV 95.8 79.0 - 97.0 fL    MCH 31.8 26.6 - 33.0 pg    MCHC 33.1 31.5 - 35.7 g/dL    RDW 15.0 12.3 - 15.4 %    RDW-SD 52.6 37.0 - 54.0 fl    MPV 9.6 6.0 - 12.0 fL    Platelets 237 140 - 450 10*3/mm3    Neutrophil % 77.8 (H) 42.7 - 76.0 %    Lymphocyte % 10.2 (L) 19.6 - 45.3 %    Monocyte % 11.5 5.0 - 12.0 %    Eosinophil % 0.0 (L) 0.3 - 6.2 %    Basophil % 0.2 0.0 - 1.5 %    Immature Grans % 0.3 0.0 - 0.5 %    Neutrophils, Absolute 4.72 1.70 - 7.00 10*3/mm3    Lymphocytes, Absolute 0.62 (L) 0.70 - 3.10 10*3/mm3    Monocytes, Absolute 0.70 0.10 - 0.90 10*3/mm3    Eosinophils, Absolute 0.00 0.00 - 0.40 10*3/mm3    Basophils, Absolute 0.01 0.00 - 0.20 10*3/mm3    Immature Grans, Absolute 0.02 0.00 - 0.05 10*3/mm3    nRBC 0.5 (H) 0.0 - 0.2 /100 WBC   High Sensitivity Troponin T 2Hr    Collection Time: 04/10/23 12:45 PM    Specimen: Blood   Result Value Ref Range    HS Troponin T 51 (H) <15 ng/L    Troponin T Delta -20 (L) >=-4 - <+4 ng/L   POC Glucose Once    Collection Time: 04/10/23  2:52 PM    Specimen: Blood   Result Value Ref Range    Glucose 150 (H) 70 - 130 mg/dL       Ordered the above labs and independently interpreted results. My findings will be discussed in the medical decision making section below        RADIOLOGY  XR Hip With or Without Pelvis 2 - 3 View Right    Result Date:  4/10/2023  XR HIP W OR WO PELVIS 2-3 VIEW RIGHT-  04/10/2023  HISTORY: Fell, right hip pain.  A right hip prosthesis is seen in good position. There is vascular calcification.  No fractures or other acute abnormalities are seen in the pelvis and right hip.  This report was finalized on 4/10/2023 8:55 AM by Dr. Unruly Finley M.D.        Ordered the above noted radiological studies.  Independently interpreted by me and my independent review of findings can be found in the ED Course.  See dictation for official radiology interpretation.      PROCEDURES    Critical Care  Performed by: Dallas Feliciano MD  Authorized by: Dallas Feliciano MD     Critical care provider statement:     Critical care time (minutes):  39    Critical care time was exclusive of:  Separately billable procedures and treating other patients    Critical care was necessary to treat or prevent imminent or life-threatening deterioration of the following conditions:  Cardiac failure, renal failure and metabolic crisis    Critical care was time spent personally by me on the following activities:  Development of treatment plan with patient or surrogate, discussions with consultants, evaluation of patient's response to treatment, examination of patient, obtaining history from patient or surrogate, ordering and performing treatments and interventions, ordering and review of laboratory studies, ordering and review of radiographic studies, pulse oximetry, re-evaluation of patient's condition and review of old charts    Care discussed with: admitting provider      Care discussed with comment:  Dr. Schwarz, Cardiology; Dr. Cole, admitting      EKG - Per my independent interpretation:    EKG Time: 0953  Rhythm/Rate: Sinus bradycardia with rate of 39  Normal axis  Normal intervals  No acute ischemic changes  No STEMI       New changes compared to July 19, 2022      MEDICATIONS GIVEN IN ER    Medications   pantoprazole (PROTONIX) EC tablet 40 mg (40 mg Oral  Given 4/10/23 1031)   ondansetron ODT (ZOFRAN-ODT) disintegrating tablet 4 mg (4 mg Oral Given 4/10/23 1204)   sodium chloride 0.9 % bolus 500 mL (500 mL Intravenous New Bag 4/10/23 1204)         PROGRESS, DATA ANALYSIS, CONSULTS, AND MEDICAL DECISION MAKING    Please note that this section constitutes my independent interpretation of clinical data including lab results, radiology, EKG's.  This constitutes my independent professional opinion regarding differential diagnosis and management of this patient.  It may include any factors such as history from outside sources, review of external records, social determinants of health, management of medications, response to those treatments, and discussions with other providers.    Differential Diagnosis and Plan: Initial concern for right hip dislocation, periprosthetic fracture, electrolyte abnormalities, among others.  Plan for labs, x-ray, EKG, and reevaluation with results.    Additional sources:  - Discussed/ obtained information from independent historians:       - Chronic or social conditions impacting care:      - Shared decision making:  Patient fully updated on and in agreement with the course and plan moving forward    ED Course as of 04/10/23 1509   Mon Apr 10, 2023   0900 XR Hip With or Without Pelvis 2 - 3 View Right  Per my independent interpretation of the right hip x-ray, no evidence of fracture or dislocation [DC]   0901 XR Hip With or Without Pelvis 2 - 3 View Right  Radiology report reviewed, no acute fracture/dislocation [DC]   1109 Glucose: 90 [DC]   1109 BUN(!): 38 [DC]   1109 Creatinine(!): 3.01  1.39 on 10/11/22 [DC]   1109 Sodium(!): 123 [DC]   1117 Magnesium: 2.3 [DC]   1144 HS Troponin T(!!): 71 [DC]   1158 ALT (SGPT)(!): 248 [DC]   1158 AST (SGOT)(!): 447 [DC]   1158 Alkaline Phosphatase: 103 [DC]   1158 Total Bilirubin: 0.6  LFTs normal 8 months ago [DC]   1158 Ethanol %: 0.021 [DC]   1200 Discussed with RE Beckham, discussed patient  clinical course and find today, treatment modalities, and pending cardiology consult, plans for hospitalization. [DC]   1217 Discussed with Dr. Schwarz, Cardiology, discussed patient's clinical course and findings today, would recommend holding metoprolol today, likely combination of medication effect and renal issues [DC]      ED Course User Index  [DC] Dallas Feliciano MD     Family at bedside updated along with patient on findings today and discussions with providers with plans for hospitalization for further monitoring.  All questions and concerns addressed.  He has remained hemodynamically stable without mental status change or hypotension in the emergency department. IV fluids started in the ED.     Hospitalization Considered?:     Orders Placed During This Visit:  Orders Placed This Encounter   Procedures   • XR Hip With or Without Pelvis 2 - 3 View Right   • Basic Metabolic Panel   • Magnesium   • CBC Auto Differential   • High Sensitivity Troponin T   • Ethanol   • Hepatic Function Panel   • High Sensitivity Troponin T 2Hr   • Cardiac Monitoring   • LCG (on-call MD unless specified)   • Inpatient Nutrition Consult   • POC Glucose Once   • ECG 12 Lead Bradycardia   • Inpatient Admission   • Transfer Patient   • CBC & Differential       Additional orders considered but not placed:      Independent interpretation of labs, radiology studies, and discussions with consultants: See ED Course        DIAGNOSIS  Final diagnoses:   Sinus bradycardia   Hyponatremia   Acute on chronic renal insufficiency   Nausea and vomiting, unspecified vomiting type   History of COPD   History of coronary artery disease         DISPOSITION  HOSPITALIZATION    Discussed treatment plan and reason for hospitalization with pt/family and hospitalizing physician.  Pt/family voiced understanding of the plan for hospitalization for further testing/treatment as needed.                   --    Please note that portions of this were completed  with a voice recognition program.       Note Disclaimer: At [facility], we believe that sharing information builds trust and better relationships. You are receiving this note because you are receiving care at [facility] or recently visited. It is possible you will see health information before a provider has talked with you about it. This kind of information can be easy to misunderstand. To help you fully understand what it means for your health, we urge you to discuss this note with your provider.         Dallas Feliciano MD  04/10/23 1518

## 2023-04-10 NOTE — CODE DOCUMENTATION
Patient Name:  Yuval Loja  YOB: 1950  MRN:  [id number]  Admit Date:  4/10/2023    Visit Diagnoses:     ICD-10-CM ICD-9-CM   1. Sinus bradycardia  R00.1 427.89   2. Hyponatremia  E87.1 276.1   3. Acute on chronic renal insufficiency  N28.9 593.9    N18.9 585.9   4. Nausea and vomiting, unspecified vomiting type  R11.2 787.01   5. History of COPD  Z87.09 V12.69   6. History of coronary artery disease  Z86.79 V12.59       Reason For Rapid:   rochelle  RN Communicated With:  Pageuziel Cole     Rapid Outcome:  Transfer to CICU   Communication From Rapid Team:   Patient transferred to CICU for symptomatic bradycardia. Dr. Gonzalez informed of transfer and present at bedside upon arrival to unit. Atropine give. Dopamine initiated. Patient transcutaneously paced. Cardiology Paged. Dr Maddox to bedside and patient transferred to cath lab for TVP.    Most Recent Vital Signs  Temp:  [97.7 °F (36.5 °C)-98.8 °F (37.1 °C)] 97.7 °F (36.5 °C)  Heart Rate:  [32-56] 33  Resp:  [16-18] 18  BP: ()/(42-56) 65/42  SpO2:  [93 %-99 %] 93 %  on   ;   Device (Oxygen Therapy): room air    Labs:      Glucose   Date/Time Value Ref Range Status   04/10/2023 1516 146 (H) 70 - 130 mg/dL Final     Comment:     Meter: [id number] : [id number] Jesus IBARRA   04/10/2023 1452 150 (H) 70 - 130 mg/dL Final     Comment:     Meter: [id number] : [id number] Woody IBARRA     Site   Date Value Ref Range Status   04/10/2023 Arterial: left brachial  Final     Xavier's Test   Date Value Ref Range Status   04/10/2023 N/A  Final     pH, Arterial   Date Value Ref Range Status   04/10/2023 6.884 (C) 7.350 - 7.450 pH units Final     Comment:     Meter: [id number] : [id number] Moises MarcsteveCritical:Notify Dr gonzalez (10-Apr-23 15:54:43)Read back ok     pCO2, Arterial   Date Value Ref Range Status   04/10/2023 27.6 (L) 35.0 - 45.0 mm Hg Final     pO2, Arterial   Date Value Ref Range Status   04/10/2023 92.0 80.0 -  100.0 mm Hg Final     HCO3, Arterial   Date Value Ref Range Status   04/10/2023 5.2 (L) 22.0 - 28.0 mmol/L Final     Base Excess, Arterial   Date Value Ref Range Status   04/10/2023 -26.9 (L) 0.0 - 2.0 mmol/L Final     Barometric Pressure for Blood Gas   Date Value Ref Range Status   04/10/2023 759.9 mmHg Final     Modality   Date Value Ref Range Status   04/10/2023 Cannula  Final     Results from last 7 days   Lab Units 04/10/23  1140   WBC 10*3/mm3 6.07   HEMOGLOBIN g/dL 10.7*   PLATELETS 10*3/mm3 237     Results from last 7 days   Lab Units 04/10/23  1021   SODIUM mmol/L 123*   POTASSIUM mmol/L 5.2   CHLORIDE mmol/L 91*   CO2 mmol/L 9.4*   BUN mg/dL 38*   CREATININE mg/dL 3.01*   GLUCOSE mg/dL 90   ALBUMIN g/dL 3.9   BILIRUBIN mg/dL 0.6   ALK PHOS U/L 103   AST (SGOT) U/L 447*   ALT (SGPT) U/L 248*   Estimated Creatinine Clearance: 20.3 mL/min (A) (by C-G formula based on SCr of 3.01 mg/dL (H)).  Results from last 7 days   Lab Units 04/10/23  1245 04/10/23  1021   HSTROP T ng/L 51* 71*         Results from last 7 days   Lab Units 04/10/23  1540   PH, ARTERIAL pH units 6.884*   PO2 ART mm Hg 92.0   PCO2, ARTERIAL mm Hg 27.6*   HCO3 ART mmol/L 5.2*   MODALITY  Cannula   No results found for: STREPPNEUAG, LEGANTIGENUR        NIH Stroke Scale:                                                         Please refer to full rapid documentation on summary page under Index / Code Timeline

## 2023-04-10 NOTE — CONSULTS
Patient Care Team:  Alessia Prescott APRN as PCP - General (Family Medicine)  Jonny Bains MD as Referring Physician (Internal Medicine)  Carlos Villareal MD as Consulting Physician (Hematology and Oncology)      Subjective     Patient is a 72 y.o. male.  I was called to see stat patient brought to CCU with hypotension and bradycardia.  1 mg atropine administered in route with no response.  We gave a second milligram of atropine no response he was also started on dopamine fairly quickly titrating up to 20 mg blood pressure transiently improved he was in the 50s systolic when he arrived.  But we could not keep and maintain above the 70s.  He appeared to be in complete heart block.  We started transcutaneous pacing him at a rate of about 90 still only moderate blood pressure support.  Patient was really minimally responsive he was not verbal he was moving around some wanted to lie on his right side but not really following commands.  We will get a stat blood gas pH was 6.88 with a PCO2 of 28 and a PO2 of 92 on 8 L  I spoke with his daughters he called his 1 daughter about 5 AM and said he had trouble he was so weak he had trouble moving his legs but told her it was an emergency just wait till she wake up she woke up about 8 or 830 and called and he said do not worry him on my way to the emergency room.  Per daughter she has a big problem with alcohol abuse and has had DTs multiple times.    Review of Systems:  Unable to obtain      History  Past Medical History:   Diagnosis Date   • Alcohol abuse    • Arthritis    • CAD (coronary artery disease)    • Chronic kidney disease, stage 3    • COPD (chronic obstructive pulmonary disease)    • Disease of thyroid gland    • Elevated cholesterol    • Fatty liver, alcoholic    • GERD (gastroesophageal reflux disease)    • History of transfusion    • Hypertensive urgency    • Metabolic acidosis    • Myocardial infarction    • Sinus tachycardia    • Sleep  "apnea    • Stroke      Past Surgical History:   Procedure Laterality Date   • BACK SURGERY     • CARDIAC CATHETERIZATION     • COLONOSCOPY     • ENDOSCOPY     • FRACTURE SURGERY     • JOINT REPLACEMENT     • SKIN BIOPSY     • TOTAL HIP ARTHROPLASTY Right 06/27/2022    Procedure: TOTAL HIP ARTHROPLASTY ANTERIOR WITH HANA TABLE;  Surgeon: Willian Quiles MD;  Location: [facility] OR;  Service: Orthopedics;  Laterality: Right;  Depuy, carli. hana     Social History     Socioeconomic History   • Marital status: Single   Tobacco Use   • Smoking status: Every Day     Packs/day: 0.50     Types: Cigarettes   • Smokeless tobacco: Never   • Tobacco comments:     tried to provide education declined irritated w/ attempt smoked \" depends on what I feel like.\"   Vaping Use   • Vaping Use: Never used   Substance and Sexual Activity   • Alcohol use: Not Currently   • Drug use: No   • Sexual activity: Defer     History reviewed. No pertinent family history.      Allergies:  Mirtazapine and Sertraline    Medications:  Prior to Admission medications    Medication Sig Start Date End Date Taking? Authorizing Provider   hydrOXYzine (ATARAX) 25 MG tablet Take 1 tablet by mouth 4 (Four) Times a Day As Needed for Anxiety. 7/17/20  Yes Sarbjit Paris MD   metoprolol tartrate (LOPRESSOR) 25 MG tablet Take 1 tablet by mouth 2 (Two) Times a Day.   Yes Sarbjit Paris MD   pantoprazole (PROTONIX) 40 MG EC tablet Take 1 tablet by mouth Daily. 7/17/20  Yes Sarbjit Paris MD   sucralfate (Carafate) 1 GM/10ML suspension Take 10 mL by mouth 4 (Four) Times a Day.   Yes ProviderSarbjit MD   atenolol (TENORMIN) 100 MG tablet Take 1 tablet by mouth Daily. 3/18/22   Moo Ahn MD   docusate sodium (COLACE) 100 MG capsule Take 1 capsule by mouth 2 (Two) Times a Day.    ProviderSarbjit MD     phenylephrine, 200 mcg, Intravenous, Once      DOPamine, 2-20 mcg/kg/min, Last Rate: 10 mcg/kg/min (04/10/23 " "1525)  sodium bicarbonate, 150 mEq, Last Rate: 150 mEq (04/10/23 1547)        Objective     Vital Signs  Vital Sign Min/Max for last 24 hours  Temp  Min: 97.7 °F (36.5 °C)  Max: 98.8 °F (37.1 °C)   BP  Min: 65/42  Max: 112/46   Pulse  Min: 32  Max: 56   Resp  Min: 16  Max: 18   SpO2  Min: 93 %  Max: 99 %   No data recorded   Weight  Min: 66.7 kg (147 lb)  Max: 66.7 kg (147 lb)     No intake or output data in the 24 hours ending 04/10/23 1603  No intake/output data recorded.  Last Weight and Admission Weight        04/10/23  0818   Weight: 66.7 kg (147 lb)     Flowsheet Rows    Flowsheet Row First Filed Value   Admission Height 175.3 cm (69\") Documented at 04/10/2023 0818   Admission Weight 66.7 kg (147 lb) Documented at 04/10/2023 0818          Body mass index is 21.71 kg/m².           Physical Exam:  General Appearance: Older black male he is lying in the bed he is nonverbal he has almost Cheyne-Fonseca type respiratory pattern  Eyes: Conjunctiva are clear and anicteric pupils are about 2 mm I do respond equally to light he has bilateral arcus senilis  ENT: Mucous membranes are dry both nasal and oral he has a Mallampati type II airway as best I can tell  Neck: Trachea midline no jugular venous tension no palpable adenopathy or thyromegaly  Lungs: Clear bilaterally respirations sort of look agonal  Cardiac: Bradycardic heart rate in the 30s on the monitor looks to be complete heart block as does EKG  Abdomen: Soft no palpable hepatosplenomegaly  : Not examined  Musculoskeletal: No edema extremities are cool  Skin: Cool dry no jaundice  Neuro: Patient is definitely moving all extremities he is nonverbal he is not really following any commands  Extremities/P Vascular: Pulses are very thready and irregular and only feel pulses centrally I do not feel any distal pulses  MSE: Unable to assess      Labs:  Results from last 7 days   Lab Units 04/10/23  1021   GLUCOSE mg/dL 90   SODIUM mmol/L 123*   POTASSIUM mmol/L 5.2 "   MAGNESIUM mg/dL 2.3   CO2 mmol/L 9.4*   CHLORIDE mmol/L 91*   ANION GAP mmol/L 22.6*   CREATININE mg/dL 3.01*   BUN mg/dL 38*   BUN / CREAT RATIO  12.6   CALCIUM mg/dL 9.5   ALK PHOS U/L 103   TOTAL PROTEIN g/dL 7.2   ALT (SGPT) U/L 248*   AST (SGOT) U/L 447*   BILIRUBIN mg/dL 0.6   ALBUMIN g/dL 3.9     Estimated Creatinine Clearance: 20.3 mL/min (A) (by C-G formula based on SCr of 3.01 mg/dL (H)).      Results from last 7 days   Lab Units 04/10/23  1140   WBC 10*3/mm3 6.07   RBC 10*6/mm3 3.37*   HEMOGLOBIN g/dL 10.7*   HEMATOCRIT % 32.3*   MCV fL 95.8   MCH pg 31.8   MCHC g/dL 33.1   RDW % 15.0   RDW-SD fl 52.6   MPV fL 9.6   PLATELETS 10*3/mm3 237   NEUTROPHIL % % 77.8*   LYMPHOCYTE % % 10.2*   MONOCYTES % % 11.5   EOSINOPHIL % % 0.0*   BASOPHIL % % 0.2   IMM GRAN % % 0.3   NEUTROS ABS 10*3/mm3 4.72   LYMPHS ABS 10*3/mm3 0.62*   MONOS ABS 10*3/mm3 0.70   EOS ABS 10*3/mm3 0.00   BASOS ABS 10*3/mm3 0.01   IMMATURE GRANS (ABS) 10*3/mm3 0.02   NRBC /100 WBC 0.5*     Results from last 7 days   Lab Units 04/10/23  1540   PH, ARTERIAL pH units 6.884*   PO2 ART mm Hg 92.0   PCO2, ARTERIAL mm Hg 27.6*   HCO3 ART mmol/L 5.2*     Results from last 7 days   Lab Units 04/10/23  1245 04/10/23  1021   HSTROP T ng/L 51* 71*                     Microbiology Results (last 10 days)     ** No results found for the last 240 hours. **            Diagnostics:  XR Hip With or Without Pelvis 2 - 3 View Right    Result Date: 4/10/2023  XR HIP W OR WO PELVIS 2-3 VIEW RIGHT-  04/10/2023  HISTORY: Fell, right hip pain.  A right hip prosthesis is seen in good position. There is vascular calcification.  No fractures or other acute abnormalities are seen in the pelvis and right hip.  This report was finalized on 4/10/2023 8:55 AM by Dr. Unruly Finley M.D.      Results for orders placed during the hospital encounter of 07/19/22    Adult Transthoracic Echo Complete W/ Cont if Necessary Per Protocol    Interpretation Summary  · Left  ventricular wall thickness is consistent with borderline concentric hypertrophy.  · Calculated left ventricular EF = 56.9% Estimated left ventricular EF was in agreement with the calculated left ventricular EF. Left ventricular systolic function is normal.  · Left ventricular diastolic function was normal.  · The aortic valve is abnormal in structure. The aortic valve exhibits sclerosis. A bicuspid aortic valve cannot be excluded. Trace to mild aortic valve regurgitation is present. No aortic valve stenosis is present.      He had a right hip x-ray does not show anything acute    Assessment & Plan     1. Complete heart block not responding to atropine or dopamine he has transcutaneous pacer and he is going stat for transvenous pacer.  2. Shock question is this all cardiogenic probably is but with his hypothermia now again the need to rule out sepsis.  I am hoping once we get him paced that his blood pressure will be much better.  We may need to use some Levophed.  3. Hypothermia whether this is contributing to his bradycardia result we do not know what and actively rewarm him.  He is getting ventilated with a warming circuit he is got a warming blanket he is getting warmed IV fluids.  I will check thyroid functions among other potential causes along with sepsis work-up.  4. Severe metabolic acidosis this could be partly due to his renal dysfunction I am worried about a lactic acidosis could this be just due to hypoperfusion from his heart block and hypotension as a septic etc. again we have to see what his lactic acid level is if his lactic acid is not markedly elevated then we have to think about ingestions.  5. Renal insufficiency/failure last creatinine we have is about 8 months ago was pretty much normal probably acute kidney injury may be related to hypoperfusion/ATN he will need nephrology input probably  6. Hyponatremia replace and follow  7. Elevated liver function test question alcoholic hepatitis versus  other we will have to work this up and follow I am to check a PT/INR his bilirubin is normal I doubt he is clinic qualify on his MELD score for therapy.  8. Anemia question etiology we have not seen any active bleeding we will have to follow his hemoglobin and work this up  9. Alcoholism with alcohol abuse his alcohol level was positive on presentation we will have to put him on the alcohol withdrawal protocol administer thiamine and folate.  He woke up that much  10. Fluids/electrolytes/nutrition feeds may be necessary but we will see that secondary problem currently  11. Respiratory patient intubated were ventilating him to try and keep his CO2 bone down to try and maintain his pH as best we can.  12. Altered mental status I think this is primarily metabolic encephalopathy he has so many potential causes he is hypotensive and hypoperfusing had a severe acidosis he is hyponatremic we need to address all of these.  It is telling that as we were resuscitating him he started waking up and was awake and fighting by the time we are getting to the Cath Lab.      Pavan Saucedo Jr, MD  04/10/23  16:03 EDT    Time: Critical care time excluding procedure 79 minutes

## 2023-04-10 NOTE — POST-PROCEDURE NOTE
Procedure: Oral endotracheal intubation  Indication patient with agonal respirations needing emergent transvenous pacer need to secure airway.  Procedure #3 glide scope blade was used needle endotracheal tube was used no sedation was required patient was intubated through the visualized vocal cords on the first attempt he really did not resisted all.  Capnograph color change breath sounds bilaterally confirmed tube placement.  No complication chest x-ray ordered.

## 2023-04-11 ENCOUNTER — APPOINTMENT (OUTPATIENT)
Dept: CARDIOLOGY | Facility: HOSPITAL | Age: 73
End: 2023-04-11
Payer: MEDICARE

## 2023-04-11 ENCOUNTER — APPOINTMENT (OUTPATIENT)
Dept: GENERAL RADIOLOGY | Facility: HOSPITAL | Age: 73
End: 2023-04-11
Payer: MEDICARE

## 2023-04-11 LAB
ALBUMIN SERPL-MCNC: 3.2 G/DL (ref 3.5–5.2)
ALBUMIN/GLOB SERPL: 1.1 G/DL
ALP SERPL-CCNC: 128 U/L (ref 39–117)
ALT SERPL W P-5'-P-CCNC: 490 U/L (ref 1–41)
ANION GAP SERPL CALCULATED.3IONS-SCNC: 20.8 MMOL/L (ref 5–15)
AORTIC DIMENSIONLESS INDEX: 1.1 (DI)
ARTERIAL PATENCY WRIST A: ABNORMAL
ARTERIAL PATENCY WRIST A: POSITIVE
AST SERPL-CCNC: 1648 U/L (ref 1–40)
ATMOSPHERIC PRESS: 759.9 MMHG
ATMOSPHERIC PRESS: 760.5 MMHG
ATMOSPHERIC PRESS: 760.6 MMHG
ATMOSPHERIC PRESS: 760.7 MMHG
BACTERIA UR QL AUTO: NORMAL /HPF
BASE EXCESS BLDA CALC-SCNC: -0.1 MMOL/L (ref 0–2)
BASE EXCESS BLDA CALC-SCNC: -1.4 MMOL/L (ref 0–2)
BASE EXCESS BLDA CALC-SCNC: 1.8 MMOL/L (ref 0–2)
BASE EXCESS BLDA CALC-SCNC: 3.7 MMOL/L (ref 0–2)
BASOPHILS # BLD AUTO: 0 10*3/MM3 (ref 0–0.2)
BASOPHILS NFR BLD AUTO: 0 % (ref 0–1.5)
BDY SITE: ABNORMAL
BH CV ECHO MEAS - ACS: 1.95 CM
BH CV ECHO MEAS - AI P1/2T: 420.3 MSEC
BH CV ECHO MEAS - AO MAX PG: 3.5 MMHG
BH CV ECHO MEAS - AO MEAN PG: 1.69 MMHG
BH CV ECHO MEAS - AO ROOT DIAM: 3.1 CM
BH CV ECHO MEAS - AO V2 MAX: 93.5 CM/SEC
BH CV ECHO MEAS - AO V2 VTI: 14.3 CM
BH CV ECHO MEAS - AVA(I,D): 2.7 CM2
BH CV ECHO MEAS - EDV(CUBED): 68.8 ML
BH CV ECHO MEAS - EDV(MOD-SP2): 57 ML
BH CV ECHO MEAS - EDV(MOD-SP4): 54 ML
BH CV ECHO MEAS - EF(MOD-BP): 54.5 %
BH CV ECHO MEAS - EF(MOD-SP2): 56.1 %
BH CV ECHO MEAS - EF(MOD-SP4): 55.6 %
BH CV ECHO MEAS - ESV(CUBED): 17.6 ML
BH CV ECHO MEAS - ESV(MOD-SP2): 25 ML
BH CV ECHO MEAS - ESV(MOD-SP4): 24 ML
BH CV ECHO MEAS - FS: 36.5 %
BH CV ECHO MEAS - IVS/LVPW: 1.06 CM
BH CV ECHO MEAS - IVSD: 1.14 CM
BH CV ECHO MEAS - LAT PEAK E' VEL: 9.1 CM/SEC
BH CV ECHO MEAS - LV MASS(C)D: 152.2 GRAMS
BH CV ECHO MEAS - LV MAX PG: 3.4 MMHG
BH CV ECHO MEAS - LV MEAN PG: 1.61 MMHG
BH CV ECHO MEAS - LV V1 MAX: 92.4 CM/SEC
BH CV ECHO MEAS - LV V1 VTI: 15.7 CM
BH CV ECHO MEAS - LVIDD: 4.1 CM
BH CV ECHO MEAS - LVIDS: 2.6 CM
BH CV ECHO MEAS - LVOT AREA: 2.44 CM2
BH CV ECHO MEAS - LVOT DIAM: 1.76 CM
BH CV ECHO MEAS - LVPWD: 1.07 CM
BH CV ECHO MEAS - MED PEAK E' VEL: 6 CM/SEC
BH CV ECHO MEAS - MV DEC SLOPE: 433.5 CM/SEC2
BH CV ECHO MEAS - MV DEC TIME: 0.2 MSEC
BH CV ECHO MEAS - MV E MAX VEL: 119 CM/SEC
BH CV ECHO MEAS - MV MAX PG: 3.9 MMHG
BH CV ECHO MEAS - MV MEAN PG: 1.26 MMHG
BH CV ECHO MEAS - MV P1/2T: 66 MSEC
BH CV ECHO MEAS - MV V2 VTI: 24.8 CM
BH CV ECHO MEAS - MVA(P1/2T): 3.3 CM2
BH CV ECHO MEAS - MVA(VTI): 1.55 CM2
BH CV ECHO MEAS - PA ACC TIME: 0.06 SEC
BH CV ECHO MEAS - PA PR(ACCEL): 52.9 MMHG
BH CV ECHO MEAS - PA V2 MAX: 88.2 CM/SEC
BH CV ECHO MEAS - PI END-D VEL: 78.7 CM/SEC
BH CV ECHO MEAS - PULM A REVS DUR: 0.08 SEC
BH CV ECHO MEAS - PULM A REVS VEL: 23.2 CM/SEC
BH CV ECHO MEAS - PULM DIAS VEL: 37.3 CM/SEC
BH CV ECHO MEAS - PULM S/D: 0.53
BH CV ECHO MEAS - PULM SYS VEL: 19.7 CM/SEC
BH CV ECHO MEAS - QP/QS: 0.51
BH CV ECHO MEAS - RAP SYSTOLE: 15 MMHG
BH CV ECHO MEAS - RV MAX PG: 1.11 MMHG
BH CV ECHO MEAS - RV V1 MAX: 52.7 CM/SEC
BH CV ECHO MEAS - RV V1 VTI: 10.3 CM
BH CV ECHO MEAS - RVOT DIAM: 1.56 CM
BH CV ECHO MEAS - RVSP: 25 MMHG
BH CV ECHO MEAS - SV(LVOT): 38.4 ML
BH CV ECHO MEAS - SV(MOD-SP2): 32 ML
BH CV ECHO MEAS - SV(MOD-SP4): 30 ML
BH CV ECHO MEAS - SV(RVOT): 19.7 ML
BH CV ECHO MEAS - TAPSE (>1.6): 1.67 CM
BH CV ECHO MEAS - TR MAX PG: 10.4 MMHG
BH CV ECHO MEAS - TR MAX VEL: 161.4 CM/SEC
BH CV ECHO MEASUREMENTS AVERAGE E/E' RATIO: 15.76
BILIRUB SERPL-MCNC: 0.9 MG/DL (ref 0–1.2)
BILIRUB UR QL STRIP: NEGATIVE
BUN SERPL-MCNC: 45 MG/DL (ref 8–23)
BUN/CREAT SERPL: 12.7 (ref 7–25)
CALCIUM SPEC-SCNC: 8 MG/DL (ref 8.6–10.5)
CHLORIDE SERPL-SCNC: 85 MMOL/L (ref 98–107)
CHOLEST SERPL-MCNC: 112 MG/DL (ref 0–200)
CLARITY UR: CLEAR
CO2 SERPL-SCNC: 20.2 MMOL/L (ref 22–29)
COLOR UR: ABNORMAL
CORTIS SERPL-MCNC: 28.5 MCG/DL
CREAT SERPL-MCNC: 3.53 MG/DL (ref 0.76–1.27)
D-LACTATE SERPL-SCNC: 1.1 MMOL/L (ref 0.5–2)
D-LACTATE SERPL-SCNC: 1.3 MMOL/L (ref 0.5–2)
D-LACTATE SERPL-SCNC: 2.3 MMOL/L (ref 0.5–2)
D-LACTATE SERPL-SCNC: 9 MMOL/L (ref 0.5–2)
D-LACTATE SERPL-SCNC: 9 MMOL/L (ref 0.5–2)
DEPRECATED RDW RBC AUTO: 55.1 FL (ref 37–54)
EGFRCR SERPLBLD CKD-EPI 2021: 17.4 ML/MIN/1.73
EOSINOPHIL # BLD AUTO: 0 10*3/MM3 (ref 0–0.4)
EOSINOPHIL NFR BLD AUTO: 0 % (ref 0.3–6.2)
ERYTHROCYTE [DISTWIDTH] IN BLOOD BY AUTOMATED COUNT: 15.8 % (ref 12.3–15.4)
GLOBULIN UR ELPH-MCNC: 3 GM/DL
GLUCOSE BLDC GLUCOMTR-MCNC: 106 MG/DL (ref 70–130)
GLUCOSE BLDC GLUCOMTR-MCNC: 113 MG/DL (ref 70–130)
GLUCOSE BLDC GLUCOMTR-MCNC: 124 MG/DL (ref 70–130)
GLUCOSE BLDC GLUCOMTR-MCNC: 125 MG/DL (ref 70–130)
GLUCOSE BLDC GLUCOMTR-MCNC: 142 MG/DL (ref 70–130)
GLUCOSE BLDC GLUCOMTR-MCNC: 147 MG/DL (ref 70–130)
GLUCOSE BLDC GLUCOMTR-MCNC: 151 MG/DL (ref 70–130)
GLUCOSE BLDC GLUCOMTR-MCNC: 151 MG/DL (ref 70–130)
GLUCOSE BLDC GLUCOMTR-MCNC: 172 MG/DL (ref 70–130)
GLUCOSE BLDC GLUCOMTR-MCNC: 176 MG/DL (ref 70–130)
GLUCOSE BLDC GLUCOMTR-MCNC: 252 MG/DL (ref 70–130)
GLUCOSE BLDC GLUCOMTR-MCNC: 301 MG/DL (ref 70–130)
GLUCOSE BLDC GLUCOMTR-MCNC: 341 MG/DL (ref 70–130)
GLUCOSE BLDC GLUCOMTR-MCNC: 397 MG/DL (ref 70–130)
GLUCOSE BLDC GLUCOMTR-MCNC: 428 MG/DL (ref 70–130)
GLUCOSE BLDC GLUCOMTR-MCNC: 464 MG/DL (ref 70–130)
GLUCOSE BLDC GLUCOMTR-MCNC: 79 MG/DL (ref 70–130)
GLUCOSE BLDC GLUCOMTR-MCNC: 97 MG/DL (ref 70–130)
GLUCOSE SERPL-MCNC: 312 MG/DL (ref 65–99)
GLUCOSE UR STRIP-MCNC: NEGATIVE MG/DL
HBA1C MFR BLD: 4.5 % (ref 4.8–5.6)
HCO3 BLDA-SCNC: 18.7 MMOL/L (ref 22–28)
HCO3 BLDA-SCNC: 20.9 MMOL/L (ref 22–28)
HCO3 BLDA-SCNC: 24.2 MMOL/L (ref 22–28)
HCO3 BLDA-SCNC: 26.7 MMOL/L (ref 22–28)
HCT VFR BLD AUTO: 27.8 % (ref 37.5–51)
HDLC SERPL-MCNC: 74 MG/DL (ref 40–60)
HGB BLD-MCNC: 9.1 G/DL (ref 13–17.7)
HGB UR QL STRIP.AUTO: ABNORMAL
HYALINE CASTS UR QL AUTO: NORMAL /LPF
IMM GRANULOCYTES # BLD AUTO: 0.01 10*3/MM3 (ref 0–0.05)
IMM GRANULOCYTES NFR BLD AUTO: 0.2 % (ref 0–0.5)
INHALED O2 CONCENTRATION: 100 %
INHALED O2 CONCENTRATION: 100 %
INHALED O2 CONCENTRATION: 30 %
INHALED O2 CONCENTRATION: 30 %
KETONES UR QL STRIP: ABNORMAL
LDLC SERPL CALC-MCNC: 24 MG/DL (ref 0–100)
LDLC/HDLC SERPL: 0.35 {RATIO}
LEFT ATRIUM VOLUME INDEX: 13.5 ML/M2
LEUKOCYTE ESTERASE UR QL STRIP.AUTO: ABNORMAL
LYMPHOCYTES # BLD AUTO: 0.24 10*3/MM3 (ref 0.7–3.1)
LYMPHOCYTES NFR BLD AUTO: 5.7 % (ref 19.6–45.3)
MAXIMAL PREDICTED HEART RATE: 146 BPM
MCH RBC QN AUTO: 31.6 PG (ref 26.6–33)
MCHC RBC AUTO-ENTMCNC: 32.7 G/DL (ref 31.5–35.7)
MCV RBC AUTO: 96.5 FL (ref 79–97)
MODALITY: ABNORMAL
MONOCYTES # BLD AUTO: 0.17 10*3/MM3 (ref 0.1–0.9)
MONOCYTES NFR BLD AUTO: 4 % (ref 5–12)
NEUTROPHILS NFR BLD AUTO: 3.79 10*3/MM3 (ref 1.7–7)
NEUTROPHILS NFR BLD AUTO: 90.1 % (ref 42.7–76)
NITRITE UR QL STRIP: NEGATIVE
NRBC BLD AUTO-RTO: 0.7 /100 WBC (ref 0–0.2)
O2 A-A PPRESDIFF RESPIRATORY: 0.4 MMHG
O2 A-A PPRESDIFF RESPIRATORY: 0.4 MMHG
O2 A-A PPRESDIFF RESPIRATORY: 0.5 MMHG
O2 A-A PPRESDIFF RESPIRATORY: 0.6 MMHG
PCO2 BLDA: 19.4 MM HG (ref 35–45)
PCO2 BLDA: 22.3 MM HG (ref 35–45)
PCO2 BLDA: 29.4 MM HG (ref 35–45)
PCO2 BLDA: 33.6 MM HG (ref 35–45)
PEEP RESPIRATORY: 10 CM[H2O]
PEEP RESPIRATORY: 10 CM[H2O]
PH BLDA: 7.51 PH UNITS (ref 7.35–7.45)
PH BLDA: 7.52 PH UNITS (ref 7.35–7.45)
PH BLDA: 7.58 PH UNITS (ref 7.35–7.45)
PH BLDA: 7.59 PH UNITS (ref 7.35–7.45)
PH UR STRIP.AUTO: <=5 [PH] (ref 5–8)
PLATELET # BLD AUTO: 178 10*3/MM3 (ref 140–450)
PMV BLD AUTO: 10.1 FL (ref 6–12)
PO2 BLDA: 330.2 MM HG (ref 80–100)
PO2 BLDA: 403.1 MM HG (ref 80–100)
PO2 BLDA: 81.9 MM HG (ref 80–100)
PO2 BLDA: 92.8 MM HG (ref 80–100)
POTASSIUM SERPL-SCNC: 3.4 MMOL/L (ref 3.5–5.2)
PROT SERPL-MCNC: 6.2 G/DL (ref 6–8.5)
PROT UR QL STRIP: ABNORMAL
RBC # BLD AUTO: 2.88 10*6/MM3 (ref 4.14–5.8)
RBC # UR STRIP: NORMAL /HPF
REF LAB TEST METHOD: NORMAL
SAO2 % BLDCOA: 100 % (ref 92–99)
SAO2 % BLDCOA: 100 % (ref 92–99)
SAO2 % BLDCOA: 97.3 % (ref 92–99)
SAO2 % BLDCOA: 98 % (ref 92–99)
SET MECH RESP RATE: 14
SET MECH RESP RATE: 14
SET MECH RESP RATE: 28
SET MECH RESP RATE: 28
SINUS: 2.8 CM
SODIUM SERPL-SCNC: 126 MMOL/L (ref 136–145)
SP GR UR STRIP: 1.02 (ref 1–1.03)
SQUAMOUS #/AREA URNS HPF: NORMAL /HPF
STRESS TARGET HR: 124 BPM
TOTAL RATE: 14 BREATHS/MINUTE
TOTAL RATE: 14 BREATHS/MINUTE
TOTAL RATE: 28 BREATHS/MINUTE
TOTAL RATE: 28 BREATHS/MINUTE
TRIGL SERPL-MCNC: 62 MG/DL (ref 0–150)
TSH SERPL DL<=0.05 MIU/L-ACNC: 1.38 UIU/ML (ref 0.27–4.2)
UROBILINOGEN UR QL STRIP: ABNORMAL
VENTILATOR MODE: AC
VLDLC SERPL-MCNC: 14 MG/DL (ref 5–40)
VT ON VENT VENT: 500 ML
VT ON VENT VENT: 600 ML
WBC # UR STRIP: NORMAL /HPF
WBC NRBC COR # BLD: 4.21 10*3/MM3 (ref 3.4–10.8)

## 2023-04-11 PROCEDURE — 82533 TOTAL CORTISOL: CPT | Performed by: INTERNAL MEDICINE

## 2023-04-11 PROCEDURE — 83036 HEMOGLOBIN GLYCOSYLATED A1C: CPT | Performed by: INTERNAL MEDICINE

## 2023-04-11 PROCEDURE — 93306 TTE W/DOPPLER COMPLETE: CPT | Performed by: INTERNAL MEDICINE

## 2023-04-11 PROCEDURE — 99232 SBSQ HOSP IP/OBS MODERATE 35: CPT | Performed by: INTERNAL MEDICINE

## 2023-04-11 PROCEDURE — 25010000002 PROPOFOL 10 MG/ML EMULSION: Performed by: INTERNAL MEDICINE

## 2023-04-11 PROCEDURE — 25010000002 DOPAMINE PER 40 MG: Performed by: INTERNAL MEDICINE

## 2023-04-11 PROCEDURE — 83605 ASSAY OF LACTIC ACID: CPT | Performed by: INTERNAL MEDICINE

## 2023-04-11 PROCEDURE — 85025 COMPLETE CBC W/AUTO DIFF WBC: CPT | Performed by: INTERNAL MEDICINE

## 2023-04-11 PROCEDURE — 80053 COMPREHEN METABOLIC PANEL: CPT | Performed by: INTERNAL MEDICINE

## 2023-04-11 PROCEDURE — 80061 LIPID PANEL: CPT | Performed by: INTERNAL MEDICINE

## 2023-04-11 PROCEDURE — 25010000002 THIAMINE PER 100 MG: Performed by: INTERNAL MEDICINE

## 2023-04-11 PROCEDURE — 94799 UNLISTED PULMONARY SVC/PX: CPT

## 2023-04-11 PROCEDURE — 25010000002 CEFTRIAXONE PER 250 MG: Performed by: INTERNAL MEDICINE

## 2023-04-11 PROCEDURE — 25510000001 PERFLUTREN (DEFINITY) 8.476 MG IN SODIUM CHLORIDE (PF) 0.9 % 10 ML INJECTION: Performed by: INTERNAL MEDICINE

## 2023-04-11 PROCEDURE — 84443 ASSAY THYROID STIM HORMONE: CPT | Performed by: INTERNAL MEDICINE

## 2023-04-11 PROCEDURE — 93306 TTE W/DOPPLER COMPLETE: CPT

## 2023-04-11 PROCEDURE — 82803 BLOOD GASES ANY COMBINATION: CPT

## 2023-04-11 PROCEDURE — 81001 URINALYSIS AUTO W/SCOPE: CPT | Performed by: INTERNAL MEDICINE

## 2023-04-11 PROCEDURE — 94003 VENT MGMT INPAT SUBQ DAY: CPT

## 2023-04-11 PROCEDURE — 63710000001 INSULIN LISPRO (HUMAN) PER 5 UNITS: Performed by: INTERNAL MEDICINE

## 2023-04-11 PROCEDURE — 82962 GLUCOSE BLOOD TEST: CPT

## 2023-04-11 PROCEDURE — 36600 WITHDRAWAL OF ARTERIAL BLOOD: CPT

## 2023-04-11 PROCEDURE — 25010000002 ENOXAPARIN PER 10 MG: Performed by: INTERNAL MEDICINE

## 2023-04-11 PROCEDURE — 71045 X-RAY EXAM CHEST 1 VIEW: CPT

## 2023-04-11 PROCEDURE — 25010000002 LORAZEPAM PER 2 MG: Performed by: INTERNAL MEDICINE

## 2023-04-11 PROCEDURE — 94761 N-INVAS EAR/PLS OXIMETRY MLT: CPT

## 2023-04-11 RX ORDER — NICOTINE POLACRILEX 4 MG
15 LOZENGE BUCCAL
Status: DISCONTINUED | OUTPATIENT
Start: 2023-04-11 | End: 2023-04-14

## 2023-04-11 RX ORDER — DEXTROSE MONOHYDRATE 25 G/50ML
10-50 INJECTION, SOLUTION INTRAVENOUS
Status: DISCONTINUED | OUTPATIENT
Start: 2023-04-11 | End: 2023-04-11

## 2023-04-11 RX ORDER — IBUPROFEN 600 MG/1
1 TABLET ORAL
Status: DISCONTINUED | OUTPATIENT
Start: 2023-04-11 | End: 2023-04-11

## 2023-04-11 RX ORDER — INSULIN LISPRO 100 [IU]/ML
0-24 INJECTION, SOLUTION INTRAVENOUS; SUBCUTANEOUS
Status: DISCONTINUED | OUTPATIENT
Start: 2023-04-11 | End: 2023-04-14

## 2023-04-11 RX ORDER — NICOTINE POLACRILEX 4 MG
15 LOZENGE BUCCAL
Status: DISCONTINUED | OUTPATIENT
Start: 2023-04-11 | End: 2023-04-11

## 2023-04-11 RX ORDER — SODIUM CHLORIDE 0.9 % (FLUSH) 0.9 %
10 SYRINGE (ML) INJECTION AS NEEDED
Status: DISCONTINUED | OUTPATIENT
Start: 2023-04-11 | End: 2023-04-11

## 2023-04-11 RX ORDER — IBUPROFEN 600 MG/1
1 TABLET ORAL
Status: DISCONTINUED | OUTPATIENT
Start: 2023-04-11 | End: 2023-04-14

## 2023-04-11 RX ORDER — SODIUM CHLORIDE 9 MG/ML
40 INJECTION, SOLUTION INTRAVENOUS AS NEEDED
Status: DISCONTINUED | OUTPATIENT
Start: 2023-04-11 | End: 2023-04-11

## 2023-04-11 RX ORDER — SODIUM CHLORIDE 9 MG/ML
100 INJECTION, SOLUTION INTRAVENOUS CONTINUOUS
Status: DISCONTINUED | OUTPATIENT
Start: 2023-04-11 | End: 2023-04-12

## 2023-04-11 RX ORDER — SODIUM CHLORIDE 0.9 % (FLUSH) 0.9 %
10 SYRINGE (ML) INJECTION EVERY 12 HOURS SCHEDULED
Status: DISCONTINUED | OUTPATIENT
Start: 2023-04-11 | End: 2023-04-11

## 2023-04-11 RX ORDER — DEXTROSE MONOHYDRATE 25 G/50ML
25 INJECTION, SOLUTION INTRAVENOUS
Status: DISCONTINUED | OUTPATIENT
Start: 2023-04-11 | End: 2023-04-14

## 2023-04-11 RX ADMIN — INSULIN HUMAN 1.6 UNITS/HR: 1 INJECTION, SOLUTION INTRAVENOUS at 09:17

## 2023-04-11 RX ADMIN — SODIUM CHLORIDE 100 ML/HR: 9 INJECTION, SOLUTION INTRAVENOUS at 21:16

## 2023-04-11 RX ADMIN — INSULIN LISPRO 4 UNITS: 100 INJECTION, SOLUTION INTRAVENOUS; SUBCUTANEOUS at 21:31

## 2023-04-11 RX ADMIN — CEFTRIAXONE SODIUM 1 G: 1 INJECTION, POWDER, FOR SOLUTION INTRAMUSCULAR; INTRAVENOUS at 20:55

## 2023-04-11 RX ADMIN — DOCUSATE SODIUM 50MG AND SENNOSIDES 8.6MG 2 TABLET: 8.6; 5 TABLET, FILM COATED ORAL at 20:55

## 2023-04-11 RX ADMIN — Medication 0.18 MCG/KG/MIN: at 16:56

## 2023-04-11 RX ADMIN — PROPOFOL INJECTABLE EMULSION 20 MCG/KG/MIN: 10 INJECTION, EMULSION INTRAVENOUS at 13:35

## 2023-04-11 RX ADMIN — Medication 10 ML: at 00:52

## 2023-04-11 RX ADMIN — LORAZEPAM 0.5 MG: 2 INJECTION INTRAMUSCULAR; INTRAVENOUS at 04:07

## 2023-04-11 RX ADMIN — PERFLUTREN 4 ML: 6.52 INJECTION, SUSPENSION INTRAVENOUS at 14:20

## 2023-04-11 RX ADMIN — Medication 0.02 MCG/KG/MIN: at 06:57

## 2023-04-11 RX ADMIN — Medication 10 ML: at 09:20

## 2023-04-11 RX ADMIN — THIAMINE HYDROCHLORIDE 300 MG: 100 INJECTION, SOLUTION INTRAMUSCULAR; INTRAVENOUS at 09:19

## 2023-04-11 RX ADMIN — DOCUSATE SODIUM 50MG AND SENNOSIDES 8.6MG 2 TABLET: 8.6; 5 TABLET, FILM COATED ORAL at 00:52

## 2023-04-11 RX ADMIN — LORAZEPAM 0.5 MG: 2 INJECTION INTRAMUSCULAR; INTRAVENOUS at 10:31

## 2023-04-11 RX ADMIN — FAMOTIDINE 20 MG: 10 INJECTION INTRAVENOUS at 00:51

## 2023-04-11 RX ADMIN — ENOXAPARIN SODIUM 30 MG: 100 INJECTION SUBCUTANEOUS at 18:38

## 2023-04-11 RX ADMIN — SODIUM BICARBONATE 150 MEQ: 84 INJECTION, SOLUTION INTRAVENOUS at 02:06

## 2023-04-11 RX ADMIN — Medication 10 ML: at 20:55

## 2023-04-11 RX ADMIN — FOLIC ACID 1 MG: 5 INJECTION, SOLUTION INTRAMUSCULAR; INTRAVENOUS; SUBCUTANEOUS at 09:19

## 2023-04-11 RX ADMIN — DOPAMINE HYDROCHLORIDE 16 MCG/KG/MIN: 160 INJECTION, SOLUTION INTRAVENOUS at 01:39

## 2023-04-11 RX ADMIN — LORAZEPAM 0.5 MG: 2 INJECTION INTRAMUSCULAR; INTRAVENOUS at 16:52

## 2023-04-11 RX ADMIN — PANTOPRAZOLE SODIUM 40 MG: 40 INJECTION, POWDER, FOR SOLUTION INTRAVENOUS at 05:43

## 2023-04-11 RX ADMIN — CHLORHEXIDINE GLUCONATE 0.12% ORAL RINSE 15 ML: 1.2 LIQUID ORAL at 09:21

## 2023-04-11 RX ADMIN — PROPOFOL INJECTABLE EMULSION 30 MCG/KG/MIN: 10 INJECTION, EMULSION INTRAVENOUS at 22:52

## 2023-04-11 RX ADMIN — PROPOFOL INJECTABLE EMULSION 30 MCG/KG/MIN: 10 INJECTION, EMULSION INTRAVENOUS at 00:06

## 2023-04-11 RX ADMIN — SODIUM CHLORIDE 100 ML/HR: 9 INJECTION, SOLUTION INTRAVENOUS at 09:20

## 2023-04-11 RX ADMIN — CHLORHEXIDINE GLUCONATE 0.12% ORAL RINSE 15 ML: 1.2 LIQUID ORAL at 20:56

## 2023-04-11 RX ADMIN — PROPOFOL INJECTABLE EMULSION 25 MCG/KG/MIN: 10 INJECTION, EMULSION INTRAVENOUS at 04:05

## 2023-04-11 NOTE — PLAN OF CARE
Goal Outcome Evaluation:         Pt remains in cicu on vent, prop, levo. Cortrack placed, temp 98-99f now. UOP 300cc. ABG results were given to dr quijano, bicarb gtt stopped, follow-up abg. Pt Tvpaced at 80. Dopamine switched to levo.

## 2023-04-11 NOTE — PLAN OF CARE
Goal Outcome Evaluation:Pt remains in CICU on mechanical ventilation and sedated. IV insulin was discontinued and sliding scale insulin has been started. Tube Feeds were started at 1400 today.

## 2023-04-11 NOTE — PROGRESS NOTES
"Daily progress note    Primary care physician      Chief complaint  S/p temporary pacemaker on vent and sedated and family at bedside.    History of present illness  72-year-old -American male with history of coronary artery disease hypertension hyperlipidemia chronic anemia chronic kidney disease stage III and gastroesophageal reflux disease who also abuse alcohol and tobacco presented to [facility] emergency room after mechanical fall when he tripped and fell on the right hip with complaint of right hip pain.  Patient denies any headache dizziness lightheadedness chest pain shortness of breath abdominal pain nausea vomiting diarrhea.  Patient evaluated in ER admitted to the floor with acute kidney injury and also found to have sinus bradycardia with hyponatremia.  Patient admitted and on the floor his blood pressure dropped and more bradycardic and rapid response called.  Patient transferred to the unit intubated and provided with supportive care and taken to the Cath Lab for temporary pacemaker placement     REVIEW OF SYSTEMS  Unable to obtain     PHYSICAL EXAM   Blood pressure 108/51, pulse 80, temperature 98.7 °F (37.1 °C), temperature source Oral, resp. rate 14, height 175 cm (68.9\"), weight 77 kg (169 lb 12.1 oz), SpO2 99 %.    General: On vent  HEENT: Mucous membranes moist, atraumatic,  Neck: Supple  Pulm: Moving air bilaterally  Cardiovascular: Regular rhythm bradycardia, intact distal pulses, no peripheral edema  GI: Soft, nontender, nondistended, no rebound, no guarding, bowel sounds present  MSK: Full ROM, no deformity  Skin: Warm, dry  Psych: Calm, cooperative     LAB RESULTS  Lab Results (last 24 hours)     Procedure Component Value Units Date/Time    POC Glucose Once [[id number]]  (Normal) Collected: 04/11/23 1530    Specimen: Blood Updated: 04/11/23 1534     Glucose 125 mg/dL      Comment: Meter: [id number] : [id number] Dhaval Abel RN       POC Glucose Once " [[id number]]  (Normal) Collected: 04/11/23 1417    Specimen: Blood Updated: 04/11/23 1428     Glucose 124 mg/dL      Comment: Meter: [id number] : [id number] Dhaval Abel RN       STAT Lactic Acid, Reflex [[id number]]  (Abnormal) Collected: 04/11/23 1059    Specimen: Blood Updated: 04/11/23 1328     Lactate 2.3 mmol/L     POC Glucose Once [[id number]]  (Normal) Collected: 04/11/23 1312    Specimen: Blood Updated: 04/11/23 1316     Glucose 113 mg/dL      Comment: Meter: [id number] : [id number] Dhaval Abel RN       POC Glucose Once [[id number]]  (Normal) Collected: 04/11/23 1157    Specimen: Blood Updated: 04/11/23 1203     Glucose 106 mg/dL      Comment: Meter: [id number] : [id number] Dhaval Abel RN       POC Glucose Once [[id number]]  (Normal) Collected: 04/11/23 1051    Specimen: Blood Updated: 04/11/23 1059     Glucose 79 mg/dL      Comment: Meter: [id number] : [id number] Dhaval Abel RN       POC Glucose Once [[id number]]  (Normal) Collected: 04/11/23 1026    Specimen: Blood Updated: 04/11/23 1037     Glucose 97 mg/dL      Comment: Meter: [id number] : [id number] Dhaval Abel RN       Urinalysis, Microscopic Only - Urine, Clean Catch [[id number]] Collected: 04/11/23 0941    Specimen: Urine, Clean Catch Updated: 04/11/23 1021     RBC, UA None Seen /HPF      WBC, UA 0-2 /HPF      Bacteria, UA None Seen /HPF      Squamous Epithelial Cells, UA 0-2 /HPF      Hyaline Casts, UA None Seen /LPF      Methodology Manual Light Microscopy    Urinalysis With Microscopic If Indicated (No Culture) - Urine, Clean Catch [[id number]]  (Abnormal) Collected: 04/11/23 0941    Specimen: Urine, Clean Catch Updated: 04/11/23 1002     Color, UA Dark Yellow     Appearance, UA Clear     pH, UA <=5.0     Specific Gravity, UA 1.020     Glucose, UA Negative     Ketones, UA Trace     Bilirubin, UA Negative     Blood, UA Small (1+)     Protein, UA 30 mg/dL (1+)     Leuk Esterase, UA Trace     Nitrite, UA  Negative     Urobilinogen, UA 1.0 E.U./dL    Blood Gas, Arterial - [[id number]]  (Abnormal) Collected: 04/11/23 0950    Specimen: Arterial Blood Updated: 04/11/23 0952     Site Arterial: right radial     Xavier's Test Positive     pH, Arterial 7.508 pH units      pCO2, Arterial 33.6 mm Hg      pO2, Arterial 92.8 mm Hg      HCO3, Arterial 26.7 mmol/L      Base Excess, Arterial 3.7 mmol/L      O2 Saturation Calculated 98.0 %      Comment: Meter: [id number] : [id number] Moises Leann        A-a DO2 0.5 mmHg      Barometric Pressure for Blood Gas 760.7 mmHg      Modality Adult Vent     FIO2 30 %      Ventilator Mode AC     Set Tidal Volume 500     Set Mech Resp Rate 14     Rate 14 Breaths/minute      PEEP 10    POC Glucose Once [[id number]]  (Abnormal) Collected: 04/11/23 0917    Specimen: Blood Updated: 04/11/23 0927     Glucose 142 mg/dL      Comment: Meter: [id number] : [id number] Dhaval Abel RN       Cortisol [[id number]] Collected: 04/11/23 0420    Specimen: Blood Updated: 04/11/23 0857     Cortisol 28.50 mcg/dL     Narrative:      Cortisol Reference Ranges:    Cortisol 6AM - 10AM Range: 6.02-18.40 mcg/dl  Cortisol 4PM - 8PM Range: 2.68-10.50 mcg/dl      Results may be falsely increased if patient taking Biotin.      Blood Gas, Arterial - [[id number]]  (Abnormal) Collected: 04/11/23 0817    Specimen: Arterial Blood Updated: 04/11/23 0819     Site Arterial: left brachial     Xavier's Test N/A     pH, Arterial 7.524 pH units      pCO2, Arterial 29.4 mm Hg      pO2, Arterial 81.9 mm Hg      HCO3, Arterial 24.2 mmol/L      Base Excess, Arterial 1.8 mmol/L      O2 Saturation Calculated 97.3 %      Comment: Meter: [id number] : [id number] Moises Leann        A-a DO2 0.4 mmHg      Barometric Pressure for Blood Gas 760.6 mmHg      Modality Adult Vent     FIO2 30 %      Ventilator Mode AC     Set Tidal Volume 600     Set Mech Resp Rate 14     Rate 14 Breaths/minute      PEEP 10    POC Glucose Once  [[id number]]  (Abnormal) Collected: 04/11/23 0733    Specimen: Blood Updated: 04/11/23 0734     Glucose 176 mg/dL      Comment: Meter: [id number] : [id number] Phillip Wilkerson RN       POC Glucose Once [[id number]]  (Abnormal) Collected: 04/11/23 0628    Specimen: Blood Updated: 04/11/23 0630     Glucose 252 mg/dL      Comment: Meter: [id number] : [id number] Phillip Wilkerson RN       Blood Culture - Blood, Arm, Right [[id number]] Collected: 04/10/23 1853    Specimen: Blood from Arm, Right Updated: 04/11/23 0615    Comprehensive Metabolic Panel [[id number]]  (Abnormal) Collected: 04/11/23 0420    Specimen: Blood Updated: 04/11/23 0600     Glucose 312 mg/dL      BUN 45 mg/dL      Creatinine 3.53 mg/dL      Sodium 126 mmol/L      Potassium 3.4 mmol/L      Chloride 85 mmol/L      CO2 20.2 mmol/L      Calcium 8.0 mg/dL      Total Protein 6.2 g/dL      Albumin 3.2 g/dL      ALT (SGPT) 490 U/L      AST (SGOT) 1,648 U/L      Alkaline Phosphatase 128 U/L      Total Bilirubin 0.9 mg/dL      Globulin 3.0 gm/dL      A/G Ratio 1.1 g/dL      BUN/Creatinine Ratio 12.7     Anion Gap 20.8 mmol/L      eGFR 17.4 mL/min/1.73     Narrative:      GFR Normal >60  Chronic Kidney Disease <60  Kidney Failure <15    The GFR formula is only valid for adults with stable renal function between ages 18 and 70.    STAT Lactic Acid, Reflex [[id number]]  (Abnormal) Collected: 04/11/23 0420    Specimen: Blood Updated: 04/11/23 0551     Lactate 9.0 mmol/L     TSH [[id number]]  (Normal) Collected: 04/11/23 0420    Specimen: Blood Updated: 04/11/23 0547     TSH 1.380 uIU/mL     Lipid Panel [[id number]]  (Abnormal) Collected: 04/11/23 0420    Specimen: Blood Updated: 04/11/23 0541     Total Cholesterol 112 mg/dL      Triglycerides 62 mg/dL      HDL Cholesterol 74 mg/dL      LDL Cholesterol  24 mg/dL      VLDL Cholesterol 14 mg/dL      LDL/HDL Ratio 0.35    Narrative:      Cholesterol Reference Ranges  (U.S. Department of Health and Human Services ATP III  Classifications)    Desirable          <200 mg/dL  Borderline High    200-239 mg/dL  High Risk          >240 mg/dL      Triglyceride Reference Ranges  (U.S. Department of Health and Human Services ATP III Classifications)    Normal           <150 mg/dL  Borderline High  150-199 mg/dL  High             200-499 mg/dL  Very High        >500 mg/dL    HDL Reference Ranges  (U.S. Department of Health and Human Services ATP III Classifications)    Low     <40 mg/dl (major risk factor for CHD)  High    >60 mg/dl ('negative' risk factor for CHD)        LDL Reference Ranges  (U.S. Department of Health and Human Services ATP III Classifications)    Optimal          <100 mg/dL  Near Optimal     100-129 mg/dL  Borderline High  130-159 mg/dL  High             160-189 mg/dL  Very High        >189 mg/dL    Hemoglobin A1c [[id number]]  (Abnormal) Collected: 04/11/23 0420    Specimen: Blood Updated: 04/11/23 0520     Hemoglobin A1C 4.50 %     Narrative:      Hemoglobin A1C Ranges:    Increased Risk for Diabetes  5.7% to 6.4%  Diabetes                     >= 6.5%  Diabetic Goal                < 7.0%    POC Glucose Once [[id number]]  (Abnormal) Collected: 04/11/23 0509    Specimen: Blood Updated: 04/11/23 0510     Glucose 301 mg/dL      Comment: Meter: [id number] : [id number] Pool GRAHAM       CBC & Differential [[id number]]  (Abnormal) Collected: 04/11/23 0420    Specimen: Blood Updated: 04/11/23 0457    Narrative:      The following orders were created for panel order CBC & Differential.  Procedure                               Abnormality         Status                     ---------                               -----------         ------                     CBC Auto Differential[[id number]]        Abnormal            Final result                 Please view results for these tests on the individual orders.    CBC Auto Differential [[id number]]  (Abnormal) Collected: 04/11/23 0420    Specimen: Blood Updated: 04/11/23 0457      WBC 4.21 10*3/mm3      RBC 2.88 10*6/mm3      Hemoglobin 9.1 g/dL      Hematocrit 27.8 %      MCV 96.5 fL      MCH 31.6 pg      MCHC 32.7 g/dL      RDW 15.8 %      RDW-SD 55.1 fl      MPV 10.1 fL      Platelets 178 10*3/mm3      Neutrophil % 90.1 %      Lymphocyte % 5.7 %      Monocyte % 4.0 %      Eosinophil % 0.0 %      Basophil % 0.0 %      Immature Grans % 0.2 %      Neutrophils, Absolute 3.79 10*3/mm3      Lymphocytes, Absolute 0.24 10*3/mm3      Monocytes, Absolute 0.17 10*3/mm3      Eosinophils, Absolute 0.00 10*3/mm3      Basophils, Absolute 0.00 10*3/mm3      Immature Grans, Absolute 0.01 10*3/mm3      nRBC 0.7 /100 WBC     Blood Gas, Arterial - [[id number]]  (Abnormal) Collected: 04/11/23 0448    Specimen: Arterial Blood Updated: 04/11/23 0453     Site Arterial: right radial     Xavier's Test Positive     pH, Arterial 7.581 pH units      Comment: Sat 100% Meter: [id number] : [id number] Azar ReneeCritical:Notify KENDELL Omalley (11-Apr-23 04:52:37)Read back ok        pCO2, Arterial 22.3 mm Hg      pO2, Arterial 403.1 mm Hg      HCO3, Arterial 20.9 mmol/L      Base Excess, Arterial -0.1 mmol/L      O2 Saturation Calculated 100.0 %      A-a DO2 0.6 mmHg      Barometric Pressure for Blood Gas 760.5 mmHg      Modality Adult Vent     FIO2 100 %      Ventilator Mode AC     Set Tidal Volume 600     Set Mech Resp Rate 28     Rate 28 Breaths/minute     Blood Gas, Arterial - [[id number]]  (Abnormal) Collected: 04/11/23 0430    Specimen: Arterial Blood Updated: 04/11/23 0440     Site Arterial: right radial     Xavier's Test Positive     pH, Arterial 7.593 pH units      Comment: Sat 100% Meter: [id number] : [id number] Azar ReneeCritical:Carlosy KENDELL calixto (11-Apr-23 04:39:19)Read back ok        pCO2, Arterial 19.4 mm Hg      Comment: Critical:Notify KENDELL calixto (11-Apr-23 04:39:19)Read back ok        pO2, Arterial 330.2 mm Hg      HCO3, Arterial 18.7 mmol/L      Base Excess, Arterial -1.4  mmol/L      O2 Saturation Calculated 100.0 %      A-a DO2 0.4 mmHg      Barometric Pressure for Blood Gas 759.9 mmHg      Modality Adult Vent     FIO2 100 %      Ventilator Mode AC     Set Tidal Volume 600     Set [facility] Resp Rate 28     Rate 28 Breaths/minute     POC Glucose Once [[id number]]  (Abnormal) Collected: 04/11/23 0413    Specimen: Blood Updated: 04/11/23 0414     Glucose 341 mg/dL      Comment: Meter: [id number] : [id number] Phillip Wilkerson RN       POC Glucose Once [[id number]]  (Abnormal) Collected: 04/11/23 0309    Specimen: Blood Updated: 04/11/23 0310     Glucose 397 mg/dL      Comment: Meter: [id number] : [id number] Phillip Wilkerson RN       STAT Lactic Acid, Reflex [[id number]]  (Abnormal) Collected: 04/11/23 0145    Specimen: Blood Updated: 04/11/23 0232     Lactate 9.0 mmol/L     POC Glucose Once [[id number]]  (Abnormal) Collected: 04/11/23 0208    Specimen: Blood Updated: 04/11/23 0211     Glucose 428 mg/dL      Comment: Treated Patient Meter: [id number] : [id number] Phillip Wilkerson RN       POC Glucose Once [[id number]]  (Abnormal) Collected: 04/11/23 0057    Specimen: Blood Updated: 04/11/23 0104     Glucose 464 mg/dL      Comment: Result Not Confirmed Meter: [id number] : [id number] Phillip Wilkerson RN       STAT Lactic Acid, Reflex [[id number]]  (Abnormal) Collected: 04/10/23 2242    Specimen: Blood Updated: 04/10/23 2334     Lactate 9.0 mmol/L     POC Glucose Once [[id number]]  (Abnormal) Collected: 04/10/23 2317    Specimen: Blood Updated: 04/10/23 2318     Glucose 434 mg/dL      Comment: RN Notified R and V Meter: [id number] : [id number] Pool GRAHAM       Osmolality, Serum [[id number]]  (Normal) Collected: 04/10/23 1853    Specimen: Blood Updated: 04/10/23 2037     Osmolality 297 mOsm/kg     Osmolality, Urine - Urine, Clean Catch [[id number]] Collected: 04/10/23 1905    Specimen: Urine, Clean Catch Updated: 04/10/23 1954     Osmolality, Urine 282 mOsm/kg     Narrative:       Osmo Normal Reference Ranges:    Random:  mOsm/kg H2O, depending on fluid intake.  Random: >850 mOsm/kg H20, after 12 hour fluid restriction.    24 Hour: 300-900 mOsm/kg H2O.    Comprehensive Metabolic Panel [[id number]]  (Abnormal) Collected: 04/10/23 1853    Specimen: Blood Updated: 04/10/23 1950     Glucose 266 mg/dL      BUN 45 mg/dL      Creatinine 3.80 mg/dL      Sodium 129 mmol/L      Potassium 5.2 mmol/L      Chloride 90 mmol/L      CO2 8.6 mmol/L      Calcium 8.1 mg/dL      Total Protein 6.0 g/dL      Albumin 3.5 g/dL      ALT (SGPT) 448 U/L      AST (SGOT) 995 U/L      Alkaline Phosphatase 138 U/L      Total Bilirubin 2.0 mg/dL      Globulin 2.5 gm/dL      A/G Ratio 1.4 g/dL      BUN/Creatinine Ratio 11.8     Anion Gap 30.4 mmol/L      eGFR 15.9 mL/min/1.73     Narrative:      GFR Normal >60  Chronic Kidney Disease <60  Kidney Failure <15    The GFR formula is only valid for adults with stable renal function between ages 18 and 70.    Folate [[id number]]  (Normal) Collected: 04/10/23 1853    Specimen: Blood Updated: 04/10/23 1947     Folate >20.00 ng/mL     Narrative:      Results may be falsely increased if patient taking Biotin.      Vitamin B12 [[id number]]  (Abnormal) Collected: 04/10/23 1853    Specimen: Blood Updated: 04/10/23 1947     Vitamin B-12 1,508 pg/mL     Narrative:      Results may be falsely increased if patient taking Biotin.      Procalcitonin [[id number]]  (Abnormal) Collected: 04/10/23 1853    Specimen: Blood Updated: 04/10/23 1940     Procalcitonin 0.98 ng/mL     Narrative:      As a Marker for Sepsis (Non-Neonates):    1. <0.5 ng/mL represents a low risk of severe sepsis and/or septic shock.  2. >2 ng/mL represents a high risk of severe sepsis and/or septic shock.    As a Marker for Lower Respiratory Tract Infections that require antibiotic therapy:    PCT on Admission    Antibiotic Therapy       6-12 Hrs later    >0.5                Strongly Recommended  >0.25 - <0.5         "Recommended   0.1 - 0.25          Discouraged              Remeasure/reassess PCT  <0.1                Strongly Discouraged     Remeasure/reassess PCT    As 28 day mortality risk marker: \"Change in Procalcitonin Result\" (>80% or <=80%) if Day 0 (or Day 1) and Day 4 values are available. Refer to http://www.[employer] [employer]-pct-calculator.com    Change in PCT <=80%  A decrease of PCT levels below or equal to 80% defines a positive change in PCT test result representing a higher risk for 28-day all-cause mortality of patients diagnosed with severe sepsis for septic shock.    Change in PCT >80%  A decrease of PCT levels of more than 80% defines a negative change in PCT result representing a lower risk for 28-day all-cause mortality of patients diagnosed with severe sepsis or septic shock.       BNP [[id number]]  (Abnormal) Collected: 04/10/23 1853    Specimen: Blood Updated: 04/10/23 1940     proBNP 5,209.0 pg/mL     Narrative:      Among patients with dyspnea, NT-proBNP is highly sensitive for the detection of acute congestive heart failure. In addition NT-proBNP of <300 pg/ml effectively rules out acute congestive heart failure with 99% negative predictive value.    Results may be falsely decreased if patient taking Biotin.      Hepatitis Panel, Acute [[id number]]  (Normal) Collected: 04/10/23 1853    Specimen: Blood Updated: 04/10/23 1939     Hepatitis B Surface Ag Non-Reactive     Hep A IgM Non-Reactive     Hep B C IgM Non-Reactive     Hepatitis C Ab Non-Reactive    Narrative:      Results may be falsely decreased if patient taking Biotin.     Lactic Acid, Plasma [[id number]]  (Abnormal) Collected: 04/10/23 1853    Specimen: Blood Updated: 04/10/23 1938     Lactate 11.6 mmol/L     Iron Profile [[id number]]  (Abnormal) Collected: 04/10/23 1853    Specimen: Blood Updated: 04/10/23 1936     Iron 202 mcg/dL      Iron Saturation 78 %      Transferrin 174 mg/dL      TIBC 259 mcg/dL     Acetaminophen Level [[id number]]  (Normal) " Collected: 04/10/23 1853    Specimen: Blood Updated: 04/10/23 1936     Acetaminophen 10.0 mcg/mL     Urine Drug Screen - Urine, Clean Catch [[id number]]  (Normal) Collected: 04/10/23 1905    Specimen: Urine, Clean Catch Updated: 04/10/23 1935     Amphet/Methamphet, Screen Negative     Barbiturates Screen, Urine Negative     Benzodiazepine Screen, Urine Negative     Cocaine Screen, Urine Negative     Opiate Screen Negative     THC, Screen, Urine Negative     Methadone Screen, Urine Negative     Oxycodone Screen, Urine Negative    Narrative:      Negative Thresholds Per Drugs Screened:    Amphetamines                 500 ng/ml  Barbiturates                 200 ng/ml  Benzodiazepines              100 ng/ml  Cocaine                      300 ng/ml  Methadone                    300 ng/ml  Opiates                      300 ng/ml  Oxycodone                    100 ng/ml  THC                           50 ng/ml    The Normal Value for all drugs tested is negative. This report includes final unconfirmed screening results to be used for medical treatment purposes only. Unconfirmed results must not be used for non-medical purposes such as employment or legal testing. Clinical consideration should be applied to any drug of abuse test, particularly when unconfirmed results are used.            Protime-INR [[id number]]  (Abnormal) Collected: 04/10/23 1853    Specimen: Blood Updated: 04/10/23 1916     Protime 17.3 Seconds      INR 1.40    CBC & Differential [[id number]]  (Abnormal) Collected: 04/10/23 1853    Specimen: Blood Updated: 04/10/23 1910    Narrative:      The following orders were created for panel order CBC & Differential.  Procedure                               Abnormality         Status                     ---------                               -----------         ------                     CBC Auto Differential[[id number]]        Abnormal            Final result                 Please view results for these tests on the  individual orders.    CBC Auto Differential [[id number]]  (Abnormal) Collected: 04/10/23 1853    Specimen: Blood Updated: 04/10/23 1910     WBC 6.15 10*3/mm3      RBC 2.93 10*6/mm3      Hemoglobin 9.2 g/dL      Hematocrit 29.0 %      MCV 99.0 fL      MCH 31.4 pg      MCHC 31.7 g/dL      RDW 15.6 %      RDW-SD 55.6 fl      MPV 9.8 fL      Platelets 194 10*3/mm3      Neutrophil % 83.2 %      Lymphocyte % 9.6 %      Monocyte % 6.3 %      Eosinophil % 0.0 %      Basophil % 0.2 %      Immature Grans % 0.7 %      Neutrophils, Absolute 5.12 10*3/mm3      Lymphocytes, Absolute 0.59 10*3/mm3      Monocytes, Absolute 0.39 10*3/mm3      Eosinophils, Absolute 0.00 10*3/mm3      Basophils, Absolute 0.01 10*3/mm3      Immature Grans, Absolute 0.04 10*3/mm3      nRBC 0.5 /100 WBC     Blood Culture - Blood, Arm, Left [[id number]] Collected: 04/10/23 1853    Specimen: Blood from Arm, Left Updated: 04/10/23 1905    POC Glucose Once [[id number]]  (Abnormal) Collected: 04/10/23 1824    Specimen: Blood Updated: 04/10/23 1826     Glucose 285 mg/dL      Comment: Meter: [id number] : [id number] Anna Albarado RN       T4, Free [[id number]]  (Normal) Collected: 04/10/23 1245    Specimen: Blood Updated: 04/10/23 1625     Free T4 0.98 ng/dL     Narrative:      Results may be falsely increased if patient taking Biotin.      Blood Gas, Arterial - [[id number]]  (Abnormal) Collected: 04/10/23 1540    Specimen: Arterial Blood Updated: 04/10/23 1556     Site Arterial: left brachial     Xavire's Test N/A     pH, Arterial 6.884 pH units      Comment: Meter: [id number] : [id number] Moises NoriegaCritical:Notify Dr gonzalez (10-Apr-23 15:54:43)Read back ok        pCO2, Arterial 27.6 mm Hg      pO2, Arterial 92.0 mm Hg      HCO3, Arterial 5.2 mmol/L      Base Excess, Arterial -26.9 mmol/L      O2 Saturation Calculated 88.3 %      Barometric Pressure for Blood Gas 759.9 mmHg      Modality Cannula     Flow Rate 8 lpm           Imaging Results (Last 24  Hours)     Procedure Component Value Units Date/Time    XR Chest 1 View [[id number]] Collected: 04/11/23 0523     Updated: 04/11/23 0523    Narrative:        Patient: HANNAH ALTMAN  Time Out: 05:22  Exam(s): XR CXR 1 VIEW     EXAM:    XR Chest, 1 View    CLINICAL HISTORY:     Reason for exam: Intubated Patient.    TECHNIQUE:    Frontal view of the chest.    COMPARISON:    No relevant prior studies available.    FINDINGS:    Lungs:  Mild bibasilar atelectasis.    Pleural space:  Unremarkable.  No pneumothorax.    Heart:  Unremarkable.  No cardiomegaly.    Mediastinum: Right IJ transvenous pacer in place.  Nasogastric tube   terminates below field-of-view in the left upper quadrant.  Endotracheal   tube terminates 5.0 cm above the level of the ninfa.  Right subclavian   central venous catheter terminates in the mid SVC.    Bones joints:  Unremarkable.    IMPRESSION:       1. Support lines and tubes as above.    2. Mild bibasilar atelectasis.    Impression:          Electronically signed by Carlos Rick M.D. on 04-11-23 at 0522    XR Abdomen KUB [[id number]] Collected: 04/10/23 2341     Updated: 04/10/23 2346    Narrative:      KUB     HISTORY: Cortrak placed     COMPARISON: None     FINDINGS: Feeding tube has been placed and the tip extends in the region  of the gastric antrum and duodenal bulb.     This report was finalized on 4/10/2023 11:43 PM by Dr. Lele Browne M.D.       XR Chest 1 View [[id number]] Collected: 04/10/23 1818     Updated: 04/10/23 1823    Narrative:      PORTABLE CHEST     HISTORY: Assess endotracheal tube.     COMPARISON: Chest x-ray 07/20/2022.     A right subclavian central line is in place with the tip at the junction  of superior vena cava and right atrium. The patient is intubated. The  endotracheal tube is in satisfactory position midway between the  thoracic inlet and ninfa.     The heart is within normal limits in size. Bibasilar  atelectasis/infiltrate is appreciated more  prominent on the left. There  is no evidence of effusion or of congestive failure.     This report was finalized on 4/10/2023 6:20 PM by Dr. Jonny Meyer M.D.              ECG 12 Lead            Component  Ref Range & Units 15:25  (4/10/23) 09:53  (4/10/23) 8 mo ago  (7/19/22) 8 mo ago  (7/19/22) 9 mo ago  (6/25/22) 1 yr ago  (3/15/22)   QT Interval  ms 610 P  582 P  387  390  398  348    Resulting Agency BH ECG BH ECG BH ECG BH ECG BH ECG BH ECG             HEART RATE= 30  bpm  RR Interval= 2000  ms  [place] Interval= 197  ms  P Horizontal Axis=   deg  P Front Axis= 0  deg  QRSD Interval= 115  ms  QT Interval= 610  ms  QRS Axis= 55  deg  T Wave Axis= 19  deg  - ABNORMAL ECG -  Sinus bradycardia  Atrial premature complexes  Nonspecific intraventricular conduction delay             Current Facility-Administered Medications:   •  sennosides-docusate (PERICOLACE) 8.6-50 MG per tablet 2 tablet, 2 tablet, Oral, BID, 2 tablet at 04/11/23 0052 **AND** polyethylene glycol (MIRALAX) packet 17 g, 17 g, Oral, Daily PRN **AND** bisacodyl (DULCOLAX) EC tablet 5 mg, 5 mg, Oral, Daily PRN **AND** bisacodyl (DULCOLAX) suppository 10 mg, 10 mg, Rectal, Daily PRN, Vaibhav Bonilla MD  •  cefTRIAXone (ROCEPHIN) 1 g in sodium chloride 0.9 % 100 mL IVPB-VTB, 1 g, Intravenous, Q24H, Vaibhav Bonilla MD, Last Rate: 200 mL/hr at 04/10/23 2014, 1 g at 04/10/23 2014  •  chlorhexidine (PERIDEX) 0.12 % solution 15 mL, 15 mL, Mouth/Throat, Q12H, Vaibhav Bonilla MD, 15 mL at 04/11/23 0921  •  dextrose (D50W) (25 g/50 mL) IV injection 10-50 mL, 10-50 mL, Intravenous, Q15 Min PRN, Pavan Saucedo Jr., MD  •  dextrose (GLUTOSE) oral gel 15 g, 15 g, Oral, Q15 Min PRN, Pavan Saucedo Jr., MD  •  Enoxaparin Sodium (LOVENOX) syringe 30 mg, 30 mg, Subcutaneous, Q24H, Pavan Saucedo Jr., MD, 30 mg at 04/10/23 1816  •  EPINEPHrine 5 mg in 250 mL NS infusion, 0.02-0.3 mcg/kg/min, Intravenous, Titrated, Vaibhav Bonilla,  MD  •  fentaNYL citrate (PF) (SUBLIMAZE) injection 50 mcg, 50 mcg, Intravenous, Q30 Min PRN, Vaibhav Bonilla MD  •  folic acid 1 mg in sodium chloride 0.9 % 50 mL IVPB, 1 mg, Intravenous, Daily, Vaibhav Bonilla MD, Last Rate: 100 mL/hr at 04/11/23 0919, 1 mg at 04/11/23 0919  •  glucagon (GLUCAGEN) injection 1 mg, 1 mg, Intramuscular, Q15 Min PRN, Pavan Saucedo Jr., MD  •  insulin regular 1 unit/mL in 0.9% sodium chloride (Glucommander), 0-100 Units/hr, Intravenous, Titrated, Pavan Saucedo Jr., MD, Last Rate: 0.3 mL/hr at 04/11/23 1532, 0.3 Units/hr at 04/11/23 1532  •  LORazepam (ATIVAN) injection 0.5 mg, 0.5 mg, Intravenous, Q6H, 0.5 mg at 04/11/23 1031 **FOLLOWED BY** LORazepam (ATIVAN) injection 0.5 mg, 0.5 mg, Intravenous, Q8H, Vaibhav Bonilla MD  •  LORazepam (ATIVAN) tablet 0.5 mg, 0.5 mg, Oral, Q2H PRN **OR** LORazepam (ATIVAN) injection 0.5 mg, 0.5 mg, Intravenous, Q2H PRN **OR** LORazepam (ATIVAN) tablet 1 mg, 1 mg, Oral, Q1H PRN **OR** LORazepam (ATIVAN) injection 1 mg, 1 mg, Intravenous, Q1H PRN **OR** LORazepam (ATIVAN) injection 1 mg, 1 mg, Intravenous, Q15 Min PRN **OR** LORazepam (ATIVAN) injection 1 mg, 1 mg, Intramuscular, Q15 Min PRN **OR** LORazepam (ATIVAN) injection 2 mg, 2 mg, Intravenous, Q1H PRN **OR** LORazepam (ATIVAN) tablet 2 mg, 2 mg, Oral, Q1H PRN, Vaibhav Bonilla MD  •  norepinephrine (LEVOPHED) 8 mg in 250 mL NS infusion (premix), 0.02-0.3 mcg/kg/min, Intravenous, Titrated, Vaibhav Bonilla MD, Last Rate: 31.3 mL/hr at 04/11/23 1035, 0.25 mcg/kg/min at 04/11/23 1035  •  ondansetron (ZOFRAN) injection 4 mg, 4 mg, Intravenous, Q6H PRN, Vaibhav Bonilla MD  •  pantoprazole (PROTONIX) injection 40 mg, 40 mg, Intravenous, Q AM, Vaibhav Bonilla MD, 40 mg at 04/11/23 0543  •  Pharmacy to Dose enoxaparin (LOVENOX), , Does not apply, Continuous PRN, Vaibhav Bonilla MD  •  propofol (DIPRIVAN) infusion 10 mg/mL 100 mL, 5-50 mcg/kg/min,  Intravenous, Titrated, Vaibhav Bonilla MD, Last Rate: 8 mL/hr at 04/11/23 1335, 20 mcg/kg/min at 04/11/23 1335  •  sodium chloride 0.9 % flush 10 mL, 10 mL, Intravenous, Q12H, Vaibhav Bonilla MD, 10 mL at 04/11/23 0920  •  sodium chloride 0.9 % flush 10 mL, 10 mL, Intravenous, PRN, Vaibhav Bonilla MD  •  sodium chloride 0.9 % flush 10 mL, 10 mL, Intravenous, Q12H, Pavan Saucedo Jr., MD, 10 mL at 04/11/23 0920  •  sodium chloride 0.9 % flush 10 mL, 10 mL, Intravenous, PRN, Pavan Saucedo Jr., MD  •  sodium chloride 0.9 % infusion 40 mL, 40 mL, Intravenous, PRN, Vaibhav Bonilla MD  •  sodium chloride 0.9 % infusion 40 mL, 40 mL, Intravenous, PRN, Pavan Saucedo Jr., MD  •  sodium chloride 0.9 % infusion, 100 mL/hr, Intravenous, Continuous, Pavan Saucedo Jr., MD, Last Rate: 100 mL/hr at 04/11/23 0920, 100 mL/hr at 04/11/23 0920  •  thiamine (B-1) 300 mg in sodium chloride 0.9 % 100 mL IVPB, 300 mg, Intravenous, Daily, Vaibhav Bonilla MD, Last Rate: 200 mL/hr at 04/11/23 0919, 300 mg at 04/11/23 0919  •  Vasopressin (PITRESSIN) 20 Units in sodium chloride 0.9 % 100 mL infusion, 0.03 Units/min, Intravenous, Continuous, Vaibhav Bonilla MD     ASSESSMENT  Acute hypoxic respiratory failure  Acute kidney injury  Hypotensive shock  Symptomatic sinus bradycardia status post temporary pacemaker  Hyponatremia  Alcohol hepatitis  History of paroxysmal atrial fibrillation  Coronary artery disease status post PCI  History of hypertension with low blood pressure  Hyperlipidemia  Alcohol abuse  Tobacco abuse  History of prior CVA  Chronic kidney disease stage III  Gastroesophageal reflux disease    PLAN  CPM  ICU care  Vent support  IVF  IV thiamine  Vasopressors as needed  Start tube feeding  Alcohol withdrawal and DTs precautions  Detox medications  CIWA protocol if needed  Cardiology pulmonary and nephrology to follow patient  Discontinue beta-blocker  Stress ulcer DVT  prophylaxis   Supportive care  Discussed with family nursing staff cardiology and pulmonary  Follow closely and further recommendation according to hospital course    JOLIE BUNDY MD    Copied text in this note has been reviewed and is accurate as of 04/11/23

## 2023-04-11 NOTE — CONSULTS
Nutrition Services    Patient Name:  Yuval Loja  YOB: 1950  MRN: [id number]  Admit Date:  4/10/2023  Assessment Date:  04/11/23    Comment: Nutrition Consult for TF assessment.  Dx: respiratory failure on vent, hyponatremia, bradycardia, CAD, HTN, anemia, CRISTIANO on CKD,GERD, ETOH abuse    Labs: glu 176-301, na 126, k 3.4, BUN 45, cr 3.53, alt 490, alb 3.2  IVF's at 100ml/hr  Meds: Thiamine, Levo, Epinephrine, insulin, Vasopressin  Propofol 11.6/306kcals\    Plan/Recommendation:  Will start TF's with Fibersource HN 1.2 at 20ml/hr   No water flushes, MD to manage  TF goal rate is Fibersource HN at 60ml/hr     Will continue to follow clinical course and monitor nutritional needs.     CLINICAL NUTRITION ASSESSMENT      Reason for Assessment Physician Consult, Tube Feeding Assessment      Diagnosis/Problem   respiratory failure on vent, hyponatremia, bradycardia, CAD, HTN, anemia, CRISTIANO on CKD,GERD, ETOH abuse   Medical/Surgical History Past Medical History:   Diagnosis Date   • Alcohol abuse    • Arthritis    • CAD (coronary artery disease)    • Chronic kidney disease, stage 3    • COPD (chronic obstructive pulmonary disease)    • Disease of thyroid gland    • Elevated cholesterol    • Fatty liver, alcoholic    • GERD (gastroesophageal reflux disease)    • History of transfusion    • Hypertensive urgency    • Metabolic acidosis    • Myocardial infarction    • Sinus tachycardia    • Sleep apnea    • Stroke        Past Surgical History:   Procedure Laterality Date   • BACK SURGERY     • CARDIAC CATHETERIZATION     • CARDIAC ELECTROPHYSIOLOGY PROCEDURE N/A 4/10/2023    Procedure: Temporary Pacemaker;  Surgeon: Vaibhav Bonilla MD;  Location: [facility] INVASIVE LOCATION;  Service: Cardiovascular;  Laterality: N/A;   • COLONOSCOPY     • ENDOSCOPY     • FRACTURE SURGERY     • JOINT REPLACEMENT     • SKIN BIOPSY     • TOTAL HIP ARTHROPLASTY Right 06/27/2022    Procedure: TOTAL HIP ARTHROPLASTY ANTERIOR  "WITH HANA TABLE;  Surgeon: Willian Quiles MD;  Location: [facility];  Service: Orthopedics;  Laterality: Right;  Nova, carli. [email or web address]        Encounter Information        Nutrition History:  unknown   Food Preferences:    Supplements:    Factors Affecting Intake: altered respiratory status     Anthropometrics        Current Height  Current Weight  BMI kg/m2 Height: 175.3 cm (69\")  Weight: 77.2 kg (170 lb 3.1 oz) (04/11/23 0510)  Body mass index is 25.13 kg/m².   Adjusted BMI (if applicable)        Admission Weight        Ideal Body Weight (IBW) 70.7 kg   Adjusted IBW (if applicable)        Usual Body Weight (UBW) See wt hx   Weight Change/Trend Variable wt's       Weight History Wt Readings from Last 30 Encounters:   04/11/23 0510 77.2 kg (170 lb 3.1 oz)   04/10/23 0818 66.7 kg (147 lb)   09/24/22 1658 66.7 kg (147 lb)   07/21/22 0850 66.7 kg (147 lb)   07/19/22 1551 67 kg (147 lb 11.3 oz)   07/19/22 1516 65.7 kg (144 lb 12.8 oz)   06/26/22 0500 65.6 kg (144 lb 9.6 oz)   06/25/22 2252 69.3 kg (152 lb 12.8 oz)   03/16/22 0750 65.8 kg (145 lb)   03/15/22 0455 65.8 kg (145 lb)   07/01/20 1053 56.2 kg (124 lb)   05/27/20 1526 56.2 kg (124 lb)   04/19/20 0535 77.6 kg (171 lb 1.2 oz)   04/18/20 1310 79.4 kg (175 lb)   10/26/19 1534 73.7 kg (162 lb 6.4 oz)   04/17/19 1600 72.1 kg (159 lb)   05/23/17 1843 72.6 kg (160 lb)   05/23/17 1141 77.1 kg (170 lb)   06/20/16 2123 74.8 kg (165 lb)           --  Tests/Procedures        Tests/Procedures X-Ray     Labs       Pertinent Labs    Results from last 7 days   Lab Units 04/11/23  0420 04/10/23  1853 04/10/23  1021   SODIUM mmol/L 126* 129* 123*   POTASSIUM mmol/L 3.4* 5.2 5.2   CHLORIDE mmol/L 85* 90* 91*   CO2 mmol/L 20.2* 8.6* 9.4*   BUN mg/dL 45* 45* 38*   CREATININE mg/dL 3.53* 3.80* 3.01*   CALCIUM mg/dL 8.0* 8.1* 9.5   BILIRUBIN mg/dL 0.9 2.0* 0.6   ALK PHOS U/L 128* 138* 103   ALT (SGPT) U/L 490* 448* 248*   AST (SGOT) U/L 1,648* 995* 447*   GLUCOSE mg/dL 312* " 266* 90     Results from last 7 days   Lab Units 04/11/23  0420 04/10/23  1140 04/10/23  1021   MAGNESIUM mg/dL  --   --  2.3   HEMOGLOBIN g/dL 9.1*   < >  --    HEMATOCRIT % 27.8*   < >  --    WBC 10*3/mm3 4.21   < >  --    TRIGLYCERIDES mg/dL 62  --   --    ALBUMIN g/dL 3.2*   < > 3.9    < > = values in this interval not displayed.     Results from last 7 days   Lab Units 04/11/23  0420 04/10/23  1853 04/10/23  1140   INR   --  1.40*  --    PLATELETS 10*3/mm3 178 194 237     SARS-CoV-2, CLARICE   Date Value Ref Range Status   10/11/2022 Negative Negative Final     Comment:     The 2019-CoV rRT-PCR Assay is only for use under a Food and Drug Administration Emergency Use Authorization. The performance characteristics of the assay were verified by the Clinical Microbiology Laboratory at the [facility].   Results should be used in conjunction with the patient's clinical symptoms, medical history, and other clinical/laboratory findings to determine an overall clinical diagnosis. Negative results do not preclude infection with SARS-CoV-2 (COVID-19).    Test parameters have not been validated for screening asymptomatic patients.     Lab Results   Component Value Date    HGBA1C 4.50 (L) 04/11/2023          Medications           Scheduled Medications cefTRIAXone, 1 g, Intravenous, Q24H  chlorhexidine, 15 mL, Mouth/Throat, Q12H  enoxaparin, 30 mg, Subcutaneous, Q24H  famotidine, 20 mg, Intravenous, Nightly  folic acid (FOLVITE) IVPB, 1 mg, Intravenous, Daily  LORazepam, 0.5 mg, Intravenous, Q6H   Followed by  LORazepam, 0.5 mg, Intravenous, Q8H  pantoprazole, 40 mg, Intravenous, Q AM  senna-docusate sodium, 2 tablet, Oral, BID  sodium chloride, 10 mL, Intravenous, Q12H  sodium chloride, 10 mL, Intravenous, Q12H  thiamine (VITAMIN B1) IVPB, 300 mg, Intravenous, Daily       Infusions EPINEPHrine, 0.02-0.3 mcg/kg/min  insulin, 0-100 Units/hr  norepinephrine, 0.02-0.3 mcg/kg/min, Last Rate: 0.2 mcg/kg/min  "(04/11/23 0855)  Pharmacy to Dose enoxaparin (LOVENOX),   propofol, 5-50 mcg/kg/min, Last Rate: 10 mcg/kg/min (04/11/23 0851)  sodium chloride, 100 mL/hr  vasopressin, 0.03 Units/min       PRN Medications •  senna-docusate sodium **AND** polyethylene glycol **AND** bisacodyl **AND** bisacodyl  •  dextrose  •  dextrose  •  fentaNYL citrate (PF)  •  glucagon (human recombinant)  •  LORazepam **OR** LORazepam **OR** LORazepam **OR** LORazepam **OR** LORazepam **OR** LORazepam **OR** LORazepam **OR** LORazepam  •  ondansetron  •  Pharmacy to Dose enoxaparin (LOVENOX)  •  sodium chloride  •  sodium chloride  •  sodium chloride  •  sodium chloride     Physical Findings          Physical Appearance ventilator support, bed bugs noted on adm   Oral/Mouth Cavity WNL   Edema  no edema   Gastrointestinal normoactive   Skin  skin intact   Tubes/Drains Cortrak, NG tube, placed by RN   NFPE Not applicable at this time   --  Current Nutrition Orders & Evaluation of Intake       Oral Nutrition     Food Allergies NKFA   Current PO Diet NPO Diet NPO Type: Strict NPO   Supplement n/a   PO Evaluation     % PO Intake     # of Days Evaluated    -  Estimated/Assessed Needs       Energy Requirements    Height for Calculation  Height: 175.3 cm (69\")   Weight for Calculation 77.2 kg   Method for Estimation  25 kcal/kg, 30 kcal/kg   EST Needs (kcal/day) 4961-2743       Protein Requirements    Weight for Calculation 77.2 kg   EST Protein Needs (g/kg) 1.0 - 1.2 gm/kg   EST Daily Needs (g/day) 77-92       Fluid Requirements     Method for Estimation Defer to physician    Estimated Needs (mL/day)    -  PES STATEMENT / NUTRITION DIAGNOSIS      Nutrition Dx Problem  Problem: Needs Alternative Route  Etiology: Medical Diagnosis and Factors Affecting Nutrition Respiratory Failure on the vent  Signs/Symptoms: NPO    Comment:    --  NUTRITION INTERVENTION / PLAN OF CARE      Intervention Goal(s) Maintain nutrition status, Nutrition support treatment, " Improved nutrition related labs, Reduce/improve symptoms, Meet estimated needs, Disease management/therapy, Initiate TF/PN, Tolerate TF/PN at goal and Maintain weight         RD Intervention/Action Follow Tx Progress and Care plan reviewed         Prescription/Orders:       PO Diet       Supplements       Snacks       Enteral Nutrition       Parenteral Nutrition    New Prescription Ordered? Yes   -   Enteral Prescription:     Enteral Route NG    TF Delivery Method Continuous    Enteral Product Isosource HN    Modular None    Propofol Rate/Kcal 11.6/306kcals    TF Start Rate (mL/hr) 20    TF Goal Rate (mL/hr) 60    Free Water Flush (mL) none    TF Provision at Goal: 1728kcals plus propofol kcal, 77gm protein, 1166mL free water + 0mL water flushes         Calories 100% needs met         Protein  100 % needs met         Fluid (mL) 1166 mL total     Prescription Ordered Yes   -      Monitor/Evaluation Per protocol, I&O, Pertinent labs, EN delivery/tolerance, PN delivery/tolerance, Weight, Skin status, GI status, Symptoms, POC/GOC   Discharge Plan/Needs Pending clinical course   Education Will instruct as appropriate   --    RD to follow per protocol.      Electronically signed by:  Kitty Aguilar RD  04/11/23 09:03 EDT

## 2023-04-11 NOTE — PROGRESS NOTES
Dr. Bonilla and I checked on patient this morning. Pacemaker is functioning well, low thresholds. He does have underlying rhythm in the 50s, appears to be junctional. Will continue to follow along and reassess need for permanent pacemaker as overall status improves.

## 2023-04-11 NOTE — PROGRESS NOTES
LOS: 1 day   Patient Care Team:  Alessia Prescott APRN as PCP - General (Family Medicine)  Jonny Bains MD as Referring Physician (Internal Medicine)  Carlos Villareal MD as Consulting Physician (Hematology and Oncology)    Subjective     Patient is intubated he is sedated on propofol he is not following verbal he does grimace and withdrawal to noxious stimuli resist eye opening.    Review of Systems:          Objective     Vital Signs  Vital Sign Min/Max for last 24 hours  Temp  Min: 93.2 °F (34 °C)  Max: 100.1 °F (37.8 °C)   BP  Min: 54/35  Max: 123/52   Pulse  Min: 25  Max: 93   Resp  Min: 16  Max: 28   SpO2  Min: 59 %  Max: 100 %   No data recorded   Weight  Min: 66.7 kg (147 lb)  Max: 77.2 kg (170 lb 3.1 oz)        Ventilator/Non-Invasive Ventilation Settings (From admission, onward)     Start     Ordered    04/10/23 1637  Ventilator - AC/VC; Other; 28; 100%; SpO2 >/= 90%; 5; mL; 600  Continuous        Question Answer Comment   Vent Mode AC/VC    Rate Other    Rate 28    FiO2 100%    Titrate FiO2 to Keep SpO2 >/= 90%    PEEP 5    Tidal Volume mL            04/10/23 1647                             Body mass index is 25.13 kg/m².  No intake/output data recorded.  I/O this shift:  In: 3436 [I.V.:3381; NG/GT:55]  Out: 300 [Urine:300]        Physical Exam:  General Appearance: Well-developed black male he is intubated with an 8 endotracheal tube at about 24 cm at the lips he is on assist-control of 14 tidal volume 600 PEEP of 5 FiO2 50% SPO2 is 100% peak airway pressures are about 23 cm he is on propofol at 25 mics and he is on norepinephrine at 0.04 mics per kilogram per minute.  Eyes: Conjunctiva are clear and anicteric pupils are about 4 mm equal and reactive to light bilateral arcus senilis  ENT: He has the oral endotracheal tube in nasoenteric tube in place mucous membranes are little dry  Neck: Trachea midline I do not see any jugular venous tension there is a right IJ cordis type  catheter with a transvenous pacemaker site looks okay  Lungs: Clear bilaterally no wheezes no rales no rhonchi symmetric expansion  Cardiac: Regular rate and rhythm rates at 80 looks to be 100% paced I did not turn the pacer down to evaluate intrinsic rate  Abdomen: Soft no palpable hepatosplenomegaly or masses rare bowel sounds  : Sahu catheter dependent drainage there is a little bit of clear darker yellow urine in the urometer  Musculoskeletal: Grossly normal there is a right subclavian central venous catheter site looks good  Skin: Warm and dry no jaundice no petechiae his core temperature is 98.6 currently  Neuro: Again he is sedated with propofol he is not following verbal but he is withdrawing all extremities to nailbed pressure and grimacing resisting eye opening  Extremities/P Vascular: No clubbing no cyanosis no edema importantly he has strong distal pulses in all 4 extremities today  MSE: Unable to assess       Labs:  Results from last 7 days   Lab Units 04/11/23  0420 04/10/23  1853 04/10/23  1021   GLUCOSE mg/dL 312* 266* 90   SODIUM mmol/L 126* 129* 123*   POTASSIUM mmol/L 3.4* 5.2 5.2   MAGNESIUM mg/dL  --   --  2.3   CO2 mmol/L 20.2* 8.6* 9.4*   CHLORIDE mmol/L 85* 90* 91*   ANION GAP mmol/L 20.8* 30.4* 22.6*   CREATININE mg/dL 3.53* 3.80* 3.01*   BUN mg/dL 45* 45* 38*   BUN / CREAT RATIO  12.7 11.8 12.6   CALCIUM mg/dL 8.0* 8.1* 9.5   ALK PHOS U/L 128* 138* 103   TOTAL PROTEIN g/dL 6.2 6.0 7.2   ALT (SGPT) U/L 490* 448* 248*   AST (SGOT) U/L 1,648* 995* 447*   BILIRUBIN mg/dL 0.9 2.0* 0.6   ALBUMIN g/dL 3.2* 3.5 3.9   GLOBULIN gm/dL 3.0 2.5  --      Estimated Creatinine Clearance: 20 mL/min (A) (by C-G formula based on SCr of 3.53 mg/dL (H)).      Results from last 7 days   Lab Units 04/11/23  0420 04/10/23  1853 04/10/23  1140   WBC 10*3/mm3 4.21 6.15 6.07   RBC 10*6/mm3 2.88* 2.93* 3.37*   HEMOGLOBIN g/dL 9.1* 9.2* 10.7*   HEMATOCRIT % 27.8* 29.0* 32.3*   MCV fL 96.5 99.0* 95.8   MCH pg 31.6  31.4 31.8   MCHC g/dL 32.7 31.7 33.1   RDW % 15.8* 15.6* 15.0   RDW-SD fl 55.1* 55.6* 52.6   MPV fL 10.1 9.8 9.6   PLATELETS 10*3/mm3 178 194 237   NEUTROPHIL % % 90.1* 83.2* 77.8*   LYMPHOCYTE % % 5.7* 9.6* 10.2*   MONOCYTES % % 4.0* 6.3 11.5   EOSINOPHIL % % 0.0* 0.0* 0.0*   BASOPHIL % % 0.0 0.2 0.2   IMM GRAN % % 0.2 0.7* 0.3   NEUTROS ABS 10*3/mm3 3.79 5.12 4.72   LYMPHS ABS 10*3/mm3 0.24* 0.59* 0.62*   MONOS ABS 10*3/mm3 0.17 0.39 0.70   EOS ABS 10*3/mm3 0.00 0.00 0.00   BASOS ABS 10*3/mm3 0.00 0.01 0.01   IMMATURE GRANS (ABS) 10*3/mm3 0.01 0.04 0.02   NRBC /100 WBC 0.7* 0.5* 0.5*     Results from last 7 days   Lab Units 04/11/23  0448   PH, ARTERIAL pH units 7.581*   PO2 ART mm Hg 403.1*   PCO2, ARTERIAL mm Hg 22.3*   HCO3 ART mmol/L 20.9*     Results from last 7 days   Lab Units 04/10/23  1245 04/10/23  1021   HSTROP T ng/L 51* 71*     Results from last 7 days   Lab Units 04/10/23  1853   PROBNP pg/mL 5,209.0*     Results from last 7 days   Lab Units 04/11/23  0420 04/10/23  1245   TSH uIU/mL 1.380  --    FREE T4 ng/dL  --  0.98     Results from last 7 days   Lab Units 04/11/23  0420 04/11/23  0145 04/10/23  2242 04/10/23  1853   LACTATE mmol/L 9.0* 9.0* 9.0* 11.6*   PROCALCITONIN ng/mL  --   --   --  0.98*     Results from last 7 days   Lab Units 04/10/23  1853   INR  1.40*     Microbiology Results (last 10 days)     ** No results found for the last 240 hours. **              cefTRIAXone, 1 g, Intravenous, Q24H  chlorhexidine, 15 mL, Mouth/Throat, Q12H  enoxaparin, 30 mg, Subcutaneous, Q24H  famotidine, 20 mg, Intravenous, Nightly  folic acid (FOLVITE) IVPB, 1 mg, Intravenous, Daily  LORazepam, 0.5 mg, Intravenous, Q6H   Followed by  LORazepam, 0.5 mg, Intravenous, Q8H  pantoprazole, 40 mg, Intravenous, Q AM  Phenylephrine HCl (Pressors), 200 mcg, Intravenous, Once  senna-docusate sodium, 2 tablet, Oral, BID  sodium chloride, 10 mL, Intravenous, Q12H  sodium chloride, 10 mL, Intravenous, Q12H  thiamine  (VITAMIN B1) IVPB, 300 mg, Intravenous, Daily      DOPamine, 2-20 mcg/kg/min, Last Rate: 8 mcg/kg/min (04/11/23 0651)  EPINEPHrine, 0.02-0.3 mcg/kg/min  insulin, 0-100 Units/hr, Last Rate: 5.8 Units/hr (04/11/23 0645)  norepinephrine, 0.02-0.3 mcg/kg/min  Pharmacy to Dose enoxaparin (LOVENOX),   propofol, 5-50 mcg/kg/min, Last Rate: 25 mcg/kg/min (04/11/23 0405)  sodium bicarbonate, 150 mEq, Last Rate: Stopped (04/11/23 0645)  vasopressin, 0.03 Units/min        Diagnostics:  XR Chest 1 View    Result Date: 4/11/2023  Patient: HANNAH ALTMAN  Time Out: 05:22 Exam(s): XR CXR 1 VIEW EXAM:   XR Chest, 1 View CLINICAL HISTORY:    Reason for exam: Intubated Patient. TECHNIQUE:   Frontal view of the chest. COMPARISON:   No relevant prior studies available. FINDINGS:   Lungs:  Mild bibasilar atelectasis.   Pleural space:  Unremarkable.  No pneumothorax.   Heart:  Unremarkable.  No cardiomegaly.   Mediastinum: Right IJ transvenous pacer in place.  Nasogastric tube terminates below field-of-view in the left upper quadrant.  Endotracheal tube terminates 5.0 cm above the level of the ninfa.  Right subclavian central venous catheter terminates in the mid SVC.   Bones joints:  Unremarkable. IMPRESSION:     1. Support lines and tubes as above. 2. Mild bibasilar atelectasis.    Electronically signed by Carlos Rick M.D. on 04-11-23 at 0522    XR Chest 1 View    Result Date: 4/10/2023  PORTABLE CHEST  HISTORY: Assess endotracheal tube.  COMPARISON: Chest x-ray 07/20/2022.  A right subclavian central line is in place with the tip at the junction of superior vena cava and right atrium. The patient is intubated. The endotracheal tube is in satisfactory position midway between the thoracic inlet and ninfa.  The heart is within normal limits in size. Bibasilar atelectasis/infiltrate is appreciated more prominent on the left. There is no evidence of effusion or of congestive failure.  This report was finalized on 4/10/2023 6:20 PM by  Dr. Jonny Meyer M.D.      EP/CRM Study    Result Date: 4/10/2023  Placement of temporary pacemaker and central venous line.     XR Abdomen KUB    Result Date: 4/10/2023  KUB  HISTORY: Cortrak placed  COMPARISON: None  FINDINGS: Feeding tube has been placed and the tip extends in the region of the gastric antrum and duodenal bulb.  This report was finalized on 4/10/2023 11:43 PM by Dr. Lele Browne M.D.      XR Hip With or Without Pelvis 2 - 3 View Right    Result Date: 4/10/2023  XR HIP W OR WO PELVIS 2-3 VIEW RIGHT-  04/10/2023  HISTORY: Fell, right hip pain.  A right hip prosthesis is seen in good position. There is vascular calcification.  No fractures or other acute abnormalities are seen in the pelvis and right hip.  This report was finalized on 4/10/2023 8:55 AM by Dr. Unruly Finley M.D.      Results for orders placed during the hospital encounter of 07/19/22    Adult Transthoracic Echo Complete W/ Cont if Necessary Per Protocol    Interpretation Summary  · Left ventricular wall thickness is consistent with borderline concentric hypertrophy.  · Calculated left ventricular EF = 56.9% Estimated left ventricular EF was in agreement with the calculated left ventricular EF. Left ventricular systolic function is normal.  · Left ventricular diastolic function was normal.  · The aortic valve is abnormal in structure. The aortic valve exhibits sclerosis. A bicuspid aortic valve cannot be excluded. Trace to mild aortic valve regurgitation is present. No aortic valve stenosis is present.      Chest x-ray reviewed endotracheal tube in good position tip few centimeters above the main ninfa there is a right IJ cordis catheter terminating in the right ventricle pacing lead there is a right subclavian central venous catheter terminating at the cavoatrial junction and there is a enteral tube terminating below the diaphragm there is bibasilar atelectasis right greater than left    Active Hospital Problems     Diagnosis  POA   • **Sinus bradycardia [R00.1]  Yes      Resolved Hospital Problems   No resolved problems to display.         Assessment & Plan     1. Complete heart block not responding to atropine or dopamine patient now has transvenous pacemaker cardiology following  2. Shock improving at least a major portion of bleed was cardiogenic but he is now paced and still requiring some vasopressors, I am going to check an echocardiogram to evaluate LV function.  I am going to continue treatment for possible sepsis although a definitive source has not been found.  Thyroid functions were normal  3. Hypothermia resolved  4. Severe metabolic acidosis much improved stopping bicarbonate drip decreasing ventilation now that he is alkalotic some still has significant lactic acidosis that has improved some continue to follow lactic acid  5. Acute kidney injury creatinine continue to rise sort of plateauing and may be dropping a little he is making some urine I am sure some of this is ATN related to his hypotension nephrology consulted  6. Hyponatremia persists even correcting for glucose changing from bicarbonate drip to a saline drip and will continue to follow sodium  7. Elevated liver function test there certainly could be an element of alcoholic hepatitis but I suspect with the rise this is ischemic hepatitis.  Acute viral hepatitis panel was negative treatment is supportive at this point in time  8. Anemia labs suggest more anemia of chronic disease hemoglobin is actually pretty stable we will continue to monitor  9. Alcoholism with alcohol abuse his alcohol level was positive on presentation we will have to put him on the alcohol withdrawal protocol administer thiamine and folate.    He has history of recurrent DVTs so he may be a challenge.  10. Fluids/electrolytes/nutrition I will start some tube feeds to protect gut integrity  11. Respiratory patient intubated we will going to continue weaning O2 I am going to increase  his PEEP a little bit just to try and recruit some of that atelectatic lung  12. Metabolic encephalopathy yesterday as we started getting perfusion and things corrected he started getting very wild and moving all extremities he is clearly not focal I think as we get his electrolytes acid-base status further corrected he will improve it may be a little difficult with his alcohol withdrawal and according to his family he can be very difficult when he is going through alcohol withdrawal we may have to to maintain significant sedation.  13. Possible sepsis.  I have not seen a definitive source I do not see any definite pneumonia as white counts been normal the procalcitonin was minimally elevated in the presence of acute renal failure this is not very definitive.  We still do not have a urinalysis to look at have reordered.  We have cultures pending I think we discontinue the Rocephin while we evaluate.  14. Lactic acidosis he really has not made tremendous improvement over the last 8 to 12 hours is a little concerning for sure I am going to continue to trend if this does not improve or goes up then we may have to worry about ischemic gout etc. his abdominal exam is benign right now however.  May also just be hard to clear the lactate with his multiorgan dysfunction.  15. Severe hyperglycemia hemoglobin A1c does not suggest diabetes this is probably all stress response etc. he is on insulin drip will continue and monitor  16. Fluids/electrolytes/nutrition and go ahead and start tube feeds to protect gut integrity              Plan for disposition:      Pavan Saucedo Jr, MD  04/11/23  06:52 EDT    Time: Critical care time 44 minutes

## 2023-04-11 NOTE — PROGRESS NOTES
LOS: 1 day   Patient Care Team:  Alessia Prescott APRN as PCP - General (Family Medicine)  Jonny Bains MD as Referring Physician (Internal Medicine)  Carlos Villareal MD as Consulting Physician (Hematology and Oncology)    Chief Complaint: Follow-up sinus bradycardia and junctional bradycardia, coronary artery disease, elevated high-sensitivity troponin.    Interval History: Doing better in general.  Still on minimal Levophed.  Paced at 80 with underlying sinus and junctional rhythm in the 50s.  Daughter is in the room.  He remains intubated.    Vital Signs:  Temp:  [93.2 °F (34 °C)-100.1 °F (37.8 °C)] 98.7 °F (37.1 °C)  Heart Rate:  [25-93] 80  Resp:  [14-28] 14  BP: ()/(35-78) 114/54  FiO2 (%):  [30 %-100 %] 30 %    Intake/Output Summary (Last 24 hours) at 4/11/2023 1407  Last data filed at 4/11/2023 0600  Gross per 24 hour   Intake 3436 ml   Output 300 ml   Net 3136 ml       Physical Exam:   General Appearance:    Intubated, sedated.   Lungs:     Clear to auscultation bilaterally anteriorly.    Heart:    Regular rhythm and normal rate (paced).  No murmurs, gallops, or rubs.   Abdomen:     Soft, nontender, nondistended.    Extremities:   No clubbing, cyanosis, or edema.     Results Review:    Results from last 7 days   Lab Units 04/11/23  0420   SODIUM mmol/L 126*   POTASSIUM mmol/L 3.4*   CHLORIDE mmol/L 85*   CO2 mmol/L 20.2*   BUN mg/dL 45*   CREATININE mg/dL 3.53*   GLUCOSE mg/dL 312*   CALCIUM mg/dL 8.0*     Results from last 7 days   Lab Units 04/10/23  1245 04/10/23  1021   HSTROP T ng/L 51* 71*     Results from last 7 days   Lab Units 04/11/23  0420   WBC 10*3/mm3 4.21   HEMOGLOBIN g/dL 9.1*   HEMATOCRIT % 27.8*   PLATELETS 10*3/mm3 178     Results from last 7 days   Lab Units 04/10/23  1853   INR  1.40*     Results from last 7 days   Lab Units 04/11/23  0420   CHOLESTEROL mg/dL 112     Results from last 7 days   Lab Units 04/10/23  1021   MAGNESIUM mg/dL 2.3     Results from last 7 days    Lab Units 04/11/23  0420   CHOLESTEROL mg/dL 112   TRIGLYCERIDES mg/dL 62   HDL CHOL mg/dL 74*   LDL CHOL mg/dL 24       I reviewed the patient's new clinical results.        Assessment:  1.  Sinus bradycardia and junctional bradycardia, requiring transvenous pacemaker on 4/10/2023.  2.  Acute hypoxic respiratory failure  3.  Shock, likely multifactorial   4.  Acute kidney injury with stage III chronic kidney disease  5.  Severe metabolic acidosis  6.  Hypothermia, resolved  7.  Hyponatremia  8.  Multifactorial weakness and nausea  9.  Heavy alcohol use with elevated alcohol level on admission   10.  Acute transaminitis  11.  History of paroxysmal atrial fibrillation  12.  Coronary artery disease, status post stenting to the RCA in 2000 and in the LAD in 2006  13.  History of prior stroke  14.  Elevated high-sensitivity troponin, likely secondary to renal dysfunction    Plan:  -Dr. Bonilla following from EP for temporary pacer.  He is still having sinus bradycardia and junctional bradycardia in the 50s underlying the pacer.  Rate is set at 80.    -Transaminitis is worse.  Not sure if this is from shock versus alcoholic hepatitis.  This may be a combination.  Hopefully, with better perfusion, this will improve.    -Still suspect that the bradycardia is secondary to metabolic issues.    -Echocardiogram pending for later today.  Will review.    -Wean Levophed as tolerated.  Nephrology consulted.    -TSH is normal.    Paul Schwarz MD  04/11/23  14:07 EDT

## 2023-04-11 NOTE — NURSING NOTE
On 04/10/2023 around 1500 pt trasfered from 6th floor as rapid response via bed. Temp 93.2 rectally. Initiate warming blanket and warm fluids.HR around 30's given 1 atropin. Dr. Saucedo at the bedside. Dopamine initiated. Emergent intubation performed. Notified cardiologist. and pt was taken to cath lab immediately.   Around  1800 pt back from cathlab with temporary pacemaker. A & Ox1 follows simple command. Central line in place. Send 2 sets of blood culture and urine sample. Sahu in place. Vital stable. Will keep monitoring.

## 2023-04-11 NOTE — PROGRESS NOTES
Discharge Planning Assessment  [facility]     Patient Name: Yuval Loja  MRN: [id number]  Today's Date: 4/11/2023    Admit Date: 4/10/2023    Plan: Plan is undetermined  Following for therapy evaluations   Discharge Needs Assessment     Row Name 04/11/23 1424       Living Environment    People in Home alone    Current Living Arrangements home    Potentially Unsafe Housing Conditions none    Primary Care Provided by self    Provides Primary Care For no one    Family Caregiver if Needed none    Quality of Family Relationships supportive;involved;helpful    Able to Return to Prior Arrangements yes       Resource/Environmental Concerns    Resource/Environmental Concerns none    Transportation Concerns none       Food Insecurity    Within the past 12 months, you worried that your food would run out before you got the money to buy more. Never true    Within the past 12 months, the food you bought just didn't last and you didn't have money to get more. Never true       Transition Planning    Patient/Family Anticipates Transition to home    Patient/Family Anticipated Services at Transition none    Transportation Anticipated family or friend will provide       Discharge Needs Assessment    Equipment Currently Used at Home bp cuff;walker, standard;cane, straight    Concerns to be Addressed discharge planning    Equipment Needed After Discharge other (see comments)               Discharge Plan     Row Name 04/11/23 1425       Plan    Plan Plan is undetermined  Following for therapy evaluations    Plan Comments IMM noted. CCP spoke to patient’s daughter Torri [phone] via telephone.  CCP role explained.  Face sheet verified.  Discharge planning discussed.   Pt’s daughter is his emergency contact.    [facility] [address]  Pt lives a one story house alone.  He is independent with ADL’s.  He uses a cane to ambulate.  He has no rehab history.  He has no HH history.  Plan is  undetermined .   CCP following for therapy evaluations              Continued Care and Services - Admitted Since 4/10/2023    Coordination has not been started for this encounter.          Demographic Summary    No documentation.                Functional Status    No documentation.                Psychosocial    No documentation.                Abuse/Neglect    No documentation.                Legal    No documentation.                Substance Abuse    No documentation.                Patient Forms    No documentation.                   Phyllis Beth RN

## 2023-04-11 NOTE — SIGNIFICANT NOTE
04/11/23 0801   Readiness to Wean Daily Screen   Daily Screen Complete Y   Daily Screen Outcome fail  (not following commands;  propofol running as well as pressor)

## 2023-04-11 NOTE — PLAN OF CARE
Problem: Aspiration (Enteral Nutrition)  Goal: Absence of Aspiration Signs and Symptoms  Outcome: Ongoing, Progressing     Problem: Device-Related Complication Risk (Enteral Nutrition)  Goal: Safe, Effective Therapy Delivery  Outcome: Ongoing, Progressing     Problem: Feeding Intolerance (Enteral Nutrition)  Goal: Feeding Tolerance  Outcome: Ongoing, Progressing   Goal Outcome Evaluation:      TF's per MD order  RD to follow

## 2023-04-12 ENCOUNTER — APPOINTMENT (OUTPATIENT)
Dept: GENERAL RADIOLOGY | Facility: HOSPITAL | Age: 73
End: 2023-04-12
Payer: MEDICARE

## 2023-04-12 LAB
ALBUMIN SERPL-MCNC: 3 G/DL (ref 3.5–5.2)
ALBUMIN/GLOB SERPL: 1.1 G/DL
ALP SERPL-CCNC: 131 U/L (ref 39–117)
ALT SERPL W P-5'-P-CCNC: 498 U/L (ref 1–41)
ANION GAP SERPL CALCULATED.3IONS-SCNC: 8.1 MMOL/L (ref 5–15)
ARTERIAL PATENCY WRIST A: POSITIVE
ARTERIAL PATENCY WRIST A: POSITIVE
AST SERPL-CCNC: 695 U/L (ref 1–40)
ATMOSPHERIC PRESS: 753.8 MMHG
ATMOSPHERIC PRESS: 756.4 MMHG
BASE EXCESS BLDA CALC-SCNC: 3.9 MMOL/L (ref 0–2)
BASE EXCESS BLDA CALC-SCNC: 6.1 MMOL/L (ref 0–2)
BASOPHILS # BLD AUTO: 0.01 10*3/MM3 (ref 0–0.2)
BASOPHILS NFR BLD AUTO: 0.1 % (ref 0–1.5)
BDY SITE: ABNORMAL
BDY SITE: ABNORMAL
BILIRUB SERPL-MCNC: 0.4 MG/DL (ref 0–1.2)
BUN SERPL-MCNC: 41 MG/DL (ref 8–23)
BUN/CREAT SERPL: 14.5 (ref 7–25)
CALCIUM SPEC-SCNC: 7.8 MG/DL (ref 8.6–10.5)
CHLORIDE SERPL-SCNC: 99 MMOL/L (ref 98–107)
CO2 SERPL-SCNC: 28.9 MMOL/L (ref 22–29)
CREAT SERPL-MCNC: 2.83 MG/DL (ref 0.76–1.27)
D-LACTATE SERPL-SCNC: 1.3 MMOL/L (ref 0.5–2)
D-LACTATE SERPL-SCNC: 1.4 MMOL/L (ref 0.5–2)
DEPRECATED RDW RBC AUTO: 53.4 FL (ref 37–54)
EGFRCR SERPLBLD CKD-EPI 2021: 22.7 ML/MIN/1.73
EOSINOPHIL # BLD AUTO: 0.02 10*3/MM3 (ref 0–0.4)
EOSINOPHIL NFR BLD AUTO: 0.3 % (ref 0.3–6.2)
ERYTHROCYTE [DISTWIDTH] IN BLOOD BY AUTOMATED COUNT: 15.8 % (ref 12.3–15.4)
GLOBULIN UR ELPH-MCNC: 2.7 GM/DL
GLUCOSE BLDC GLUCOMTR-MCNC: 108 MG/DL (ref 70–130)
GLUCOSE BLDC GLUCOMTR-MCNC: 139 MG/DL (ref 70–130)
GLUCOSE BLDC GLUCOMTR-MCNC: 144 MG/DL (ref 70–130)
GLUCOSE BLDC GLUCOMTR-MCNC: 152 MG/DL (ref 70–130)
GLUCOSE BLDC GLUCOMTR-MCNC: 157 MG/DL (ref 70–130)
GLUCOSE BLDC GLUCOMTR-MCNC: 181 MG/DL (ref 70–130)
GLUCOSE SERPL-MCNC: 145 MG/DL (ref 65–99)
HCO3 BLDA-SCNC: 27.5 MMOL/L (ref 22–28)
HCO3 BLDA-SCNC: 30.3 MMOL/L (ref 22–28)
HCT VFR BLD AUTO: 26.8 % (ref 37.5–51)
HGB BLD-MCNC: 8.8 G/DL (ref 13–17.7)
IMM GRANULOCYTES # BLD AUTO: 0.07 10*3/MM3 (ref 0–0.05)
IMM GRANULOCYTES NFR BLD AUTO: 0.9 % (ref 0–0.5)
INHALED O2 CONCENTRATION: 30 %
INHALED O2 CONCENTRATION: 30 %
LYMPHOCYTES # BLD AUTO: 0.59 10*3/MM3 (ref 0.7–3.1)
LYMPHOCYTES NFR BLD AUTO: 7.7 % (ref 19.6–45.3)
MCH RBC QN AUTO: 30.7 PG (ref 26.6–33)
MCHC RBC AUTO-ENTMCNC: 32.8 G/DL (ref 31.5–35.7)
MCV RBC AUTO: 93.4 FL (ref 79–97)
MODALITY: ABNORMAL
MODALITY: ABNORMAL
MONOCYTES # BLD AUTO: 0.29 10*3/MM3 (ref 0.1–0.9)
MONOCYTES NFR BLD AUTO: 3.8 % (ref 5–12)
NEUTROPHILS NFR BLD AUTO: 6.73 10*3/MM3 (ref 1.7–7)
NEUTROPHILS NFR BLD AUTO: 87.2 % (ref 42.7–76)
NRBC BLD AUTO-RTO: 0.8 /100 WBC (ref 0–0.2)
O2 A-A PPRESDIFF RESPIRATORY: 0.4 MMHG
O2 A-A PPRESDIFF RESPIRATORY: 0.4 MMHG
PCO2 BLDA: 36.8 MM HG (ref 35–45)
PCO2 BLDA: 41.4 MM HG (ref 35–45)
PEEP RESPIRATORY: 10 CM[H2O]
PEEP RESPIRATORY: 5 CM[H2O]
PH BLDA: 7.47 PH UNITS (ref 7.35–7.45)
PH BLDA: 7.48 PH UNITS (ref 7.35–7.45)
PLATELET # BLD AUTO: 163 10*3/MM3 (ref 140–450)
PMV BLD AUTO: 10.3 FL (ref 6–12)
PO2 BLDA: 76.8 MM HG (ref 80–100)
PO2 BLDA: 80.4 MM HG (ref 80–100)
POTASSIUM SERPL-SCNC: 3.4 MMOL/L (ref 3.5–5.2)
PROT SERPL-MCNC: 5.7 G/DL (ref 6–8.5)
RBC # BLD AUTO: 2.87 10*6/MM3 (ref 4.14–5.8)
SAO2 % BLDCOA: 96 % (ref 92–99)
SAO2 % BLDCOA: 96.7 % (ref 92–99)
SET MECH RESP RATE: 12
SET MECH RESP RATE: 24
SODIUM SERPL-SCNC: 136 MMOL/L (ref 136–145)
TOTAL RATE: 12 BREATHS/MINUTE
TOTAL RATE: 24 BREATHS/MINUTE
VENTILATOR MODE: ABNORMAL
VENTILATOR MODE: AC
VT ON VENT VENT: 357 ML
VT ON VENT VENT: 500 ML
WBC NRBC COR # BLD: 7.71 10*3/MM3 (ref 3.4–10.8)

## 2023-04-12 PROCEDURE — 94761 N-INVAS EAR/PLS OXIMETRY MLT: CPT

## 2023-04-12 PROCEDURE — 94799 UNLISTED PULMONARY SVC/PX: CPT

## 2023-04-12 PROCEDURE — 94003 VENT MGMT INPAT SUBQ DAY: CPT

## 2023-04-12 PROCEDURE — 25010000002 LORAZEPAM PER 2 MG: Performed by: INTERNAL MEDICINE

## 2023-04-12 PROCEDURE — 25010000002 ENOXAPARIN PER 10 MG: Performed by: INTERNAL MEDICINE

## 2023-04-12 PROCEDURE — 25010000002 PROPOFOL 10 MG/ML EMULSION: Performed by: INTERNAL MEDICINE

## 2023-04-12 PROCEDURE — 94760 N-INVAS EAR/PLS OXIMETRY 1: CPT

## 2023-04-12 PROCEDURE — 85025 COMPLETE CBC W/AUTO DIFF WBC: CPT | Performed by: HOSPITALIST

## 2023-04-12 PROCEDURE — 82962 GLUCOSE BLOOD TEST: CPT

## 2023-04-12 PROCEDURE — 63710000001 INSULIN LISPRO (HUMAN) PER 5 UNITS: Performed by: INTERNAL MEDICINE

## 2023-04-12 PROCEDURE — 25010000002 CEFTRIAXONE PER 250 MG: Performed by: INTERNAL MEDICINE

## 2023-04-12 PROCEDURE — 82803 BLOOD GASES ANY COMBINATION: CPT

## 2023-04-12 PROCEDURE — 25010000002 FENTANYL CITRATE (PF) 50 MCG/ML SOLUTION: Performed by: INTERNAL MEDICINE

## 2023-04-12 PROCEDURE — 71045 X-RAY EXAM CHEST 1 VIEW: CPT

## 2023-04-12 PROCEDURE — 25010000002 THIAMINE PER 100 MG: Performed by: INTERNAL MEDICINE

## 2023-04-12 PROCEDURE — 36600 WITHDRAWAL OF ARTERIAL BLOOD: CPT

## 2023-04-12 PROCEDURE — 99232 SBSQ HOSP IP/OBS MODERATE 35: CPT | Performed by: INTERNAL MEDICINE

## 2023-04-12 PROCEDURE — 80053 COMPREHEN METABOLIC PANEL: CPT | Performed by: INTERNAL MEDICINE

## 2023-04-12 PROCEDURE — 83605 ASSAY OF LACTIC ACID: CPT | Performed by: INTERNAL MEDICINE

## 2023-04-12 RX ADMIN — CHLORHEXIDINE GLUCONATE 0.12% ORAL RINSE 15 ML: 1.2 LIQUID ORAL at 08:06

## 2023-04-12 RX ADMIN — PANTOPRAZOLE SODIUM 40 MG: 40 INJECTION, POWDER, FOR SOLUTION INTRAVENOUS at 06:03

## 2023-04-12 RX ADMIN — CEFTRIAXONE SODIUM 1 G: 1 INJECTION, POWDER, FOR SOLUTION INTRAMUSCULAR; INTRAVENOUS at 18:19

## 2023-04-12 RX ADMIN — INSULIN LISPRO 4 UNITS: 100 INJECTION, SOLUTION INTRAVENOUS; SUBCUTANEOUS at 04:06

## 2023-04-12 RX ADMIN — Medication 10 ML: at 20:14

## 2023-04-12 RX ADMIN — PROPOFOL INJECTABLE EMULSION 25 MCG/KG/MIN: 10 INJECTION, EMULSION INTRAVENOUS at 16:03

## 2023-04-12 RX ADMIN — LORAZEPAM 0.5 MG: 2 INJECTION INTRAMUSCULAR; INTRAVENOUS at 10:54

## 2023-04-12 RX ADMIN — LORAZEPAM 0.5 MG: 2 INJECTION INTRAMUSCULAR; INTRAVENOUS at 20:09

## 2023-04-12 RX ADMIN — THIAMINE HYDROCHLORIDE 300 MG: 100 INJECTION, SOLUTION INTRAMUSCULAR; INTRAVENOUS at 08:05

## 2023-04-12 RX ADMIN — INSULIN LISPRO 4 UNITS: 100 INJECTION, SOLUTION INTRAVENOUS; SUBCUTANEOUS at 15:41

## 2023-04-12 RX ADMIN — LORAZEPAM 0.5 MG: 2 INJECTION INTRAMUSCULAR; INTRAVENOUS at 00:20

## 2023-04-12 RX ADMIN — LORAZEPAM 0.5 MG: 2 INJECTION INTRAMUSCULAR; INTRAVENOUS at 15:41

## 2023-04-12 RX ADMIN — SODIUM CHLORIDE 100 ML/HR: 9 INJECTION, SOLUTION INTRAVENOUS at 06:31

## 2023-04-12 RX ADMIN — FOLIC ACID 1 MG: 5 INJECTION, SOLUTION INTRAMUSCULAR; INTRAVENOUS; SUBCUTANEOUS at 08:05

## 2023-04-12 RX ADMIN — Medication 10 ML: at 08:05

## 2023-04-12 RX ADMIN — CHLORHEXIDINE GLUCONATE 0.12% ORAL RINSE 15 ML: 1.2 LIQUID ORAL at 20:14

## 2023-04-12 RX ADMIN — PROPOFOL INJECTABLE EMULSION 35 MCG/KG/MIN: 10 INJECTION, EMULSION INTRAVENOUS at 22:58

## 2023-04-12 RX ADMIN — INSULIN LISPRO 4 UNITS: 100 INJECTION, SOLUTION INTRAVENOUS; SUBCUTANEOUS at 13:23

## 2023-04-12 RX ADMIN — ENOXAPARIN SODIUM 30 MG: 100 INJECTION SUBCUTANEOUS at 18:19

## 2023-04-12 RX ADMIN — LORAZEPAM 0.5 MG: 2 INJECTION INTRAMUSCULAR; INTRAVENOUS at 08:04

## 2023-04-12 RX ADMIN — LORAZEPAM 0.5 MG: 2 INJECTION INTRAMUSCULAR; INTRAVENOUS at 13:23

## 2023-04-12 RX ADMIN — PROPOFOL INJECTABLE EMULSION 20 MCG/KG/MIN: 10 INJECTION, EMULSION INTRAVENOUS at 06:31

## 2023-04-12 RX ADMIN — FENTANYL CITRATE 50 MCG: 50 INJECTION, SOLUTION INTRAMUSCULAR; INTRAVENOUS at 22:58

## 2023-04-12 RX ADMIN — FENTANYL CITRATE 50 MCG: 50 INJECTION, SOLUTION INTRAMUSCULAR; INTRAVENOUS at 20:22

## 2023-04-12 NOTE — PLAN OF CARE
Goal Outcome Evaluation: Pt remains in CICU on mechanical ventilation. SBT was performed, based on results plan to retry and possibly extubate tomorrow. Pt's intrinsic cardiac rhythm present most of the shift; TVP rate lowered to 30. Tube feeds were increase to 30 and tolerated. UO 1525

## 2023-04-12 NOTE — PLAN OF CARE
Goal Outcome Evaluation:   Pt remained in the CICU overnight. Levophed weaned off. Propofol drip continued. Pt still not following commands this shift. 2050 u/o out this shift; 1 BM. Temperatures normothermic overnight.

## 2023-04-12 NOTE — PROGRESS NOTES
Heart rate and rhythm remain stable. No pacing after rate decreased to 70bpm. Will reduce to 30bpm and monitor overnight. If no pacing then plans to remove temp. Wire tomorrow.

## 2023-04-12 NOTE — PROGRESS NOTES
"Daily progress note    Primary care physician      Chief complaint  Doing better this morning.  Patient remains on vent but on weaning parameters and following commands.  Patient tolerating tube feeding.    History of present illness  72-year-old -American male with history of coronary artery disease hypertension hyperlipidemia chronic anemia chronic kidney disease stage III and gastroesophageal reflux disease who also abuse alcohol and tobacco presented to [facility] emergency room after mechanical fall when he tripped and fell on the right hip with complaint of right hip pain.  Patient denies any headache dizziness lightheadedness chest pain shortness of breath abdominal pain nausea vomiting diarrhea.  Patient evaluated in ER admitted to the floor with acute kidney injury and also found to have sinus bradycardia with hyponatremia.  Patient admitted and on the floor his blood pressure dropped and more bradycardic and rapid response called.  Patient transferred to the unit intubated and provided with supportive care and taken to the Cath Lab for temporary pacemaker placement     REVIEW OF SYSTEMS  Unable to obtain     PHYSICAL EXAM   Blood pressure 117/53, pulse 100, temperature 98.6 °F (37 °C), resp. rate 28, height 175 cm (68.9\"), weight 75.8 kg (167 lb 1.7 oz), SpO2 98 %.    General: Awake and alert on vent  HEENT: Mucous membranes moist, atraumatic,  Neck: Supple  Pulm: Moving air bilaterally  Cardiovascular: S1-S2 paced   GI: Soft, nontender, nondistended, no rebound, no guarding, bowel sounds present  MSK: Full ROM, no deformity  Skin: Warm, dry  Psych: Calm, cooperative     LAB RESULTS  Lab Results (last 24 hours)     Procedure Component Value Units Date/Time    POC Glucose Once [[id number]]  (Abnormal) Collected: 04/12/23 1512    Specimen: Blood Updated: 04/12/23 1517     Glucose 157 mg/dL      Comment: Meter: [id number] : [id number] Ulises IBARRA       Blood Gas, Arterial " - [[id number]]  (Abnormal) Collected: 04/12/23 1436    Specimen: Arterial Blood Updated: 04/12/23 1441     Site Arterial: right radial     Xavier's Test Positive     pH, Arterial 7.473 pH units      pCO2, Arterial 41.4 mm Hg      pO2, Arterial 76.8 mm Hg      HCO3, Arterial 30.3 mmol/L      Base Excess, Arterial 6.1 mmol/L      O2 Saturation Calculated 96.0 %      Comment: 98% Meter: [id number] : [id number] Damon Delaney        A-a DO2 0.4 mmHg      Barometric Pressure for Blood Gas 753.8 mmHg      Modality Adult Vent     FIO2 30 %      Ventilator Mode PS     Set Tidal Volume 357     Set Mech Resp Rate 24     Rate 24 Breaths/minute      PEEP 5    POC Glucose Once [[id number]]  (Abnormal) Collected: 04/12/23 1144    Specimen: Blood Updated: 04/12/23 1151     Glucose 181 mg/dL      Comment: Meter: [id number] : [id number] Ulises IBARRA       POC Glucose Once [[id number]]  (Abnormal) Collected: 04/12/23 0728    Specimen: Blood Updated: 04/12/23 0745     Glucose 139 mg/dL      Comment: Meter: [id number] : [id number] Ulises IBARRA       Lactic Acid, Plasma [[id number]]  (Normal) Collected: 04/12/23 0604    Specimen: Blood Updated: 04/12/23 0645     Lactate 1.4 mmol/L     Blood Culture - Blood, Arm, Right [[id number]]  (Normal) Collected: 04/10/23 1853    Specimen: Blood from Arm, Right Updated: 04/12/23 0630     Blood Culture No growth at 24 hours    Narrative:      Less than seven (7) mL's of blood was collected.  Insufficient quantity may yield false negative results.    CBC & Differential [[id number]]  (Abnormal) Collected: 04/12/23 0348    Specimen: Blood Updated: 04/12/23 0514    Narrative:      The following orders were created for panel order CBC & Differential.  Procedure                               Abnormality         Status                     ---------                               -----------         ------                     CBC Auto Differential[[id number]]        Abnormal            Final  result                 Please view results for these tests on the individual orders.    CBC Auto Differential [[id number]]  (Abnormal) Collected: 04/12/23 0348    Specimen: Blood Updated: 04/12/23 0514     WBC 7.71 10*3/mm3      RBC 2.87 10*6/mm3      Hemoglobin 8.8 g/dL      Hematocrit 26.8 %      MCV 93.4 fL      MCH 30.7 pg      MCHC 32.8 g/dL      RDW 15.8 %      RDW-SD 53.4 fl      MPV 10.3 fL      Platelets 163 10*3/mm3      Neutrophil % 87.2 %      Lymphocyte % 7.7 %      Monocyte % 3.8 %      Eosinophil % 0.3 %      Basophil % 0.1 %      Immature Grans % 0.9 %      Neutrophils, Absolute 6.73 10*3/mm3      Lymphocytes, Absolute 0.59 10*3/mm3      Monocytes, Absolute 0.29 10*3/mm3      Eosinophils, Absolute 0.02 10*3/mm3      Basophils, Absolute 0.01 10*3/mm3      Immature Grans, Absolute 0.07 10*3/mm3      nRBC 0.8 /100 WBC     Comprehensive Metabolic Panel [[id number]]  (Abnormal) Collected: 04/12/23 0348    Specimen: Blood Updated: 04/12/23 0447     Glucose 145 mg/dL      BUN 41 mg/dL      Creatinine 2.83 mg/dL      Sodium 136 mmol/L      Potassium 3.4 mmol/L      Chloride 99 mmol/L      CO2 28.9 mmol/L      Calcium 7.8 mg/dL      Total Protein 5.7 g/dL      Albumin 3.0 g/dL      ALT (SGPT) 498 U/L      AST (SGOT) 695 U/L      Alkaline Phosphatase 131 U/L      Total Bilirubin 0.4 mg/dL      Globulin 2.7 gm/dL      A/G Ratio 1.1 g/dL      BUN/Creatinine Ratio 14.5     Anion Gap 8.1 mmol/L      eGFR 22.7 mL/min/1.73     Narrative:      GFR Normal >60  Chronic Kidney Disease <60  Kidney Failure <15    The GFR formula is only valid for adults with stable renal function between ages 18 and 70.    POC Glucose Once [[id number]]  (Abnormal) Collected: 04/12/23 0348    Specimen: Blood Updated: 04/12/23 0349     Glucose 152 mg/dL      Comment: Meter: [id number] : [id number] Denilson Jurado RN       Blood Gas, Arterial - [[id number]]  (Abnormal) Collected: 04/12/23 0303    Specimen: Arterial Blood Updated: 04/12/23 0305      Site Arterial: right radial     Xavier's Test Positive     pH, Arterial 7.482 pH units      pCO2, Arterial 36.8 mm Hg      pO2, Arterial 80.4 mm Hg      HCO3, Arterial 27.5 mmol/L      Base Excess, Arterial 3.9 mmol/L      O2 Saturation Calculated 96.7 %      Comment: Sat 98% Meter: [id number] : [id number] Azar Hansen        A-a DO2 0.4 mmHg      Barometric Pressure for Blood Gas 756.4 mmHg      Modality Adult Vent     FIO2 30 %      Ventilator Mode AC     Set Tidal Volume 500     Set Mech Resp Rate 12     Rate 12 Breaths/minute      PEEP 10    Lactic Acid, Plasma [[id number]]  (Normal) Collected: 04/11/23 2335    Specimen: Blood Updated: 04/12/23 0013     Lactate 1.3 mmol/L     POC Glucose Once [[id number]]  (Abnormal) Collected: 04/11/23 2322    Specimen: Blood Updated: 04/11/23 2323     Glucose 147 mg/dL      Comment: Meter: [id number] : [id number] Curt IBARRA       POC Glucose Once [[id number]]  (Abnormal) Collected: 04/11/23 2100    Specimen: Blood Updated: 04/11/23 2102     Glucose 172 mg/dL      Comment: Meter: [id number] : [id number] Denilson Jurado RN       Lactic Acid, Plasma [[id number]]  (Normal) Collected: 04/11/23 1839    Specimen: Blood from Cannula Updated: 04/11/23 1920     Lactate 1.3 mmol/L     Blood Culture - Blood, Arm, Left [[id number]]  (Normal) Collected: 04/10/23 1853    Specimen: Blood from Arm, Left Updated: 04/11/23 1915     Blood Culture No growth at 24 hours    Narrative:      Less than seven (7) mL's of blood was collected.  Insufficient quantity may yield false negative results.    POC Glucose Once [[id number]]  (Abnormal) Collected: 04/11/23 1904    Specimen: Blood Updated: 04/11/23 1905     Glucose 151 mg/dL      Comment: Meter: [id number] : [id number] Curt IBARRA       STAT Lactic Acid, Reflex [[id number]]  (Normal) Collected: 04/11/23 1704    Specimen: Blood from Cannula Updated: 04/11/23 1734     Lactate 1.1 mmol/L     POC Glucose Once [[id number]]   (Abnormal) Collected: 04/11/23 1649    Specimen: Blood Updated: 04/11/23 1652     Glucose 151 mg/dL      Comment: Meter: [id number] : [id number] Dhaval Abel RN             Imaging Results (Last 24 Hours)     Procedure Component Value Units Date/Time    XR Chest 1 View [[id number]] Collected: 04/12/23 0518     Updated: 04/12/23 0518    Narrative:        Patient: HANNAH ALTMAN  Time Out: 05:17  Exam(s): XR CXR 1 VIEW     EXAM:    XR Chest, 1 View    CLINICAL HISTORY:     Reason for exam: Intubated Patient.    TECHNIQUE:    Frontal view of the chest.    COMPARISON:    4 11 2023    FINDINGS:    Lungs:  Unremarkable.  No consolidation.    Pleural space:  Unremarkable.  No pneumothorax.    Heart:  Unremarkable.  No cardiomegaly.    Mediastinum: Stable endotracheal tube, nasogastric tube, right   subclavian central venous catheter, and right IJ transvenous pacer.    Bones joints:  Unremarkable.    IMPRESSION:       1. Stable lines and tubes.    2. No new consolidations.    Impression:          Electronically signed by Carlos Rick M.D. on 04-12-23 at 0517           ECG 12 Lead            Component  Ref Range & Units 15:25  (4/10/23) 09:53  (4/10/23) 8 mo ago  (7/19/22) 8 mo ago  (7/19/22) 9 mo ago  (6/25/22) 1 yr ago  (3/15/22)   QT Interval  ms 610 P  582 P  387  390  398  348    Resulting Agency BH ECG BH ECG BH ECG BH ECG BH ECG BH ECG             HEART RATE= 30  bpm  RR Interval= 2000  ms  NC Interval= 197  ms  P Horizontal Axis=   deg  P Front Axis= 0  deg  QRSD Interval= 115  ms  QT Interval= 610  ms  QRS Axis= 55  deg  T Wave Axis= 19  deg  - ABNORMAL ECG -  Sinus bradycardia  Atrial premature complexes  Nonspecific intraventricular conduction delay             Current Facility-Administered Medications:   •  sennosides-docusate (PERICOLACE) 8.6-50 MG per tablet 2 tablet, 2 tablet, Oral, BID, 2 tablet at 04/11/23 2055 **AND** polyethylene glycol (MIRALAX) packet 17 g, 17 g, Oral, Daily PRN **AND**  bisacodyl (DULCOLAX) EC tablet 5 mg, 5 mg, Oral, Daily PRN **AND** bisacodyl (DULCOLAX) suppository 10 mg, 10 mg, Rectal, Daily PRN, Vaibhav Bonilla MD  •  cefTRIAXone (ROCEPHIN) 1 g in sodium chloride 0.9 % 100 mL IVPB-VTB, 1 g, Intravenous, Q24H, Vaibhav Bonilla MD, Last Rate: 200 mL/hr at 04/11/23 2055, 1 g at 04/11/23 2055  •  chlorhexidine (PERIDEX) 0.12 % solution 15 mL, 15 mL, Mouth/Throat, Q12H, Vaibhav Bonilla MD, 15 mL at 04/12/23 0806  •  dextrose (D50W) (25 g/50 mL) IV injection 25 g, 25 g, Intravenous, Q15 Min PRN, Pavan Saucedo Jr., MD  •  dextrose (GLUTOSE) oral gel 15 g, 15 g, Oral, Q15 Min PRN, Pavan Saucedo Jr., MD  •  Enoxaparin Sodium (LOVENOX) syringe 30 mg, 30 mg, Subcutaneous, Q24H, Pavan Saucedo Jr., MD, 30 mg at 04/11/23 1838  •  EPINEPHrine 5 mg in 250 mL NS infusion, 0.02-0.3 mcg/kg/min, Intravenous, Titrated, Vaibhav Bonilla MD  •  fentaNYL citrate (PF) (SUBLIMAZE) injection 50 mcg, 50 mcg, Intravenous, Q30 Min PRN, Vaibhav Bonilla MD  •  folic acid 1 mg in sodium chloride 0.9 % 50 mL IVPB, 1 mg, Intravenous, Daily, Vaibhav Bonilla MD, Last Rate: 100 mL/hr at 04/12/23 0805, 1 mg at 04/12/23 0805  •  glucagon (GLUCAGEN) injection 1 mg, 1 mg, Intramuscular, Q15 Min PRN, Pavan Saucedo Jr., MD  •  insulin lispro (ADMELOG) injection 0-24 Units, 0-24 Units, Subcutaneous, Q4H, Pavan Saucedo Jr., MD, 4 Units at 04/12/23 1541  •  LORazepam (ATIVAN) tablet 0.5 mg, 0.5 mg, Oral, Q2H PRN **OR** LORazepam (ATIVAN) injection 0.5 mg, 0.5 mg, Intravenous, Q2H PRN, 0.5 mg at 04/12/23 1541 **OR** LORazepam (ATIVAN) tablet 1 mg, 1 mg, Oral, Q1H PRN **OR** LORazepam (ATIVAN) injection 1 mg, 1 mg, Intravenous, Q1H PRN **OR** LORazepam (ATIVAN) injection 1 mg, 1 mg, Intravenous, Q15 Min PRN **OR** LORazepam (ATIVAN) injection 1 mg, 1 mg, Intramuscular, Q15 Min PRN **OR** LORazepam (ATIVAN) injection 2 mg, 2 mg, Intravenous, Q1H PRN **OR**  LORazepam (ATIVAN) tablet 2 mg, 2 mg, Oral, Q1H PRN, Vaibhav Bonilla MD  •  norepinephrine (LEVOPHED) 8 mg in 250 mL NS infusion (premix), 0.02-0.3 mcg/kg/min, Intravenous, Titrated, Vaibhav Bonilla MD, Stopped at 04/12/23 0330  •  ondansetron (ZOFRAN) injection 4 mg, 4 mg, Intravenous, Q6H PRN, Vaibhav Bonilla MD  •  pantoprazole (PROTONIX) injection 40 mg, 40 mg, Intravenous, Q AM, Vaibhav Bonilla MD, 40 mg at 04/12/23 0603  •  Pharmacy to Dose enoxaparin (LOVENOX), , Does not apply, Continuous PRN, Vaibhav Bonilla MD  •  propofol (DIPRIVAN) infusion 10 mg/mL 100 mL, 5-50 mcg/kg/min, Intravenous, Titrated, Vaibhav Bonilla MD, Last Rate: 2 mL/hr at 04/12/23 1450, 5 mcg/kg/min at 04/12/23 1450  •  sodium chloride 0.9 % flush 10 mL, 10 mL, Intravenous, Q12H, Vaibhav Bonilla MD, 10 mL at 04/12/23 0805  •  sodium chloride 0.9 % flush 10 mL, 10 mL, Intravenous, PRN, Vaibhav Bonilla MD  •  sodium chloride 0.9 % infusion 40 mL, 40 mL, Intravenous, PRN, Vaibhav Bonilla MD  •  thiamine (B-1) 300 mg in sodium chloride 0.9 % 100 mL IVPB, 300 mg, Intravenous, Daily, Vaibhav Bonilla MD, Last Rate: 200 mL/hr at 04/12/23 0805, 300 mg at 04/12/23 0805  •  Vasopressin (PITRESSIN) 20 Units in sodium chloride 0.9 % 100 mL infusion, 0.03 Units/min, Intravenous, Continuous, Vaibhav Bonilla MD     ASSESSMENT  Acute hypoxic respiratory failure  Acute kidney injury  Hypotensive shock  Symptomatic sinus bradycardia status post temporary pacemaker  Hyponatremia  Alcohol hepatitis  History of paroxysmal atrial fibrillation  Coronary artery disease status post PCI  History of hypertension with low blood pressure  Hyperlipidemia  Alcohol abuse  Tobacco abuse  History of prior CVA  Chronic kidney disease stage III  Gastroesophageal reflux disease    PLAN  CPM  ICU care  Vent support and wean  IVF  IV thiamine  Vasopressors as needed  Start tube feeding  Alcohol withdrawal and DTs  precautions  Detox medications  CIWA protocol if needed  Cardiology pulmonary and nephrology to follow patient  Discontinue beta-blocker  Stress ulcer DVT prophylaxis   Supportive care  Discussed with family nursing staff cardiology and pulmonary  Follow closely and further recommendation according to hospital course    JOLIE BUNDY MD    Copied text in this note has been reviewed and is accurate as of 04/12/23

## 2023-04-12 NOTE — PROGRESS NOTES
Checked on patient and temporary pacemaker this morning. His rhythm is stable. He now has intrinsic rhythm in the 80-90s. He is in normal sinus rhythm.     Bradycardia and junctional rhythm resolved. There was no evidence of AV block. Reduced pacing rate to 70bpm. Thresholds stable and temp wire working well. Will continue to follow along.

## 2023-04-12 NOTE — PROGRESS NOTES
LOS: 2 days   Patient Care Team:  Alessia Prescott APRN as PCP - General (Family Medicine)  Jonny Bains MD as Referring Physician (Internal Medicine)  Carlos Villareal MD as Consulting Physician (Hematology and Oncology)    Chief Complaint: Follow-up sinus bradycardia and junctional bradycardia, coronary artery disease, elevated high-sensitivity troponin.    Interval History: He is off of Levophed.  He remains on propofol and intubated this morning.  His heart rate is above the threshold of the pacer.    Vital Signs:  Temp:  [97.6 °F (36.4 °C)-98.9 °F (37.2 °C)] 98.6 °F (37 °C)  Heart Rate:  [] 100  Resp:  [12-28] 28  BP: ()/(48-98) 117/53  FiO2 (%):  [30 %] 30 %    Intake/Output Summary (Last 24 hours) at 4/12/2023 1504  Last data filed at 4/12/2023 0600  Gross per 24 hour   Intake 2958.25 ml   Output 2400 ml   Net 558.25 ml       Physical Exam:   General Appearance:    Intubated, sedated.   Lungs:     Clear to auscultation bilaterally anteriorly.    Heart:    Regular rhythm and normal rate.  No murmurs, gallops, or rubs.   Abdomen:     Soft, nontender, nondistended.    Extremities:   No clubbing, cyanosis, or edema.     Results Review:    Results from last 7 days   Lab Units 04/12/23  0348   SODIUM mmol/L 136   POTASSIUM mmol/L 3.4*   CHLORIDE mmol/L 99   CO2 mmol/L 28.9   BUN mg/dL 41*   CREATININE mg/dL 2.83*   GLUCOSE mg/dL 145*   CALCIUM mg/dL 7.8*     Results from last 7 days   Lab Units 04/10/23  1245 04/10/23  1021   HSTROP T ng/L 51* 71*     Results from last 7 days   Lab Units 04/12/23  0348   WBC 10*3/mm3 7.71   HEMOGLOBIN g/dL 8.8*   HEMATOCRIT % 26.8*   PLATELETS 10*3/mm3 163     Results from last 7 days   Lab Units 04/10/23  1853   INR  1.40*     Results from last 7 days   Lab Units 04/11/23  0420   CHOLESTEROL mg/dL 112     Results from last 7 days   Lab Units 04/10/23  1021   MAGNESIUM mg/dL 2.3     Results from last 7 days   Lab Units 04/11/23  0420   CHOLESTEROL mg/dL 112    TRIGLYCERIDES mg/dL 62   HDL CHOL mg/dL 74*   LDL CHOL mg/dL 24       I reviewed the patient's new clinical results.        Assessment:  1.  Sinus bradycardia and junctional bradycardia, requiring transvenous pacemaker on 4/10/2023.  2.  Acute hypoxic respiratory failure  3.  Shock, likely multifactorial   4.  Acute kidney injury with stage III chronic kidney disease  5.  Severe metabolic acidosis  6.  Hypothermia, resolved  7.  Hyponatremia  8.  Multifactorial weakness and nausea  9.  Heavy alcohol use with elevated alcohol level on admission   10.  Acute transaminitis  11.  History of paroxysmal atrial fibrillation  12.  Coronary artery disease, status post stenting to the RCA in 2000 and in the LAD in 2006  13.  History of prior stroke  14.  Elevated high-sensitivity troponin, likely secondary to renal dysfunction    Plan:  -Dr. Bonilla following from EP for temporary pacer.  He is actually above the pacing rate limit of 70 currently.  He is in sinus rhythm.  Plans noted to reduce the rate to 30 bpm and monitor overnight.  If pacing not needed, potentially remove temporary wire tomorrow.    -Transaminitis and renal function improved.  Suspect bradycardia was metabolically mediated on admission.    -Echocardiogram yesterday with EF 55 to 60% and otherwise fairly unremarkable.    -Suspect some of this was from alcohol, especially at the liver issues.  Alcohol cessation is going to be essential for him as an outpatient.      Paul Schwarz MD  04/12/23  15:04 EDT

## 2023-04-12 NOTE — SIGNIFICANT NOTE
04/12/23 0646   Readiness to Wean Daily Screen   Daily Screen Complete Y   Daily Screen Outcome fail  (+10 PEEP)

## 2023-04-12 NOTE — PROGRESS NOTES
LOS: 2 days   Patient Care Team:  Alessia Prescott APRN as PCP - General (Family Medicine)  Jonny Bains MD as Referring Physician (Internal Medicine)  Carlos Villareal MD as Consulting Physician (Hematology and Oncology)    Subjective     Patient is intubated he is sedated on propofol he is following commands now with all 4 extremities    Review of Systems:          Objective     Vital Signs  Vital Sign Min/Max for last 24 hours  Temp  Min: 98.5 °F (36.9 °C)  Max: 98.9 °F (37.2 °C)   BP  Min: 91/50  Max: 123/54   Pulse  Min: 80  Max: 100   Resp  Min: 12  Max: 21   SpO2  Min: 96 %  Max: 100 %   No data recorded   Weight  Min: 75.8 kg (167 lb 1.7 oz)  Max: 77 kg (169 lb 12.1 oz)        Ventilator/Non-Invasive Ventilation Settings (From admission, onward)     Start     Ordered    04/10/23 1637  Ventilator - AC/VC; Other; 28; 100%; SpO2 >/= 90%; 5; mL; 600  Continuous        Question Answer Comment   Vent Mode AC/VC    Rate Other    Rate 28    FiO2 100%    Titrate FiO2 to Keep SpO2 >/= 90%    PEEP 5    Tidal Volume mL            04/10/23 1647                             Body mass index is 24.75 kg/m².  I/O last 3 completed shifts:  In: 4591 [I.V.:4451; NG/GT:140]  Out: 950 [Urine:950]  I/O this shift:  In: 1803.3 [I.V.:1300.3; NG/GT:356; IV Piggyback:147]  Out: 2050 [Urine:2050]        Physical Exam:  General Appearance: Well-developed black male he is intubated with an 8 endotracheal tube at about 24 cm at the lips he is on assist-control of 12 tidal volume 500 PEEP of 7.5 FiO2 30% SPO2 is 100% peak airway pressures are about 22 cm he is on propofol at 25 mics   Eyes: Conjunctiva are clear and anicteric pupils are about 3 mm equal and reactive to light bilateral arcus senilis  ENT: He has the oral endotracheal tube in nasoenteric tube in place mucous membranes are little dry  Neck: Trachea midline I do not see any jugular venous distension there is a right IJ cordis type catheter with a  transvenous pacemaker site looks okay  Lungs: Clear bilaterally no wheezes no rales no rhonchi symmetric expansion  Cardiac: Regular rate and rhythm heart rate 92 and I do not see any pacer spikes.  Abdomen: Soft no palpable hepatosplenomegaly or masses rare bowel sounds  : Sahu catheter dependent drainage there is a little bit of clear darker yellow urine in the urometer  Musculoskeletal: Grossly normal there is a right subclavian central venous catheter site looks good  Skin: Warm and dry no jaundice no petechiae   Neuro: He is squeeze both hands to command and wiggle toes on both feet pretty briskly upon a single request  Extremities/P Vascular: No clubbing no cyanosis no edema,  he has strong distal pulses in all 4 extremities today  MSE: Unable to assess       Labs:  Results from last 7 days   Lab Units 04/12/23  0348 04/11/23  0420 04/10/23  1853 04/10/23  1021   GLUCOSE mg/dL 145* 312* 266* 90   SODIUM mmol/L 136 126* 129* 123*   POTASSIUM mmol/L 3.4* 3.4* 5.2 5.2   MAGNESIUM mg/dL  --   --   --  2.3   CO2 mmol/L 28.9 20.2* 8.6* 9.4*   CHLORIDE mmol/L 99 85* 90* 91*   ANION GAP mmol/L 8.1 20.8* 30.4* 22.6*   CREATININE mg/dL 2.83* 3.53* 3.80* 3.01*   BUN mg/dL 41* 45* 45* 38*   BUN / CREAT RATIO  14.5 12.7 11.8 12.6   CALCIUM mg/dL 7.8* 8.0* 8.1* 9.5   ALK PHOS U/L 131* 128* 138* 103   TOTAL PROTEIN g/dL 5.7* 6.2 6.0 7.2   ALT (SGPT) U/L 498* 490* 448* 248*   AST (SGOT) U/L 695* 1,648* 995* 447*   BILIRUBIN mg/dL 0.4 0.9 2.0* 0.6   ALBUMIN g/dL 3.0* 3.2* 3.5 3.9   GLOBULIN gm/dL 2.7 3.0 2.5  --      Estimated Creatinine Clearance: 24.6 mL/min (A) (by C-G formula based on SCr of 2.83 mg/dL (H)).      Results from last 7 days   Lab Units 04/12/23  0348 04/11/23  0420 04/10/23  1853 04/10/23  1140   WBC 10*3/mm3 7.71 4.21 6.15 6.07   RBC 10*6/mm3 2.87* 2.88* 2.93* 3.37*   HEMOGLOBIN g/dL 8.8* 9.1* 9.2* 10.7*   HEMATOCRIT % 26.8* 27.8* 29.0* 32.3*   MCV fL 93.4 96.5 99.0* 95.8   MCH pg 30.7 31.6 31.4 31.8    MCHC g/dL 32.8 32.7 31.7 33.1   RDW % 15.8* 15.8* 15.6* 15.0   RDW-SD fl 53.4 55.1* 55.6* 52.6   MPV fL 10.3 10.1 9.8 9.6   PLATELETS 10*3/mm3 163 178 194 237   NEUTROPHIL % % 87.2* 90.1* 83.2* 77.8*   LYMPHOCYTE % % 7.7* 5.7* 9.6* 10.2*   MONOCYTES % % 3.8* 4.0* 6.3 11.5   EOSINOPHIL % % 0.3 0.0* 0.0* 0.0*   BASOPHIL % % 0.1 0.0 0.2 0.2   IMM GRAN % % 0.9* 0.2 0.7* 0.3   NEUTROS ABS 10*3/mm3 6.73 3.79 5.12 4.72   LYMPHS ABS 10*3/mm3 0.59* 0.24* 0.59* 0.62*   MONOS ABS 10*3/mm3 0.29 0.17 0.39 0.70   EOS ABS 10*3/mm3 0.02 0.00 0.00 0.00   BASOS ABS 10*3/mm3 0.01 0.00 0.01 0.01   IMMATURE GRANS (ABS) 10*3/mm3 0.07* 0.01 0.04 0.02   NRBC /100 WBC 0.8* 0.7* 0.5* 0.5*     Results from last 7 days   Lab Units 04/12/23  0303   PH, ARTERIAL pH units 7.482*   PO2 ART mm Hg 80.4   PCO2, ARTERIAL mm Hg 36.8   HCO3 ART mmol/L 27.5     Results from last 7 days   Lab Units 04/10/23  1245 04/10/23  1021   HSTROP T ng/L 51* 71*     Results from last 7 days   Lab Units 04/10/23  1853   PROBNP pg/mL 5,209.0*     Results from last 7 days   Lab Units 04/11/23  0420 04/10/23  1245   TSH uIU/mL 1.380  --    FREE T4 ng/dL  --  0.98     Results from last 7 days   Lab Units 04/12/23  0604 04/11/23  2335 04/11/23  1839 04/11/23  1704 04/11/23  1059 04/11/23  0420 04/11/23  0145 04/10/23  2242 04/10/23  1853   LACTATE mmol/L 1.4 1.3 1.3 1.1 2.3* 9.0* 9.0*   < > 11.6*   PROCALCITONIN ng/mL  --   --   --   --   --   --   --   --  0.98*    < > = values in this interval not displayed.     Results from last 7 days   Lab Units 04/10/23  1853   INR  1.40*     Microbiology Results (last 10 days)     Procedure Component Value - Date/Time    Blood Culture - Blood, Arm, Left [[id number]]  (Normal) Collected: 04/10/23 1853    Lab Status: Preliminary result Specimen: Blood from Arm, Left Updated: 04/11/23 1915     Blood Culture No growth at 24 hours    Narrative:      Less than seven (7) mL's of blood was collected.  Insufficient quantity may yield false  negative results.    Blood Culture - Blood, Arm, Right [[id number]]  (Normal) Collected: 04/10/23 1853    Lab Status: Preliminary result Specimen: Blood from Arm, Right Updated: 04/12/23 0630     Blood Culture No growth at 24 hours    Narrative:      Less than seven (7) mL's of blood was collected.  Insufficient quantity may yield false negative results.              cefTRIAXone, 1 g, Intravenous, Q24H  chlorhexidine, 15 mL, Mouth/Throat, Q12H  enoxaparin, 30 mg, Subcutaneous, Q24H  folic acid (FOLVITE) IVPB, 1 mg, Intravenous, Daily  insulin lispro, 0-24 Units, Subcutaneous, Q4H  LORazepam, 0.5 mg, Intravenous, Q8H  pantoprazole, 40 mg, Intravenous, Q AM  senna-docusate sodium, 2 tablet, Oral, BID  sodium chloride, 10 mL, Intravenous, Q12H  thiamine (VITAMIN B1) IVPB, 300 mg, Intravenous, Daily      EPINEPHrine, 0.02-0.3 mcg/kg/min  norepinephrine, 0.02-0.3 mcg/kg/min, Last Rate: Stopped (04/12/23 0330)  Pharmacy to Dose enoxaparin (LOVENOX),   propofol, 5-50 mcg/kg/min, Last Rate: 20 mcg/kg/min (04/12/23 0631)  sodium chloride, 100 mL/hr, Last Rate: 100 mL/hr (04/12/23 0631)  vasopressin, 0.03 Units/min        Diagnostics:  XR Chest 1 View    Result Date: 4/11/2023  Patient: HANNAH ALTMAN  Time Out: 05:22 Exam(s): XR CXR 1 VIEW EXAM:   XR Chest, 1 View CLINICAL HISTORY:    Reason for exam: Intubated Patient. TECHNIQUE:   Frontal view of the chest. COMPARISON:   No relevant prior studies available. FINDINGS:   Lungs:  Mild bibasilar atelectasis.   Pleural space:  Unremarkable.  No pneumothorax.   Heart:  Unremarkable.  No cardiomegaly.   Mediastinum: Right IJ transvenous pacer in place.  Nasogastric tube terminates below field-of-view in the left upper quadrant.  Endotracheal tube terminates 5.0 cm above the level of the ninfa.  Right subclavian central venous catheter terminates in the mid SVC.   Bones joints:  Unremarkable. IMPRESSION:     1. Support lines and tubes as above. 2. Mild bibasilar  atelectasis.    Electronically signed by Carlos Rick M.D. on 04-11-23 at 0522    XR Chest 1 View    Result Date: 4/10/2023  PORTABLE CHEST  HISTORY: Assess endotracheal tube.  COMPARISON: Chest x-ray 07/20/2022.  A right subclavian central line is in place with the tip at the junction of superior vena cava and right atrium. The patient is intubated. The endotracheal tube is in satisfactory position midway between the thoracic inlet and ninfa.  The heart is within normal limits in size. Bibasilar atelectasis/infiltrate is appreciated more prominent on the left. There is no evidence of effusion or of congestive failure.  This report was finalized on 4/10/2023 6:20 PM by Dr. Jonny Meyer M.D.      EP/CRM Study    Result Date: 4/10/2023  Placement of temporary pacemaker and central venous line.     XR Abdomen KUB    Result Date: 4/10/2023  KUB  HISTORY: Cortrak placed  COMPARISON: None  FINDINGS: Feeding tube has been placed and the tip extends in the region of the gastric antrum and duodenal bulb.  This report was finalized on 4/10/2023 11:43 PM by Dr. Lele Browne M.D.      XR Hip With or Without Pelvis 2 - 3 View Right    Result Date: 4/10/2023  XR HIP W OR WO PELVIS 2-3 VIEW RIGHT-  04/10/2023  HISTORY: Fell, right hip pain.  A right hip prosthesis is seen in good position. There is vascular calcification.  No fractures or other acute abnormalities are seen in the pelvis and right hip.  This report was finalized on 4/10/2023 8:55 AM by Dr. Unruly Finley M.D.      Results for orders placed during the hospital encounter of 04/10/23    Adult Transthoracic Echo Complete W/ Cont if Necessary Per Protocol    Interpretation Summary  •  Left ventricular systolic function is normal. Left ventricular ejection fraction appears to be 56 - 60%.  •  Left ventricular diastolic function was indeterminate.  •  There is calcification of the aortic valve.  •  Estimated right ventricular systolic pressure from tricuspid  regurgitation is normal (<35 mmHg).      Chest x-ray reviewed endotracheal tube in good position tip few centimeters above the main ninfa there is a right IJ cordis catheter terminating in the right ventricle pacing lead, there is a right subclavian central venous catheter terminating at the cavoatrial junction and there is a enteral tube terminating below the diaphragm  Improved atelectasis probably a little right base still    Active Hospital Problems    Diagnosis  POA   • **Sinus bradycardia [R00.1]  Yes      Resolved Hospital Problems   No resolved problems to display.         Assessment & Plan     1. Complete heart block with temporary transvenous pacer looks like patient is picking up he now has an intrinsic rhythm above the pacer rate  2. Shock predominantly cardiogenic much improved off vasopressors cannot exclude a component of sepsis  3. Hypothermia resolved  4. Severe metabolic acidosis resolved  5. Acute kidney injury creatinine improving suspect ATN related to shock  6. Hyponatremia resolved  7. Acute hepatitis I suspect a combination of ischemic and alcoholic liver enzymes are improving pretty quickly with improvement in his blood pressure suspect ischemic is the primary .  8. Anemia labs suggest more anemia of chronic disease hemoglobin is actually pretty stable we will continue to monitor  9. Alcoholism with alcohol abuse his alcohol level was positive on presentation we will have to put him on the alcohol withdrawal protocol administer thiamine and folate.    He has history of recurrent DVTs so he may be a challenge.  On CIWA protocol, this may hinder getting him off the ventilator.  10. Fluids/electrolytes/nutrition tolerating tube feeds advance to goal  11. Respiratory patient doing well I am hoping that we can trial him today  12. Metabolic encephalopathy improved  13. Possible sepsis.  I have not seen a definitive source I do not see any definite pneumonia as white counts been normal the  procalcitonin was minimally elevated in the presence of acute renal failure this is not very definitive.  .  We have cultures pending I think we continue the Rocephin while we evaluate.  14. Lactic acidosis he really has not made tremendous improvement over the last 8 to 12 hours is a little concerning for sure I am going to continue to trend if this does not improve or goes up then we may have to worry about ischemic gout etc. his abdominal exam is benign right now however.  May also just be hard to clear the lactate with his multiorgan dysfunction.  15. Severe hyperglycemia hemoglobin A1c does not suggest diabetes this is probably all stress response etc. this is improved he is on sliding scale insulin now  16. Fluids/electrolytes/nutrition a tolerating tube feeds I think we can advance and hyponatremia has resolved.              Plan for disposition:      Pavan Saucedo Jr, MD  04/12/23  06:54 EDT    Time: Critical care time 40 minutes

## 2023-04-13 ENCOUNTER — APPOINTMENT (OUTPATIENT)
Dept: GENERAL RADIOLOGY | Facility: HOSPITAL | Age: 73
End: 2023-04-13
Payer: MEDICARE

## 2023-04-13 LAB
ALBUMIN SERPL-MCNC: 2.9 G/DL (ref 3.5–5.2)
ALBUMIN/GLOB SERPL: 1 G/DL
ALP SERPL-CCNC: 141 U/L (ref 39–117)
ALT SERPL W P-5'-P-CCNC: 379 U/L (ref 1–41)
ANION GAP SERPL CALCULATED.3IONS-SCNC: 7 MMOL/L (ref 5–15)
ARTERIAL PATENCY WRIST A: POSITIVE
AST SERPL-CCNC: 311 U/L (ref 1–40)
ATMOSPHERIC PRESS: 751 MMHG
BASE EXCESS BLDA CALC-SCNC: 5.8 MMOL/L (ref 0–2)
BASOPHILS # BLD AUTO: 0.01 10*3/MM3 (ref 0–0.2)
BASOPHILS NFR BLD AUTO: 0.1 % (ref 0–1.5)
BDY SITE: ABNORMAL
BILIRUB SERPL-MCNC: 0.2 MG/DL (ref 0–1.2)
BUN SERPL-MCNC: 29 MG/DL (ref 8–23)
BUN/CREAT SERPL: 15.3 (ref 7–25)
CALCIUM SPEC-SCNC: 8 MG/DL (ref 8.6–10.5)
CHLORIDE SERPL-SCNC: 105 MMOL/L (ref 98–107)
CO2 SERPL-SCNC: 29 MMOL/L (ref 22–29)
CREAT SERPL-MCNC: 1.9 MG/DL (ref 0.76–1.27)
DEPRECATED RDW RBC AUTO: 52.7 FL (ref 37–54)
EGFRCR SERPLBLD CKD-EPI 2021: 36.6 ML/MIN/1.73
EOSINOPHIL # BLD AUTO: 0.04 10*3/MM3 (ref 0–0.4)
EOSINOPHIL NFR BLD AUTO: 0.6 % (ref 0.3–6.2)
ERYTHROCYTE [DISTWIDTH] IN BLOOD BY AUTOMATED COUNT: 15.3 % (ref 12.3–15.4)
GLOBULIN UR ELPH-MCNC: 2.9 GM/DL
GLUCOSE BLDC GLUCOMTR-MCNC: 126 MG/DL (ref 70–130)
GLUCOSE BLDC GLUCOMTR-MCNC: 133 MG/DL (ref 70–130)
GLUCOSE BLDC GLUCOMTR-MCNC: 149 MG/DL (ref 70–130)
GLUCOSE BLDC GLUCOMTR-MCNC: 160 MG/DL (ref 70–130)
GLUCOSE BLDC GLUCOMTR-MCNC: 163 MG/DL (ref 70–130)
GLUCOSE SERPL-MCNC: 144 MG/DL (ref 65–99)
HCO3 BLDA-SCNC: 30.2 MMOL/L (ref 22–28)
HCT VFR BLD AUTO: 25.8 % (ref 37.5–51)
HGB BLD-MCNC: 8.3 G/DL (ref 13–17.7)
IMM GRANULOCYTES # BLD AUTO: 0.04 10*3/MM3 (ref 0–0.05)
IMM GRANULOCYTES NFR BLD AUTO: 0.6 % (ref 0–0.5)
INHALED O2 CONCENTRATION: 30 %
LYMPHOCYTES # BLD AUTO: 0.83 10*3/MM3 (ref 0.7–3.1)
LYMPHOCYTES NFR BLD AUTO: 11.7 % (ref 19.6–45.3)
MAGNESIUM SERPL-MCNC: 2 MG/DL (ref 1.6–2.4)
MCH RBC QN AUTO: 31.1 PG (ref 26.6–33)
MCHC RBC AUTO-ENTMCNC: 32.2 G/DL (ref 31.5–35.7)
MCV RBC AUTO: 96.6 FL (ref 79–97)
MODALITY: ABNORMAL
MONOCYTES # BLD AUTO: 0.26 10*3/MM3 (ref 0.1–0.9)
MONOCYTES NFR BLD AUTO: 3.7 % (ref 5–12)
NEUTROPHILS NFR BLD AUTO: 5.9 10*3/MM3 (ref 1.7–7)
NEUTROPHILS NFR BLD AUTO: 83.3 % (ref 42.7–76)
NRBC BLD AUTO-RTO: 0.3 /100 WBC (ref 0–0.2)
O2 A-A PPRESDIFF RESPIRATORY: 0.4 MMHG
PCO2 BLDA: 42.3 MM HG (ref 35–45)
PEEP RESPIRATORY: 7.5 CM[H2O]
PH BLDA: 7.46 PH UNITS (ref 7.35–7.45)
PLATELET # BLD AUTO: 149 10*3/MM3 (ref 140–450)
PMV BLD AUTO: 10.1 FL (ref 6–12)
PO2 BLDA: 67 MM HG (ref 80–100)
POTASSIUM SERPL-SCNC: 3.5 MMOL/L (ref 3.5–5.2)
PROT SERPL-MCNC: 5.8 G/DL (ref 6–8.5)
RBC # BLD AUTO: 2.67 10*6/MM3 (ref 4.14–5.8)
SAO2 % BLDCOA: 93.9 % (ref 92–99)
SET MECH RESP RATE: 12
SODIUM SERPL-SCNC: 141 MMOL/L (ref 136–145)
TOTAL RATE: 27 BREATHS/MINUTE
VENTILATOR MODE: ABNORMAL
VT ON VENT VENT: 500 ML
WBC NRBC COR # BLD: 7.08 10*3/MM3 (ref 3.4–10.8)

## 2023-04-13 PROCEDURE — 71045 X-RAY EXAM CHEST 1 VIEW: CPT

## 2023-04-13 PROCEDURE — 25010000002 PROPOFOL 10 MG/ML EMULSION: Performed by: INTERNAL MEDICINE

## 2023-04-13 PROCEDURE — 85025 COMPLETE CBC W/AUTO DIFF WBC: CPT | Performed by: HOSPITALIST

## 2023-04-13 PROCEDURE — 94799 UNLISTED PULMONARY SVC/PX: CPT

## 2023-04-13 PROCEDURE — 94760 N-INVAS EAR/PLS OXIMETRY 1: CPT

## 2023-04-13 PROCEDURE — 25010000002 CEFTRIAXONE PER 250 MG: Performed by: INTERNAL MEDICINE

## 2023-04-13 PROCEDURE — 94761 N-INVAS EAR/PLS OXIMETRY MLT: CPT

## 2023-04-13 PROCEDURE — 83735 ASSAY OF MAGNESIUM: CPT | Performed by: INTERNAL MEDICINE

## 2023-04-13 PROCEDURE — 25010000002 THIAMINE PER 100 MG: Performed by: INTERNAL MEDICINE

## 2023-04-13 PROCEDURE — 25010000002 ENOXAPARIN PER 10 MG: Performed by: INTERNAL MEDICINE

## 2023-04-13 PROCEDURE — 99232 SBSQ HOSP IP/OBS MODERATE 35: CPT | Performed by: INTERNAL MEDICINE

## 2023-04-13 PROCEDURE — 63710000001 INSULIN LISPRO (HUMAN) PER 5 UNITS: Performed by: INTERNAL MEDICINE

## 2023-04-13 PROCEDURE — 36600 WITHDRAWAL OF ARTERIAL BLOOD: CPT

## 2023-04-13 PROCEDURE — 82803 BLOOD GASES ANY COMBINATION: CPT

## 2023-04-13 PROCEDURE — 80053 COMPREHEN METABOLIC PANEL: CPT | Performed by: INTERNAL MEDICINE

## 2023-04-13 PROCEDURE — 25010000002 LORAZEPAM PER 2 MG: Performed by: INTERNAL MEDICINE

## 2023-04-13 PROCEDURE — 02PA3MZ REMOVAL OF CARDIAC LEAD FROM HEART, PERCUTANEOUS APPROACH: ICD-10-PCS | Performed by: INTERNAL MEDICINE

## 2023-04-13 PROCEDURE — 82962 GLUCOSE BLOOD TEST: CPT

## 2023-04-13 RX ORDER — POTASSIUM CHLORIDE 1.5 G/1.77G
40 POWDER, FOR SOLUTION ORAL ONCE
Status: COMPLETED | OUTPATIENT
Start: 2023-04-13 | End: 2023-04-13

## 2023-04-13 RX ORDER — ENOXAPARIN SODIUM 100 MG/ML
40 INJECTION SUBCUTANEOUS EVERY 24 HOURS
Status: DISCONTINUED | OUTPATIENT
Start: 2023-04-13 | End: 2023-04-18 | Stop reason: HOSPADM

## 2023-04-13 RX ORDER — FOLIC ACID 1 MG/1
1 TABLET ORAL DAILY
Status: DISCONTINUED | OUTPATIENT
Start: 2023-04-14 | End: 2023-04-18 | Stop reason: HOSPADM

## 2023-04-13 RX ADMIN — THIAMINE HYDROCHLORIDE 300 MG: 100 INJECTION, SOLUTION INTRAMUSCULAR; INTRAVENOUS at 08:32

## 2023-04-13 RX ADMIN — DOCUSATE SODIUM 50MG AND SENNOSIDES 8.6MG 2 TABLET: 8.6; 5 TABLET, FILM COATED ORAL at 08:11

## 2023-04-13 RX ADMIN — Medication 10 ML: at 08:12

## 2023-04-13 RX ADMIN — CHLORHEXIDINE GLUCONATE 0.12% ORAL RINSE 15 ML: 1.2 LIQUID ORAL at 08:11

## 2023-04-13 RX ADMIN — PROPOFOL INJECTABLE EMULSION 35 MCG/KG/MIN: 10 INJECTION, EMULSION INTRAVENOUS at 02:24

## 2023-04-13 RX ADMIN — PANTOPRAZOLE SODIUM 40 MG: 40 INJECTION, POWDER, FOR SOLUTION INTRAVENOUS at 06:19

## 2023-04-13 RX ADMIN — LORAZEPAM 1 MG: 2 INJECTION INTRAMUSCULAR; INTRAVENOUS at 02:19

## 2023-04-13 RX ADMIN — INSULIN LISPRO 4 UNITS: 100 INJECTION, SOLUTION INTRAVENOUS; SUBCUTANEOUS at 04:49

## 2023-04-13 RX ADMIN — CEFTRIAXONE SODIUM 1 G: 1 INJECTION, POWDER, FOR SOLUTION INTRAMUSCULAR; INTRAVENOUS at 18:01

## 2023-04-13 RX ADMIN — FOLIC ACID 1 MG: 5 INJECTION, SOLUTION INTRAMUSCULAR; INTRAVENOUS; SUBCUTANEOUS at 08:32

## 2023-04-13 RX ADMIN — Medication 10 ML: at 21:22

## 2023-04-13 RX ADMIN — DOCUSATE SODIUM 50MG AND SENNOSIDES 8.6MG 2 TABLET: 8.6; 5 TABLET, FILM COATED ORAL at 21:22

## 2023-04-13 RX ADMIN — POTASSIUM CHLORIDE 40 MEQ: 1.5 POWDER, FOR SOLUTION ORAL at 07:02

## 2023-04-13 RX ADMIN — ENOXAPARIN SODIUM 40 MG: 100 INJECTION SUBCUTANEOUS at 18:00

## 2023-04-13 RX ADMIN — INSULIN LISPRO 4 UNITS: 100 INJECTION, SOLUTION INTRAVENOUS; SUBCUTANEOUS at 11:03

## 2023-04-13 NOTE — NURSING NOTE
Pt having desaturations down to 78%. Change in lungs sounds since first assessment. Stat chest x-ray ordered. RN called with report from radiologist. FiO2 titrated up from 30-50% by RT to maintain O2 sats greater than 90%. Dr. Del Angel paged and updated; no new orders for now. Per Dr. Del Angel continue to titrate up FiO2 as needed to maintain adequate O2 sats.

## 2023-04-13 NOTE — PROGRESS NOTES
Nutrition Services    Patient Name:  Yuval Loja  YOB: 1950  MRN: [id number]  Admit Date:  4/10/2023  Assessment Date:  04/13/23    Comment: Nutrition follow up for TF  Dx: respiratory failure on vent, hyponatremia, bradycardia, CAD, HTN, anemia, CRISTIANO on CKD,GERD, ETOH abuse    Pt extubated, NG remains in place with Fibersource HN running at 30ml/hr this morning.   SLP to evaluate this PM or tomorrow AM.     Labs: Na 141, Creat 1.90, BUN 29, ,   Meds: insulin, thiamine, pericolace     Plan/Recommendation:  Continue to advance TF towards goal rate 60ml/hr  PO diet advance per MD/SLP  No water flushes, MD to manage    Will continue to follow clinical course and monitor nutritional needs.     CLINICAL NUTRITION ASSESSMENT      Reason for Assessment Follow-up Protocol     Diagnosis/Problem   respiratory failure on vent, hyponatremia, bradycardia, CAD, HTN, anemia, CRISTIANO on CKD,GERD, ETOH abuse   Medical/Surgical History Past Medical History:   Diagnosis Date   • Alcohol abuse    • Arthritis    • CAD (coronary artery disease)    • Chronic kidney disease, stage 3    • COPD (chronic obstructive pulmonary disease)    • Disease of thyroid gland    • Elevated cholesterol    • Fatty liver, alcoholic    • GERD (gastroesophageal reflux disease)    • History of transfusion    • Hypertensive urgency    • Metabolic acidosis    • Myocardial infarction    • Sinus tachycardia    • Sleep apnea    • Stroke        Past Surgical History:   Procedure Laterality Date   • BACK SURGERY     • CARDIAC CATHETERIZATION     • CARDIAC ELECTROPHYSIOLOGY PROCEDURE N/A 4/10/2023    Procedure: Temporary Pacemaker;  Surgeon: Vaibhav Bonilla MD;  Location: [facility] INVASIVE LOCATION;  Service: Cardiovascular;  Laterality: N/A;   • COLONOSCOPY     • ENDOSCOPY     • FRACTURE SURGERY     • JOINT REPLACEMENT     • SKIN BIOPSY     • TOTAL HIP ARTHROPLASTY Right 06/27/2022    Procedure: TOTAL HIP ARTHROPLASTY ANTERIOR  "WITH HANA TABLE;  Surgeon: Willian Quiles MD;  Location: [facility] OR;  Service: Orthopedics;  Laterality: Right;  Nova, carli. everett        Encounter Information        Nutrition History:  unknown   Food Preferences:    Supplements:    Factors Affecting Intake: altered respiratory status     Anthropometrics        Current Height  Current Weight  BMI kg/m2 Height: 175 cm (68.9\")  Weight: 76 kg (167 lb 8.8 oz) (04/13/23 0623)  Body mass index is 24.82 kg/m².   Adjusted BMI (if applicable)        Admission Weight        Ideal Body Weight (IBW) 70.7 kg   Adjusted IBW (if applicable)        Usual Body Weight (UBW) See wt hx   Weight Change/Trend Variable wt's       Weight History Wt Readings from Last 30 Encounters:   04/13/23 0623 76 kg (167 lb 8.8 oz)   04/12/23 0600 75.8 kg (167 lb 1.7 oz)   04/11/23 1419 77 kg (169 lb 12.1 oz)   04/11/23 0510 77.2 kg (170 lb 3.1 oz)   04/10/23 0818 66.7 kg (147 lb)   09/24/22 1658 66.7 kg (147 lb)   07/21/22 0850 66.7 kg (147 lb)   07/19/22 1551 67 kg (147 lb 11.3 oz)   07/19/22 1516 65.7 kg (144 lb 12.8 oz)   06/26/22 0500 65.6 kg (144 lb 9.6 oz)   06/25/22 2252 69.3 kg (152 lb 12.8 oz)   03/16/22 0750 65.8 kg (145 lb)   03/15/22 0455 65.8 kg (145 lb)   07/01/20 1053 56.2 kg (124 lb)   05/27/20 1526 56.2 kg (124 lb)   04/19/20 0535 77.6 kg (171 lb 1.2 oz)   04/18/20 1310 79.4 kg (175 lb)   10/26/19 1534 73.7 kg (162 lb 6.4 oz)   04/17/19 1600 72.1 kg (159 lb)   05/23/17 1843 72.6 kg (160 lb)   05/23/17 1141 77.1 kg (170 lb)   06/20/16 2123 74.8 kg (165 lb)           --  Tests/Procedures        Tests/Procedures X-Ray     Labs       Pertinent Labs    Results from last 7 days   Lab Units 04/13/23  0355 04/12/23  0348 04/11/23  0420   SODIUM mmol/L 141 136 126*   POTASSIUM mmol/L 3.5 3.4* 3.4*   CHLORIDE mmol/L 105 99 85*   CO2 mmol/L 29.0 28.9 20.2*   BUN mg/dL 29* 41* 45*   CREATININE mg/dL 1.90* 2.83* 3.53*   CALCIUM mg/dL 8.0* 7.8* 8.0*   BILIRUBIN mg/dL 0.2 0.4 0.9 "   ALK PHOS U/L 141* 131* 128*   ALT (SGPT) U/L 379* 498* 490*   AST (SGOT) U/L 311* 695* 1,648*   GLUCOSE mg/dL 144* 145* 312*     Results from last 7 days   Lab Units 04/13/23  0355 04/12/23 0348 04/11/23  0420 04/10/23  1140 04/10/23  1021   MAGNESIUM mg/dL 2.0  --   --   --  2.3   HEMOGLOBIN g/dL 8.3*   < > 9.1*   < >  --    HEMATOCRIT % 25.8*   < > 27.8*   < >  --    WBC 10*3/mm3 7.08   < > 4.21   < >  --    TRIGLYCERIDES mg/dL  --   --  62  --   --    ALBUMIN g/dL 2.9*   < > 3.2*   < > 3.9    < > = values in this interval not displayed.     Results from last 7 days   Lab Units 04/13/23 0355 04/12/23 0348 04/11/23  0420 04/10/23  1853 04/10/23  1140   INR   --   --   --  1.40*  --    PLATELETS 10*3/mm3 149 163 178 194 237     SARS-CoV-2, CLARICE   Date Value Ref Range Status   10/11/2022 Negative Negative Final     Comment:     The 2019-CoV rRT-PCR Assay is only for use under a Food and Drug Administration Emergency Use Authorization. The performance characteristics of the assay were verified by the Clinical Microbiology Laboratory at the [facility].   Results should be used in conjunction with the patient's clinical symptoms, medical history, and other clinical/laboratory findings to determine an overall clinical diagnosis. Negative results do not preclude infection with SARS-CoV-2 (COVID-19).    Test parameters have not been validated for screening asymptomatic patients.     Lab Results   Component Value Date    HGBA1C 4.50 (L) 04/11/2023          Medications           Scheduled Medications cefTRIAXone, 1 g, Intravenous, Q24H  enoxaparin, 40 mg, Subcutaneous, Q24H  [START ON 4/14/2023] folic acid, 1 mg, Nasogastric, Daily  insulin lispro, 0-24 Units, Subcutaneous, Q4H  senna-docusate sodium, 2 tablet, Oral, BID  sodium chloride, 10 mL, Intravenous, Q12H  [START ON 4/14/2023] thiamine, 300 mg, Nasogastric, Daily       Infusions Pharmacy to Dose enoxaparin (LOVENOX),        PRN Medications  "•  senna-docusate sodium **AND** polyethylene glycol **AND** bisacodyl **AND** bisacodyl  •  dextrose  •  dextrose  •  glucagon (human recombinant)  •  LORazepam **OR** LORazepam **OR** LORazepam **OR** LORazepam **OR** LORazepam **OR** LORazepam **OR** LORazepam **OR** LORazepam  •  ondansetron  •  Pharmacy to Dose enoxaparin (LOVENOX)  •  sodium chloride  •  sodium chloride     Physical Findings          Physical Appearance alert, oriented, on oxygen therapy, bed bugs noted on adm   Oral/Mouth Cavity teeth missing   Edema  no edema   Gastrointestinal hyperactive bowel sounds, last bowel movement:4/12   Skin  skin intact   Tubes/Drains Cortrak, NG tube, placed by RN   NFPE Not applicable at this time   --  Current Nutrition Orders & Evaluation of Intake       Oral Nutrition     Food Allergies NKFA   Current PO Diet No diet orders on file   Supplement n/a   PO Evaluation     % PO Intake     # of Days Evaluated    -  Estimated/Assessed Needs       Energy Requirements    Height for Calculation  Height: 175 cm (68.9\")   Weight for Calculation 77.2 kg   Method for Estimation  25 kcal/kg, 30 kcal/kg   EST Needs (kcal/day) 2489-5599       Protein Requirements    Weight for Calculation 77.2 kg   EST Protein Needs (g/kg) 1.0 - 1.2 gm/kg   EST Daily Needs (g/day) 77-92       Fluid Requirements     Method for Estimation Defer to physician    Estimated Needs (mL/day)    -  PES STATEMENT / NUTRITION DIAGNOSIS      Nutrition Dx Problem  Problem: Needs Alternative Route  Etiology: Medical Diagnosis and Factors Affecting Nutrition Respiratory Failure, recent extubation  Signs/Symptoms: NPO    Comment:    --  NUTRITION INTERVENTION / PLAN OF CARE      Intervention Goal(s) Maintain nutrition status, Nutrition support treatment, Improved nutrition related labs, Reduce/improve symptoms, Meet estimated needs, Disease management/therapy, Initiate TF/PN, Tolerate TF/PN at goal and Maintain weight         RD Intervention/Action Follow Tx " Progress and Care plan reviewed         Prescription/Orders:       PO Diet       Supplements       Snacks       Enteral Nutrition       Parenteral Nutrition    New Prescription Ordered? Yes   -   Enteral Prescription:     Enteral Route NG    TF Delivery Method Continuous    Enteral Product Fibersource HN    Modular None    Propofol Rate/Kcal 11.6/306kcals    TF Start Rate (mL/hr) 20    TF Goal Rate (mL/hr) 60    Free Water Flush (mL) none    TF Provision at Goal: 1728kcals plus propofol kcal, 77gm protein, 1166mL free water + 0mL water flushes         Calories 100% needs met         Protein  100 % needs met         Fluid (mL) 1166 mL total     Prescription Ordered Yes   -      Monitor/Evaluation Per protocol, I&O, Pertinent labs, EN delivery/tolerance, PN delivery/tolerance, Weight, Skin status, GI status, Symptoms, POC/GOC   Discharge Plan/Needs Pending clinical course   Education Will instruct as appropriate   --    RD to follow per protocol.      Electronically signed by:  Kelley Carmona RD  04/13/23 14:58 EDT

## 2023-04-13 NOTE — PROGRESS NOTES
"[facility] Clinical Pharmacy Services: Enoxaparin Consult    Yuval Loja has a pharmacy consult to dose prophylactic enoxaparin per Dr. Saucedo's request.     Indication: VTE Prophylaxis  Home Anticoagulation: none     Relevant clinical data and objective history reviewed:  72 y.o. male 175 cm (68.9\") 76 kg (167 lb 8.8 oz)   Body mass index is 24.82 kg/m².   Results from last 7 days   Lab Units 04/13/23  0355 04/12/23  0348 04/11/23  0420 04/10/23  1853   CREATININE mg/dL 1.90* 2.83* 3.53* 3.80*   PLATELETS 10*3/mm3 149 163 178 194   HEMOGLOBIN g/dL 8.3* 8.8* 9.1* 9.2*   INR   --   --   --  1.40*     Estimated Creatinine Clearance: 36.7 mL/min (A) (by C-G formula based on SCr of 1.9 mg/dL (H)).    Assessment/Plan    Adjusted enoxaparin to 40mg SQ q24h. Crcl>30ml/min.     Samara Stevens, PharmD  Clinical Pharmacist    "

## 2023-04-13 NOTE — PLAN OF CARE
Goal Outcome Evaluation:         Patient remain in cicu. Patient is extubated on O2 nasal canula. TVP is removed this morning. No BM this shift. Diet was advance to cardiac diet. Will continue to monitore

## 2023-04-13 NOTE — SIGNIFICANT NOTE
04/13/23 0716   Readiness to Wean Daily Screen   Daily Screen Complete Y   Daily Screen Outcome fail  (PLACED PT ON PSV, REBECA OK SO FAR, VERY AGGITATED WHEN YOU TRY TO GET HIM TO DO ANYTHING, NIF -11)   Spontaneous Breathing Trial   $ SBT Readiness to Wean yes   Vt Spontaneous (mL) 402 mL   Effort (VC) unable   RSBI 49   Negative Inspiratory Force (cm H2O) -11   Resp Rate (Obs) 24

## 2023-04-13 NOTE — CONSULTS
[facility] Consult Note      Patient Name: Yuval Loja  : 1950  MRN: [id number]  Primary Care Physician:  Alessia Prescott APRN  Referring Physician: Chino Cole MD  Date of admission: 4/10/2023    Subjective     Reason for Consult:  CRISTIANO on CKD2    HPI:   Yuval Loja is a 72 y.o. male with CKD2 (baseline SCR 1.2-1.4) admitted 4/10 after he fell at home, injuring his right hip.  Found to have sinus bradycardia, CRISTIANO, and severe + AG metabolic acidosis (AG 30 with arterial pH 6.88).  Blood alcohol level elevated on arrival.  PMH includes alcoholism, hypertension on atenolol, LORI, and PVD with prior stroke.  Hospitalization notable for intubation, complete heart block requiring transcutaneous pacing, and hypotension that required pressors initially.  SCR on arrival 3.8, with value 2.8 yesterday and 1.9 today.  2.5 L UOP yesterday.    Review of Systems:   Not obtainable as patient currently on ventilator    Personal History     Past Medical History:   Diagnosis Date   • Alcohol abuse    • Arthritis    • CAD (coronary artery disease)    • Chronic kidney disease, stage 3    • COPD (chronic obstructive pulmonary disease)    • Disease of thyroid gland    • Elevated cholesterol    • Fatty liver, alcoholic    • GERD (gastroesophageal reflux disease)    • History of transfusion    • Hypertensive urgency    • Metabolic acidosis    • Myocardial infarction    • Sinus tachycardia    • Sleep apnea    • Stroke        Past Surgical History:   Procedure Laterality Date   • BACK SURGERY     • CARDIAC CATHETERIZATION     • CARDIAC ELECTROPHYSIOLOGY PROCEDURE N/A 4/10/2023    Procedure: Temporary Pacemaker;  Surgeon: Vaibhav Bonilla MD;  Location: [facility] INVASIVE LOCATION;  Service: Cardiovascular;  Laterality: N/A;   • COLONOSCOPY     • ENDOSCOPY     • FRACTURE SURGERY     • JOINT REPLACEMENT     • SKIN BIOPSY     • TOTAL HIP ARTHROPLASTY Right 2022    Procedure: TOTAL HIP  "ARTHROPLASTY ANTERIOR WITH HANA TABLE;  Surgeon: Willian Quiles MD;  Location: [facility] OR;  Service: Orthopedics;  Laterality: Right;  [place], [address]       Family History: family history is not on file.    Social History:  reports that he has been smoking cigarettes. He has been smoking an average of .5 packs per day. He has never used smokeless tobacco. He reports that he does not currently use alcohol. He reports that he does not use drugs.    Home Medications:  Prior to Admission medications    Medication Sig Start Date End Date Taking? Authorizing Provider   hydrOXYzine (ATARAX) 25 MG tablet Take 1 tablet by mouth 4 (Four) Times a Day As Needed for Anxiety. 7/17/20  Yes Sarbjit Paris MD   metoprolol tartrate (LOPRESSOR) 25 MG tablet Take 1 tablet by mouth 2 (Two) Times a Day.   Yes Sarbjit Paris MD   pantoprazole (PROTONIX) 40 MG EC tablet Take 1 tablet by mouth Daily. 7/17/20  Yes Sarbjit Paris MD   sucralfate (Carafate) 1 GM/10ML suspension Take 10 mL by mouth 4 (Four) Times a Day.   Yes Sarbjit Paris MD   atenolol (TENORMIN) 100 MG tablet Take 1 tablet by mouth Daily. 3/18/22   Moo Ahn MD   docusate sodium (COLACE) 100 MG capsule Take 1 capsule by mouth 2 (Two) Times a Day.    ProviderSarbjit MD       Allergies:  Allergies   Allergen Reactions   • Mirtazapine Other (See Comments)     confused   • Sertraline Anxiety, Diarrhea and Nausea And Vomiting     Also \"hallucinations\"       Objective     Vitals:   Temp:  [98.1 °F (36.7 °C)-98.6 °F (37 °C)] 98.3 °F (36.8 °C)  Heart Rate:  [] 111  Resp:  [24-29] 24  BP: (100-121)/(48-98) 117/73  FiO2 (%):  [30 %-50 %] 30 %    Intake/Output Summary (Last 24 hours) at 4/13/2023 0915  Last data filed at 4/13/2023 0623  Gross per 24 hour   Intake 1962 ml   Output 2475 ml   Net -513 ml       Physical Exam:   Constitutional: Awake, alert, NAD, intubated and sedated  HEENT: Sclera anicteric, no " conjunctival injection, oral ETT  Neck: Supple, no carotid bruit, trachea at midline, no JVD  Respiratory: Clear anteriorly, nonlabored  Cardiovascular: Irregularly irregular, tachycardic, no rub  Gastrointestinal: BS +, soft, nontender and nondistended  : No palpable bladder  Musculoskeletal: Trace hip edema, no clubbing or cyanosis  Psychiatric: Sedated  Neurologic: Pupils are equal  Skin: Warm and dry       Scheduled Meds:     cefTRIAXone, 1 g, Intravenous, Q24H  chlorhexidine, 15 mL, Mouth/Throat, Q12H  enoxaparin, 30 mg, Subcutaneous, Q24H  folic acid (FOLVITE) IVPB, 1 mg, Intravenous, Daily  insulin lispro, 0-24 Units, Subcutaneous, Q4H  pantoprazole, 40 mg, Intravenous, Q AM  senna-docusate sodium, 2 tablet, Oral, BID  sodium chloride, 10 mL, Intravenous, Q12H  thiamine (VITAMIN B1) IVPB, 300 mg, Intravenous, Daily      IV Meds:   EPINEPHrine, 0.02-0.3 mcg/kg/min  norepinephrine, 0.02-0.3 mcg/kg/min, Last Rate: Stopped (04/12/23 0330)  Pharmacy to Dose enoxaparin (LOVENOX),   propofol, 5-50 mcg/kg/min, Last Rate: 10 mcg/kg/min (04/13/23 0714)  vasopressin, 0.03 Units/min        Results Reviewed:   I have personally reviewed the results from the time of this admission to 4/13/2023 09:15 EDT     Lab Results   Component Value Date    GLUCOSE 144 (H) 04/13/2023    CALCIUM 8.0 (L) 04/13/2023     04/13/2023    K 3.5 04/13/2023    CO2 29.0 04/13/2023     04/13/2023    BUN 29 (H) 04/13/2023    CREATININE 1.90 (H) 04/13/2023    EGFRIFAFRI 74 10/25/2020    BCR 15.3 04/13/2023    ANIONGAP 7.0 04/13/2023      Lab Results   Component Value Date    MG 2.0 04/13/2023    PHOS 3.3 06/30/2022    ALBUMIN 2.9 (L) 04/13/2023           Assessment / Plan     ASSESSMENT:  1.  CRISTIANO on CKD2, nonoliguric, improving, likely prerenal due to hypotension, renal hypoperfusion due to complete heart block, and cardiogenic shock.  Volume excess by previous imaging with pleural effusions; auto-diuresing now.  Electrolytes stable  other than low-normal potassium; anion gap now normal.  Pseudohyponatremia due to previous hyperglycemia, now resolved.  Urinalysis on arrival fairly bland with no RBCs and only 30 mg/dL protein and a concentrated specimen  2.  Respiratory failure  3.  Complete heart block, with temporary need for transvenous pacer, now with tachyarrhythmia  4.  Alcoholism  5.  Anemia    PLAN:  1.  Replace potassium; check magnesium  2.  Hopefully extubate today  3.  D/w Dr. Saucedo  4.  Surveillance labs    Thank you for involving us in the care of Yuval Loja.  Please feel free to call with any questions.    Thomas Weiner MD  04/13/23  09:15 EDT    [facility]  [phone]      Please note that portions of this note were completed with a voice recognition program.

## 2023-04-13 NOTE — PROGRESS NOTES
LOS: 3 days   Patient Care Team:  Alessia Prescott APRN as PCP - General (Family Medicine)  Jonny Bains MD as Referring Physician (Internal Medicine)  Carlos Villareal MD as Consulting Physician (Hematology and Oncology)    Chief Complaint: Follow-up sinus bradycardia and junctional bradycardia, coronary artery disease, elevated high-sensitivity troponin.    Interval History: Transvenous pacemaker removed earlier today.  Heart rate has actually been above 100.  He is off Levophed.  His labs are improving.  He remains intubated.    Vital Signs:  Temp:  [98.1 °F (36.7 °C)-98.6 °F (37 °C)] 98.3 °F (36.8 °C)  Heart Rate:  [] 109  Resp:  [24-29] 24  BP: (100-129)/(48-74) 110/48  FiO2 (%):  [30 %-50 %] 30 %    Intake/Output Summary (Last 24 hours) at 4/13/2023 1647  Last data filed at 4/13/2023 0800  Gross per 24 hour   Intake 2122 ml   Output 1125 ml   Net 997 ml       Physical Exam:   General Appearance:    Intubated, sedated.   Lungs:     Clear to auscultation bilaterally anteriorly.    Heart:    Regular rhythm and normal rate.  No murmurs, gallops, or rubs.   Abdomen:     Soft, nontender, nondistended.    Extremities:   No clubbing, cyanosis, or edema.     Results Review:    Results from last 7 days   Lab Units 04/13/23  0355   SODIUM mmol/L 141   POTASSIUM mmol/L 3.5   CHLORIDE mmol/L 105   CO2 mmol/L 29.0   BUN mg/dL 29*   CREATININE mg/dL 1.90*   GLUCOSE mg/dL 144*   CALCIUM mg/dL 8.0*     Results from last 7 days   Lab Units 04/10/23  1245 04/10/23  1021   HSTROP T ng/L 51* 71*     Results from last 7 days   Lab Units 04/13/23  0355   WBC 10*3/mm3 7.08   HEMOGLOBIN g/dL 8.3*   HEMATOCRIT % 25.8*   PLATELETS 10*3/mm3 149     Results from last 7 days   Lab Units 04/10/23  1853   INR  1.40*     Results from last 7 days   Lab Units 04/11/23  0420   CHOLESTEROL mg/dL 112     Results from last 7 days   Lab Units 04/13/23  0355   MAGNESIUM mg/dL 2.0     Results from last 7 days   Lab Units 04/11/23  2881    CHOLESTEROL mg/dL 112   TRIGLYCERIDES mg/dL 62   HDL CHOL mg/dL 74*   LDL CHOL mg/dL 24       I reviewed the patient's new clinical results.        Assessment:  1.  Sinus bradycardia and junctional bradycardia, requiring transvenous pacemaker on 4/10/2023.  2.  Acute hypoxic respiratory failure  3.  Shock, likely multifactorial   4.  Acute kidney injury with stage III chronic kidney disease  5.  Severe metabolic acidosis  6.  Hypothermia, resolved  7.  Hyponatremia  8.  Multifactorial weakness and nausea  9.  Heavy alcohol use with elevated alcohol level on admission   10.  Acute transaminitis  11.  History of paroxysmal atrial fibrillation  12.  Coronary artery disease, status post stenting to the RCA in 2000 and in the LAD in 2006  13.  History of prior stroke  14.  Elevated high-sensitivity troponin, likely secondary to renal dysfunction    Plan:  -Temporary pacemaker removed earlier today.  Suspect that bradycardia was metabolically mediated on admission.    -Transaminitis and renal function continues to improve.  Nephrology is now following.    -Echocardiogram yesterday with EF 55 to 60% and otherwise fairly unremarkable.    -Suspect some of this was from alcohol, especially at the liver issues.  Alcohol cessation is going to be essential for him as an outpatient.      Paul Schwarz MD  04/13/23  16:47 EDT

## 2023-04-13 NOTE — PROGRESS NOTES
LOS: 3 days   Patient Care Team:  Alessia Prescott APRN as PCP - General (Family Medicine)  Jonny Bains MD as Referring Physician (Internal Medicine)  Carlos Villareal MD as Consulting Physician (Hematology and Oncology)    Subjective     Patient is intubated he is sedated on propofol he is following commands now with all 4 extremities he very much indicates he would like the endotracheal tube out.    Review of Systems:          Objective     Vital Signs  Vital Sign Min/Max for last 24 hours  Temp  Min: 97.6 °F (36.4 °C)  Max: 98.6 °F (37 °C)   BP  Min: 100/74  Max: 121/55   Pulse  Min: 88  Max: 105   Resp  Min: 24  Max: 29   SpO2  Min: 90 %  Max: 100 %   No data recorded   Weight  Min: 76 kg (167 lb 8.8 oz)  Max: 76 kg (167 lb 8.8 oz)        Ventilator/Non-Invasive Ventilation Settings (From admission, onward)     Start     Ordered    04/10/23 1637  Ventilator - AC/VC; Other; 28; 100%; SpO2 >/= 90%; 5; mL; 600  Continuous        Question Answer Comment   Vent Mode AC/VC    Rate Other    Rate 28    FiO2 100%    Titrate FiO2 to Keep SpO2 >/= 90%    PEEP 5    Tidal Volume mL            04/10/23 1647                             Body mass index is 24.82 kg/m².  I/O last 3 completed shifts:  In: 3850.3 [I.V.:2796.3; Other:180; NG/GT:727; IV Piggyback:147]  Out: 4250 [Urine:4250]  I/O this shift:  In: 1070 [I.V.:359; Other:362; NG/GT:349]  Out: 925 [Urine:925]        Physical Exam:  General Appearance: Well-developed black male he is intubated with an 8 endotracheal tube at about 24 cm at the lips he is on spontaneous breathing trial and 15 mics of propofol still he is awake calm he is breathing about 15 times a minute tidal volumes are in the 330 to 520 cc range.  He does not appear in any distress he is on 30% O2 with oxygen saturations in the mid upper 90s.  Eyes: Conjunctiva are clear and anicteric pupils are about 3 mm equal and reactive to light bilateral arcus senilis  ENT: He has the oral  endotracheal tube in, nasoenteric tube in place mucous membranes are little dry  Neck: Trachea midline I do not see any jugular venous distension there is a right IJ cordis type catheter pacemaker just withdrawn  Lungs: Few rhonchi suctioned out some clear secretions in his airways were clear after.  Cardiac: Tachycardic heart rates about 102 right now no murmur  Abdomen: Soft no palpable hepatosplenomegaly or masses rare bowel sounds  : Sahu catheter dependent drainage clear urine  Musculoskeletal: Grossly normal there is a right subclavian central venous catheter site looks good  Skin: Warm and dry no jaundice no petechiae   Neuro: He is squeeze both hands to command and wiggle toes on both feet pretty briskly upon a single request, gives me a thumbs up at request.  He is vigorously indicating he wants the ET tube out.  Extremities/P Vascular: No clubbing no cyanosis no edema,  he has strong distal pulses in all 4 extremities today  MSE: Unable to assess       Labs:  Results from last 7 days   Lab Units 04/13/23  0355 04/12/23  0348 04/11/23  0420 04/10/23  1853 04/10/23  1021   GLUCOSE mg/dL 144* 145* 312* 266* 90   SODIUM mmol/L 141 136 126* 129* 123*   POTASSIUM mmol/L 3.5 3.4* 3.4* 5.2 5.2   MAGNESIUM mg/dL  --   --   --   --  2.3   CO2 mmol/L 29.0 28.9 20.2* 8.6* 9.4*   CHLORIDE mmol/L 105 99 85* 90* 91*   ANION GAP mmol/L 7.0 8.1 20.8* 30.4* 22.6*   CREATININE mg/dL 1.90* 2.83* 3.53* 3.80* 3.01*   BUN mg/dL 29* 41* 45* 45* 38*   BUN / CREAT RATIO  15.3 14.5 12.7 11.8 12.6   CALCIUM mg/dL 8.0* 7.8* 8.0* 8.1* 9.5   ALK PHOS U/L 141* 131* 128* 138* 103   TOTAL PROTEIN g/dL 5.8* 5.7* 6.2 6.0 7.2   ALT (SGPT) U/L 379* 498* 490* 448* 248*   AST (SGOT) U/L 311* 695* 1,648* 995* 447*   BILIRUBIN mg/dL 0.2 0.4 0.9 2.0* 0.6   ALBUMIN g/dL 2.9* 3.0* 3.2* 3.5 3.9   GLOBULIN gm/dL 2.9 2.7 3.0 2.5  --      Estimated Creatinine Clearance: 36.7 mL/min (A) (by C-G formula based on SCr of 1.9 mg/dL (H)).      Results from  last 7 days   Lab Units 04/13/23  0355 04/12/23  0348 04/11/23  0420 04/10/23  1853 04/10/23  1140   WBC 10*3/mm3 7.08 7.71 4.21 6.15 6.07   RBC 10*6/mm3 2.67* 2.87* 2.88* 2.93* 3.37*   HEMOGLOBIN g/dL 8.3* 8.8* 9.1* 9.2* 10.7*   HEMATOCRIT % 25.8* 26.8* 27.8* 29.0* 32.3*   MCV fL 96.6 93.4 96.5 99.0* 95.8   MCH pg 31.1 30.7 31.6 31.4 31.8   MCHC g/dL 32.2 32.8 32.7 31.7 33.1   RDW % 15.3 15.8* 15.8* 15.6* 15.0   RDW-SD fl 52.7 53.4 55.1* 55.6* 52.6   MPV fL 10.1 10.3 10.1 9.8 9.6   PLATELETS 10*3/mm3 149 163 178 194 237   NEUTROPHIL % % 83.3* 87.2* 90.1* 83.2* 77.8*   LYMPHOCYTE % % 11.7* 7.7* 5.7* 9.6* 10.2*   MONOCYTES % % 3.7* 3.8* 4.0* 6.3 11.5   EOSINOPHIL % % 0.6 0.3 0.0* 0.0* 0.0*   BASOPHIL % % 0.1 0.1 0.0 0.2 0.2   IMM GRAN % % 0.6* 0.9* 0.2 0.7* 0.3   NEUTROS ABS 10*3/mm3 5.90 6.73 3.79 5.12 4.72   LYMPHS ABS 10*3/mm3 0.83 0.59* 0.24* 0.59* 0.62*   MONOS ABS 10*3/mm3 0.26 0.29 0.17 0.39 0.70   EOS ABS 10*3/mm3 0.04 0.02 0.00 0.00 0.00   BASOS ABS 10*3/mm3 0.01 0.01 0.00 0.01 0.01   IMMATURE GRANS (ABS) 10*3/mm3 0.04 0.07* 0.01 0.04 0.02   NRBC /100 WBC 0.3* 0.8* 0.7* 0.5* 0.5*     Results from last 7 days   Lab Units 04/13/23  0306   PH, ARTERIAL pH units 7.461*   PO2 ART mm Hg 67.0*   PCO2, ARTERIAL mm Hg 42.3   HCO3 ART mmol/L 30.2*     Results from last 7 days   Lab Units 04/10/23  1245 04/10/23  1021   HSTROP T ng/L 51* 71*     Results from last 7 days   Lab Units 04/10/23  1853   PROBNP pg/mL 5,209.0*     Results from last 7 days   Lab Units 04/11/23  0420 04/10/23  1245   TSH uIU/mL 1.380  --    FREE T4 ng/dL  --  0.98     Results from last 7 days   Lab Units 04/12/23  0604 04/11/23  2335 04/11/23  1839 04/11/23  1704 04/11/23  1059 04/11/23  0420 04/11/23  0145 04/10/23  2242 04/10/23  1853   LACTATE mmol/L 1.4 1.3 1.3 1.1 2.3* 9.0* 9.0*   < > 11.6*   PROCALCITONIN ng/mL  --   --   --   --   --   --   --   --  0.98*    < > = values in this interval not displayed.     Results from last 7 days   Lab  Units 04/10/23  1853   INR  1.40*     Microbiology Results (last 10 days)     Procedure Component Value - Date/Time    Blood Culture - Blood, Arm, Left [[id number]]  (Normal) Collected: 04/10/23 1853    Lab Status: Preliminary result Specimen: Blood from Arm, Left Updated: 04/12/23 1915     Blood Culture No growth at 2 days    Narrative:      Less than seven (7) mL's of blood was collected.  Insufficient quantity may yield false negative results.    Blood Culture - Blood, Arm, Right [[id number]]  (Normal) Collected: 04/10/23 1853    Lab Status: Preliminary result Specimen: Blood from Arm, Right Updated: 04/13/23 0630     Blood Culture No growth at 2 days    Narrative:      Less than seven (7) mL's of blood was collected.  Insufficient quantity may yield false negative results.              cefTRIAXone, 1 g, Intravenous, Q24H  chlorhexidine, 15 mL, Mouth/Throat, Q12H  enoxaparin, 30 mg, Subcutaneous, Q24H  folic acid (FOLVITE) IVPB, 1 mg, Intravenous, Daily  insulin lispro, 0-24 Units, Subcutaneous, Q4H  pantoprazole, 40 mg, Intravenous, Q AM  potassium chloride, 40 mEq, Oral, Once  senna-docusate sodium, 2 tablet, Oral, BID  sodium chloride, 10 mL, Intravenous, Q12H  thiamine (VITAMIN B1) IVPB, 300 mg, Intravenous, Daily      EPINEPHrine, 0.02-0.3 mcg/kg/min  norepinephrine, 0.02-0.3 mcg/kg/min, Last Rate: Stopped (04/12/23 0330)  Pharmacy to Dose enoxaparin (LOVENOX),   propofol, 5-50 mcg/kg/min, Last Rate: 20 mcg/kg/min (04/13/23 0623)  vasopressin, 0.03 Units/min        Diagnostics:  XR Chest 1 View    Result Date: 4/11/2023  Patient: HANNAH ALTMAN  Time Out: 05:22 Exam(s): XR CXR 1 VIEW EXAM:   XR Chest, 1 View CLINICAL HISTORY:    Reason for exam: Intubated Patient. TECHNIQUE:   Frontal view of the chest. COMPARISON:   No relevant prior studies available. FINDINGS:   Lungs:  Mild bibasilar atelectasis.   Pleural space:  Unremarkable.  No pneumothorax.   Heart:  Unremarkable.  No cardiomegaly.   Mediastinum: Right  IJ transvenous pacer in place.  Nasogastric tube terminates below field-of-view in the left upper quadrant.  Endotracheal tube terminates 5.0 cm above the level of the ninfa.  Right subclavian central venous catheter terminates in the mid SVC.   Bones joints:  Unremarkable. IMPRESSION:     1. Support lines and tubes as above. 2. Mild bibasilar atelectasis.    Electronically signed by Carlos Rick M.D. on 04-11-23 at 0522    XR Chest 1 View    Result Date: 4/10/2023  PORTABLE CHEST  HISTORY: Assess endotracheal tube.  COMPARISON: Chest x-ray 07/20/2022.  A right subclavian central line is in place with the tip at the junction of superior vena cava and right atrium. The patient is intubated. The endotracheal tube is in satisfactory position midway between the thoracic inlet and ninfa.  The heart is within normal limits in size. Bibasilar atelectasis/infiltrate is appreciated more prominent on the left. There is no evidence of effusion or of congestive failure.  This report was finalized on 4/10/2023 6:20 PM by Dr. Jonny Meyer M.D.      EP/CRM Study    Result Date: 4/10/2023  Placement of temporary pacemaker and central venous line.     XR Abdomen KUB    Result Date: 4/10/2023  KUB  HISTORY: Cortrak placed  COMPARISON: None  FINDINGS: Feeding tube has been placed and the tip extends in the region of the gastric antrum and duodenal bulb.  This report was finalized on 4/10/2023 11:43 PM by Dr. Lele Browne M.D.      XR Hip With or Without Pelvis 2 - 3 View Right    Result Date: 4/10/2023  XR HIP W OR WO PELVIS 2-3 VIEW RIGHT-  04/10/2023  HISTORY: Fell, right hip pain.  A right hip prosthesis is seen in good position. There is vascular calcification.  No fractures or other acute abnormalities are seen in the pelvis and right hip.  This report was finalized on 4/10/2023 8:55 AM by Dr. Unruly Finley M.D.      Results for orders placed during the hospital encounter of 04/10/23    Adult Transthoracic Echo  Complete W/ Cont if Necessary Per Protocol    Interpretation Summary  •  Left ventricular systolic function is normal. Left ventricular ejection fraction appears to be 56 - 60%.  •  Left ventricular diastolic function was indeterminate.  •  There is calcification of the aortic valve.  •  Estimated right ventricular systolic pressure from tricuspid regurgitation is normal (<35 mmHg).      Chest x-ray reviewed endotracheal tube in good position tip few centimeters above the main ninfa there is a right IJ cordis catheter terminating in the right ventricle pacing lead, there is a right subclavian central venous catheter terminating at the cavoatrial junction and there is a enteral tube terminating below the diaphragm  Improved atelectasis at the left base there is a little bit of possible infiltrates as well overall stable to slightly improved from yesterday I think    Active Hospital Problems    Diagnosis  POA   • **Sinus bradycardia [R00.1]  Yes      Resolved Hospital Problems   No resolved problems to display.         Assessment & Plan     1. Complete heart block with temporary transvenous pacer he has been maintaining intrinsic rhythm above the pacer pretty much for over 24 hours and pacer was removed this morning by Ms. CRU we did 1 you  2. Shock predominantly cardiogenic much improved off vasopressors cannot exclude a component of sepsis  3. Hypothermia resolved  4. Severe metabolic acidosis resolved  5. Acute kidney injury creatinine improving suspect ATN related to shock  6. Hyponatremia resolved  7. Acute hepatitis I suspect a combination of ischemic and alcoholic liver enzymes are improving pretty quickly with improvement in his blood pressure suspect ischemic is the primary .  8. Anemia labs suggest more anemia of chronic disease hemoglobin is actually pretty stable we will continue to monitor  9. Alcoholism with alcohol abuse his alcohol level was positive on presentation we will have to put him on  the alcohol withdrawal protocol administer thiamine and folate.    He has history of recurrent DVTs so he may be a challenge.  On CIWA protocol, this may be a challenge getting him off the ventilator but I think we need to give it a try.  10. Fluids/electrolytes/nutrition tolerating tube feeds at goal.  11. Respiratory patient doing well plan to extubate biggest issue will be airway protection.  12. Metabolic encephalopathy improved  13. Possible sepsis.  I have not seen a definitive source, could have pneumonia as white counts been normal the procalcitonin was minimally elevated in the presence of acute renal failure this is not very definitive.  .  Blood cultures are negative at day 2 I am going to continue Rocephin at least through today see how he does after extubation  14. Lactic acidosis resolved  15. Severe hyperglycemia hemoglobin A1c does not suggest diabetes this is probably all stress response etc. this is improved he is on sliding scale insulin now  16. Possible obstructive sleep apnea I history could be issue with sedation and extubation  17. Fluids/electrolytes/nutrition a tolerating tube feeds I think we can advance and hyponatremia has resolved.              Plan for disposition:      Pavan Saucedo Jr, MD  04/13/23  06:59 EDT    Time: Critical care time 43 minutes

## 2023-04-13 NOTE — PROGRESS NOTES
"Daily progress note    Primary care physician      Chief complaint  Extubated and in no respiratory distress    History of present illness  72-year-old -American male with history of coronary artery disease hypertension hyperlipidemia chronic anemia chronic kidney disease stage III and gastroesophageal reflux disease who also abuse alcohol and tobacco presented to [facility] emergency room after mechanical fall when he tripped and fell on the right hip with complaint of right hip pain.  Patient denies any headache dizziness lightheadedness chest pain shortness of breath abdominal pain nausea vomiting diarrhea.  Patient evaluated in ER admitted to the floor with acute kidney injury and also found to have sinus bradycardia with hyponatremia.  Patient admitted and on the floor his blood pressure dropped and more bradycardic and rapid response called.  Patient transferred to the unit intubated and provided with supportive care and taken to the Cath Lab for temporary pacemaker placement     REVIEW OF SYSTEMS  Unable to obtain     PHYSICAL EXAM   Blood pressure 110/48, pulse 109, temperature 98.3 °F (36.8 °C), temperature source Oral, resp. rate 24, height 175 cm (68.9\"), weight 76 kg (167 lb 8.8 oz), SpO2 95 %.    General: Sleepy but arousable  HEENT: Mucous membranes moist, atraumatic,  Neck: Supple  Pulm: Moving air bilaterally  Cardiovascular: S1-S2 paced   GI: Soft, nontender, nondistended, no rebound, no guarding, bowel sounds present  MSK: Full ROM, no deformity  Skin: Warm, dry  Psych: Calm, cooperative     LAB RESULTS  Lab Results (last 24 hours)     Procedure Component Value Units Date/Time    POC Glucose Once [[id number]]  (Normal) Collected: 04/13/23 1608    Specimen: Blood Updated: 04/13/23 1610     Glucose 126 mg/dL      Comment: Meter: [id number] : [id number] Young Foley RN       POC Glucose Once [[id number]]  (Abnormal) Collected: 04/13/23 1058    Specimen: Blood Updated: " 04/13/23 1059     Glucose 163 mg/dL      Comment: Meter: [id number] : [id number] Young Foley RN       POC Glucose Once [[id number]]  (Abnormal) Collected: 04/13/23 0752    Specimen: Blood Updated: 04/13/23 0801     Glucose 133 mg/dL      Comment: Meter: [id number] : jude Florence RN       Magnesium [[id number]]  (Normal) Collected: 04/13/23 0355    Specimen: Blood Updated: 04/13/23 0737     Magnesium 2.0 mg/dL     Blood Culture - Blood, Arm, Right [[id number]]  (Normal) Collected: 04/10/23 1853    Specimen: Blood from Arm, Right Updated: 04/13/23 0630     Blood Culture No growth at 2 days    Narrative:      Less than seven (7) mL's of blood was collected.  Insufficient quantity may yield false negative results.    Comprehensive Metabolic Panel [[id number]]  (Abnormal) Collected: 04/13/23 0355    Specimen: Blood Updated: 04/13/23 0545     Glucose 144 mg/dL      BUN 29 mg/dL      Creatinine 1.90 mg/dL      Sodium 141 mmol/L      Potassium 3.5 mmol/L      Chloride 105 mmol/L      CO2 29.0 mmol/L      Calcium 8.0 mg/dL      Total Protein 5.8 g/dL      Albumin 2.9 g/dL      ALT (SGPT) 379 U/L      AST (SGOT) 311 U/L      Alkaline Phosphatase 141 U/L      Total Bilirubin 0.2 mg/dL      Globulin 2.9 gm/dL      A/G Ratio 1.0 g/dL      BUN/Creatinine Ratio 15.3     Anion Gap 7.0 mmol/L      eGFR 36.6 mL/min/1.73     Narrative:      GFR Normal >60  Chronic Kidney Disease <60  Kidney Failure <15    The GFR formula is only valid for adults with stable renal function between ages 18 and 70.    CBC & Differential [[id number]]  (Abnormal) Collected: 04/13/23 0355    Specimen: Blood Updated: 04/13/23 0535    Narrative:      The following orders were created for panel order CBC & Differential.  Procedure                               Abnormality         Status                     ---------                               -----------         ------                     CBC Auto Differential[[id number]]         Abnormal            Final result                 Please view results for these tests on the individual orders.    CBC Auto Differential [[id number]]  (Abnormal) Collected: 04/13/23 0355    Specimen: Blood Updated: 04/13/23 0535     WBC 7.08 10*3/mm3      RBC 2.67 10*6/mm3      Hemoglobin 8.3 g/dL      Hematocrit 25.8 %      MCV 96.6 fL      MCH 31.1 pg      MCHC 32.2 g/dL      RDW 15.3 %      RDW-SD 52.7 fl      MPV 10.1 fL      Platelets 149 10*3/mm3      Neutrophil % 83.3 %      Lymphocyte % 11.7 %      Monocyte % 3.7 %      Eosinophil % 0.6 %      Basophil % 0.1 %      Immature Grans % 0.6 %      Neutrophils, Absolute 5.90 10*3/mm3      Lymphocytes, Absolute 0.83 10*3/mm3      Monocytes, Absolute 0.26 10*3/mm3      Eosinophils, Absolute 0.04 10*3/mm3      Basophils, Absolute 0.01 10*3/mm3      Immature Grans, Absolute 0.04 10*3/mm3      nRBC 0.3 /100 WBC     POC Glucose Once [[id number]]  (Abnormal) Collected: 04/13/23 0353    Specimen: Blood Updated: 04/13/23 0354     Glucose 160 mg/dL      Comment: Meter: [id number] : [id number] Denilson Jurado RN       Blood Gas, Arterial - [[id number]]  (Abnormal) Collected: 04/13/23 0306    Specimen: Arterial Blood Updated: 04/13/23 0308     Site Arterial: left radial     Xavier's Test Positive     pH, Arterial 7.461 pH units      pCO2, Arterial 42.3 mm Hg      pO2, Arterial 67.0 mm Hg      HCO3, Arterial 30.2 mmol/L      Base Excess, Arterial 5.8 mmol/L      O2 Saturation Calculated 93.9 %      Comment: sat 98 Meter: [id number] : [id number] Layla Swan DO2 0.4 mmHg      Barometric Pressure for Blood Gas 751.0 mmHg      Modality Adult Vent     FIO2 30 %      Ventilator Mode VC     Set Tidal Volume 500     Set Mech Resp Rate 12     Rate 27 Breaths/minute      PEEP 7.5    POC Glucose Once [[id number]]  (Abnormal) Collected: 04/12/23 2341    Specimen: Blood Updated: 04/12/23 2342     Glucose 144 mg/dL      Comment: Meter: [id number] : [id number] Jossy  Dawood IBARRA       POC Glucose Once [[id number]]  (Normal) Collected: 04/12/23 2110    Specimen: Blood Updated: 04/12/23 2111     Glucose 108 mg/dL      Comment: Meter: [id number] : [id number] Denilson Jurado RN       Blood Culture - Blood, Arm, Left [[id number]]  (Normal) Collected: 04/10/23 1853    Specimen: Blood from Arm, Left Updated: 04/12/23 1915     Blood Culture No growth at 2 days    Narrative:      Less than seven (7) mL's of blood was collected.  Insufficient quantity may yield false negative results.          Imaging Results (Last 24 Hours)     Procedure Component Value Units Date/Time    XR Chest 1 View [[id number]] Collected: 04/12/23 2352     Updated: 04/13/23 1227    Narrative:      PORTABLE CHEST     HISTORY: Abnormal lung sounds.     COMPARISON: Chest x-ray 04/12/2023 at 0303 hours.     FINDINGS: The patient is intubated. The endotracheal tube is in  satisfactory position with the tip of the endotracheal tube midway  between the thoracic inlet and ninfa. A feeding tube is in place. The  tip of the feeding tube is is not included in the field-of-view but at  least in the distal stomach. A right subclavian central line is in  place. A right internal jugular transvenous pacer is noted.     The patient is rotated to the right. The heart is within normal limits  in size.     There is increasing infiltrate present at the lung bases bilaterally  more prominent on the right. There is mild prominence of the pulmonary  vasculature and perihilar regions bilaterally. There is no evidence of  consolidation or of gross effusion. The above information was called to  the patient's nurse at time of the dictation.     This report was finalized on 4/13/2023 12:24 PM by Dr. Jonny Meyer M.D.       XR Chest 1 View [[id number]] Collected: 04/13/23 0621     Updated: 04/13/23 0621    Narrative:        Patient: HANNAH ALTMAN  Time Out: 06:21  Exam(s): XR CXR 1 VIEW     EXAM:    XR Chest, 1 View    CLINICAL HISTORY:        Intubated patient.    TECHNIQUE:    Frontal view of the chest.    COMPARISON:    Chest radiograph 4 12 2023    FINDINGS:    Lungs:  Bilateral airspace opacities are present.    Pleural space:  Small bilateral pleural effusions.  No pneumothorax.    Heart:  Unremarkable.  No cardiomegaly.    Mediastinum:  Unremarkable.    Bones joints:  Unremarkable.    Tubes, lines and devices:  Stable lines and tubes.    IMPRESSION:       1.  Bilateral airspace opacities may represent pulmonary edema and or   atypical infection.  2.  Small bilateral pleural effusions.      Impression:          Electronically signed by Yelena Raines MD on 04-13-23 at 0621           ECG 12 Lead            Component  Ref Range & Units 15:25  (4/10/23) 09:53  (4/10/23) 8 mo ago  (7/19/22) 8 mo ago  (7/19/22) 9 mo ago  (6/25/22) 1 yr ago  (3/15/22)   QT Interval  ms 610 P  582 P  387  390  398  348    Resulting Agency  ECG  ECG  ECG  ECG  ECG  ECG             HEART RATE= 30  bpm  RR Interval= 2000  ms  ND Interval= 197  ms  P Horizontal Axis=   deg  P Front Axis= 0  deg  QRSD Interval= 115  ms  QT Interval= 610  ms  QRS Axis= 55  deg  T Wave Axis= 19  deg  - ABNORMAL ECG -  Sinus bradycardia  Atrial premature complexes  Nonspecific intraventricular conduction delay             Current Facility-Administered Medications:   •  sennosides-docusate (PERICOLACE) 8.6-50 MG per tablet 2 tablet, 2 tablet, Oral, BID, 2 tablet at 04/13/23 0811 **AND** polyethylene glycol (MIRALAX) packet 17 g, 17 g, Oral, Daily PRN **AND** bisacodyl (DULCOLAX) EC tablet 5 mg, 5 mg, Oral, Daily PRN **AND** bisacodyl (DULCOLAX) suppository 10 mg, 10 mg, Rectal, Daily PRN, Vaibhav Bonilla MD  •  cefTRIAXone (ROCEPHIN) 1 g in sodium chloride 0.9 % 100 mL IVPB-VTB, 1 g, Intravenous, Q24H, Vaibhav Bonilla MD, Last Rate: 200 mL/hr at 04/12/23 1819, 1 g at 04/12/23 1819  •  dextrose (D50W) (25 g/50 mL) IV injection 25 g, 25 g, Intravenous, Q15 Min PRN, Lewis  Pavan Granado Jr., MD  •  dextrose (GLUTOSE) oral gel 15 g, 15 g, Oral, Q15 Min PRN, Pavan Saucedo Jr., MD  •  Enoxaparin Sodium (LOVENOX) syringe 40 mg, 40 mg, Subcutaneous, Q24H, Pavan Saucedo Jr., MD  •  [START ON 4/14/2023] folic acid (FOLVITE) tablet 1 mg, 1 mg, Nasogastric, Daily, Pavan Saucedo Jr., MD  •  glucagon (GLUCAGEN) injection 1 mg, 1 mg, Intramuscular, Q15 Min PRN, Pavan Saucedo Jr., MD  •  insulin lispro (ADMELOG) injection 0-24 Units, 0-24 Units, Subcutaneous, Q4H, Pavan Saucedo Jr., MD, 4 Units at 04/13/23 1103  •  LORazepam (ATIVAN) tablet 0.5 mg, 0.5 mg, Oral, Q2H PRN **OR** LORazepam (ATIVAN) injection 0.5 mg, 0.5 mg, Intravenous, Q2H PRN, 0.5 mg at 04/12/23 2009 **OR** LORazepam (ATIVAN) tablet 1 mg, 1 mg, Oral, Q1H PRN **OR** LORazepam (ATIVAN) injection 1 mg, 1 mg, Intravenous, Q1H PRN, 1 mg at 04/13/23 0219 **OR** LORazepam (ATIVAN) injection 1 mg, 1 mg, Intravenous, Q15 Min PRN **OR** LORazepam (ATIVAN) injection 1 mg, 1 mg, Intramuscular, Q15 Min PRN **OR** LORazepam (ATIVAN) injection 2 mg, 2 mg, Intravenous, Q1H PRN **OR** LORazepam (ATIVAN) tablet 2 mg, 2 mg, Oral, Q1H PRN, Vaibhav Bonilla MD  •  ondansetron (ZOFRAN) injection 4 mg, 4 mg, Intravenous, Q6H PRN, Vaibhav Bonilla MD  •  Pharmacy to Dose enoxaparin (LOVENOX), , Does not apply, Continuous PRN, Vaibhav Bonilla MD  •  sodium chloride 0.9 % flush 10 mL, 10 mL, Intravenous, Q12H, Vaibhav Bonilla MD, 10 mL at 04/13/23 0812  •  sodium chloride 0.9 % flush 10 mL, 10 mL, Intravenous, PRN, Vaibhav Bonilla MD  •  sodium chloride 0.9 % infusion 40 mL, 40 mL, Intravenous, PRN, Vaibhav Bonilla MD  •  [START ON 4/14/2023] thiamine (VITAMIN B-1) tablet 300 mg, 300 mg, Nasogastric, Daily, Pavan Saucedo Jr., MD     ASSESSMENT  Acute hypoxic respiratory failure  Acute kidney injury  Hypotensive shock  Complete heart block status post temporary  pacemaker  Hyponatremia  Alcohol hepatitis  History of paroxysmal atrial fibrillation  Coronary artery disease status post PCI  History of hypertension with low blood pressure  Hyperlipidemia  Alcohol abuse  Tobacco abuse  History of prior CVA  Chronic kidney disease stage III  Gastroesophageal reflux disease    PLAN  CPM  ICU care  Empiric antibiotic  Vasopressors as needed  Continue tube feeding  Alcohol withdrawal and DTs precautions  Detox medications  CIWA protocol if needed  Cardiology pulmonary and nephrology to follow patient  Discontinue beta-blocker  Stress ulcer DVT prophylaxis   Supportive care  Discussed with family nursing staff cardiology and pulmonary  Follow closely and further recommendation according to hospital course    JOLIE BUNDY MD    Copied text in this note has been reviewed and is accurate as of 04/13/23

## 2023-04-13 NOTE — PLAN OF CARE
Goal Outcome Evaluation:      Pt remained in the CICU overnight. Propofol weaned as pt tolerated. Urine output 925 no BM. Intrinsic rhythm present for entirety of shift.

## 2023-04-13 NOTE — PROGRESS NOTES
Saw patient this morning. No pacing overnight. Currently NSR and ST (90-100s). Temporary pacing wire removed without any complication. We will see patient as needed if he has issues with heart rate or rhythm.

## 2023-04-14 LAB
ALBUMIN SERPL-MCNC: 3.2 G/DL (ref 3.5–5.2)
ALBUMIN/GLOB SERPL: 1.1 G/DL
ALP SERPL-CCNC: 143 U/L (ref 39–117)
ALT SERPL W P-5'-P-CCNC: 266 U/L (ref 1–41)
ANION GAP SERPL CALCULATED.3IONS-SCNC: 7 MMOL/L (ref 5–15)
AST SERPL-CCNC: 149 U/L (ref 1–40)
BILIRUB SERPL-MCNC: 0.4 MG/DL (ref 0–1.2)
BUN SERPL-MCNC: 20 MG/DL (ref 8–23)
BUN/CREAT SERPL: 11.9 (ref 7–25)
CALCIUM SPEC-SCNC: 8.4 MG/DL (ref 8.6–10.5)
CHLORIDE SERPL-SCNC: 108 MMOL/L (ref 98–107)
CO2 SERPL-SCNC: 27 MMOL/L (ref 22–29)
CREAT SERPL-MCNC: 1.68 MG/DL (ref 0.76–1.27)
DEPRECATED RDW RBC AUTO: 55.4 FL (ref 37–54)
EGFRCR SERPLBLD CKD-EPI 2021: 42.4 ML/MIN/1.73
ERYTHROCYTE [DISTWIDTH] IN BLOOD BY AUTOMATED COUNT: 15.6 % (ref 12.3–15.4)
GLOBULIN UR ELPH-MCNC: 2.9 GM/DL
GLUCOSE BLDC GLUCOMTR-MCNC: 124 MG/DL (ref 70–130)
GLUCOSE BLDC GLUCOMTR-MCNC: 126 MG/DL (ref 70–130)
GLUCOSE BLDC GLUCOMTR-MCNC: 131 MG/DL (ref 70–130)
GLUCOSE BLDC GLUCOMTR-MCNC: 135 MG/DL (ref 70–130)
GLUCOSE BLDC GLUCOMTR-MCNC: 135 MG/DL (ref 70–130)
GLUCOSE BLDC GLUCOMTR-MCNC: 149 MG/DL (ref 70–130)
GLUCOSE SERPL-MCNC: 115 MG/DL (ref 65–99)
HCT VFR BLD AUTO: 24.7 % (ref 37.5–51)
HGB BLD-MCNC: 7.9 G/DL (ref 13–17.7)
MCH RBC QN AUTO: 31.6 PG (ref 26.6–33)
MCHC RBC AUTO-ENTMCNC: 32 G/DL (ref 31.5–35.7)
MCV RBC AUTO: 98.8 FL (ref 79–97)
PLATELET # BLD AUTO: 159 10*3/MM3 (ref 140–450)
PMV BLD AUTO: 10 FL (ref 6–12)
POTASSIUM SERPL-SCNC: 3.8 MMOL/L (ref 3.5–5.2)
PROT SERPL-MCNC: 6.1 G/DL (ref 6–8.5)
QT INTERVAL: 305 MS
RBC # BLD AUTO: 2.5 10*6/MM3 (ref 4.14–5.8)
SODIUM SERPL-SCNC: 142 MMOL/L (ref 136–145)
WBC NRBC COR # BLD: 7.71 10*3/MM3 (ref 3.4–10.8)

## 2023-04-14 PROCEDURE — 82962 GLUCOSE BLOOD TEST: CPT

## 2023-04-14 PROCEDURE — 93005 ELECTROCARDIOGRAM TRACING: CPT | Performed by: INTERNAL MEDICINE

## 2023-04-14 PROCEDURE — 97162 PT EVAL MOD COMPLEX 30 MIN: CPT

## 2023-04-14 PROCEDURE — 92610 EVALUATE SWALLOWING FUNCTION: CPT

## 2023-04-14 PROCEDURE — 93010 ELECTROCARDIOGRAM REPORT: CPT | Performed by: INTERNAL MEDICINE

## 2023-04-14 PROCEDURE — 97535 SELF CARE MNGMENT TRAINING: CPT

## 2023-04-14 PROCEDURE — 25010000002 CEFTRIAXONE PER 250 MG: Performed by: INTERNAL MEDICINE

## 2023-04-14 PROCEDURE — 97530 THERAPEUTIC ACTIVITIES: CPT

## 2023-04-14 PROCEDURE — 25010000002 LORAZEPAM PER 2 MG: Performed by: INTERNAL MEDICINE

## 2023-04-14 PROCEDURE — 85027 COMPLETE CBC AUTOMATED: CPT | Performed by: INTERNAL MEDICINE

## 2023-04-14 PROCEDURE — 25010000002 ENOXAPARIN PER 10 MG: Performed by: INTERNAL MEDICINE

## 2023-04-14 PROCEDURE — 80053 COMPREHEN METABOLIC PANEL: CPT | Performed by: INTERNAL MEDICINE

## 2023-04-14 PROCEDURE — 97166 OT EVAL MOD COMPLEX 45 MIN: CPT

## 2023-04-14 PROCEDURE — 99232 SBSQ HOSP IP/OBS MODERATE 35: CPT | Performed by: INTERNAL MEDICINE

## 2023-04-14 RX ORDER — PANTOPRAZOLE SODIUM 40 MG/1
40 TABLET, DELAYED RELEASE ORAL
Status: DISCONTINUED | OUTPATIENT
Start: 2023-04-15 | End: 2023-04-18 | Stop reason: HOSPADM

## 2023-04-14 RX ORDER — INSULIN LISPRO 100 [IU]/ML
0-24 INJECTION, SOLUTION INTRAVENOUS; SUBCUTANEOUS
Status: DISCONTINUED | OUTPATIENT
Start: 2023-04-14 | End: 2023-04-14

## 2023-04-14 RX ORDER — ATENOLOL 25 MG/1
25 TABLET ORAL
Status: DISCONTINUED | OUTPATIENT
Start: 2023-04-14 | End: 2023-04-18 | Stop reason: HOSPADM

## 2023-04-14 RX ORDER — INSULIN LISPRO 100 [IU]/ML
0-7 INJECTION, SOLUTION INTRAVENOUS; SUBCUTANEOUS
Status: DISCONTINUED | OUTPATIENT
Start: 2023-04-14 | End: 2023-04-15

## 2023-04-14 RX ORDER — INSULIN LISPRO 100 [IU]/ML
0-7 INJECTION, SOLUTION INTRAVENOUS; SUBCUTANEOUS
Status: DISCONTINUED | OUTPATIENT
Start: 2023-04-14 | End: 2023-04-14

## 2023-04-14 RX ADMIN — DOCUSATE SODIUM 50MG AND SENNOSIDES 8.6MG 2 TABLET: 8.6; 5 TABLET, FILM COATED ORAL at 08:03

## 2023-04-14 RX ADMIN — Medication 300 MG: at 08:03

## 2023-04-14 RX ADMIN — Medication 1 MG: at 08:03

## 2023-04-14 RX ADMIN — LORAZEPAM 0.5 MG: 2 INJECTION INTRAMUSCULAR; INTRAVENOUS at 22:04

## 2023-04-14 RX ADMIN — CEFTRIAXONE SODIUM 1 G: 1 INJECTION, POWDER, FOR SOLUTION INTRAMUSCULAR; INTRAVENOUS at 20:43

## 2023-04-14 RX ADMIN — ATENOLOL 25 MG: 25 TABLET ORAL at 17:49

## 2023-04-14 RX ADMIN — LORAZEPAM 0.5 MG: 0.5 TABLET ORAL at 15:23

## 2023-04-14 RX ADMIN — ENOXAPARIN SODIUM 40 MG: 100 INJECTION SUBCUTANEOUS at 17:49

## 2023-04-14 RX ADMIN — Medication 10 ML: at 08:03

## 2023-04-14 NOTE — THERAPY EVALUATION
Acute Care - Speech Language Pathology   Swallow Initial Evaluation [facility]     Patient Name: Yuval Loja  : 1950  MRN: [id number]  Today's Date: 2023               Admit Date: 4/10/2023    Visit Dx:     ICD-10-CM ICD-9-CM   1. Sinus bradycardia  R00.1 427.89   2. Hyponatremia  E87.1 276.1   3. Acute on chronic renal insufficiency  N28.9 593.9    N18.9 585.9   4. Nausea and vomiting, unspecified vomiting type  R11.2 787.01   5. History of COPD  Z87.09 V12.69   6. History of coronary artery disease  Z86.79 V12.59     Patient Active Problem List   Diagnosis   • Alcoholic ketoacidosis   • Alcohol dependence   • Methamphetamine abuse   • Fatty liver, alcoholic   • Nausea vomiting and diarrhea   • Alcoholic liver disease   • Metabolic acidosis   • Tobacco abuse   • Coronary artery disease involving native coronary artery of native heart without angina pectoris   • Tachycardia   • Sinus tachycardia   • Chronic kidney disease, stage 3   • Medically noncompliant   • Visual hallucinations   • Psychosis   • Hyponatremia   • CAD (coronary artery disease)   • Leukopenia   • Symptomatic anemia   • Headache   • Substance abuse   • Gastrointestinal hemorrhage   • CRISTIANO (acute kidney injury) (HCC)   • Hyperkalemia   • Right lower quadrant abdominal pain   • New onset atrial fibrillation   • History of drug abuse   • COPD (chronic obstructive pulmonary disease)   • Right inguinal hernia   • Constipation   • Status post right hip replacement   • Right hip pain   • Sinus bradycardia     Past Medical History:   Diagnosis Date   • Alcohol abuse    • Arthritis    • CAD (coronary artery disease)    • Chronic kidney disease, stage 3    • COPD (chronic obstructive pulmonary disease)    • Disease of thyroid gland    • Elevated cholesterol    • Fatty liver, alcoholic    • GERD (gastroesophageal reflux disease)    • History of transfusion    • Hypertensive urgency    • Metabolic acidosis    • Myocardial infarction    •  "Sinus tachycardia    • Sleep apnea    • Stroke      Past Surgical History:   Procedure Laterality Date   • BACK SURGERY     • CARDIAC CATHETERIZATION     • CARDIAC ELECTROPHYSIOLOGY PROCEDURE N/A 4/10/2023    Procedure: Temporary Pacemaker;  Surgeon: Vaibhav Bonilla MD;  Location: [facility] INVASIVE LOCATION;  Service: Cardiovascular;  Laterality: N/A;   • COLONOSCOPY     • ENDOSCOPY     • FRACTURE SURGERY     • JOINT REPLACEMENT     • SKIN BIOPSY     • TOTAL HIP ARTHROPLASTY Right 06/27/2022    Procedure: TOTAL HIP ARTHROPLASTY ANTERIOR WITH HANA TABLE;  Surgeon: Willian Quiles MD;  Location: [facility] OR;  Service: Orthopedics;  Laterality: Right;  carli Bhatt. lindaa       SLP Recommendation and Plan  Continue: regular and thin diet. Medications: with thin liquids. Compensations:  small bites/sips, upright for all PO.       SLP to sign off. No suspected oropharyngeal dysphagia. Please re-consult if difficulties arise.      SWALLOW EVALUATION (last 72 hours)     SLP Adult Swallow Evaluation     Row Name 04/14/23 1123       Rehab Evaluation    Document Type evaluation  -AB    Subjective Information complains of  \"I shouldn't be here\"  -AB    Patient Observations alert  -AB    Patient/Family/Caregiver Comments/Observations seen in room cicu-2  -AB    Patient Effort adequate  -AB    Symptoms Noted During/After Treatment none  -AB       General Information    Patient Profile Reviewed yes  -AB    Pertinent History Of Current Problem per MD notes \"72 y.o. male with CKD2 admitted 4/10 after he fell at home, injuring his right hip.  Found to have sinus bradycardia, CRISTIANO, and severe + AG metabolic acidosis (AG 30 with arterial pH 6.88).  Blood alcohol level elevated on arrival.  PMH includes alcoholism, hypertension on atenolol, LORI, and PVD with prior stroke.  Hospitalization notable for intubation 4.10-4.13, complete heart block requiring transcutaneous pacing.\"  -AB    Current Method of Nutrition " regular textures;thin liquids  -AB    Precautions/Limitations, Vision WFL  -AB    Precautions/Limitations, Hearing WFL  -AB    Prior Level of Function-Communication WFL  -AB    Prior Level of Function-Swallowing regular textures;thin liquids  -AB    Plans/Goals Discussed with patient;agreed upon  -AB    Barriers to Rehab none identified  -AB    Patient's Goals for Discharge no concerns voiced  -AB       Pain    Additional Documentation Pain Scale: Numbers Pre/Post-Treatment (Group)  -AB       Oral Motor Structure and Function    Oral Lesions or Structural Abnormalities and/or variants none  -AB    Dentition Assessment natural, present and adequate  -AB    Secretion Management WNL/WFL  -AB    Mucosal Quality dry  -AB    Gag Response --  did not test  -AB    Volitional Swallow WFL  -AB    Volitional Cough WFL  -AB       Oral Musculature and Cranial Nerve Assessment    Oral Motor General Assessment WFL  -AB       General Eating/Swallowing Observations    Respiratory Support Currently in Use room air  -AB    Eating/Swallowing Skills self-fed  -AB    Positioning During Eating upright 90 degree  -AB    Utensils Used spoon;cup;straw  -AB    Consistencies Trialed ice chips;thin liquids;pureed;mixed consistency;chopped;regular textures  -AB       Clinical Swallow Eval    Clinical Swallow Evaluation Summary Bedside swallow evaluation completed. Orders received 4.13 s/p extubation, x3 days vent support with order stating eval 4.14. Passed extubation protocol, initiated on regular/thin diet per MD orders. Pt endorses no dysphagia at baseline. Oral phase within functional limits. Mastication and transit timely. No oral residuals. Pharyngeal phase with no overt s/s aspiration. Vocal quality with no significant impairments, adequate intensity and cued cough quality. No reports of globus or odynophagia. Pt irritable and perseverative “I don’t have any problems and shouldn’t be here,” and “they keep calling me an alcoholic and I’m  not.” Reassurance and encouragement provided.   Continue: regular and thin diet. Medications: with thin liquids. Compensations:  small bites/sips, upright for all PO.   SLP to sign off. No suspected oropharyngeal dysphagia. Please re-consult if difficulties arise.  -AB       SLP Evaluation Clinical Impression    SLP Swallowing Diagnosis swallow WFL/no suspected pharyngeal impairment  -AB    Functional Impact no impact on function  -AB    Swallow Criteria for Skilled Therapeutic Interventions Met no problems identified which require skilled intervention  -AB       SLP Treatment Clinical Impressions    Care Plan Review evaluation/treatment results reviewed;care plan/treatment goals reviewed;risks/benefits reviewed;current/potential barriers reviewed;patient/other agree to care plan  -AB       Recommendations    Therapy Frequency (Swallow) evaluation only  -AB    SLP Diet Recommendation regular textures;thin liquids  -AB    Recommended Diagnostics No further SLP services recommended  -AB    Recommended Precautions and Strategies upright posture during/after eating;small bites of food and sips of liquid  -AB    Oral Care Recommendations Oral Care BID/PRN  -AB    SLP Rec. for Method of Medication Administration with thin liquids  -AB    Monitor for Signs of Aspiration yes;notify SLP if any concerns  -AB    Anticipated Discharge Disposition (SLP) unknown  -AB          User Key  (r) = Recorded By, (t) = Taken By, (c) = Cosigned By    Initials Name Effective Dates    Ioana Azar, MS CCC-SLP 12/27/22 -                 EDUCATION  The patient has been educated in the following areas:   Dysphagia (Swallowing Impairment).              Time Calculation:    Time Calculation- SLP     Row Name 04/14/23 1200             Time Calculation- SLP    SLP Received On 04/14/23  -AB            User Key  (r) = Recorded By, (t) = Taken By, (c) = Cosigned By    Initials Name Provider Type    Ioana Azar, MS CCC-SLP Speech and  Language Pathologist                Therapy Charges for Today     Code Description Service Date Service Provider Modifiers Qty    [id number]  ST EVAL ORAL PHARYNG SWALLOW 3 4/14/2023 Ioana Jack, MS CCC-SLP GN 1               Ioana Jack MS CCC-SLP  4/14/2023

## 2023-04-14 NOTE — PROGRESS NOTES
LOS: 4 days   Patient Care Team:  Alessia Prescott APRN as PCP - General (Family Medicine)  Jonny Bains MD as Referring Physician (Internal Medicine)  Carlos Villareal MD as Consulting Physician (Hematology and Oncology)    Chief Complaint: Follow-up sinus bradycardia and junctional bradycardia, coronary artery disease, elevated high-sensitivity troponin.    Interval History: Heart rate has actually been elevated.  He does have frequent PACs and PVCs.  No atrial fibrillation on review.  He is extubated and alert.  No chest pain or shortness of breath.    Vital Signs:  Temp:  [97.5 °F (36.4 °C)-99.6 °F (37.6 °C)] 99.6 °F (37.6 °C)  Heart Rate:  [] 105  Resp:  [24] 24  BP: (116-165)/(49-91) 131/75    Intake/Output Summary (Last 24 hours) at 4/14/2023 1552  Last data filed at 4/14/2023 1537  Gross per 24 hour   Intake 551 ml   Output 2500 ml   Net -1949 ml       Physical Exam:   General Appearance:    Extubated, awake and alert.   Lungs:     Clear to auscultation bilaterally anteriorly.    Heart:    Regular rhythm and normal rate.  No murmurs, gallops, or rubs.   Abdomen:     Soft, nontender, nondistended.    Extremities:   No clubbing, cyanosis, or edema.     Results Review:    Results from last 7 days   Lab Units 04/14/23  0346   SODIUM mmol/L 142   POTASSIUM mmol/L 3.8   CHLORIDE mmol/L 108*   CO2 mmol/L 27.0   BUN mg/dL 20   CREATININE mg/dL 1.68*   GLUCOSE mg/dL 115*   CALCIUM mg/dL 8.4*     Results from last 7 days   Lab Units 04/10/23  1245 04/10/23  1021   HSTROP T ng/L 51* 71*     Results from last 7 days   Lab Units 04/14/23  0346   WBC 10*3/mm3 7.71   HEMOGLOBIN g/dL 7.9*   HEMATOCRIT % 24.7*   PLATELETS 10*3/mm3 159     Results from last 7 days   Lab Units 04/10/23  1853   INR  1.40*     Results from last 7 days   Lab Units 04/11/23  0420   CHOLESTEROL mg/dL 112     Results from last 7 days   Lab Units 04/13/23  0355   MAGNESIUM mg/dL 2.0     Results from last 7 days   Lab Units  04/11/23  0420   CHOLESTEROL mg/dL 112   TRIGLYCERIDES mg/dL 62   HDL CHOL mg/dL 74*   LDL CHOL mg/dL 24       I reviewed the patient's new clinical results.        Assessment:  1.  Sinus bradycardia and junctional bradycardia, requiring transvenous pacemaker on 4/10/2023.  2.  Acute hypoxic respiratory failure  3.  Shock, likely multifactorial   4.  Acute kidney injury with stage III chronic kidney disease  5.  Severe metabolic acidosis  6.  Hypothermia, resolved  7.  Hyponatremia  8.  Multifactorial weakness and nausea  9.  Heavy alcohol use with elevated alcohol level on admission   10.  Acute transaminitis  11.  History of paroxysmal atrial fibrillation  12.  Coronary artery disease, status post stenting to the RCA in 2000 and in the LAD in 2006  13.  History of prior stroke  14.  Elevated high-sensitivity troponin, likely secondary to renal dysfunction  15.  Frequent PACs and PVCs    Plan:  -Temporary pacemaker removed yesterday.  Bradycardia was metabolically mediated on admission.  Now in sinus tachycardia with frequent PACs and PVCs.  He was on atenolol 100 mg/day as an outpatient.  I am going to resume 25 mg/day at this time.  I would not increase the atenolol significantly given his recent renal dysfunction.    -Reviewed telemetry.  I did not see any evidence of true atrial fibrillation (there was a question about this earlier this morning).    -Transaminitis and renal function continues to improve.  Nephrology following.    -Echocardiogram with EF 55 to 60% and otherwise fairly unremarkable.    -Suspect some of this was from alcohol, especially at the liver issues.  Alcohol cessation is going to be essential for him as an outpatient.  I reemphasized this to him today.      Paul Schwarz MD  04/14/23  15:52 EDT

## 2023-04-14 NOTE — CASE MANAGEMENT/SOCIAL WORK
Continued Stay Note  [facility]     Patient Name: Yuval Loja  MRN: [id number]  Today's Date: 4/14/2023    Admit Date: 4/10/2023    Plan: SNF referrals pending   Discharge Plan     Row Name 04/14/23 1659       Plan    Plan SNF referrals pending    Patient/Family in Agreement with Plan yes    Plan Comments CCP noted PT notes. Met with patient at bedside to discuss discharge planning. Discussed option of short term rehab with patient. Patient plans to speak with his daughter prior to consenting to CCP speaking with her, but is interested in SNF and requests referrals in the [facility] or [facility] area, close to where he and his daughter live. Referrals pending. CCP following clinical course to assist with discharge planning needs and SNF arrangements if indicated or  set up. Will discuss with patient and daughter further once patient has opportunity to speak with daughter. Chichi ELIZABETH RN/CCP               Discharge Codes    No documentation.                     Darlene Castro

## 2023-04-14 NOTE — PLAN OF CARE
Goal Outcome Evaluation:   Pt remained in the CICU overnight. On 1L nasal cannula. Heart rate tachycardic overnight. Pt alert and oriented x3; disoriented to situation. 1200 u/o overnight; no BM.

## 2023-04-14 NOTE — THERAPY EVALUATION
Patient Name: Yuval Loja  : 1950    MRN: [id number]                              Today's Date: 2023       Admit Date: 4/10/2023    Visit Dx:     ICD-10-CM ICD-9-CM   1. Sinus bradycardia  R00.1 427.89   2. Hyponatremia  E87.1 276.1   3. Acute on chronic renal insufficiency  N28.9 593.9    N18.9 585.9   4. Nausea and vomiting, unspecified vomiting type  R11.2 787.01   5. History of COPD  Z87.09 V12.69   6. History of coronary artery disease  Z86.79 V12.59     Patient Active Problem List   Diagnosis   • Alcoholic ketoacidosis   • Alcohol dependence   • Methamphetamine abuse   • Fatty liver, alcoholic   • Nausea vomiting and diarrhea   • Alcoholic liver disease   • Metabolic acidosis   • Tobacco abuse   • Coronary artery disease involving native coronary artery of native heart without angina pectoris   • Tachycardia   • Sinus tachycardia   • Chronic kidney disease, stage 3   • Medically noncompliant   • Visual hallucinations   • Psychosis   • Hyponatremia   • CAD (coronary artery disease)   • Leukopenia   • Symptomatic anemia   • Headache   • Substance abuse   • Gastrointestinal hemorrhage   • CRISTIANO (acute kidney injury) (HCC)   • Hyperkalemia   • Right lower quadrant abdominal pain   • New onset atrial fibrillation   • History of drug abuse   • COPD (chronic obstructive pulmonary disease)   • Right inguinal hernia   • Constipation   • Status post right hip replacement   • Right hip pain   • Sinus bradycardia     Past Medical History:   Diagnosis Date   • Alcohol abuse    • Arthritis    • CAD (coronary artery disease)    • Chronic kidney disease, stage 3    • COPD (chronic obstructive pulmonary disease)    • Disease of thyroid gland    • Elevated cholesterol    • Fatty liver, alcoholic    • GERD (gastroesophageal reflux disease)    • History of transfusion    • Hypertensive urgency    • Metabolic acidosis    • Myocardial infarction    • Sinus tachycardia    • Sleep apnea    • Stroke      Past Surgical  History:   Procedure Laterality Date   • BACK SURGERY     • CARDIAC CATHETERIZATION     • CARDIAC ELECTROPHYSIOLOGY PROCEDURE N/A 4/10/2023    Procedure: Temporary Pacemaker;  Surgeon: Vaibhav Bonilla MD;  Location: [facility] INVASIVE LOCATION;  Service: Cardiovascular;  Laterality: N/A;   • COLONOSCOPY     • ENDOSCOPY     • FRACTURE SURGERY     • JOINT REPLACEMENT     • SKIN BIOPSY     • TOTAL HIP ARTHROPLASTY Right 06/27/2022    Procedure: TOTAL HIP ARTHROPLASTY ANTERIOR WITH HANA TABLE;  Surgeon: Willian Quiles MD;  Location: [facility] MAIN OR;  Service: Orthopedics;  Laterality: Right;  Depuy, carli. lindaa      General Information     Row Name 04/14/23 1627          Physical Therapy Time and Intention    Document Type evaluation  -     Mode of Treatment individual therapy;physical therapy  -     Row Name 04/14/23 1627          General Information    Prior Level of Function independent:;gait;transfer;bed mobility  cane at baseline  -     Existing Precautions/Restrictions fall  -     Barriers to Rehab medically complex  -     Row Name 04/14/23 1627          Living Environment    People in Home alone  -     Row Name 04/14/23 1627          Home Main Entrance    Number of Stairs, Main Entrance three  -     Row Name 04/14/23 1627          Cognition    Orientation Status (Cognition) oriented x 3  -     Row Name 04/14/23 1627          Safety Issues, Functional Mobility    Safety Issues Affecting Function (Mobility) safety precaution awareness;impulsivity  -     Impairments Affecting Function (Mobility) balance;cognition;endurance/activity tolerance;strength  -           User Key  (r) = Recorded By, (t) = Taken By, (c) = Cosigned By    Initials Name Provider Type     Alla Acevedo PT Physical Therapist               Mobility     Row Name 04/14/23 1629          Bed Mobility    Bed Mobility supine-sit;sit-supine  -     Supine-Sit [place] (Bed Mobility) verbal cues;nonverbal  cues (demo/gesture);contact guard  -     Sit-Supine [place] (Bed Mobility) not tested  sitting in chair  -[facility] Name 04/14/23 1629          Sit-Stand Transfer    Sit-Stand [place] (Transfers) verbal cues;nonverbal cues (demo/gesture);minimum assist (75% patient effort)  -     Assistive Device (Sit-Stand Transfers) walker, front-wheeled  -[facility] Name 04/14/23 1629          Gait/Stairs (Locomotion)    [place] Level (Gait) verbal cues;nonverbal cues (demo/gesture);minimum assist (75% patient effort)  -     Assistive Device (Gait) walker, front-wheeled  -     Distance in Feet (Gait) 5 ft bed to chair  -     Deviations/Abnormal Patterns (Gait) alicia decreased;gait speed decreased;stride length decreased  -     Bilateral Gait Deviations forward flexed posture  -     Comment, (Gait/Stairs) gait distance limited by weakness and increased HR with activity  -           User Key  (r) = Recorded By, (t) = Taken By, (c) = Cosigned By    Initials Name Provider Type     Alla Acevedo, PT Physical Therapist               Obj/Interventions     Row Name 04/14/23 1630          Range of Motion Comprehensive    General Range of Motion no range of motion deficits identified  -CH     Row Name 04/14/23 1630          Strength Comprehensive (MMT)    Comment, General Manual Muscle Testing (MMT) Assessment generalized weakness noted with functional mobility, B LE grossly >/=3+/5  -CH     Row Name 04/14/23 1630          Motor Skills    Therapeutic Exercise --  10 reps B LE AP, LAQ, and seated marches  -CH     Row Name 04/14/23 1630          Balance    Balance Assessment standing static balance;standing dynamic balance  -     Static Standing Balance verbal cues;non-verbal cues (demo/gesture);minimal assist  -     Dynamic Standing Balance verbal cues;non-verbal cues (demo/gesture);minimal assist  -     Position/Device Used, Standing Balance walker, rolling  -           User Key  (r) =  Recorded By, (t) = Taken By, (c) = Cosigned By    Initials Name Provider Type     Alla Acevedo, PT Physical Therapist               Goals/Plan     Row Name 04/14/23 1634          Bed Mobility Goal 1 (PT)    Activity/Assistive Device (Bed Mobility Goal 1, PT) bed mobility activities, all  -CH     [place] Level/Cues Needed (Bed Mobility Goal 1, PT) supervision required  -CH     Time Frame (Bed Mobility Goal 1, PT) 1 week  -     Row Name 04/14/23 1634          Transfer Goal 1 (PT)    Activity/Assistive Device (Transfer Goal 1, PT) transfers, all;walker, rolling  -CH     [place] Level/Cues Needed (Transfer Goal 1, PT) supervision required  -CH     Time Frame (Transfer Goal 1, PT) 1 week  -     Row Name 04/14/23 1634          Gait Training Goal 1 (PT)    Activity/Assistive Device (Gait Training Goal 1, PT) gait (walking locomotion);walker, rolling  -CH     [place] Level (Gait Training Goal 1, PT) supervision required  -CH     Distance (Gait Training Goal 1, PT) 100  -CH     Time Frame (Gait Training Goal 1, PT) 1 week  -     Row Name 04/14/23 1634          Therapy Assessment/Plan (PT)    Planned Therapy Interventions (PT) balance training;bed mobility training;gait training;home exercise program;patient/family education;strengthening;transfer training  -           User Key  (r) = Recorded By, (t) = Taken By, (c) = Cosigned By    Initials Name Provider Type     Alla Acevedo, PT Physical Therapist               Clinical Impression     Row Name 04/14/23 1631          Pain    Pretreatment Pain Rating 0/10 - no pain  -     Posttreatment Pain Rating 0/10 - no pain  -     Row Name 04/14/23 1631          Plan of Care Review    Plan of Care Reviewed With patient  -     Outcome Evaluation pt is a 71 yo m who was admitted with fall, CRISTIANO, and bradycardia. Pt was placed on vent during this admitted and extubated yesterday. Pt presents to PT with impaired functional mobility and gait  secondary to generalized weakness, impaired balance, and decreased activity tolerance. Pt may benefit from skilled PT to address strength, mobility, and gait.  -     Row Name 04/14/23 1631          Therapy Assessment/Plan (PT)    Patient/Family Therapy Goals Statement (PT) to return to PLOF  -     Rehab Potential (PT) good, to achieve stated therapy goals  -     Criteria for Skilled Interventions Met (PT) skilled treatment is necessary  -     Therapy Frequency (PT) 5 times/wk  -     Row Name 04/14/23 1631          Positioning and Restraints    Pre-Treatment Position in bed  -     Post Treatment Position chair  -     In Chair sitting;call light within reach;encouraged to call for assist;exit alarm on;with nsg  aide present  -           User Key  (r) = Recorded By, (t) = Taken By, (c) = Cosigned By    Initials Name Provider Type    Alla Doss, PT Physical Therapist               Outcome Measures     Row Name 04/14/23 1635          How much help from another person do you currently need...    Turning from your back to your side while in flat bed without using bedrails? 3  -CH     Moving from lying on back to sitting on the side of a flat bed without bedrails? 3  -CH     Moving to and from a bed to a chair (including a wheelchair)? 3  -CH     Standing up from a chair using your arms (e.g., wheelchair, bedside chair)? 3  -CH     Climbing 3-5 steps with a railing? 1  -CH     To walk in hospital room? 2  -CH     AM-PAC 6 Clicks Score (PT) 15  -     Highest level of mobility 4 --> Transferred to chair/commode  -     Row Name 04/14/23 1635 04/14/23 1128       Functional Assessment    Outcome Measure Options AM-PAC 6 Clicks Basic Mobility (PT)  - AM-PAC 6 Clicks Daily Activity (OT)  -CE          User Key  (r) = Recorded By, (t) = Taken By, (c) = Cosigned By    Initials Name Provider Type    Alla Doss, PT Physical Therapist    Gerri Lovell, OT Occupational Therapist                              Physical Therapy Education     Title: PT OT SLP Therapies (Done)     Topic: Physical Therapy (Done)     Point: Mobility training (Done)     Learning Progress Summary           Patient Acceptance, E,TB,D, VU,NR by  at 4/14/2023 1635                   Point: Home exercise program (Done)     Learning Progress Summary           Patient Acceptance, E,TB,D, VU,NR by  at 4/14/2023 1635                   Point: Body mechanics (Done)     Learning Progress Summary           Patient Acceptance, E,TB,D, VU,NR by  at 4/14/2023 1635                   Point: Precautions (Done)     Learning Progress Summary           Patient Acceptance, E,TB,D, VU,NR by  at 4/14/2023 1635                               User Key     Initials Effective Dates Name Provider Type Discipline     06/16/21 -  Alla Acevedo, PT Physical Therapist PT              PT Recommendation and Plan  Planned Therapy Interventions (PT): balance training, bed mobility training, gait training, home exercise program, patient/family education, strengthening, transfer training  Plan of Care Reviewed With: patient  Outcome Evaluation: pt is a 71 yo m who was admitted with fall, CRISTIANO, and bradycardia. Pt was placed on vent during this admitted and extubated yesterday. Pt presents to PT with impaired functional mobility and gait secondary to generalized weakness, impaired balance, and decreased activity tolerance. Pt may benefit from skilled PT to address strength, mobility, and gait.     Time Calculation:    PT Charges     Row Name 04/14/23 1636             Time Calculation    Start Time 1448  -      Stop Time 1509  -      Time Calculation (min) 21 min  -      PT Received On 04/14/23  -      PT - Next Appointment 04/17/23  -      PT Goal Re-Cert Due Date 04/21/23  -         Time Calculation- PT    Total Timed Code Minutes- PT 15 minute(s)  -         Timed Charges    44422 - PT Therapeutic Activity Minutes 15  -         Total  Minutes    Timed Charges Total Minutes 15  -CH       Total Minutes 15  -CH            User Key  (r) = Recorded By, (t) = Taken By, (c) = Cosigned By    Initials Name Provider Type     Alla Acevedo, PT Physical Therapist              Therapy Charges for Today     Code Description Service Date Service Provider Modifiers Qty    31714534093 HC PT THERAPEUTIC ACT EA 15 MIN 4/14/2023 Alla Acevedo, PT GP 1    18961761245 HC PT EVAL MOD COMPLEXITY 2 4/14/2023 Alla Acevedo, PT GP 1          PT G-Codes  Outcome Measure Options: AM-PAC 6 Clicks Basic Mobility (PT)  AM-PAC 6 Clicks Score (PT): 15  AM-PAC 6 Clicks Score (OT): 11  PT Discharge Summary  Anticipated Discharge Disposition (PT): skilled nursing facility    Alla Acevedo, PT  4/14/2023

## 2023-04-14 NOTE — PLAN OF CARE
Goal Outcome Evaluation:  Plan of Care Reviewed With: patient           Outcome Evaluation: pt is a 73 yo m who was admitted with fall, CRISTIANO, and bradycardia. Pt was placed on vent during this admitted and extubated yesterday. Pt presents to PT with impaired functional mobility and gait secondary to generalized weakness, impaired balance, and decreased activity tolerance. Pt may benefit from skilled PT to address strength, mobility, and gait.

## 2023-04-14 NOTE — PLAN OF CARE
Goal Outcome Evaluation:  Plan of Care Reviewed With: patient           Outcome Evaluation: Pt seen for OT eval after admission for fall and course c/b bradycardia and CRISTIANO with temporary pacer placement and intubation. Pt now extubated and on RA with HR ~106 at rest. Pt incontinent in bed and unaware, requiring depA for bathing and LBD. Pt able to assist with bed mobility and able to roll B with Crystal and complete sup<>sit w/ Crystal x 1. At EOB HR elevating to 130's initially then in 120's after 5 min. Pt agreeable to stand only in prep for repositioning and declining getting OOBTC at this time despite encouragement. Pt able to stand with Crystal and required modA for side stepping. Will con't to follow for stated goals and recommend d/c to SNF.

## 2023-04-14 NOTE — THERAPY EVALUATION
Patient Name: Yuval Loja  : 1950    MRN: [id number]                              Today's Date: 2023       Admit Date: 4/10/2023    Visit Dx:     ICD-10-CM ICD-9-CM   1. Sinus bradycardia  R00.1 427.89   2. Hyponatremia  E87.1 276.1   3. Acute on chronic renal insufficiency  N28.9 593.9    N18.9 585.9   4. Nausea and vomiting, unspecified vomiting type  R11.2 787.01   5. History of COPD  Z87.09 V12.69   6. History of coronary artery disease  Z86.79 V12.59     Patient Active Problem List   Diagnosis   • Alcoholic ketoacidosis   • Alcohol dependence   • Methamphetamine abuse   • Fatty liver, alcoholic   • Nausea vomiting and diarrhea   • Alcoholic liver disease   • Metabolic acidosis   • Tobacco abuse   • Coronary artery disease involving native coronary artery of native heart without angina pectoris   • Tachycardia   • Sinus tachycardia   • Chronic kidney disease, stage 3   • Medically noncompliant   • Visual hallucinations   • Psychosis   • Hyponatremia   • CAD (coronary artery disease)   • Leukopenia   • Symptomatic anemia   • Headache   • Substance abuse   • Gastrointestinal hemorrhage   • CRISTIANO (acute kidney injury) (HCC)   • Hyperkalemia   • Right lower quadrant abdominal pain   • New onset atrial fibrillation   • History of drug abuse   • COPD (chronic obstructive pulmonary disease)   • Right inguinal hernia   • Constipation   • Status post right hip replacement   • Right hip pain   • Sinus bradycardia     Past Medical History:   Diagnosis Date   • Alcohol abuse    • Arthritis    • CAD (coronary artery disease)    • Chronic kidney disease, stage 3    • COPD (chronic obstructive pulmonary disease)    • Disease of thyroid gland    • Elevated cholesterol    • Fatty liver, alcoholic    • GERD (gastroesophageal reflux disease)    • History of transfusion    • Hypertensive urgency    • Metabolic acidosis    • Myocardial infarction    • Sinus tachycardia    • Sleep apnea    • Stroke      Past Surgical  History:   Procedure Laterality Date   • BACK SURGERY     • CARDIAC CATHETERIZATION     • CARDIAC ELECTROPHYSIOLOGY PROCEDURE N/A 4/10/2023    Procedure: Temporary Pacemaker;  Surgeon: Vaibhav Bonilla MD;  Location: [facility] INVASIVE LOCATION;  Service: Cardiovascular;  Laterality: N/A;   • COLONOSCOPY     • ENDOSCOPY     • FRACTURE SURGERY     • JOINT REPLACEMENT     • SKIN BIOPSY     • TOTAL HIP ARTHROPLASTY Right 06/27/2022    Procedure: TOTAL HIP ARTHROPLASTY ANTERIOR WITH HANA TABLE;  Surgeon: Willian Quiles MD;  Location: [facility] MAIN OR;  Service: Orthopedics;  Laterality: Right;  Depuy, mile floyd      General Information     Row Name 04/14/23 1116          OT Time and Intention    Document Type evaluation  -CE     Mode of Treatment individual therapy;occupational therapy  -CE     Row Name 04/14/23 1116          General Information    Patient Profile Reviewed yes  -CE     Prior Level of Function independent:;ADL's;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;driving;yard work  uses cane primarily but also owns FWW  -CE     Existing Precautions/Restrictions fall  -CE     Barriers to Rehab medically complex  -CE     Row Name 04/14/23 1116          Living Environment    People in Home alone  -CE     Row Name 04/14/23 1116          Home Main Entrance    Number of Stairs, Main Entrance three  -CE     Stair Railings, Main Entrance railings safe and in good condition  -CE     Row Name 04/14/23 1116          Stairs Within Home, Primary    Number of Stairs, Within Home, Primary none  -CE     Row Name 04/14/23 1116          Cognition    Orientation Status (Cognition) oriented x 3  month/yr/place; unable to state day of wk or month correctly  -CE     Row Name 04/14/23 1116          Safety Issues, Functional Mobility    Safety Issues Affecting Function (Mobility) problem-solving;safety precautions follow-through/compliance  -CE     Impairments Affecting Function (Mobility) balance;cognition;endurance/activity tolerance;strength   -CE     Cognitive Impairments, Mobility Safety/Performance problem-solving/reasoning;safety precaution follow-through  -CE           User Key  (r) = Recorded By, (t) = Taken By, (c) = Cosigned By    Initials Name Provider Type    CE Gerri Hilton OT Occupational Therapist                 Mobility/ADL's     Row Name 04/14/23 1118          Bed Mobility    Bed Mobility rolling left;rolling right;supine-sit;sit-supine  -CE     Rolling Left [place] (Bed Mobility) 1 person assist;minimum assist (75% patient effort)  -CE     Rolling Right [place] (Bed Mobility) minimum assist (75% patient effort);1 person assist  -CE     Supine-Sit [place] (Bed Mobility) minimum assist (75% patient effort);1 person assist  -CE     Sit-Supine [place] (Bed Mobility) 1 person assist;minimum assist (75% patient effort)  -CE     Assistive Device (Bed Mobility) bed rails;head of bed elevated  -     Row Name 04/14/23 1118          Transfers    Transfers sit-stand transfer  -     Row Name 04/14/23 [facility]          Sit-Stand Transfer    Sit-Stand [place] (Transfers) 1 person assist;minimum assist (75% patient effort)  -CE     Comment, (Sit-Stand Transfer) B HHA  -CE     Row Name 04/14/23 1118          Functional Mobility    Functional Mobility- Ind. Level moderate assist (50% patient effort)  -CE     Functional Mobility- Comment pt declining to transfer to chair but able to stand and scoot laterally towards HOB for better positioning using B HHA. difficulty initiating side steps and requiring modA for balance and wt shifting; pt also stating he was dizzy upon sup>sit (VSS although HR elevated to 135 and /76)  -CE     Row Name 04/14/23 1118          Activities of Daily Living    BADL Assessment/Intervention upper body dressing;toileting;bathing  -     Row Name 04/14/23 1118          Upper Body Dressing Assessment/Training    [place] Level (Upper Body Dressing) don;doff;front opening garment;minimum assist  (75% patient effort);verbal cues  -CE     Position (Upper Body Dressing) supine  -CE     [facility] Name 04/14/23 1118          Toileting Assessment/Training    [place] Level (Toileting) toileting skills;adjust/manage clothing;perform perineal hygiene;dependent (less than 25% patient effort)  -CE     Position (Toileting) supine  -CE     [facility] Name 04/14/23 1122 04/14/23 1118       Bathing Assessment/Intervention    [place] Level (Bathing) --  for thorough completion  -CE bathing skills;dependent (less than 25% patient effort)  -CE    Position (Bathing) -- supine  -CE          User Key  (r) = Recorded By, (t) = Taken By, (c) = Cosigned By    Initials Name Provider Type    Gerri Lovell OT Occupational Therapist               Obj/Interventions     [facility] Name 04/14/23 1122          Sensory Assessment (Somatosensory)    Sensory Assessment (Somatosensory) sensation intact  -CE     Row Name 04/14/23 1122          Vision Assessment/Intervention    Visual Impairment/Limitations WFL  -[facility] Name 04/14/23 1122          Range of Motion Comprehensive    General Range of Motion no range of motion deficits identified  -CE     Row Name 04/14/23 1122          Strength Comprehensive (MMT)    Comment, General Manual Muscle Testing (MMT) Assessment generalized weakness but UEs grossly 3+/5 and LEs 4-/5 with functional activity and ADLs  -CE     Row Name 04/14/23 1122          Balance    Balance Assessment standing static balance  -CE     Static Standing Balance moderate assist  -CE     Position/Device Used, Standing Balance supported  -CE           User Key  (r) = Recorded By, (t) = Taken By, (c) = Cosigned By    Initials Name Provider Type    Gerri Lovell OT Occupational Therapist               Goals/Plan     Row Name 04/14/23 1127          Transfer Goal 1 (OT)    Activity/Assistive Device (Transfer Goal 1, OT) toilet;shower chair  -CE     [place] Level/Cues Needed (Transfer Goal 1, OT) standby assist  -CE      Time Frame (Transfer Goal 1, OT) short term goal (STG);2 weeks  -CE     Row Name 04/14/23 1127          Bathing Goal 1 (OT)    Activity/Device (Bathing Goal 1, OT) bathing skills, all  -CE     [place] Level/Cues Needed (Bathing Goal 1, OT) standby assist  -CE     Time Frame (Bathing Goal 1, OT) short term goal (STG);2 weeks  -CE     Row Name 04/14/23 1127          Dressing Goal 1 (OT)    Activity/Device (Dressing Goal 1, OT) dressing skills, all  -CE     [place]/Cues Needed (Dressing Goal 1, OT) standby assist  -CE     Time Frame (Dressing Goal 1, OT) short term goal (STG);2 weeks  -CE     Row Name 04/14/23 1127          Toileting Goal 1 (OT)    Activity/Device (Toileting Goal 1, OT) toileting skills, all  -CE     [place] Level/Cues Needed (Toileting Goal 1, OT) standby assist  -CE     Time Frame (Toileting Goal 1, OT) short term goal (STG);2 weeks  -CE     Row Name 04/14/23 1127          Problem Specific Goal 1 (OT)    Problem Specific Goal 1 (OT) Pt will complete 8 min standing activity with VSS throughout and without seated rest, with AD as needed and with SBA focusing on increasing functional endurance in prep for return to home.  -CE     Time Frame (Problem Specific Goal 1, OT) short term goal (STG);2 weeks  -CE     Row Name 04/14/23 1127          Therapy Assessment/Plan (OT)    Planned Therapy Interventions (OT) activity tolerance training;adaptive equipment training;BADL retraining;functional balance retraining;transfer/mobility retraining;strengthening exercise  -CE           User Key  (r) = Recorded By, (t) = Taken By, (c) = Cosigned By    Initials Name Provider Type    CE Gerri Hilton, LIZA Occupational Therapist               Clinical Impression     Row Name 04/14/23 1123          Pain Assessment    Pretreatment Pain Rating 0/10 - no pain  -CE     Posttreatment Pain Rating 0/10 - no pain  -CE     Row Name 04/14/23 1123          Plan of Care Review    Plan of Care Reviewed With patient   -CE     Outcome Evaluation Pt seen for OT eval after admission for fall and course c/b bradycardia and CRISTIANO with temporary pacer placement and intubation. Pt now extubated and on RA with HR ~106 at rest. Pt incontinent in bed and unaware, requiring depA for bathing and LBD. Pt able to assist with bed mobility and able to roll B with Crystal and complete sup<>sit w/ Crystal x 1. At EOB HR elevating to 130's initially then in 120's after 5 min. Pt agreeable to stand only in prep for repositioning and declining getting OOBTC at this time despite encouragement. Pt able to stand with Crystal and required modA for side stepping. Will con't to follow for stated goals and recommend d/c to SNF.  -CE     Row Name 04/14/23 1123          Therapy Assessment/Plan (OT)    Rehab Potential (OT) good, to achieve stated therapy goals  -CE     Criteria for Skilled Therapeutic Interventions Met (OT) yes  -CE     Therapy Frequency (OT) 5 times/wk  -CE     Row Name 04/14/23 1123          Therapy Plan Review/Discharge Plan (OT)    Anticipated Discharge Disposition (OT) skilled nursing facility  -CE     Row Name 04/14/23 1123          Vital Signs    O2 Delivery Pre Treatment room air  -CE     Intra SpO2 (%) 90  -CE     Pre Patient Position Supine  -CE     Intra Patient Position Standing  -CE     Post Patient Position Supine  -CE     Row Name 04/14/23 1123          Positioning and Restraints    Pre-Treatment Position in bed  -CE     Post Treatment Position bed  -CE     In Bed notified nsg;side lying left;with nsg;encouraged to call for assist;call light within reach  -CE           User Key  (r) = Recorded By, (t) = Taken By, (c) = Cosigned By    Initials Name Provider Type    CE Gerri Hilton, OT Occupational Therapist               Outcome Measures     Row Name 04/14/23 1128          How much help from another is currently needed...    Putting on and taking off regular lower body clothing? 1  -CE     Bathing (including washing, rinsing, and  drying) 1  -CE     Toileting (which includes using toilet bed pan or urinal) 1  -CE     Putting on and taking off regular upper body clothing 2  -CE     Taking care of personal grooming (such as brushing teeth) 3  -CE     Eating meals 3  -CE     AM-PAC 6 Clicks Score (OT) 11  -CE     Row Name 04/14/23 1128          Functional Assessment    Outcome Measure Options AM-PAC 6 Clicks Daily Activity (OT)  -CE           User Key  (r) = Recorded By, (t) = Taken By, (c) = Cosigned By    Initials Name Provider Type    CE Gerri Hilton OT Occupational Therapist                Occupational Therapy Education     Title: PT OT SLP Therapies (Done)     Topic: Occupational Therapy (Done)     Point: ADL training (Done)     Description:   Instruct learner(s) on proper safety adaptation and remediation techniques during self care or transfers.   Instruct in proper use of assistive devices.              Learning Progress Summary           Patient Acceptance, E, VU,NR by CE at 4/14/2023 1129                   Point: Home exercise program (Done)     Description:   Instruct learner(s) on appropriate technique for monitoring, assisting and/or progressing therapeutic exercises/activities.              Learning Progress Summary           Patient Acceptance, E, VU,NR by CE at 4/14/2023 1129                               User Key     Initials Effective Dates Name Provider Type Discipline    CE 10/17/22 -  Gerri Hilton OT Occupational Therapist OT              OT Recommendation and Plan  Planned Therapy Interventions (OT): activity tolerance training, adaptive equipment training, BADL retraining, functional balance retraining, transfer/mobility retraining, strengthening exercise  Therapy Frequency (OT): 5 times/wk  Plan of Care Review  Plan of Care Reviewed With: patient  Outcome Evaluation: Pt seen for OT eval after admission for fall and course c/b bradycardia and CRISTIANO with temporary pacer placement and intubation. Pt now extubated  and on RA with HR ~106 at rest. Pt incontinent in bed and unaware, requiring depA for bathing and LBD. Pt able to assist with bed mobility and able to roll B with Crystal and complete sup<>sit w/ Crystal x 1. At EOB HR elevating to 130's initially then in 120's after 5 min. Pt agreeable to stand only in prep for repositioning and declining getting OOBTC at this time despite encouragement. Pt able to stand with Crystal and required modA for side stepping. Will con't to follow for stated goals and recommend d/c to SNF.     Time Calculation:    Time Calculation- OT     Row Name 04/14/23 1130             Time Calculation- OT    OT Start Time 1038  -CE      OT Stop Time 1110  -CE      OT Time Calculation (min) 32 min  -CE      Total Timed Code Minutes- OT 23 minute(s)  -CE      OT Received On 04/14/23  -CE      OT - Next Appointment 04/17/23  -CE      OT Goal Re-Cert Due Date 04/28/23  -CE         Timed Charges    96399 - OT Self Care/Mgmt Minutes 23  -CE         Total Minutes    Timed Charges Total Minutes 23  -CE       Total Minutes 23  -CE            User Key  (r) = Recorded By, (t) = Taken By, (c) = Cosigned By    Initials Name Provider Type    CE Gerri Hilton OT Occupational Therapist              Therapy Charges for Today     Code Description Service Date Service Provider Modifiers Qty    12168019222 HC OT SELF CARE/MGMT/TRAIN EA 15 MIN 4/14/2023 Gerri Hilton OT GO 2    58350299604 HC OT EVAL MOD COMPLEXITY 2 4/14/2023 Gerri Hilton OT GO 1               Gerri Hilton OT  4/14/2023

## 2023-04-14 NOTE — PLAN OF CARE
Goal Outcome Evaluation:  Plan of Care Reviewed With: patient        Progress: no change  Outcome Evaluation: Bedside swallow evaluation completed. Orders received 4.13 s/p extubation, x3 days vent support with order stating eval 4.14. Passed extubation protocol, initiated on regular/thin diet per MD orders. Pt endorses no dysphagia at baseline. Oral phase within functional limits. Mastication and transit timely. No oral residuals. Pharyngeal phase with no overt s/s aspiration. Vocal quality with no significant impairments, adequate intensity and cued cough quality. No reports of globus or odynophagia. Pt irritable and perseverative “I don’t have any problems and shouldn’t be here,” and “they keep calling me an alcoholic and I’m not.” Reassurance and encouragement provided.       Continue: regular and thin diet. Medications: with thin liquids. Compensations:  small bites/sips, upright for all PO.       SLP to sign off. No suspected oropharyngeal dysphagia. Please re-consult if difficulties arise.

## 2023-04-14 NOTE — PROGRESS NOTES
Nutrition Services    Patient Name:  Yuval Loja  YOB: 1950  MRN: [id number]  Admit Date:  4/10/2023  Assessment Date:  04/14/23    Comment: Nutrition follow up   Dx: respiratory failure on vent, hyponatremia, bradycardia, CAD, HTN, anemia, CRISTIANO on CKD,GERD, ETOH abuse    TF's d/c'ed and diet advanced.  Po intake poor this am for breakfast. Po intake encouraged and discussed food pref's.    Labs: Na 142, Creat 1.68, BUN 20, ,   Meds: insulin, thiamine, pericolace, folic acid     Plan/Recommendation:  Will continue to monitor intake and encourage po.  Will offer Boost breeze supplement for pt to try    Will continue to follow clinical course and monitor nutritional needs.     CLINICAL NUTRITION ASSESSMENT      Reason for Assessment Follow-up Protocol     Diagnosis/Problem   respiratory failure extubated,  bradycardia, CAD, HTN, anemia, CRISTIANO on CKD,GERD, ETOH abuse   Medical/Surgical History Past Medical History:   Diagnosis Date   • Alcohol abuse    • Arthritis    • CAD (coronary artery disease)    • Chronic kidney disease, stage 3    • COPD (chronic obstructive pulmonary disease)    • Disease of thyroid gland    • Elevated cholesterol    • Fatty liver, alcoholic    • GERD (gastroesophageal reflux disease)    • History of transfusion    • Hypertensive urgency    • Metabolic acidosis    • Myocardial infarction    • Sinus tachycardia    • Sleep apnea    • Stroke        Past Surgical History:   Procedure Laterality Date   • BACK SURGERY     • CARDIAC CATHETERIZATION     • CARDIAC ELECTROPHYSIOLOGY PROCEDURE N/A 4/10/2023    Procedure: Temporary Pacemaker;  Surgeon: Vaibhav Bonilla MD;  Location: [facility] INVASIVE LOCATION;  Service: Cardiovascular;  Laterality: N/A;   • COLONOSCOPY     • ENDOSCOPY     • FRACTURE SURGERY     • JOINT REPLACEMENT     • SKIN BIOPSY     • TOTAL HIP ARTHROPLASTY Right 06/27/2022    Procedure: TOTAL HIP ARTHROPLASTY ANTERIOR WITH HANA TABLE;  Surgeon:  "Willian Quiles MD;  Location: [facility] OR;  Service: Orthopedics;  Laterality: Right;  mile Bhatt        Encounter Information        Nutrition History:  unknown   Food Preferences:    Supplements: States boost/ensure upsets his stomach some times   Factors Affecting Intake: decreased appetite     Anthropometrics        Current Height  Current Weight  BMI kg/m2 Height: 175 cm (68.9\")  Weight: 76.4 kg (168 lb 6.9 oz) (04/14/23 0600)  Body mass index is 24.95 kg/m².   Adjusted BMI (if applicable)        Admission Weight        Ideal Body Weight (IBW) 70.7 kg   Adjusted IBW (if applicable)        Usual Body Weight (UBW) See wt hx   Weight Change/Trend Variable wt's       Weight History Wt Readings from Last 30 Encounters:   04/14/23 0600 76.4 kg (168 lb 6.9 oz)   04/13/23 0623 76 kg (167 lb 8.8 oz)   04/12/23 0600 75.8 kg (167 lb 1.7 oz)   04/11/23 1419 77 kg (169 lb 12.1 oz)   04/11/23 0510 77.2 kg (170 lb 3.1 oz)   04/10/23 0818 66.7 kg (147 lb)   09/24/22 1658 66.7 kg (147 lb)   07/21/22 0850 66.7 kg (147 lb)   07/19/22 1551 67 kg (147 lb 11.3 oz)   07/19/22 1516 65.7 kg (144 lb 12.8 oz)   06/26/22 0500 65.6 kg (144 lb 9.6 oz)   06/25/22 2252 69.3 kg (152 lb 12.8 oz)   03/16/22 0750 65.8 kg (145 lb)   03/15/22 0455 65.8 kg (145 lb)   07/01/20 1053 56.2 kg (124 lb)   05/27/20 1526 56.2 kg (124 lb)   04/19/20 0535 77.6 kg (171 lb 1.2 oz)   04/18/20 1310 79.4 kg (175 lb)   10/26/19 1534 73.7 kg (162 lb 6.4 oz)   04/17/19 1600 72.1 kg (159 lb)   05/23/17 1843 72.6 kg (160 lb)   05/23/17 1141 77.1 kg (170 lb)   06/20/16 2123 74.8 kg (165 lb)           --  Tests/Procedures        Tests/Procedures No new tests/procedures     Labs       Pertinent Labs    Results from last 7 days   Lab Units 04/14/23  0346 04/13/23  0355 04/12/23  0348   SODIUM mmol/L 142 141 136   POTASSIUM mmol/L 3.8 3.5 3.4*   CHLORIDE mmol/L 108* 105 99   CO2 mmol/L 27.0 29.0 28.9   BUN mg/dL 20 29* 41*   CREATININE mg/dL 1.68* 1.90* " 2.83*   CALCIUM mg/dL 8.4* 8.0* 7.8*   BILIRUBIN mg/dL 0.4 0.2 0.4   ALK PHOS U/L 143* 141* 131*   ALT (SGPT) U/L 266* 379* 498*   AST (SGOT) U/L 149* 311* 695*   GLUCOSE mg/dL 115* 144* 145*     Results from last 7 days   Lab Units 04/14/23  0346 04/13/23  0355 04/12/23  0348 04/11/23  0420 04/10/23  1140 04/10/23  1021   MAGNESIUM mg/dL  --  2.0  --   --   --  2.3   HEMOGLOBIN g/dL 7.9* 8.3*   < > 9.1*   < >  --    HEMATOCRIT % 24.7* 25.8*   < > 27.8*   < >  --    WBC 10*3/mm3 7.71 7.08   < > 4.21   < >  --    TRIGLYCERIDES mg/dL  --   --   --  62  --   --    ALBUMIN g/dL 3.2* 2.9*   < > 3.2*   < > 3.9    < > = values in this interval not displayed.     Results from last 7 days   Lab Units 04/14/23  0346 04/13/23  0355 04/12/23 0348 04/11/23  0420 04/10/23  1853   INR   --   --   --   --  1.40*   PLATELETS 10*3/mm3 159 149 163 178 194     SARS-CoV-2, CLARICE   Date Value Ref Range Status   10/11/2022 Negative Negative Final     Comment:     The 2019-CoV rRT-PCR Assay is only for use under a Food and Drug Administration Emergency Use Authorization. The performance characteristics of the assay were verified by the Clinical Microbiology Laboratory at the [facility].   Results should be used in conjunction with the patient's clinical symptoms, medical history, and other clinical/laboratory findings to determine an overall clinical diagnosis. Negative results do not preclude infection with SARS-CoV-2 (COVID-19).    Test parameters have not been validated for screening asymptomatic patients.     Lab Results   Component Value Date    HGBA1C 4.50 (L) 04/11/2023          Medications           Scheduled Medications cefTRIAXone, 1 g, Intravenous, Q24H  enoxaparin, 40 mg, Subcutaneous, Q24H  folic acid, 1 mg, Nasogastric, Daily  insulin lispro, 0-24 Units, Subcutaneous, Q4H  senna-docusate sodium, 2 tablet, Oral, BID  sodium chloride, 10 mL, Intravenous, Q12H  thiamine, 300 mg, Nasogastric, Daily      "  [facility] Pharm[facility] to Dose enoxaparin (LOVENOX),        PRN Medications •  senna-docusate sodium **AND** polyethylene glycol **AND** bisacodyl **AND** bisacodyl  •  dextrose  •  dextrose  •  glucagon (human recombinant)  •  LORazepam **OR** LORazepam **OR** LORazepam **OR** LORazepam **OR** LORazepam **OR** LORazepam **OR** LORazepam **OR** LORazepam  •  ondansetron  •  Pharmacy to Dose enoxaparin (LOVENOX)  •  sodium chloride  •  sodium chloride     Physical Findings          Physical Appearance alert, oriented, on oxygen therapy, bed bugs noted on adm   Oral/Mouth Cavity teeth missing   Edema  no edema   Gastrointestinal hyperactive bowel sounds, last bowel movement:4/12   Skin  skin intact   Tubes/Drains none,    NFPE Not applicable at this time   --  Current Nutrition Orders & Evaluation of Intake       Oral Nutrition     Food Allergies NKFA   Current PO Diet Diet: Cardiac Diets; Healthy Heart (2-3 Na+); Texture: Regular Texture (IDDSI 7); Fluid Consistency: Thin (IDDSI 0)   Supplement n/a   PO Evaluation     % PO Intake 25-75% snacks    # of Days Evaluated    -  Estimated/Assessed Needs       Energy Requirements    Height for Calculation  Height: 175 cm (68.9\")   Weight for Calculation 77.2 kg   Method for Estimation  25 kcal/kg, 30 kcal/kg   EST Needs (kcal/day) 0474-5009       Protein Requirements    Weight for Calculation 77.2 kg   EST Protein Needs (g/kg) 1.0 - 1.2 gm/kg   EST Daily Needs (g/day) 77-92       Fluid Requirements     Method for Estimation Defer to physician    Estimated Needs (mL/day)    -  PES STATEMENT / NUTRITION DIAGNOSIS      Nutrition Dx Problem  Problem: Inadequate Intake/Infusion  Etiology: Medical Diagnosis and Factors Affecting Nutrition decrease appetite  Signs/Symptoms: PO intake    Comment: po encoraged   --  NUTRITION INTERVENTION / PLAN OF CARE      Intervention Goal(s) Reduce/improve symptoms, Meet estimated needs, Disease management/therapy, Tolerate PO , Increase intake, " Maintain weight and PO intake goal %: 75         RD Intervention/Action Interview for preferences, Supplement provided, Encourage intake and Follow Tx Progress         Prescription/Orders:       PO Diet       Supplements Boost breeze x1       Snacks       Enteral Nutrition       Parenteral Nutrition    New Prescription Ordered? Yes   -    -      Monitor/Evaluation Per protocol, Pertinent labs, Weight, Skin status   Discharge Plan/Needs Pending clinical course   Education Will instruct as appropriate   --    RD to follow per protocol.      Electronically signed by:  Kitty Aguilar RD  04/14/23 09:01 EDT

## 2023-04-14 NOTE — PLAN OF CARE
Problem: Adult Inpatient Plan of Care  Goal: Plan of Care Review  Outcome: Ongoing, Progressing  Flowsheets (Taken 4/14/2023 5277)  Progress: improving  Plan of Care Reviewed With: patient  Outcome Evaluation: In CICU, transfer orders placed. Worked with PT and OT. OOB to chair. +BM. F/C removed. Seen by speech therapy.

## 2023-04-14 NOTE — PROGRESS NOTES
LOS: 4 days   Patient Care Team:  Alsesia Prescott APRN as PCP - General (Family Medicine)  Jonny Bains MD as Referring Physician (Internal Medicine)  Carlos Villareal MD as Consulting Physician (Hematology and Oncology)    Subjective         Review of Systems:          Objective     Vital Signs  Vital Sign Min/Max for last 24 hours  Temp  Min: 97.5 °F (36.4 °C)  Max: 99.1 °F (37.3 °C)   BP  Min: 110/48  Max: 165/84   Pulse  Min: 94  Max: 119   Resp  Min: 24  Max: 24   SpO2  Min: 91 %  Max: 100 %   Flow (L/min)  Min: 2  Max: 4   Weight  Min: 76.4 kg (168 lb 6.9 oz)  Max: 76.4 kg (168 lb 6.9 oz)        Ventilator/Non-Invasive Ventilation Settings (From admission, onward)     Start     Ordered    04/10/23 1637  Ventilator - AC/VC; Other; 28; 100%; SpO2 >/= 90%; 5; mL; 600  Continuous        Question Answer Comment   Vent Mode AC/VC    Rate Other    Rate 28    FiO2 100%    Titrate FiO2 to Keep SpO2 >/= 90%    PEEP 5    Tidal Volume mL            04/10/23 1647                             Body mass index is 24.95 kg/m².  I/O last 3 completed shifts:  In: 2453 [I.V.:785; Other:702; NG/GT:966]  Out: 3375 [Urine:3375]  I/O this shift:  In: 100 [P.O.:100]  Out: 1200 [Urine:1200]        Physical Exam:  General Appearance: Well-developed black male he is intubated with an 8 endotracheal tube at about 24 cm at the lips he is on spontaneous breathing trial and 15 mics of propofol still he is awake calm he is breathing about 15 times a minute tidal volumes are in the 330 to 520 cc range.  He does not appear in any distress he is on 30% O2 with oxygen saturations in the mid upper 90s.  Eyes: Conjunctiva are clear and anicteric pupils are about 3 mm equal and reactive to light bilateral arcus senilis  ENT: He has the oral endotracheal tube in, nasoenteric tube in place mucous membranes are little dry  Neck: Trachea midline I do not see any jugular venous distension there is a right IJ cordis type catheter  pacemaker just withdrawn  Lungs: Few rhonchi suctioned out some clear secretions in his airways were clear after.  Cardiac: Tachycardic heart rates about 102 right now no murmur  Abdomen: Soft no palpable hepatosplenomegaly or masses rare bowel sounds  : Sahu catheter dependent drainage clear urine  Musculoskeletal: Grossly normal there is a right subclavian central venous catheter site looks good  Skin: Warm and dry no jaundice no petechiae   Neuro: He is squeeze both hands to command and wiggle toes on both feet pretty briskly upon a single request, gives me a thumbs up at request.  He is vigorously indicating he wants the ET tube out.  Extremities/P Vascular: No clubbing no cyanosis no edema,  he has strong distal pulses in all 4 extremities today  MSE: Unable to assess       Labs:  Results from last 7 days   Lab Units 04/14/23  0346 04/13/23  0355 04/12/23  0348 04/11/23  0420 04/10/23  1853 04/10/23  1021   GLUCOSE mg/dL 115* 144* 145* 312* 266* 90   SODIUM mmol/L 142 141 136 126* 129* 123*   POTASSIUM mmol/L 3.8 3.5 3.4* 3.4* 5.2 5.2   MAGNESIUM mg/dL  --  2.0  --   --   --  2.3   CO2 mmol/L 27.0 29.0 28.9 20.2* 8.6* 9.4*   CHLORIDE mmol/L 108* 105 99 85* 90* 91*   ANION GAP mmol/L 7.0 7.0 8.1 20.8* 30.4* 22.6*   CREATININE mg/dL 1.68* 1.90* 2.83* 3.53* 3.80* 3.01*   BUN mg/dL 20 29* 41* 45* 45* 38*   BUN / CREAT RATIO  11.9 15.3 14.5 12.7 11.8 12.6   CALCIUM mg/dL 8.4* 8.0* 7.8* 8.0* 8.1* 9.5   ALK PHOS U/L 143* 141* 131* 128* 138* 103   TOTAL PROTEIN g/dL 6.1 5.8* 5.7* 6.2 6.0 7.2   ALT (SGPT) U/L 266* 379* 498* 490* 448* 248*   AST (SGOT) U/L 149* 311* 695* 1,648* 995* 447*   BILIRUBIN mg/dL 0.4 0.2 0.4 0.9 2.0* 0.6   ALBUMIN g/dL 3.2* 2.9* 3.0* 3.2* 3.5 3.9   GLOBULIN gm/dL 2.9 2.9 2.7 3.0 2.5  --      Estimated Creatinine Clearance: 41.7 mL/min (A) (by C-G formula based on SCr of 1.68 mg/dL (H)).      Results from last 7 days   Lab Units 04/14/23  0346 04/13/23  0355 04/12/23  0348 04/11/23  0420  04/10/23  1853 04/10/23  1140   WBC 10*3/mm3 7.71 7.08 7.71 4.21 6.15 6.07   RBC 10*6/mm3 2.50* 2.67* 2.87* 2.88* 2.93* 3.37*   HEMOGLOBIN g/dL 7.9* 8.3* 8.8* 9.1* 9.2* 10.7*   HEMATOCRIT % 24.7* 25.8* 26.8* 27.8* 29.0* 32.3*   MCV fL 98.8* 96.6 93.4 96.5 99.0* 95.8   MCH pg 31.6 31.1 30.7 31.6 31.4 31.8   MCHC g/dL 32.0 32.2 32.8 32.7 31.7 33.1   RDW % 15.6* 15.3 15.8* 15.8* 15.6* 15.0   RDW-SD fl 55.4* 52.7 53.4 55.1* 55.6* 52.6   MPV fL 10.0 10.1 10.3 10.1 9.8 9.6   PLATELETS 10*3/mm3 159 149 163 178 194 237   NEUTROPHIL % %  --  83.3* 87.2* 90.1* 83.2* 77.8*   LYMPHOCYTE % %  --  11.7* 7.7* 5.7* 9.6* 10.2*   MONOCYTES % %  --  3.7* 3.8* 4.0* 6.3 11.5   EOSINOPHIL % %  --  0.6 0.3 0.0* 0.0* 0.0*   BASOPHIL % %  --  0.1 0.1 0.0 0.2 0.2   IMM GRAN % %  --  0.6* 0.9* 0.2 0.7* 0.3   NEUTROS ABS 10*3/mm3  --  5.90 6.73 3.79 5.12 4.72   LYMPHS ABS 10*3/mm3  --  0.83 0.59* 0.24* 0.59* 0.62*   MONOS ABS 10*3/mm3  --  0.26 0.29 0.17 0.39 0.70   EOS ABS 10*3/mm3  --  0.04 0.02 0.00 0.00 0.00   BASOS ABS 10*3/mm3  --  0.01 0.01 0.00 0.01 0.01   IMMATURE GRANS (ABS) 10*3/mm3  --  0.04 0.07* 0.01 0.04 0.02   NRBC /100 WBC  --  0.3* 0.8* 0.7* 0.5* 0.5*     Results from last 7 days   Lab Units 04/13/23  0306   PH, ARTERIAL pH units 7.461*   PO2 ART mm Hg 67.0*   PCO2, ARTERIAL mm Hg 42.3   HCO3 ART mmol/L 30.2*     Results from last 7 days   Lab Units 04/10/23  1245 04/10/23  1021   HSTROP T ng/L 51* 71*     Results from last 7 days   Lab Units 04/10/23  1853   PROBNP pg/mL 5,209.0*     Results from last 7 days   Lab Units 04/11/23  0420 04/10/23  1245   TSH uIU/mL 1.380  --    FREE T4 ng/dL  --  0.98     Results from last 7 days   Lab Units 04/12/23  0604 04/11/23  2335 04/11/23  1839 04/11/23  1704 04/11/23  1059 04/11/23  0420 04/11/23  0145 04/10/23  2242 04/10/23  1853   LACTATE mmol/L 1.4 1.3 1.3 1.1 2.3* 9.0* 9.0*   < > 11.6*   PROCALCITONIN ng/mL  --   --   --   --   --   --   --   --  0.98*    < > = values in this  interval not displayed.     Results from last 7 days   Lab Units 04/10/23  1853   INR  1.40*     Microbiology Results (last 10 days)     Procedure Component Value - Date/Time    Blood Culture - Blood, Arm, Left [[id number]]  (Normal) Collected: 04/10/23 1853    Lab Status: Preliminary result Specimen: Blood from Arm, Left Updated: 04/13/23 1915     Blood Culture No growth at 3 days    Narrative:      Less than seven (7) mL's of blood was collected.  Insufficient quantity may yield false negative results.    Blood Culture - Blood, Arm, Right [[id number]]  (Normal) Collected: 04/10/23 1853    Lab Status: Preliminary result Specimen: Blood from Arm, Right Updated: 04/14/23 0630     Blood Culture No growth at 3 days    Narrative:      Less than seven (7) mL's of blood was collected.  Insufficient quantity may yield false negative results.              cefTRIAXone, 1 g, Intravenous, Q24H  enoxaparin, 40 mg, Subcutaneous, Q24H  folic acid, 1 mg, Nasogastric, Daily  insulin lispro, 0-24 Units, Subcutaneous, Q4H  senna-docusate sodium, 2 tablet, Oral, BID  sodium chloride, 10 mL, Intravenous, Q12H  thiamine, 300 mg, Nasogastric, Daily      Pharmacy to Dose enoxaparin (LOVENOX),         Diagnostics:  XR Chest 1 View    Result Date: 4/11/2023  Patient: HANNAH ALTMAN  Time Out: 05:22 Exam(s): XR CXR 1 VIEW EXAM:   XR Chest, 1 View CLINICAL HISTORY:    Reason for exam: Intubated Patient. TECHNIQUE:   Frontal view of the chest. COMPARISON:   No relevant prior studies available. FINDINGS:   Lungs:  Mild bibasilar atelectasis.   Pleural space:  Unremarkable.  No pneumothorax.   Heart:  Unremarkable.  No cardiomegaly.   Mediastinum: Right IJ transvenous pacer in place.  Nasogastric tube terminates below field-of-view in the left upper quadrant.  Endotracheal tube terminates 5.0 cm above the level of the ninfa.  Right subclavian central venous catheter terminates in the mid SVC.   Bones joints:  Unremarkable. IMPRESSION:     1.  Support lines and tubes as above. 2. Mild bibasilar atelectasis.    Electronically signed by Carlos Rick M.D. on 04-11-23 at 0522    XR Chest 1 View    Result Date: 4/10/2023  PORTABLE CHEST  HISTORY: Assess endotracheal tube.  COMPARISON: Chest x-ray 07/20/2022.  A right subclavian central line is in place with the tip at the junction of superior vena cava and right atrium. The patient is intubated. The endotracheal tube is in satisfactory position midway between the thoracic inlet and ninfa.  The heart is within normal limits in size. Bibasilar atelectasis/infiltrate is appreciated more prominent on the left. There is no evidence of effusion or of congestive failure.  This report was finalized on 4/10/2023 6:20 PM by Dr. Jonny Meyer M.D.      EP/CRM Study    Result Date: 4/10/2023  Placement of temporary pacemaker and central venous line.     XR Abdomen KUB    Result Date: 4/10/2023  KUB  HISTORY: Cortrak placed  COMPARISON: None  FINDINGS: Feeding tube has been placed and the tip extends in the region of the gastric antrum and duodenal bulb.  This report was finalized on 4/10/2023 11:43 PM by Dr. Lele Browne M.D.      XR Hip With or Without Pelvis 2 - 3 View Right    Result Date: 4/10/2023  XR HIP W OR WO PELVIS 2-3 VIEW RIGHT-  04/10/2023  HISTORY: Fell, right hip pain.  A right hip prosthesis is seen in good position. There is vascular calcification.  No fractures or other acute abnormalities are seen in the pelvis and right hip.  This report was finalized on 4/10/2023 8:55 AM by Dr. Unruly Finley M.D.      Results for orders placed during the hospital encounter of 04/10/23    Adult Transthoracic Echo Complete W/ Cont if Necessary Per Protocol    Interpretation Summary  •  Left ventricular systolic function is normal. Left ventricular ejection fraction appears to be 56 - 60%.  •  Left ventricular diastolic function was indeterminate.  •  There is calcification of the aortic valve.  •  Estimated  right ventricular systolic pressure from tricuspid regurgitation is normal (<35 mmHg).      Chest x-ray reviewed endotracheal tube in good position tip few centimeters above the main ninfa there is a right IJ cordis catheter terminating in the right ventricle pacing lead, there is a right subclavian central venous catheter terminating at the cavoatrial junction and there is a enteral tube terminating below the diaphragm  Improved atelectasis at the left base there is a little bit of possible infiltrates as well overall stable to slightly improved from yesterday I think    Active Hospital Problems    Diagnosis  POA   • **Sinus bradycardia [R00.1]  Yes      Resolved Hospital Problems   No resolved problems to display.         Assessment & Plan     1. Complete heart block he required temporary transvenous pacer but has been doing well that has subsequently been removed.  This morning looks like he may be in A-fib actually looks like he has been bouncing in and out of A-fib might get an EKG.  Cardiology following  2. Shock predominantly cardiogenic much improved off vasopressors cannot exclude a component of sepsis  3. Hypothermia resolved  4. Severe metabolic acidosis resolved  5. Acute kidney injury creatinine improving suspect ATN related to shock  6. Hyponatremia resolved  7. Acute hepatitis I suspect a combination of ischemic and alcoholic liver enzymes are improving pretty quickly with improvement in his blood pressure suspect ischemic is the primary .  8. Anemia labs suggest more anemia of chronic disease hemoglobin is actually pretty stable we will continue to monitor  9. Alcoholism with alcohol abuse his alcohol level was positive on presentation  have to put him on the alcohol withdrawal protocol administer thiamine and folate.    He has history of recurrent DTs , seems to be doing pretty good with this currently  10. Fluids/electrolytes/nutrition patient cleared for p.o. we will see how he  does  11. Respiratory patient doing well postextubation.  12. Metabolic encephalopathy improved although there are still some confusion  13. Possible sepsis.  I have not seen a definitive source, could have pneumonia as white counts been normal the procalcitonin was minimally elevated in the presence of acute renal failure this is not very definitive.  .  Blood cultures are negative at day 3 I am going to continue Rocephin for 5 days and stop  14. Lactic acidosis resolved  15. Severe hyperglycemia hemoglobin A1c does not suggest diabetes this is probably all stress response etc. this is improved he is on sliding scale insulin now  16. Possible obstructive sleep apnea by history could be issue with sedation and extubation, we have really not seen too much of an issue postextubation.  17. Fluids/electrolytes/nutrition passed swallow eval taking p.o.              Plan for disposition: Transfer out of ICU to telemetry unit back to the care of Dr. Cole primarily pulmonary will see as needed      Pavan Saucedo Jr, MD  04/14/23  06:57 EDT    Time: 41 minutes care time today

## 2023-04-14 NOTE — DISCHARGE PLACEMENT REQUEST
"Yuval Altman (72 y.o. Male)     Date of Birth   1950    Social Security Number       Address   [address] [place] [address]    Home Phone   [phone]    MRN   [id number]       [facility]    Marital Status   Single                            Admission Date   4/10/23    Admission Type   Emergency    Admitting Provider   Chino Cole MD    Attending Provider   Chino Cole MD    Department, Room/Bed   [facility] CARDIAC INTENSIVE CARE, 3002/1       Discharge Date       Discharge Disposition       Discharge Destination                               Attending Provider: Chino Cole MD    Allergies: Mirtazapine, Sertraline    Isolation: None   Infection: None   Code Status: CPR    Ht: 175 cm (68.9\")   Wt: 76.4 kg (168 lb 6.9 oz)    Admission Cmt: None   Principal Problem: Sinus bradycardia [R00.1]                 Active Insurance as of 4/10/2023     Primary Coverage     Payor Plan Insurance Group Employer/Plan Group    HUMANA MEDICARE REPLACEMENT [facility] [facility] NON PAR [id number]     Payor Plan Address Payor Plan Phone Number Payor Plan Fax Number Effective Dates       1/1/2023 - None Entered    Subscriber Name Subscriber Birth Date Member ID       YUVAL ALTMAN 1950 [id number]                 Emergency Contacts      (Rel.) Home Phone Work Phone Mobile Phone    Torri Altman (Daughter) [phone]    Yaniv(Nephew) Carlos (Relative) [phone] -- [phone]            "

## 2023-04-14 NOTE — PROGRESS NOTES
[facility] Progress Note      Patient Name: Yuval Loja  : 1950  MRN: [id number]  Primary Care Physician:  Alessia Prescott APRN  Date of admission: 4/10/2023    Subjective     Interval History:   Feels stronger; no shortness of breath on 2 L/min  Appetite fair, but no N/V    Review of Systems:   As noted above    Objective     Vitals:   Temp:  [97.5 °F (36.4 °C)-99.5 °F (37.5 °C)] 99.5 °F (37.5 °C)  Heart Rate:  [] 101  Resp:  [24] 24  BP: (110-165)/(48-91) 119/56  Flow (L/min):  [2-4] 2    Intake/Output Summary (Last 24 hours) at 2023 0810  Last data filed at 2023 0600  Gross per 24 hour   Intake 431 ml   Output 1900 ml   Net -1469 ml       Physical Exam:    Constitutional: Awake, alert, NAD  HEENT: Sclera anicteric, no conjunctival injection  Neck: Supple, no carotid bruit, trachea at midline, no JVD  Respiratory: Clear anteriorly, nonlabored on 2 L/min  Cardiovascular: Irregularly irregular, tachycardic, no rub  Gastrointestinal: BS +, soft, nontender and nondistended  : No palpable bladder  Musculoskeletal: Trace hip edema, no clubbing or cyanosis  Psychiatric:  Awake, appropriate  Neurologic:  Moves all extremities  Skin: Warm and dry    Scheduled Meds:     cefTRIAXone, 1 g, Intravenous, Q24H  enoxaparin, 40 mg, Subcutaneous, Q24H  folic acid, 1 mg, Nasogastric, Daily  insulin lispro, 0-24 Units, Subcutaneous, Q4H  senna-docusate sodium, 2 tablet, Oral, BID  sodium chloride, 10 mL, Intravenous, Q12H  thiamine, 300 mg, Nasogastric, Daily      IV Meds:   Pharmacy to Dose enoxaparin (LOVENOX),         Results Reviewed:   I have personally reviewed the results from the time of this admission to 2023 08:10 EDT     Results from last 7 days   Lab Units 23  0346 23  0355 23  0348   SODIUM mmol/L 142 141 136   POTASSIUM mmol/L 3.8 3.5 3.4*   CHLORIDE mmol/L 108* 105 99   CO2 mmol/L 27.0 29.0 28.9   BUN mg/dL 20 29* 41*   CREATININE mg/dL  1.68* 1.90* 2.83*   CALCIUM mg/dL 8.4* 8.0* 7.8*   BILIRUBIN mg/dL 0.4 0.2 0.4   ALK PHOS U/L 143* 141* 131*   ALT (SGPT) U/L 266* 379* 498*   AST (SGOT) U/L 149* 311* 695*   GLUCOSE mg/dL 115* 144* 145*       Estimated Creatinine Clearance: 41.7 mL/min (A) (by C-G formula based on SCr of 1.68 mg/dL (H)).    Results from last 7 days   Lab Units 04/13/23  0355 04/10/23  1021   MAGNESIUM mg/dL 2.0 2.3             Results from last 7 days   Lab Units 04/14/23  0346 04/13/23  0355 04/12/23  0348 04/11/23  0420 04/10/23  1853   WBC 10*3/mm3 7.71 7.08 7.71 4.21 6.15   HEMOGLOBIN g/dL 7.9* 8.3* 8.8* 9.1* 9.2*   PLATELETS 10*3/mm3 159 149 163 178 194       Results from last 7 days   Lab Units 04/10/23  1853   INR  1.40*       Assessment / Plan     ASSESSMENT:  1.  CRISTIANO on CKD2, nonoliguric, improving, likely prerenal due to hypotension, renal hypoperfusion due to complete heart block, and cardiogenic shock.  Volume excess, improving thanks to auto-diuresis. Electrolytes stable. Urinalysis on arrival fairly bland with no RBCs and only 30 mg/dL protein in a concentrated specimen  2.  Respiratory failure, resolved; extubated 4/13  3.  Complete heart block, with temporary need for transvenous pacer, now with tachyarrhythmia  4.  Alcoholism  5.  Anemia  6.  Transaminitis, likely combo of alcohol intoxication and ischemia    PLAN:  1.  No need for diuretics since he is auto diuresing  2.  Surveillance labs    Thank you for involving us in the care of Yuval Loja.  Please feel free to call with any questions.    Thomas Weiner MD  04/14/23  08:10 EDT    [facility]  [phone]    Please note that portions of this note were completed with a voice recognition program.

## 2023-04-14 NOTE — PROGRESS NOTES
"Daily progress note    Primary care physician      Chief complaint  Awake and alert and following all commands with no respiratory distress.    History of present illness  72-year-old -American male with history of coronary artery disease hypertension hyperlipidemia chronic anemia chronic kidney disease stage III and gastroesophageal reflux disease who also abuse alcohol and tobacco presented to [facility] emergency room after mechanical fall when he tripped and fell on the right hip with complaint of right hip pain.  Patient denies any headache dizziness lightheadedness chest pain shortness of breath abdominal pain nausea vomiting diarrhea.  Patient evaluated in ER admitted to the floor with acute kidney injury and also found to have sinus bradycardia with hyponatremia.  Patient admitted and on the floor his blood pressure dropped and more bradycardic and rapid response called.  Patient transferred to the unit intubated and provided with supportive care and taken to the Cath Lab for temporary pacemaker placement     REVIEW OF SYSTEMS  Unable to obtain     PHYSICAL EXAM   Blood pressure 131/75, pulse 105, temperature 99.6 °F (37.6 °C), temperature source Oral, resp. rate 24, height 175 cm (68.9\"), weight 76.4 kg (168 lb 6.9 oz), SpO2 90 %.    General: Sleepy but arousable  HEENT: Mucous membranes moist, atraumatic,  Neck: Supple  Pulm: Moving air bilaterally  Cardiovascular: S1-S2 paced   GI: Soft, nontender, nondistended, no rebound, no guarding, bowel sounds present  MSK: Full ROM, no deformity  Skin: Warm, dry  Psych: Calm, cooperative     LAB RESULTS  Lab Results (last 24 hours)     Procedure Component Value Units Date/Time    POC Glucose Once [[id number]]  (Abnormal) Collected: 04/14/23 1207    Specimen: Blood Updated: 04/14/23 1209     Glucose 149 mg/dL      Comment: Meter: [id number] : [id number] Дмитрий IBARRA       POC Glucose Once [[id number]]  (Abnormal) Collected: 04/14/23 " 0805    Specimen: Blood Updated: 04/14/23 0807     Glucose 135 mg/dL      Comment: Meter: [id number] : [id number] Дмитрий IBARRA       Blood Culture - Blood, Arm, Right [[id number]]  (Normal) Collected: 04/10/23 1853    Specimen: Blood from Arm, Right Updated: 04/14/23 0630     Blood Culture No growth at 3 days    Narrative:      Less than seven (7) mL's of blood was collected.  Insufficient quantity may yield false negative results.    Comprehensive Metabolic Panel [[id number]]  (Abnormal) Collected: 04/14/23 0346    Specimen: Blood Updated: 04/14/23 0500     Glucose 115 mg/dL      BUN 20 mg/dL      Creatinine 1.68 mg/dL      Sodium 142 mmol/L      Potassium 3.8 mmol/L      Chloride 108 mmol/L      CO2 27.0 mmol/L      Calcium 8.4 mg/dL      Total Protein 6.1 g/dL      Albumin 3.2 g/dL      ALT (SGPT) 266 U/L      AST (SGOT) 149 U/L      Alkaline Phosphatase 143 U/L      Total Bilirubin 0.4 mg/dL      Globulin 2.9 gm/dL      A/G Ratio 1.1 g/dL      BUN/Creatinine Ratio 11.9     Anion Gap 7.0 mmol/L      eGFR 42.4 mL/min/1.73     Narrative:      GFR Normal >60  Chronic Kidney Disease <60  Kidney Failure <15    The GFR formula is only valid for adults with stable renal function between ages 18 and 70.    CBC (No Diff) [[id number]]  (Abnormal) Collected: 04/14/23 0346    Specimen: Blood Updated: 04/14/23 0442     WBC 7.71 10*3/mm3      RBC 2.50 10*6/mm3      Hemoglobin 7.9 g/dL      Hematocrit 24.7 %      MCV 98.8 fL      MCH 31.6 pg      MCHC 32.0 g/dL      RDW 15.6 %      RDW-SD 55.4 fl      MPV 10.0 fL      Platelets 159 10*3/mm3     POC Glucose Once [[id number]]  (Normal) Collected: 04/14/23 0344    Specimen: Blood Updated: 04/14/23 0345     Glucose 126 mg/dL      Comment: Meter: [id number] : [id number] Samantha Winter RN       POC Glucose Once [[id number]]  (Abnormal) Collected: 04/14/23 0050    Specimen: Blood Updated: 04/14/23 0051     Glucose 135 mg/dL      Comment: Meter: [id number] : [id number] Denilson  Rhiannon RDZ       POC Glucose Once [[id number]]  (Abnormal) Collected: 04/13/23 2056    Specimen: Blood Updated: 04/13/23 2056     Glucose 149 mg/dL      Comment: Meter: [id number] : [id number] Denilson Jurado RN       Blood Culture - Blood, Arm, Left [[id number]]  (Normal) Collected: 04/10/23 1853    Specimen: Blood from Arm, Left Updated: 04/13/23 1915     Blood Culture No growth at 3 days    Narrative:      Less than seven (7) mL's of blood was collected.  Insufficient quantity may yield false negative results.    POC Glucose Once [[id number]]  (Normal) Collected: 04/13/23 1608    Specimen: Blood Updated: 04/13/23 1610     Glucose 126 mg/dL      Comment: Meter: [id number] : [id number] Young Foley RN             Imaging Results (Last 24 Hours)     ** No results found for the last 24 hours. **           ECG 12 Lead            Component  Ref Range & Units 15:25  (4/10/23) 09:53  (4/10/23) 8 mo ago  (7/19/22) 8 mo ago  (7/19/22) 9 mo ago  (6/25/22) 1 yr ago  (3/15/22)   QT Interval  ms 610 P  582 P  387  390  398  348    Resulting Agency  ECG BH ECG BH ECG BH ECG  ECG BH ECG             HEART RATE= 30  bpm  RR Interval= 2000  ms  LA Interval= 197  ms  P Horizontal Axis=   deg  P Front Axis= 0  deg  QRSD Interval= 115  ms  QT Interval= 610  ms  QRS Axis= 55  deg  T Wave Axis= 19  deg  - ABNORMAL ECG -  Sinus bradycardia  Atrial premature complexes  Nonspecific intraventricular conduction delay             Current Facility-Administered Medications:   •  sennosides-docusate (PERICOLACE) 8.6-50 MG per tablet 2 tablet, 2 tablet, Oral, BID, 2 tablet at 04/14/23 0803 **AND** polyethylene glycol (MIRALAX) packet 17 g, 17 g, Oral, Daily PRN **AND** bisacodyl (DULCOLAX) EC tablet 5 mg, 5 mg, Oral, Daily PRN **AND** bisacodyl (DULCOLAX) suppository 10 mg, 10 mg, Rectal, Daily PRN, Pavan Saucedo Jr., MD  •  cefTRIAXone (ROCEPHIN) 1 g in sodium chloride 0.9 % 100 mL IVPB-VTB, 1 g, Intravenous, Q24H, Pavan Saucedo  Loy Strickland MD, Last Rate: 200 mL/hr at 04/13/23 1801, 1 g at 04/13/23 1801  •  dextrose (D50W) (25 g/50 mL) IV injection 25 g, 25 g, Intravenous, Q15 Min PRN, Pavan Saucedo Jr., MD  •  dextrose (GLUTOSE) oral gel 15 g, 15 g, Oral, Q15 Min PRN, Pavan Saucedo Jr., MD  •  Enoxaparin Sodium (LOVENOX) syringe 40 mg, 40 mg, Subcutaneous, Q24H, Pavan Saucedo Jr., MD, 40 mg at 04/13/23 1800  •  folic acid (FOLVITE) tablet 1 mg, 1 mg, Nasogastric, Daily, Pavan Saucedo Jr., MD, 1 mg at 04/14/23 0803  •  glucagon (GLUCAGEN) injection 1 mg, 1 mg, Intramuscular, Q15 Min PRN, Pavan Saucedo Jr., MD  •  insulin lispro (ADMELOG) injection 0-24 Units, 0-24 Units, Subcutaneous, 4x Daily With Meals & Nightly, Pavan Saucedo Jr., MD  •  LORazepam (ATIVAN) tablet 0.5 mg, 0.5 mg, Oral, Q2H PRN, 0.5 mg at 04/14/23 1523 **OR** LORazepam (ATIVAN) injection 0.5 mg, 0.5 mg, Intravenous, Q2H PRN, 0.5 mg at 04/12/23 2009 **OR** LORazepam (ATIVAN) tablet 1 mg, 1 mg, Oral, Q1H PRN **OR** LORazepam (ATIVAN) injection 1 mg, 1 mg, Intravenous, Q1H PRN, 1 mg at 04/13/23 0219 **OR** LORazepam (ATIVAN) injection 1 mg, 1 mg, Intravenous, Q15 Min PRN **OR** LORazepam (ATIVAN) injection 1 mg, 1 mg, Intramuscular, Q15 Min PRN **OR** LORazepam (ATIVAN) injection 2 mg, 2 mg, Intravenous, Q1H PRN **OR** LORazepam (ATIVAN) tablet 2 mg, 2 mg, Oral, Q1H PRN, Pavan Saucedo Jr., MD  •  ondansetron (ZOFRAN) injection 4 mg, 4 mg, Intravenous, Q6H PRN, Pavan Saucedo Jr., MD  •  sodium chloride 0.9 % flush 10 mL, 10 mL, Intravenous, Q12H, Pavan Saucedo Jr., MD, 10 mL at 04/14/23 0803  •  sodium chloride 0.9 % flush 10 mL, 10 mL, Intravenous, PRN, Pavan Saucedo Jr., MD  •  sodium chloride 0.9 % infusion 40 mL, 40 mL, Intravenous, PRN, Pavan Saucedo Jr., MD  •  thiamine (VITAMIN B-1) tablet 300 mg, 300 mg, Nasogastric, Daily, Pavan Saucedo Jr., MD, 300 mg at 04/14/23 0803     ASSESSMENT  Acute hypoxic  respiratory failure  Acute kidney injury  Hypotensive shock  Complete heart block status post temporary pacemaker  Hyponatremia  Alcohol hepatitis  History of paroxysmal atrial fibrillation  Coronary artery disease status post PCI  History of hypertension with low blood pressure  Hyperlipidemia  Alcohol abuse  Tobacco abuse  History of prior CVA  Chronic kidney disease stage III  Gastroesophageal reflux disease    PLAN  CPM  Continue antibiotic  Detox medications  Cardiology pulmonary and nephrology to follow patient  Stress ulcer DVT prophylaxis   Supportive care  Transfer out from the unit  Discussed with family and nursing staff  Follow closely and further recommendation according to hospital course    JOLIE BUNDY MD    Copied text in this note has been reviewed and is accurate as of 04/14/23

## 2023-04-15 LAB
ALBUMIN SERPL-MCNC: 3.3 G/DL (ref 3.5–5.2)
ALBUMIN/GLOB SERPL: 1.1 G/DL
ALP SERPL-CCNC: 110 U/L (ref 39–117)
ALT SERPL W P-5'-P-CCNC: 179 U/L (ref 1–41)
ANION GAP SERPL CALCULATED.3IONS-SCNC: 8.4 MMOL/L (ref 5–15)
AST SERPL-CCNC: 56 U/L (ref 1–40)
BACTERIA SPEC AEROBE CULT: NORMAL
BILIRUB SERPL-MCNC: 0.4 MG/DL (ref 0–1.2)
BUN SERPL-MCNC: 15 MG/DL (ref 8–23)
BUN/CREAT SERPL: 10.6 (ref 7–25)
CALCIUM SPEC-SCNC: 9.7 MG/DL (ref 8.6–10.5)
CHLORIDE SERPL-SCNC: 109 MMOL/L (ref 98–107)
CO2 SERPL-SCNC: 26.6 MMOL/L (ref 22–29)
CREAT SERPL-MCNC: 1.41 MG/DL (ref 0.76–1.27)
DEPRECATED RDW RBC AUTO: 53.8 FL (ref 37–54)
EGFRCR SERPLBLD CKD-EPI 2021: 52.3 ML/MIN/1.73
ERYTHROCYTE [DISTWIDTH] IN BLOOD BY AUTOMATED COUNT: 15.3 % (ref 12.3–15.4)
GLOBULIN UR ELPH-MCNC: 3 GM/DL
GLUCOSE BLDC GLUCOMTR-MCNC: 108 MG/DL (ref 70–130)
GLUCOSE BLDC GLUCOMTR-MCNC: 127 MG/DL (ref 70–130)
GLUCOSE BLDC GLUCOMTR-MCNC: 127 MG/DL (ref 70–130)
GLUCOSE SERPL-MCNC: 116 MG/DL (ref 65–99)
HCT VFR BLD AUTO: 25.4 % (ref 37.5–51)
HGB BLD-MCNC: 8.1 G/DL (ref 13–17.7)
MAGNESIUM SERPL-MCNC: 1.9 MG/DL (ref 1.6–2.4)
MCH RBC QN AUTO: 31.3 PG (ref 26.6–33)
MCHC RBC AUTO-ENTMCNC: 31.9 G/DL (ref 31.5–35.7)
MCV RBC AUTO: 98.1 FL (ref 79–97)
PHOSPHATE SERPL-MCNC: 2.8 MG/DL (ref 2.5–4.5)
PLATELET # BLD AUTO: 157 10*3/MM3 (ref 140–450)
PMV BLD AUTO: 9.7 FL (ref 6–12)
POTASSIUM SERPL-SCNC: 4.1 MMOL/L (ref 3.5–5.2)
PROT SERPL-MCNC: 6.3 G/DL (ref 6–8.5)
RBC # BLD AUTO: 2.59 10*6/MM3 (ref 4.14–5.8)
SODIUM SERPL-SCNC: 144 MMOL/L (ref 136–145)
WBC NRBC COR # BLD: 7.25 10*3/MM3 (ref 3.4–10.8)

## 2023-04-15 PROCEDURE — 85027 COMPLETE CBC AUTOMATED: CPT | Performed by: INTERNAL MEDICINE

## 2023-04-15 PROCEDURE — 84100 ASSAY OF PHOSPHORUS: CPT | Performed by: INTERNAL MEDICINE

## 2023-04-15 PROCEDURE — 99231 SBSQ HOSP IP/OBS SF/LOW 25: CPT | Performed by: INTERNAL MEDICINE

## 2023-04-15 PROCEDURE — 82962 GLUCOSE BLOOD TEST: CPT

## 2023-04-15 PROCEDURE — 80053 COMPREHEN METABOLIC PANEL: CPT | Performed by: INTERNAL MEDICINE

## 2023-04-15 PROCEDURE — 83735 ASSAY OF MAGNESIUM: CPT | Performed by: INTERNAL MEDICINE

## 2023-04-15 PROCEDURE — 25010000002 ENOXAPARIN PER 10 MG: Performed by: INTERNAL MEDICINE

## 2023-04-15 PROCEDURE — 25010000002 LORAZEPAM PER 2 MG: Performed by: HOSPITALIST

## 2023-04-15 RX ORDER — DIPHENOXYLATE HYDROCHLORIDE AND ATROPINE SULFATE 2.5; .025 MG/1; MG/1
1 TABLET ORAL DAILY
Status: DISCONTINUED | OUTPATIENT
Start: 2023-04-15 | End: 2023-04-18 | Stop reason: HOSPADM

## 2023-04-15 RX ORDER — LORAZEPAM 2 MG/ML
0.5 INJECTION INTRAMUSCULAR EVERY 4 HOURS PRN
Status: DISCONTINUED | OUTPATIENT
Start: 2023-04-15 | End: 2023-04-17

## 2023-04-15 RX ORDER — LORAZEPAM 2 MG/ML
1 INJECTION INTRAMUSCULAR EVERY 4 HOURS PRN
Status: DISCONTINUED | OUTPATIENT
Start: 2023-04-15 | End: 2023-04-15

## 2023-04-15 RX ADMIN — Medication 1 MG: at 09:00

## 2023-04-15 RX ADMIN — LORAZEPAM 0.5 MG: 0.5 TABLET ORAL at 12:31

## 2023-04-15 RX ADMIN — LORAZEPAM 0.5 MG: 0.5 TABLET ORAL at 09:00

## 2023-04-15 RX ADMIN — LORAZEPAM 0.5 MG: 0.5 TABLET ORAL at 02:24

## 2023-04-15 RX ADMIN — PANTOPRAZOLE SODIUM 40 MG: 40 TABLET, DELAYED RELEASE ORAL at 06:22

## 2023-04-15 RX ADMIN — LORAZEPAM 0.5 MG: 2 INJECTION INTRAMUSCULAR; INTRAVENOUS at 20:51

## 2023-04-15 RX ADMIN — DOCUSATE SODIUM 50MG AND SENNOSIDES 8.6MG 2 TABLET: 8.6; 5 TABLET, FILM COATED ORAL at 20:51

## 2023-04-15 RX ADMIN — Medication 100 MG: at 20:51

## 2023-04-15 RX ADMIN — Medication 300 MG: at 09:00

## 2023-04-15 RX ADMIN — Medication 1 TABLET: at 17:49

## 2023-04-15 RX ADMIN — ENOXAPARIN SODIUM 40 MG: 100 INJECTION SUBCUTANEOUS at 17:49

## 2023-04-15 RX ADMIN — LORAZEPAM 0.5 MG: 0.5 TABLET ORAL at 06:22

## 2023-04-15 RX ADMIN — ATENOLOL 25 MG: 25 TABLET ORAL at 09:00

## 2023-04-15 RX ADMIN — LORAZEPAM 0.5 MG: 0.5 TABLET ORAL at 15:02

## 2023-04-15 NOTE — PROGRESS NOTES
"Daily progress note    Primary care physician      Chief complaint  Doing better with no new complaints and agreeable to go to subacute rehab once more stable.    History of present illness  72-year-old -American male with history of coronary artery disease hypertension hyperlipidemia chronic anemia chronic kidney disease stage III and gastroesophageal reflux disease who also abuse alcohol and tobacco presented to [facility] emergency room after mechanical fall when he tripped and fell on the right hip with complaint of right hip pain.  Patient denies any headache dizziness lightheadedness chest pain shortness of breath abdominal pain nausea vomiting diarrhea.  Patient evaluated in ER admitted to the floor with acute kidney injury and also found to have sinus bradycardia with hyponatremia.  Patient admitted and on the floor his blood pressure dropped and more bradycardic and rapid response called.  Patient transferred to the unit intubated and provided with supportive care and taken to the Cath Lab for temporary pacemaker placement     REVIEW OF SYSTEMS  Unable to obtain     PHYSICAL EXAM   Blood pressure 139/67, pulse 87, temperature 97.4 °F (36.3 °C), temperature source Oral, resp. rate 18, height 175 cm (68.9\"), weight 78 kg (172 lb), SpO2 95 %.    General: Sleepy but arousable  HEENT: Mucous membranes moist, atraumatic,  Neck: Supple  Pulm: Moving air bilaterally  Cardiovascular: S1-S2 paced   GI: Soft, nontender, nondistended, no rebound, no guarding, bowel sounds present  MSK: Full ROM, no deformity  Skin: Warm, dry  Psych: Calm, cooperative     LAB RESULTS  Lab Results (last 24 hours)     Procedure Component Value Units Date/Time    POC Glucose Once [[id number]]  (Normal) Collected: 04/15/23 1121    Specimen: Blood Updated: 04/15/23 1123     Glucose 108 mg/dL      Comment: Meter: [id number] : [id number] Jenniffer IBARRA       Magnesium [[id number]]  (Normal) Collected: 04/15/23 " 0621    Specimen: Blood Updated: 04/15/23 0654     Magnesium 1.9 mg/dL     Comprehensive Metabolic Panel [[id number]]  (Abnormal) Collected: 04/15/23 0621    Specimen: Blood Updated: 04/15/23 0654     Glucose 116 mg/dL      BUN 15 mg/dL      Creatinine 1.41 mg/dL      Sodium 144 mmol/L      Potassium 4.1 mmol/L      Chloride 109 mmol/L      CO2 26.6 mmol/L      Calcium 9.7 mg/dL      Total Protein 6.3 g/dL      Albumin 3.3 g/dL      ALT (SGPT) 179 U/L      AST (SGOT) 56 U/L      Alkaline Phosphatase 110 U/L      Total Bilirubin 0.4 mg/dL      Globulin 3.0 gm/dL      A/G Ratio 1.1 g/dL      BUN/Creatinine Ratio 10.6     Anion Gap 8.4 mmol/L      eGFR 52.3 mL/min/1.73     Narrative:      GFR Normal >60  Chronic Kidney Disease <60  Kidney Failure <15    The GFR formula is only valid for adults with stable renal function between ages 18 and 70.    Phosphorus [[id number]]  (Normal) Collected: 04/15/23 0621    Specimen: Blood Updated: 04/15/23 0654     Phosphorus 2.8 mg/dL     CBC (No Diff) [[id number]]  (Abnormal) Collected: 04/15/23 0621    Specimen: Blood Updated: 04/15/23 0635     WBC 7.25 10*3/mm3      RBC 2.59 10*6/mm3      Hemoglobin 8.1 g/dL      Hematocrit 25.4 %      MCV 98.1 fL      MCH 31.3 pg      MCHC 31.9 g/dL      RDW 15.3 %      RDW-SD 53.8 fl      MPV 9.7 fL      Platelets 157 10*3/mm3     Blood Culture - Blood, Arm, Right [[id number]]  (Normal) Collected: 04/10/23 1853    Specimen: Blood from Arm, Right Updated: 04/15/23 0630     Blood Culture No growth at 4 days    Narrative:      Less than seven (7) mL's of blood was collected.  Insufficient quantity may yield false negative results.    POC Glucose Once [[id number]]  (Normal) Collected: 04/15/23 0554    Specimen: Blood Updated: 04/15/23 0556     Glucose 127 mg/dL      Comment: Meter: [id number] : [id number] [facility]       POC Glucose Once [[id number]]  (Normal) Collected: 04/15/23 0122    Specimen: Blood Updated: 04/15/23 0123     Glucose 127  mg/dL      Comment: Meter: [id number] : [id number] Zahraa Fay NST       POC Glucose Once [[id number]]  (Normal) Collected: 04/14/23 2051    Specimen: Blood Updated: 04/14/23 2051     Glucose 124 mg/dL      Comment: Meter: [id number] : [id number] Seng Chambers RN       Blood Culture - Blood, Arm, Left [[id number]]  (Normal) Collected: 04/10/23 1853    Specimen: Blood from Arm, Left Updated: 04/14/23 1915     Blood Culture No growth at 4 days    Narrative:      Less than seven (7) mL's of blood was collected.  Insufficient quantity may yield false negative results.    POC Glucose Once [[id number]]  (Abnormal) Collected: 04/14/23 1624    Specimen: Blood Updated: 04/14/23 1636     Glucose 131 mg/dL      Comment: Meter: [id number] : [id number] Ulises IBARRA             Imaging Results (Last 24 Hours)     ** No results found for the last 24 hours. **           ECG 12 Lead            Component  Ref Range & Units 15:25  (4/10/23) 09:53  (4/10/23) 8 mo ago  (7/19/22) 8 mo ago  (7/19/22) 9 mo ago  (6/25/22) 1 yr ago  (3/15/22)   QT Interval  ms 610 P  582 P  387  390  398  348    Resulting Agency BH ECG BH ECG BH ECG BH ECG BH ECG BH ECG             HEART RATE= 30  bpm  RR Interval= 2000  ms  [place] Interval= 197  ms  P Horizontal Axis=   deg  P Front Axis= 0  deg  QRSD Interval= 115  ms  QT Interval= 610  ms  QRS Axis= 55  deg  T Wave Axis= 19  deg  - ABNORMAL ECG -  Sinus bradycardia  Atrial premature complexes  Nonspecific intraventricular conduction delay             Current Facility-Administered Medications:   •  atenolol (TENORMIN) tablet 25 mg, 25 mg, Oral, Q24H, Paul Schwarz MD, 25 mg at 04/15/23 0900  •  Enoxaparin Sodium (LOVENOX) syringe 40 mg, 40 mg, Subcutaneous, Q24H, Pavan Saucedo Jr., MD, 40 mg at 04/14/23 1749  •  folic acid (FOLVITE) tablet 1 mg, 1 mg, Nasogastric, Daily, Pavan Saucedo Jr., MD, 1 mg at 04/15/23 0900  •  LORazepam (ATIVAN) injection 0.5 mg, 0.5 mg,  Intravenous, Q4H PRN, Jolie Cole MD  •  pantoprazole (PROTONIX) EC tablet 40 mg, 40 mg, Oral, Q AM, Jolie Cole MD, 40 mg at 04/15/23 0622  •  sennosides-docusate (PERICOLACE) 8.6-50 MG per tablet 2 tablet, 2 tablet, Oral, BID, 2 tablet at 04/14/23 0803 **AND** [DISCONTINUED] polyethylene glycol (MIRALAX) packet 17 g, 17 g, Oral, Daily PRN **AND** [DISCONTINUED] bisacodyl (DULCOLAX) EC tablet 5 mg, 5 mg, Oral, Daily PRN **AND** [DISCONTINUED] bisacodyl (DULCOLAX) suppository 10 mg, 10 mg, Rectal, Daily PRN, Pavan Saucedo Jr., MD  •  sodium chloride 0.9 % flush 10 mL, 10 mL, Intravenous, PRN, Pavan Saucedo Jr., MD  •  thiamine (VITAMIN B-1) tablet 300 mg, 300 mg, Oral, Daily, Jolie Cole MD, 300 mg at 04/15/23 0900     ASSESSMENT  Acute hypoxic respiratory failure  Acute kidney injury resolved  Hypotensive shock  Complete heart block status post temporary pacemaker  Hyponatremia  Alcohol hepatitis  History of paroxysmal atrial fibrillation  Coronary artery disease status post PCI  History of hypertension with low blood pressure  Hyperlipidemia  Alcohol abuse  Tobacco abuse  History of prior CVA  Chronic kidney disease stage III  Gastroesophageal reflux disease    PLAN  CPM  Antibiotic completed  Continue detox medications  Stress ulcer DVT prophylaxis   Supportive care  PT/OT  Discussed with family and nursing staff  Discharge planning    JOLIE COLE MD    Copied text in this note has been reviewed and is accurate as of 04/15/23

## 2023-04-15 NOTE — PROGRESS NOTES
[facility] Progress Note      Patient Name: Yuval Loja  : 1950  MRN: [id number]  Primary Care Physician:  Alessia Prescott APRN  Date of admission: 4/10/2023    Subjective     Interval History:     Seen and examined.  Confused.  Laying down in bed.  No new issues.  Review of Systems:   As noted above    Objective     Vitals:   Temp:  [97.9 °F (36.6 °C)-99.8 °F (37.7 °C)] 97.9 °F (36.6 °C)  Heart Rate:  [] 89  Resp:  [24] 24  BP: (102-141)/(56-87) 136/67  Flow (L/min):  [2] 2    Intake/Output Summary (Last 24 hours) at 4/15/2023 0715  Last data filed at 4/15/2023 0000  Gross per 24 hour   Intake 240 ml   Output 1150 ml   Net -910 ml       Physical Exam:    Constitutional: Awake, alert, NAD  HEENT: Sclera anicteric, no conjunctival injection  Neck: Supple, no carotid bruit, trachea at midline, no JVD  Respiratory: Clear anteriorly, nonlabored on 2 L/min  Cardiovascular: Irregularly irregular, tachycardic, no rub  Gastrointestinal: BS +, soft, nontender and nondistended  : No palpable bladder  Musculoskeletal: Trace hip edema, no clubbing or cyanosis  Psychiatric:  Awake, appropriate  Neurologic:  Moves all extremities  Skin: Warm and dry    Scheduled Meds:     atenolol, 25 mg, Oral, Q24H  enoxaparin, 40 mg, Subcutaneous, Q24H  folic acid, 1 mg, Nasogastric, Daily  insulin lispro, 0-7 Units, Subcutaneous, 4x Daily With Meals & Nightly  pantoprazole, 40 mg, Oral, Q AM  senna-docusate sodium, 2 tablet, Oral, BID  thiamine, 300 mg, Oral, Daily      IV Meds:        Results Reviewed:   I have personally reviewed the results from the time of this admission to 4/15/2023 07:15 EDT     Results from last 7 days   Lab Units 04/15/23  0621 23  0346 23  0355   SODIUM mmol/L 144 142 141   POTASSIUM mmol/L 4.1 3.8 3.5   CHLORIDE mmol/L 109* 108* 105   CO2 mmol/L 26.6 27.0 29.0   BUN mg/dL 15 20 29*   CREATININE mg/dL 1.41* 1.68* 1.90*   CALCIUM mg/dL 9.7 8.4* 8.0*    BILIRUBIN mg/dL 0.4 0.4 0.2   ALK PHOS U/L 110 143* 141*   ALT (SGPT) U/L 179* 266* 379*   AST (SGOT) U/L 56* 149* 311*   GLUCOSE mg/dL 116* 115* 144*       Estimated Creatinine Clearance: 50.7 mL/min (A) (by C-G formula based on SCr of 1.41 mg/dL (H)).    Results from last 7 days   Lab Units 04/15/23  0621 04/13/23  0355 04/10/23  1021   MAGNESIUM mg/dL 1.9 2.0 2.3   PHOSPHORUS mg/dL 2.8  --   --              Results from last 7 days   Lab Units 04/15/23  0621 04/14/23  0346 04/13/23  0355 04/12/23  0348 04/11/23  0420   WBC 10*3/mm3 7.25 7.71 7.08 7.71 4.21   HEMOGLOBIN g/dL 8.1* 7.9* 8.3* 8.8* 9.1*   PLATELETS 10*3/mm3 157 159 149 163 178       Results from last 7 days   Lab Units 04/10/23  1853   INR  1.40*       Assessment / Plan     ASSESSMENT:  1.  CRISTIANO on CKD2, nonoliguric, improving, likely prerenal due to hypotension, renal hypoperfusion due to complete heart block, and cardiogenic shock.  Volume excess, improving thanks to auto-diuresis. Electrolytes stable. Urinalysis on arrival fairly bland with no RBCs and only 30 mg/dL protein in a concentrated specimen  2.  Respiratory failure, resolved; extubated 4/13  3.  Complete heart block, with temporary need for transvenous pacer, now with tachyarrhythmia  4.  Alcoholism  5.  Anemia  6.  Transaminitis, likely combo of alcohol intoxication and ischemia    PLAN:    1.  Kidney function is improving volume status is appropriate.  We will continue to hold diuretic.  Continue current management  2.  Surveillance labs    Thank you for involving us in the care of Yuval Loja.  Please feel free to call with any questions.    Alejandra Tipton MD  04/15/23  07:15 EDT    [facility]  [phone]    Please note that portions of this note were completed with a voice recognition program.

## 2023-04-15 NOTE — PROGRESS NOTES
LOS: 5 days   Patient Care Team:  Alessia Prescott APRN as PCP - General (Family Medicine)  Jonny Bains MD as Referring Physician (Internal Medicine)  Carlos Villareal MD as Consulting Physician (Hematology and Oncology)    Chief Complaint:   Bradycardia    Interval History:     Stable, no acute events    I reviewed his telemetry, sinus with PAC.  EKG yesterday is sinus tachycardia    Central line remains due to poor access.     Objective   Vital Signs  Temp:  [97.9 °F (36.6 °C)-99.8 °F (37.7 °C)] 99.3 °F (37.4 °C)  Heart Rate:  [] 79  Resp:  [19-24] 19  BP: (102-139)/(57-87) 133/81    Intake/Output Summary (Last 24 hours) at 4/15/2023 0909  Last data filed at 4/15/2023 0800  Gross per 24 hour   Intake 180 ml   Output 1350 ml   Net -1170 ml         Physical Exam  Vitals and nursing note reviewed.   HENT:      Head: Normocephalic.   Cardiovascular:      Rate and Rhythm: Normal rate. Rhythm irregular.   Chest:      Comments: Central line on left chest, normal  Neurological:      General: No focal deficit present.      Mental Status: He is alert.   Psychiatric:         Mood and Affect: Mood normal.           Results Review:      Results from last 7 days   Lab Units 04/15/23  0621 04/14/23 0346 04/13/23  0355   SODIUM mmol/L 144 142 141   POTASSIUM mmol/L 4.1 3.8 3.5   CHLORIDE mmol/L 109* 108* 105   CO2 mmol/L 26.6 27.0 29.0   BUN mg/dL 15 20 29*   CREATININE mg/dL 1.41* 1.68* 1.90*   GLUCOSE mg/dL 116* 115* 144*   CALCIUM mg/dL 9.7 8.4* 8.0*     Results from last 7 days   Lab Units 04/10/23  1245 04/10/23  1021   HSTROP T ng/L 51* 71*     Results from last 7 days   Lab Units 04/15/23  0621 04/14/23 0346 04/13/23  0355   WBC 10*3/mm3 7.25 7.71 7.08   HEMOGLOBIN g/dL 8.1* 7.9* 8.3*   HEMATOCRIT % 25.4* 24.7* 25.8*   PLATELETS 10*3/mm3 157 159 149     Results from last 7 days   Lab Units 04/10/23  1853   INR  1.40*     Results from last 7 days   Lab Units 04/11/23  0420   CHOLESTEROL mg/dL 112      Results from last 7 days   Lab Units 04/15/23  0621   MAGNESIUM mg/dL 1.9     Results from last 7 days   Lab Units 04/11/23  0420   CHOLESTEROL mg/dL 112   TRIGLYCERIDES mg/dL 62   HDL CHOL mg/dL 74*   LDL CHOL mg/dL 24       I reviewed the patient's new clinical results.  I personally viewed and interpreted the patient's EKG/Telemetry data        Medication Review:   atenolol, 25 mg, Oral, Q24H  enoxaparin, 40 mg, Subcutaneous, Q24H  folic acid, 1 mg, Nasogastric, Daily  insulin lispro, 0-7 Units, Subcutaneous, 4x Daily With Meals & Nightly  pantoprazole, 40 mg, Oral, Q AM  senna-docusate sodium, 2 tablet, Oral, BID  thiamine, 300 mg, Oral, Daily             Assessment & Plan       Sinus bradycardia   Sinus tachycardia  Premature atrial contractions    Rhythm has remain stable.  I think this was precipitated by his acute renal failure and electrolyte abnormalities, rather than intrinsic conduction disease.     No further recommendations at this time      Will sign off.     Vaibhav Bonilla MD  04/15/23  09:09 EDT

## 2023-04-15 NOTE — PLAN OF CARE
Goal Outcome Evaluation:  Plan of Care Reviewed With: patient        Progress: improving       Pt being treated for sinus bradycardia and alcohol withdraw. Pt was AAOX2, confused, and agitated this am. PRN ativan given per order. Pt improved as the day went on. AAOx4 with minimal confusion. Pt ambulated from bed to chair with 1 assisst. No c/o pain or SOB. All VSS. Remains in NSR. WCTM. Plan to d/c home or to acute rehab in 1-3 days.

## 2023-04-15 NOTE — PLAN OF CARE
Goal Outcome Evaluation:  Plan of Care Reviewed With: patient        Progress: improving  Outcome Evaluation: Pt came from CICU, All Vss, restless and anxious, pt on SEWA PROTOCAL, med administer per order. trying to get off the bedx2 this shift. SR/ST on the monitor. Unstable of gait.

## 2023-04-16 LAB
ALBUMIN SERPL-MCNC: 3.3 G/DL (ref 3.5–5.2)
ALBUMIN/GLOB SERPL: 1 G/DL
ALP SERPL-CCNC: 104 U/L (ref 39–117)
ALT SERPL W P-5'-P-CCNC: 117 U/L (ref 1–41)
ANION GAP SERPL CALCULATED.3IONS-SCNC: 10 MMOL/L (ref 5–15)
AST SERPL-CCNC: 35 U/L (ref 1–40)
BACTERIA SPEC AEROBE CULT: NORMAL
BASOPHILS # BLD AUTO: 0.02 10*3/MM3 (ref 0–0.2)
BASOPHILS NFR BLD AUTO: 0.3 % (ref 0–1.5)
BILIRUB SERPL-MCNC: 0.4 MG/DL (ref 0–1.2)
BUN SERPL-MCNC: 14 MG/DL (ref 8–23)
BUN/CREAT SERPL: 10.8 (ref 7–25)
CALCIUM SPEC-SCNC: 9.3 MG/DL (ref 8.6–10.5)
CHLORIDE SERPL-SCNC: 110 MMOL/L (ref 98–107)
CO2 SERPL-SCNC: 26 MMOL/L (ref 22–29)
CREAT SERPL-MCNC: 1.3 MG/DL (ref 0.76–1.27)
DEPRECATED RDW RBC AUTO: 51 FL (ref 37–54)
EGFRCR SERPLBLD CKD-EPI 2021: 57.6 ML/MIN/1.73
EOSINOPHIL # BLD AUTO: 0.1 10*3/MM3 (ref 0–0.4)
EOSINOPHIL NFR BLD AUTO: 1.7 % (ref 0.3–6.2)
ERYTHROCYTE [DISTWIDTH] IN BLOOD BY AUTOMATED COUNT: 14.8 % (ref 12.3–15.4)
GLOBULIN UR ELPH-MCNC: 3.2 GM/DL
GLUCOSE BLDC GLUCOMTR-MCNC: 117 MG/DL (ref 70–130)
GLUCOSE SERPL-MCNC: 112 MG/DL (ref 65–99)
HCT VFR BLD AUTO: 24.8 % (ref 37.5–51)
HGB BLD-MCNC: 8 G/DL (ref 13–17.7)
IMM GRANULOCYTES # BLD AUTO: 0.05 10*3/MM3 (ref 0–0.05)
IMM GRANULOCYTES NFR BLD AUTO: 0.9 % (ref 0–0.5)
LYMPHOCYTES # BLD AUTO: 1.11 10*3/MM3 (ref 0.7–3.1)
LYMPHOCYTES NFR BLD AUTO: 19.3 % (ref 19.6–45.3)
MCH RBC QN AUTO: 31.1 PG (ref 26.6–33)
MCHC RBC AUTO-ENTMCNC: 32.3 G/DL (ref 31.5–35.7)
MCV RBC AUTO: 96.5 FL (ref 79–97)
MONOCYTES # BLD AUTO: 0.89 10*3/MM3 (ref 0.1–0.9)
MONOCYTES NFR BLD AUTO: 15.5 % (ref 5–12)
NEUTROPHILS NFR BLD AUTO: 3.57 10*3/MM3 (ref 1.7–7)
NEUTROPHILS NFR BLD AUTO: 62.3 % (ref 42.7–76)
NRBC BLD AUTO-RTO: 0.2 /100 WBC (ref 0–0.2)
PLATELET # BLD AUTO: 174 10*3/MM3 (ref 140–450)
PMV BLD AUTO: 10 FL (ref 6–12)
POTASSIUM SERPL-SCNC: 3.8 MMOL/L (ref 3.5–5.2)
PROT SERPL-MCNC: 6.5 G/DL (ref 6–8.5)
RBC # BLD AUTO: 2.57 10*6/MM3 (ref 4.14–5.8)
SODIUM SERPL-SCNC: 146 MMOL/L (ref 136–145)
WBC NRBC COR # BLD: 5.74 10*3/MM3 (ref 3.4–10.8)

## 2023-04-16 PROCEDURE — 80053 COMPREHEN METABOLIC PANEL: CPT | Performed by: HOSPITALIST

## 2023-04-16 PROCEDURE — 82962 GLUCOSE BLOOD TEST: CPT

## 2023-04-16 PROCEDURE — 25010000002 ENOXAPARIN PER 10 MG: Performed by: INTERNAL MEDICINE

## 2023-04-16 PROCEDURE — 25010000002 LORAZEPAM PER 2 MG: Performed by: HOSPITALIST

## 2023-04-16 PROCEDURE — 85025 COMPLETE CBC W/AUTO DIFF WBC: CPT | Performed by: HOSPITALIST

## 2023-04-16 RX ADMIN — ATENOLOL 25 MG: 25 TABLET ORAL at 08:36

## 2023-04-16 RX ADMIN — LORAZEPAM 0.5 MG: 2 INJECTION INTRAMUSCULAR; INTRAVENOUS at 22:36

## 2023-04-16 RX ADMIN — Medication 1 TABLET: at 08:36

## 2023-04-16 RX ADMIN — PANTOPRAZOLE SODIUM 40 MG: 40 TABLET, DELAYED RELEASE ORAL at 04:57

## 2023-04-16 RX ADMIN — Medication 100 MG: at 08:36

## 2023-04-16 RX ADMIN — DOCUSATE SODIUM 50MG AND SENNOSIDES 8.6MG 2 TABLET: 8.6; 5 TABLET, FILM COATED ORAL at 19:47

## 2023-04-16 RX ADMIN — Medication 1 MG: at 08:36

## 2023-04-16 RX ADMIN — ENOXAPARIN SODIUM 40 MG: 100 INJECTION SUBCUTANEOUS at 17:58

## 2023-04-16 RX ADMIN — DOCUSATE SODIUM 50MG AND SENNOSIDES 8.6MG 2 TABLET: 8.6; 5 TABLET, FILM COATED ORAL at 08:36

## 2023-04-16 NOTE — PROGRESS NOTES
"Daily progress note    Primary care physician      Chief complaint  Doing better with no new complaints     History of present illness  72-year-old -American male with history of coronary artery disease hypertension hyperlipidemia chronic anemia chronic kidney disease stage III and gastroesophageal reflux disease who also abuse alcohol and tobacco presented to [facility] emergency room after mechanical fall when he tripped and fell on the right hip with complaint of right hip pain.  Patient denies any headache dizziness lightheadedness chest pain shortness of breath abdominal pain nausea vomiting diarrhea.  Patient evaluated in ER admitted to the floor with acute kidney injury and also found to have sinus bradycardia with hyponatremia.  Patient admitted and on the floor his blood pressure dropped and more bradycardic and rapid response called.  Patient transferred to the unit intubated and provided with supportive care and taken to the Cath Lab for temporary pacemaker placement     REVIEW OF SYSTEMS  Unremarkable except generalized weakness     PHYSICAL EXAM   Blood pressure 132/72, pulse 82, temperature 99.7 °F (37.6 °C), temperature source Oral, resp. rate 19, height 175 cm (68.9\"), weight 71.3 kg (157 lb 3.2 oz), SpO2 95 %.    General: Sleepy but arousable  HEENT: Mucous membranes moist, atraumatic,  Neck: Supple  Pulm: Moving air bilaterally  Cardiovascular: S1-S2 paced   GI: Soft, nontender, nondistended, no rebound, no guarding, bowel sounds present  MSK: Full ROM, no deformity  Skin: Warm, dry  Psych: Calm, cooperative     LAB RESULTS  Lab Results (last 24 hours)     Procedure Component Value Units Date/Time    Blood Culture - Blood, Arm, Right [[id number]]  (Normal) Collected: 04/10/23 1853    Specimen: Blood from Arm, Right Updated: 04/16/23 0630     Blood Culture No growth at 5 days    Narrative:      Less than seven (7) mL's of blood was collected.  Insufficient quantity may " yield false negative results.    Comprehensive Metabolic Panel [[id number]]  (Abnormal) Collected: 04/16/23 0457    Specimen: Blood Updated: 04/16/23 0544     Glucose 112 mg/dL      BUN 14 mg/dL      Creatinine 1.30 mg/dL      Sodium 146 mmol/L      Potassium 3.8 mmol/L      Chloride 110 mmol/L      CO2 26.0 mmol/L      Calcium 9.3 mg/dL      Total Protein 6.5 g/dL      Albumin 3.3 g/dL      ALT (SGPT) 117 U/L      AST (SGOT) 35 U/L      Alkaline Phosphatase 104 U/L      Total Bilirubin 0.4 mg/dL      Globulin 3.2 gm/dL      A/G Ratio 1.0 g/dL      BUN/Creatinine Ratio 10.8     Anion Gap 10.0 mmol/L      eGFR 57.6 mL/min/1.73     Narrative:      GFR Normal >60  Chronic Kidney Disease <60  Kidney Failure <15    The GFR formula is only valid for adults with stable renal function between ages 18 and 70.    POC Glucose Once [[id number]]  (Normal) Collected: 04/16/23 0535    Specimen: Blood Updated: 04/16/23 0536     Glucose 117 mg/dL      Comment: Meter: [id number] : [id number] Bethany GRAHAM       CBC & Differential [[id number]]  (Abnormal) Collected: 04/16/23 0457    Specimen: Blood Updated: 04/16/23 0529    Narrative:      The following orders were created for panel order CBC & Differential.  Procedure                               Abnormality         Status                     ---------                               -----------         ------                     CBC Auto Differential[[id number]]        Abnormal            Final result                 Please view results for these tests on the individual orders.    CBC Auto Differential [[id number]]  (Abnormal) Collected: 04/16/23 0457    Specimen: Blood Updated: 04/16/23 0529     WBC 5.74 10*3/mm3      RBC 2.57 10*6/mm3      Hemoglobin 8.0 g/dL      Hematocrit 24.8 %      MCV 96.5 fL      MCH 31.1 pg      MCHC 32.3 g/dL      RDW 14.8 %      RDW-SD 51.0 fl      MPV 10.0 fL      Platelets 174 10*3/mm3      Neutrophil % 62.3 %      Lymphocyte % 19.3 %      Monocyte  % 15.5 %      Eosinophil % 1.7 %      Basophil % 0.3 %      Immature Grans % 0.9 %      Neutrophils, Absolute 3.57 10*3/mm3      Lymphocytes, Absolute 1.11 10*3/mm3      Monocytes, Absolute 0.89 10*3/mm3      Eosinophils, Absolute 0.10 10*3/mm3      Basophils, Absolute 0.02 10*3/mm3      Immature Grans, Absolute 0.05 10*3/mm3      nRBC 0.2 /100 WBC     Blood Culture - Blood, Arm, Left [[id number]]  (Normal) Collected: 04/10/23 1853    Specimen: Blood from Arm, Left Updated: 04/15/23 1916     Blood Culture No growth at 5 days    Narrative:      Less than seven (7) mL's of blood was collected.  Insufficient quantity may yield false negative results.          Imaging Results (Last 24 Hours)     ** No results found for the last 24 hours. **           ECG 12 Lead            Component  Ref Range & Units 15:25  (4/10/23) 09:53  (4/10/23) 8 mo ago  (7/19/22) 8 mo ago  (7/19/22) 9 mo ago  (6/25/22) 1 yr ago  (3/15/22)   QT Interval  ms 610 P  582 P  387  390  398  348    Resulting Agency BH ECG BH ECG BH ECG BH ECG BH ECG BH ECG             HEART RATE= 30  bpm  RR Interval= 2000  ms  DE Interval= 197  ms  P Horizontal Axis=   deg  P Front Axis= 0  deg  QRSD Interval= 115  ms  QT Interval= 610  ms  QRS Axis= 55  deg  T Wave Axis= 19  deg  - ABNORMAL ECG -  Sinus bradycardia  Atrial premature complexes  Nonspecific intraventricular conduction delay             Current Facility-Administered Medications:   •  atenolol (TENORMIN) tablet 25 mg, 25 mg, Oral, Q24H, Paul Schwarz MD, 25 mg at 04/16/23 0836  •  Enoxaparin Sodium (LOVENOX) syringe 40 mg, 40 mg, Subcutaneous, Q24H, Pavan Saucedo Jr., MD, 40 mg at 04/15/23 1749  •  folic acid (FOLVITE) tablet 1 mg, 1 mg, Nasogastric, Daily, Pavan Saucedo Jr., MD, 1 mg at 04/16/23 0836  •  LORazepam (ATIVAN) injection 0.5 mg, 0.5 mg, Intravenous, Q4H PRN, Chino Cole MD, 0.5 mg at 04/15/23 2051  •  multivitamin (THERAGRAN) tablet 1 tablet, 1 tablet, Oral, Daily, Kelsey  MD Jolie, 1 tablet at 04/16/23 0836  •  pantoprazole (PROTONIX) EC tablet 40 mg, 40 mg, Oral, Q AM, Jolie Cole MD, 40 mg at 04/16/23 0457  •  sennosides-docusate (PERICOLACE) 8.6-50 MG per tablet 2 tablet, 2 tablet, Oral, BID, 2 tablet at 04/16/23 0836 **AND** [DISCONTINUED] polyethylene glycol (MIRALAX) packet 17 g, 17 g, Oral, Daily PRN **AND** [DISCONTINUED] bisacodyl (DULCOLAX) EC tablet 5 mg, 5 mg, Oral, Daily PRN **AND** [DISCONTINUED] bisacodyl (DULCOLAX) suppository 10 mg, 10 mg, Rectal, Daily PRN, Pavan Saucedo Jr., MD  •  sodium chloride 0.9 % flush 10 mL, 10 mL, Intravenous, PRN, Pavan Saucedo Jr., MD  •  thiamine (VITAMIN B-1) tablet 100 mg, 100 mg, Oral, Daily, Jolie Cole MD, 100 mg at 04/16/23 0836     ASSESSMENT  Acute hypoxic respiratory failure  Acute kidney injury resolved  Hypotensive shock  Complete heart block status post temporary pacemaker  Hyponatremia  Alcohol hepatitis  History of paroxysmal atrial fibrillation  Coronary artery disease status post PCI  History of hypertension with low blood pressure  Hyperlipidemia  Alcohol abuse  Tobacco abuse  History of prior CVA  Chronic kidney disease stage III  Gastroesophageal reflux disease    PLAN  CPM  Antibiotic completed  Continue detox medications  Stress ulcer DVT prophylaxis   Supportive care  PT/OT  Discussed with family and nursing staff  Subacute rehab in a.m. if bed available    JOLIE COLE MD    Copied text in this note has been reviewed and is accurate as of 04/16/23

## 2023-04-16 NOTE — PROGRESS NOTES
[facility] Progress Note      Patient Name: Yuval Loja  : 1950  MRN: [id number]  Primary Care Physician:  Alessia Prescott APRN  Date of admission: 4/10/2023    Subjective     Interval History:     Seen and examined.  Much more alert and oriented today.  Forgetful.  Denies shortness of air or chest pain.  Review of Systems:   As noted above    Objective     Vitals:   Temp:  [97.4 °F (36.3 °C)-99.5 °F (37.5 °C)] 99.3 °F (37.4 °C)  Heart Rate:  [79-91] 88  Resp:  [18-19] 19  BP: (133-142)/(67-81) 135/74    Intake/Output Summary (Last 24 hours) at 2023 0852  Last data filed at 2023 0420  Gross per 24 hour   Intake 240 ml   Output 650 ml   Net -410 ml       Physical Exam:    Constitutional: Awake, alert, NAD  HEENT: Sclera anicteric, no conjunctival injection  Neck: Supple, no carotid bruit, trachea at midline, no JVD  Respiratory: Clear anteriorly, nonlabored on 2 L/min  Cardiovascular: Irregularly irregular, tachycardic, no rub  Gastrointestinal: BS +, soft, nontender and nondistended  : No palpable bladder  Musculoskeletal: Trace hip edema, no clubbing or cyanosis  Psychiatric:  Awake, appropriate  Neurologic:  Moves all extremities  Skin: Warm and dry    Scheduled Meds:     atenolol, 25 mg, Oral, Q24H  enoxaparin, 40 mg, Subcutaneous, Q24H  folic acid, 1 mg, Nasogastric, Daily  multivitamin, 1 tablet, Oral, Daily  pantoprazole, 40 mg, Oral, Q AM  senna-docusate sodium, 2 tablet, Oral, BID  thiamine, 100 mg, Oral, Daily      IV Meds:        Results Reviewed:   I have personally reviewed the results from the time of this admission to 2023 08:52 EDT     Results from last 7 days   Lab Units 23  0457 04/15/23  0621 23  0346   SODIUM mmol/L 146* 144 142   POTASSIUM mmol/L 3.8 4.1 3.8   CHLORIDE mmol/L 110* 109* 108*   CO2 mmol/L 26.0 26.6 27.0   BUN mg/dL 14 15 20   CREATININE mg/dL 1.30* 1.41* 1.68*   CALCIUM mg/dL 9.3 9.7 8.4*   BILIRUBIN mg/dL 0.4  0.4 0.4   ALK PHOS U/L 104 110 143*   ALT (SGPT) U/L 117* 179* 266*   AST (SGOT) U/L 35 56* 149*   GLUCOSE mg/dL 112* 116* 115*       Estimated Creatinine Clearance: 50.3 mL/min (A) (by C-G formula based on SCr of 1.3 mg/dL (H)).    Results from last 7 days   Lab Units 04/15/23  0621 04/13/23  0355 04/10/23  1021   MAGNESIUM mg/dL 1.9 2.0 2.3   PHOSPHORUS mg/dL 2.8  --   --              Results from last 7 days   Lab Units 04/16/23  0457 04/15/23  0621 04/14/23  0346 04/13/23  0355 04/12/23  0348   WBC 10*3/mm3 5.74 7.25 7.71 7.08 7.71   HEMOGLOBIN g/dL 8.0* 8.1* 7.9* 8.3* 8.8*   PLATELETS 10*3/mm3 174 157 159 149 163       Results from last 7 days   Lab Units 04/10/23  1853   INR  1.40*       Assessment / Plan     ASSESSMENT:  1.  CRISTIANO on CKD2, nonoliguric, improving, likely prerenal due to hypotension, renal hypoperfusion due to complete heart block, and cardiogenic shock.  Volume excess, improving thanks to auto-diuresis. Electrolytes stable. Urinalysis on arrival fairly bland with no RBCs and only 30 mg/dL protein in a concentrated specimen  2.  Respiratory failure, resolved; extubated 4/13  3.  Complete heart block, with temporary need for transvenous pacer, now with tachyarrhythmia  4.  Alcoholism  5.  Anemia  6.  Transaminitis, likely combo of alcohol intoxication and ischemia    PLAN:    1.  Kidney function is improving vand olume status is appropriate.  We will continue to hold diuretic.  Increase free water intake.  Discussed with nursing staff  2.  Surveillance labs    Thank you for involving us in the care of Yuval Loja.  Please feel free to call with any questions.    Alejandra Tipton MD  04/16/23  08:52 EDT    [facility]  [phone]    Please note that portions of this note were completed with a voice recognition program.

## 2023-04-16 NOTE — PLAN OF CARE
Goal Outcome Evaluation:           Progress: improving  Outcome Evaluation: pt is alert and oriented, forgetful, room air, no c/o pain, ativan PRN given x1 before bed, no falls, bed alarm on

## 2023-04-17 LAB
ANION GAP SERPL CALCULATED.3IONS-SCNC: 13.5 MMOL/L (ref 5–15)
BASOPHILS # BLD AUTO: 0.03 10*3/MM3 (ref 0–0.2)
BASOPHILS NFR BLD AUTO: 0.6 % (ref 0–1.5)
BUN SERPL-MCNC: 13 MG/DL (ref 8–23)
BUN/CREAT SERPL: 9.2 (ref 7–25)
CALCIUM SPEC-SCNC: 10 MG/DL (ref 8.6–10.5)
CHLORIDE SERPL-SCNC: 108 MMOL/L (ref 98–107)
CO2 SERPL-SCNC: 23.5 MMOL/L (ref 22–29)
CREAT SERPL-MCNC: 1.42 MG/DL (ref 0.76–1.27)
DEPRECATED RDW RBC AUTO: 48.6 FL (ref 37–54)
EGFRCR SERPLBLD CKD-EPI 2021: 51.9 ML/MIN/1.73
EOSINOPHIL # BLD AUTO: 0.1 10*3/MM3 (ref 0–0.4)
EOSINOPHIL NFR BLD AUTO: 1.9 % (ref 0.3–6.2)
ERYTHROCYTE [DISTWIDTH] IN BLOOD BY AUTOMATED COUNT: 14.3 % (ref 12.3–15.4)
GLUCOSE SERPL-MCNC: 106 MG/DL (ref 65–99)
HCT VFR BLD AUTO: 27.5 % (ref 37.5–51)
HGB BLD-MCNC: 9.1 G/DL (ref 13–17.7)
IMM GRANULOCYTES # BLD AUTO: 0.04 10*3/MM3 (ref 0–0.05)
IMM GRANULOCYTES NFR BLD AUTO: 0.7 % (ref 0–0.5)
LYMPHOCYTES # BLD AUTO: 1.5 10*3/MM3 (ref 0.7–3.1)
LYMPHOCYTES NFR BLD AUTO: 27.9 % (ref 19.6–45.3)
MCH RBC QN AUTO: 30.8 PG (ref 26.6–33)
MCHC RBC AUTO-ENTMCNC: 33.1 G/DL (ref 31.5–35.7)
MCV RBC AUTO: 93.2 FL (ref 79–97)
MONOCYTES # BLD AUTO: 0.81 10*3/MM3 (ref 0.1–0.9)
MONOCYTES NFR BLD AUTO: 15.1 % (ref 5–12)
NEUTROPHILS NFR BLD AUTO: 2.9 10*3/MM3 (ref 1.7–7)
NEUTROPHILS NFR BLD AUTO: 53.8 % (ref 42.7–76)
NRBC BLD AUTO-RTO: 0 /100 WBC (ref 0–0.2)
PLATELET # BLD AUTO: 223 10*3/MM3 (ref 140–450)
PMV BLD AUTO: 9.8 FL (ref 6–12)
POTASSIUM SERPL-SCNC: 3.5 MMOL/L (ref 3.5–5.2)
RBC # BLD AUTO: 2.95 10*6/MM3 (ref 4.14–5.8)
SODIUM SERPL-SCNC: 145 MMOL/L (ref 136–145)
WBC NRBC COR # BLD: 5.38 10*3/MM3 (ref 3.4–10.8)

## 2023-04-17 PROCEDURE — 85025 COMPLETE CBC W/AUTO DIFF WBC: CPT | Performed by: HOSPITALIST

## 2023-04-17 PROCEDURE — 80048 BASIC METABOLIC PNL TOTAL CA: CPT | Performed by: HOSPITALIST

## 2023-04-17 PROCEDURE — 97530 THERAPEUTIC ACTIVITIES: CPT

## 2023-04-17 PROCEDURE — 25010000002 ENOXAPARIN PER 10 MG: Performed by: INTERNAL MEDICINE

## 2023-04-17 RX ORDER — POTASSIUM CHLORIDE 750 MG/1
30 TABLET, FILM COATED, EXTENDED RELEASE ORAL ONCE
Status: COMPLETED | OUTPATIENT
Start: 2023-04-17 | End: 2023-04-17

## 2023-04-17 RX ORDER — HYDROXYZINE HYDROCHLORIDE 10 MG/1
10 TABLET, FILM COATED ORAL 3 TIMES DAILY PRN
Status: DISCONTINUED | OUTPATIENT
Start: 2023-04-17 | End: 2023-04-18

## 2023-04-17 RX ADMIN — Medication 100 MG: at 08:30

## 2023-04-17 RX ADMIN — PANTOPRAZOLE SODIUM 40 MG: 40 TABLET, DELAYED RELEASE ORAL at 05:07

## 2023-04-17 RX ADMIN — HYDROXYZINE HYDROCHLORIDE 10 MG: 10 TABLET ORAL at 21:40

## 2023-04-17 RX ADMIN — POTASSIUM CHLORIDE 30 MEQ: 750 TABLET, EXTENDED RELEASE ORAL at 18:26

## 2023-04-17 RX ADMIN — ATENOLOL 25 MG: 25 TABLET ORAL at 08:30

## 2023-04-17 RX ADMIN — Medication 1 TABLET: at 08:30

## 2023-04-17 RX ADMIN — Medication 1 MG: at 08:30

## 2023-04-17 RX ADMIN — ENOXAPARIN SODIUM 40 MG: 100 INJECTION SUBCUTANEOUS at 18:26

## 2023-04-17 NOTE — CASE MANAGEMENT/SOCIAL WORK
Continued Stay Note  [facility]     Patient Name: Yuval Loja  MRN: [id number]  Today's Date: 4/17/2023    Admit Date: 4/10/2023    Plan: SNF referrals pending Pt being off ATIVAN for 48 hours.  Pt will also need to be approved by [facility] for rehab   Discharge Plan     Row Name 04/17/23 1415       Plan    Plan SNF referrals pending until Pt has been off ATIVAN for 48 hours.  Pt will also need to be approved by [facility] for rehab.    Plan Comments [facility].  [facility] advised that they will re-evaluate once Pt is 48 hours out from having ATIVAN.  [facility] called and spoke with Pt daughter, Torri [phone] to discuss SNF options.  Torri gave [facility] permission to send mass referrals for SNF.  [facility] did tell daughter that Pt was ambulating 80 feet with physical therapy and he would need precert.  Torri advised she know how the precerts work b/c Pt went through trying to get in a rehab in October 2022.  Torri advised Pt does live alone and he does NOT have the option of going to her home at discharge.  Torri has her own family and cannot care for patient.  SNF referrals sent and pending.  [facility] gave an update to Usha/ANIA to relay to Dr. Cole.  Dr. Cole advised he is willing to do a Peer to Peer if needed, to get Pt in a skilled rehab.  CCP following.......SRS/RN CM               Discharge Codes    No documentation.               Expected Discharge Date and Time     Expected Discharge Date Expected Discharge Time    Apr 18, 2023             Marcy Marshall RN

## 2023-04-17 NOTE — THERAPY TREATMENT NOTE
Patient Name: Yuval Loja  : 1950    MRN: [id number]                              Today's Date: 2023       Admit Date: 4/10/2023    Visit Dx:     ICD-10-CM ICD-9-CM   1. Sinus bradycardia  R00.1 427.89   2. Hyponatremia  E87.1 276.1   3. Acute on chronic renal insufficiency  N28.9 593.9    N18.9 585.9   4. Nausea and vomiting, unspecified vomiting type  R11.2 787.01   5. History of COPD  Z87.09 V12.69   6. History of coronary artery disease  Z86.79 V12.59     Patient Active Problem List   Diagnosis   • Alcoholic ketoacidosis   • Alcohol dependence   • Methamphetamine abuse   • Fatty liver, alcoholic   • Nausea vomiting and diarrhea   • Alcoholic liver disease   • Metabolic acidosis   • Tobacco abuse   • Coronary artery disease involving native coronary artery of native heart without angina pectoris   • Tachycardia   • Sinus tachycardia   • Chronic kidney disease, stage 3   • Medically noncompliant   • Visual hallucinations   • Psychosis   • Hyponatremia   • CAD (coronary artery disease)   • Leukopenia   • Symptomatic anemia   • Headache   • Substance abuse   • Gastrointestinal hemorrhage   • CRISTIANO (acute kidney injury) (HCC)   • Hyperkalemia   • Right lower quadrant abdominal pain   • New onset atrial fibrillation   • History of drug abuse   • COPD (chronic obstructive pulmonary disease)   • Right inguinal hernia   • Constipation   • Status post right hip replacement   • Right hip pain   • Sinus bradycardia     Past Medical History:   Diagnosis Date   • Alcohol abuse    • Arthritis    • CAD (coronary artery disease)    • Chronic kidney disease, stage 3    • COPD (chronic obstructive pulmonary disease)    • Disease of thyroid gland    • Elevated cholesterol    • Fatty liver, alcoholic    • GERD (gastroesophageal reflux disease)    • History of transfusion    • Hypertensive urgency    • Metabolic acidosis    • Myocardial infarction    • Sinus tachycardia    • Sleep apnea    • Stroke      Past Surgical  History:   Procedure Laterality Date   • BACK SURGERY     • CARDIAC CATHETERIZATION     • CARDIAC ELECTROPHYSIOLOGY PROCEDURE N/A 4/10/2023    Procedure: Temporary Pacemaker;  Surgeon: Vaibhav Bonilla MD;  Location: [facility] INVASIVE LOCATION;  Service: Cardiovascular;  Laterality: N/A;   • COLONOSCOPY     • ENDOSCOPY     • FRACTURE SURGERY     • JOINT REPLACEMENT     • SKIN BIOPSY     • TOTAL HIP ARTHROPLASTY Right 06/27/2022    Procedure: TOTAL HIP ARTHROPLASTY ANTERIOR WITH HANA TABLE;  Surgeon: Willian Quiles MD;  Location: [facility] MAIN OR;  Service: Orthopedics;  Laterality: Right;  Depuy, carli. [email or web address]      General Information     Row Name 04/17/23 1137          Physical Therapy Time and Intention    Document Type therapy note (daily note)  -     Mode of Treatment individual therapy;physical therapy  -     Row Name 04/17/23 1137          Cognition    Orientation Status (Cognition) oriented x 3  -     Row Name 04/17/23 1137          Safety Issues, Functional Mobility    Impairments Affecting Function (Mobility) balance;cognition;endurance/activity tolerance;strength  -           User Key  (r) = Recorded By, (t) = Taken By, (c) = Cosigned By    Initials Name Provider Type     Alla Acevedo PT Physical Therapist               Mobility     Row Name 04/17/23 1137          Bed Mobility    Bed Mobility supine-sit;sit-supine  -     Supine-Sit [place] (Bed Mobility) verbal cues;standby assist  -     Sit-Supine [place] (Bed Mobility) not tested  sitting EOB  -     Assistive Device (Bed Mobility) bed rails;head of bed elevated  -     Row Name 04/17/23 1137          Sit-Stand Transfer    Sit-Stand [place] (Transfers) verbal cues;nonverbal cues (demo/gesture);contact guard  -     Assistive Device (Sit-Stand Transfers) walker, front-wheeled  -     Row Name 04/17/23 1137          Gait/Stairs (Locomotion)    [place] Level (Gait) verbal cues;nonverbal cues  (demo/gesture);contact guard  -     Assistive Device (Gait) walker, front-wheeled  -     Distance in Feet (Gait) 80  -     Deviations/Abnormal Patterns (Gait) alicia decreased;gait speed decreased;stride length decreased  -     Bilateral Gait Deviations forward flexed posture  -     Comment, (Gait/Stairs) no LOB nor unsteady gait noted, pt with slow and steady gait when ambulating with walker  -           User Key  (r) = Recorded By, (t) = Taken By, (c) = Cosigned By    Initials Name Provider Type    Alla Doss, PT Physical Therapist               Obj/Interventions     Row Name 04/17/23 1139          Motor Skills    Therapeutic Exercise --  exercises deferred secondary to lunch tray present and pt wanting to eat  -           User Key  (r) = Recorded By, (t) = Taken By, (c) = Cosigned By    Initials Name Provider Type    Alla Doss, PT Physical Therapist               Goals/Plan    No documentation.                Clinical Impression     Row Name 04/17/23 1139          Pain    Pretreatment Pain Rating 0/10 - no pain  -CH     Posttreatment Pain Rating 0/10 - no pain  -     Row Name 04/17/23 1139          Plan of Care Review    Plan of Care Reviewed With patient  -     Outcome Evaluation Pt pleasant and willing to participate in physical therapy this date. Pt got to EOB with SBA and ambulated in [place] with CGA with walker. No LOB or impaired gait noted when walking with a walker. Pt reports he has been ambulating the restroom with nursing. PT will continue to work with patient to address strength, mobility, and gait. PT recommends DC home with HHPT to follow up.  -           User Key  (r) = Recorded By, (t) = Taken By, (c) = Cosigned By    Initials Name Provider Type    Alla Doss, PT Physical Therapist               Outcome Measures     Row Name 04/17/23 1142 04/17/23 0800       How much help from another person do you currently need...    Turning from your back to  your side while in flat bed without using bedrails? 3  - 3  -JS    Moving from lying on back to sitting on the side of a flat bed without bedrails? 3  - 3  -JS    Moving to and from a bed to a chair (including a wheelchair)? 3  - 3  -JS    Standing up from a chair using your arms (e.g., wheelchair, bedside chair)? 3  - 3  -JS    Climbing 3-5 steps with a railing? 3  - 2  -JS    To walk in hospital room? 3  - 2  -JS    AM-PAC 6 Clicks Score (PT) 18  - 16  -JS    Highest level of mobility 6 --> Walked 10 steps or more  - 5 --> Static standing  -    Row Name 04/17/23 1142          Functional Assessment    Outcome Measure Options AM-PAC 6 Clicks Basic Mobility (PT)  -           User Key  (r) = Recorded By, (t) = Taken By, (c) = Cosigned By    Initials Name Provider Type     Alla Acevedo PT Physical Therapist    Samara Arnold RN Registered Nurse                             Physical Therapy Education     Title: PT OT SLP Therapies (Done)     Topic: Physical Therapy (Done)     Point: Mobility training (Done)     Learning Progress Summary           Patient Acceptance, E,TB,D, VU,NR by  at 4/17/2023 1142    Acceptance, E,TB,D, VU,NR by  at 4/14/2023 1635                   Point: Home exercise program (Done)     Learning Progress Summary           Patient Acceptance, E,TB,D, VU,NR by  at 4/17/2023 1142    Acceptance, E,TB,D, VU,NR by  at 4/14/2023 1635                   Point: Body mechanics (Done)     Learning Progress Summary           Patient Acceptance, E,TB,D, VU,NR by  at 4/17/2023 1142    Acceptance, E,TB,D, VU,NR by  at 4/14/2023 1635                   Point: Precautions (Done)     Learning Progress Summary           Patient Acceptance, E,TB,D, VU,NR by  at 4/17/2023 1142    Acceptance, E,TB,D, VU,NR by  at 4/14/2023 1635                               User Key     Initials Effective Dates Name Provider Type [facility] 06/16/21 -  Alla Acevedo, PT  Physical Therapist PT              PT Recommendation and Plan  Planned Therapy Interventions (PT): balance training, bed mobility training, gait training, home exercise program, patient/family education, strengthening, transfer training  Plan of Care Reviewed With: patient  Outcome Evaluation: Pt pleasant and willing to participate in physical therapy this date. Pt got to EOB with SBA and ambulated in [place] with CGA with walker. No LOB or impaired gait noted when walking with a walker. Pt reports he has been ambulating the restroom with nursing. PT will continue to work with patient to address strength, mobility, and gait. PT recommends DC home with HHPT to follow up.     Time Calculation:    PT Charges     Row Name 04/17/23 1142             Time Calculation    Start Time 1058  -CH      Stop Time 1109  -CH      Time Calculation (min) 11 min  -CH      PT Received On 04/17/23  -CH      PT - Next Appointment 04/18/23  -CH         Time Calculation- PT    Total Timed Code Minutes- PT 11 minute(s)  -CH         Timed Charges    17435 - PT Therapeutic Activity Minutes 11  -CH         Total Minutes    Timed Charges Total Minutes 11  -CH       Total Minutes 11  -CH            User Key  (r) = Recorded By, (t) = Taken By, (c) = Cosigned By    Initials Name Provider Type     Alla Acevedo, PT Physical Therapist              Therapy Charges for Today     Code Description Service Date Service Provider Modifiers Qty    [id number]  PT THERAPEUTIC ACT EA 15 MIN 4/17/2023 Alla Acevedo, PT GP 1          PT G-Codes  Outcome Measure Options: AM-PAC 6 Clicks Basic Mobility (PT)  AM-PAC 6 Clicks Score (PT): 18  AM-PAC 6 Clicks Score (OT): 11  PT Discharge Summary  Anticipated Discharge Disposition (PT): home with assist, home with home health    Alla Acevedo, BI  4/17/2023

## 2023-04-17 NOTE — PROGRESS NOTES
[facility] Progress Note      Patient Name: Yuval Loja  : 1950  MRN: [id number]  Primary Care Physician:  Alessia Prescott APRN  Date of admission: 4/10/2023    Subjective     Interval History:   Appetite is mediocre, but no N/V  No shortness of breath on room air; no chest pain  Wants to know why he is here    Review of Systems:   As noted above    Objective     Vitals:   Temp:  [98.9 °F (37.2 °C)-99.7 °F (37.6 °C)] 99.3 °F (37.4 °C)  Heart Rate:  [77-97] 77  Resp:  [18-22] 19  BP: (119-140)/(56-89) 126/74    Intake/Output Summary (Last 24 hours) at 2023 1707  Last data filed at 2023 1259  Gross per 24 hour   Intake 600 ml   Output 700 ml   Net -100 ml       Physical Exam:    Constitutional: Awake, alert, NAD; chronically ill  HEENT: Sclera anicteric, no conjunctival injection  Neck: Supple, no carotid bruit, trachea at midline, no JVD  Respiratory: Clear anteriorly, nonlabored on RA  Cardiovascular: RRR  Gastrointestinal: BS +, soft, nontender and nondistended  : No palpable bladder  Musculoskeletal:  No significant edema; no clubbing or cyanosis  Psychiatric:  Awake, appropriate  Neurologic:  Moves all extremities  Skin: Warm and dry    Scheduled Meds:     atenolol, 25 mg, Oral, Q24H  enoxaparin, 40 mg, Subcutaneous, Q24H  folic acid, 1 mg, Nasogastric, Daily  multivitamin, 1 tablet, Oral, Daily  pantoprazole, 40 mg, Oral, Q AM  senna-docusate sodium, 2 tablet, Oral, BID  thiamine, 100 mg, Oral, Daily      IV Meds:        Results Reviewed:   I have personally reviewed the results from the time of this admission to 2023 17:07 EDT     Results from last 7 days   Lab Units 23  0500 23  0457 04/15/23  0621 23  0346   SODIUM mmol/L 145 146* 144 142   POTASSIUM mmol/L 3.5 3.8 4.1 3.8   CHLORIDE mmol/L 108* 110* 109* 108*   CO2 mmol/L 23.5 26.0 26.6 27.0   BUN mg/dL 13 14 15 20   CREATININE mg/dL 1.42* 1.30* 1.41* 1.68*   CALCIUM mg/dL 10.0 9.3  9.7 8.4*   BILIRUBIN mg/dL  --  0.4 0.4 0.4   ALK PHOS U/L  --  104 110 143*   ALT (SGPT) U/L  --  117* 179* 266*   AST (SGOT) U/L  --  35 56* 149*   GLUCOSE mg/dL 106* 112* 116* 115*       Estimated Creatinine Clearance: 44.9 mL/min (A) (by C-G formula based on SCr of 1.42 mg/dL (H)).    Results from last 7 days   Lab Units 04/15/23  0621 04/13/23  0355   MAGNESIUM mg/dL 1.9 2.0   PHOSPHORUS mg/dL 2.8  --              Results from last 7 days   Lab Units 04/17/23  0500 04/16/23  0457 04/15/23  0621 04/14/23  0346 04/13/23  0355   WBC 10*3/mm3 5.38 5.74 7.25 7.71 7.08   HEMOGLOBIN g/dL 9.1* 8.0* 8.1* 7.9* 8.3*   PLATELETS 10*3/mm3 223 174 157 159 149       Results from last 7 days   Lab Units 04/10/23  1853   INR  1.40*       Assessment / Plan     ASSESSMENT:  1.  CRISTIANO on CKD2, nonoliguric, and back to baseline: CRISTIANO likely prerenal due to hypotension, renal hypoperfusion due to complete heart block, and cardiogenic shock.   Looks euvolemic by exam; electrolytes fine other than low-normal potassium. Urinalysis on arrival fairly bland with no RBCs and only 30 mg/dL protein in a concentrated specimen  2.  Respiratory failure, resolved; extubated 4/13  3.  Complete heart block, with temporary need for transvenous pacer, now back to NSR  4.  Alcoholism  5.  Anemia  6.  Transaminitis, likely combo of alcohol intoxication and ischemia    PLAN:  1.  Replace potassium  2.  Discharge anytime from renal view    Thank you for involving us in the care of Yuval Loja.  Please feel free to call with any questions.    Thomas Weiner MD  04/17/23  17:07 EDT    [facility]  [phone]    Please note that portions of this note were completed with a voice recognition program.

## 2023-04-17 NOTE — PROGRESS NOTES
"Daily progress note    Primary care physician      Chief complaint  Doing same with no new complaints     History of present illness  72-year-old -American male with history of coronary artery disease hypertension hyperlipidemia chronic anemia chronic kidney disease stage III and gastroesophageal reflux disease who also abuse alcohol and tobacco presented to [facility] emergency room after mechanical fall when he tripped and fell on the right hip with complaint of right hip pain.  Patient denies any headache dizziness lightheadedness chest pain shortness of breath abdominal pain nausea vomiting diarrhea.  Patient evaluated in ER admitted to the floor with acute kidney injury and also found to have sinus bradycardia with hyponatremia.  Patient admitted and on the floor his blood pressure dropped and more bradycardic and rapid response called.  Patient transferred to the unit intubated and provided with supportive care and taken to the Cath Lab for temporary pacemaker placement     REVIEW OF SYSTEMS  Unremarkable except generalized weakness     PHYSICAL EXAM   Blood pressure 119/56, pulse 80, temperature 98.9 °F (37.2 °C), temperature source Oral, resp. rate 19, height 175 cm (68.9\"), weight 69.5 kg (153 lb 3.2 oz), SpO2 97 %.    General: Sleepy but arousable  HEENT: Mucous membranes moist, atraumatic,  Neck: Supple  Pulm: Moving air bilaterally  Cardiovascular: S1-S2 paced   GI: Soft, nontender, nondistended, no rebound, no guarding, bowel sounds present  MSK: Full ROM, no deformity  Skin: Warm, dry  Psych: Calm, cooperative     LAB RESULTS  Lab Results (last 24 hours)     Procedure Component Value Units Date/Time    Basic Metabolic Panel [[id number]]  (Abnormal) Collected: 04/17/23 0500    Specimen: Blood Updated: 04/17/23 0604     Glucose 106 mg/dL      BUN 13 mg/dL      Creatinine 1.42 mg/dL      Sodium 145 mmol/L      Potassium 3.5 mmol/L      Chloride 108 mmol/L      CO2 23.5 mmol/L     "  Calcium 10.0 mg/dL      BUN/Creatinine Ratio 9.2     Anion Gap 13.5 mmol/L      eGFR 51.9 mL/min/1.73     Narrative:      GFR Normal >60  Chronic Kidney Disease <60  Kidney Failure <15    The GFR formula is only valid for adults with stable renal function between ages 18 and 70.    CBC & Differential [[id number]]  (Abnormal) Collected: 04/17/23 0500    Specimen: Blood Updated: 04/17/23 0543    Narrative:      The following orders were created for panel order CBC & Differential.  Procedure                               Abnormality         Status                     ---------                               -----------         ------                     CBC Auto Differential[[id number]]        Abnormal            Final result                 Please view results for these tests on the individual orders.    CBC Auto Differential [[id number]]  (Abnormal) Collected: 04/17/23 0500    Specimen: Blood Updated: 04/17/23 0543     WBC 5.38 10*3/mm3      RBC 2.95 10*6/mm3      Hemoglobin 9.1 g/dL      Hematocrit 27.5 %      MCV 93.2 fL      MCH 30.8 pg      MCHC 33.1 g/dL      RDW 14.3 %      RDW-SD 48.6 fl      MPV 9.8 fL      Platelets 223 10*3/mm3      Neutrophil % 53.8 %      Lymphocyte % 27.9 %      Monocyte % 15.1 %      Eosinophil % 1.9 %      Basophil % 0.6 %      Immature Grans % 0.7 %      Neutrophils, Absolute 2.90 10*3/mm3      Lymphocytes, Absolute 1.50 10*3/mm3      Monocytes, Absolute 0.81 10*3/mm3      Eosinophils, Absolute 0.10 10*3/mm3      Basophils, Absolute 0.03 10*3/mm3      Immature Grans, Absolute 0.04 10*3/mm3      nRBC 0.0 /100 WBC           Imaging Results (Last 24 Hours)     ** No results found for the last 24 hours. **           ECG 12 Lead            Component  Ref Range & Units 15:25  (4/10/23) 09:53  (4/10/23) 8 mo ago  (7/19/22) 8 mo ago  (7/19/22) 9 mo ago  (6/25/22) 1 yr ago  (3/15/22)   QT Interval  ms 610 P  582 P  387  390  398  348    Resulting Agency BH ECG BH ECG BH ECG BH ECG BH ECG BH ECG              HEART RATE= 30  bpm  RR Interval= 2000  ms  OR Interval= 197  ms  P Horizontal Axis=   deg  P Front Axis= 0  deg  QRSD Interval= 115  ms  QT Interval= 610  ms  QRS Axis= 55  deg  T Wave Axis= 19  deg  - ABNORMAL ECG -  Sinus bradycardia  Atrial premature complexes  Nonspecific intraventricular conduction delay             Current Facility-Administered Medications:   •  atenolol (TENORMIN) tablet 25 mg, 25 mg, Oral, Q24H, Paul Schwarz MD, 25 mg at 04/17/23 0830  •  Enoxaparin Sodium (LOVENOX) syringe 40 mg, 40 mg, Subcutaneous, Q24H, Pavan Saucedo Jr., MD, 40 mg at 04/16/23 1758  •  folic acid (FOLVITE) tablet 1 mg, 1 mg, Nasogastric, Daily, Pavan Saucedo Jr., MD, 1 mg at 04/17/23 0830  •  LORazepam (ATIVAN) injection 0.5 mg, 0.5 mg, Intravenous, Q4H PRN, Chino Cole MD, 0.5 mg at 04/16/23 2236  •  multivitamin (THERAGRAN) tablet 1 tablet, 1 tablet, Oral, Daily, Chino Cole MD, 1 tablet at 04/17/23 0830  •  pantoprazole (PROTONIX) EC tablet 40 mg, 40 mg, Oral, Q AM, Chino Cole MD, 40 mg at 04/17/23 0507  •  sennosides-docusate (PERICOLACE) 8.6-50 MG per tablet 2 tablet, 2 tablet, Oral, BID, 2 tablet at 04/16/23 1947 **AND** [DISCONTINUED] polyethylene glycol (MIRALAX) packet 17 g, 17 g, Oral, Daily PRN **AND** [DISCONTINUED] bisacodyl (DULCOLAX) EC tablet 5 mg, 5 mg, Oral, Daily PRN **AND** [DISCONTINUED] bisacodyl (DULCOLAX) suppository 10 mg, 10 mg, Rectal, Daily PRN, Pavan Saucedo Jr., MD  •  sodium chloride 0.9 % flush 10 mL, 10 mL, Intravenous, PRN, Pavan Saucedo Jr., MD  •  thiamine (VITAMIN B-1) tablet 100 mg, 100 mg, Oral, Daily, Chino Cole MD, 100 mg at 04/17/23 0830     ASSESSMENT  Acute hypoxic respiratory failure  Acute kidney injury resolved  Status post hypotensive shock  Complete heart block status post temporary pacemaker  Hyponatremia  Alcohol hepatitis  History of paroxysmal atrial fibrillation  Coronary artery disease status post PCI  History of  hypertension with low blood pressure  Hyperlipidemia  Alcohol abuse  Tobacco abuse  History of prior CVA  Chronic kidney disease stage III  Gastroesophageal reflux disease    PLAN  CPM  Antibiotic completed  Continue detox medications  Stress ulcer DVT prophylaxis   Supportive care  PT/OT  Discussed with family and nursing staff  Subacute rehab versus home with home health in a.m.    JOLIE BUNDY MD    Copied text in this note has been reviewed and is accurate as of 04/17/23

## 2023-04-17 NOTE — PLAN OF CARE
Goal Outcome Evaluation:           Progress: improving  Outcome Evaluation: pt is alert and oriented, forgetful, pleasant, room air, no c/o pain, no falls, bed alarm on, using urinal, PRN ativan given before bed x1, possible D/C

## 2023-04-17 NOTE — PLAN OF CARE
Goal Outcome Evaluation:  Plan of Care Reviewed With: patient           Outcome Evaluation: Pt pleasant and willing to participate in physical therapy this date. Pt got to EOB with SBA and ambulated in [place] with CGA with walker. No LOB or impaired gait noted when walking with a walker. Pt reports he has been ambulating the restroom with nursing. PT will continue to work with patient to address strength, mobility, and gait. PT recommends DC home with HHPT to follow up.

## 2023-04-17 NOTE — PROGRESS NOTES
"Nutrition Services    Patient Name:  Yuval Loja  YOB: 1950  MRN: [id number]  Admit Date:  4/10/2023    FOLLOW UP - CLINICAL NUTRITION    Assessment Date:  04/17/23    Encounter Information         Reason for Encounter Follow up, poor PO intake (TF dc last week)    Current Issues S/p pacemaker placement (temp), cath lab due to sinus bradycardia with hyponatremia.  Pt visited after lunch - reports appetite a tad better, [employer] sounded good to him. He has been drinking the Boost Breeze reportedly (had one unopened at bedside). Eager to dc to home & take care of his yard, mulching and flower beds.      Current Nutrition Orders & Evaluation of Intake       Oral Nutrition     Current PO Diet Diet: Cardiac Diets; Healthy Heart (2-3 Na+); Texture: Regular Texture (IDDSI 7); Fluid Consistency: Thin (IDDSI 0)   Supplement Boost Breeze, Daily w lunch   PO Evaluation     % PO Intake 25-50% + snacks    # of Days Evaluated     Factors Affecting Intake  weakness   --  Anthropometrics          Height    Weight Height: 175 cm (68.9\")  Weight: 69.5 kg (153 lb 3.2 oz) (04/17/23 0432)    BMI kg/m2 Body mass index is 22.69 kg/m².    Weight trend Other: fluctuating this admission  (147 lb adm wt, stated)  3/2022 wt 145 lb (standing)  Documented weights    04/10/23 0818 04/11/23 0510 04/11/23 1419 04/12/23 0600   Weight: 66.7 kg (147 lb) 77.2 kg (170 lb 3.1 oz) 77 kg (169 lb 12.1 oz) 75.8 kg (167 lb 1.7 oz)    04/13/23 0623 04/14/23 0600 04/15/23 0300 04/15/23 0555   Weight: 76 kg (167 lb 8.8 oz) 76.4 kg (168 lb 6.9 oz) 78.1 kg (172 lb 2.9 oz) 78 kg (172 lb)    04/16/23 0635 04/17/23 0432   Weight: 71.3 kg (157 lb 3.2 oz) 69.5 kg (153 lb 3.2 oz)        Labs        Pertinent Labs Reviewed, listed below     Results from last 7 days   Lab Units 04/17/23  0500 04/16/23  0457 04/15/23  0621 04/14/23  0346   SODIUM mmol/L 145 146* 144 142   POTASSIUM mmol/L 3.5 3.8 4.1 3.8   CHLORIDE mmol/L 108* 110* 109* 108*   CO2 " mmol/L 23.5 26.0 26.6 27.0   BUN mg/dL 13 14 15 20   CREATININE mg/dL 1.42* 1.30* 1.41* 1.68*   CALCIUM mg/dL 10.0 9.3 9.7 8.4*   BILIRUBIN mg/dL  --  0.4 0.4 0.4   ALK PHOS U/L  --  104 110 143*   ALT (SGPT) U/L  --  117* 179* 266*   AST (SGOT) U/L  --  35 56* 149*   GLUCOSE mg/dL 106* 112* 116* 115*     Results from last 7 days   Lab Units 04/17/23  0500 04/16/23  0457 04/15/23  0621 04/14/23 0346 04/13/23  0355 04/12/23  0348 04/11/23  0420   MAGNESIUM mg/dL  --   --  1.9  --  2.0  --   --    PHOSPHORUS mg/dL  --   --  2.8  --   --   --   --    HEMOGLOBIN g/dL 9.1* 8.0* 8.1*   < > 8.3*   < > 9.1*   HEMATOCRIT % 27.5* 24.8* 25.4*   < > 25.8*   < > 27.8*   WBC 10*3/mm3 5.38 5.74 7.25   < > 7.08   < > 4.21   TRIGLYCERIDES mg/dL  --   --   --   --   --   --  62   ALBUMIN g/dL  --  3.3* 3.3*   < > 2.9*   < > 3.2*    < > = values in this interval not displayed.     Results from last 7 days   Lab Units 04/17/23  0500 04/16/23  0457 04/15/23  0621 04/14/23 0346 04/13/23  0355 04/11/23  0420 04/10/23  1853   INR   --   --   --   --   --   --  1.40*   PLATELETS 10*3/mm3 223 174 157 159 149   < > 194    < > = values in this interval not displayed.     SARS-CoV-2, CLARICE   Date Value Ref Range Status   10/11/2022 Negative Negative Final     Comment:     The 2019-CoV rRT-PCR Assay is only for use under a Food and Drug Administration Emergency Use Authorization. The performance characteristics of the assay were verified by the Clinical Microbiology Laboratory at the [facility].   Results should be used in conjunction with the patient's clinical symptoms, medical history, and other clinical/laboratory findings to determine an overall clinical diagnosis. Negative results do not preclude infection with SARS-CoV-2 (COVID-19).    Test parameters have not been validated for screening asymptomatic patients.     Lab Results   Component Value Date    HGBA1C 4.50 (L) 04/11/2023          Medications             Scheduled Medications atenolol, 25 mg, Oral, Q24H  enoxaparin, 40 mg, Subcutaneous, Q24H  folic acid, 1 mg, Nasogastric, Daily  multivitamin, 1 tablet, Oral, Daily  pantoprazole, 40 mg, Oral, Q AM  senna-docusate sodium, 2 tablet, Oral, BID  thiamine, 100 mg, Oral, Daily        Infusions      PRN Medications •  LORazepam  •  sodium chloride     Physical Findings          Physical Appearance alert, room air   Oral/Mouth Cavity teeth missing (some)   Edema  no edema   Gastrointestinal last bowel movement:4/16   Skin  skin intact   Tubes/Drains none   NFPE No clinical signs of muscle wasting or fat loss   --  NUTRITION INTERVENTION / PLAN OF CARE  Intervention Goal         Intervention Goal(s) Maintain nutrition status, Reduce/improve symptoms, Increase intake and PO intake goal %: 75+     Nutrition Intervention         RD Action Encourage intake, Follow Tx Progress and Care plan reviewed     Nutrition Prescription         Diet Prescription     Supplement Prescription    EN/PN Prescription    New Prescription Ordered? Continue same per protocol   --  Monitor/Evaluation        Monitor I&O, PO intake, Supplement intake, Pertinent labs, Weight, Skin status, GI status, Symptoms, POC/GOC   Discharge Needs Pending clinical course   Education Will instruct as appropriate   --    RD to follow up per protocol.    Electronically signed by:  Ivonne Delgado RD  04/17/23 12:37 EDT

## 2023-04-18 ENCOUNTER — HOME HEALTH ADMISSION (OUTPATIENT)
Dept: HOME HEALTH SERVICES | Facility: HOME HEALTHCARE | Age: 73
End: 2023-04-18
Payer: MEDICARE

## 2023-04-18 ENCOUNTER — READMISSION MANAGEMENT (OUTPATIENT)
Dept: CALL CENTER | Facility: HOSPITAL | Age: 73
End: 2023-04-18
Payer: MEDICARE

## 2023-04-18 VITALS
HEIGHT: 69 IN | DIASTOLIC BLOOD PRESSURE: 55 MMHG | SYSTOLIC BLOOD PRESSURE: 118 MMHG | TEMPERATURE: 98.4 F | BODY MASS INDEX: 21.84 KG/M2 | OXYGEN SATURATION: 97 % | WEIGHT: 147.5 LBS | RESPIRATION RATE: 18 BRPM | HEART RATE: 80 BPM

## 2023-04-18 LAB
ANION GAP SERPL CALCULATED.3IONS-SCNC: 7.2 MMOL/L (ref 5–15)
BASOPHILS # BLD AUTO: 0.02 10*3/MM3 (ref 0–0.2)
BASOPHILS NFR BLD AUTO: 0.4 % (ref 0–1.5)
BUN SERPL-MCNC: 13 MG/DL (ref 8–23)
BUN/CREAT SERPL: 8.8 (ref 7–25)
CALCIUM SPEC-SCNC: 9.8 MG/DL (ref 8.6–10.5)
CHLORIDE SERPL-SCNC: 112 MMOL/L (ref 98–107)
CO2 SERPL-SCNC: 25.8 MMOL/L (ref 22–29)
CREAT SERPL-MCNC: 1.47 MG/DL (ref 0.76–1.27)
DEPRECATED RDW RBC AUTO: 50.6 FL (ref 37–54)
EGFRCR SERPLBLD CKD-EPI 2021: 49.7 ML/MIN/1.73
EOSINOPHIL # BLD AUTO: 0.06 10*3/MM3 (ref 0–0.4)
EOSINOPHIL NFR BLD AUTO: 1.3 % (ref 0.3–6.2)
ERYTHROCYTE [DISTWIDTH] IN BLOOD BY AUTOMATED COUNT: 14.6 % (ref 12.3–15.4)
GLUCOSE SERPL-MCNC: 103 MG/DL (ref 65–99)
HCT VFR BLD AUTO: 23.3 % (ref 37.5–51)
HGB BLD-MCNC: 7.7 G/DL (ref 13–17.7)
IMM GRANULOCYTES # BLD AUTO: 0.02 10*3/MM3 (ref 0–0.05)
IMM GRANULOCYTES NFR BLD AUTO: 0.4 % (ref 0–0.5)
LYMPHOCYTES # BLD AUTO: 1.17 10*3/MM3 (ref 0.7–3.1)
LYMPHOCYTES NFR BLD AUTO: 25.2 % (ref 19.6–45.3)
MAGNESIUM SERPL-MCNC: 2 MG/DL (ref 1.6–2.4)
MCH RBC QN AUTO: 31.4 PG (ref 26.6–33)
MCHC RBC AUTO-ENTMCNC: 33 G/DL (ref 31.5–35.7)
MCV RBC AUTO: 95.1 FL (ref 79–97)
MONOCYTES # BLD AUTO: 0.61 10*3/MM3 (ref 0.1–0.9)
MONOCYTES NFR BLD AUTO: 13.1 % (ref 5–12)
NEUTROPHILS NFR BLD AUTO: 2.77 10*3/MM3 (ref 1.7–7)
NEUTROPHILS NFR BLD AUTO: 59.6 % (ref 42.7–76)
NRBC BLD AUTO-RTO: 0 /100 WBC (ref 0–0.2)
PLATELET # BLD AUTO: 238 10*3/MM3 (ref 140–450)
PMV BLD AUTO: 9.4 FL (ref 6–12)
POTASSIUM SERPL-SCNC: 3.9 MMOL/L (ref 3.5–5.2)
RBC # BLD AUTO: 2.45 10*6/MM3 (ref 4.14–5.8)
SODIUM SERPL-SCNC: 145 MMOL/L (ref 136–145)
WBC NRBC COR # BLD: 4.65 10*3/MM3 (ref 3.4–10.8)

## 2023-04-18 PROCEDURE — 80048 BASIC METABOLIC PNL TOTAL CA: CPT | Performed by: HOSPITALIST

## 2023-04-18 PROCEDURE — 85025 COMPLETE CBC W/AUTO DIFF WBC: CPT | Performed by: HOSPITALIST

## 2023-04-18 PROCEDURE — 83735 ASSAY OF MAGNESIUM: CPT | Performed by: INTERNAL MEDICINE

## 2023-04-18 RX ORDER — THIAMINE MONONITRATE (VIT B1) 100 MG
100 TABLET ORAL DAILY
Qty: 30 TABLET | Refills: 0 | Status: SHIPPED | OUTPATIENT
Start: 2023-04-19 | End: 2023-05-19

## 2023-04-18 RX ORDER — FOLIC ACID 1 MG/1
1 TABLET ORAL DAILY
Qty: 30 TABLET | Refills: 0 | Status: SHIPPED | OUTPATIENT
Start: 2023-04-19 | End: 2023-05-19

## 2023-04-18 RX ORDER — ATENOLOL 25 MG/1
25 TABLET ORAL
Qty: 30 TABLET | Refills: 0 | Status: SHIPPED | OUTPATIENT
Start: 2023-04-19 | End: 2023-05-19

## 2023-04-18 RX ORDER — HYDROXYZINE HYDROCHLORIDE 25 MG/1
25 TABLET, FILM COATED ORAL 3 TIMES DAILY PRN
Status: DISCONTINUED | OUTPATIENT
Start: 2023-04-18 | End: 2023-04-18 | Stop reason: HOSPADM

## 2023-04-18 RX ORDER — DIPHENOXYLATE HYDROCHLORIDE AND ATROPINE SULFATE 2.5; .025 MG/1; MG/1
1 TABLET ORAL DAILY
Qty: 30 TABLET | Refills: 0 | Status: SHIPPED | OUTPATIENT
Start: 2023-04-19 | End: 2023-05-19

## 2023-04-18 RX ADMIN — PANTOPRAZOLE SODIUM 40 MG: 40 TABLET, DELAYED RELEASE ORAL at 08:30

## 2023-04-18 RX ADMIN — ATENOLOL 25 MG: 25 TABLET ORAL at 08:30

## 2023-04-18 RX ADMIN — Medication 1 MG: at 08:30

## 2023-04-18 RX ADMIN — Medication 1 TABLET: at 08:30

## 2023-04-18 RX ADMIN — Medication 100 MG: at 08:30

## 2023-04-18 RX ADMIN — HYDROXYZINE HYDROCHLORIDE 25 MG: 25 TABLET ORAL at 09:52

## 2023-04-18 NOTE — PROGRESS NOTES
[facility] Progress Note      Patient Name: Yuval Loja  : 1950  MRN: [id number]  Primary Care Physician:  Alessia Prescott APRN  Date of admission: 4/10/2023    Subjective     Interval History:   Appetite is mediocre, but no N/V  No shortness of breath on room air; no chest pain  Eager to go home    Review of Systems:   As noted above    Objective     Vitals:   Temp:  [97.7 °F (36.5 °C)-99.3 °F (37.4 °C)] 97.7 °F (36.5 °C)  Heart Rate:  [76-87] 76  Resp:  [18-20] 18  BP: (116-135)/(60-74) 131/60    Intake/Output Summary (Last 24 hours) at 2023 1458  Last data filed at 2023 1149  Gross per 24 hour   Intake 120 ml   Output 575 ml   Net -455 ml       Physical Exam:    Constitutional: Awake, alert, NAD; chronically ill  HEENT: Sclera anicteric, no conjunctival injection  Neck: Supple, no carotid bruit, trachea at midline, no JVD  Respiratory: Clear anteriorly, nonlabored on RA  Cardiovascular: RRR  Gastrointestinal: BS +, soft, nontender and nondistended  : No palpable bladder  Musculoskeletal:  No significant edema; no clubbing or cyanosis  Psychiatric:  Awake, appropriate  Neurologic:  Moves all extremities  Skin: Warm and dry    Scheduled Meds:     atenolol, 25 mg, Oral, Q24H  enoxaparin, 40 mg, Subcutaneous, Q24H  folic acid, 1 mg, Nasogastric, Daily  multivitamin, 1 tablet, Oral, Daily  pantoprazole, 40 mg, Oral, Q AM  senna-docusate sodium, 2 tablet, Oral, BID  thiamine, 100 mg, Oral, Daily      IV Meds:        Results Reviewed:   I have personally reviewed the results from the time of this admission to 2023 14:58 EDT     Results from last 7 days   Lab Units 23  1027 23  0500 23  0457 04/15/23  0621 23  0346   SODIUM mmol/L 145 145 146* 144 142   POTASSIUM mmol/L 3.9 3.5 3.8 4.1 3.8   CHLORIDE mmol/L 112* 108* 110* 109* 108*   CO2 mmol/L 25.8 23.5 26.0 26.6 27.0   BUN mg/dL 13 13 14 15 20   CREATININE mg/dL 1.47* 1.42* 1.30* 1.41*  1.68*   CALCIUM mg/dL 9.8 10.0 9.3 9.7 8.4*   BILIRUBIN mg/dL  --   --  0.4 0.4 0.4   ALK PHOS U/L  --   --  104 110 143*   ALT (SGPT) U/L  --   --  117* 179* 266*   AST (SGOT) U/L  --   --  35 56* 149*   GLUCOSE mg/dL 103* 106* 112* 116* 115*       Estimated Creatinine Clearance: 41.7 mL/min (A) (by C-G formula based on SCr of 1.47 mg/dL (H)).    Results from last 7 days   Lab Units 04/18/23  1027 04/15/23  0621 04/13/23  0355   MAGNESIUM mg/dL 2.0 1.9 2.0   PHOSPHORUS mg/dL  --  2.8  --              Results from last 7 days   Lab Units 04/18/23  1027 04/17/23  0500 04/16/23  0457 04/15/23  0621 04/14/23  0346   WBC 10*3/mm3 4.65 5.38 5.74 7.25 7.71   HEMOGLOBIN g/dL 7.7* 9.1* 8.0* 8.1* 7.9*   PLATELETS 10*3/mm3 238 223 174 157 159             Assessment / Plan     ASSESSMENT:  1.  CRISTIANO on CKD2, nonoliguric, and back to baseline: CRISTIANO likely prerenal due to hypotension, renal hypoperfusion due to complete heart block, and cardiogenic shock.   Looks euvolemic by exam; electrolytes fine.  Urinalysis on arrival fairly bland with no RBCs and only 30 mg/dL protein in a concentrated specimen  2.  Respiratory failure, resolved; extubated 4/13  3.  Complete heart block, with temporary need for transvenous pacer, now back to NSR  4.  Alcoholism  5.  Anemia  6.  Transaminitis, likely combo of alcohol intoxication and ischemia    PLAN:  1.  Discharge anytime from renal view  2.  Will arrange follow-up in our office    Thank you for involving us in the care of Yuval Loja.  Please feel free to call with any questions.    Thomas Weiner MD  04/18/23  14:58 EDT    [facility]  [phone]    Please note that portions of this note were completed with a voice recognition program.

## 2023-04-18 NOTE — CASE MANAGEMENT/SOCIAL WORK
Continued Stay Note  [facility]     Patient Name: Yuval Loja  MRN: [id number]  Today's Date: 4/18/2023    Admit Date: 4/10/2023    Plan: [facility]/ [facility]   Discharge Plan     Row Name 04/18/23 1742       Plan    Plan Home w/ [facility]    Plan Comments [facility] accepted and will follow Pt at home.  Pt daughter Torri will transport Pt home.  CCP following..........Marcy RICE/KENDELL CM    Row Name 04/18/23 1542       Plan    Plan Home w/ HH (referrals pending)    Plan Comments Pt ambulating 80 feet with P.T. Highly unlikely his medicare advantage plan would approve for skilled rehab.  S/W Pt and he is refusing to go to SNF anyway.  CCP spoke wth Torri/daughter ([phone]) to give her an update.  Torri is requesting HH follow Pt at [place].  [facility] has sent out HH referrals.  Torri advised she would transport Pt home at d/c.  CCP following.........Marcy RICE/KENDELL CM               Discharge Codes    No documentation.               Expected Discharge Date and Time     Expected Discharge Date Expected Discharge Time    Apr 18, 2023             Marcy Marshall RN

## 2023-04-18 NOTE — DISCHARGE SUMMARY
Discharge summary    Date of admission 4/10/2023  Date of discharge 4/18/2023    Final diagnosis  Acute hypoxic respiratory failure  Acute kidney injury resolved  Status post hypotensive shock  Complete heart block status post temporary pacemaker  Hyponatremia resolved  Alcohol hepatitis resolved  History of paroxysmal atrial fibrillation  Coronary artery disease status post PCI  Hyperlipidemia  Alcohol abuse  Tobacco abuse  History of prior CVA  Chronic kidney disease stage III  Gastroesophageal reflux disease    Discharge medications    Current Facility-Administered Medications:   •  atenolol (TENORMIN) tablet 25 mg, 25 mg, Oral, Q24H, Paul Schwarz MD, 25 mg at 04/18/23 0830  •  Enoxaparin Sodium (LOVENOX) syringe 40 mg, 40 mg, Subcutaneous, Q24H, Pavan Saucedo Jr., MD, 40 mg at 04/17/23 1826  •  folic acid (FOLVITE) tablet 1 mg, 1 mg, Nasogastric, Daily, Pavan Saucedo Jr., MD, 1 mg at 04/18/23 0830  •  hydrOXYzine (ATARAX) tablet 25 mg, 25 mg, Oral, TID PRN, Chino Cole MD, 25 mg at 04/18/23 0952  •  multivitamin (THERAGRAN) tablet 1 tablet, 1 tablet, Oral, Daily, Chino Cole MD, 1 tablet at 04/18/23 0830  •  pantoprazole (PROTONIX) EC tablet 40 mg, 40 mg, Oral, Q AM, Chino Cole MD, 40 mg at 04/18/23 0830  •  sennosides-docusate (PERICOLACE) 8.6-50 MG per tablet 2 tablet, 2 tablet, Oral, BID, 2 tablet at 04/16/23 1947 **AND** [DISCONTINUED] polyethylene glycol (MIRALAX) packet 17 g, 17 g, Oral, Daily PRN **AND** [DISCONTINUED] bisacodyl (DULCOLAX) EC tablet 5 mg, 5 mg, Oral, Daily PRN **AND** [DISCONTINUED] bisacodyl (DULCOLAX) suppository 10 mg, 10 mg, Rectal, Daily PRN, Pavan Saucedo Jr., MD  •  sodium chloride 0.9 % flush 10 mL, 10 mL, Intravenous, PRN, Pavan Saucedo Jr., MD  •  thiamine (VITAMIN B-1) tablet 100 mg, 100 mg, Oral, Daily, Chino Cole MD, 100 mg at 04/18/23 0830     Consults obtained  Cardiology  Pulmonary  Nephrology  Nutrition    Procedures  Temporary  pacemaker placement and removal  Intubation and extubation    Hospital course  72-year-old -American male with multiple medical problems and well-known to our service admitted through emergency room with right hip pain.  Patient work-up in ER revealed acute kidney injury and also found to be hyponatremic and sinus bradycardia.  Patient admitted and as he was going to his room dropped his blood pressure and heart rate and went into complete heart block and taken to the Cath Lab and provided with temporary pacemaker and also provided with vent support.  Patient remain on vent which is weaned off and he remained on ICU care.  Patient improved gradually and transfer out from the unit as he got extubated and cardiology recommend no need for permanent pacemaker and his beta-blocker restarted with a small dose.  Patient remain on detox and is improving and initially wanted to go to subacute rehab but he is doing so well and does not qualify for subacute rehab and will go home with family support and home health which is arranged.    Discharge diet regular    Activity as tolerated    Medication as above    Follow-up with primary doctor in 1 week and follow-up with cardiology and nephrology per the instructions and take medication as directed.    JOLIE BUNDY MD

## 2023-04-18 NOTE — OUTREACH NOTE
Prep Survey    Flowsheet Row Responses   [facility] facility patient discharged from? [facility]   Is LACE score < 7 ? No   Eligibility Readm Mgmt   Discharge diagnosis Sinus bradycardia,   complete heart block,   heart cath   Does the patient have one of the following disease processes/diagnoses(primary or secondary)? Other   Does the patient have Home health ordered? Yes   What is the [facility] health agency?  [facility]    Is there a DME ordered? No   Prep survey completed? Yes          Samara NAJERA - Registered Nurse

## 2023-04-18 NOTE — PROGRESS NOTES
"Daily progress note    Primary care physician      Chief complaint  Doing same with no new complaints     History of present illness  72-year-old -American male with history of coronary artery disease hypertension hyperlipidemia chronic anemia chronic kidney disease stage III and gastroesophageal reflux disease who also abuse alcohol and tobacco presented to [facility] emergency room after mechanical fall when he tripped and fell on the right hip with complaint of right hip pain.  Patient denies any headache dizziness lightheadedness chest pain shortness of breath abdominal pain nausea vomiting diarrhea.  Patient evaluated in ER admitted to the floor with acute kidney injury and also found to have sinus bradycardia with hyponatremia.  Patient admitted and on the floor his blood pressure dropped and more bradycardic and rapid response called.  Patient transferred to the unit intubated and provided with supportive care and taken to the Cath Lab for temporary pacemaker placement     REVIEW OF SYSTEMS  Unremarkable except generalized weakness     PHYSICAL EXAM   Blood pressure 118/55, pulse 80, temperature 98.4 °F (36.9 °C), temperature source Oral, resp. rate 18, height 175 cm (68.9\"), weight 66.9 kg (147 lb 8 oz), SpO2 97 %.    General: Sleepy but arousable  HEENT: Mucous membranes moist, atraumatic,  Neck: Supple  Pulm: Moving air bilaterally  Cardiovascular: S1-S2 paced   GI: Soft, nontender, nondistended, no rebound, no guarding, bowel sounds present  MSK: Full ROM, no deformity  Skin: Warm, dry  Psych: Calm, cooperative     LAB RESULTS  Lab Results (last 24 hours)     Procedure Component Value Units Date/Time    Magnesium [[id number]]  (Normal) Collected: 04/18/23 1027    Specimen: Blood Updated: 04/18/23 1132     Magnesium 2.0 mg/dL     Basic Metabolic Panel [[id number]]  (Abnormal) Collected: 04/18/23 1027    Specimen: Blood Updated: 04/18/23 1132     Glucose 103 mg/dL      BUN 13 mg/dL  "     Creatinine 1.47 mg/dL      Sodium 145 mmol/L      Potassium 3.9 mmol/L      Chloride 112 mmol/L      CO2 25.8 mmol/L      Calcium 9.8 mg/dL      BUN/Creatinine Ratio 8.8     Anion Gap 7.2 mmol/L      eGFR 49.7 mL/min/1.73     Narrative:      GFR Normal >60  Chronic Kidney Disease <60  Kidney Failure <15    The GFR formula is only valid for adults with stable renal function between ages 18 and 70.    CBC & Differential [[id number]]  (Abnormal) Collected: 04/18/23 1027    Specimen: Blood Updated: 04/18/23 1113    Narrative:      The following orders were created for panel order CBC & Differential.  Procedure                               Abnormality         Status                     ---------                               -----------         ------                     CBC Auto Differential[[id number]]        Abnormal            Final result                 Please view results for these tests on the individual orders.    CBC Auto Differential [[id number]]  (Abnormal) Collected: 04/18/23 1027    Specimen: Blood Updated: 04/18/23 1113     WBC 4.65 10*3/mm3      RBC 2.45 10*6/mm3      Hemoglobin 7.7 g/dL      Hematocrit 23.3 %      MCV 95.1 fL      MCH 31.4 pg      MCHC 33.0 g/dL      RDW 14.6 %      RDW-SD 50.6 fl      MPV 9.4 fL      Platelets 238 10*3/mm3      Neutrophil % 59.6 %      Lymphocyte % 25.2 %      Monocyte % 13.1 %      Eosinophil % 1.3 %      Basophil % 0.4 %      Immature Grans % 0.4 %      Neutrophils, Absolute 2.77 10*3/mm3      Lymphocytes, Absolute 1.17 10*3/mm3      Monocytes, Absolute 0.61 10*3/mm3      Eosinophils, Absolute 0.06 10*3/mm3      Basophils, Absolute 0.02 10*3/mm3      Immature Grans, Absolute 0.02 10*3/mm3      nRBC 0.0 /100 WBC           Imaging Results (Last 24 Hours)     ** No results found for the last 24 hours. **           ECG 12 Lead            Component  Ref Range & Units 15:25  (4/10/23) 09:53  (4/10/23) 8 mo ago  (7/19/22) 8 mo ago  (7/19/22) 9 mo ago  (6/25/22) 1 yr  ago  (3/15/22)   QT Interval  ms 610 P  582 P  387  390  398  348    Resulting Agency BH ECG BH ECG BH ECG BH ECG BH ECG BH ECG             HEART RATE= 30  bpm  RR Interval= 2000  ms  RI Interval= 197  ms  P Horizontal Axis=   deg  P Front Axis= 0  deg  QRSD Interval= 115  ms  QT Interval= 610  ms  QRS Axis= 55  deg  T Wave Axis= 19  deg  - ABNORMAL ECG -  Sinus bradycardia  Atrial premature complexes  Nonspecific intraventricular conduction delay             Current Facility-Administered Medications:   •  atenolol (TENORMIN) tablet 25 mg, 25 mg, Oral, Q24H, Paul Schwarz MD, 25 mg at 04/18/23 0830  •  Enoxaparin Sodium (LOVENOX) syringe 40 mg, 40 mg, Subcutaneous, Q24H, Pavan Saucedo Jr., MD, 40 mg at 04/17/23 1826  •  folic acid (FOLVITE) tablet 1 mg, 1 mg, Nasogastric, Daily, Pavan Saucedo Jr., MD, 1 mg at 04/18/23 0830  •  hydrOXYzine (ATARAX) tablet 25 mg, 25 mg, Oral, TID PRN, Chino Cole MD, 25 mg at 04/18/23 0952  •  multivitamin (THERAGRAN) tablet 1 tablet, 1 tablet, Oral, Daily, Chino Cole MD, 1 tablet at 04/18/23 0830  •  pantoprazole (PROTONIX) EC tablet 40 mg, 40 mg, Oral, Q AM, Chino Cole MD, 40 mg at 04/18/23 0830  •  sennosides-docusate (PERICOLACE) 8.6-50 MG per tablet 2 tablet, 2 tablet, Oral, BID, 2 tablet at 04/16/23 1947 **AND** [DISCONTINUED] polyethylene glycol (MIRALAX) packet 17 g, 17 g, Oral, Daily PRN **AND** [DISCONTINUED] bisacodyl (DULCOLAX) EC tablet 5 mg, 5 mg, Oral, Daily PRN **AND** [DISCONTINUED] bisacodyl (DULCOLAX) suppository 10 mg, 10 mg, Rectal, Daily PRN, Pavan Saucedo Jr., MD  •  sodium chloride 0.9 % flush 10 mL, 10 mL, Intravenous, PRN, Pavan Saucedo Jr., MD  •  thiamine (VITAMIN B-1) tablet 100 mg, 100 mg, Oral, Daily, Chino Cole MD, 100 mg at 04/18/23 0830     ASSESSMENT  Acute hypoxic respiratory failure  Acute kidney injury resolved  Status post hypotensive shock  Complete heart block status post temporary  pacemaker  Hyponatremia  Alcohol hepatitis  History of paroxysmal atrial fibrillation  Coronary artery disease status post PCI  History of hypertension with low blood pressure  Hyperlipidemia  Alcohol abuse  Tobacco abuse  History of prior CVA  Chronic kidney disease stage III  Gastroesophageal reflux disease    PLAN  Discharge home   Discharge summary dictated      JOLIE BUNDY MD    Copied text in this note has been reviewed and is accurate as of 04/18/23

## 2023-04-18 NOTE — CASE MANAGEMENT/SOCIAL WORK
Continued Stay Note  [facility]     Patient Name: Yuval Loja  MRN: [id number]  Today's Date: 4/18/2023    Admit Date: 4/10/2023    Plan: Home w/ HH (referrals pending)   Discharge Plan     Row Name 04/18/23 1542       Plan    Plan Home w/ HH (referrals pending)    Plan Comments Pt ambulating 80 feet with P.T. Highly unlikely his medicare advantage plan would approve for skilled rehab.  S/W Pt and he is refusing to go to SNF anyway.  [facility] spoke [facility] Torri/daughter ([phone]) to give her an update.  Torri is requesting HH follow Pt at [place].  [facility] has sent out HH referrals.  Torri advised she would transport Pt home at d/c.  CCP following.........Marcy RICE/KENDELL CM               Discharge Codes    No documentation.               Expected Discharge Date and Time     Expected Discharge Date Expected Discharge Time    Apr 18, 2023             Marcy Marshall RN

## 2023-04-18 NOTE — CASE MANAGEMENT/SOCIAL WORK
Case Management Discharge Note      Final Note: Home w/ [facility] to follow.......SRS/RN CM         Selected Continued Care - Discharged on 4/18/2023 Admission date: 4/10/2023 - Discharge disposition: Home or Self Care    Destination    No services have been selected for the patient.              Durable Medical Equipment    No services have been selected for the patient.              Dialysis/Infusion    No services have been selected for the patient.              Home Medical Care Coordination complete.    Service Provider Selected Services Address Phone Fax Patient Preferred    [facility] [address] [place] [address], [facility] [address] [address] [phone] --          Therapy    No services have been selected for the patient.              Community Resources    No services have been selected for the patient.              Community & DME    No services have been selected for the patient.                       Final Discharge Disposition Code: 06 - home with home health care

## 2023-04-20 ENCOUNTER — READMISSION MANAGEMENT (OUTPATIENT)
Dept: CALL CENTER | Facility: HOSPITAL | Age: 73
End: 2023-04-20
Payer: MEDICARE

## 2023-04-20 NOTE — OUTREACH NOTE
Medical Week 1 Survey    Flowsheet Row Responses   [facility] patient discharged from? [place]   Does the patient have one of the following disease processes/diagnoses(primary or secondary)? Other   Week 1 attempt successful? No   Unsuccessful attempts Attempt 1          Alexandra Jones Registered Nurse

## 2023-04-27 ENCOUNTER — READMISSION MANAGEMENT (OUTPATIENT)
Dept: CALL CENTER | Facility: HOSPITAL | Age: 73
End: 2023-04-27
Payer: MEDICARE

## 2023-04-27 NOTE — OUTREACH NOTE
Medical Week 2 Survey    Flowsheet Row Responses   [facility] patient discharged from? [place]   Does the patient have one of the following disease processes/diagnoses(primary or secondary)? Other   Week 2 attempt successful? No   Unsuccessful attempts Attempt 1          DEAN DAVIS - Registered Nurse

## 2023-05-01 ENCOUNTER — READMISSION MANAGEMENT (OUTPATIENT)
Dept: CALL CENTER | Facility: HOSPITAL | Age: 73
End: 2023-05-01
Payer: MEDICARE

## 2023-05-01 NOTE — OUTREACH NOTE
Medical Week 2 Survey    Flowsheet Row Responses   [facility] patient discharged from? [place]   Does the patient have one of the following disease processes/diagnoses(primary or secondary)? Other   Week 2 attempt successful? No   Unsuccessful attempts Attempt 2          Alise BURGOS - Registered Nurse

## 2023-05-09 ENCOUNTER — READMISSION MANAGEMENT (OUTPATIENT)
Dept: CALL CENTER | Facility: HOSPITAL | Age: 73
End: 2023-05-09
Payer: MEDICARE

## 2023-05-09 NOTE — OUTREACH NOTE
Medical Week 3 Survey    Flowsheet Row Responses   [facility] patient discharged from? [place]   Does the patient have one of the following disease processes/diagnoses(primary or secondary)? Other   Week 3 attempt successful? No   Unsuccessful attempts Attempt 1          Amita BRADLEY - Registered Nurse

## 2023-06-01 ENCOUNTER — HOSPITAL ENCOUNTER (INPATIENT)
Facility: HOSPITAL | Age: 73
LOS: 6 days | Discharge: NURSING, REHAB OR LONG-TERM CARE | End: 2023-06-08
Attending: EMERGENCY MEDICINE | Admitting: HOSPITALIST
Payer: MEDICARE

## 2023-06-01 ENCOUNTER — APPOINTMENT (OUTPATIENT)
Dept: CT IMAGING | Facility: HOSPITAL | Age: 73
End: 2023-06-01
Payer: MEDICARE

## 2023-06-01 DIAGNOSIS — R10.84 GENERALIZED ABDOMINAL PAIN: Primary | ICD-10-CM

## 2023-06-01 DIAGNOSIS — R11.2 NAUSEA AND VOMITING, UNSPECIFIED VOMITING TYPE: ICD-10-CM

## 2023-06-01 DIAGNOSIS — N17.9 AKI (ACUTE KIDNEY INJURY): ICD-10-CM

## 2023-06-01 LAB
ALBUMIN SERPL-MCNC: 4.6 G/DL (ref 3.5–5.2)
ALBUMIN/GLOB SERPL: 1.2 G/DL
ALP SERPL-CCNC: 96 U/L (ref 39–117)
ALT SERPL W P-5'-P-CCNC: 9 U/L (ref 1–41)
ANION GAP SERPL CALCULATED.3IONS-SCNC: 23.3 MMOL/L (ref 5–15)
ANION GAP SERPL CALCULATED.3IONS-SCNC: 29 MMOL/L (ref 5–15)
APTT PPP: 38.4 SECONDS (ref 22.7–35.4)
AST SERPL-CCNC: 42 U/L (ref 1–40)
BASOPHILS # BLD AUTO: 0.03 10*3/MM3 (ref 0–0.2)
BASOPHILS NFR BLD AUTO: 0.5 % (ref 0–1.5)
BILIRUB SERPL-MCNC: 0.2 MG/DL (ref 0–1.2)
BUN SERPL-MCNC: 20 MG/DL (ref 8–23)
BUN SERPL-MCNC: 22 MG/DL (ref 8–23)
BUN/CREAT SERPL: 11.7 (ref 7–25)
BUN/CREAT SERPL: 12 (ref 7–25)
CALCIUM SPEC-SCNC: 10.3 MG/DL (ref 8.6–10.5)
CALCIUM SPEC-SCNC: 9.5 MG/DL (ref 8.6–10.5)
CHLORIDE SERPL-SCNC: 102 MMOL/L (ref 98–107)
CHLORIDE SERPL-SCNC: 99 MMOL/L (ref 98–107)
CO2 SERPL-SCNC: 14 MMOL/L (ref 22–29)
CO2 SERPL-SCNC: 15.7 MMOL/L (ref 22–29)
CREAT SERPL-MCNC: 1.71 MG/DL (ref 0.76–1.27)
CREAT SERPL-MCNC: 1.83 MG/DL (ref 0.76–1.27)
DEPRECATED RDW RBC AUTO: 43.4 FL (ref 37–54)
EGFRCR SERPLBLD CKD-EPI 2021: 38.2 ML/MIN/1.73
EGFRCR SERPLBLD CKD-EPI 2021: 41.5 ML/MIN/1.73
EOSINOPHIL # BLD AUTO: 0 10*3/MM3 (ref 0–0.4)
EOSINOPHIL NFR BLD AUTO: 0 % (ref 0.3–6.2)
ERYTHROCYTE [DISTWIDTH] IN BLOOD BY AUTOMATED COUNT: 12.1 % (ref 12.3–15.4)
ETHANOL BLD-MCNC: <10 MG/DL (ref 0–10)
ETHANOL UR QL: <0.01 %
GLOBULIN UR ELPH-MCNC: 3.7 GM/DL
GLUCOSE SERPL-MCNC: 100 MG/DL (ref 65–99)
GLUCOSE SERPL-MCNC: 103 MG/DL (ref 65–99)
HCT VFR BLD AUTO: 38.2 % (ref 37.5–51)
HGB BLD-MCNC: 12.1 G/DL (ref 13–17.7)
IMM GRANULOCYTES # BLD AUTO: 0.03 10*3/MM3 (ref 0–0.05)
IMM GRANULOCYTES NFR BLD AUTO: 0.5 % (ref 0–0.5)
INR PPP: 1.1 (ref 0.9–1.1)
LIPASE SERPL-CCNC: 49 U/L (ref 13–60)
LYMPHOCYTES # BLD AUTO: 0.39 10*3/MM3 (ref 0.7–3.1)
LYMPHOCYTES NFR BLD AUTO: 6.1 % (ref 19.6–45.3)
MAGNESIUM SERPL-MCNC: 2.1 MG/DL (ref 1.6–2.4)
MCH RBC QN AUTO: 30.7 PG (ref 26.6–33)
MCHC RBC AUTO-ENTMCNC: 31.7 G/DL (ref 31.5–35.7)
MCV RBC AUTO: 97 FL (ref 79–97)
MONOCYTES # BLD AUTO: 0.24 10*3/MM3 (ref 0.1–0.9)
MONOCYTES NFR BLD AUTO: 3.8 % (ref 5–12)
NEUTROPHILS NFR BLD AUTO: 5.71 10*3/MM3 (ref 1.7–7)
NEUTROPHILS NFR BLD AUTO: 89.1 % (ref 42.7–76)
NRBC BLD AUTO-RTO: 0 /100 WBC (ref 0–0.2)
NT-PROBNP SERPL-MCNC: 1327 PG/ML (ref 0–900)
PLATELET # BLD AUTO: 316 10*3/MM3 (ref 140–450)
PMV BLD AUTO: 10 FL (ref 6–12)
POTASSIUM SERPL-SCNC: 4.3 MMOL/L (ref 3.5–5.2)
POTASSIUM SERPL-SCNC: 4.5 MMOL/L (ref 3.5–5.2)
PROT SERPL-MCNC: 8.3 G/DL (ref 6–8.5)
PROTHROMBIN TIME: 14.3 SECONDS (ref 11.7–14.2)
QT INTERVAL: 361 MS
RBC # BLD AUTO: 3.94 10*6/MM3 (ref 4.14–5.8)
SODIUM SERPL-SCNC: 141 MMOL/L (ref 136–145)
SODIUM SERPL-SCNC: 142 MMOL/L (ref 136–145)
TROPONIN T SERPL HS-MCNC: 27 NG/L
TROPONIN T SERPL HS-MCNC: 33 NG/L
WBC NRBC COR # BLD: 6.4 10*3/MM3 (ref 3.4–10.8)

## 2023-06-01 PROCEDURE — 25010000002 ONDANSETRON PER 1 MG: Performed by: EMERGENCY MEDICINE

## 2023-06-01 PROCEDURE — 93005 ELECTROCARDIOGRAM TRACING: CPT | Performed by: HOSPITALIST

## 2023-06-01 PROCEDURE — 83690 ASSAY OF LIPASE: CPT | Performed by: EMERGENCY MEDICINE

## 2023-06-01 PROCEDURE — G0378 HOSPITAL OBSERVATION PER HR: HCPCS

## 2023-06-01 PROCEDURE — 83880 ASSAY OF NATRIURETIC PEPTIDE: CPT | Performed by: EMERGENCY MEDICINE

## 2023-06-01 PROCEDURE — 80053 COMPREHEN METABOLIC PANEL: CPT | Performed by: EMERGENCY MEDICINE

## 2023-06-01 PROCEDURE — 93010 ELECTROCARDIOGRAM REPORT: CPT | Performed by: INTERNAL MEDICINE

## 2023-06-01 PROCEDURE — 84484 ASSAY OF TROPONIN QUANT: CPT | Performed by: PHYSICIAN ASSISTANT

## 2023-06-01 PROCEDURE — 85730 THROMBOPLASTIN TIME PARTIAL: CPT | Performed by: INTERNAL MEDICINE

## 2023-06-01 PROCEDURE — 25010000002 MORPHINE PER 10 MG: Performed by: EMERGENCY MEDICINE

## 2023-06-01 PROCEDURE — 25010000002 DIGOXIN PER 500 MCG: Performed by: INTERNAL MEDICINE

## 2023-06-01 PROCEDURE — 99285 EMERGENCY DEPT VISIT HI MDM: CPT

## 2023-06-01 PROCEDURE — 99223 1ST HOSP IP/OBS HIGH 75: CPT | Performed by: INTERNAL MEDICINE

## 2023-06-01 PROCEDURE — 85025 COMPLETE CBC W/AUTO DIFF WBC: CPT | Performed by: EMERGENCY MEDICINE

## 2023-06-01 PROCEDURE — 25010000002 ONDANSETRON PER 1 MG: Performed by: HOSPITALIST

## 2023-06-01 PROCEDURE — 83735 ASSAY OF MAGNESIUM: CPT | Performed by: EMERGENCY MEDICINE

## 2023-06-01 PROCEDURE — 85610 PROTHROMBIN TIME: CPT | Performed by: INTERNAL MEDICINE

## 2023-06-01 PROCEDURE — 82077 ASSAY SPEC XCP UR&BREATH IA: CPT | Performed by: EMERGENCY MEDICINE

## 2023-06-01 PROCEDURE — 25010000002 DICYCLOMINE PER 20 MG: Performed by: EMERGENCY MEDICINE

## 2023-06-01 PROCEDURE — 84484 ASSAY OF TROPONIN QUANT: CPT | Performed by: EMERGENCY MEDICINE

## 2023-06-01 PROCEDURE — 93005 ELECTROCARDIOGRAM TRACING: CPT | Performed by: EMERGENCY MEDICINE

## 2023-06-01 PROCEDURE — 25010000002 LORAZEPAM PER 2 MG: Performed by: INTERNAL MEDICINE

## 2023-06-01 PROCEDURE — 25010000002 HEPARIN (PORCINE) 25000-0.45 UT/250ML-% SOLUTION: Performed by: INTERNAL MEDICINE

## 2023-06-01 PROCEDURE — 74176 CT ABD & PELVIS W/O CONTRAST: CPT

## 2023-06-01 RX ORDER — FAMOTIDINE 20 MG/1
20 TABLET, FILM COATED ORAL 2 TIMES DAILY
Status: DISCONTINUED | OUTPATIENT
Start: 2023-06-01 | End: 2023-06-08 | Stop reason: HOSPADM

## 2023-06-01 RX ORDER — SODIUM CHLORIDE 9 MG/ML
75 INJECTION, SOLUTION INTRAVENOUS CONTINUOUS
Status: DISCONTINUED | OUTPATIENT
Start: 2023-06-01 | End: 2023-06-03

## 2023-06-01 RX ORDER — LORAZEPAM 2 MG/ML
1 INJECTION INTRAMUSCULAR EVERY 6 HOURS PRN
Status: DISCONTINUED | OUTPATIENT
Start: 2023-06-01 | End: 2023-06-07

## 2023-06-01 RX ORDER — DICYCLOMINE HYDROCHLORIDE 10 MG/ML
20 INJECTION INTRAMUSCULAR ONCE
Status: COMPLETED | OUTPATIENT
Start: 2023-06-01 | End: 2023-06-01

## 2023-06-01 RX ORDER — DOCUSATE SODIUM 100 MG/1
100 CAPSULE, LIQUID FILLED ORAL 2 TIMES DAILY
Status: DISCONTINUED | OUTPATIENT
Start: 2023-06-01 | End: 2023-06-01

## 2023-06-01 RX ORDER — HEPARIN SODIUM 10000 [USP'U]/100ML
18 INJECTION, SOLUTION INTRAVENOUS
Status: DISCONTINUED | OUTPATIENT
Start: 2023-06-01 | End: 2023-06-05

## 2023-06-01 RX ORDER — FAMOTIDINE 10 MG/ML
20 INJECTION, SOLUTION INTRAVENOUS ONCE
Status: DISCONTINUED | OUTPATIENT
Start: 2023-06-01 | End: 2023-06-01

## 2023-06-01 RX ORDER — ONDANSETRON 2 MG/ML
4 INJECTION INTRAMUSCULAR; INTRAVENOUS ONCE
Status: COMPLETED | OUTPATIENT
Start: 2023-06-01 | End: 2023-06-01

## 2023-06-01 RX ORDER — ROSUVASTATIN CALCIUM 5 MG/1
5 TABLET, COATED ORAL NIGHTLY
Status: DISCONTINUED | OUTPATIENT
Start: 2023-06-01 | End: 2023-06-08 | Stop reason: HOSPADM

## 2023-06-01 RX ORDER — PANTOPRAZOLE SODIUM 40 MG/1
40 TABLET, DELAYED RELEASE ORAL DAILY
Status: DISCONTINUED | OUTPATIENT
Start: 2023-06-01 | End: 2023-06-01

## 2023-06-01 RX ORDER — SUCRALFATE 1 G/1
1 TABLET ORAL
Status: DISCONTINUED | OUTPATIENT
Start: 2023-06-01 | End: 2023-06-08 | Stop reason: HOSPADM

## 2023-06-01 RX ORDER — AMLODIPINE BESYLATE 10 MG/1
10 TABLET ORAL DAILY
Status: DISCONTINUED | OUTPATIENT
Start: 2023-06-01 | End: 2023-06-03

## 2023-06-01 RX ORDER — SODIUM CHLORIDE 0.9 % (FLUSH) 0.9 %
10 SYRINGE (ML) INJECTION AS NEEDED
Status: DISCONTINUED | OUTPATIENT
Start: 2023-06-01 | End: 2023-06-08 | Stop reason: HOSPADM

## 2023-06-01 RX ORDER — PANTOPRAZOLE SODIUM 40 MG/1
40 TABLET, DELAYED RELEASE ORAL
Status: DISCONTINUED | OUTPATIENT
Start: 2023-06-01 | End: 2023-06-01

## 2023-06-01 RX ORDER — FAMOTIDINE 10 MG/ML
20 INJECTION, SOLUTION INTRAVENOUS ONCE
Status: COMPLETED | OUTPATIENT
Start: 2023-06-01 | End: 2023-06-01

## 2023-06-01 RX ORDER — ROSUVASTATIN CALCIUM 10 MG/1
10 TABLET, COATED ORAL DAILY
Status: DISCONTINUED | OUTPATIENT
Start: 2023-06-01 | End: 2023-06-01

## 2023-06-01 RX ORDER — HYDROXYZINE HYDROCHLORIDE 25 MG/1
25 TABLET, FILM COATED ORAL 4 TIMES DAILY PRN
Status: DISCONTINUED | OUTPATIENT
Start: 2023-06-01 | End: 2023-06-08

## 2023-06-01 RX ORDER — METOPROLOL SUCCINATE 25 MG/1
25 TABLET, EXTENDED RELEASE ORAL 2 TIMES DAILY
Status: DISCONTINUED | OUTPATIENT
Start: 2023-06-01 | End: 2023-06-03

## 2023-06-01 RX ORDER — LORAZEPAM 2 MG/ML
0.25 INJECTION INTRAMUSCULAR EVERY 8 HOURS PRN
Status: DISCONTINUED | OUTPATIENT
Start: 2023-06-01 | End: 2023-06-01

## 2023-06-01 RX ORDER — DIGOXIN 0.25 MG/ML
500 INJECTION INTRAMUSCULAR; INTRAVENOUS ONCE
Status: COMPLETED | OUTPATIENT
Start: 2023-06-01 | End: 2023-06-01

## 2023-06-01 RX ORDER — ONDANSETRON 2 MG/ML
4 INJECTION INTRAMUSCULAR; INTRAVENOUS EVERY 6 HOURS PRN
Status: DISCONTINUED | OUTPATIENT
Start: 2023-06-01 | End: 2023-06-08

## 2023-06-01 RX ORDER — MORPHINE SULFATE 2 MG/ML
4 INJECTION, SOLUTION INTRAMUSCULAR; INTRAVENOUS ONCE
Status: COMPLETED | OUTPATIENT
Start: 2023-06-01 | End: 2023-06-01

## 2023-06-01 RX ORDER — ROSUVASTATIN CALCIUM 10 MG/1
10 TABLET, COATED ORAL DAILY
COMMUNITY

## 2023-06-01 RX ORDER — METOPROLOL SUCCINATE 25 MG/1
25 TABLET, EXTENDED RELEASE ORAL 2 TIMES DAILY
COMMUNITY
End: 2023-06-08 | Stop reason: HOSPADM

## 2023-06-01 RX ORDER — SUCRALFATE ORAL 1 G/10ML
1 SUSPENSION ORAL
COMMUNITY

## 2023-06-01 RX ORDER — HEPARIN SODIUM 5000 [USP'U]/ML
40-80 INJECTION, SOLUTION INTRAVENOUS; SUBCUTANEOUS EVERY 6 HOURS PRN
Status: DISCONTINUED | OUTPATIENT
Start: 2023-06-01 | End: 2023-06-06

## 2023-06-01 RX ORDER — AMLODIPINE BESYLATE 10 MG/1
10 TABLET ORAL DAILY
COMMUNITY
End: 2023-06-08 | Stop reason: HOSPADM

## 2023-06-01 RX ORDER — DICYCLOMINE HYDROCHLORIDE 10 MG/ML
20 INJECTION INTRAMUSCULAR ONCE
Status: DISCONTINUED | OUTPATIENT
Start: 2023-06-01 | End: 2023-06-01

## 2023-06-01 RX ORDER — MORPHINE SULFATE 2 MG/ML
2 INJECTION, SOLUTION INTRAMUSCULAR; INTRAVENOUS EVERY 4 HOURS PRN
Status: DISCONTINUED | OUTPATIENT
Start: 2023-06-01 | End: 2023-06-07

## 2023-06-01 RX ADMIN — ONDANSETRON 4 MG: 2 INJECTION INTRAMUSCULAR; INTRAVENOUS at 05:38

## 2023-06-01 RX ADMIN — SODIUM CHLORIDE 500 ML: 9 INJECTION, SOLUTION INTRAVENOUS at 06:39

## 2023-06-01 RX ADMIN — HEPARIN SODIUM 18 UNITS/KG/HR: 10000 INJECTION, SOLUTION INTRAVENOUS at 18:29

## 2023-06-01 RX ADMIN — ONDANSETRON 4 MG: 2 INJECTION INTRAMUSCULAR; INTRAVENOUS at 12:56

## 2023-06-01 RX ADMIN — ROSUVASTATIN CALCIUM 5 MG: 5 TABLET, FILM COATED ORAL at 22:07

## 2023-06-01 RX ADMIN — FAMOTIDINE 20 MG: 10 INJECTION INTRAVENOUS at 10:12

## 2023-06-01 RX ADMIN — ONDANSETRON 4 MG: 2 INJECTION INTRAMUSCULAR; INTRAVENOUS at 18:58

## 2023-06-01 RX ADMIN — SODIUM CHLORIDE 75 ML/HR: 9 INJECTION, SOLUTION INTRAVENOUS at 15:46

## 2023-06-01 RX ADMIN — LORAZEPAM 0.25 MG: 2 INJECTION INTRAMUSCULAR; INTRAVENOUS at 19:30

## 2023-06-01 RX ADMIN — SODIUM CHLORIDE 500 ML: 9 INJECTION, SOLUTION INTRAVENOUS at 05:30

## 2023-06-01 RX ADMIN — SODIUM CHLORIDE 125 ML/HR: 9 INJECTION, SOLUTION INTRAVENOUS at 08:27

## 2023-06-01 RX ADMIN — SUCRALFATE 1 G: 1 TABLET ORAL at 22:07

## 2023-06-01 RX ADMIN — FAMOTIDINE 20 MG: 20 TABLET, FILM COATED ORAL at 21:39

## 2023-06-01 RX ADMIN — SODIUM CHLORIDE 5 MG/HR: 900 INJECTION, SOLUTION INTRAVENOUS at 18:09

## 2023-06-01 RX ADMIN — DICYCLOMINE HYDROCHLORIDE 20 MG: 20 INJECTION INTRAMUSCULAR at 05:38

## 2023-06-01 RX ADMIN — DIGOXIN 500 MCG: 0.25 INJECTION INTRAMUSCULAR; INTRAVENOUS at 17:30

## 2023-06-01 RX ADMIN — MORPHINE SULFATE 4 MG: 2 INJECTION, SOLUTION INTRAMUSCULAR; INTRAVENOUS at 07:51

## 2023-06-01 NOTE — PLAN OF CARE
Goal Outcome Evaluation:   Pt on room air; sinus tach - afib on monitor; started on cardizem drip; q15 Bps; started on heparin drip; continuous IVFs; pt nauseous and refusing anything by mouth at this time; HR coming down to 100-110s at this time; PRN zofran given for nausea.

## 2023-06-01 NOTE — PROGRESS NOTES
Clinical Pharmacy Services: Medication History    Yuval Loja is a 72 y.o. male presenting to [facility] for   Chief Complaint   Patient presents with   • Abdominal Pain       He  has a past medical history of Alcohol abuse, Arthritis, CAD (coronary artery disease), Chronic kidney disease, stage 3, COPD (chronic obstructive pulmonary disease), Disease of thyroid gland, Elevated cholesterol, Fatty liver, alcoholic, GERD (gastroesophageal reflux disease), History of transfusion, Hypertensive urgency, Metabolic acidosis, Myocardial infarction, Sinus tachycardia, Sleep apnea, and Stroke.    Allergies as of 06/01/2023 - Reviewed 06/01/2023   Allergen Reaction Noted   • Mirtazapine Other (See Comments) 05/14/2020   • Sertraline Anxiety, Diarrhea, and Nausea And Vomiting 10/04/2013       Medication information was obtained from: Patient/Pharmacy  Pharmacy and Phone Number:   [id number] - [place], [place] - [address]  AT [facility] - [phone]  - [phone] FX [address] [facility] [address]  Phone: [phone] Fax: [phone]    [facility] [place]  [address] [place] [id number]  Phone: [phone] Fax: [phone]        Prior to Admission Medications     Prescriptions Last Dose Informant Patient Reported? Taking?    amLODIPine (NORVASC) 10 MG tablet  Self, Pharmacy Yes Yes    Take 1 tablet by mouth Daily.    docusate sodium (COLACE) 100 MG capsule  Medication Bottle Yes Yes    Take 1 capsule by mouth 2 (Two) Times a Day.    hydrOXYzine (ATARAX) 25 MG tablet  Pharmacy, Self Yes Yes    Take 1 tablet by mouth 4 (Four) Times a Day As Needed for Anxiety.    metoprolol succinate XL (TOPROL-XL) 25 MG 24 hr tablet  Self, Pharmacy Yes Yes    Take 1 tablet by mouth 2 (Two) Times a Day.    pantoprazole (PROTONIX) 40 MG EC tablet  Pharmacy, Self Yes Yes    Take 1 tablet by mouth Daily.    rosuvastatin (CRESTOR) 10 MG tablet  Pharmacy,  Self Yes Yes    Take 1 tablet by mouth Daily.    sucralfate (CARAFATE) 1 GM/10ML suspension  Self Yes Yes    Take 10 mL by mouth 4 (Four) Times a Day With Meals & at Bedtime.    thiamine (VITAMIN B-1) 100 MG tablet  tablet  Self, Pharmacy Yes Yes    Take 1 tablet by mouth Daily.            Medication notes: Patient states he has run out of his medications and needs refills on them. Patient also states he does not think the medications he takes are doing him any good.     This medication list is complete to the best of my knowledge as of 6/1/2023    Please call if questions.    Arsenio Munroe  Medication History Technician  [phone]    6/1/2023 10:00 EDT

## 2023-06-01 NOTE — H&P
History and physical    Primary care physician  Dr. Prescott    Chief complaint  Abdominal pain with nausea vomiting diarrhea    History of present illness  72-year-old -American male with history of paroxysmal atrial fibrillation coronary artery disease hyperlipidemia alcohol abuse tobacco abuse and chronic kidney disease stage III who was recently discharged from the hospital after management of hypotensive shock and also found to be in complete heart block which resolved.  Patient presented to [facility] with abdominal pain nausea vomiting diarrhea.  Patient is back to alcohol use but not abusing any more.  Patient denies any fever chills cough congestion chest pain shortness of breath and answer question appropriately.  Patient work-up in ER revealed acute kidney injury secondary to volume depletion admitted for observation.    PAST MEDICAL HISTORY  • Alcohol abuse     • Arthritis     • Disease of thyroid gland     • Elevated cholesterol     • GERD (gastroesophageal reflux disease)     • History of transfusion     • Hypertensive urgency     • Myocardial infarction     • Sleep apnea     • Stroke        PAST SURGICAL HISTORY              Procedure Laterality Date   • BACK SURGERY       • CARDIAC CATHETERIZATION       • CARDIAC ELECTROPHYSIOLOGY PROCEDURE N/A 4/10/2023     Procedure: Temporary Pacemaker;  Surgeon: Vaibhav Bonilla MD;  Location: [facility] INVASIVE LOCATION;  Service: Cardiovascular;  Laterality: N/A;   • COLONOSCOPY       • ENDOSCOPY       • FRACTURE SURGERY       • JOINT REPLACEMENT       • SKIN BIOPSY       • TOTAL HIP ARTHROPLASTY Right 06/27/2022     Procedure: TOTAL HIP ARTHROPLASTY ANTERIOR WITH HANA TABLE;  Surgeon: Willian Quiles MD;  Location: [facility] OR;  Service: Orthopedics;  Laterality: Right;  carli Bhatt. hana         FAMILY HISTORY  No family history on file.     SOCIAL HISTORY                 Socioeconomic History   • Marital status: Single  "  Tobacco Use   • Smoking status: Every Day       Packs/day: 0.50       Types: Cigarettes   • Smokeless tobacco: Never   • Tobacco comments:       tried to provide education declined irritated w/ attempt smoked \" depends on what I feel like.\"   Vaping Use   • Vaping Use: Never used   Substance and Sexual Activity   • Alcohol use: Not Currently   • Drug use: No   • Sexual activity: Defer         ALLERGIES  Mirtazapine and Sertraline  Home medications reviewed     REVIEW OF SYSTEMS  Constitutional: Negative for chills and fever.   Respiratory: Negative for shortness of breath.    Cardiovascular: Negative for chest pain.   Gastrointestinal: Positive for abdominal pain, diarrhea, nausea and vomiting. Negative for blood in stool.   Genitourinary: Negative for difficulty urinating, flank pain and hematuria.        PHYSICAL EXAM  Blood pressure 144/71, pulse 104, temperature 97.4 °F (36.3 °C), resp. rate 18, height 175.3 cm (69\"), weight 65.8 kg (145 lb), SpO2 96 %.    Constitutional: Awake and alert in no respiratory distress  HEENT: Unremarkable  Neck:  Supple  Cardiovascular: Normal rate, regular rhythm and normal heart sounds.   Pulmonary/Chest: Effort normal and breath sounds normal.  Abdominal: Soft.  No distension.  Nontender bowel sounds positive  Musculoskeletal: No edema, tenderness or deformity.   Neurological:  No focal deficit  Skin: Skin is warm and dry. Pt. is not diaphoretic.  Psychiatric: Mood, affect normal.  He is pleasant and cooperative.    LAB RESULTS  Lab Results (last 24 hours)     Procedure Component Value Units Date/Time    Basic Metabolic Panel [[id number]]  (Abnormal) Collected: 06/01/23 0750    Specimen: Blood Updated: 06/01/23 0824     Glucose 100 mg/dL      BUN 20 mg/dL      Creatinine 1.71 mg/dL      Sodium 141 mmol/L      Potassium 4.3 mmol/L      Comment: Specimen hemolyzed.  Results may be affected.        Chloride 102 mmol/L      CO2 15.7 mmol/L      Calcium 9.5 mg/dL      BUN/Creatinine " Ratio 11.7     Anion Gap 23.3 mmol/L      eGFR 41.5 mL/min/1.73     Narrative:      GFR Normal >60  Chronic Kidney Disease <60  Kidney Failure <15    The GFR formula is only valid for adults with stable renal function between ages 18 and 70.    Single High Sensitivity Troponin T [[id number]]  (Abnormal) Collected: 06/01/23 0750    Specimen: Blood Updated: 06/01/23 0824     HS Troponin T 27 ng/L      Comment: Specimen hemolyzed.  Results may be affected.       Narrative:      High Sensitive Troponin T Reference Range:  <10.0 ng/L- Negative Female for AMI  <15.0 ng/L- Negative Male for AMI  >=10 - Abnormal Female indicating possible myocardial injury.  >=15 - Abnormal Male indicating possible myocardial injury.   Clinicians would have to utilize clinical acumen, EKG, Troponin, and serial changes to determine if it is an Acute Myocardial Infarction or myocardial injury due to an underlying chronic condition.         Comprehensive Metabolic Panel [[id number]]  (Abnormal) Collected: 06/01/23 0528    Specimen: Blood Updated: 06/01/23 0628     Glucose 103 mg/dL      BUN 22 mg/dL      Creatinine 1.83 mg/dL      Sodium 142 mmol/L      Potassium 4.5 mmol/L      Comment: Specimen hemolyzed.  Results may be affected.        Chloride 99 mmol/L      CO2 14.0 mmol/L      Calcium 10.3 mg/dL      Total Protein 8.3 g/dL      Albumin 4.6 g/dL      ALT (SGPT) 9 U/L      Comment: Specimen hemolyzed.  Results may be affected.        AST (SGOT) 42 U/L      Comment: Specimen hemolyzed.  Results may be affected.        Alkaline Phosphatase 96 U/L      Total Bilirubin 0.2 mg/dL      Globulin 3.7 gm/dL      A/G Ratio 1.2 g/dL      BUN/Creatinine Ratio 12.0     Anion Gap 29.0 mmol/L      eGFR 38.2 mL/min/1.73     Narrative:      GFR Normal >60  Chronic Kidney Disease <60  Kidney Failure <15    The GFR formula is only valid for adults with stable renal function between ages 18 and 70.    Single High Sensitivity Troponin T [[id number]]  (Abnormal)  Collected: 06/01/23 0528    Specimen: Blood Updated: 06/01/23 0628     HS Troponin T 33 ng/L      Comment: Specimen hemolyzed.  Results may be affected.       Narrative:      High Sensitive Troponin T Reference Range:  <10.0 ng/L- Negative Female for AMI  <15.0 ng/L- Negative Male for AMI  >=10 - Abnormal Female indicating possible myocardial injury.  >=15 - Abnormal Male indicating possible myocardial injury.   Clinicians would have to utilize clinical acumen, EKG, Troponin, and serial changes to determine if it is an Acute Myocardial Infarction or myocardial injury due to an underlying chronic condition.         Magnesium [[id number]]  (Normal) Collected: 06/01/23 0528    Specimen: Blood Updated: 06/01/23 0628     Magnesium 2.1 mg/dL     Ethanol [[id number]] Collected: 06/01/23 0528    Specimen: Blood Updated: 06/01/23 0623     Ethanol <10 mg/dL      Ethanol % <0.010 %     Lipase [[id number]]  (Normal) Collected: 06/01/23 0528    Specimen: Blood Updated: 06/01/23 0623     Lipase 49 U/L     BNP [[id number]]  (Abnormal) Collected: 06/01/23 0528    Specimen: Blood Updated: 06/01/23 0621     proBNP 1,327.0 pg/mL     Narrative:      Among patients with dyspnea, NT-proBNP is highly sensitive for the detection of acute congestive heart failure. In addition NT-proBNP of <300 pg/ml effectively rules out acute congestive heart failure with 99% negative predictive value.    Results may be falsely decreased if patient taking Biotin.      CBC & Differential [[id number]]  (Abnormal) Collected: 06/01/23 0528    Specimen: Blood Updated: 06/01/23 0604    Narrative:      The following orders were created for panel order CBC & Differential.  Procedure                               Abnormality         Status                     ---------                               -----------         ------                     CBC Auto Differential[[id number]]        Abnormal            Final result                 Please view results for these tests on  the individual orders.    CBC Auto Differential [[id number]]  (Abnormal) Collected: 06/01/23 0528    Specimen: Blood Updated: 06/01/23 0604     WBC 6.40 10*3/mm3      RBC 3.94 10*6/mm3      Hemoglobin 12.1 g/dL      Hematocrit 38.2 %      MCV 97.0 fL      MCH 30.7 pg      MCHC 31.7 g/dL      RDW 12.1 %      RDW-SD 43.4 fl      MPV 10.0 fL      Platelets 316 10*3/mm3      Neutrophil % 89.1 %      Lymphocyte % 6.1 %      Monocyte % 3.8 %      Eosinophil % 0.0 %      Basophil % 0.5 %      Immature Grans % 0.5 %      Neutrophils, Absolute 5.71 10*3/mm3      Lymphocytes, Absolute 0.39 10*3/mm3      Monocytes, Absolute 0.24 10*3/mm3      Eosinophils, Absolute 0.00 10*3/mm3      Basophils, Absolute 0.03 10*3/mm3      Immature Grans, Absolute 0.03 10*3/mm3      nRBC 0.0 /100 WBC         Imaging Results (Last 24 Hours)     Procedure Component Value Units Date/Time    CT Abdomen Pelvis Without Contrast [[id number]] Collected: 06/01/23 0849     Updated: 06/01/23 0911    Narrative:      CT ABDOMEN AND PELVIS WITHOUT IV CONTRAST     HISTORY: Abdominal pain x1 week, nausea, vomiting, diarrhea.     TECHNIQUE: Radiation dose reduction techniques were utilized, including  automated exposure control and exposure modulation based on body size.   3 mm images were obtained through the abdomen and pelvis without the  administration of IV contrast. Lack of IV contrast limits evaluation of  solid, visceral, and vascular structures. Sensitivity for underlying  lesions and infection decreased. Image quality somewhat degraded by  motion/artifact.     COMPARISON: 03/14/2022, report only 10/03/2022     FINDINGS:      LOWER CHEST: The heart is stable. Atherosclerosis including extensive  coronary calcifications. Bibasilar atelectasis right greater than left.  Recommend follow-up to resolution after treatment.           ABDOMEN:      Liver/Biliary Tract:Hepatic granulomas. Cholelithiasis with folder  septations along the gallbladder fundus similar  to the prior suggesting  pneumatosis which could be confirmed with ultrasound.     Spleen: Within normal limits.     Pancreas: Within normal limits.     Adrenals: Nodular thickening left gland similar to the prior. Right  gland within normal limits.     Kidneys:  Renal parenchymal scarring. Intrarenal vascular  calcifications..     Bowel:  The stomach is moderately distended with ingested enteric  content. Nonspecific fluid-filled loops of small bowel. No obstruction.  Scattered colonic diverticula. The rectum is moderately distended with  stool. No significant bowel wall thickening or free fluid on this exam  somewhat limited by streak artifact from pelvic hardware.     Peritoneum: Within normal limits.     Vasculature:    Extensive atherosclerosis abdominal aorta and branch  vessels. Ectasia of the abdominal aorta without focal aneurysmal  portion.     Lymph Nodes:  Within normal limits.                                 PELVIS:                                   Pelvic organs: Streak artifact from right hip prosthesis. Distended  bladder. Mild prostatomegaly.     Abdominal/Pelvic Wall: Within normal limits.     BONES: Right hip arthroplasty. Advanced degenerative changes  thoracolumbar spine and pelvis bilateral pars defects with grade 1  anterolisthesis of L4 on L5       Impression:      1. No acute intra-abdominal or limited noncontrast exam. Recommend  follow-up as clinically indicated if symptoms persist/worsen.  2. The stomach is moderately distended with ingested enteric content  with nonspecific fluid-filled loops of small bowel, scattered colonic  diverticula, and moderate rectal stool burden.  3. Cholelithiasis suggestion of adenomyomatosis or.  4. Please see above for additional findings.     This report was finalized on 6/1/2023 9:08 AM by Dr. Chhaya Negron M.D.           ECG 12 Lead             Component  Ref Range & Units 05:49  (6/1/23) 1 mo ago  (4/14/23) 1 mo ago  (4/10/23) 1 mo ago  (4/10/23) 10 mo  ago  (7/19/22) 10 mo ago  (7/19/22) 11 mo ago  (6/25/22)   QT Interval  ms 361  305  610  582  387  390  398    Resulting Agency BH ECG BH ECG BH ECG BH ECG BH ECG BH ECG BH ECG             HEART RATE= 109  bpm  RR Interval= 550  ms  WA Interval= 168  ms  P Horizontal Axis= -28  deg  P Front Axis= 77  deg  QRSD Interval= 92  ms  QT Interval= 361  ms  QRS Axis= 71  deg  T Wave Axis= 49  deg  - BORDERLINE ECG -  Sinus tachycardia   Atrial premature complex  Borderline prolonged QT interval  PVCs have resolved             Current Facility-Administered Medications:   •  amLODIPine (NORVASC) tablet 10 mg, 10 mg, Oral, Daily, Chino Cole MD  •  docusate sodium (COLACE) capsule 100 mg, 100 mg, Oral, BID, Chino Cole MD  •  hydrOXYzine (ATARAX) tablet 25 mg, 25 mg, Oral, 4x Daily PRN, Chino Cole MD  •  metoprolol succinate XL (TOPROL-XL) 24 hr tablet 25 mg, 25 mg, Oral, BID, Chino Cole MD  •  morphine injection 2 mg, 2 mg, Intravenous, Q4H PRN, Chino Cole MD  •  ondansetron (ZOFRAN) injection 4 mg, 4 mg, Intravenous, Q6H PRN, Chino Cole MD  •  pantoprazole (PROTONIX) EC tablet 40 mg, 40 mg, Oral, BID AC, Chino Cole MD  •  rosuvastatin (CRESTOR) tablet 10 mg, 10 mg, Oral, Daily, Chino Cole MD  •  Insert Peripheral IV, , , Once **AND** sodium chloride 0.9 % flush 10 mL, 10 mL, Intravenous, PRN, Moses Blanco MD  •  sodium chloride 0.9 % infusion, 75 mL/hr, Intravenous, Continuous, Chino Cole MD, Last Rate: 125 mL/hr at 06/01/23 0827, 125 mL/hr at 06/01/23 0827  •  sucralfate (CARAFATE) tablet 1 g, 1 g, Oral, 4x Daily AC & at Bedtime, Chino Cole MD  •  thiamine (VITAMIN B-1) tablet 100 mg, 100 mg, Oral, Daily, Chino Cole MD    Current Outpatient Medications:   •  amLODIPine (NORVASC) 10 MG tablet, Take 1 tablet by mouth Daily., Disp: , Rfl:   •  docusate sodium (COLACE) 100 MG capsule, Take 1 capsule by mouth 2 (Two) Times a Day., Disp: , Rfl:   •  hydrOXYzine (ATARAX) 25 MG tablet, Take 1  tablet by mouth 4 (Four) Times a Day As Needed for Anxiety., Disp: , Rfl:   •  metoprolol succinate XL (TOPROL-XL) 25 MG 24 hr tablet, Take 1 tablet by mouth 2 (Two) Times a Day., Disp: , Rfl:   •  pantoprazole (PROTONIX) 40 MG EC tablet, Take 1 tablet by mouth Daily., Disp: , Rfl:   •  rosuvastatin (CRESTOR) 10 MG tablet, Take 1 tablet by mouth Daily., Disp: , Rfl:   •  sucralfate (CARAFATE) 1 GM/10ML suspension, Take 10 mL by mouth 4 (Four) Times a Day With Meals & at Bedtime., Disp: , Rfl:   •  thiamine (VITAMIN B-1) 100 MG tablet  tablet, Take 1 tablet by mouth Daily., Disp: , Rfl:      ASSESSMENT  Abdominal pain nausea vomiting diarrhea consistent gastroenteritis  Acute kidney injury  Recent complete heart block and hypotensive shock resolved  History of alcohol abuse  Paroxysmal atrial fibrillation  Coronary artery disease  Hyperlipidemia  History of CVA  Chronic kidney disease stage III  Gastroesophageal reflux disease    PLAN  Admit  IVF  Symptomatic treatment for abdominal pain nausea vomiting  Check GI panel  Continue home medications  Stress ulcer DVT prophylaxis  Supportive care  PT OT  Patient is full code  Discussed with nursing staff  Follow closely and further recommendation current hospital course    JOLIE BUNDY MD

## 2023-06-01 NOTE — ED NOTES
Patient arrived to ED via EMS stretcher from home with complaints of abdominal pain, nausea, vomitting and diarrhea x3 days.

## 2023-06-01 NOTE — ED NOTES
.Nursing report ED to floor  Yuval A Loja  72 y.o.  male    HPI :   Chief Complaint   Patient presents with    Abdominal Pain       Admitting doctor:   Chino Cole MD    Admitting diagnosis:   The primary encounter diagnosis was Generalized abdominal pain. Diagnoses of CRISTIANO (acute kidney injury) and Nausea and vomiting, unspecified vomiting type were also pertinent to this visit.    Code status:   Current Code Status       Date Active Code Status Order ID Comments User Context       Prior            Allergies:   Mirtazapine and Sertraline    Isolation:   No active isolations    Intake and Output    Intake/Output Summary (Last 24 hours) at 6/1/2023 1112  Last data filed at 6/1/2023 0827  Gross per 24 hour   Intake 1000 ml   Output --   Net 1000 ml       Weight:       06/01/23  0502   Weight: 65.8 kg (145 lb)       Most recent vitals:   Vitals:    06/01/23 1101 06/01/23 1102 06/01/23 1103 06/01/23 1104   BP: 144/71      Pulse: 109 114 113 104   Resp:       Temp:       SpO2: 96% 96%  96%   Weight:       Height:           Active LDAs/IV Access:   Lines, Drains & Airways       Active LDAs       Name Placement date Placement time Site Days    Peripheral IV 06/01/23 0530 Anterior;Distal;Left;Upper Arm 06/01/23  0530  Arm  less than 1                    Labs (abnormal labs have a star):   Labs Reviewed   COMPREHENSIVE METABOLIC PANEL - Abnormal; Notable for the following components:       Result Value    Glucose 103 (*)     Creatinine 1.83 (*)     CO2 14.0 (*)     AST (SGOT) 42 (*)     Anion Gap 29.0 (*)     eGFR 38.2 (*)     All other components within normal limits    Narrative:     GFR Normal >60  Chronic Kidney Disease <60  Kidney Failure <15    The GFR formula is only valid for adults with stable renal function between ages 18 and 70.   BNP (IN-HOUSE) - Abnormal; Notable for the following components:    proBNP 1,327.0 (*)     All other components within normal limits    Narrative:     Among patients with dyspnea,  NT-proBNP is highly sensitive for the detection of acute congestive heart failure. In addition NT-proBNP of <300 pg/ml effectively rules out acute congestive heart failure with 99% negative predictive value.    Results may be falsely decreased if patient taking Biotin.     SINGLE HSTROPONIN T - Abnormal; Notable for the following components:    HS Troponin T 33 (*)     All other components within normal limits    Narrative:     High Sensitive Troponin T Reference Range:  <10.0 ng/L- Negative Female for AMI  <15.0 ng/L- Negative Male for AMI  >=10 - Abnormal Female indicating possible myocardial injury.  >=15 - Abnormal Male indicating possible myocardial injury.   Clinicians would have to utilize clinical acumen, EKG, Troponin, and serial changes to determine if it is an Acute Myocardial Infarction or myocardial injury due to an underlying chronic condition.        CBC WITH AUTO DIFFERENTIAL - Abnormal; Notable for the following components:    RBC 3.94 (*)     Hemoglobin 12.1 (*)     RDW 12.1 (*)     Neutrophil % 89.1 (*)     Lymphocyte % 6.1 (*)     Monocyte % 3.8 (*)     Eosinophil % 0.0 (*)     Lymphocytes, Absolute 0.39 (*)     All other components within normal limits   BASIC METABOLIC PANEL - Abnormal; Notable for the following components:    Glucose 100 (*)     Creatinine 1.71 (*)     CO2 15.7 (*)     Anion Gap 23.3 (*)     eGFR 41.5 (*)     All other components within normal limits    Narrative:     GFR Normal >60  Chronic Kidney Disease <60  Kidney Failure <15    The GFR formula is only valid for adults with stable renal function between ages 18 and 70.   SINGLE HSTROPONIN T - Abnormal; Notable for the following components:    HS Troponin T 27 (*)     All other components within normal limits    Narrative:     High Sensitive Troponin T Reference Range:  <10.0 ng/L- Negative Female for AMI  <15.0 ng/L- Negative Male for AMI  >=10 - Abnormal Female indicating possible myocardial injury.  >=15 - Abnormal Male  indicating possible myocardial injury.   Clinicians would have to utilize clinical acumen, EKG, Troponin, and serial changes to determine if it is an Acute Myocardial Infarction or myocardial injury due to an underlying chronic condition.        MAGNESIUM - Normal   LIPASE - Normal   ETHANOL   URINALYSIS W/ MICROSCOPIC IF INDICATED (NO CULTURE)   CBC AND DIFFERENTIAL    Narrative:     The following orders were created for panel order CBC & Differential.  Procedure                               Abnormality         Status                     ---------                               -----------         ------                     CBC Auto Differential[[id number]]        Abnormal            Final result                 Please view results for these tests on the individual orders.       EKG:   ECG 12 Lead Electrolyte Imbalance   Final Result   HEART RATE= 109  bpm   RR Interval= 550  ms   FL Interval= 168  ms   P Horizontal Axis= -28  deg   P Front Axis= 77  deg   QRSD Interval= 92  ms   QT Interval= 361  ms   QRS Axis= 71  deg   T Wave Axis= 49  deg   - BORDERLINE ECG -   Sinus tachycardia    Atrial premature complex   Borderline prolonged QT interval   PVCs have resolved   Electronically Signed By: Margarita Mars ([facility]) 01-Jun-2023 10:04:55   Date and Time of Study: 2023-06-01 05:49:30          Meds given in ED:   Medications   sodium chloride 0.9 % flush 10 mL (has no administration in time range)   sodium chloride 0.9 % infusion (125 mL/hr Intravenous New Bag 6/1/23 0827)   sodium chloride 0.9 % bolus 500 mL (0 mL Intravenous Stopped 6/1/23 0637)   ondansetron (ZOFRAN) injection 4 mg (4 mg Intravenous Given 6/1/23 0538)   dicyclomine (BENTYL) injection 20 mg (20 mg Intramuscular Given 6/1/23 0538)   sodium chloride 0.9 % bolus 500 mL (0 mL Intravenous Stopped 6/1/23 0827)   morphine injection 4 mg (4 mg Intravenous Given 6/1/23 0751)   famotidine (PEPCID) injection 20 mg (20 mg Intravenous Given 6/1/23 1012)  "      Imaging results:  CT Abdomen Pelvis Without Contrast    Result Date: 6/1/2023  1. No acute intra-abdominal or limited noncontrast exam. Recommend follow-up as clinically indicated if symptoms persist/worsen. 2. The stomach is moderately distended with ingested enteric content with nonspecific fluid-filled loops of small bowel, scattered colonic diverticula, and moderate rectal stool burden. 3. Cholelithiasis suggestion of adenomyomatosis or. 4. Please see above for additional findings.  This report was finalized on 6/1/2023 9:08 AM by Dr. Chhaya Negron M.D.       Ambulatory status:   -     Social issues:   Social History     Socioeconomic History    Marital status: Single   Tobacco Use    Smoking status: Every Day     Packs/day: 0.50     Types: Cigarettes    Smokeless tobacco: Never    Tobacco comments:     tried to provide education declined irritated w/ attempt smoked \" depends on what I feel like.\"   Vaping Use    Vaping Use: Never used   Substance and Sexual Activity    Alcohol use: Not Currently    Drug use: No    Sexual activity: Defer           Adina Song RN  06/01/23 11:12 EDT       "

## 2023-06-01 NOTE — CONSULTS
Date of Consultation: 23    Referral Provider: Chino Cole MD     Reason for Consultation: Atrial fibrillation    Encounter Provider: Paul Schwarz MD    Group of Service: [facility]     Patient Name: Yuval Loja    :1950    Chief complaint:  Abdominal pain, nausea, and vomiting    History of Present Illness:  Yuval Loja is a 73 yo patient who I know from a recent hospitalization.  He has a PMH of atrial fibrillation (not on AC), CAD post stenting of the RCA in  and LAD in , CKD stage 3, alcohol abuse, gastric bleeds, COPD and current smoker.    He presented to the ED early this morning with complaints of non-radiating mid abdominal pain x2 weeks.  His pain has been ongoing with nausea, vomiting and diarrhea for 3 days.  He denies blood in his stool or emesis.      He was here in April with with an CRISTIANO and developed complete heart block which required a temporary pacemaker.  He is on Toprol 25 mg daily.  He has not had follow-up as an outpatient.     HS troponin's are flat at 33 and 27, proBNP was 1327, creatinine is 1.71 which is a bit higher than his baseline of around 1.4, hemoglobin is 12.1.  CXR shows bilateral airspace opacities which could represent either pulmonary edema or atypical infection.  He was found to be in atrial fibrillation with RVR, which is new.  He does not feel this.  He has had no chest pain.  He just feels poorly in general.  When I saw him, his heart rate was in the 130s to 140s.  He is not currently able to take oral medications because of the nausea and vomiting.      ECHO 23  •  Left ventricular systolic function is normal. Left ventricular ejection fraction appears to be 56 - 60%.  •  Left ventricular diastolic function was indeterminate.  •  There is calcification of the aortic valve.  •  Estimated right ventricular systolic pressure from tricuspid regurgitation is normal (<35 mmHg).    Past Medical History:   Diagnosis Date  "  • Alcohol abuse    • Arthritis    • CAD (coronary artery disease)    • Chronic kidney disease, stage 3    • COPD (chronic obstructive pulmonary disease)    • Disease of thyroid gland    • Elevated cholesterol    • Fatty liver, alcoholic    • GERD (gastroesophageal reflux disease)    • History of transfusion    • Hypertensive urgency    • Metabolic acidosis    • Myocardial infarction    • Sinus tachycardia    • Sleep apnea    • Stroke          Past Surgical History:   Procedure Laterality Date   • BACK SURGERY     • CARDIAC CATHETERIZATION     • CARDIAC ELECTROPHYSIOLOGY PROCEDURE N/A 4/10/2023    Procedure: Temporary Pacemaker;  Surgeon: Vaibhav Bonilla MD;  Location: [facility] CATH INVASIVE LOCATION;  Service: Cardiovascular;  Laterality: N/A;   • COLONOSCOPY     • ENDOSCOPY     • FRACTURE SURGERY     • JOINT REPLACEMENT     • SKIN BIOPSY     • TOTAL HIP ARTHROPLASTY Right 06/27/2022    Procedure: TOTAL HIP ARTHROPLASTY ANTERIOR WITH HANA TABLE;  Surgeon: Willian Quiles MD;  Location: [facility] OR;  Service: Orthopedics;  Laterality: Right;  Depuy, carli. hana         Allergies   Allergen Reactions   • Mirtazapine Other (See Comments)     confused   • Sertraline Anxiety, Diarrhea and Nausea And Vomiting     Also \"hallucinations\"         No current facility-administered medications on file prior to encounter.     Current Outpatient Medications on File Prior to Encounter   Medication Sig Dispense Refill   • amLODIPine (NORVASC) 10 MG tablet Take 1 tablet by mouth Daily.     • docusate sodium (COLACE) 100 MG capsule Take 1 capsule by mouth 2 (Two) Times a Day.     • hydrOXYzine (ATARAX) 25 MG tablet Take 1 tablet by mouth 4 (Four) Times a Day As Needed for Anxiety.     • metoprolol succinate XL (TOPROL-XL) 25 MG 24 hr tablet Take 1 tablet by mouth 2 (Two) Times a Day.     • pantoprazole (PROTONIX) 40 MG EC tablet Take 1 tablet by mouth Daily.     • rosuvastatin (CRESTOR) 10 MG tablet Take 1 tablet by " "mouth Daily.     • sucralfate (CARAFATE) 1 GM/10ML suspension Take 10 mL by mouth 4 (Four) Times a Day With Meals & at Bedtime.     • thiamine (VITAMIN B-1) 100 MG tablet  tablet Take 1 tablet by mouth Daily.           Social History     Socioeconomic History   • Marital status: Single   Tobacco Use   • Smoking status: Every Day     Packs/day: 0.50     Types: Cigarettes   • Smokeless tobacco: Never   • Tobacco comments:     tried to provide education declined irritated w/ attempt smoked \" depends on what I feel like.\"   Vaping Use   • Vaping Use: Never used   Substance and Sexual Activity   • Alcohol use: Not Currently   • Drug use: No   • Sexual activity: Defer         History reviewed. No pertinent family history.    REVIEW OF SYSTEMS:   12 point ROS was performed and is negative except as outlined in HPI    REVIEW OF SYSTEMS:   CONSTITUTIONAL: No weight loss, fever, chills.  HEENT: No visual changes or hearing changes.  SKIN: No rash.   RESPIRATORY: No cough or hemoptysis.  GASTROINTESTINAL: No abdominal pain, melena, nausea, or vomiting.  GENITOURINARY: No dysuria or frequency.  NEUROLOGICAL: No numbness or tingling in the extremities.   MUSCULOSKELETAL: No new joint pain.    HEMATOLOGIC: No bleeding or bruising.   LYMPHATICS: No enlarged nodes.   PSYCHIATRIC: No severe depression or anxiety.    ENDOCRINOLOGIC: No heat or cold intolerance.       Objective:     Vitals:    06/01/23 1200 06/01/23 1301 06/01/23 1308 06/01/23 1512   BP: 152/72   141/78   BP Location: Left arm   Left arm   Patient Position: Lying   Lying   Pulse: 114 (!) 147 (!) 140 (!) 133   Resp: 18   18   Temp: 98.4 °F (36.9 °C)   98.4 °F (36.9 °C)   TempSrc: Oral   Oral   SpO2: 98%  95% 96%   Weight: 68.8 kg (151 lb 10.8 oz)      Height: 175.3 cm (69\")        Body mass index is 22.4 kg/m².  Flowsheet Rows    Flowsheet Row First Filed Value   Admission Height 175.3 cm (69\") Documented at 06/01/2023 0502   Admission Weight 65.8 kg (145 lb) " Documented at 06/01/2023 0502           General:    No acute distress, alert and oriented x4, pleasant                   Head:    Normocephalic, atraumatic.   Eyes:          Conjunctivae and sclerae normal, no icterus, PERRLA   Throat:   No oral lesions, no thrush, oral mucosa moist.    Neck:   Supple, trachea midline.   Lungs:     Clear to auscultation bilaterally     Heart:    Irregularly irregular rhythm with a tachycardic rate. No murmurs, gallops, or rubs noted.   Abdomen:     Soft, non-tender, non-distended, positive bowel sounds.    Extremities:   No clubbing, cyanosis, or edema.     Pulses:   Pulses palpable and equal bilaterally.    Skin:   No bleeding or rash.   Neuro:   Non-focal.  Moves all extremities well.    Psychiatric:   Normal mood and affect.                 Lab Review:                Results from last 7 days   Lab Units 06/01/23  0750   SODIUM mmol/L 141   POTASSIUM mmol/L 4.3   CHLORIDE mmol/L 102   CO2 mmol/L 15.7*   BUN mg/dL 20   CREATININE mg/dL 1.71*   GLUCOSE mg/dL 100*   CALCIUM mg/dL 9.5     Results from last 7 days   Lab Units 06/01/23  0750 06/01/23  0528   HSTROP T ng/L 27* 33*     Results from last 7 days   Lab Units 06/01/23  0528   WBC 10*3/mm3 6.40   HEMOGLOBIN g/dL 12.1*   HEMATOCRIT % 38.2   PLATELETS 10*3/mm3 316             Results from last 7 days   Lab Units 06/01/23  0528   MAGNESIUM mg/dL 2.1               6-1-23    EKG (reviewed by me personally):      Assessment:   1.  Abdominal pain with nausea, vomiting, and diarrhea  2.  Paroxysmal atrial fibrillation with RVR (new)  3.  Acute kidney injury with stage III chronic kidney disease  4.  Coronary artery disease status post stenting to the RCA in 2000 and to the LAD in 2006  5.  History of heavy alcohol use, denies currently  6.  Recent sinus and junctional bradycardia requiring transvenous pacemaker on 4/10/2023 (resolved and felt to be metabolic the time)  7.  History of prior stroke  8.  Elevated high-sensitivity  troponin, likely secondary to renal dysfunction  9.  Frequent PACs and PVCs  10.  Hypertension  Plan:       The atrial fibrillation is relatively new.  There is a history of this in the chart, although the patient cannot recall.  He has had nausea, vomiting, and diarrhea.  He is going to be hydrated with IV fluids.  I am going to start him on a Cardizem drip for rate control.  I am also going to give him 500 mcg of IV digoxin 1 time now.  He does not appear to be on anticoagulation as an outpatient.  His [id number] score is 5 based on his age, history of stroke, hypertension, and coronary artery disease.  I am going to start him on IV heparin given his renal dysfunction.  Again, he cannot currently take oral medications because of his nausea and vomiting.    Cardiology will continue to follow.  Thank you very much for this consult.    David Schwarz MD

## 2023-06-02 LAB
ALBUMIN SERPL-MCNC: 3.8 G/DL (ref 3.5–5.2)
ALBUMIN/GLOB SERPL: 1.3 G/DL
ALP SERPL-CCNC: 80 U/L (ref 39–117)
ALT SERPL W P-5'-P-CCNC: 10 U/L (ref 1–41)
ANION GAP SERPL CALCULATED.3IONS-SCNC: 15.4 MMOL/L (ref 5–15)
APTT PPP: 107.2 SECONDS (ref 22.7–35.4)
APTT PPP: 160.9 SECONDS (ref 22.7–35.4)
APTT PPP: >200 SECONDS (ref 22.7–35.4)
AST SERPL-CCNC: 27 U/L (ref 1–40)
BACTERIA UR QL AUTO: ABNORMAL /HPF
BASOPHILS # BLD AUTO: 0.02 10*3/MM3 (ref 0–0.2)
BASOPHILS NFR BLD AUTO: 0.3 % (ref 0–1.5)
BILIRUB SERPL-MCNC: 0.2 MG/DL (ref 0–1.2)
BILIRUB UR QL STRIP: NEGATIVE
BUN SERPL-MCNC: 16 MG/DL (ref 8–23)
BUN/CREAT SERPL: 9.3 (ref 7–25)
CALCIUM SPEC-SCNC: 9.7 MG/DL (ref 8.6–10.5)
CHLORIDE SERPL-SCNC: 113 MMOL/L (ref 98–107)
CLARITY UR: CLEAR
CO2 SERPL-SCNC: 22.6 MMOL/L (ref 22–29)
COLOR UR: YELLOW
CREAT SERPL-MCNC: 1.72 MG/DL (ref 0.76–1.27)
DEPRECATED RDW RBC AUTO: 43.6 FL (ref 37–54)
EGFRCR SERPLBLD CKD-EPI 2021: 41.2 ML/MIN/1.73
EOSINOPHIL # BLD AUTO: 0.01 10*3/MM3 (ref 0–0.4)
EOSINOPHIL NFR BLD AUTO: 0.2 % (ref 0.3–6.2)
ERYTHROCYTE [DISTWIDTH] IN BLOOD BY AUTOMATED COUNT: 12.6 % (ref 12.3–15.4)
GEN 5 2HR TROPONIN T REFLEX: 62 NG/L
GLOBULIN UR ELPH-MCNC: 3 GM/DL
GLUCOSE SERPL-MCNC: 108 MG/DL (ref 65–99)
GLUCOSE UR STRIP-MCNC: NEGATIVE MG/DL
HCT VFR BLD AUTO: 33.8 % (ref 37.5–51)
HGB BLD-MCNC: 10.7 G/DL (ref 13–17.7)
HGB UR QL STRIP.AUTO: ABNORMAL
HYALINE CASTS UR QL AUTO: ABNORMAL /LPF
IMM GRANULOCYTES # BLD AUTO: 0.02 10*3/MM3 (ref 0–0.05)
IMM GRANULOCYTES NFR BLD AUTO: 0.3 % (ref 0–0.5)
KETONES UR QL STRIP: ABNORMAL
LEUKOCYTE ESTERASE UR QL STRIP.AUTO: NEGATIVE
LYMPHOCYTES # BLD AUTO: 1.03 10*3/MM3 (ref 0.7–3.1)
LYMPHOCYTES NFR BLD AUTO: 17.4 % (ref 19.6–45.3)
MCH RBC QN AUTO: 30.6 PG (ref 26.6–33)
MCHC RBC AUTO-ENTMCNC: 31.7 G/DL (ref 31.5–35.7)
MCV RBC AUTO: 96.6 FL (ref 79–97)
MONOCYTES # BLD AUTO: 0.69 10*3/MM3 (ref 0.1–0.9)
MONOCYTES NFR BLD AUTO: 11.7 % (ref 5–12)
NEUTROPHILS NFR BLD AUTO: 4.15 10*3/MM3 (ref 1.7–7)
NEUTROPHILS NFR BLD AUTO: 70.1 % (ref 42.7–76)
NITRITE UR QL STRIP: NEGATIVE
NRBC BLD AUTO-RTO: 0.2 /100 WBC (ref 0–0.2)
PH UR STRIP.AUTO: 5.5 [PH] (ref 5–8)
PLATELET # BLD AUTO: 265 10*3/MM3 (ref 140–450)
PMV BLD AUTO: 9.7 FL (ref 6–12)
POTASSIUM SERPL-SCNC: 3.6 MMOL/L (ref 3.5–5.2)
PROT SERPL-MCNC: 6.8 G/DL (ref 6–8.5)
PROT UR QL STRIP: ABNORMAL
QT INTERVAL: 326 MS
RBC # BLD AUTO: 3.5 10*6/MM3 (ref 4.14–5.8)
RBC # UR STRIP: ABNORMAL /HPF
REF LAB TEST METHOD: ABNORMAL
SODIUM SERPL-SCNC: 151 MMOL/L (ref 136–145)
SP GR UR STRIP: 1.01 (ref 1–1.03)
SQUAMOUS #/AREA URNS HPF: ABNORMAL /HPF
TROPONIN T DELTA: 5 NG/L
TROPONIN T SERPL HS-MCNC: 57 NG/L
UROBILINOGEN UR QL STRIP: ABNORMAL
WBC # UR STRIP: ABNORMAL /HPF
WBC NRBC COR # BLD: 5.92 10*3/MM3 (ref 3.4–10.8)

## 2023-06-02 PROCEDURE — 25010000002 THIAMINE HCL 200 MG/2ML SOLUTION: Performed by: INTERNAL MEDICINE

## 2023-06-02 PROCEDURE — 84484 ASSAY OF TROPONIN QUANT: CPT | Performed by: HOSPITALIST

## 2023-06-02 PROCEDURE — 36415 COLL VENOUS BLD VENIPUNCTURE: CPT | Performed by: HOSPITALIST

## 2023-06-02 PROCEDURE — 25010000002 LORAZEPAM PER 2 MG: Performed by: INTERNAL MEDICINE

## 2023-06-02 PROCEDURE — 85730 THROMBOPLASTIN TIME PARTIAL: CPT | Performed by: HOSPITALIST

## 2023-06-02 PROCEDURE — 25010000002 ZIPRASIDONE MESYLATE PER 10 MG: Performed by: INTERNAL MEDICINE

## 2023-06-02 PROCEDURE — 85730 THROMBOPLASTIN TIME PARTIAL: CPT | Performed by: INTERNAL MEDICINE

## 2023-06-02 PROCEDURE — 81001 URINALYSIS AUTO W/SCOPE: CPT | Performed by: EMERGENCY MEDICINE

## 2023-06-02 PROCEDURE — 25010000002 HEPARIN (PORCINE) 25000-0.45 UT/250ML-% SOLUTION: Performed by: INTERNAL MEDICINE

## 2023-06-02 PROCEDURE — 25010000002 DIGOXIN PER 500 MCG: Performed by: INTERNAL MEDICINE

## 2023-06-02 PROCEDURE — 80053 COMPREHEN METABOLIC PANEL: CPT | Performed by: HOSPITALIST

## 2023-06-02 PROCEDURE — 85025 COMPLETE CBC W/AUTO DIFF WBC: CPT | Performed by: INTERNAL MEDICINE

## 2023-06-02 PROCEDURE — 99233 SBSQ HOSP IP/OBS HIGH 50: CPT | Performed by: INTERNAL MEDICINE

## 2023-06-02 RX ORDER — DIGOXIN 0.25 MG/ML
250 INJECTION INTRAMUSCULAR; INTRAVENOUS ONCE
Status: COMPLETED | OUTPATIENT
Start: 2023-06-02 | End: 2023-06-02

## 2023-06-02 RX ORDER — LORAZEPAM 2 MG/ML
1 INJECTION INTRAMUSCULAR
Status: DISCONTINUED | OUTPATIENT
Start: 2023-06-02 | End: 2023-06-07

## 2023-06-02 RX ORDER — LORAZEPAM 2 MG/ML
0.5 INJECTION INTRAMUSCULAR
Status: DISCONTINUED | OUTPATIENT
Start: 2023-06-02 | End: 2023-06-07

## 2023-06-02 RX ORDER — LORAZEPAM 1 MG/1
1 TABLET ORAL
Status: DISCONTINUED | OUTPATIENT
Start: 2023-06-02 | End: 2023-06-07

## 2023-06-02 RX ORDER — THIAMINE HYDROCHLORIDE 100 MG/ML
200 INJECTION, SOLUTION INTRAMUSCULAR; INTRAVENOUS EVERY 8 HOURS SCHEDULED
Status: DISCONTINUED | OUTPATIENT
Start: 2023-06-02 | End: 2023-06-06

## 2023-06-02 RX ORDER — ZIPRASIDONE MESYLATE 20 MG/ML
20 INJECTION, POWDER, LYOPHILIZED, FOR SOLUTION INTRAMUSCULAR ONCE
Status: COMPLETED | OUTPATIENT
Start: 2023-06-02 | End: 2023-06-02

## 2023-06-02 RX ORDER — LORAZEPAM 0.5 MG/1
0.5 TABLET ORAL
Status: DISCONTINUED | OUTPATIENT
Start: 2023-06-02 | End: 2023-06-07

## 2023-06-02 RX ORDER — NICOTINE 21 MG/24HR
1 PATCH, TRANSDERMAL 24 HOURS TRANSDERMAL
Status: DISCONTINUED | OUTPATIENT
Start: 2023-06-02 | End: 2023-06-08

## 2023-06-02 RX ORDER — FOLIC ACID 1 MG/1
1 TABLET ORAL DAILY
Status: DISCONTINUED | OUTPATIENT
Start: 2023-06-02 | End: 2023-06-08 | Stop reason: HOSPADM

## 2023-06-02 RX ADMIN — THIAMINE HYDROCHLORIDE 200 MG: 100 INJECTION, SOLUTION INTRAMUSCULAR; INTRAVENOUS at 22:44

## 2023-06-02 RX ADMIN — SODIUM CHLORIDE 10 MG/HR: 900 INJECTION, SOLUTION INTRAVENOUS at 16:04

## 2023-06-02 RX ADMIN — SODIUM CHLORIDE 10 MG/HR: 900 INJECTION, SOLUTION INTRAVENOUS at 04:43

## 2023-06-02 RX ADMIN — Medication 100 MG: at 10:00

## 2023-06-02 RX ADMIN — AMLODIPINE BESYLATE 10 MG: 10 TABLET ORAL at 10:00

## 2023-06-02 RX ADMIN — HEPARIN SODIUM 13 UNITS/KG/HR: 10000 INJECTION, SOLUTION INTRAVENOUS at 17:37

## 2023-06-02 RX ADMIN — DIGOXIN 250 MCG: 0.25 INJECTION INTRAMUSCULAR; INTRAVENOUS at 09:53

## 2023-06-02 RX ADMIN — SUCRALFATE 1 G: 1 TABLET ORAL at 10:00

## 2023-06-02 RX ADMIN — METOPROLOL SUCCINATE 25 MG: 25 TABLET, EXTENDED RELEASE ORAL at 20:11

## 2023-06-02 RX ADMIN — LORAZEPAM 1 MG: 2 INJECTION INTRAMUSCULAR; INTRAVENOUS at 01:09

## 2023-06-02 RX ADMIN — LORAZEPAM 1 MG: 2 INJECTION INTRAMUSCULAR; INTRAVENOUS at 14:39

## 2023-06-02 RX ADMIN — SODIUM CHLORIDE 75 ML/HR: 9 INJECTION, SOLUTION INTRAVENOUS at 16:08

## 2023-06-02 RX ADMIN — THIAMINE HYDROCHLORIDE 200 MG: 100 INJECTION, SOLUTION INTRAMUSCULAR; INTRAVENOUS at 16:01

## 2023-06-02 RX ADMIN — SODIUM CHLORIDE 75 ML/HR: 9 INJECTION, SOLUTION INTRAVENOUS at 06:39

## 2023-06-02 RX ADMIN — FAMOTIDINE 20 MG: 20 TABLET, FILM COATED ORAL at 10:00

## 2023-06-02 RX ADMIN — ZIPRASIDONE MESYLATE 20 MG: 20 INJECTION, POWDER, LYOPHILIZED, FOR SOLUTION INTRAMUSCULAR at 18:45

## 2023-06-02 RX ADMIN — METOPROLOL SUCCINATE 25 MG: 25 TABLET, EXTENDED RELEASE ORAL at 10:00

## 2023-06-02 RX ADMIN — LORAZEPAM 1 MG: 2 INJECTION INTRAMUSCULAR; INTRAVENOUS at 07:21

## 2023-06-02 NOTE — SIGNIFICANT NOTE
06/02/23 1150   OTHER   Discipline occupational therapist   Rehab Time/Intention   Session Not Performed unable to evaluate, medical status change;other (see comments)  (Per RN pt not appropriate for OT eval, inappropriate with staff, verbally abusive, confused. Will attempt OT eval 6/4)   Recommendation   OT - Next Appointment 06/04/23

## 2023-06-02 NOTE — PLAN OF CARE
Goal Outcome Evaluation:   Pt on room air; agitated and restless; afib hr 110-120s; Cardizem drip; heparin drip; IVFs; 3 point restraints; geodon given IM; daughter involved in care; suction at bedside; bed rails padded.

## 2023-06-02 NOTE — NURSING NOTE
Received call back from pt's daughter, DIMITRI Loja. She was informed of pt's behavior last night and that he had been placed in restraints. Pt's daughter said that pt had been confused on previous visits to the hospital.

## 2023-06-02 NOTE — PLAN OF CARE
Goal Outcome Evaluation:  Plan of Care Reviewed With: patient        Progress: declining  Outcome Evaluation: Pt confused, interferng with care- pulling lines, removing devices. In NVNSD restraints.

## 2023-06-02 NOTE — NURSING NOTE
At 2330 pt was found confused trying to get out of bed. Pt had already pulled out his IV's and heart monitor. Pt was confused, verbally and physically aggressive. Security was called and pt was helped back in bed and cleaned up and assessed. Provider was called (Dr Carpenter) and orders for non violent non self destructive soft restraints to L and R wrists and 4 rails up.  also notified that pt appears to be withdrawing and has a history of alcohol use. An order for ativan IV received. RN attempted to call pt's daughter DIMIRTI Loja to inform her about the situation. She did not answer the phone and a message was left on her voice mail.

## 2023-06-02 NOTE — SIGNIFICANT NOTE
06/02/23 1108   OTHER   Discipline physical therapist   Rehab Time/Intention   Session Not Performed unable to evaluate, medical status change;other (see comments)  (Per RN, not appropriate for PT eval today. Remains confused, in restraints, at times violent and inappropriate with staff.)   Recommendation   PT - Next Appointment 06/03/23

## 2023-06-02 NOTE — PROGRESS NOTES
"DAILY PROGRESS NOTE  [facility]    Patient Identification:  Name: Yuval Loja  Age: 72 y.o.  Sex: male  :  1950  MRN: [id number]         Primary Care Physician: Alessia Prescott APRN    Subjective: patient is confused, agitated; daughter is at the bedside    Interval History: follow up for alcohol withdrawal, a fib, cad, ckd3, copd     Objective:    Scheduled Meds:amLODIPine, 10 mg, Oral, Daily  famotidine, 20 mg, Oral, BID  folic acid, 1 mg, Oral, Daily  metoprolol succinate XL, 25 mg, Oral, BID  nicotine, 1 patch, Transdermal, Q24H  rosuvastatin, 5 mg, Oral, Nightly  sucralfate, 1 g, Oral, 4x Daily AC & at Bedtime  thiamine (B-1) IV, 200 mg, Intravenous, Q8H   Followed by  [START ON 2023] thiamine, 100 mg, Oral, Daily      Continuous Infusions:dilTIAZem, 5 mg/hr, Last Rate: 10 mg/hr (23 1604)  heparin, 18 Units/kg/hr, Last Rate: 13 Units/kg/hr (23 1737)  sodium chloride, 75 mL/hr, Last Rate: 75 mL/hr (23 1608)        Vital signs in last 24 hours:  Temp:  [97.8 °F (36.6 °C)-98.5 °F (36.9 °C)] 97.8 °F (36.6 °C)  Heart Rate:  [102-121] 114  Resp:  [18-20] 18  BP: (123-152)/(60-99) 142/63    Intake/Output:    Intake/Output Summary (Last 24 hours) at 2023 1852  Last data filed at 2023 1400  Gross per 24 hour   Intake 1620 ml   Output 400 ml   Net 1220 ml       Exam:  /63 (BP Location: Left arm, Patient Position: Lying)   Pulse 114   Temp 97.8 °F (36.6 °C) (Oral)   Resp 18   Ht 175.3 cm (69\")   Wt 68.8 kg (151 lb 10.8 oz)   SpO2 96%   BMI 22.40 kg/m²     General Appearance:    Alert, cooperative, no distress, AAOx1; agitated                          Head:    Normocephalic, without obvious abnormality, atraumatic; bitemporal wasting                           Eyes:    PERRL, conjunctivae/corneas clear, EOM's intact, both eyes                         Throat:   Lips, tongue, gums normal; oral mucosa pink and moist                           Neck:   Supple, " symmetrical, trachea midline, no JVD                         Lungs:    Clear to auscultation bilaterally, respirations unlabored                 Chest Wall:    No tenderness or deformity                          Heart:    Regular rate and rhythm, S1 and S2 normal, no murmur,no  rub or gallop                  Abdomen:     Soft, nontender, bowel sounds active, no masses, no organomegaly                  Extremities:   Extremities normal, atraumatic, no cyanosis or edema                        Pulses:   Pulses palpable in all extremities                            Skin:   Skin is warm and dry,  no rashes or palpable lesions                      Data Review:  Labs in chart were reviewed.  WBC   Date Value Ref Range Status   06/02/2023 5.92 3.40 - 10.80 10*3/mm3 Final     Hemoglobin   Date Value Ref Range Status   06/02/2023 10.7 (L) 13.0 - 17.7 g/dL Final     Hematocrit   Date Value Ref Range Status   06/02/2023 33.8 (L) 37.5 - 51.0 % Final     Platelets   Date Value Ref Range Status   06/02/2023 265 140 - 450 10*3/mm3 Final     Sodium   Date Value Ref Range Status   06/02/2023 151 (H) 136 - 145 mmol/L Final     Potassium   Date Value Ref Range Status   06/02/2023 3.6 3.5 - 5.2 mmol/L Final     Chloride   Date Value Ref Range Status   06/02/2023 113 (H) 98 - 107 mmol/L Final     CO2   Date Value Ref Range Status   06/02/2023 22.6 22.0 - 29.0 mmol/L Final     BUN   Date Value Ref Range Status   06/02/2023 16 8 - 23 mg/dL Final     Creatinine   Date Value Ref Range Status   06/02/2023 1.72 (H) 0.76 - 1.27 mg/dL Final     Glucose   Date Value Ref Range Status   06/02/2023 108 (H) 65 - 99 mg/dL Final     Calcium   Date Value Ref Range Status   06/02/2023 9.7 8.6 - 10.5 mg/dL Final     Magnesium   Date Value Ref Range Status   06/01/2023 2.1 1.6 - 2.4 mg/dL Final     AST (SGOT)   Date Value Ref Range Status   06/02/2023 27 1 - 40 U/L Final     ALT (SGPT)   Date Value Ref Range Status   06/02/2023 10 1 - 41 U/L Final      Alkaline Phosphatase   Date Value Ref Range Status   06/02/2023 80 39 - 117 U/L Final         Assessment:  Active Hospital Problems    Diagnosis  POA   • **Generalized abdominal pain [R10.84]  Yes      Resolved Hospital Problems   No resolved problems to display.   atrial fibrillation with rvr  Alcohol withdrawal with delirium  Alcohol dependence  ckd3    Plan:  Will add ciwa protocol  Monitor on telemetry  dw daughter and nurse  Trend labs  Medium risk    Nicole Lewis MD  6/2/2023  18:52 EDT

## 2023-06-03 LAB
ALBUMIN SERPL-MCNC: 3.7 G/DL (ref 3.5–5.2)
ALBUMIN/GLOB SERPL: 1.2 G/DL
ALP SERPL-CCNC: 80 U/L (ref 39–117)
ALT SERPL W P-5'-P-CCNC: 12 U/L (ref 1–41)
ANION GAP SERPL CALCULATED.3IONS-SCNC: 14.8 MMOL/L (ref 5–15)
APTT PPP: 53.9 SECONDS (ref 22.7–35.4)
APTT PPP: 68.6 SECONDS (ref 22.7–35.4)
APTT PPP: 94.3 SECONDS (ref 22.7–35.4)
AST SERPL-CCNC: 38 U/L (ref 1–40)
BASOPHILS # BLD AUTO: 0.03 10*3/MM3 (ref 0–0.2)
BASOPHILS NFR BLD AUTO: 0.5 % (ref 0–1.5)
BILIRUB SERPL-MCNC: 0.3 MG/DL (ref 0–1.2)
BUN SERPL-MCNC: 8 MG/DL (ref 8–23)
BUN/CREAT SERPL: 5.4 (ref 7–25)
CALCIUM SPEC-SCNC: 9.5 MG/DL (ref 8.6–10.5)
CHLORIDE SERPL-SCNC: 113 MMOL/L (ref 98–107)
CO2 SERPL-SCNC: 23.2 MMOL/L (ref 22–29)
CREAT SERPL-MCNC: 1.47 MG/DL (ref 0.76–1.27)
DEPRECATED RDW RBC AUTO: 43.1 FL (ref 37–54)
EGFRCR SERPLBLD CKD-EPI 2021: 49.7 ML/MIN/1.73
EOSINOPHIL # BLD AUTO: 0.02 10*3/MM3 (ref 0–0.4)
EOSINOPHIL NFR BLD AUTO: 0.3 % (ref 0.3–6.2)
ERYTHROCYTE [DISTWIDTH] IN BLOOD BY AUTOMATED COUNT: 12.3 % (ref 12.3–15.4)
GLOBULIN UR ELPH-MCNC: 3 GM/DL
GLUCOSE SERPL-MCNC: 76 MG/DL (ref 65–99)
HCT VFR BLD AUTO: 32.3 % (ref 37.5–51)
HGB BLD-MCNC: 10.3 G/DL (ref 13–17.7)
IMM GRANULOCYTES # BLD AUTO: 0.02 10*3/MM3 (ref 0–0.05)
IMM GRANULOCYTES NFR BLD AUTO: 0.3 % (ref 0–0.5)
LYMPHOCYTES # BLD AUTO: 0.87 10*3/MM3 (ref 0.7–3.1)
LYMPHOCYTES NFR BLD AUTO: 13.8 % (ref 19.6–45.3)
MCH RBC QN AUTO: 31 PG (ref 26.6–33)
MCHC RBC AUTO-ENTMCNC: 31.9 G/DL (ref 31.5–35.7)
MCV RBC AUTO: 97.3 FL (ref 79–97)
MONOCYTES # BLD AUTO: 0.66 10*3/MM3 (ref 0.1–0.9)
MONOCYTES NFR BLD AUTO: 10.5 % (ref 5–12)
NEUTROPHILS NFR BLD AUTO: 4.7 10*3/MM3 (ref 1.7–7)
NEUTROPHILS NFR BLD AUTO: 74.6 % (ref 42.7–76)
NRBC BLD AUTO-RTO: 0 /100 WBC (ref 0–0.2)
PLATELET # BLD AUTO: 227 10*3/MM3 (ref 140–450)
PMV BLD AUTO: 9.9 FL (ref 6–12)
POTASSIUM SERPL-SCNC: 3.5 MMOL/L (ref 3.5–5.2)
PROT SERPL-MCNC: 6.7 G/DL (ref 6–8.5)
RBC # BLD AUTO: 3.32 10*6/MM3 (ref 4.14–5.8)
SODIUM SERPL-SCNC: 151 MMOL/L (ref 136–145)
WBC NRBC COR # BLD: 6.3 10*3/MM3 (ref 3.4–10.8)

## 2023-06-03 PROCEDURE — 85730 THROMBOPLASTIN TIME PARTIAL: CPT | Performed by: INTERNAL MEDICINE

## 2023-06-03 PROCEDURE — 25010000002 HEPARIN (PORCINE) 25000-0.45 UT/250ML-% SOLUTION: Performed by: INTERNAL MEDICINE

## 2023-06-03 PROCEDURE — 85025 COMPLETE CBC W/AUTO DIFF WBC: CPT | Performed by: INTERNAL MEDICINE

## 2023-06-03 PROCEDURE — 99232 SBSQ HOSP IP/OBS MODERATE 35: CPT | Performed by: NURSE PRACTITIONER

## 2023-06-03 PROCEDURE — 25010000002 THIAMINE HCL 200 MG/2ML SOLUTION: Performed by: INTERNAL MEDICINE

## 2023-06-03 PROCEDURE — 80053 COMPREHEN METABOLIC PANEL: CPT | Performed by: INTERNAL MEDICINE

## 2023-06-03 PROCEDURE — 25010000002 HEPARIN (PORCINE) PER 1000 UNITS: Performed by: INTERNAL MEDICINE

## 2023-06-03 PROCEDURE — 25010000002 LORAZEPAM PER 2 MG: Performed by: INTERNAL MEDICINE

## 2023-06-03 PROCEDURE — 85730 THROMBOPLASTIN TIME PARTIAL: CPT | Performed by: HOSPITALIST

## 2023-06-03 RX ORDER — HYDRALAZINE HYDROCHLORIDE 20 MG/ML
10 INJECTION INTRAMUSCULAR; INTRAVENOUS EVERY 6 HOURS PRN
Status: DISCONTINUED | OUTPATIENT
Start: 2023-06-03 | End: 2023-06-07

## 2023-06-03 RX ORDER — DEXTROSE AND SODIUM CHLORIDE 5; .45 G/100ML; G/100ML
75 INJECTION, SOLUTION INTRAVENOUS CONTINUOUS
Status: DISCONTINUED | OUTPATIENT
Start: 2023-06-03 | End: 2023-06-07

## 2023-06-03 RX ADMIN — SODIUM CHLORIDE 75 ML/HR: 9 INJECTION, SOLUTION INTRAVENOUS at 05:43

## 2023-06-03 RX ADMIN — SODIUM CHLORIDE 12.5 MG/HR: 900 INJECTION, SOLUTION INTRAVENOUS at 02:01

## 2023-06-03 RX ADMIN — SODIUM CHLORIDE 15 MG/HR: 900 INJECTION, SOLUTION INTRAVENOUS at 23:35

## 2023-06-03 RX ADMIN — LORAZEPAM 1 MG: 2 INJECTION INTRAMUSCULAR; INTRAVENOUS at 08:43

## 2023-06-03 RX ADMIN — LORAZEPAM 0.5 MG: 2 INJECTION INTRAMUSCULAR; INTRAVENOUS at 07:00

## 2023-06-03 RX ADMIN — LORAZEPAM 1 MG: 2 INJECTION INTRAMUSCULAR; INTRAVENOUS at 07:52

## 2023-06-03 RX ADMIN — DEXTROSE AND SODIUM CHLORIDE 100 ML/HR: 5; 450 INJECTION, SOLUTION INTRAVENOUS at 17:22

## 2023-06-03 RX ADMIN — Medication 10 ML: at 21:44

## 2023-06-03 RX ADMIN — THIAMINE HYDROCHLORIDE 200 MG: 100 INJECTION, SOLUTION INTRAMUSCULAR; INTRAVENOUS at 06:59

## 2023-06-03 RX ADMIN — AMLODIPINE BESYLATE 10 MG: 10 TABLET ORAL at 08:43

## 2023-06-03 RX ADMIN — HEPARIN SODIUM 10 UNITS/KG/HR: 10000 INJECTION, SOLUTION INTRAVENOUS at 01:38

## 2023-06-03 RX ADMIN — METOPROLOL TARTRATE 5 MG: 5 INJECTION INTRAVENOUS at 21:44

## 2023-06-03 RX ADMIN — LORAZEPAM 1 MG: 2 INJECTION INTRAMUSCULAR; INTRAVENOUS at 05:43

## 2023-06-03 RX ADMIN — Medication 1 PATCH: at 08:43

## 2023-06-03 RX ADMIN — LORAZEPAM 0.5 MG: 2 INJECTION INTRAMUSCULAR; INTRAVENOUS at 21:44

## 2023-06-03 RX ADMIN — SODIUM CHLORIDE 12.5 MG/HR: 900 INJECTION, SOLUTION INTRAVENOUS at 00:04

## 2023-06-03 RX ADMIN — METOPROLOL SUCCINATE 25 MG: 25 TABLET, EXTENDED RELEASE ORAL at 08:43

## 2023-06-03 RX ADMIN — THIAMINE HYDROCHLORIDE 200 MG: 100 INJECTION, SOLUTION INTRAMUSCULAR; INTRAVENOUS at 17:22

## 2023-06-03 RX ADMIN — HEPARIN SODIUM 2800 UNITS: 5000 INJECTION INTRAVENOUS; SUBCUTANEOUS at 11:16

## 2023-06-03 RX ADMIN — SODIUM CHLORIDE 5 MG/HR: 900 INJECTION, SOLUTION INTRAVENOUS at 17:22

## 2023-06-03 RX ADMIN — HEPARIN SODIUM 12 UNITS/KG/HR: 10000 INJECTION, SOLUTION INTRAVENOUS at 21:44

## 2023-06-03 RX ADMIN — SODIUM CHLORIDE 15 MG/HR: 900 INJECTION, SOLUTION INTRAVENOUS at 11:16

## 2023-06-03 RX ADMIN — METOPROLOL TARTRATE 5 MG: 5 INJECTION INTRAVENOUS at 17:22

## 2023-06-03 RX ADMIN — LORAZEPAM 1 MG: 2 INJECTION INTRAMUSCULAR; INTRAVENOUS at 04:51

## 2023-06-03 NOTE — PROGRESS NOTES
Hospital Follow Up    LOS:  LOS: 1 day   Patient Name: Yuval Loja  Age/Sex: 72 y.o. male  : 1950  MRN: [id number]    Day of Service: 23   Length of Stay: 1  Encounter Provider: RICHMOND Crockett  Place of Service: [facility] CARDIOLOGY  Patient Care Team:  Alessia Prescott APRN as PCP - General (Family Medicine)  Jonny Bains MD as Referring Physician (Internal Medicine)  Carlos Villareal MD as Consulting Physician (Hematology and Oncology)    Subjective:     Chief Complaint: Paroxysmal atrial fibrillation, coronary artery disease.    Interval History: Remains confused and restless today     Objective:     Objective:  Temp:  [97.4 °F (36.3 °C)-98.6 °F (37 °C)] 97.7 °F (36.5 °C)  Heart Rate:  [] 115  Resp:  [16-18] 16  BP: (136-166)/() 140/85   No intake or output data in the 24 hours ending 23 1416  Body mass index is 22.4 kg/m².      23  0502 23  1200   Weight: 65.8 kg (145 lb) 68.8 kg (151 lb 10.8 oz)     Weight change:     Physical Exam:   General Appearance:    Awake alert and oriented in no acute distress.   Color:  Skin:  Neuro:  HEENT:    Lungs:     Pink  Warm and dry  No focal, motor or sensory deficits  Neck supple, pupils equal, round and reactive. No JVD, No Bruit  Clear to auscultation,respirations regular, even and                  unlabored    Heart:    Regular rate and rhythm, S1 and S2, no murmur, no gallop, no rub. No edema, DP/PT pulses are 2+   Chest Wall:    No abnormalities observed   Abdomen:     Normal bowel sounds, no masses, no organomegaly, soft        non-tender, non-distended, no guarding, no ascites noted   Extremities:   Moves all extremities well, no edema, no cyanosis, no redness       Lab Review:   Results from last 7 days   Lab Units 23  0732 23  0216   SODIUM mmol/L 151* 151*   POTASSIUM mmol/L 3.5 3.6   CHLORIDE mmol/L 113* 113*   CO2 mmol/L 23.2 22.6   BUN mg/dL 8 16  "  CREATININE mg/dL 1.47* 1.72*   GLUCOSE mg/dL 76 108*   CALCIUM mg/dL 9.5 9.7   AST (SGOT) U/L 38 27   ALT (SGPT) U/L 12 10     Results from last 7 days   Lab Units 06/02/23  0420 06/02/23  0216 06/01/23  0750 06/01/23  0528   HSTROP T ng/L 62* 57* 27* 33*     Results from last 7 days   Lab Units 06/03/23  0732 06/02/23  0216   WBC 10*3/mm3 6.30 5.92   HEMOGLOBIN g/dL 10.3* 10.7*   HEMATOCRIT % 32.3* 33.8*   PLATELETS 10*3/mm3 227 265     Results from last 7 days   Lab Units 06/03/23  0732 06/03/23  0027 06/02/23  0216 06/01/23  1908   INR   --   --   --  1.10   APTT seconds 68.6* 53.9*   < > 38.4*    < > = values in this interval not displayed.     Results from last 7 days   Lab Units 06/01/23  0528   MAGNESIUM mg/dL 2.1           Invalid input(s): LDLCALC  Results from last 7 days   Lab Units 06/01/23  0528   PROBNP pg/mL 1,327.0*         I reviewed the patient's new clinical results.  I personally viewed and interpreted the patient's EKG  Current Medications:   Scheduled Meds:amLODIPine, 10 mg, Oral, Daily  famotidine, 20 mg, Oral, BID  folic acid, 1 mg, Oral, Daily  metoprolol succinate XL, 25 mg, Oral, BID  nicotine, 1 patch, Transdermal, Q24H  rosuvastatin, 5 mg, Oral, Nightly  sucralfate, 1 g, Oral, 4x Daily AC & at Bedtime  thiamine (B-1) IV, 200 mg, Intravenous, Q8H   Followed by  [START ON 6/7/2023] thiamine, 100 mg, Oral, Daily      Continuous Infusions:dilTIAZem, 5 mg/hr, Last Rate: 15 mg/hr (06/03/23 1116)  heparin, 18 Units/kg/hr, Last Rate: 12 Units/kg/hr (06/03/23 1117)  sodium chloride, 75 mL/hr, Last Rate: 75 mL/hr (06/03/23 0543)        Allergies:  Allergies   Allergen Reactions    Mirtazapine Other (See Comments)     confused    Sertraline Anxiety, Diarrhea and Nausea And Vomiting     Also \"hallucinations\"       Assessment:       Generalized abdominal pain    1.  Abdominal pain with nausea, vomiting, and diarrhea  2.  Paroxysmal atrial fibrillation with RVR (new) heart rate remains elevated.  " Will add scheduled IV Lopressor in addition to diltiazem drip.  Currently at 15 mg an hour.  Nurse was unable to get him to take oral meds.  Also on heparin for anticoagulant.  3.  Acute kidney injury with stage III chronic kidney disease  4.  Coronary artery disease status post stenting to the RCA in 2000 and to the LAD in 2006  5.  History of heavy alcohol use - possible current alcohol withdrawal  6.  Recent sinus and junctional bradycardia requiring transvenous pacemaker on 4/10/2023 (resolved and felt to be metabolic the time)  7.  History of prior stroke  8.  Elevated high-sensitivity troponin, likely secondary to renal dysfunction  9.  Frequent PACs and PVCs  10.  Hypertension  11.  Hypernatremia       Plan:       heart rate still elevated.  Remains on Cardizem drip.  Unable to tolerate oral meds.  We will add IV Lopressor scheduled    RICHMOND Crockett  06/03/23  14:16 EDT  Electronically signed by RICHMOND Crockett, 06/03/23, 2:16 PM EDT.

## 2023-06-03 NOTE — NURSING NOTE
Per RICHMOND Man for [facility], hold hep gtt for one more hour then at 0040 I will restart at a decrease dose by 3 un/kg/hr which will be 10 un/kg/hr. Then I will recheck the PTT 6 hours later.

## 2023-06-03 NOTE — PLAN OF CARE
Problem: Adult Inpatient Plan of Care  Goal: Plan of Care Review  Outcome: Ongoing, Progressing  Flowsheets (Taken 6/3/2023 0642)  Progress: no change  Plan of Care Reviewed With: patient  Outcome Evaluation:   restraints in place   Q2H turn/tilt   geodon helped pt to be relaxed and sleep most of the shift, woke up anxious, restless, & agitated - PRN IV ativan given per protocol CIWA scoring   stool still needed   on RA   IVF cont   no c/o N/V/D   will cont to monitor   Goal Outcome Evaluation:  Plan of Care Reviewed With: patient        Progress: no change  Outcome Evaluation: restraints in place; Q2H turn/tilt; geodon helped pt to be relaxed and sleep most of the shift, woke up anxious, restless, & agitated - PRN IV ativan given per protocol CIWA scoring; stool still needed; on RA; IVF cont; no c/o N/V/D; will cont to monitor

## 2023-06-03 NOTE — NURSING NOTE
0025 - Lab has come up and drawn a PTT from a timed order from 2045 however that was already drawn and called to me. It was not put in the chart therefore it did not cancel out that order.  Hep gtt is currently still being held and I will restart it at 10 un/kg/hr at 0040 and recheck a PTT at 0640 per phone call with RICHMOND Man/[facility].

## 2023-06-03 NOTE — PROGRESS NOTES
LOS: 0 days   Patient Care Team:  Alessia Prescott APRN as PCP - General (Family Medicine)  Jonny Bains MD as Referring Physician (Internal Medicine)  Carlos Villareal MD as Consulting Physician (Hematology and Oncology)    Chief Complaint: Follow-up paroxysmal atrial fibrillation with RVR, coronary artery disease.    Interval History: He had a rough night.  He was agitated and pulled lines.  He was in soft restraints when I saw him earlier today.  He remained slightly confused.  There are concerns about alcohol withdrawal.  His rate was still elevated this morning.    Vital Signs:  Temp:  [97.8 °F (36.6 °C)-98.6 °F (37 °C)] 98.6 °F (37 °C)  Heart Rate:  [102-121] 109  Resp:  [18-20] 18  BP: (123-152)/(62-99) 144/76    Intake/Output Summary (Last 24 hours) at 6/2/2023 2026  Last data filed at 6/2/2023 1400  Gross per 24 hour   Intake 1620 ml   Output 400 ml   Net 1220 ml       Physical Exam:   General Appearance:    No acute distress, agitated and confused.   Lungs:     Clear to auscultation bilaterally     Heart:    Irregularly irregular rhythm with a tachycardic rate..  No murmurs, gallops, or  rubs.   Abdomen:     Soft, nontender, nondistended.    Extremities:   No clubbing, cyanosis, or edema.     Results Review:    Results from last 7 days   Lab Units 06/02/23  0216   SODIUM mmol/L 151*   POTASSIUM mmol/L 3.6   CHLORIDE mmol/L 113*   CO2 mmol/L 22.6   BUN mg/dL 16   CREATININE mg/dL 1.72*   GLUCOSE mg/dL 108*   CALCIUM mg/dL 9.7     Results from last 7 days   Lab Units 06/02/23  0420 06/02/23  0216 06/01/23  0750   HSTROP T ng/L 62* 57* 27*     Results from last 7 days   Lab Units 06/02/23  0216   WBC 10*3/mm3 5.92   HEMOGLOBIN g/dL 10.7*   HEMATOCRIT % 33.8*   PLATELETS 10*3/mm3 265     Results from last 7 days   Lab Units 06/02/23  0958 06/02/23  0216 06/01/23  1908   INR   --   --  1.10   APTT seconds >200.0* 107.2* 38.4*         Results from last 7 days   Lab Units 06/01/23  0528   MAGNESIUM  mg/dL 2.1           I reviewed the patient's new clinical results.        Assessment:  1.  Abdominal pain with nausea, vomiting, and diarrhea  2.  Paroxysmal atrial fibrillation with RVR (new)  3.  Acute kidney injury with stage III chronic kidney disease  4.  Coronary artery disease status post stenting to the RCA in 2000 and to the LAD in 2006  5.  History of heavy alcohol use - possible current alcohol withdrawal  6.  Recent sinus and junctional bradycardia requiring transvenous pacemaker on 4/10/2023 (resolved and felt to be metabolic the time)  7.  History of prior stroke  8.  Elevated high-sensitivity troponin, likely secondary to renal dysfunction  9.  Frequent PACs and PVCs  10.  Hypertension  11.  Hypernatremia    Plan:  -Again, there is significant concern he may be having alcohol withdrawal.  This may account for the altered mental status.  This is being managed by the medicine service.    -The atrial fibrillation is going to be difficult to control while he is agitated.  He was refusing oral medications earlier today.  Continue Cardizem drip.  I gave him another dose of IV digoxin this morning.  I will check a digoxin level tomorrow.    -Continue heparin drip for anticoagulation given the new atrial fibrillation.    Paul Schwarz MD  06/02/23  20:26 EDT

## 2023-06-03 NOTE — PROGRESS NOTES
"DAILY PROGRESS NOTE  [facility]    Patient Identification:  Name: Yuval Ljoa  Age: 72 y.o.  Sex: male  :  1950  MRN: [id number]         Primary Care Physician: Alessia Prescott APRN    Subjective: patient is much calmer today; no family is present    Interval History: follow up for alcohol withdrawal, a fib with rvr, nausea and vomiting; alcohol dependence     Objective:    Scheduled Meds:famotidine, 20 mg, Oral, BID  folic acid, 1 mg, Oral, Daily  metoprolol tartrate, 5 mg, Intravenous, Q6H  nicotine, 1 patch, Transdermal, Q24H  rosuvastatin, 5 mg, Oral, Nightly  sucralfate, 1 g, Oral, 4x Daily AC & at Bedtime  thiamine (B-1) IV, 200 mg, Intravenous, Q8H   Followed by  [START ON 2023] thiamine, 100 mg, Oral, Daily      Continuous Infusions:dextrose 5 % and sodium chloride 0.45 %, 100 mL/hr  dilTIAZem, 5 mg/hr, Last Rate: 15 mg/hr (23 1116)  heparin, 18 Units/kg/hr, Last Rate: 12 Units/kg/hr (23 1117)        Vital signs in last 24 hours:  Temp:  [97.4 °F (36.3 °C)-98.6 °F (37 °C)] 97.7 °F (36.5 °C)  Heart Rate:  [] 115  Resp:  [16-18] 16  BP: (136-166)/() 140/85    Intake/Output:  No intake or output data in the 24 hours ending 23 1654    Exam:  /85 (BP Location: Left arm, Patient Position: Lying)   Pulse 115   Temp 97.7 °F (36.5 °C) (Oral)   Resp 16   Ht 175.3 cm (69\")   Wt 68.8 kg (151 lb 10.8 oz)   SpO2 93%   BMI 22.40 kg/m²     General Appearance:    Drowsy, thin, chronically ill appearing                          Head:    Normocephalic, without obvious abnormality, atraumatic                           Eyes:     both eyes closed                         Throat:   Lips, tongue, gums normal; oral mucosa pink and moist                           Neck:   Supple, symmetrical, trachea midline, no JVD                         Lungs:    Clear to auscultation bilaterally, respirations unlabored                 Chest Wall:    No tenderness or " deformity                          Heart:    Regular rate and rhythm, S1 and S2 normal, no murmur,no  rub or gallop                  Abdomen:     Soft, nontender, bowel sounds active, no masses, no organomegaly                  Extremities:   Extremities normal, atraumatic, no cyanosis or edema                        Pulses:   Pulses palpable in all extremities                            Skin:   Skin is warm and dry,  no rashes or palpable lesions                        Data Review:  Labs in chart were reviewed.  WBC   Date Value Ref Range Status   06/03/2023 6.30 3.40 - 10.80 10*3/mm3 Final     Hemoglobin   Date Value Ref Range Status   06/03/2023 10.3 (L) 13.0 - 17.7 g/dL Final     Hematocrit   Date Value Ref Range Status   06/03/2023 32.3 (L) 37.5 - 51.0 % Final     Platelets   Date Value Ref Range Status   06/03/2023 227 140 - 450 10*3/mm3 Final     Sodium   Date Value Ref Range Status   06/03/2023 151 (H) 136 - 145 mmol/L Final     Potassium   Date Value Ref Range Status   06/03/2023 3.5 3.5 - 5.2 mmol/L Final     Chloride   Date Value Ref Range Status   06/03/2023 113 (H) 98 - 107 mmol/L Final     CO2   Date Value Ref Range Status   06/03/2023 23.2 22.0 - 29.0 mmol/L Final     BUN   Date Value Ref Range Status   06/03/2023 8 8 - 23 mg/dL Final     Creatinine   Date Value Ref Range Status   06/03/2023 1.47 (H) 0.76 - 1.27 mg/dL Final     Glucose   Date Value Ref Range Status   06/03/2023 76 65 - 99 mg/dL Final     Calcium   Date Value Ref Range Status   06/03/2023 9.5 8.6 - 10.5 mg/dL Final     Magnesium   Date Value Ref Range Status   06/01/2023 2.1 1.6 - 2.4 mg/dL Final         Assessment:  Active Hospital Problems    Diagnosis  POA    **Generalized abdominal pain [R10.84]  Yes      Resolved Hospital Problems   No resolved problems to display.   Alcohol withdrawal  Alcohol dependence  A fib with rvt  hypernatremia    Plan:  Change iv fluids  Trend sodium  Monitor on telemetry  Continue ciwa protocol  Labs  reviewed  High riski    Nicole Lewis MD  6/3/2023  16:54 EDT

## 2023-06-04 LAB
APTT PPP: 101.4 SECONDS (ref 22.7–35.4)
APTT PPP: 68.5 SECONDS (ref 22.7–35.4)
BASOPHILS # BLD AUTO: 0.01 10*3/MM3 (ref 0–0.2)
BASOPHILS NFR BLD AUTO: 0.2 % (ref 0–1.5)
DEPRECATED RDW RBC AUTO: 43 FL (ref 37–54)
EOSINOPHIL # BLD AUTO: 0.06 10*3/MM3 (ref 0–0.4)
EOSINOPHIL NFR BLD AUTO: 1.2 % (ref 0.3–6.2)
ERYTHROCYTE [DISTWIDTH] IN BLOOD BY AUTOMATED COUNT: 12.5 % (ref 12.3–15.4)
HCT VFR BLD AUTO: 29.4 % (ref 37.5–51)
HGB BLD-MCNC: 9.6 G/DL (ref 13–17.7)
IMM GRANULOCYTES # BLD AUTO: 0.02 10*3/MM3 (ref 0–0.05)
IMM GRANULOCYTES NFR BLD AUTO: 0.4 % (ref 0–0.5)
LYMPHOCYTES # BLD AUTO: 0.9 10*3/MM3 (ref 0.7–3.1)
LYMPHOCYTES NFR BLD AUTO: 17.4 % (ref 19.6–45.3)
MCH RBC QN AUTO: 31.3 PG (ref 26.6–33)
MCHC RBC AUTO-ENTMCNC: 32.7 G/DL (ref 31.5–35.7)
MCV RBC AUTO: 95.8 FL (ref 79–97)
MONOCYTES # BLD AUTO: 0.53 10*3/MM3 (ref 0.1–0.9)
MONOCYTES NFR BLD AUTO: 10.3 % (ref 5–12)
NEUTROPHILS NFR BLD AUTO: 3.64 10*3/MM3 (ref 1.7–7)
NEUTROPHILS NFR BLD AUTO: 70.5 % (ref 42.7–76)
NRBC BLD AUTO-RTO: 0 /100 WBC (ref 0–0.2)
PLATELET # BLD AUTO: 199 10*3/MM3 (ref 140–450)
PMV BLD AUTO: 10.3 FL (ref 6–12)
RBC # BLD AUTO: 3.07 10*6/MM3 (ref 4.14–5.8)
WBC NRBC COR # BLD: 5.16 10*3/MM3 (ref 3.4–10.8)

## 2023-06-04 PROCEDURE — 25010000002 HEPARIN (PORCINE) PER 1000 UNITS: Performed by: INTERNAL MEDICINE

## 2023-06-04 PROCEDURE — 85730 THROMBOPLASTIN TIME PARTIAL: CPT | Performed by: HOSPITALIST

## 2023-06-04 PROCEDURE — 97110 THERAPEUTIC EXERCISES: CPT

## 2023-06-04 PROCEDURE — 25010000002 THIAMINE HCL 200 MG/2ML SOLUTION: Performed by: INTERNAL MEDICINE

## 2023-06-04 PROCEDURE — 97162 PT EVAL MOD COMPLEX 30 MIN: CPT

## 2023-06-04 PROCEDURE — 25010000002 LORAZEPAM PER 2 MG: Performed by: INTERNAL MEDICINE

## 2023-06-04 PROCEDURE — 85730 THROMBOPLASTIN TIME PARTIAL: CPT | Performed by: INTERNAL MEDICINE

## 2023-06-04 PROCEDURE — 85025 COMPLETE CBC W/AUTO DIFF WBC: CPT | Performed by: INTERNAL MEDICINE

## 2023-06-04 PROCEDURE — 99232 SBSQ HOSP IP/OBS MODERATE 35: CPT | Performed by: NURSE PRACTITIONER

## 2023-06-04 RX ORDER — NITROGLYCERIN 0.4 MG/1
0.4 TABLET SUBLINGUAL
Status: DISCONTINUED | OUTPATIENT
Start: 2023-06-04 | End: 2023-06-07

## 2023-06-04 RX ADMIN — METOPROLOL TARTRATE 5 MG: 1 INJECTION, SOLUTION INTRAVENOUS at 18:51

## 2023-06-04 RX ADMIN — DEXTROSE AND SODIUM CHLORIDE 100 ML/HR: 5; 450 INJECTION, SOLUTION INTRAVENOUS at 04:21

## 2023-06-04 RX ADMIN — HEPARIN SODIUM 2800 UNITS: 5000 INJECTION INTRAVENOUS; SUBCUTANEOUS at 18:51

## 2023-06-04 RX ADMIN — DEXTROSE AND SODIUM CHLORIDE 100 ML/HR: 5; 450 INJECTION, SOLUTION INTRAVENOUS at 21:55

## 2023-06-04 RX ADMIN — SODIUM CHLORIDE 10 MG/HR: 900 INJECTION, SOLUTION INTRAVENOUS at 08:58

## 2023-06-04 RX ADMIN — SODIUM CHLORIDE 5 MG/HR: 900 INJECTION, SOLUTION INTRAVENOUS at 21:54

## 2023-06-04 RX ADMIN — THIAMINE HYDROCHLORIDE 200 MG: 100 INJECTION, SOLUTION INTRAMUSCULAR; INTRAVENOUS at 00:30

## 2023-06-04 RX ADMIN — Medication 1 PATCH: at 08:58

## 2023-06-04 RX ADMIN — METOPROLOL TARTRATE 5 MG: 1 INJECTION, SOLUTION INTRAVENOUS at 21:44

## 2023-06-04 RX ADMIN — METOPROLOL TARTRATE 5 MG: 5 INJECTION INTRAVENOUS at 14:18

## 2023-06-04 RX ADMIN — DEXTROSE AND SODIUM CHLORIDE 100 ML/HR: 5; 450 INJECTION, SOLUTION INTRAVENOUS at 14:18

## 2023-06-04 RX ADMIN — METOPROLOL TARTRATE 5 MG: 5 INJECTION INTRAVENOUS at 04:20

## 2023-06-04 RX ADMIN — METOPROLOL TARTRATE 5 MG: 5 INJECTION INTRAVENOUS at 08:58

## 2023-06-04 RX ADMIN — THIAMINE HYDROCHLORIDE 200 MG: 100 INJECTION, SOLUTION INTRAMUSCULAR; INTRAVENOUS at 08:58

## 2023-06-04 RX ADMIN — THIAMINE HYDROCHLORIDE 200 MG: 100 INJECTION, SOLUTION INTRAMUSCULAR; INTRAVENOUS at 14:18

## 2023-06-04 RX ADMIN — LORAZEPAM 1 MG: 2 INJECTION INTRAMUSCULAR; INTRAVENOUS at 04:20

## 2023-06-04 RX ADMIN — LORAZEPAM 1 MG: 2 INJECTION INTRAMUSCULAR; INTRAVENOUS at 00:30

## 2023-06-04 RX ADMIN — THIAMINE HYDROCHLORIDE 200 MG: 100 INJECTION, SOLUTION INTRAMUSCULAR; INTRAVENOUS at 21:44

## 2023-06-04 NOTE — THERAPY EVALUATION
Patient Name: Yuval Loja  : 1950    MRN: [id number]                              Today's Date: 2023       Admit Date: 2023    Visit Dx:     ICD-10-CM ICD-9-CM   1. Generalized abdominal pain  R10.84 789.07   2. CRISTIANO (acute kidney injury)  N17.9 584.9   3. Nausea and vomiting, unspecified vomiting type  R11.2 787.01     Patient Active Problem List   Diagnosis    Alcoholic ketoacidosis    Alcohol dependence    Methamphetamine abuse    Fatty liver, alcoholic    Nausea vomiting and diarrhea    Alcoholic liver disease    Metabolic acidosis    Tobacco abuse    Coronary artery disease involving native coronary artery of native heart without angina pectoris    Tachycardia    Sinus tachycardia    Chronic kidney disease, stage 3    Medically noncompliant    Visual hallucinations    Psychosis    Hyponatremia    CAD (coronary artery disease)    Leukopenia    Symptomatic anemia    Headache    Substance abuse    Gastrointestinal hemorrhage    CRISTIANO (acute kidney injury) (HCC)    Hyperkalemia    Right lower quadrant abdominal pain    New onset atrial fibrillation    History of drug abuse    COPD (chronic obstructive pulmonary disease)    Right inguinal hernia    Constipation    Status post right hip replacement    Right hip pain    Sinus bradycardia    Generalized abdominal pain     Past Medical History:   Diagnosis Date    Alcohol abuse     Arthritis     CAD (coronary artery disease)     Chronic kidney disease, stage 3     COPD (chronic obstructive pulmonary disease)     Disease of thyroid gland     Elevated cholesterol     Fatty liver, alcoholic     GERD (gastroesophageal reflux disease)     History of transfusion     Hypertensive urgency     Metabolic acidosis     Myocardial infarction     Sinus tachycardia     Sleep apnea     Stroke      Past Surgical History:   Procedure Laterality Date    BACK SURGERY      CARDIAC CATHETERIZATION      CARDIAC ELECTROPHYSIOLOGY PROCEDURE N/A 4/10/2023    Procedure: Temporary  Pacemaker;  Surgeon: Vaibhav Bonilla MD;  Location: [facility] CATH INVASIVE LOCATION;  Service: Cardiovascular;  Laterality: N/A;    COLONOSCOPY      ENDOSCOPY      FRACTURE SURGERY      JOINT REPLACEMENT      SKIN BIOPSY      TOTAL HIP ARTHROPLASTY Right 06/27/2022    Procedure: TOTAL HIP ARTHROPLASTY ANTERIOR WITH HANA TABLE;  Surgeon: Willian Quiles MD;  Location: [facility] MAIN OR;  Service: Orthopedics;  Laterality: Right;  Depuy, carli. everett      General Information       Row Name 06/04/23 1219          Physical Therapy Time and Intention    Document Type evaluation  -AR     Mode of Treatment physical therapy  -AR       Row Name 06/04/23 1219          General Information    Patient Profile Reviewed yes  -AR     Prior Level of Function independent:;min assist:  difficult historian but sounds like pt normally uses cane, sig. other assists as needed, lives very close, they're within walking distance if his statement is correct  -AR     Existing Precautions/Restrictions fall  -AR     Barriers to Rehab none identified  -AR       Row Name 06/04/23 1219          Living Environment    People in Home alone  -AR       Row Name 06/04/23 1219          Home Main Entrance    Number of Stairs, Main Entrance --  unable to obtain 2/2 cognition  -AR       Row Name 06/04/23 1219          Cognition    Orientation Status (Cognition) disoriented to;situation;place;time  -AR       Row Name 06/04/23 1219          Safety Issues, Functional Mobility    Safety Issues Affecting Function (Mobility) insight into deficits/self-awareness;problem-solving  -AR     Impairments Affecting Function (Mobility) balance;cognition;endurance/activity tolerance;strength;pain  -AR               User Key  (r) = Recorded By, (t) = Taken By, (c) = Cosigned By      Initials Name Provider Type    AR Makayla Zee PT Physical Therapist                   Mobility       Row Name 06/04/23 1220          Bed Mobility    Bed Mobility  supine-sit;sit-supine  -AR     Supine-Sit [place] (Bed Mobility) minimum assist (75% patient effort)  -AR     Sit-Supine [place] (Bed Mobility) standby assist  -AR     Comment, (Bed Mobility) scooting towards  HOB while in supine with SBA  -AR       Row Name 06/04/23 1220          Sit-Stand Transfer    Sit-Stand [place] (Transfers) minimum assist (75% patient effort)  -AR     Comment, (Sit-Stand Transfer) sit<>stand twice w/ HHA  -AR       Row Name 06/04/23 1220          Gait/Stairs (Locomotion)    [place] Level (Gait) minimum assist (75% patient effort)  -AR     Distance in Feet (Gait) 2 small steps towards HOB, HHAx2 on PT, no knee buckling; limted by fatigue  -AR     Deviations/Abnormal Patterns (Gait) festinating/shuffling;gait speed decreased  -AR               User Key  (r) = Recorded By, (t) = Taken By, (c) = Cosigned By      Initials Name Provider Type    AR Makayla Zee, PT Physical Therapist                   Obj/Interventions       Row Name 06/04/23 1221          Range of Motion Comprehensive    Comment, General Range of Motion B LE WFL  -AR       Row Name 06/04/23 1221          Strength Comprehensive (MMT)    Comment, General Manual Muscle Testing (MMT) Assessment B LE at least 3+/5 during mobility  -AR       Row Name 06/04/23 1221          Balance    Balance Assessment sitting static balance;standing static balance  -AR     Static Sitting Balance standby assist  -AR     Static Standing Balance minimal assist  -AR               User Key  (r) = Recorded By, (t) = Taken By, (c) = Cosigned By      Initials Name Provider Type    Makayla Sommers, PT Physical Therapist                   Goals/Plan       Row Name 06/04/23 1224          Bed Mobility Goal 1 (PT)    Activity/Assistive Device (Bed Mobility Goal 1, PT) bed mobility activities, all  -AR     [place] Level/Cues Needed (Bed Mobility Goal 1, PT) modified independence  -AR     Time Frame (Bed Mobility Goal 1, PT) 1 week   -AR       Row Name 06/04/23 1224          Transfer Goal 1 (PT)    Activity/Assistive Device (Transfer Goal 1, PT) sit-to-stand/stand-to-sit;bed-to-chair/chair-to-bed;walker, rolling  -AR     [place]/Cues Needed (Transfer Goal 1, PT) standby assist  -AR     Time Frame (Transfer Goal 1, PT) 1 week  -AR       Row Name 06/04/23 1224          Gait Training Goal 1 (PT)    Activity/Assistive Device (Gait Training Goal 1, PT) gait (walking locomotion);walker, rolling  -AR     [place] (Gait Training Goal 1, PT) standby assist  -AR     Distance (Gait Training Goal 1, PT) 100  -AR     Time Frame (Gait Training Goal 1, PT) 1 week  -AR       Row Name 06/04/23 1224          Therapy Assessment/Plan (PT)    Planned Therapy Interventions (PT) balance training;bed mobility training;gait training;home exercise program;patient/family education;transfer training;ROM (range of motion);stair training;strengthening  -AR               User Key  (r) = Recorded By, (t) = Taken By, (c) = Cosigned By      Initials Name Provider Type    AR Makayla Zee, PT Physical Therapist                   Clinical Impression       Row Name 06/04/23 1221          Pain    Pretreatment Pain Rating 0/10 - no pain  -AR     Posttreatment Pain Rating 0/10 - no pain  -AR     Pain Intervention(s) Repositioned  -AR       Row Name 06/04/23 1221          Plan of Care Review    Outcome Evaluation Pt admitted with abdominal pain, h/o EtOH abuse.  Pt presents today pleasantly confused, following simple instructions, with generalized weakness and limited activity tolerance.  Pt is in B UE and single LE soft restraints.  Required min A for supine>sit, stood twice w/ min A, and able to take a step towards HOB with min A.  returned to supine w/ stand by assist.   Disucssed pt performance w/ nursing staff.  May benefit from DC to SNU  pending progress while inpatient.  -AR       Row Name 06/04/23 1221          Therapy Assessment/Plan (PT)    Rehab  Potential (PT) good, to achieve stated therapy goals  -AR     Criteria for Skilled Interventions Met (PT) yes  -AR     Therapy Frequency (PT) 5 times/wk  -AR       Row Name 06/04/23 1221          Vital Signs    O2 Delivery Pre Treatment supplemental O2  -AR       Row Name 06/04/23 1221          Positioning and Restraints    Pre-Treatment Position in bed  -AR     Post Treatment Position bed  -AR     In Bed notified nsg;supine;call light within reach;encouraged to call for assist;exit alarm on  zone 2 bed alarm  -AR     Restraints reapplied:;notified nsg:;soft limb  B UE and R LE restrain reapplied  -AR               User Key  (r) = Recorded By, (t) = Taken By, (c) = Cosigned By      Initials Name Provider Type    AR Makayla Zee PT Physical Therapist                   Outcome Measures       Row Name 06/04/23 1225          How much help from another person do you currently need...    Turning from your back to your side while in flat bed without using bedrails? 3  -AR     Moving from lying on back to sitting on the side of a flat bed without bedrails? 3  -AR     Moving to and from a bed to a chair (including a wheelchair)? 2  -AR     Standing up from a chair using your arms (e.g., wheelchair, bedside chair)? 3  -AR     Climbing 3-5 steps with a railing? 1  -AR     To walk in hospital room? 2  -AR     AM-PAC 6 Clicks Score (PT) 14  -AR     Highest level of mobility 4 --> Transferred to chair/commode  -AR       Row Name 06/04/23 1225          Functional Assessment    Outcome Measure Options AM-PAC 6 Clicks Basic Mobility (PT)  -AR               User Key  (r) = Recorded By, (t) = Taken By, (c) = Cosigned By      Initials Name Provider Type    Makayla Sommers, PT Physical Therapist                                 Physical Therapy Education       Title: PT OT SLP Therapies (In Progress)       Topic: Physical Therapy (In Progress)       Point: Mobility training (In Progress)       Learning Progress Summary              Patient Acceptance, E, NR,NL by AR at 6/4/2023 1225                         Point: Home exercise program (In Progress)       Learning Progress Summary             Patient Acceptance, E, NR,NL by AR at 6/4/2023 1225                         Point: Body mechanics (In Progress)       Learning Progress Summary             Patient Acceptance, E, NR,NL by AR at 6/4/2023 1225                         Point: Precautions (In Progress)       Learning Progress Summary             Patient Acceptance, E, NR,NL by AR at 6/4/2023 1225                                         User Key       Initials Effective Dates Name Provider Type Discipline    AR 06/16/21 -  Makayla Zee, PT Physical Therapist PT                  PT Recommendation and Plan  Planned Therapy Interventions (PT): balance training, bed mobility training, gait training, home exercise program, patient/family education, transfer training, ROM (range of motion), stair training, strengthening  Outcome Evaluation: Pt admitted with abdominal pain, h/o EtOH abuse.  Pt presents today pleasantly confused, following simple instructions, with generalized weakness and limited activity tolerance.  Pt is in B UE and single LE soft restraints.  Required min A for supine>sit, stood twice w/ min A, and able to take a step towards HOB with min A.  returned to supine w/ stand by assist.   Disucssed pt performance w/ nursing staff.  May benefit from DC to SNU  pending progress while inpatient.     Time Calculation:    PT Charges       Row Name 06/04/23 1218             Time Calculation    Start Time 0941  -AR      Stop Time 1010  -AR      Time Calculation (min) 29 min  -AR      PT Received On 06/04/23  -AR      PT - Next Appointment 06/05/23  -AR      PT Goal Re-Cert Due Date 06/11/23  -AR                User Key  (r) = Recorded By, (t) = Taken By, (c) = Cosigned By      Initials Name Provider Type    AR Makayla Zee, PT Physical Therapist                  Therapy Charges for  Today       Code Description Service Date Service Provider Modifiers Qty    79520514419 HC PT EVAL MOD COMPLEXITY 4 6/4/2023 Makayla Zee, PT GP 1    42679010283 HC PT THER PROC EA 15 MIN 6/4/2023 Makayla Zee, PT GP 1            PT G-Codes  Outcome Measure Options: AM-PAC 6 Clicks Basic Mobility (PT)  AM-PAC 6 Clicks Score (PT): 14  PT Discharge Summary  Anticipated Discharge Disposition (PT): skilled nursing facility, home with assist    Makayla Zee PT  6/4/2023

## 2023-06-04 NOTE — PROGRESS NOTES
"DAILY PROGRESS NOTE  [facility]    Patient Identification:  Name: Yuval Loja  Age: 72 y.o.  Sex: male  :  1950  MRN: [id number]         Primary Care Physician: Alessia Prescott APRN    Subjective: patient is less agitated and much more alert; eating  Interval History: follow up for alcohol withdrawal delirium; alcohol dependence, cad, paf     Objective:    Scheduled Meds:famotidine, 20 mg, Oral, BID  folic acid, 1 mg, Oral, Daily  metoprolol tartrate, 5 mg, Intravenous, Q4H  nicotine, 1 patch, Transdermal, Q24H  rosuvastatin, 5 mg, Oral, Nightly  sucralfate, 1 g, Oral, 4x Daily AC & at Bedtime  thiamine (B-1) IV, 200 mg, Intravenous, Q8H   Followed by  [START ON 2023] thiamine, 100 mg, Oral, Daily      Continuous Infusions:dextrose 5 % and sodium chloride 0.45 %, 100 mL/hr, Last Rate: 100 mL/hr (23 1418)  dilTIAZem, 5 mg/hr, Last Rate: 5 mg/hr (23 1459)  heparin, 18 Units/kg/hr, Last Rate: 10 Units/kg/hr (23 0855)        Vital signs in last 24 hours:  Temp:  [97.7 °F (36.5 °C)-98.8 °F (37.1 °C)] 98.5 °F (36.9 °C)  Heart Rate:  [] 83  Resp:  [16] 16  BP: (118-149)/(60-99) 148/68    Intake/Output:    Intake/Output Summary (Last 24 hours) at 2023 1638  Last data filed at 2023 1605  Gross per 24 hour   Intake 1338.33 ml   Output 950 ml   Net 388.33 ml       Exam:  /68 (BP Location: Left arm, Patient Position: Lying)   Pulse 83   Temp 98.5 °F (36.9 °C) (Oral)   Resp 16   Ht 175.3 cm (69\")   Wt 68.8 kg (151 lb 10.8 oz)   SpO2 100%   BMI 22.40 kg/m²     General Appearance:    Alert, cooperative, no distress, AAOx2                          Head:    Normocephalic, without obvious abnormality, atraumatic                           Eyes:    PERRL, conjunctivae/corneas clear, EOM's intact, both eyes                         Throat:   Lips, tongue, gums normal; oral mucosa pink and moist                           Neck:   Supple, symmetrical, trachea " midline, no JVD                         Lungs:    decreased breath sounds bilaterally, respirations unlabored                 Chest Wall:    No tenderness or deformity                          Heart:    irregular rate and rhythm, S1 and S2 normal, no murmur,no  rub or gallop                  Abdomen:     Soft, nontender, bowel sounds active, no masses, no organomegaly                  Extremities:   Extremities normal, atraumatic, no cyanosis or edema                        Pulses:   Pulses palpable in all extremities                            Skin:   Skin is warm and dry,  no rashes or palpable lesions                     Data Review:  Labs in chart were reviewed.  WBC   Date Value Ref Range Status   06/04/2023 5.16 3.40 - 10.80 10*3/mm3 Final     Hemoglobin   Date Value Ref Range Status   06/04/2023 9.6 (L) 13.0 - 17.7 g/dL Final     Hematocrit   Date Value Ref Range Status   06/04/2023 29.4 (L) 37.5 - 51.0 % Final     Platelets   Date Value Ref Range Status   06/04/2023 199 140 - 450 10*3/mm3 Final     Sodium   Date Value Ref Range Status   06/03/2023 151 (H) 136 - 145 mmol/L Final     Potassium   Date Value Ref Range Status   06/03/2023 3.5 3.5 - 5.2 mmol/L Final     Chloride   Date Value Ref Range Status   06/03/2023 113 (H) 98 - 107 mmol/L Final     CO2   Date Value Ref Range Status   06/03/2023 23.2 22.0 - 29.0 mmol/L Final     BUN   Date Value Ref Range Status   06/03/2023 8 8 - 23 mg/dL Final     Creatinine   Date Value Ref Range Status   06/03/2023 1.47 (H) 0.76 - 1.27 mg/dL Final     Glucose   Date Value Ref Range Status   06/03/2023 76 65 - 99 mg/dL Final     Calcium   Date Value Ref Range Status   06/03/2023 9.5 8.6 - 10.5 mg/dL Final         Assessment:  Active Hospital Problems    Diagnosis  POA    **Generalized abdominal pain [R10.84]  Yes      Resolved Hospital Problems   No resolved problems to display.   Alcohol withdrawal  Alcohol dependence  Paf  hypertension    Plan:  He is improving  Continue  [facility] protocol and restraints  Psychiatry to see tomorrow  On heparin and iv lopressor as well as cardizem drip  Cardiology is following   nurse  High malik Lewis MD  6/4/2023  16:38 EDT

## 2023-06-04 NOTE — PLAN OF CARE
Goal Outcome Evaluation:              Outcome Evaluation: Pt admitted with abdominal pain, h/o EtOH abuse.  Pt presents today pleasantly confused, following simple instructions, with generalized weakness and limited activity tolerance.  Pt is in B UE and single LE soft restraints.  Required min A for supine>sit, stood twice w/ min A, and able to take a step towards HOB with min A.  returned to supine w/ stand by assist.   Disucssed pt performance w/ nursing staff.  May benefit from DC to SNU  pending progress while inpatient.

## 2023-06-04 NOTE — PROGRESS NOTES
Hospital Follow Up    LOS:  LOS: 2 days   Patient Name: Yuval Loja  Age/Sex: 72 y.o. male  : 1950  MRN: [id number]    Day of Service: 23   Length of Stay: 2  Encounter Provider: RICHMOND Crockett  Place of Service: [facility] CARDIOLOGY  Patient Care Team:  Alessia Prescott APRN as PCP - General (Family Medicine)  Jonny Bains MD as Referring Physician (Internal Medicine)  Carlos Villareal MD as Consulting Physician (Hematology and Oncology)    Subjective:     Chief Complaint: PAF, CAD    Interval History: no chest pain today. More appropriate today    Objective:     Objective:  Temp:  [97.7 °F (36.5 °C)-98.8 °F (37.1 °C)] 98.5 °F (36.9 °C)  Heart Rate:  [] 130  Resp:  [16] 16  BP: (118-149)/(60-99) 127/71     Intake/Output Summary (Last 24 hours) at 2023 1452  Last data filed at 2023 0505  Gross per 24 hour   Intake 1218.33 ml   Output 450 ml   Net 768.33 ml     Body mass index is 22.4 kg/m².      23  0502 23  1200   Weight: 65.8 kg (145 lb) 68.8 kg (151 lb 10.8 oz)     Weight change:     Physical Exam:   General Appearance:    Awake alert and oriented in no acute distress.   Color:  Skin:  Neuro:  HEENT:    Lungs:     Pink  Warm and dry  No focal, motor or sensory deficits  Neck supple, pupils equal, round and reactive. No JVD, No Bruit  Clear to auscultation,respirations regular, even and                  unlabored    Heart:    Irreg/irreg rate and rhythm, S1 and S2, no murmur, no gallop, no rub. No edema, DP/PT pulses are 2+   Chest Wall:    No abnormalities observed   Abdomen:     Normal bowel sounds, no masses, no organomegaly, soft        non-tender, non-distended, no guarding, no ascites noted   Extremities:   Moves all extremities well, no edema, no cyanosis, no redness       Lab Review:   Results from last 7 days   Lab Units 23  0732 23  0216   SODIUM mmol/L 151* 151*   POTASSIUM mmol/L 3.5 3.6  "  CHLORIDE mmol/L 113* 113*   CO2 mmol/L 23.2 22.6   BUN mg/dL 8 16   CREATININE mg/dL 1.47* 1.72*   GLUCOSE mg/dL 76 108*   CALCIUM mg/dL 9.5 9.7   AST (SGOT) U/L 38 27   ALT (SGPT) U/L 12 10     Results from last 7 days   Lab Units 06/02/23  0420 06/02/23  0216 06/01/23  0750 06/01/23  0528   HSTROP T ng/L 62* 57* 27* 33*     Results from last 7 days   Lab Units 06/04/23  0612 06/03/23  0732   WBC 10*3/mm3 5.16 6.30   HEMOGLOBIN g/dL 9.6* 10.3*   HEMATOCRIT % 29.4* 32.3*   PLATELETS 10*3/mm3 199 227     Results from last 7 days   Lab Units 06/04/23  0612 06/03/23  1735 06/02/23  0216 06/01/23  1908   INR   --   --   --  1.10   APTT seconds 101.4* 94.3*   < > 38.4*    < > = values in this interval not displayed.     Results from last 7 days   Lab Units 06/01/23  0528   MAGNESIUM mg/dL 2.1           Invalid input(s): LDLCALC  Results from last 7 days   Lab Units 06/01/23  0528   PROBNP pg/mL 1,327.0*         I reviewed the patient's new clinical results.  I personally viewed and interpreted the patient's EKG  Current Medications:   Scheduled Meds:famotidine, 20 mg, Oral, BID  folic acid, 1 mg, Oral, Daily  metoprolol tartrate, 5 mg, Intravenous, Q6H  nicotine, 1 patch, Transdermal, Q24H  rosuvastatin, 5 mg, Oral, Nightly  sucralfate, 1 g, Oral, 4x Daily AC & at Bedtime  thiamine (B-1) IV, 200 mg, Intravenous, Q8H   Followed by  [START ON 6/7/2023] thiamine, 100 mg, Oral, Daily      Continuous Infusions:dextrose 5 % and sodium chloride 0.45 %, 100 mL/hr, Last Rate: 100 mL/hr (06/04/23 1418)  dilTIAZem, 5 mg/hr, Last Rate: 7.5 mg/hr (06/04/23 1418)  heparin, 18 Units/kg/hr, Last Rate: 10 Units/kg/hr (06/04/23 2661)        Allergies:  Allergies   Allergen Reactions    Mirtazapine Other (See Comments)     confused    Sertraline Anxiety, Diarrhea and Nausea And Vomiting     Also \"hallucinations\"       Assessment:       Generalized abdominal pain    1.  Abdominal pain with nausea, vomiting, and diarrhea  2.  Paroxysmal " atrial fibrillation with RVR (new) heart rate remains elevated.  on cardizem and IV lopressor Also on heparin for anticoagulant.  3.  Acute kidney injury with stage III chronic kidney disease  4.  Coronary artery disease status post stenting to the RCA in 2000 and to the LAD in 2006  5.  History of heavy alcohol use - possible current alcohol withdrawal  6.  Recent sinus and junctional bradycardia requiring transvenous pacemaker on 4/10/2023 (resolved and felt to be metabolic the time)  7.  History of prior stroke  8.  Elevated high-sensitivity troponin, likely secondary to renal dysfunction  9.  Frequent PACs and PVCs  10.  Hypertension  11.  Hypernatremia       Plan:   Still not able to have PO. HR has been up. Will increase lopressor to every 4 hours.        RICHMOND Crockett  06/04/23  14:52 EDT  Electronically signed by RICHMOND Crockett, 06/04/23, 2:52 PM EDT.

## 2023-06-05 LAB
ANION GAP SERPL CALCULATED.3IONS-SCNC: 8.1 MMOL/L (ref 5–15)
APTT PPP: 78.7 SECONDS (ref 22.7–35.4)
BASOPHILS # BLD AUTO: 0.01 10*3/MM3 (ref 0–0.2)
BASOPHILS NFR BLD AUTO: 0.3 % (ref 0–1.5)
BUN SERPL-MCNC: 6 MG/DL (ref 8–23)
BUN/CREAT SERPL: 4.4 (ref 7–25)
CALCIUM SPEC-SCNC: 9.1 MG/DL (ref 8.6–10.5)
CHLORIDE SERPL-SCNC: 113 MMOL/L (ref 98–107)
CO2 SERPL-SCNC: 25.9 MMOL/L (ref 22–29)
CREAT SERPL-MCNC: 1.35 MG/DL (ref 0.76–1.27)
DEPRECATED RDW RBC AUTO: 42.2 FL (ref 37–54)
EGFRCR SERPLBLD CKD-EPI 2021: 55.1 ML/MIN/1.73
EOSINOPHIL # BLD AUTO: 0.06 10*3/MM3 (ref 0–0.4)
EOSINOPHIL NFR BLD AUTO: 1.5 % (ref 0.3–6.2)
ERYTHROCYTE [DISTWIDTH] IN BLOOD BY AUTOMATED COUNT: 12.3 % (ref 12.3–15.4)
GLUCOSE SERPL-MCNC: 132 MG/DL (ref 65–99)
HCT VFR BLD AUTO: 28.1 % (ref 37.5–51)
HGB BLD-MCNC: 9.1 G/DL (ref 13–17.7)
IMM GRANULOCYTES # BLD AUTO: 0.02 10*3/MM3 (ref 0–0.05)
IMM GRANULOCYTES NFR BLD AUTO: 0.5 % (ref 0–0.5)
LYMPHOCYTES # BLD AUTO: 0.82 10*3/MM3 (ref 0.7–3.1)
LYMPHOCYTES NFR BLD AUTO: 20.6 % (ref 19.6–45.3)
MAGNESIUM SERPL-MCNC: 1.7 MG/DL (ref 1.6–2.4)
MCH RBC QN AUTO: 30.8 PG (ref 26.6–33)
MCHC RBC AUTO-ENTMCNC: 32.4 G/DL (ref 31.5–35.7)
MCV RBC AUTO: 95.3 FL (ref 79–97)
MONOCYTES # BLD AUTO: 0.46 10*3/MM3 (ref 0.1–0.9)
MONOCYTES NFR BLD AUTO: 11.5 % (ref 5–12)
NEUTROPHILS NFR BLD AUTO: 2.62 10*3/MM3 (ref 1.7–7)
NEUTROPHILS NFR BLD AUTO: 65.6 % (ref 42.7–76)
NRBC BLD AUTO-RTO: 0 /100 WBC (ref 0–0.2)
PLATELET # BLD AUTO: 172 10*3/MM3 (ref 140–450)
PMV BLD AUTO: 10.3 FL (ref 6–12)
POTASSIUM SERPL-SCNC: 3.5 MMOL/L (ref 3.5–5.2)
RBC # BLD AUTO: 2.95 10*6/MM3 (ref 4.14–5.8)
SODIUM SERPL-SCNC: 147 MMOL/L (ref 136–145)
WBC NRBC COR # BLD: 3.99 10*3/MM3 (ref 3.4–10.8)

## 2023-06-05 PROCEDURE — 25010000002 THIAMINE HCL 200 MG/2ML SOLUTION: Performed by: INTERNAL MEDICINE

## 2023-06-05 PROCEDURE — 80048 BASIC METABOLIC PNL TOTAL CA: CPT | Performed by: INTERNAL MEDICINE

## 2023-06-05 PROCEDURE — 97530 THERAPEUTIC ACTIVITIES: CPT

## 2023-06-05 PROCEDURE — 25010000002 HEPARIN (PORCINE) 25000-0.45 UT/250ML-% SOLUTION: Performed by: INTERNAL MEDICINE

## 2023-06-05 PROCEDURE — 83735 ASSAY OF MAGNESIUM: CPT | Performed by: INTERNAL MEDICINE

## 2023-06-05 PROCEDURE — 99232 SBSQ HOSP IP/OBS MODERATE 35: CPT | Performed by: INTERNAL MEDICINE

## 2023-06-05 PROCEDURE — 90791 PSYCH DIAGNOSTIC EVALUATION: CPT

## 2023-06-05 PROCEDURE — 85730 THROMBOPLASTIN TIME PARTIAL: CPT | Performed by: HOSPITALIST

## 2023-06-05 PROCEDURE — 85025 COMPLETE CBC W/AUTO DIFF WBC: CPT | Performed by: INTERNAL MEDICINE

## 2023-06-05 PROCEDURE — 97166 OT EVAL MOD COMPLEX 45 MIN: CPT

## 2023-06-05 RX ORDER — DILTIAZEM HYDROCHLORIDE 180 MG/1
180 CAPSULE, COATED, EXTENDED RELEASE ORAL
Status: DISCONTINUED | OUTPATIENT
Start: 2023-06-05 | End: 2023-06-08 | Stop reason: HOSPADM

## 2023-06-05 RX ORDER — CHOLECALCIFEROL (VITAMIN D3) 125 MCG
5 CAPSULE ORAL NIGHTLY PRN
Status: DISCONTINUED | OUTPATIENT
Start: 2023-06-05 | End: 2023-06-08

## 2023-06-05 RX ORDER — ACETAMINOPHEN 325 MG/1
650 TABLET ORAL EVERY 6 HOURS PRN
Status: DISCONTINUED | OUTPATIENT
Start: 2023-06-05 | End: 2023-06-08 | Stop reason: HOSPADM

## 2023-06-05 RX ADMIN — METOPROLOL TARTRATE 5 MG: 1 INJECTION, SOLUTION INTRAVENOUS at 07:06

## 2023-06-05 RX ADMIN — ROSUVASTATIN CALCIUM 5 MG: 5 TABLET, FILM COATED ORAL at 20:30

## 2023-06-05 RX ADMIN — METOPROLOL TARTRATE 25 MG: 25 TABLET, FILM COATED ORAL at 20:34

## 2023-06-05 RX ADMIN — SUCRALFATE 1 G: 1 TABLET ORAL at 20:30

## 2023-06-05 RX ADMIN — DEXTROSE AND SODIUM CHLORIDE 100 ML/HR: 5; 450 INJECTION, SOLUTION INTRAVENOUS at 18:49

## 2023-06-05 RX ADMIN — DILTIAZEM HYDROCHLORIDE 180 MG: 180 CAPSULE, COATED, EXTENDED RELEASE ORAL at 13:08

## 2023-06-05 RX ADMIN — THIAMINE HYDROCHLORIDE 200 MG: 100 INJECTION, SOLUTION INTRAMUSCULAR; INTRAVENOUS at 23:00

## 2023-06-05 RX ADMIN — METOPROLOL TARTRATE 5 MG: 1 INJECTION, SOLUTION INTRAVENOUS at 09:40

## 2023-06-05 RX ADMIN — FAMOTIDINE 20 MG: 20 TABLET, FILM COATED ORAL at 20:30

## 2023-06-05 RX ADMIN — METOPROLOL TARTRATE 5 MG: 1 INJECTION, SOLUTION INTRAVENOUS at 02:48

## 2023-06-05 RX ADMIN — ACETAMINOPHEN 650 MG: 325 TABLET, FILM COATED ORAL at 23:00

## 2023-06-05 RX ADMIN — METOPROLOL TARTRATE 25 MG: 25 TABLET, FILM COATED ORAL at 13:11

## 2023-06-05 RX ADMIN — Medication 5 MG: at 23:00

## 2023-06-05 RX ADMIN — HEPARIN SODIUM 12 UNITS/KG/HR: 10000 INJECTION, SOLUTION INTRAVENOUS at 07:06

## 2023-06-05 RX ADMIN — THIAMINE HYDROCHLORIDE 200 MG: 100 INJECTION, SOLUTION INTRAMUSCULAR; INTRAVENOUS at 13:08

## 2023-06-05 RX ADMIN — SUCRALFATE 1 G: 1 TABLET ORAL at 13:08

## 2023-06-05 RX ADMIN — THIAMINE HYDROCHLORIDE 200 MG: 100 INJECTION, SOLUTION INTRAMUSCULAR; INTRAVENOUS at 07:06

## 2023-06-05 RX ADMIN — Medication 1 PATCH: at 09:40

## 2023-06-05 NOTE — PLAN OF CARE
Goal Outcome Evaluation:  Plan of Care Reviewed With: patient           Outcome Evaluation: Pt seen for OT mony this AM. Admitted with abdominal pain. PMHx ETOH abuse. Pt remains in B soft wrist restraints and single RLE soft restraint. Difficult to obtain PLOF due to cognition. Today pt required cues for task sequencing and performed supine to sit with CGA. Pt attempted to don shoes but required mod A to complete. Stood with min A (HHA). Pt dependent with toileting with brief and purewick donned. Incontinent episode of bladder once purewick was removed for functional mobility. Required cues for sequencing bed mobility to return to supine. Pt began laying straight back onto bed impulsively. Pt presents with decreased cognition, balance, strength, ADL performance and functional mobility. Pt to benefit from skilled OT to address deficits and maximize independence with ADLs.

## 2023-06-05 NOTE — PROGRESS NOTES
"DAILY PROGRESS NOTE  [facility]    Patient Identification:  Name: Yuval Loja  Age: 72 y.o.  Sex: male  :  1950  MRN: [id number]         Primary Care Physician: Alessia Prescott APRN    Subjective: patient is alert, calmer;   Interval History: follow up for alcohol withdrawal, alcohol dependence,  a fib with rvr    Objective:    Scheduled Meds:dilTIAZem CD, 180 mg, Oral, Q24H  famotidine, 20 mg, Oral, BID  folic acid, 1 mg, Oral, Daily  metoprolol tartrate, 25 mg, Oral, Q12H  nicotine, 1 patch, Transdermal, Q24H  rosuvastatin, 5 mg, Oral, Nightly  sucralfate, 1 g, Oral, 4x Daily AC & at Bedtime  thiamine (B-1) IV, 200 mg, Intravenous, Q8H   Followed by  [START ON 2023] thiamine, 100 mg, Oral, Daily      Continuous Infusions:dextrose 5 % and sodium chloride 0.45 %, 100 mL/hr, Last Rate: 100 mL/hr (23)        Vital signs in last 24 hours:  Temp:  [98.3 °F (36.8 °C)-98.6 °F (37 °C)] 98.6 °F (37 °C)  Heart Rate:  [] 89  Resp:  [16] 16  BP: (119-163)/() 152/76    Intake/Output:    Intake/Output Summary (Last 24 hours) at 2023 1606  Last data filed at 2023 1300  Gross per 24 hour   Intake 1862 ml   Output 1200 ml   Net 662 ml       Exam:  /76 (BP Location: Left arm, Patient Position: Lying)   Pulse 89   Temp 98.6 °F (37 °C) (Oral)   Resp 16   Ht 175.3 cm (69\")   Wt 68.8 kg (151 lb 10.8 oz)   SpO2 99%   BMI 22.40 kg/m²     General Appearance:    Alert, cooperative, no distress, AAOx3; thin, chronically ill appearing                          Head:    Normocephalic, without obvious abnormality, atraumatic                           Eyes:    PERRL, conjunctivae/corneas clear, EOM's intact, both eyes                         Throat:   Lips, tongue, gums normal; oral mucosa pink and moist                           Neck:   Supple, symmetrical, trachea midline, no JVD                         Lungs:    Clear to auscultation bilaterally, respirations " unlabored                 Chest Wall:    No tenderness or deformity                          Heart:    Regular rate and rhythm, S1 and S2 normal, no murmur,no  rub or gallop                  Abdomen:     Soft, nontender, bowel sounds active, no masses, no organomegaly                  Extremities:   Extremities normal, atraumatic, no cyanosis or edema                        Pulses:   Pulses palpable in all extremities                            Skin:   Skin is warm and dry,  no rashes or palpable lesions                  d       Data Review:  Labs in chart were reviewed.  WBC   Date Value Ref Range Status   06/05/2023 3.99 3.40 - 10.80 10*3/mm3 Final     Hemoglobin   Date Value Ref Range Status   06/05/2023 9.1 (L) 13.0 - 17.7 g/dL Final     Hematocrit   Date Value Ref Range Status   06/05/2023 28.1 (L) 37.5 - 51.0 % Final     Platelets   Date Value Ref Range Status   06/05/2023 172 140 - 450 10*3/mm3 Final     Sodium   Date Value Ref Range Status   06/05/2023 147 (H) 136 - 145 mmol/L Final     Potassium   Date Value Ref Range Status   06/05/2023 3.5 3.5 - 5.2 mmol/L Final     Chloride   Date Value Ref Range Status   06/05/2023 113 (H) 98 - 107 mmol/L Final     CO2   Date Value Ref Range Status   06/05/2023 25.9 22.0 - 29.0 mmol/L Final     BUN   Date Value Ref Range Status   06/05/2023 6 (L) 8 - 23 mg/dL Final     Creatinine   Date Value Ref Range Status   06/05/2023 1.35 (H) 0.76 - 1.27 mg/dL Final     Glucose   Date Value Ref Range Status   06/05/2023 132 (H) 65 - 99 mg/dL Final     Calcium   Date Value Ref Range Status   06/05/2023 9.1 8.6 - 10.5 mg/dL Final     Magnesium   Date Value Ref Range Status   06/05/2023 1.7 1.6 - 2.4 mg/dL Final         Assessment:  Active Hospital Problems    Diagnosis  POA    **Generalized abdominal pain [R10.84]  Yes      Resolved Hospital Problems   No resolved problems to display.   Alcohol withdrawal  Alcohol dependence  Weakness  A fib with rvr      Plan:  Will continue to  monitor  On po meds now per cardiology  Out of restraints presently but gets agitated at night  Expect he cannot return to live alone and may need rehab  DLindaw nurse and patient  Medium risk    Nicole Lewis MD  6/5/2023  16:06 EDT

## 2023-06-05 NOTE — CONSULTS
"  Access center consult.    Met with patient in room #632. Introduced self and role. Patient agreed to be evaluated.    Patient is a 72 y.o. S/B/M. He is alert and oriented x3. Patient lives at home alone. Nondenominational: Islam. Children: 2. Occuption: retired. Hobbies: cleaning. Education: trade school. Legal: past incarceration. : Torri Loja (Daughter)[phone]. : none. Support system: family. History of violence/trauma/abuse:denies.    Access consulted for alcohol. Patient presented to the ED 6/1/23 with c/o abdominal pain x1 week and N/V/D. Past medical history anxiety, ETOH intoxication, ETOH abuse, depression, MI, stroke, fatty liver alcoholic, CAD, GERD, COPD, sleep apnea, thyroid disease. BAL normal. Initial use of ETOH began age 15 y.o. He denies any past substance use disorder treatment. Hx.UDS +meth.  Inquired with patient of last ETOH intake pt. became increasingly agitated stating \"it's been a while. That's not what's making me sick!\" Patient reports drinking \"a beer or two once in a while.\" Through out encounter patient agitated, irritable, yelling at times. Attempted to de-escalate, spoke in calm reassuring tone with some success. Patient evasive with responses stating \"I'm a grown man, if I want to drink a beer I will.\" Patient refuses to further discuss substance use or treatment options. He denies having any ETOH problem.  Patient uses tobacco. Current medication : nicotine patch. Last CIWA 7.    Patient denies any past inpatient psychiatric care. He denies any past or current mental health providers. Patient seen by Access multiple times for ETOH and has declined resources. Patient denies any SI/HI/A/V/H. Denies wish to be dead. No recent changes in sleep or appetite.Patient did not rate depression/anxiety but discussed being depressed due to \"nosy people\" and home repairs needed. Anxiety: \"plenty\" Patient again becoming verbally aggressive, angry, stating \"people like you " "just need to mind your own business.\"     Patient declined any recovery/outpatient psychiatry resources. Report given to primary RN. Referred patient back to medical. No further Access services at this time.   "

## 2023-06-05 NOTE — PROGRESS NOTES
LOS: 3 days   Patient Care Team:  Alessia Prescott APRN as PCP - General (Family Medicine)  Jonny Bains MD as Referring Physician (Internal Medicine)  Carlos Villareal MD as Consulting Physician (Hematology and Oncology)    Chief Complaint:     F/u a fib with rvr    Interval History:     He denies chest pain, difficulty breathing, heart racing or skipping, dizziness.  He does that he is nauseated with oral medications.  He does not look short winded lying in bed.  He is answering questions reasonably and did eat well yesterday but has been not taking his oral medications.    Echo 4/11/23    Interpretation Summary         Left ventricular systolic function is normal. Left ventricular ejection fraction appears to be 56 - 60%.    Left ventricular diastolic function was indeterminate.    There is calcification of the aortic valve.    Estimated right ventricular systolic pressure from tricuspid regurgitation is normal (<35 mmHg).       Objective   Vital Signs  Temp:  [98.3 °F (36.8 °C)-98.6 °F (37 °C)] 98.3 °F (36.8 °C)  Heart Rate:  [] 68  Resp:  [16] 16  BP: ()/() 129/88    Intake/Output Summary (Last 24 hours) at 6/5/2023 0932  Last data filed at 6/5/2023 0730  Gross per 24 hour   Intake 1862 ml   Output 1150 ml   Net 712 ml       Comfortable NAD  Neck supple, no JVD or thyromegaly appreciated  S1/S2 irregular, nontachycardic no m/r/g  Lungs CTA B, normal effort  Abdomen S/NT/ND (+) BS, no HSM appreciated  Extremities warm, no clubbing, cyanosis, or edema  No visible or palpable skin lesions  A/Ox4, mood and affect appropriate    Results Review:      Results from last 7 days   Lab Units 06/05/23  0054 06/03/23  0732 06/02/23  0216   SODIUM mmol/L 147* 151* 151*   POTASSIUM mmol/L 3.5 3.5 3.6   CHLORIDE mmol/L 113* 113* 113*   CO2 mmol/L 25.9 23.2 22.6   BUN mg/dL 6* 8 16   CREATININE mg/dL 1.35* 1.47* 1.72*   GLUCOSE mg/dL 132* 76 108*   CALCIUM mg/dL 9.1 9.5 9.7     Results from last 7  days   Lab Units 06/02/23  0420 06/02/23  0216 06/01/23  0750   HSTROP T ng/L 62* 57* 27*     Results from last 7 days   Lab Units 06/05/23  0054 06/04/23  0612 06/03/23  0732   WBC 10*3/mm3 3.99 5.16 6.30   HEMOGLOBIN g/dL 9.1* 9.6* 10.3*   HEMATOCRIT % 28.1* 29.4* 32.3*   PLATELETS 10*3/mm3 172 199 227     Results from last 7 days   Lab Units 06/05/23  0054 06/04/23  1456 06/04/23  0612 06/02/23  0216 06/01/23  1908   INR   --   --   --   --  1.10   APTT seconds 78.7* 68.5* 101.4*   < > 38.4*    < > = values in this interval not displayed.         Results from last 7 days   Lab Units 06/05/23  0054   MAGNESIUM mg/dL 1.7           I reviewed the patient's new clinical results.  I personally viewed and interpreted the patient's EKG/Telemetry data        Medication Review:   famotidine, 20 mg, Oral, BID  folic acid, 1 mg, Oral, Daily  metoprolol tartrate, 5 mg, Intravenous, Q4H  nicotine, 1 patch, Transdermal, Q24H  rosuvastatin, 5 mg, Oral, Nightly  sucralfate, 1 g, Oral, 4x Daily AC & at Bedtime  thiamine (B-1) IV, 200 mg, Intravenous, Q8H   Followed by  [START ON 6/7/2023] thiamine, 100 mg, Oral, Daily        dextrose 5 % and sodium chloride 0.45 %, 100 mL/hr, Last Rate: 100 mL/hr (06/04/23 2155)  dilTIAZem, 5 mg/hr, Last Rate: 5 mg/hr (06/04/23 2154)  heparin, 18 Units/kg/hr, Last Rate: 12 Units/kg/hr (06/05/23 0706)        Assessment & Plan       Generalized abdominal pain    Abdominal pain with nausea, vomiting, and diarrhea - resolved.   atrial fibrillation with RVR -heart rate controlled, has not been on anticoagulation at home because of compliance issues and GI bleeds.  Stop heparin, stop IV diltiazem, start oral diltiazem and oral metoprolol.  Acute kidney injury with stage III chronic kidney disease.  Coronary artery disease status post stenting to the RCA in 2000 and to the LAD in 2006  History of heavy alcohol use - current alcohol withdrawal  Recent sinus and junctional bradycardia requiring  transvenous pacemaker on 4/10/2023 (resolved and felt to be metabolic the time)  History of prior stroke  Elevated high-sensitivity troponin, likely secondary to renal dysfunction  Frequent PACs and PVCs  Hypertension  Hypernatremia    Overall better - change to po meds - will see.     Ann Marie Jennings MD  06/05/23  09:32 EDT

## 2023-06-05 NOTE — PLAN OF CARE
Goal Outcome Evaluation:           Progress: no change  Outcome Evaluation: vss, no complaints of pain, hep drip and cardizem drip dc'd, taking po meds at this, restraints continued, 02 2l

## 2023-06-05 NOTE — PLAN OF CARE
Goal Outcome Evaluation:  Plan of Care Reviewed With: patient           Outcome Evaluation: Pt participated with PT this morning. Pt remains in B soft wrist restraints. Completed supine >sit cga and stood with min A. Posterior lean noted in standing. Attempted to ambulate further from the bed today but pt had incontinent bladder episode once standing. Pt required significant cues for proper way to lay back supine and his agitation level increased some. Will continue to follow and progress as able.

## 2023-06-05 NOTE — THERAPY TREATMENT NOTE
Patient Name: Yuval Loja  : 1950    MRN: [id number]                              Today's Date: 2023       Admit Date: 2023    Visit Dx:     ICD-10-CM ICD-9-CM   1. Generalized abdominal pain  R10.84 789.07   2. CRISTIANO (acute kidney injury)  N17.9 584.9   3. Nausea and vomiting, unspecified vomiting type  R11.2 787.01     Patient Active Problem List   Diagnosis    Alcoholic ketoacidosis    Alcohol dependence    Methamphetamine abuse    Fatty liver, alcoholic    Nausea vomiting and diarrhea    Alcoholic liver disease    Metabolic acidosis    Tobacco abuse    Coronary artery disease involving native coronary artery of native heart without angina pectoris    Tachycardia    Sinus tachycardia    Chronic kidney disease, stage 3    Medically noncompliant    Visual hallucinations    Psychosis    Hyponatremia    CAD (coronary artery disease)    Leukopenia    Symptomatic anemia    Headache    Substance abuse    Gastrointestinal hemorrhage    CRISTIANO (acute kidney injury) (HCC)    Hyperkalemia    Right lower quadrant abdominal pain    New onset atrial fibrillation    History of drug abuse    COPD (chronic obstructive pulmonary disease)    Right inguinal hernia    Constipation    Status post right hip replacement    Right hip pain    Sinus bradycardia    Generalized abdominal pain     Past Medical History:   Diagnosis Date    Alcohol abuse     Arthritis     CAD (coronary artery disease)     Chronic kidney disease, stage 3     COPD (chronic obstructive pulmonary disease)     Disease of thyroid gland     Elevated cholesterol     Fatty liver, alcoholic     GERD (gastroesophageal reflux disease)     History of transfusion     Hypertensive urgency     Metabolic acidosis     Myocardial infarction     Sinus tachycardia     Sleep apnea     Stroke      Past Surgical History:   Procedure Laterality Date    BACK SURGERY      CARDIAC CATHETERIZATION      CARDIAC ELECTROPHYSIOLOGY PROCEDURE N/A 4/10/2023    Procedure: Temporary  Pacemaker;  Surgeon: Vaibhav Bonilla MD;  Location: [facility] CATH INVASIVE LOCATION;  Service: Cardiovascular;  Laterality: N/A;    COLONOSCOPY      ENDOSCOPY      FRACTURE SURGERY      JOINT REPLACEMENT      SKIN BIOPSY      TOTAL HIP ARTHROPLASTY Right 06/27/2022    Procedure: TOTAL HIP ARTHROPLASTY ANTERIOR WITH HANA TABLE;  Surgeon: Willian Quiles MD;  Location: [facility] MAIN OR;  Service: Orthopedics;  Laterality: Right;  Depuy, carliLinda floyd      General Information       Row Name 06/05/23 1201          Physical Therapy Time and Intention    Document Type therapy note (daily note)  -CW     Mode of Treatment physical therapy;individual therapy  -CW       Row Name 06/05/23 1201          General Information    Patient Profile Reviewed yes  -CW       Row Name 06/05/23 1201          Cognition    Orientation Status (Cognition) oriented to;person  -CW       Row Name 06/05/23 1201          Safety Issues, Functional Mobility    Safety Issues Affecting Function (Mobility) insight into deficits/self-awareness;judgment;problem-solving;awareness of need for assistance;safety precautions follow-through/compliance;sequencing abilities;safety precaution awareness;impulsivity  -CW     Impairments Affecting Function (Mobility) balance;cognition;endurance/activity tolerance;strength;pain  -CW               User Key  (r) = Recorded By, (t) = Taken By, (c) = Cosigned By      Initials Name Provider Type    CW Katie Álvarez PT Physical Therapist                   Mobility       Row Name 06/05/23 1201          Bed Mobility    Bed Mobility supine-sit;sit-supine  -CW     Supine-Sit [place] (Bed Mobility) contact guard  -CW     Sit-Supine [place] (Bed Mobility) verbal cues;nonverbal cues (demo/gesture);moderate assist (50% patient effort)  -CW     Assistive Device (Bed Mobility) head of bed elevated;bed rails  -CW     Comment, (Bed Mobility) many cues for return to supine, pt began impulsively laying straight  back on bed  -CW       Row Name 06/05/23 1201          Sit-Stand Transfer    Sit-Stand [place] (Transfers) minimum assist (75% patient effort);1 person assist;verbal cues;nonverbal cues (demo/gesture)  -CW     Comment, (Sit-Stand Transfer) HHA  -CW       Row Name 06/05/23 1201          Gait/Stairs (Locomotion)    [place] Level (Gait) minimum assist (75% patient effort);verbal cues;1 person assist;1 person to manage equipment  -CW     Assistive Device (Gait) other (see comments)  HHA  -CW     Distance in Feet (Gait) 2 steps forward  -CW     Comment, (Gait/Stairs) further gait limited due to incontinent episode  -CW               User Key  (r) = Recorded By, (t) = Taken By, (c) = Cosigned By      Initials Name Provider Type    Katie Whitaker PT Physical Therapist                   Obj/Interventions       Row Name 06/05/23 1203          Balance    Balance Assessment standing dynamic balance;standing static balance  -CW     Static Standing Balance minimal assist  -CW     Dynamic Standing Balance minimal assist  -CW     Comment, Balance posterior lean noted  -CW               User Key  (r) = Recorded By, (t) = Taken By, (c) = Cosigned By      Initials Name Provider Type    Katie Whitaker PT Physical Therapist                   Goals/Plan    No documentation.                  Clinical Impression       Row Name 06/05/23 1204          Pain    Pretreatment Pain Rating 0/10 - no pain  -CW     Posttreatment Pain Rating 0/10 - no pain  -CW       Row Name 06/05/23 1204          Plan of Care Review    Plan of Care Reviewed With patient  -CW     Outcome Evaluation Pt participated with PT this morning. Pt remains in B soft wrist restraints. Completed supine >sit cga and stood with min A. Posterior lean noted in standing. Attempted to ambulate further from the bed today but pt had incontinent bladder episode once standing. Pt required significant cues for proper way to lay back supine and his agitation level  increased some. Will continue to follow and progress as able.  -CW       Row Name 06/05/23 1204          Vital Signs    O2 Delivery Pre Treatment supplemental O2  -CW     O2 Delivery Intra Treatment supplemental O2  -CW     O2 Delivery Post Treatment supplemental O2  -CW       Row Name 06/05/23 1204          Positioning and Restraints    Pre-Treatment Position in bed  -CW     Post Treatment Position bed  -CW     In Bed notified nsg;call light within reach;encouraged to call for assist;exit alarm on;supine  RN and aide present  -CW     Restraints reapplied:;soft limb  -CW               User Key  (r) = Recorded By, (t) = Taken By, (c) = Cosigned By      Initials Name Provider Type    Katie Whitaker, PT Physical Therapist                   Outcome Measures       Row Name 06/05/23 1204 06/05/23 0800       How much help from another person do you currently need...    Turning from your back to your side while in flat bed without using bedrails? 3  -CW 3  -LW    Moving from lying on back to sitting on the side of a flat bed without bedrails? 3  -CW 3  -LW    Moving to and from a bed to a chair (including a wheelchair)? 3  -CW 3  -LW    Standing up from a chair using your arms (e.g., wheelchair, bedside chair)? 3  -CW 3  -LW    Climbing 3-5 steps with a railing? 1  -CW 3  -LW    To walk in hospital room? 3  -CW 3  -LW    AM-PAC 6 Clicks Score (PT) 16  -CW 18  -LW    Highest level of mobility 5 --> Static standing  -CW 6 --> Walked 10 steps or more  -      Row Name 06/05/23 1316 06/05/23 1204       Functional Assessment    Outcome Measure Options AM-PAC 6 Clicks Daily Activity (OT)  -KA AM-PAC 6 Clicks Basic Mobility (PT)  -CW              User Key  (r) = Recorded By, (t) = Taken By, (c) = Cosigned By      Initials Name Provider Type    Joshua Grubbs, RN Registered Nurse    Katie Whitaker, PT Physical Therapist    Shey Woo, OT Occupational Therapist                                 Physical  Therapy Education       Title: PT OT SLP Therapies (In Progress)       Topic: Physical Therapy (In Progress)       Point: Mobility training (In Progress)       Learning Progress Summary             Patient Acceptance, E, NR by CW at 6/5/2023 1204    Acceptance, E, NR,NL by AR at 6/4/2023 1225                         Point: Home exercise program (In Progress)       Learning Progress Summary             Patient Acceptance, E, NR,NL by AR at 6/4/2023 1225                         Point: Body mechanics (In Progress)       Learning Progress Summary             Patient Acceptance, E, NR,NL by AR at 6/4/2023 1225                         Point: Precautions (In Progress)       Learning Progress Summary             Patient Acceptance, E, NR,NL by AR at 6/4/2023 1225                                         User Key       Initials Effective Dates Name Provider Type Discipline    AR 06/16/21 -  Makayla Zee, PT Physical Therapist PT    CW 12/13/22 -  Katie Álvarez PT Physical Therapist PT                  PT Recommendation and Plan     Plan of Care Reviewed With: patient  Outcome Evaluation: Pt participated with PT this morning. Pt remains in B soft wrist restraints. Completed supine >sit cga and stood with min A. Posterior lean noted in standing. Attempted to ambulate further from the bed today but pt had incontinent bladder episode once standing. Pt required significant cues for proper way to lay back supine and his agitation level increased some. Will continue to follow and progress as able.     Time Calculation:    PT Charges       Row Name 06/05/23 1445             Time Calculation    Start Time 1116  -CW      Stop Time 1136  -CW      Time Calculation (min) 20 min  -CW      PT Received On 06/05/23  -CW      PT - Next Appointment 06/06/23  -CW                User Key  (r) = Recorded By, (t) = Taken By, (c) = Cosigned By      Initials Name Provider Type    CW Katie Álvarez, PT Physical Therapist                   Therapy Charges for Today       Code Description Service Date Service Provider Modifiers Qty    [id number]  PT THERAPEUTIC ACT EA 15 MIN 6/5/2023 Katie Álvarez, PT GP 1            PT G-Codes  Outcome Measure Options: AM-PAC 6 Clicks Daily Activity (OT)  AM-PAC 6 Clicks Score (PT): 16  AM-PAC 6 Clicks Score (OT): 15  PT Discharge Summary  Anticipated Discharge Disposition (PT): skilled nursing facility    Katie Álvarez, BI  6/5/2023

## 2023-06-05 NOTE — THERAPY EVALUATION
Patient Name: Yuval Loja  : 1950    MRN: [id number]                              Today's Date: 2023       Admit Date: 2023    Visit Dx:     ICD-10-CM ICD-9-CM   1. Generalized abdominal pain  R10.84 789.07   2. CRISTIANO (acute kidney injury)  N17.9 584.9   3. Nausea and vomiting, unspecified vomiting type  R11.2 787.01     Patient Active Problem List   Diagnosis    Alcoholic ketoacidosis    Alcohol dependence    Methamphetamine abuse    Fatty liver, alcoholic    Nausea vomiting and diarrhea    Alcoholic liver disease    Metabolic acidosis    Tobacco abuse    Coronary artery disease involving native coronary artery of native heart without angina pectoris    Tachycardia    Sinus tachycardia    Chronic kidney disease, stage 3    Medically noncompliant    Visual hallucinations    Psychosis    Hyponatremia    CAD (coronary artery disease)    Leukopenia    Symptomatic anemia    Headache    Substance abuse    Gastrointestinal hemorrhage    CRISTIANO (acute kidney injury) (HCC)    Hyperkalemia    Right lower quadrant abdominal pain    New onset atrial fibrillation    History of drug abuse    COPD (chronic obstructive pulmonary disease)    Right inguinal hernia    Constipation    Status post right hip replacement    Right hip pain    Sinus bradycardia    Generalized abdominal pain     Past Medical History:   Diagnosis Date    Alcohol abuse     Arthritis     CAD (coronary artery disease)     Chronic kidney disease, stage 3     COPD (chronic obstructive pulmonary disease)     Disease of thyroid gland     Elevated cholesterol     Fatty liver, alcoholic     GERD (gastroesophageal reflux disease)     History of transfusion     Hypertensive urgency     Metabolic acidosis     Myocardial infarction     Sinus tachycardia     Sleep apnea     Stroke      Past Surgical History:   Procedure Laterality Date    BACK SURGERY      CARDIAC CATHETERIZATION      CARDIAC ELECTROPHYSIOLOGY PROCEDURE N/A 4/10/2023    Procedure: Temporary  Pacemaker;  Surgeon: Vaibhav Bonilla MD;  Location: [facility] CATH INVASIVE LOCATION;  Service: Cardiovascular;  Laterality: N/A;    COLONOSCOPY      ENDOSCOPY      FRACTURE SURGERY      JOINT REPLACEMENT      SKIN BIOPSY      TOTAL HIP ARTHROPLASTY Right 06/27/2022    Procedure: TOTAL HIP ARTHROPLASTY ANTERIOR WITH HANA TABLE;  Surgeon: Willian Quiles MD;  Location: [facility] MAIN OR;  Service: Orthopedics;  Laterality: Right;  Depuy, carli. everett      General Information       Row Name 06/05/23 1303          OT Time and Intention    Document Type evaluation  -     Mode of Treatment individual therapy;occupational therapy  -       Row Name 06/05/23 1303          General Information    Patient Profile Reviewed yes  -     Prior Level of Function --  per chart pt uses a cane  -     Existing Precautions/Restrictions fall  -       Row Name 06/05/23 1303          Living Environment    People in Home alone  -       Row Name 06/05/23 1303          Home Main Entrance    Number of Stairs, Main Entrance --  difficult to obtain due to cognition  -       Row Name 06/05/23 1303          Cognition    Orientation Status (Cognition) oriented to;person  -       Row Name 06/05/23 1303          Safety Issues, Functional Mobility    Safety Issues Affecting Function (Mobility) insight into deficits/self-awareness;awareness of need for assistance;sequencing abilities;impulsivity;judgment;problem-solving;ability to follow commands  -     Impairments Affecting Function (Mobility) balance;cognition;endurance/activity tolerance;strength;pain  -               User Key  (r) = Recorded By, (t) = Taken By, (c) = Cosigned By      Initials Name Provider Type    Shey Woo OT Occupational Therapist                     Mobility/ADL's       Row Name 06/05/23 1306          Bed Mobility    Bed Mobility supine-sit;sit-supine  -     Supine-Sit [place] (Bed Mobility) contact guard  -     Sit-Supine  [place] (Bed Mobility) verbal cues;nonverbal cues (demo/gesture);moderate assist (50% patient effort)  -     Assistive Device (Bed Mobility) head of bed elevated;bed rails  -     Comment, (Bed Mobility) Cues to return to supine properly. Pt impulsively began laying straight back onto bed  -       Row Name 06/05/23 1306          Sit-Stand Transfer    Sit-Stand [place] (Transfers) minimum assist (75% patient effort);1 person assist;verbal cues;nonverbal cues (demo/gesture)  -     Comment, (Sit-Stand Transfer) HHA  -       Row Name 06/05/23 1306          Activities of Daily Living    BADL Assessment/Intervention lower body dressing;toileting  -       Row Name 06/05/23 1306          Lower Body Dressing Assessment/Training    [place] Level (Lower Body Dressing) lower body dressing skills;doff;don;shoes/slippers;minimum assist (75% patient effort)  -     Position (Lower Body Dressing) edge of bed sitting  -       Row Name 06/05/23 1306          Toileting Assessment/Training    [place] Level (Toileting) toileting skills;adjust/manage clothing;perform perineal hygiene;maximum assist (25% patient effort)  -               User Key  (r) = Recorded By, (t) = Taken By, (c) = Cosigned By      Initials Name Provider Type    Shey Woo OT Occupational Therapist                   Obj/Interventions       Row Name 06/05/23 1308          Range of Motion Comprehensive    General Range of Motion bilateral upper extremity ROM WFL  -       Row Name 06/05/23 1308          Strength Comprehensive (MMT)    Comment, General Manual Muscle Testing (MMT) Assessment BUE grossly 4/5  -KA       Row Name 06/05/23 1308          Balance    Static Sitting Balance standby assist  -KA     Static Standing Balance minimal assist  -KA     Dynamic Standing Balance minimal assist  -KA     Balance Interventions sitting;standing  -     Comment, Balance cues for posterior lean  -KA               User Key  (r) =  Recorded By, (t) = Taken By, (c) = Cosigned By      Initials Name Provider Type    Shey Woo, OT Occupational Therapist                   Goals/Plan       Row Name 06/05/23 1315          Transfer Goal 1 (OT)    Activity/Assistive Device (Transfer Goal 1, OT) toilet  -KA     [place] Level/Cues Needed (Transfer Goal 1, OT) minimum assist (75% or more patient effort)  -KA     Time Frame (Transfer Goal 1, OT) short term goal (STG);2 weeks  -KA     Progress/Outcome (Transfer Goal 1, OT) goal ongoing  -       Row Name 06/05/23 1315          Dressing Goal 1 (OT)    Activity/Device (Dressing Goal 1, OT) dressing skills, all  -KA     [place]/Cues Needed (Dressing Goal 1, OT) minimum assist (75% or more patient effort)  -KA     Time Frame (Dressing Goal 1, OT) short term goal (STG);2 weeks  -KA     Progress/Outcome (Dressing Goal 1, OT) goal ongoing  -[facility] Name 06/05/23 1315          Toileting Goal 1 (OT)    Activity/Device (Toileting Goal 1, OT) toileting skills, all  -KA     [place] Level/Cues Needed (Toileting Goal 1, OT) minimum assist (75% or more patient effort)  -KA     Time Frame (Toileting Goal 1, OT) short term goal (STG);2 weeks  -KA     Progress/Outcome (Toileting Goal 1, OT) goal ongoing  -       Row Name 06/05/23 1315          Therapy Assessment/Plan (OT)    Planned Therapy Interventions (OT) activity tolerance training;BADL retraining;ROM/therapeutic exercise;strengthening exercise;transfer/mobility retraining;cognitive/visual perception retraining;patient/caregiver education/training  -               User Key  (r) = Recorded By, (t) = Taken By, (c) = Cosigned By      Initials Name Provider Type    Shey Woo OT Occupational Therapist                   Clinical Impression       Row Name 06/05/23 1309          Pain Assessment    Pretreatment Pain Rating 0/10 - no pain  -     Posttreatment Pain Rating 0/10 - no pain  -       Row Name 06/05/23 1309          Plan  of Care Review    Plan of Care Reviewed With patient  -     Outcome Evaluation Pt seen for OT mony this AM. Admitted with abdominal pain. PMHx ETOH abuse. Pt remains in B soft wrist restraints and single RLE soft restraint. Difficult to obtain PLOF due to cognition. Today pt required cues for task sequencing and performed supine to sit with CGA. Pt attempted to don shoes but required mod A to complete. Stood with min A (HHA). Pt dependent with toileting with brief and purewick donned. Incontinent episode of bladder once purewick was removed for functional mobility. Required cues for sequencing bed mobility to return to supine. Pt began laying straight back onto bed impulsively. Pt presents with decreased cognition, balance, strength, ADL performance and functional mobility. Pt to benefit from skilled OT to address deficits and maximize independence with ADLs.  -       Row Name 06/05/23 1306          Therapy Assessment/Plan (OT)    Rehab Potential (OT) good, to achieve stated therapy goals  -     Criteria for Skilled Therapeutic Interventions Met (OT) yes  -     Therapy Frequency (OT) 3 times/wk  -       Row Name 06/05/23 1307          Therapy Plan Review/Discharge Plan (OT)    Anticipated Discharge Disposition (OT) skilled nursing facility  -       Row Name 06/05/23 1309          Vital Signs    Pre Patient Position Supine  -     Intra Patient Position Standing  -     Post Patient Position Supine  -       Row Name 06/05/23 1309          Positioning and Restraints    Pre-Treatment Position in bed  -KA     Post Treatment Position bed  -KA     In Bed notified nsg;supine;call light within reach;encouraged to call for assist;exit alarm on  RN and aide present  -               User Key  (r) = Recorded By, (t) = Taken By, (c) = Cosigned By      Initials Name Provider Type    Shey Woo, OT Occupational Therapist                   Outcome Measures       Row Name 06/05/23 0116          How much  help from another is currently needed...    Putting on and taking off regular lower body clothing? 2  -KA     Bathing (including washing, rinsing, and drying) 2  -KA     Toileting (which includes using toilet bed pan or urinal) 2  -KA     Putting on and taking off regular upper body clothing 3  -KA     Taking care of personal grooming (such as brushing teeth) 3  -KA     Eating meals 3  -KA     AM-PAC 6 Clicks Score (OT) 15  -KA       Row Name 06/05/23 1204          How much help from another person do you currently need...    Turning from your back to your side while in flat bed without using bedrails? 3  -CW     Moving from lying on back to sitting on the side of a flat bed without bedrails? 3  -CW     Moving to and from a bed to a chair (including a wheelchair)? 3  -CW     Standing up from a chair using your arms (e.g., wheelchair, bedside chair)? 3  -CW     Climbing 3-5 steps with a railing? 1  -CW     To walk in hospital room? 3  -CW     AM-PAC 6 Clicks Score (PT) 16  -CW     Highest level of mobility 5 --> Static standing  -CW       Row Name 06/05/23 1316 06/05/23 1204       Functional Assessment    Outcome Measure Options AM-PAC 6 Clicks Daily Activity (OT)  -KA AM-PAC 6 Clicks Basic Mobility (PT)  -CW              User Key  (r) = Recorded By, (t) = Taken By, (c) = Cosigned By      Initials Name Provider Type    Katie Whitaker, PT Physical Therapist    Shey Woo, OT Occupational Therapist                    Occupational Therapy Education       Title: PT OT SLP Therapies (In Progress)       Topic: Occupational Therapy (Done)       Point: ADL training (Done)       Description:   Instruct learner(s) on proper safety adaptation and remediation techniques during self care or transfers.   Instruct in proper use of assistive devices.                  Learning Progress Summary             Patient Acceptance, E,TB, VU,NR by JONEL at 6/5/2023 1316                         Point: Home exercise program (Done)        Description:   Instruct learner(s) on appropriate technique for monitoring, assisting and/or progressing therapeutic exercises/activities.                  Learning Progress Summary             Patient Acceptance, E,TB, VU,NR by JONEL at 6/5/2023 1316                         Point: Precautions (Done)       Description:   Instruct learner(s) on prescribed precautions during self-care and functional transfers.                  Learning Progress Summary             Patient Acceptance, E,TB, VU,NR by JONEL at 6/5/2023 1316                                         User Key       Initials Effective Dates Name Provider Type Discipline    JONEL 09/22/22 -  Shey Martin OT Occupational Therapist OT                  OT Recommendation and Plan  Planned Therapy Interventions (OT): activity tolerance training, BADL retraining, ROM/therapeutic exercise, strengthening exercise, transfer/mobility retraining, cognitive/visual perception retraining, patient/caregiver education/training  Therapy Frequency (OT): 3 times/wk  Plan of Care Review  Plan of Care Reviewed With: patient  Outcome Evaluation: Pt seen for OT eval this AM. Admitted with abdominal pain. PMHx ETOH abuse. Pt remains in B soft wrist restraints and single RLE soft restraint. Difficult to obtain PLOF due to cognition. Today pt required cues for task sequencing and performed supine to sit with CGA. Pt attempted to don shoes but required mod A to complete. Stood with min A (HHA). Pt dependent with toileting with brief and purewick donned. Incontinent episode of bladder once purewick was removed for functional mobility. Required cues for sequencing bed mobility to return to supine. Pt began laying straight back onto bed impulsively. Pt presents with decreased cognition, balance, strength, ADL performance and functional mobility. Pt to benefit from skilled OT to address deficits and maximize independence with ADLs.     Time Calculation:    Time Calculation- OT       Row  Name 06/05/23 1316             Time Calculation- OT    OT Start Time 1117  -KA      OT Stop Time 1136  -KA      OT Time Calculation (min) 19 min  -KA      Total Timed Code Minutes- OT 8 minute(s)  -KA      OT Received On 06/05/23  -JONEL      OT - Next Appointment 06/06/23  -JONEL      OT Goal Re-Cert Due Date 06/19/23  -KA         Timed Charges    71165 - OT Therapeutic Activity Minutes 8  -KA         Untimed Charges    OT Eval/Re-eval Minutes 11  -KA         Total Minutes    Timed Charges Total Minutes 8  -KA      Untimed Charges Total Minutes 11  -KA       Total Minutes 19  -KA                User Key  (r) = Recorded By, (t) = Taken By, (c) = Cosigned By      Initials Name Provider Type    Shey Woo OT Occupational Therapist                  Therapy Charges for Today       Code Description Service Date Service Provider Modifiers Qty    92371346334 HC OT THERAPEUTIC ACT EA 15 MIN 6/5/2023 Shey Martin OT GO 1    68241344910 HC OT EVAL MOD COMPLEXITY 2 6/5/2023 Shey Martin OT GO 1                 Shey Martin OT  6/5/2023

## 2023-06-06 LAB
APTT PPP: 34.6 SECONDS (ref 22.7–35.4)
BASOPHILS # BLD AUTO: 0.01 10*3/MM3 (ref 0–0.2)
BASOPHILS NFR BLD AUTO: 0.3 % (ref 0–1.5)
DEPRECATED RDW RBC AUTO: 44 FL (ref 37–54)
EOSINOPHIL # BLD AUTO: 0.07 10*3/MM3 (ref 0–0.4)
EOSINOPHIL NFR BLD AUTO: 1.9 % (ref 0.3–6.2)
ERYTHROCYTE [DISTWIDTH] IN BLOOD BY AUTOMATED COUNT: 12.8 % (ref 12.3–15.4)
HCT VFR BLD AUTO: 26.3 % (ref 37.5–51)
HGB BLD-MCNC: 8.6 G/DL (ref 13–17.7)
IMM GRANULOCYTES # BLD AUTO: 0.01 10*3/MM3 (ref 0–0.05)
IMM GRANULOCYTES NFR BLD AUTO: 0.3 % (ref 0–0.5)
LYMPHOCYTES # BLD AUTO: 1.22 10*3/MM3 (ref 0.7–3.1)
LYMPHOCYTES NFR BLD AUTO: 33.2 % (ref 19.6–45.3)
MCH RBC QN AUTO: 31.6 PG (ref 26.6–33)
MCHC RBC AUTO-ENTMCNC: 32.7 G/DL (ref 31.5–35.7)
MCV RBC AUTO: 96.7 FL (ref 79–97)
MONOCYTES # BLD AUTO: 0.44 10*3/MM3 (ref 0.1–0.9)
MONOCYTES NFR BLD AUTO: 12 % (ref 5–12)
NEUTROPHILS NFR BLD AUTO: 1.93 10*3/MM3 (ref 1.7–7)
NEUTROPHILS NFR BLD AUTO: 52.3 % (ref 42.7–76)
NRBC BLD AUTO-RTO: 0 /100 WBC (ref 0–0.2)
PLATELET # BLD AUTO: 161 10*3/MM3 (ref 140–450)
PMV BLD AUTO: 9.8 FL (ref 6–12)
RBC # BLD AUTO: 2.72 10*6/MM3 (ref 4.14–5.8)
WBC NRBC COR # BLD: 3.68 10*3/MM3 (ref 3.4–10.8)

## 2023-06-06 PROCEDURE — 97535 SELF CARE MNGMENT TRAINING: CPT

## 2023-06-06 PROCEDURE — 85025 COMPLETE CBC W/AUTO DIFF WBC: CPT | Performed by: INTERNAL MEDICINE

## 2023-06-06 PROCEDURE — 99232 SBSQ HOSP IP/OBS MODERATE 35: CPT | Performed by: NURSE PRACTITIONER

## 2023-06-06 PROCEDURE — 25010000002 ONDANSETRON PER 1 MG: Performed by: HOSPITALIST

## 2023-06-06 PROCEDURE — 97530 THERAPEUTIC ACTIVITIES: CPT

## 2023-06-06 PROCEDURE — 25010000002 THIAMINE HCL 200 MG/2ML SOLUTION: Performed by: INTERNAL MEDICINE

## 2023-06-06 PROCEDURE — 85730 THROMBOPLASTIN TIME PARTIAL: CPT | Performed by: INTERNAL MEDICINE

## 2023-06-06 RX ORDER — POTASSIUM CHLORIDE 750 MG/1
10 TABLET, FILM COATED, EXTENDED RELEASE ORAL DAILY
Status: DISCONTINUED | OUTPATIENT
Start: 2023-06-06 | End: 2023-06-06

## 2023-06-06 RX ORDER — DIPHENOXYLATE HYDROCHLORIDE AND ATROPINE SULFATE 2.5; .025 MG/1; MG/1
1 TABLET ORAL DAILY
Status: DISCONTINUED | OUTPATIENT
Start: 2023-06-06 | End: 2023-06-08 | Stop reason: HOSPADM

## 2023-06-06 RX ORDER — POTASSIUM CHLORIDE 750 MG/1
40 TABLET, FILM COATED, EXTENDED RELEASE ORAL ONCE
Status: COMPLETED | OUTPATIENT
Start: 2023-06-06 | End: 2023-06-06

## 2023-06-06 RX ADMIN — ONDANSETRON 4 MG: 2 INJECTION INTRAMUSCULAR; INTRAVENOUS at 18:02

## 2023-06-06 RX ADMIN — THIAMINE HYDROCHLORIDE 200 MG: 100 INJECTION, SOLUTION INTRAMUSCULAR; INTRAVENOUS at 06:38

## 2023-06-06 RX ADMIN — SUCRALFATE 1 G: 1 TABLET ORAL at 11:22

## 2023-06-06 RX ADMIN — Medication 1 PATCH: at 08:12

## 2023-06-06 RX ADMIN — SUCRALFATE 1 G: 1 TABLET ORAL at 21:20

## 2023-06-06 RX ADMIN — THIAMINE HYDROCHLORIDE 200 MG: 100 INJECTION, SOLUTION INTRAMUSCULAR; INTRAVENOUS at 13:51

## 2023-06-06 RX ADMIN — ROSUVASTATIN CALCIUM 5 MG: 5 TABLET, FILM COATED ORAL at 21:20

## 2023-06-06 RX ADMIN — HYDROXYZINE HYDROCHLORIDE 25 MG: 25 TABLET ORAL at 23:17

## 2023-06-06 RX ADMIN — Medication 5 MG: at 21:20

## 2023-06-06 RX ADMIN — DEXTROSE AND SODIUM CHLORIDE 100 ML/HR: 5; 450 INJECTION, SOLUTION INTRAVENOUS at 15:46

## 2023-06-06 RX ADMIN — POTASSIUM CHLORIDE 40 MEQ: 750 TABLET, EXTENDED RELEASE ORAL at 16:33

## 2023-06-06 RX ADMIN — METOPROLOL TARTRATE 25 MG: 25 TABLET, FILM COATED ORAL at 21:20

## 2023-06-06 RX ADMIN — SUCRALFATE 1 G: 1 TABLET ORAL at 16:33

## 2023-06-06 RX ADMIN — DEXTROSE AND SODIUM CHLORIDE 100 ML/HR: 5; 450 INJECTION, SOLUTION INTRAVENOUS at 04:56

## 2023-06-06 RX ADMIN — SUCRALFATE 1 G: 1 TABLET ORAL at 06:38

## 2023-06-06 RX ADMIN — FAMOTIDINE 20 MG: 20 TABLET, FILM COATED ORAL at 21:20

## 2023-06-06 RX ADMIN — METOPROLOL TARTRATE 25 MG: 25 TABLET, FILM COATED ORAL at 08:11

## 2023-06-06 RX ADMIN — DILTIAZEM HYDROCHLORIDE 180 MG: 180 CAPSULE, COATED, EXTENDED RELEASE ORAL at 08:11

## 2023-06-06 RX ADMIN — Medication 1 MG: at 08:11

## 2023-06-06 RX ADMIN — FAMOTIDINE 20 MG: 20 TABLET, FILM COATED ORAL at 08:11

## 2023-06-06 NOTE — DISCHARGE PLACEMENT REQUEST
"Hannah Altman (72 y.o. Male)       Date of Birth   1950    Social Security Number       Address   [address] [place] [address]    Home Phone   [phone]    MRN   [id number]       [facility]    Marital Status   Single                            Admission Date   6/1/23    Admission Type   Emergency    Admitting Provider   Chino Cole MD    Attending Provider   Chino Cole MD    Department, Room/Bed   [address], [address]       Discharge Date       Discharge Disposition       Discharge Destination                                 Attending Provider: Chino Cole MD    Allergies: Mirtazapine, Sertraline    Isolation: None   Infection: None   Code Status: CPR    Ht: 175.3 cm (69\")   Wt: 68.8 kg (151 lb 10.8 oz)    Admission Cmt: None   Principal Problem: Generalized abdominal pain [R10.84]                   Active Insurance as of 6/1/2023       Primary Coverage       Payor Plan Insurance Group Employer/Plan Group    HUMA[employer] MEDICARE REPLACEMENT [employer] [facility] MEDICARE REPLACEMENT NON PAR [id number]       Payor Plan Address Payor Plan Phone Number Payor Plan Fax Number Effective Dates       1/1/2023 - None Entered      Subscriber Name Subscriber Birth Date Member ID       HANNAH ALTMAN 1950 [id number]                     Emergency Contacts        (Rel.) Home Phone Work Phone Mobile Phone    Torri Altman (Daughter) [phone]    Carlos Purvis (Relative) [phone] -- [phone]                "

## 2023-06-06 NOTE — PLAN OF CARE
Goal Outcome Evaluation:  Plan of Care Reviewed With: patient           Outcome Evaluation: Pt participated with PT this morning. He continues to have confusion and poor safety awareness. He did increase ambulation distance to 20' cga using a walker. Will continue to follow and progress as able. Rec SNF.

## 2023-06-06 NOTE — PLAN OF CARE
Goal Outcome Evaluation:  Plan of Care Reviewed With: patient        Progress: improving  Outcome Evaluation: Pt seen for OT treatment this date. Pt improved cognitively from yesterday, but cont to demo some decreased safety awareness and cofusion. Pt was able to perform LE dressing with set up edge of bed. Performed fm with rw with cga and cues for safety. Cont to demo decreased abilty to perform adls and fm safely, recommending cont acute OT and snf at [facility] for further OT.  OT wore appropriate PPE during session and performed hand hygiene before/after session.

## 2023-06-06 NOTE — THERAPY TREATMENT NOTE
Patient Name: Yuval Loja  : 1950    MRN: [id number]                              Today's Date: 2023       Admit Date: 2023    Visit Dx:     ICD-10-CM ICD-9-CM   1. Generalized abdominal pain  R10.84 789.07   2. CRISTIANO (acute kidney injury)  N17.9 584.9   3. Nausea and vomiting, unspecified vomiting type  R11.2 787.01     Patient Active Problem List   Diagnosis    Alcoholic ketoacidosis    Alcohol dependence    Methamphetamine abuse    Fatty liver, alcoholic    Nausea vomiting and diarrhea    Alcoholic liver disease    Metabolic acidosis    Tobacco abuse    Coronary artery disease involving native coronary artery of native heart without angina pectoris    Tachycardia    Sinus tachycardia    Chronic kidney disease, stage 3    Medically noncompliant    Visual hallucinations    Psychosis    Hyponatremia    CAD (coronary artery disease)    Leukopenia    Symptomatic anemia    Headache    Substance abuse    Gastrointestinal hemorrhage    CRISTIANO (acute kidney injury) (HCC)    Hyperkalemia    Right lower quadrant abdominal pain    New onset atrial fibrillation    History of drug abuse    COPD (chronic obstructive pulmonary disease)    Right inguinal hernia    Constipation    Status post right hip replacement    Right hip pain    Sinus bradycardia    Generalized abdominal pain     Past Medical History:   Diagnosis Date    Alcohol abuse     Arthritis     CAD (coronary artery disease)     Chronic kidney disease, stage 3     COPD (chronic obstructive pulmonary disease)     Disease of thyroid gland     Elevated cholesterol     Fatty liver, alcoholic     GERD (gastroesophageal reflux disease)     History of transfusion     Hypertensive urgency     Metabolic acidosis     Myocardial infarction     Sinus tachycardia     Sleep apnea     Stroke      Past Surgical History:   Procedure Laterality Date    BACK SURGERY      CARDIAC CATHETERIZATION      CARDIAC ELECTROPHYSIOLOGY PROCEDURE N/A 4/10/2023    Procedure: Temporary  Pacemaker;  Surgeon: Vaibhav Bonilla MD;  Location: [facility] CATH INVASIVE LOCATION;  Service: Cardiovascular;  Laterality: N/A;    COLONOSCOPY      ENDOSCOPY      FRACTURE SURGERY      JOINT REPLACEMENT      SKIN BIOPSY      TOTAL HIP ARTHROPLASTY Right 06/27/2022    Procedure: TOTAL HIP ARTHROPLASTY ANTERIOR WITH HANA TABLE;  Surgeon: Willian Quiles MD;  Location: [facility] MAIN OR;  Service: Orthopedics;  Laterality: Right;  Depuy, carli. everett      General Information       Row Name 06/06/23 0952          Physical Therapy Time and Intention    Document Type therapy note (daily note)  -CW     Mode of Treatment physical therapy;co-treatment  -CW       Row Name 06/06/23 0952          General Information    Patient Profile Reviewed yes  -CW     Existing Precautions/Restrictions fall  -CW       Row Name 06/06/23 0952          Cognition    Orientation Status (Cognition) oriented to;person  -CW       Row Name 06/06/23 0952          Safety Issues, Functional Mobility    Safety Issues Affecting Function (Mobility) insight into deficits/self-awareness;awareness of need for assistance;judgment;safety precautions follow-through/compliance;safety precaution awareness;problem-solving  -CW     Impairments Affecting Function (Mobility) balance;cognition;endurance/activity tolerance;strength  -CW               User Key  (r) = Recorded By, (t) = Taken By, (c) = Cosigned By      Initials Name Provider Type    CW Katie Álvarez PT Physical Therapist                   Mobility       Row Name 06/06/23 0953          Bed Mobility    Bed Mobility supine-sit;sit-supine  -CW     Supine-Sit [place] (Bed Mobility) standby assist  -CW     Sit-Supine [place] (Bed Mobility) standby assist  -CW     Assistive Device (Bed Mobility) head of bed elevated;bed rails  -CW       Row Name 06/06/23 0953          Sit-Stand Transfer    Sit-Stand [place] (Transfers) contact guard;verbal cues  -     Assistive Device (Sit-Stand  Transfers) walker, front-wheeled  -CW       Row Name 06/06/23 0953          Gait/Stairs (Locomotion)    [place] Level (Gait) contact guard;verbal cues  -CW     Assistive Device (Gait) walker, front-wheeled  -CW     Distance in Feet (Gait) 20'  -CW     Deviations/Abnormal Patterns (Gait) alicia decreased;gait speed decreased;stride length decreased  -CW               User Key  (r) = Recorded By, (t) = Taken By, (c) = Cosigned By      Initials Name Provider Type    Katie Whitaker PT Physical Therapist                   Obj/Interventions       Row Name 06/06/23 0953          Balance    Static Standing Balance contact guard  -CW     Dynamic Standing Balance contact guard  -CW     Position/Device Used, Standing Balance walker, front-wheeled;supported  -CW               User Key  (r) = Recorded By, (t) = Taken By, (c) = Cosigned By      Initials Name Provider Type    Katie Whitaker PT Physical Therapist                   Goals/Plan    No documentation.                  Clinical Impression       [facility] Name 06/06/23 0953          Pain    Pretreatment Pain Rating 0/10 - no pain  -CW     Posttreatment Pain Rating 0/10 - no pain  -CW       Row Name 06/06/23 0953          Plan of Care Review    Plan of Care Reviewed With patient  -CW     Outcome Evaluation Pt participated with PT this morning. He continues to have confusion and poor safety awareness. He did increase ambulation distance to 20' cga using a walker. Will continue to follow and progress as able. Rec SNF.  -CW       Row Name 06/06/23 0953          Vital Signs    O2 Delivery Pre Treatment room air  -CW       Row Name 06/06/23 0953          Positioning and Restraints    Pre-Treatment Position in bed  -CW     Post Treatment Position bed  -CW     In Bed notified nsg;call light within reach;encouraged to call for assist;exit alarm on;fowlers  -               User Key  (r) = Recorded By, (t) = Taken By, (c) = Cosigned By      Initials Name Provider Type     Katie Whitaker, BI Physical Therapist                   Outcome Measures       Row Name 06/06/23 0956          How much help from another person do you currently need...    Turning from your back to your side while in flat bed without using bedrails? 3  -CW     Moving from lying on back to sitting on the side of a flat bed without bedrails? 3  -CW     Moving to and from a bed to a chair (including a wheelchair)? 3  -CW     Standing up from a chair using your arms (e.g., wheelchair, bedside chair)? 3  -CW     Climbing 3-5 steps with a railing? 1  -CW     To walk in hospital room? 3  -CW     AM-PAC 6 Clicks Score (PT) 16  -CW     Highest level of mobility 5 --> Static standing  -CW       Row Name 06/06/23 0956          Functional Assessment    Outcome Measure Options AM-PAC 6 Clicks Basic Mobility (PT)  -CW               User Key  (r) = Recorded By, (t) = Taken By, (c) = Cosigned By      Initials Name Provider Type    Katie Whitaker PT Physical Therapist                                 Physical Therapy Education       Title: PT OT SLP Therapies (In Progress)       Topic: Physical Therapy (In Progress)       Point: Mobility training (In Progress)       Learning Progress Summary             Patient Acceptance, E, NR by CW at 6/6/2023 0956    Acceptance, E, NR by CW at 6/5/2023 1204    Acceptance, E, NR,NL by AR at 6/4/2023 1225                         Point: Home exercise program (In Progress)       Learning Progress Summary             Patient Acceptance, E, NR,NL by AR at 6/4/2023 1225                         Point: Body mechanics (In Progress)       Learning Progress Summary             Patient Acceptance, E, NR,NL by AR at 6/4/2023 1225                         Point: Precautions (In Progress)       Learning Progress Summary             Patient Acceptance, E, NR,NL by AR at 6/4/2023 1225                                         User Key       Initials Effective Dates Name Provider Type Discipline    AR  06/16/21 -  Makayla Zee, PT Physical Therapist PT    CW 12/13/22 -  Katie Álvarez PT Physical Therapist PT                  PT Recommendation and Plan     Plan of Care Reviewed With: patient  Outcome Evaluation: Pt participated with PT this morning. He continues to have confusion and poor safety awareness. He did increase ambulation distance to 20' cga using a walker. Will continue to follow and progress as able. Rec SNF.     Time Calculation:    PT Charges       Row Name 06/06/23 0952             Time Calculation    Start Time 0848  -CW      Stop Time 0901  -CW      Time Calculation (min) 13 min  -CW      PT Received On 06/06/23  -CW      PT - Next Appointment 06/07/23  -CW                User Key  (r) = Recorded By, (t) = Taken By, (c) = Cosigned By      Initials Name Provider Type    CW Katie Álvarez, PT Physical Therapist                  Therapy Charges for Today       Code Description Service Date Service Provider Modifiers Qty    10914350511 HC PT THERAPEUTIC ACT EA 15 MIN 6/5/2023 Katie Álvarez, PT GP 1    72857733077 HC PT THERAPEUTIC ACT EA 15 MIN 6/6/2023 Katie Álvarez, PT GP 1            PT G-Codes  Outcome Measure Options: AM-PAC 6 Clicks Basic Mobility (PT)  AM-PAC 6 Clicks Score (PT): 16  AM-PAC 6 Clicks Score (OT): 15  PT Discharge Summary  Anticipated Discharge Disposition (PT): skilled nursing facility    Katie Álvarez PT  6/6/2023

## 2023-06-06 NOTE — NURSING NOTE
Restraint order received earlier this shift on 6/5/23, however the mike wrist restraints were never placed. Patient resting well overnight, no agitation or taking off equipment.

## 2023-06-06 NOTE — PLAN OF CARE
Goal Outcome Evaluation:   Denies pain this shift.  Denies GI complaints this shift.  Up with PT this am, see notes.  Stated tired of being in bed, however refused to get up in chair when offered.  Po intake fair.  IVFs continue.  Family updated per phone call rec'd.  Psych notes reviewed, looks like they signed off noting client has been seen in the past and refuses all offered services.  CCP consult placed to review discharge needs.  Safety maintained.  Continue to monitor.

## 2023-06-06 NOTE — PLAN OF CARE
Problem: Adult Inpatient Plan of Care  Goal: Plan of Care Review  Outcome: Ongoing, Progressing  Flowsheets (Taken 6/6/2023 0722)  Progress: improving  Plan of Care Reviewed With: patient  Outcome Evaluation:   VSS   A&Ox4 all night   trouble w/sleep - melatonin given & rested a little   Q2H turn remind, ed on skin   no restraints entire shift   pt very adamant that he has not been drinking and that his behavior this admission is how he gets when he is sick - Psych consult may benefit?   afib on monitor, controlled heart rate   IVF cont   will cont to monitor   Goal Outcome Evaluation:  Plan of Care Reviewed With: patient        Progress: improving  Outcome Evaluation: VSS; A&Ox4 all night; trouble w/sleep - melatonin given & rested a little; Q2H turn remind, ed on skin; no restraints entire shift; pt very adamant that he has not been drinking and that his behavior this admission is how he gets when he is sick - Psych consult may benefit?; afib on monitor, controlled heart rate; IVF cont; will cont to monitor

## 2023-06-06 NOTE — PROGRESS NOTES
"Daily progress note    Primary care physician  Dr. Prescott    Chief complaint  Awake and alert and pleasantly confused with no specific complaints.    History of present illness  72-year-old -American male with history of paroxysmal atrial fibrillation coronary artery disease hyperlipidemia alcohol abuse tobacco abuse and chronic kidney disease stage III who was recently discharged from the hospital after management of hypotensive shock and also found to be in complete heart block which resolved.  Patient presented to [facility] with abdominal pain nausea vomiting diarrhea.  Patient is back to alcohol use but not abusing any more.  Patient denies any fever chills cough congestion chest pain shortness of breath and answer question appropriately.  Patient work-up in ER revealed acute kidney injury secondary to volume depletion admitted for observation.     REVIEW OF SYSTEMS  Unremarkable except generalized weakness    PHYSICAL EXAM  Blood pressure 144/66, pulse 74, temperature 99 °F (37.2 °C), temperature source Oral, resp. rate 16, height 175.3 cm (69\"), weight 68.8 kg (151 lb 10.8 oz), SpO2 98 %.    Constitutional: Awake and alert in no respiratory distress  HEENT: Unremarkable  Neck:  Supple  Cardiovascular: Normal rate, regular rhythm and normal heart sounds.   Pulmonary/Chest: Effort normal and breath sounds normal.  Abdominal: Soft.  No distension.  Nontender bowel sounds positive  Musculoskeletal: No edema, tenderness or deformity.   Neurological:  No focal deficit  Skin: Skin is warm and dry. Pt. is not diaphoretic.  Psychiatric: Mood, affect normal.  He is pleasant and cooperative.    LAB RESULTS  Lab Results (last 24 hours)       Procedure Component Value Units Date/Time    aPTT [[id number]]  (Normal) Collected: 06/06/23 0623    Specimen: Blood Updated: 06/06/23 0737     PTT 34.6 seconds     CBC & Differential [[id number]]  (Abnormal) Collected: 06/06/23 0623    Specimen: Blood Updated: " 06/06/23 0718    Narrative:      The following orders were created for panel order CBC & Differential.  Procedure                               Abnormality         Status                     ---------                               -----------         ------                     CBC Auto Differential[[id number]]        Abnormal            Final result                 Please view results for these tests on the individual orders.    CBC Auto Differential [[id number]]  (Abnormal) Collected: 06/06/23 0623    Specimen: Blood Updated: 06/06/23 0718     WBC 3.68 10*3/mm3      RBC 2.72 10*6/mm3      Hemoglobin 8.6 g/dL      Hematocrit 26.3 %      MCV 96.7 fL      MCH 31.6 pg      MCHC 32.7 g/dL      RDW 12.8 %      RDW-SD 44.0 fl      MPV 9.8 fL      Platelets 161 10*3/mm3      Neutrophil % 52.3 %      Lymphocyte % 33.2 %      Monocyte % 12.0 %      Eosinophil % 1.9 %      Basophil % 0.3 %      Immature Grans % 0.3 %      Neutrophils, Absolute 1.93 10*3/mm3      Lymphocytes, Absolute 1.22 10*3/mm3      Monocytes, Absolute 0.44 10*3/mm3      Eosinophils, Absolute 0.07 10*3/mm3      Basophils, Absolute 0.01 10*3/mm3      Immature Grans, Absolute 0.01 10*3/mm3      nRBC 0.0 /100 WBC           Imaging Results (Last 24 Hours)       ** No results found for the last 24 hours. **          ECG 12 Lead             Component  Ref Range & Units 05:49  (6/1/23) 1 mo ago  (4/14/23) 1 mo ago  (4/10/23) 1 mo ago  (4/10/23) 10 mo ago  (7/19/22) 10 mo ago  (7/19/22) 11 mo ago  (6/25/22)   QT Interval  ms 361  305  610  582  387  390  398    Resulting Agency BH ECG BH ECG BH ECG BH ECG BH ECG BH ECG BH ECG             HEART RATE= 109  bpm  RR Interval= 550  ms  MT Interval= 168  ms  P Horizontal Axis= -28  deg  P Front Axis= 77  deg  QRSD Interval= 92  ms  QT Interval= 361  ms  QRS Axis= 71  deg  T Wave Axis= 49  deg  - BORDERLINE ECG -  Sinus tachycardia   Atrial premature complex  Borderline prolonged QT interval  PVCs have resolved              Current Facility-Administered Medications:     acetaminophen (TYLENOL) tablet 650 mg, 650 mg, Oral, Q6H PRN, Lexa Carpenter MD, 650 mg at 06/05/23 2300    dextrose 5 % and sodium chloride 0.45 % infusion, 100 mL/hr, Intravenous, Continuous, Nicole Lewis MD, Last Rate: 100 mL/hr at 06/06/23 1546, 100 mL/hr at 06/06/23 1546    dilTIAZem CD (CARDIZEM CD) 24 hr capsule 180 mg, 180 mg, Oral, Q24H, Ann Marie Jennings MD, 180 mg at 06/06/23 0811    famotidine (PEPCID) tablet 20 mg, 20 mg, Oral, BID, Chino Cole MD, 20 mg at 06/06/23 0811    folic acid (FOLVITE) tablet 1 mg, 1 mg, Oral, Daily, Nicole Lewis MD, 1 mg at 06/06/23 0811    hydrALAZINE (APRESOLINE) injection 10 mg, 10 mg, Intravenous, Q6H PRN, Vita Leung APRN    hydrOXYzine (ATARAX) tablet 25 mg, 25 mg, Oral, 4x Daily PRN, Chino Cole MD    LORazepam (ATIVAN) tablet 0.5 mg, 0.5 mg, Oral, Q2H PRN **OR** LORazepam (ATIVAN) injection 0.5 mg, 0.5 mg, Intravenous, Q2H PRN, 0.5 mg at 06/03/23 2144 **OR** LORazepam (ATIVAN) tablet 1 mg, 1 mg, Oral, Q1H PRN **OR** LORazepam (ATIVAN) injection 1 mg, 1 mg, Intravenous, Q1H PRN, 1 mg at 06/04/23 0420 **OR** LORazepam (ATIVAN) injection 1 mg, 1 mg, Intravenous, Q15 Min PRN, 1 mg at 06/03/23 0843 **OR** LORazepam (ATIVAN) injection 1 mg, 1 mg, Intramuscular, Q15 Min PRN, Nicole Lewis MD    LORazepam (ATIVAN) injection 1 mg, 1 mg, Intravenous, Q6H PRN, Lexa Carpenter MD, 1 mg at 06/02/23 1439    Magnesium Standard Dose Replacement - Follow Nurse / BPA Driven Protocol, , Does not apply, PRN, Nicole Lewis MD    melatonin tablet 5 mg, 5 mg, Oral, Nightly PRN, Lexa Carpenter MD, 5 mg at 06/05/23 2300    metoprolol tartrate (LOPRESSOR) tablet 25 mg, 25 mg, Oral, Q12H, Ann Marie Jennings MD, 25 mg at 06/06/23 0811    morphine injection 2 mg, 2 mg, Intravenous, Q4H PRN, Chino Cole MD    nicotine (NICODERM CQ) 14 MG/24HR patch 1 patch, 1 patch, Transdermal,  Q24H, Nicole Lewis MD, 1 patch at 06/06/23 0812    nitroglycerin (NITROSTAT) SL tablet 0.4 mg, 0.4 mg, Sublingual, Q5 Min PRN, Jolie Bundy MD    ondansetron (ZOFRAN) injection 4 mg, 4 mg, Intravenous, Q6H PRN, Jolie Budny MD, 4 mg at 06/01/23 1858    rosuvastatin (CRESTOR) tablet 5 mg, 5 mg, Oral, Nightly, Jolie Bundy MD, 5 mg at 06/05/23 2030    [COMPLETED] Insert Peripheral IV, , , Once **AND** sodium chloride 0.9 % flush 10 mL, 10 mL, Intravenous, PRN, Moses Blanco MD, 10 mL at 06/03/23 2144    sucralfate (CARAFATE) tablet 1 g, 1 g, Oral, 4x Daily AC & at Bedtime, Jolie Bundy MD, 1 g at 06/06/23 1122    thiamine (B-1) injection 200 mg, 200 mg, Intravenous, Q8H, 200 mg at 06/06/23 1351 **FOLLOWED BY** [START ON 6/7/2023] thiamine (VITAMIN B-1) tablet 100 mg, 100 mg, Oral, Daily, Nicole Lewis MD     ASSESSMENT  Abdominal pain nausea vomiting diarrhea resolved  Acute kidney injury resolving  Recent complete heart block and hypotensive shock resolved  History of alcohol abuse  Paroxysmal atrial fibrillation  Coronary artery disease  Hyperlipidemia  History of CVA  Chronic kidney disease stage III  Gastroesophageal reflux disease    PLAN  CPM  Continue IVF  Detox medications  CIWA protocol  Symptomatic treatment for abdominal pain nausea vomiting  Alcohol withdrawal and seizure precautions  Continue home medications  Stress ulcer DVT prophylaxis  Supportive care  PT OT  Discussed with nursing staff and family  Discharge planning    JOLIE BUNDY MD    Copied text in this note has been reviewed and is accurate as of 06/06/23

## 2023-06-06 NOTE — PROGRESS NOTES
Discharge Planning Assessment  [facility]     Patient Name: Yuval Loja  MRN: [id number]  Today's Date: 6/6/2023    Admit Date: 6/1/2023    Plan: tbd   Discharge Needs Assessment       Row Name 06/06/23 1639       Living Environment    People in Home alone    Current Living Arrangements home    Primary Care Provided by self    Provides Primary Care For no one    Family Caregiver if Needed child(starr), adult    Family Caregiver Names Torri    Quality of Family Relationships helpful;involved    Able to Return to Prior Arrangements yes       Resource/Environmental Concerns    Resource/Environmental Concerns none    Transportation Concerns none       Transition Planning    Patient/Family Anticipates Transition to home    Patient/Family Anticipated Services at Transition home health care    Transportation Anticipated family or friend will provide       Discharge Needs Assessment    Equipment Currently Used at Home cane, straight    Concerns to be Addressed adjustment to diagnosis/illness;compliance issue;substance/tobacco abuse/use    Equipment Needed After Discharge none    Discharge Facility/Level of Care Needs nursing facility, skilled;home with home health    Provided Post Acute Provider List? N/A    Provided Post Acute Provider Quality & Resource List? N/A    Current Discharge Risk substance use/abuse                   Discharge Plan       Row Name 06/06/23 2964       Plan    Plan tbd    Plan Comments Met with patient at the bedside, verified facesheet. Patient lives alone in single story house, pt states there is one small step to enter, once inside all ADL are on main level. Patient uses a cane, no other DME. Patient states he no longer drives, his daughter provides all transportation. Patient states he is IADL. Patient has no h/o SNF, he was d/c home on 4/18/23 with [facility]. Pending progress patient will return home. He is agreeable with referral to [facility]. Patient is also agreeable with referrals to local  SNFs to check availability. Patient notified of NON-PAR benefits. Patient is agreeable to CCP discussing dc planning with his daughter, but says she is probably at work. VMM left for daughter, Torri, [phone]. Pt confirmed PCP is Alessia Prescott, preferred pharmacy is [facility]. CCP will follow up.                  Continued Care and Services - Admitted Since 6/1/2023    Coordination has not been started for this encounter.       Selected Continued Care - Prior Encounters Includes continued care and service providers with selected services from prior encounters from 3/3/2023 to 6/6/2023      Discharged on 4/18/2023 Admission date: 4/10/2023 - Discharge disposition: Home-Health Care Svc      Home Medical Care       Service Provider Selected Services Address Phone Fax Patient Preferred    [facility] [address] [place] [address], [facility] [address] [id number] [phone] --                          Expected Discharge Date and Time       Expected Discharge Date Expected Discharge Time    Jun 8, 2023            Demographic Summary    No documentation.                  Functional Status       Row Name 06/06/23 1641       Functional Status    Usual Activity Tolerance moderate       Assessment of Health Literacy    Health Literacy Fair       Functional Status, IADL    Medications independent    Meal Preparation independent    Housekeeping independent    Laundry independent    Shopping assistive person       Mental Status    General Appearance WDL WDL       Mental Status Summary    Recent Changes in Mental Status/Cognitive Functioning unable to assess                   Psychosocial    No documentation.                  Abuse/Neglect    No documentation.                  Legal    No documentation.                  Substance Abuse    No documentation.                  Patient Forms    No documentation.                     Court Olivo RN

## 2023-06-06 NOTE — PROGRESS NOTES
"    Patient Name: Yuval Loja  :1950  72 y.o.      Patient Care Team:  Alessia Prescott APRN as PCP - General (Family Medicine)  Jonny Bains MD as Referring Physician (Internal Medicine)  Carlos Villareal MD as Consulting Physician (Hematology and Oncology)    Chief Complaint: follow up atrial fibrillation with RVR     Interval History: Hr well controlled. 80s. BP overall stable. Kidney function improving.       Objective   Vital Signs  Temp:  [98.3 °F (36.8 °C)-98.7 °F (37.1 °C)] 98.7 °F (37.1 °C)  Heart Rate:  [68-93] 70  Resp:  [16] 16  BP: (135-152)/() 148/68    Intake/Output Summary (Last 24 hours) at 2023 1229  Last data filed at 2023 0900  Gross per 24 hour   Intake 840 ml   Output 250 ml   Net 590 ml     Flowsheet Rows      Flowsheet Row First Filed Value   Admission Height 175.3 cm (69\") Documented at 2023 0502   Admission Weight 65.8 kg (145 lb) Documented at 2023 0502            Physical Exam:   General Appearance:    Alert, cooperative, in no acute distress   Lungs:     Clear to auscultation.  Normal respiratory effort and rate.      Heart:    irregular rhythm and normal rate, normal S1 and S2, no murmurs, gallops or rubs.     Chest Wall:    No abnormalities observed   Abdomen:     Soft, nontender, positive bowel sounds.     Extremities:   no cyanosis, clubbing or edema.  No marked joint deformities.  Adequate musculoskeletal strength.       Results Review:    Results from last 7 days   Lab Units 23  0054   SODIUM mmol/L 147*   POTASSIUM mmol/L 3.5   CHLORIDE mmol/L 113*   CO2 mmol/L 25.9   BUN mg/dL 6*   CREATININE mg/dL 1.35*   GLUCOSE mg/dL 132*   CALCIUM mg/dL 9.1     Results from last 7 days   Lab Units 23  0420 23  0216 23  0750   HSTROP T ng/L 62* 57* 27*     Results from last 7 days   Lab Units 23  0623   WBC 10*3/mm3 3.68   HEMOGLOBIN g/dL 8.6*   HEMATOCRIT % 26.3*   PLATELETS 10*3/mm3 161     Results from last 7 days "   Lab Units 06/06/23  0623 06/05/23  0054 06/04/23  1456 06/02/23  0216 06/01/23  1908   INR   --   --   --   --  1.10   APTT seconds 34.6 78.7* 68.5*   < > 38.4*    < > = values in this interval not displayed.     Results from last 7 days   Lab Units 06/05/23  0054   MAGNESIUM mg/dL 1.7                   Medication Review:   dilTIAZem CD, 180 mg, Oral, Q24H  famotidine, 20 mg, Oral, BID  folic acid, 1 mg, Oral, Daily  metoprolol tartrate, 25 mg, Oral, Q12H  nicotine, 1 patch, Transdermal, Q24H  rosuvastatin, 5 mg, Oral, Nightly  sucralfate, 1 g, Oral, 4x Daily AC & at Bedtime  thiamine (B-1) IV, 200 mg, Intravenous, Q8H   Followed by  [START ON 6/7/2023] thiamine, 100 mg, Oral, Daily         dextrose 5 % and sodium chloride 0.45 %, 100 mL/hr, Last Rate: 100 mL/hr (06/06/23 0456)        Assessment & Plan   Abdominal pain with nausea, vomiting and diarrhea. Resolved  Atrial fibrillation with RVR - rate controlled and tolerating oral medications. He is not on anticoagulation at home due to compliance issues and GI bleeds. Heparin stopped 6/5/23.  Acute kidney injury on chronic kidney disease, improving.  History of heavy alcohol use  Coronary artery disease status post stenting to the RCA in 2000 and LAD in 2006  Recent sinus and junctional bradyycardia requiring transvenous pacing 4/10/23. Metabolic etiology and recovered. Did not require PPM.   Elevated high sensitivity troponin , secondary to renal dysfunction  Frequent PACs and PVCs   Hypertension  Hypernatremia     HR doing well on PO meds.       RICHMOND Gaona  [facility]  06/06/23  12:29 EDT

## 2023-06-06 NOTE — THERAPY TREATMENT NOTE
Patient Name: Yuval Loja  : 1950    MRN: [id number]                              Today's Date: 2023       Admit Date: 2023    Visit Dx:     ICD-10-CM ICD-9-CM   1. Generalized abdominal pain  R10.84 789.07   2. CRISTIANO (acute kidney injury)  N17.9 584.9   3. Nausea and vomiting, unspecified vomiting type  R11.2 787.01     Patient Active Problem List   Diagnosis    Alcoholic ketoacidosis    Alcohol dependence    Methamphetamine abuse    Fatty liver, alcoholic    Nausea vomiting and diarrhea    Alcoholic liver disease    Metabolic acidosis    Tobacco abuse    Coronary artery disease involving native coronary artery of native heart without angina pectoris    Tachycardia    Sinus tachycardia    Chronic kidney disease, stage 3    Medically noncompliant    Visual hallucinations    Psychosis    Hyponatremia    CAD (coronary artery disease)    Leukopenia    Symptomatic anemia    Headache    Substance abuse    Gastrointestinal hemorrhage    CRISTIANO (acute kidney injury) (HCC)    Hyperkalemia    Right lower quadrant abdominal pain    New onset atrial fibrillation    History of drug abuse    COPD (chronic obstructive pulmonary disease)    Right inguinal hernia    Constipation    Status post right hip replacement    Right hip pain    Sinus bradycardia    Generalized abdominal pain     Past Medical History:   Diagnosis Date    Alcohol abuse     Arthritis     CAD (coronary artery disease)     Chronic kidney disease, stage 3     COPD (chronic obstructive pulmonary disease)     Disease of thyroid gland     Elevated cholesterol     Fatty liver, alcoholic     GERD (gastroesophageal reflux disease)     History of transfusion     Hypertensive urgency     Metabolic acidosis     Myocardial infarction     Sinus tachycardia     Sleep apnea     Stroke      Past Surgical History:   Procedure Laterality Date    BACK SURGERY      CARDIAC CATHETERIZATION      CARDIAC ELECTROPHYSIOLOGY PROCEDURE N/A 4/10/2023    Procedure: Temporary  Pacemaker;  Surgeon: Vaibhav Bonilla MD;  Location: [facility] CATH INVASIVE LOCATION;  Service: Cardiovascular;  Laterality: N/A;    COLONOSCOPY      ENDOSCOPY      FRACTURE SURGERY      JOINT REPLACEMENT      SKIN BIOPSY      TOTAL HIP ARTHROPLASTY Right 06/27/2022    Procedure: TOTAL HIP ARTHROPLASTY ANTERIOR WITH HANA TABLE;  Surgeon: Willian Quiles MD;  Location: [facility] MAIN OR;  Service: Orthopedics;  Laterality: Right;  Depuy, carli. everett      General Information       Row Name 06/06/23 1224          OT Time and Intention    Document Type therapy note (daily note)  -KB     Mode of Treatment occupational therapy;co-treatment  -KB       Row Name 06/06/23 1224          General Information    Patient Profile Reviewed yes  -KB     Existing Precautions/Restrictions fall  -KB       Row Name 06/06/23 1224          Cognition    Orientation Status (Cognition) oriented to;person  -KB       Row Name 06/06/23 1224          Safety Issues, Functional Mobility    Safety Issues Affecting Function (Mobility) safety precaution awareness;awareness of need for assistance;insight into deficits/self-awareness  -     Impairments Affecting Function (Mobility) balance;cognition;endurance/activity tolerance;strength  -KB               User Key  (r) = Recorded By, (t) = Taken By, (c) = Cosigned By      Initials Name Provider Type    KB Mikala Quesada, LIZA Occupational Therapist                     Mobility/ADL's       Row Name 06/06/23 1227          Bed Mobility    Bed Mobility supine-sit;sit-supine  -KB     Supine-Sit [place] (Bed Mobility) standby assist  -KB     Sit-Supine [place] (Bed Mobility) standby assist  -     Assistive Device (Bed Mobility) head of bed elevated;bed rails  -KB       Row Name 06/06/23 1228          Sit-Stand Transfer    Sit-Stand [place] (Transfers) contact guard;verbal cues  -     Assistive Device (Sit-Stand Transfers) walker, front-wheeled  -KB       Row Name 06/06/23 122           Activities of Daily Living    BADL Assessment/Intervention lower body dressing  -KB       Row Name 06/06/23 1225          Lower Body Dressing Assessment/Training    [place] Level (Lower Body Dressing) shoes/slippers;standby assist  -KB     Position (Lower Body Dressing) edge of bed sitting  -KB     Comment, (Lower Body Dressing) able to don slippers with set up, including bending forward to get over heel  -KB               User Key  (r) = Recorded By, (t) = Taken By, (c) = Cosigned By      Initials Name Provider Type    Mikala Lee OT Occupational Therapist                   Obj/Interventions       Row Name 06/06/23 1229          Balance    Balance Assessment sitting static balance;sitting dynamic balance;standing dynamic balance;standing static balance  -KB     Static Sitting Balance standby assist  -SAMARIA     Dynamic Sitting Balance standby assist  -KB     Static Standing Balance contact guard  -KB     Dynamic Standing Balance contact guard  -KB               User Key  (r) = Recorded By, (t) = Taken By, (c) = Cosigned By      Initials Name Provider Type    Mikala Lee OT Occupational Therapist                   Goals/Plan    No documentation.                  Clinical Impression       Row Name 06/06/23 1229          Pain Assessment    Pretreatment Pain Rating 0/10 - no pain  -KB     Posttreatment Pain Rating 0/10 - no pain  -KB       Row Name 06/06/23 1229          Plan of Care Review    Plan of Care Reviewed With patient  -     Progress improving  -     Outcome Evaluation Pt seen for OT treatment this date. Pt improved cognitively from yesterday, but cont to demo some decreased safety awareness and cofusion. Pt was able to perform LE dressing with set up edge of bed. Performed fm with rw with cga and cues for safety. Cont to demo decreased abilty to perform adls and fm safely, recommending cont acute OT and snf at [place] for further OT.  -       Row Name 06/06/23 1229          Therapy Plan  Review/Discharge Plan (OT)    Anticipated Discharge Disposition (OT) skilled nursing facility  -       Row Name 06/06/23 1229          Positioning and Restraints    Pre-Treatment Position in bed  -KB     Post Treatment Position bed  -KB     In Bed notified nsg;supine;encouraged to call for assist;exit alarm on;call light within reach  -KB               User Key  (r) = Recorded By, (t) = Taken By, (c) = Cosigned By      Initials Name Provider Type    Mikala Lee, OT Occupational Therapist                   Outcome Measures       Row Name 06/06/23 1231          How much help from another is currently needed...    Putting on and taking off regular lower body clothing? 3  -KB     Bathing (including washing, rinsing, and drying) 2  -KB     Toileting (which includes using toilet bed pan or urinal) 2  -KB     Putting on and taking off regular upper body clothing 3  -KB     Taking care of personal grooming (such as brushing teeth) 3  -KB     Eating meals 3  -KB     AM-PAC 6 Clicks Score (OT) 16  -KB       Row Name 06/06/23 0956          How much help from another person do you currently need...    Turning from your back to your side while in flat bed without using bedrails? 3  -CW     Moving from lying on back to sitting on the side of a flat bed without bedrails? 3  -CW     Moving to and from a bed to a chair (including a wheelchair)? 3  -CW     Standing up from a chair using your arms (e.g., wheelchair, bedside chair)? 3  -CW     Climbing 3-5 steps with a railing? 1  -CW     To walk in hospital room? 3  -CW     AM-PAC 6 Clicks Score (PT) 16  -CW     Highest level of mobility 5 --> Static standing  -CW       Row Name 06/06/23 0956          Functional Assessment    Outcome Measure Options AM-PAC 6 Clicks Basic Mobility (PT)  -CW               User Key  (r) = Recorded By, (t) = Taken By, (c) = Cosigned By      Initials Name Provider Type    Katie Whitaker, PT Physical Therapist    Mikala Lee, OT  Occupational Therapist                    Occupational Therapy Education       Title: PT OT SLP Therapies (In Progress)       Topic: Occupational Therapy (Done)       Point: ADL training (Done)       Description:   Instruct learner(s) on proper safety adaptation and remediation techniques during self care or transfers.   Instruct in proper use of assistive devices.                  Learning Progress Summary             Patient Acceptance, E,TB, VU,NR by  at 6/5/2023 1316                         Point: Home exercise program (Done)       Description:   Instruct learner(s) on appropriate technique for monitoring, assisting and/or progressing therapeutic exercises/activities.                  Learning Progress Summary             Patient Acceptance, E,TB, VU,NR by  at 6/5/2023 1316                         Point: Precautions (Done)       Description:   Instruct learner(s) on prescribed precautions during self-care and functional transfers.                  Learning Progress Summary             Patient Acceptance, E,TB, VU,NR by  at 6/5/2023 1316                                         User Key       Initials Effective Dates Name Provider Type Discipline     09/22/22 -  Shey Martin OT Occupational Therapist OT                  OT Recommendation and Plan     Plan of Care Review  Plan of Care Reviewed With: patient  Progress: improving  Outcome Evaluation: Pt seen for OT treatment this date. Pt improved cognitively from yesterday, but cont to demo some decreased safety awareness and cofusion. Pt was able to perform LE dressing with set up edge of bed. Performed fm with rw with cga and cues for safety. Cont to demo decreased abilty to perform adls and fm safely, recommending cont acute OT and snf at [place] for further OT.     Time Calculation:    Time Calculation- OT       Row Name 06/06/23 1232             Time Calculation- OT    OT Start Time 0847  -KB      OT Stop Time 0901  -KB      OT Time Calculation (min) 14  min  -KB      Total Timed Code Minutes- OT 14 minute(s)  -KB      OT Received On 06/06/23  -KB      OT - Next Appointment 06/08/23  -KB      OT Goal Re-Cert Due Date 06/19/23  -KB         Timed Charges    62535 - OT Self Care/Mgmt Minutes 14  -KB         Total Minutes    Timed Charges Total Minutes 14  -KB       Total Minutes 14  -KB                User Key  (r) = Recorded By, (t) = Taken By, (c) = Cosigned By      Initials Name Provider Type    KB Mikala Quesada OT Occupational Therapist                  Therapy Charges for Today       Code Description Service Date Service Provider Modifiers Qty    [id number] HC OT SELF CARE/MGMT/TRAIN EA 15 MIN 6/6/2023 Mikala Quesada OT GO 1                 Mikala Quesada OT  6/6/2023

## 2023-06-07 LAB
ALBUMIN SERPL-MCNC: 2.9 G/DL (ref 3.5–5.2)
ALBUMIN/GLOB SERPL: 1 G/DL
ALP SERPL-CCNC: 54 U/L (ref 39–117)
ALT SERPL W P-5'-P-CCNC: 8 U/L (ref 1–41)
ANION GAP SERPL CALCULATED.3IONS-SCNC: 6.9 MMOL/L (ref 5–15)
AST SERPL-CCNC: 11 U/L (ref 1–40)
BASOPHILS # BLD AUTO: 0.01 10*3/MM3 (ref 0–0.2)
BASOPHILS NFR BLD AUTO: 0.3 % (ref 0–1.5)
BILIRUB SERPL-MCNC: 0.3 MG/DL (ref 0–1.2)
BUN SERPL-MCNC: 4 MG/DL (ref 8–23)
BUN/CREAT SERPL: 3.3 (ref 7–25)
CALCIUM SPEC-SCNC: 9.2 MG/DL (ref 8.6–10.5)
CHLORIDE SERPL-SCNC: 111 MMOL/L (ref 98–107)
CO2 SERPL-SCNC: 24.1 MMOL/L (ref 22–29)
CREAT SERPL-MCNC: 1.22 MG/DL (ref 0.76–1.27)
DEPRECATED RDW RBC AUTO: 43.6 FL (ref 37–54)
EGFRCR SERPLBLD CKD-EPI 2021: 62.2 ML/MIN/1.73
EOSINOPHIL # BLD AUTO: 0.06 10*3/MM3 (ref 0–0.4)
EOSINOPHIL NFR BLD AUTO: 1.6 % (ref 0.3–6.2)
ERYTHROCYTE [DISTWIDTH] IN BLOOD BY AUTOMATED COUNT: 12.7 % (ref 12.3–15.4)
GLOBULIN UR ELPH-MCNC: 2.9 GM/DL
GLUCOSE SERPL-MCNC: 108 MG/DL (ref 65–99)
HCT VFR BLD AUTO: 26.8 % (ref 37.5–51)
HGB BLD-MCNC: 8.5 G/DL (ref 13–17.7)
IMM GRANULOCYTES # BLD AUTO: 0.01 10*3/MM3 (ref 0–0.05)
IMM GRANULOCYTES NFR BLD AUTO: 0.3 % (ref 0–0.5)
LYMPHOCYTES # BLD AUTO: 1.21 10*3/MM3 (ref 0.7–3.1)
LYMPHOCYTES NFR BLD AUTO: 32.1 % (ref 19.6–45.3)
MCH RBC QN AUTO: 30.5 PG (ref 26.6–33)
MCHC RBC AUTO-ENTMCNC: 31.7 G/DL (ref 31.5–35.7)
MCV RBC AUTO: 96.1 FL (ref 79–97)
MONOCYTES # BLD AUTO: 0.44 10*3/MM3 (ref 0.1–0.9)
MONOCYTES NFR BLD AUTO: 11.7 % (ref 5–12)
NEUTROPHILS NFR BLD AUTO: 2.04 10*3/MM3 (ref 1.7–7)
NEUTROPHILS NFR BLD AUTO: 54 % (ref 42.7–76)
NRBC BLD AUTO-RTO: 0 /100 WBC (ref 0–0.2)
PLATELET # BLD AUTO: 161 10*3/MM3 (ref 140–450)
PMV BLD AUTO: 10.7 FL (ref 6–12)
POTASSIUM SERPL-SCNC: 3.2 MMOL/L (ref 3.5–5.2)
PROT SERPL-MCNC: 5.8 G/DL (ref 6–8.5)
RBC # BLD AUTO: 2.79 10*6/MM3 (ref 4.14–5.8)
SODIUM SERPL-SCNC: 142 MMOL/L (ref 136–145)
WBC NRBC COR # BLD: 3.77 10*3/MM3 (ref 3.4–10.8)

## 2023-06-07 PROCEDURE — 99232 SBSQ HOSP IP/OBS MODERATE 35: CPT | Performed by: NURSE PRACTITIONER

## 2023-06-07 PROCEDURE — 85025 COMPLETE CBC W/AUTO DIFF WBC: CPT | Performed by: HOSPITALIST

## 2023-06-07 PROCEDURE — 25010000002 FUROSEMIDE PER 20 MG: Performed by: INTERNAL MEDICINE

## 2023-06-07 PROCEDURE — 80053 COMPREHEN METABOLIC PANEL: CPT | Performed by: HOSPITALIST

## 2023-06-07 RX ORDER — POTASSIUM CHLORIDE 750 MG/1
40 TABLET, FILM COATED, EXTENDED RELEASE ORAL
Status: COMPLETED | OUTPATIENT
Start: 2023-06-07 | End: 2023-06-07

## 2023-06-07 RX ORDER — HYDROCODONE BITARTRATE AND ACETAMINOPHEN 5; 325 MG/1; MG/1
1 TABLET ORAL EVERY 6 HOURS PRN
Status: DISCONTINUED | OUTPATIENT
Start: 2023-06-07 | End: 2023-06-08

## 2023-06-07 RX ORDER — LORAZEPAM 2 MG/ML
0.25 INJECTION INTRAMUSCULAR EVERY 6 HOURS PRN
Status: DISCONTINUED | OUTPATIENT
Start: 2023-06-07 | End: 2023-06-08

## 2023-06-07 RX ORDER — FUROSEMIDE 10 MG/ML
80 INJECTION INTRAMUSCULAR; INTRAVENOUS ONCE
Status: COMPLETED | OUTPATIENT
Start: 2023-06-07 | End: 2023-06-07

## 2023-06-07 RX ORDER — LORAZEPAM 2 MG/ML
0.25 INJECTION INTRAMUSCULAR EVERY 6 HOURS PRN
Status: DISCONTINUED | OUTPATIENT
Start: 2023-06-07 | End: 2023-06-07

## 2023-06-07 RX ADMIN — ROSUVASTATIN CALCIUM 5 MG: 5 TABLET, FILM COATED ORAL at 20:36

## 2023-06-07 RX ADMIN — FAMOTIDINE 20 MG: 20 TABLET, FILM COATED ORAL at 20:36

## 2023-06-07 RX ADMIN — FUROSEMIDE 80 MG: 40 INJECTION, SOLUTION INTRAMUSCULAR; INTRAVENOUS at 11:00

## 2023-06-07 RX ADMIN — Medication 100 MG: at 08:40

## 2023-06-07 RX ADMIN — DEXTROSE AND SODIUM CHLORIDE 75 ML/HR: 5; 450 INJECTION, SOLUTION INTRAVENOUS at 03:41

## 2023-06-07 RX ADMIN — Medication 5 MG: at 20:36

## 2023-06-07 RX ADMIN — SUCRALFATE 1 G: 1 TABLET ORAL at 17:28

## 2023-06-07 RX ADMIN — HYDROXYZINE HYDROCHLORIDE 25 MG: 25 TABLET ORAL at 08:53

## 2023-06-07 RX ADMIN — POTASSIUM CHLORIDE 40 MEQ: 750 TABLET, EXTENDED RELEASE ORAL at 13:00

## 2023-06-07 RX ADMIN — METOPROLOL TARTRATE 25 MG: 25 TABLET, FILM COATED ORAL at 20:36

## 2023-06-07 RX ADMIN — SUCRALFATE 1 G: 1 TABLET ORAL at 20:36

## 2023-06-07 RX ADMIN — FAMOTIDINE 20 MG: 20 TABLET, FILM COATED ORAL at 08:40

## 2023-06-07 RX ADMIN — HYDROXYZINE HYDROCHLORIDE 25 MG: 25 TABLET ORAL at 14:57

## 2023-06-07 RX ADMIN — DILTIAZEM HYDROCHLORIDE 180 MG: 180 CAPSULE, COATED, EXTENDED RELEASE ORAL at 08:40

## 2023-06-07 RX ADMIN — SUCRALFATE 1 G: 1 TABLET ORAL at 08:40

## 2023-06-07 RX ADMIN — POTASSIUM CHLORIDE 40 MEQ: 750 TABLET, EXTENDED RELEASE ORAL at 11:00

## 2023-06-07 RX ADMIN — Medication 1 MG: at 08:40

## 2023-06-07 RX ADMIN — Medication 1 PATCH: at 08:40

## 2023-06-07 RX ADMIN — Medication 1 TABLET: at 08:40

## 2023-06-07 RX ADMIN — POTASSIUM CHLORIDE 40 MEQ: 750 TABLET, EXTENDED RELEASE ORAL at 14:55

## 2023-06-07 RX ADMIN — METOPROLOL TARTRATE 25 MG: 25 TABLET, FILM COATED ORAL at 08:40

## 2023-06-07 RX ADMIN — SUCRALFATE 1 G: 1 TABLET ORAL at 11:05

## 2023-06-07 NOTE — PROGRESS NOTES
Discharge Planning Assessment  [facility]     Patient Name: Yuval Loja  MRN: [id number]  Today's Date: 6/7/2023    Admit Date: 6/1/2023    Plan: [facility] pending pre-cert   Discharge Needs Assessment    No documentation.                  Discharge Plan       Row Name 06/07/23 1154       Plan    Plan     Plan Comments Follow up with patient at the bedside and s/w pt's daughter by phone, Torri [phone]. Patient and daughter would like for pt to go to short-term rehab at [facility] or [facility]; both are [place] facilities. [facility] with [facility] [place] is going to call patient's daughter to answer a few questions she has about [facility] vs. [facility]. Sereh will start pre-cert at the facility that daughter prefers. Jeffery will follow up with CCP.       Row Name 06/07/23 0935       Plan    Plan tbd    Plan Comments [facility] accepted [facility] and [facility] also accepted. [employer] MCR pre-cert required for dc to SNF. CCP will follow up with pt/family for facility preference.                  Continued Care and Services - Admitted Since 6/1/2023       Destination       Service Provider Request Status Selected Services Address Phone Fax Patient Preferred    [facility] Accepted N/A [address] [facility] [id number] [phone] --    [facility] Accepted N/A [address] [facility] [address] [phone] --    [facility] Accepted N/A [address] [address] [facility] [id number] [phone] --    [facility] Considering  Pending clinical review N/A [address] [facility] [id number] [phone] --    [facility] Considering  Pending clinical review N/A [address] [facility] [id number] [phone] --    [facility] Pending - Request Sent N/A [address] [facility] [phone] --     [facility] Pending - Request Sent N/A [address] [id number] [phone] --    [facility] Pending - Request Sent N/A [address] [facility] [phone] --    [facility] Declined  Cannot meet patient's needs N/A [address] [facility] [address] [phone] --    [facility] Declined  Current or History of Substance Abuse, Cannot meet patient's needs N/A [address] [facility] [id number] [phone] --    [facility] Declined  Cannot meet patient's needs N/A [address] [facility] [id number] [phone] --    [facility] Declined  Cannot meet patient's needs N/A [address] [facility] [address] [phone] --              Home Medical Care       Service Provider Request Status Selected Services Address Phone Fax Patient Preferred    [facility] Declined  Inadequate staffing N/A [address] [facility], [address] [address] [phone] --                  Selected Continued Care - Prior Encounters Includes continued care and service providers with selected services from prior encounters from 3/3/2023 to 6/7/2023      Discharged on 4/18/2023 Admission date: 4/10/2023 - Discharge disposition: Home-Health Care c      Home Medical Care       Service Provider Selected Services Address Phone Fax Patient Preferred    [facility] [address] [facility], [address] [id number] [phone] --                          Expected Discharge Date and Time       Expected Discharge Date Expected Discharge Time    Jun 8, 2023            Demographic Summary    No documentation.                  Functional Status    No documentation.                  Psychosocial    No documentation.                   Abuse/Neglect    No documentation.                  Legal    No documentation.                  Substance Abuse    No documentation.                  Patient Forms    No documentation.                     Court Olivo RN

## 2023-06-07 NOTE — PROGRESS NOTES
"Daily progress note    Primary care physician  Dr. Prescott    Chief complaint  Awake and alert and feeling same with no new complaints and agreeable to go to subacute rehab.    History of present illness  72-year-old -American male with history of paroxysmal atrial fibrillation coronary artery disease hyperlipidemia alcohol abuse tobacco abuse and chronic kidney disease stage III who was recently discharged from the hospital after management of hypotensive shock and also found to be in complete heart block which resolved.  Patient presented to [facility] with abdominal pain nausea vomiting diarrhea.  Patient is back to alcohol use but not abusing any more.  Patient denies any fever chills cough congestion chest pain shortness of breath and answer question appropriately.  Patient work-up in ER revealed acute kidney injury secondary to volume depletion admitted for observation.     REVIEW OF SYSTEMS  Unremarkable except generalized weakness    PHYSICAL EXAM  Blood pressure 115/76, pulse 80, temperature 98.9 °F (37.2 °C), temperature source Oral, resp. rate 16, height 175.3 cm (69\"), weight 68.8 kg (151 lb 10.8 oz), SpO2 98 %.    Constitutional: Awake and alert in no respiratory distress  HEENT: Unremarkable  Neck:  Supple  Cardiovascular: Normal rate, regular rhythm and normal heart sounds.   Pulmonary/Chest: Effort normal and breath sounds normal.  Abdominal: Soft.  No distension.  Nontender bowel sounds positive  Musculoskeletal: No edema, tenderness or deformity.   Neurological:  No focal deficit  Skin: Skin is warm and dry. Pt. is not diaphoretic.  Psychiatric: Mood, affect normal.  He is pleasant and cooperative.    LAB RESULTS  Lab Results (last 24 hours)       Procedure Component Value Units Date/Time    Comprehensive Metabolic Panel [[id number]]  (Abnormal) Collected: 06/07/23 0612    Specimen: Blood Updated: 06/07/23 0707     Glucose 108 mg/dL      BUN 4 mg/dL      Creatinine 1.22 mg/dL      " Sodium 142 mmol/L      Potassium 3.2 mmol/L      Chloride 111 mmol/L      CO2 24.1 mmol/L      Calcium 9.2 mg/dL      Total Protein 5.8 g/dL      Albumin 2.9 g/dL      ALT (SGPT) 8 U/L      AST (SGOT) 11 U/L      Alkaline Phosphatase 54 U/L      Total Bilirubin 0.3 mg/dL      Globulin 2.9 gm/dL      A/G Ratio 1.0 g/dL      BUN/Creatinine Ratio 3.3     Anion Gap 6.9 mmol/L      eGFR 62.2 mL/min/1.73     Narrative:      GFR Normal >60  Chronic Kidney Disease <60  Kidney Failure <15    The GFR formula is only valid for adults with stable renal function between ages 18 and 70.    CBC & Differential [[id number]]  (Abnormal) Collected: 06/07/23 0612    Specimen: Blood Updated: 06/07/23 0642    Narrative:      The following orders were created for panel order CBC & Differential.  Procedure                               Abnormality         Status                     ---------                               -----------         ------                     CBC Auto Differential[[id number]]        Abnormal            Final result                 Please view results for these tests on the individual orders.    CBC Auto Differential [[id number]]  (Abnormal) Collected: 06/07/23 0612    Specimen: Blood Updated: 06/07/23 0642     WBC 3.77 10*3/mm3      RBC 2.79 10*6/mm3      Hemoglobin 8.5 g/dL      Hematocrit 26.8 %      MCV 96.1 fL      MCH 30.5 pg      MCHC 31.7 g/dL      RDW 12.7 %      RDW-SD 43.6 fl      MPV 10.7 fL      Platelets 161 10*3/mm3      Neutrophil % 54.0 %      Lymphocyte % 32.1 %      Monocyte % 11.7 %      Eosinophil % 1.6 %      Basophil % 0.3 %      Immature Grans % 0.3 %      Neutrophils, Absolute 2.04 10*3/mm3      Lymphocytes, Absolute 1.21 10*3/mm3      Monocytes, Absolute 0.44 10*3/mm3      Eosinophils, Absolute 0.06 10*3/mm3      Basophils, Absolute 0.01 10*3/mm3      Immature Grans, Absolute 0.01 10*3/mm3      nRBC 0.0 /100 WBC           Imaging Results (Last 24 Hours)       ** No results found for the last  24 hours. **          ECG 12 Lead             Component  Ref Range & Units 05:49  (6/1/23) 1 mo ago  (4/14/23) 1 mo ago  (4/10/23) 1 mo ago  (4/10/23) 10 mo ago  (7/19/22) 10 mo ago  (7/19/22) 11 mo ago  (6/25/22)   QT Interval  ms 361  305  610  582  387  390  398    Resulting Agency BH ECG BH ECG BH ECG BH ECG BH ECG BH ECG BH ECG             HEART RATE= 109  bpm  RR Interval= 550  ms  MD Interval= 168  ms  P Horizontal Axis= -28  deg  P Front Axis= 77  deg  QRSD Interval= 92  ms  QT Interval= 361  ms  QRS Axis= 71  deg  T Wave Axis= 49  deg  - BORDERLINE ECG -  Sinus tachycardia   Atrial premature complex  Borderline prolonged QT interval  PVCs have resolved             Current Facility-Administered Medications:     acetaminophen (TYLENOL) tablet 650 mg, 650 mg, Oral, Q6H PRN, Lexa Carpenter MD, 650 mg at 06/05/23 2300    dilTIAZem CD (CARDIZEM CD) 24 hr capsule 180 mg, 180 mg, Oral, Q24H, Ann Marie Jennings MD, 180 mg at 06/07/23 0840    famotidine (PEPCID) tablet 20 mg, 20 mg, Oral, BID, Chino Cole MD, 20 mg at 06/07/23 0840    folic acid (FOLVITE) tablet 1 mg, 1 mg, Oral, Daily, Nicole Lewis MD, 1 mg at 06/07/23 0840    hydrALAZINE (APRESOLINE) injection 10 mg, 10 mg, Intravenous, Q6H PRN, Vita Leung APRN    hydrOXYzine (ATARAX) tablet 25 mg, 25 mg, Oral, 4x Daily PRN, Chino Cole MD, 25 mg at 06/07/23 1457    LORazepam (ATIVAN) injection 1 mg, 1 mg, Intravenous, Q6H PRBHASKAR, Lexa Carpenter MD, 1 mg at 06/02/23 1439    Magnesium Standard Dose Replacement - Follow Nurse / BPA Driven Protocol, , Does not apply, PRN, Nicole Lewis MD    melatonin tablet 5 mg, 5 mg, Oral, Nightly PRN, Lexa Carpenter MD, 5 mg at 06/06/23 2120    metoprolol tartrate (LOPRESSOR) tablet 25 mg, 25 mg, Oral, Q12H, Ann Maire Jennings MD, 25 mg at 06/07/23 0840    morphine injection 2 mg, 2 mg, Intravenous, Q4H PRN, Chino Cole MD    multivitamin (THERAGRAN) tablet 1 tablet, 1 tablet, Oral,  Daily, Jolie Bundy MD, 1 tablet at 06/07/23 0840    nicotine (NICODERM CQ) 14 MG/24HR patch 1 patch, 1 patch, Transdermal, Q24H, Nicole Lewis MD, 1 patch at 06/07/23 0840    nitroglycerin (NITROSTAT) SL tablet 0.4 mg, 0.4 mg, Sublingual, Q5 Min PRN, Jolie Bundy MD    ondansetron (ZOFRAN) injection 4 mg, 4 mg, Intravenous, Q6H PRN, Jolie Bundy MD, 4 mg at 06/06/23 1802    rosuvastatin (CRESTOR) tablet 5 mg, 5 mg, Oral, Nightly, Jolie Bundy MD, 5 mg at 06/06/23 2120    [COMPLETED] Insert Peripheral IV, , , Once **AND** sodium chloride 0.9 % flush 10 mL, 10 mL, Intravenous, PRN, Moses Blanco MD, 10 mL at 06/03/23 2144    sucralfate (CARAFATE) tablet 1 g, 1 g, Oral, 4x Daily AC & at Bedtime, Jolie Bundy MD, 1 g at 06/07/23 1105    thiamine (VITAMIN B-1) tablet 100 mg, 100 mg, Oral, Daily, Jolie Bundy MD, 100 mg at 06/07/23 0840     ASSESSMENT  Abdominal pain nausea vomiting diarrhea resolved  Acute kidney injury resolving  Recent complete heart block and hypotensive shock resolved  History of alcohol abuse  Paroxysmal atrial fibrillation  Coronary artery disease  Hyperlipidemia  History of CVA  Chronic kidney disease stage III  Gastroesophageal reflux disease    PLAN  CPM  Discontinue IV fluid  Detox medications  Off CIWA protocol  Alcohol withdrawal and seizure precautions  Continue home medications  Stress ulcer DVT prophylaxis  Supportive care  PT OT  Discussed with nursing staff and family  Subacute rehab once bed available and approved with insurance    JOLIE BUNDY MD    Copied text in this note has been reviewed and is accurate as of 06/07/23

## 2023-06-07 NOTE — CONSULTS
[facility] Consult Note      Patient Name: Yuval Loja  : 1950  MRN: [id number]  Primary Care Physician:  Alessia Prescott APRN  Referring Physician: Chino Cole MD  Date of admission: 2023    Subjective     Reason for Consult: Chronic kidney    HPI:   Yuval Loja is a 72 y.o. male patient was admitted on 2023, where he presented with abdominal pain was having nausea, vomiting and diarrhea prior to admission but his diarrhea has resolved and his nausea and vomiting abdominal pain has resolved since admission, his creatinine on admission was 1.83 gradually improving down to 1.22 he had a previous hospitalization in April where he had acute kidney injury on chronic kidney disease when he was seen by my partner Dr. Thomas Weiner at that time.    The patient has history of strokes in the past x2 leading to right-sided weakness, history of coronary artery disease, COPD, hypertension, history of fatty liver most likely related to alcohol use, GERD, obstructive sleep apnea, history of alcohol abuse.  He is not diabetic    He denies any chest pain or shortness of air, no orthopnea or PND, no nausea or vomiting, no dysuria or gross hematuria.    Review of Systems:   14 point review of systems is otherwise negative except for mentioned above on HPI    Personal History     Past Medical History:   Diagnosis Date    Alcohol abuse     Arthritis     CAD (coronary artery disease)     Chronic kidney disease, stage 3     COPD (chronic obstructive pulmonary disease)     Disease of thyroid gland     Elevated cholesterol     Fatty liver, alcoholic     GERD (gastroesophageal reflux disease)     History of transfusion     Hypertensive urgency     Metabolic acidosis     Myocardial infarction     Sinus tachycardia     Sleep apnea     Stroke        Past Surgical History:   Procedure Laterality Date    BACK SURGERY      CARDIAC CATHETERIZATION      CARDIAC ELECTROPHYSIOLOGY PROCEDURE N/A  4/10/2023    Procedure: Temporary Pacemaker;  Surgeon: Vaibhav Bonilla MD;  Location:  [facility];  Service: Cardiovascular;  Laterality: N/A;    COLONOSCOPY      ENDOSCOPY      FRACTURE SURGERY      JOINT REPLACEMENT      SKIN BIOPSY      TOTAL HIP ARTHROPLASTY Right 06/27/2022    Procedure: TOTAL HIP ARTHROPLASTY ANTERIOR WITH HANA TABLE;  Surgeon: Willian Quiles MD;  Location: [facility] MAIN OR;  Service: Orthopedics;  Laterality: Right;  Depuy, mile floyd       Family History: family history is not on file.    Social History:  reports that he has been smoking cigarettes. He has been smoking an average of .5 packs per day. He has never used smokeless tobacco. He reports that he does not currently use alcohol. He reports that he does not use drugs.    Home Medications:  Prior to Admission medications    Medication Sig Start Date End Date Taking? Authorizing Provider   amLODIPine (NORVASC) 10 MG tablet Take 1 tablet by mouth Daily.   Yes Sarbjit Paris MD   docusate sodium (COLACE) 100 MG capsule Take 1 capsule by mouth 2 (Two) Times a Day.   Yes Sarbjit Paris MD   hydrOXYzine (ATARAX) 25 MG tablet Take 1 tablet by mouth 4 (Four) Times a Day As Needed for Anxiety. 7/17/20  Yes Sarbjit Paris MD   metoprolol succinate XL (TOPROL-XL) 25 MG 24 hr tablet Take 1 tablet by mouth 2 (Two) Times a Day.   Yes Sarbjti Paris MD   pantoprazole (PROTONIX) 40 MG EC tablet Take 1 tablet by mouth Daily. 7/17/20  Yes Sarbjit Paris MD   rosuvastatin (CRESTOR) 10 MG tablet Take 1 tablet by mouth Daily.   Yes Sarbjit Paris MD   sucralfate (CARAFATE) 1 GM/10ML suspension Take 10 mL by mouth 4 (Four) Times a Day With Meals & at Bedtime.   Yes Sarbjit Paris MD   thiamine (VITAMIN B-1) 100 MG tablet  tablet Take 1 tablet by mouth Daily.   Yes Sarbjit Paris MD       Allergies:  Allergies   Allergen Reactions    Mirtazapine Other (See Comments)      "confused    Sertraline Anxiety, Diarrhea and Nausea And Vomiting     Also \"hallucinations\"       Objective     Vitals:   Temp:  [99 °F (37.2 °C)-99.4 °F (37.4 °C)] 99 °F (37.2 °C)  Heart Rate:  [73-84] 84  Resp:  [16] 16  BP: (129-151)/(55-73) 129/55  Flow (L/min):  [2] 2    Intake/Output Summary (Last 24 hours) at 6/7/2023 0842  Last data filed at 6/6/2023 1725  Gross per 24 hour   Intake 2510 ml   Output 350 ml   Net 2160 ml       Physical Exam:   Constitutional: Awake, alert, no acute distress.  Chronically ill  HEENT: Sclera anicteric, no conjunctival injection  Neck: Supple, no thyromegaly, no lymphadenopathy, trachea at midline, no JVD  Respiratory: Clear to auscultation bilaterally, nonlabored respiration  Cardiovascular: RRR, no murmurs, no rubs or gallops, no carotid bruit  Gastrointestinal: Positive bowel sounds, abdomen is soft, nontender and nondistended  : No palpable bladder  Musculoskeletal: 2+ pedal and ankle edema, no clubbing or cyanosis  Psychiatric: Appropriate affect, cooperative  Neurologic: Normal speech and mental status, has right-sided weakness  Skin: Warm and dry       Scheduled Meds:     dilTIAZem CD, 180 mg, Oral, Q24H  famotidine, 20 mg, Oral, BID  folic acid, 1 mg, Oral, Daily  metoprolol tartrate, 25 mg, Oral, Q12H  multivitamin, 1 tablet, Oral, Daily  nicotine, 1 patch, Transdermal, Q24H  rosuvastatin, 5 mg, Oral, Nightly  sucralfate, 1 g, Oral, 4x Daily AC & at Bedtime  thiamine, 100 mg, Oral, Daily      IV Meds:   dextrose 5 % and sodium chloride 0.45 %, 75 mL/hr, Last Rate: 75 mL/hr (06/07/23 0341)        Results Reviewed:   I have personally reviewed the results from the time of this admission to 6/7/2023 08:43 EDT     Lab Results   Component Value Date    GLUCOSE 108 (H) 06/07/2023    CALCIUM 9.2 06/07/2023     06/07/2023    K 3.2 (L) 06/07/2023    CO2 24.1 06/07/2023     (H) 06/07/2023    BUN 4 (L) 06/07/2023    CREATININE 1.22 06/07/2023    EGFRIFAFRI 74 " 10/25/2020    BCR 3.3 (L) 06/07/2023    ANIONGAP 6.9 06/07/2023      Lab Results   Component Value Date    MG 1.7 06/05/2023    PHOS 2.8 04/15/2023    ALBUMIN 2.9 (L) 06/07/2023           Assessment / Plan       Generalized abdominal pain      ASSESSMENT:  -Acute kidney injury on chronic kidney disease stage II multifactorial in etiology improved since admission, had volume depletion and hypernatremia which also improved with hydration currently he is having evidence of fluid excess since he has lower extremity edema  -Hypokalemia, potassium 3.2, will check magnesium  -History of CVA with right-sided weakness  -History of alcoholism and fatty liver  -Obstructive sleep apnea  -Anemia, hemoglobin 8.5 and his MCV 96.1, based on his studies on 4/10/2023 he has a picture consistent of anemia of chronic disease since he had low TIBC and he had adequate level of iron and vitamin B12 and folate at that time      PLAN:  -Restrict sodium intake to 2 g daily  -Replete potassium  -Will check magnesium tomorrow with the labs  -Discontinue IV fluid  -Will give 1 dose of furosemide 80 mg x 1 IV  -Surveillance lab    Thank you for involving us in the care of Yuval Loja.  Please feel free to call with any questions.    Johnie Pearl MD  06/07/23  08:43 EDT    [facility]  [phone]      Please note that portions of this note were completed with a voice recognition program.

## 2023-06-07 NOTE — PROGRESS NOTES
Continued Stay Note  [facility]     Patient Name: Yuval JORGE Loja  MRN: [id number]  Today's Date: 6/7/2023    Admit Date: 6/1/2023    Plan: [facility] CERT APPROVED   Discharge Plan       Row Name 06/07/23 1652       Plan    Plan [facility] CERT APPROVED    Plan Comments Rec'd update from [facility] with [facility], pre-cert approved and bed  is ready. S/w Dr. Cole, potential dc tomorrow.  Plan for dc tomorrow to [facility] via family or  van. [facility] is selected in [email or web address]                   Discharge Codes    No documentation.                 Expected Discharge Date and Time       Expected Discharge Date Expected Discharge Time    Jun 8, 2023               Court Olivo RN

## 2023-06-07 NOTE — PLAN OF CARE
Problem: Fall Injury Risk  Goal: Absence of Fall and Fall-Related Injury  Outcome: Ongoing, Progressing  Intervention: Identify and Manage Contributors  Recent Flowsheet Documentation  Taken 6/7/2023 0840 by Ellen Allen, RN  Medication Review/Management: medications reviewed  Intervention: Promote Injury-Free Environment  Recent Flowsheet Documentation  Taken 6/7/2023 1457 by Ellen Allen, RN  Safety Promotion/Fall Prevention:   assistive device/personal items within reach   clutter free environment maintained   fall prevention program maintained   nonskid shoes/slippers when out of bed   safety round/check completed   room organization consistent  Taken 6/7/2023 1020 by Ellen Allen, RN  Safety Promotion/Fall Prevention:   assistive device/personal items within reach   clutter free environment maintained   fall prevention program maintained   nonskid shoes/slippers when out of bed   room organization consistent   safety round/check completed  Taken 6/7/2023 0840 by Ellen Allen, RN  Safety Promotion/Fall Prevention:   assistive device/personal items within reach   clutter free environment maintained   fall prevention program maintained   nonskid shoes/slippers when out of bed   room organization consistent   safety round/check completed   Goal Outcome Evaluation:  Plan of Care Reviewed With: patient           Outcome Evaluation: Pt denies pain, SOA, N/V/D. Up in chair part of day. IVF d/c. Lasix IV X1. K+ replaced. Will CTM

## 2023-06-07 NOTE — PROGRESS NOTES
"    Patient Name: Yuval Loja  :1950  72 y.o.      Patient Care Team:  Alessia Prescott APRN as PCP - General (Family Medicine)  Jonny Bains MD as Referring Physician (Internal Medicine)  Carlos Villareal MD as Consulting Physician (Hematology and Oncology)    Chief Complaint: follow up atrial fibrillation with RVR     Interval History: renal function improving. Nephrology now following. HR well controlled and blood pressure stable. Sitting up in the chair. Good UOP.        Objective   Vital Signs  Temp:  [99 °F (37.2 °C)-99.4 °F (37.4 °C)] 99 °F (37.2 °C)  Heart Rate:  [73-84] 84  Resp:  [16] 16  BP: (129-151)/(55-73) 129/55    Intake/Output Summary (Last 24 hours) at 2023 1104  Last data filed at 2023 1725  Gross per 24 hour   Intake 2150 ml   Output 350 ml   Net 1800 ml     Flowsheet Rows      Flowsheet Row First Filed Value   Admission Height 175.3 cm (69\") Documented at 2023 0502   Admission Weight 65.8 kg (145 lb) Documented at 2023 0502            Physical Exam:   General Appearance:    Alert, cooperative, in no acute distress   Lungs:     Clear to auscultation.  Normal respiratory effort and rate.      Heart:    irregular rhythm and normal rate, normal S1 and S2, no murmurs, gallops or rubs.     Chest Wall:    No abnormalities observed   Abdomen:     Soft, nontender, positive bowel sounds.     Extremities:   no cyanosis, clubbing or edema.  No marked joint deformities.  Adequate musculoskeletal strength.       Results Review:    Results from last 7 days   Lab Units 23  0612   SODIUM mmol/L 142   POTASSIUM mmol/L 3.2*   CHLORIDE mmol/L 111*   CO2 mmol/L 24.1   BUN mg/dL 4*   CREATININE mg/dL 1.22   GLUCOSE mg/dL 108*   CALCIUM mg/dL 9.2     Results from last 7 days   Lab Units 23  0420 23  0216 23  0750   HSTROP T ng/L 62* 57* 27*     Results from last 7 days   Lab Units 23  0612   WBC 10*3/mm3 3.77   HEMOGLOBIN g/dL 8.5*   HEMATOCRIT % " 26.8*   PLATELETS 10*3/mm3 161     Results from last 7 days   Lab Units 06/06/23  0623 06/05/23  0054 06/04/23  1456 06/02/23  0216 06/01/23  1908   INR   --   --   --   --  1.10   APTT seconds 34.6 78.7* 68.5*   < > 38.4*    < > = values in this interval not displayed.     Results from last 7 days   Lab Units 06/05/23  0054   MAGNESIUM mg/dL 1.7                   Medication Review:   dilTIAZem CD, 180 mg, Oral, Q24H  famotidine, 20 mg, Oral, BID  folic acid, 1 mg, Oral, Daily  furosemide, 80 mg, Intravenous, Once  metoprolol tartrate, 25 mg, Oral, Q12H  multivitamin, 1 tablet, Oral, Daily  nicotine, 1 patch, Transdermal, Q24H  potassium chloride, 40 mEq, Oral, Q2H  rosuvastatin, 5 mg, Oral, Nightly  sucralfate, 1 g, Oral, 4x Daily AC & at Bedtime  thiamine, 100 mg, Oral, Daily                Assessment & Plan   Abdominal pain with nausea, vomiting and diarrhea. Resolved  Atrial fibrillation with RVR - rate controlled and tolerating oral medications. He is not on anticoagulation at home due to compliance issues and GI bleeds. Heparin stopped 6/5/23.  Acute kidney injury on chronic kidney disease, improving. Nephrology now following.   History of heavy alcohol use  Coronary artery disease status post stenting to the RCA in 2000 and LAD in 2006  Recent sinus and junctional bradyycardia requiring transvenous pacing 4/10/23. Metabolic etiology and recovered. Did not require PPM.   Elevated high sensitivity troponin , secondary to renal dysfunction  Frequent PACs and PVCs   Hypertension  Hypernatremia     HR remains stable on current regimen.   Nephrology giving a dose of IV lasix today. Good UOP so far. Canister on wall is full.   Will follow.     RICHMOND Gaona  [facility]  06/07/23  11:04 EDT

## 2023-06-07 NOTE — PLAN OF CARE
Goal Outcome Evaluation:     Plan of care reviewed with patient. Pt aware to use call light for any needs. ETOH withdrawal symptoms managed, not scoring on CIWA scale.

## 2023-06-07 NOTE — PROGRESS NOTES
Continued Stay Note  [facility]     Patient Name: Yuval Loja  MRN: [id number]  Today's Date: 6/7/2023    Admit Date: 6/1/2023    Plan: tbd   Discharge Plan       Row Name 06/07/23 0958       Plan    Plan tbd    Plan Comments [facility] accepted [facility] and [facility] also accepted. [employer] MCR pre-cert required for dc to SNF. CCP will follow up with pt/family for facility preference.                   Discharge Codes    No documentation.                 Expected Discharge Date and Time       Expected Discharge Date Expected Discharge Time    Jun 8, 2023               Court Olivo RN

## 2023-06-07 NOTE — PROGRESS NOTES
Continued Stay Note  [facility]     Patient Name: Yuval Loja  MRN: [id number]  Today's Date: 6/7/2023    Admit Date: 6/1/2023    Plan: [facility] pending pre-cert   Discharge Plan       Row Name 06/07/23 1342       Plan    Plan [facility] pending pre-cert    Plan Comments Pt's daughter prefers the [facility] location as it is closer to her home. Daughter is going to tour [facility] today @ 1400. [facility] will start pre-cert. [facility] is selected in [place]. Anticipate wc van or family transport (pending daughter's work schedule).      Row Name 06/07/23 2744       Plan    Plan --    Plan Comments Follow up with patient at the bedside and s/w pt's daughter by phone, Torri [phone]. Patient and daughter would like for pt to go to short-term rehab at [facility] or [facility]; both are [facility] facilities. [facility] with [facility] is going to call patient's daughter to answer a few questions she has about [facility] vs. [facility]. [facility] will start pre-cert at the facility that daughter prefers. Sereh will follow up with CCP.                   Discharge Codes    No documentation.                 Expected Discharge Date and Time       Expected Discharge Date Expected Discharge Time    Jun 8, 2023               Court Olivo RN

## 2023-06-08 VITALS
DIASTOLIC BLOOD PRESSURE: 54 MMHG | HEIGHT: 69 IN | RESPIRATION RATE: 16 BRPM | TEMPERATURE: 98.6 F | OXYGEN SATURATION: 100 % | SYSTOLIC BLOOD PRESSURE: 137 MMHG | WEIGHT: 151.68 LBS | HEART RATE: 71 BPM | BODY MASS INDEX: 22.47 KG/M2

## 2023-06-08 LAB
ALBUMIN SERPL-MCNC: 3.4 G/DL (ref 3.5–5.2)
ANION GAP SERPL CALCULATED.3IONS-SCNC: 5.5 MMOL/L (ref 5–15)
BASOPHILS # BLD AUTO: 0.02 10*3/MM3 (ref 0–0.2)
BASOPHILS NFR BLD AUTO: 0.5 % (ref 0–1.5)
BUN SERPL-MCNC: 6 MG/DL (ref 8–23)
BUN/CREAT SERPL: 4.5 (ref 7–25)
CALCIUM SPEC-SCNC: 10.3 MG/DL (ref 8.6–10.5)
CHLORIDE SERPL-SCNC: 109 MMOL/L (ref 98–107)
CO2 SERPL-SCNC: 28.5 MMOL/L (ref 22–29)
CREAT SERPL-MCNC: 1.34 MG/DL (ref 0.76–1.27)
DEPRECATED RDW RBC AUTO: 45.9 FL (ref 37–54)
EGFRCR SERPLBLD CKD-EPI 2021: 55.6 ML/MIN/1.73
EOSINOPHIL # BLD AUTO: 0.07 10*3/MM3 (ref 0–0.4)
EOSINOPHIL NFR BLD AUTO: 1.7 % (ref 0.3–6.2)
ERYTHROCYTE [DISTWIDTH] IN BLOOD BY AUTOMATED COUNT: 13.1 % (ref 12.3–15.4)
GLUCOSE SERPL-MCNC: 88 MG/DL (ref 65–99)
HCT VFR BLD AUTO: 28.5 % (ref 37.5–51)
HGB BLD-MCNC: 9.1 G/DL (ref 13–17.7)
IMM GRANULOCYTES # BLD AUTO: 0.01 10*3/MM3 (ref 0–0.05)
IMM GRANULOCYTES NFR BLD AUTO: 0.2 % (ref 0–0.5)
LYMPHOCYTES # BLD AUTO: 1.01 10*3/MM3 (ref 0.7–3.1)
LYMPHOCYTES NFR BLD AUTO: 24.4 % (ref 19.6–45.3)
MAGNESIUM SERPL-MCNC: 1.4 MG/DL (ref 1.6–2.4)
MCH RBC QN AUTO: 31.2 PG (ref 26.6–33)
MCHC RBC AUTO-ENTMCNC: 31.9 G/DL (ref 31.5–35.7)
MCV RBC AUTO: 97.6 FL (ref 79–97)
MONOCYTES # BLD AUTO: 0.5 10*3/MM3 (ref 0.1–0.9)
MONOCYTES NFR BLD AUTO: 12.1 % (ref 5–12)
NEUTROPHILS NFR BLD AUTO: 2.53 10*3/MM3 (ref 1.7–7)
NEUTROPHILS NFR BLD AUTO: 61.1 % (ref 42.7–76)
NRBC BLD AUTO-RTO: 0 /100 WBC (ref 0–0.2)
PHOSPHATE SERPL-MCNC: 1.8 MG/DL (ref 2.5–4.5)
PLATELET # BLD AUTO: 183 10*3/MM3 (ref 140–450)
PMV BLD AUTO: 10.5 FL (ref 6–12)
POTASSIUM SERPL-SCNC: 3.9 MMOL/L (ref 3.5–5.2)
RBC # BLD AUTO: 2.92 10*6/MM3 (ref 4.14–5.8)
SODIUM SERPL-SCNC: 143 MMOL/L (ref 136–145)
URATE SERPL-MCNC: 6.2 MG/DL (ref 3.4–7)
WBC NRBC COR # BLD: 4.14 10*3/MM3 (ref 3.4–10.8)

## 2023-06-08 PROCEDURE — 84550 ASSAY OF BLOOD/URIC ACID: CPT | Performed by: INTERNAL MEDICINE

## 2023-06-08 PROCEDURE — 0 MAGNESIUM SULFATE 4 GM/100ML SOLUTION: Performed by: INTERNAL MEDICINE

## 2023-06-08 PROCEDURE — 80069 RENAL FUNCTION PANEL: CPT | Performed by: INTERNAL MEDICINE

## 2023-06-08 PROCEDURE — 99232 SBSQ HOSP IP/OBS MODERATE 35: CPT | Performed by: NURSE PRACTITIONER

## 2023-06-08 PROCEDURE — 83735 ASSAY OF MAGNESIUM: CPT | Performed by: INTERNAL MEDICINE

## 2023-06-08 PROCEDURE — 85025 COMPLETE CBC W/AUTO DIFF WBC: CPT | Performed by: HOSPITALIST

## 2023-06-08 RX ORDER — MAGNESIUM SULFATE HEPTAHYDRATE 40 MG/ML
4 INJECTION, SOLUTION INTRAVENOUS ONCE
Status: COMPLETED | OUTPATIENT
Start: 2023-06-08 | End: 2023-06-08

## 2023-06-08 RX ORDER — MAGNESIUM SULFATE 1 G/100ML
1 INJECTION INTRAVENOUS
Status: DISCONTINUED | OUTPATIENT
Start: 2023-06-08 | End: 2023-06-08

## 2023-06-08 RX ORDER — DILTIAZEM HYDROCHLORIDE 180 MG/1
180 CAPSULE, COATED, EXTENDED RELEASE ORAL
Qty: 30 CAPSULE | Refills: 0 | Status: SHIPPED | OUTPATIENT
Start: 2023-06-09 | End: 2023-07-09

## 2023-06-08 RX ORDER — DIPHENOXYLATE HYDROCHLORIDE AND ATROPINE SULFATE 2.5; .025 MG/1; MG/1
1 TABLET ORAL DAILY
Qty: 30 TABLET | Refills: 0 | Status: SHIPPED | OUTPATIENT
Start: 2023-06-09 | End: 2023-07-09

## 2023-06-08 RX ORDER — FOLIC ACID 1 MG/1
1 TABLET ORAL DAILY
Qty: 30 TABLET | Refills: 0 | Status: SHIPPED | OUTPATIENT
Start: 2023-06-09 | End: 2023-07-09

## 2023-06-08 RX ADMIN — DILTIAZEM HYDROCHLORIDE 180 MG: 180 CAPSULE, COATED, EXTENDED RELEASE ORAL at 08:06

## 2023-06-08 RX ADMIN — Medication 1 PATCH: at 08:06

## 2023-06-08 RX ADMIN — FAMOTIDINE 20 MG: 20 TABLET, FILM COATED ORAL at 08:05

## 2023-06-08 RX ADMIN — Medication 2 PACKET: at 12:06

## 2023-06-08 RX ADMIN — MAGNESIUM SULFATE HEPTAHYDRATE 4 G: 40 INJECTION, SOLUTION INTRAVENOUS at 08:51

## 2023-06-08 RX ADMIN — SUCRALFATE 1 G: 1 TABLET ORAL at 08:06

## 2023-06-08 RX ADMIN — Medication 100 MG: at 08:05

## 2023-06-08 RX ADMIN — METOPROLOL TARTRATE 25 MG: 25 TABLET, FILM COATED ORAL at 08:05

## 2023-06-08 RX ADMIN — SUCRALFATE 1 G: 1 TABLET ORAL at 12:06

## 2023-06-08 RX ADMIN — HYDROXYZINE HYDROCHLORIDE 25 MG: 25 TABLET ORAL at 08:05

## 2023-06-08 RX ADMIN — Medication 1 MG: at 08:06

## 2023-06-08 RX ADMIN — Medication 1 TABLET: at 08:05

## 2023-06-08 NOTE — PLAN OF CARE
Problem: Fall Injury Risk  Goal: Absence of Fall and Fall-Related Injury  Outcome: Ongoing, Progressing  Intervention: Identify and Manage Contributors  Recent Flowsheet Documentation  Taken 6/8/2023 1245 by Ellen Allen, RN  Medication Review/Management: medications reviewed  Taken 6/8/2023 0809 by Ellen Allen, RN  Medication Review/Management: medications reviewed  Intervention: Promote Injury-Free Environment  Recent Flowsheet Documentation  Taken 6/8/2023 1245 by Ellen Allen, RN  Safety Promotion/Fall Prevention:   assistive device/personal items within reach   clutter free environment maintained   fall prevention program maintained   nonskid shoes/slippers when out of bed   room organization consistent   safety round/check completed  Taken 6/8/2023 1025 by Ellen Allen, RN  Safety Promotion/Fall Prevention:   assistive device/personal items within reach   clutter free environment maintained   fall prevention program maintained   nonskid shoes/slippers when out of bed   room organization consistent   safety round/check completed  Taken 6/8/2023 0809 by Ellen Allen, RN  Safety Promotion/Fall Prevention:   assistive device/personal items within reach   clutter free environment maintained   fall prevention program maintained   nonskid shoes/slippers when out of bed   room organization consistent   safety round/check completed   Goal Outcome Evaluation:  Plan of Care Reviewed With: patient           Outcome Evaluation: BHASKAR Walton,V,D. Up in chair. Mg and Phos replaced. Plan for d/c today with daughter transporting. CTNARAYAN

## 2023-06-08 NOTE — PLAN OF CARE
Goal Outcome Evaluation:      Plan of care reviewed with patient. Pt in afib on telemetry, controlled HR. Pt with adequate urine output overnight.

## 2023-06-08 NOTE — PLAN OF CARE
Discharge education completed  Problem: Fall Injury Risk  Goal: Absence of Fall and Fall-Related Injury  6/8/2023 1501 by Ellen Allen RN  Outcome: Adequate for Care Transition  6/8/2023 1500 by Ellen Allen RN  Outcome: Ongoing, Progressing  Intervention: Identify and Manage Contributors  Recent Flowsheet Documentation  Taken 6/8/2023 1245 by Ellen Allen, RN  Medication Review/Management: medications reviewed  Taken 6/8/2023 0809 by Ellen Allen RN  Medication Review/Management: medications reviewed  Intervention: Promote Injury-Free Environment  Recent Flowsheet Documentation  Taken 6/8/2023 1245 by Ellen Allen RN  Safety Promotion/Fall Prevention:   assistive device/personal items within reach   clutter free environment maintained   fall prevention program maintained   nonskid shoes/slippers when out of bed   room organization consistent   safety round/check completed  Taken 6/8/2023 1025 by Ellen Allen RN  Safety Promotion/Fall Prevention:   assistive device/personal items within reach   clutter free environment maintained   fall prevention program maintained   nonskid shoes/slippers when out of bed   room organization consistent   safety round/check completed  Taken 6/8/2023 0809 by Ellen Allen RN  Safety Promotion/Fall Prevention:   assistive device/personal items within reach   clutter free environment maintained   fall prevention program maintained   nonskid shoes/slippers when out of bed   room organization consistent   safety round/check completed   Goal Outcome Evaluation:  Plan of Care Reviewed With: patient           Outcome Evaluation: jaswant NOBLE, N,V,D. Up in chair. Mg and Phos replaced. Plan for d/c today with daughter transporting. CUCO

## 2023-06-08 NOTE — CASE MANAGEMENT/SOCIAL WORK
Continued Stay Note  [facility]     Patient Name: Yuval Loja  MRN: [id number]  Today's Date: 6/8/2023    Admit Date: 6/1/2023    Plan: [facility] via family transport   Discharge Plan       Row Name 06/08/23 1135       Plan    Plan [facility] today via family transport    Patient/Family in Agreement with Plan yes    Plan Comments Per MD, patient will dc today. [facility] spok with [facility] who states patient is good to admit there today, they will need a 30 day form faxed to them at [phone]. CCP faxed over completed 30 day form to [facility]. CCP spoke with patient's daughter over the phone who states she will be able to transport patient to the facility today. Pharmacy updated. ADELINE GALICIA                   Discharge Codes    No documentation.                 Expected Discharge Date and Time       Expected Discharge Date Expected Discharge Time    Jun 8, 2023               ADELINE Negron

## 2023-06-08 NOTE — DISCHARGE SUMMARY
Discharge summary    Date of admission 6/1/2023  Date of discharge 6/8/2023    Final diagnosis  Abdominal pain nausea vomiting diarrhea resolved  Acute kidney injury resolved  Recent complete heart block and hypotensive shock resolved  History of alcohol abuse  Paroxysmal atrial fibrillation  Coronary artery disease  Hyperlipidemia  History of CVA  Chronic kidney disease stage III  Gastroesophageal reflux disease    Discharge medications    Current Facility-Administered Medications:     acetaminophen (TYLENOL) tablet 650 mg, 650 mg, Oral, Q6H PRN, Lexa Carpenter MD, 650 mg at 06/05/23 2300    dilTIAZem CD (CARDIZEM CD) 24 hr capsule 180 mg, 180 mg, Oral, Q24H, Ann Marie Jennings MD, 180 mg at 06/08/23 0806    famotidine (PEPCID) tablet 20 mg, 20 mg, Oral, BID, Chino Cole MD, 20 mg at 06/08/23 0805    folic acid (FOLVITE) tablet 1 mg, 1 mg, Oral, Daily, StingNicole dumont MD, 1 mg at 06/08/23 0806    metoprolol tartrate (LOPRESSOR) tablet 25 mg, 25 mg, Oral, Q12H, Ann Marie Jennings MD, 25 mg at 06/08/23 0805    multivitamin (THERAGRAN) tablet 1 tablet, 1 tablet, Oral, Daily, Chino Cole MD, 1 tablet at 06/08/23 0805    rosuvastatin (CRESTOR) tablet 5 mg, 5 mg, Oral, Nightly, Chino Cole MD, 5 mg at 06/07/23 2036    [COMPLETED] Insert Peripheral IV, , , Once **AND** sodium chloride 0.9 % flush 10 mL, 10 mL, Intravenous, PRN, Moses Blanco MD, 10 mL at 06/03/23 2144    sucralfate (CARAFATE) tablet 1 g, 1 g, Oral, 4x Daily AC & at Bedtime, Chino Cole MD, 1 g at 06/08/23 1206    thiamine (VITAMIN B-1) tablet 100 mg, 100 mg, Oral, Daily, Chino Cole MD, 100 mg at 06/08/23 0805     Consults obtained  Cardiology  Nephrology  Psychiatry    Procedures  None    Hospital course  72-year-old -American male with history of atrial fibrillation coronary artery disease hypertension hyperlipidemia who continues to abuse alcohol and tobacco and was recently discharged from the hospital after  management of hypotensive shock with complete heart block which resolved.  Patient presented again with abdominal pain nausea vomiting diarrhea and resume alcohol usage but denies any abuse.  Patient admitted treated with IV fluids as he has gastroenteritis.  Patient also have acute kidney injury followed by nephrology.  Patient has electrolyte imbalance which is also replaced per protocol.  Patient remain on detox and required CIWA protocol due to alcohol withdrawal syndrome which is also resolved.  Patient agreeable to go to subacute rehab and cleared for discharge from cardiology nephrology.  Patient also followed by psychiatry on this admission.    Discharge diet regular    Activity per PT OT    Medication as above    Further care per accepting physician at nursing home and follow-up with cardiology nephrology and psychiatry per their instructions and take medication as directed.    JOLIE BUNDY MD

## 2023-06-08 NOTE — PROGRESS NOTES
[facility] Progress Note      Patient Name: Yuval Loja  : 1950  MRN: [id number]  Primary Care Physician:  Alessia Prescott APRN  Date of admission: 2023    Subjective     Interval History:   Follow-up chronic kidney disease    The patient is feeling somewhat better, denies any chest pain or shortness of air, no orthopnea or PND, no nausea or vomiting.  No dysuria or gross hematuria, he received 1 dose of IV furosemide and had excellent urine output.    Review of Systems:   As noted above    Objective     Vitals:   Temp:  [98.6 °F (37 °C)-99.3 °F (37.4 °C)] 99.3 °F (37.4 °C)  Heart Rate:  [70-95] 83  Resp:  [16] 16  BP: (115-148)/(55-78) 138/78    Intake/Output Summary (Last 24 hours) at 2023 0806  Last data filed at 2023 0720  Gross per 24 hour   Intake 600 ml   Output 2375 ml   Net -1775 ml       Physical Exam:    General Appearance: alert, alert, chronically, no acute distress   Skin: warm and dry  HEENT: oral mucosa normal, nonicteric sclera  Neck: supple, no JVD  Lungs: CTA, unlabored breathing effort  Heart: RRR, normal S1 and S2, no rub  Abdomen: soft, nontender, nondistended, normoactive bowel  : no palpable bladder  Extremities: 1+ ankle edema  Neuro: normal speech and mental status     Scheduled Meds:     dilTIAZem CD, 180 mg, Oral, Q24H  famotidine, 20 mg, Oral, BID  folic acid, 1 mg, Oral, Daily  metoprolol tartrate, 25 mg, Oral, Q12H  multivitamin, 1 tablet, Oral, Daily  nicotine, 1 patch, Transdermal, Q24H  rosuvastatin, 5 mg, Oral, Nightly  sucralfate, 1 g, Oral, 4x Daily AC & at Bedtime  thiamine, 100 mg, Oral, Daily      IV Meds:        Results Reviewed:   I have personally reviewed the results from the time of this admission to 2023 08:06 EDT     Results from last 7 days   Lab Units 23  0612 23  0054 23  0732 23  0216   SODIUM mmol/L 142 147* 151* 151*   POTASSIUM mmol/L 3.2* 3.5 3.5 3.6   CHLORIDE mmol/L 111* 113*  113* 113*   CO2 mmol/L 24.1 25.9 23.2 22.6   BUN mg/dL 4* 6* 8 16   CREATININE mg/dL 1.22 1.35* 1.47* 1.72*   CALCIUM mg/dL 9.2 9.1 9.5 9.7   BILIRUBIN mg/dL 0.3  --  0.3 0.2   ALK PHOS U/L 54  --  80 80   ALT (SGPT) U/L 8  --  12 10   AST (SGOT) U/L 11  --  38 27   GLUCOSE mg/dL 108* 132* 76 108*       Estimated Creatinine Clearance: 51.7 mL/min (by C-G formula based on SCr of 1.22 mg/dL).    Results from last 7 days   Lab Units 06/05/23  0054   MAGNESIUM mg/dL 1.7             Results from last 7 days   Lab Units 06/08/23  0734 06/07/23  0612 06/06/23  0623 06/05/23  0054 06/04/23  0612   WBC 10*3/mm3 4.14 3.77 3.68 3.99 5.16   HEMOGLOBIN g/dL 9.1* 8.5* 8.6* 9.1* 9.6*   PLATELETS 10*3/mm3 183 161 161 172 199       Results from last 7 days   Lab Units 06/01/23  1908   INR  1.10       Assessment / Plan     ASSESSMENT:  -Acute kidney injury on chronic kidney disease stage II multifactorial in etiology improved since admission, had volume depletion and hypernatremia which also improved with hydration, he had evidence of fluid excess yesterday which improved with diuresis, creatinine yesterday was 1.22, potassium down to 3.2, today's potassium still pending, magnesium today 1.4 and uric acid 6.2  -Hypokalemia, and hypomagnesemia  -History of CVA with right-sided weakness  -History of alcoholism and fatty liver  -Obstructive sleep apnea  -Anemia, hemoglobin 9 point and his MCV 96.1, based on his studies on 4/10/2023 he has a picture consistent of anemia of chronic disease since he had low TIBC and he had adequate level of iron and vitamin B12 and folate at that time    PLAN:  -Replete magnesium  -Check his labs and adjust treatment as needed  -Surveillance labs  Copied text in this note has been reviewed and is accurate as of 06/08/23.       Thank you for involving us in the care of Yuval Loja.  Please feel free to call with any questions.    Johnie Pearl MD  06/08/23  08:06 EDT    [facility]  [phone]    Please note that portions of this note were completed with a voice recognition program.

## 2023-06-08 NOTE — PROGRESS NOTES
"[facility]    Patient Name: Yuval Loja  :1950  72 y.o.  LOS: 6  Encounter Provider: RICHMOND Flower      Patient Care Team:  Alessia Prescott APRN as PCP - General (Family Medicine)  Jonny Bains MD as Referring Physician (Internal Medicine)  Carlos Villareal MD as Consulting Physician (Hematology and Oncology)    Chief Complaint:  Follow up AF with RVR    Interval History: Patient sitting up in bedside chair.  Denies complaints of AF.  He has decided with his daughter that he would like to stop consuming alcohol.  In SR on bedside monitor       Objective   Vital Signs  Temp:  [98.6 °F (37 °C)-99.3 °F (37.4 °C)] 99.3 °F (37.4 °C)  Heart Rate:  [70-95] 83  Resp:  [16] 16  BP: (115-148)/(55-78) 138/78    Intake/Output Summary (Last 24 hours) at 2023 0910  Last data filed at 2023 0720  Gross per 24 hour   Intake 360 ml   Output 2375 ml   Net -2015 ml     Flowsheet Rows      Flowsheet Row First Filed Value   Admission Height 175.3 cm (69\") Documented at 2023 0502   Admission Weight 65.8 kg (145 lb) Documented at 2023 0502              Constitutional:       Appearance: Normal appearance. Well-developed.   Eyes:      Conjunctiva/sclera: Conjunctivae normal.   Neck:      Vascular: No carotid bruit.   Pulmonary:      Effort: Pulmonary effort is normal.      Breath sounds: Normal breath sounds.   Cardiovascular:      Normal rate. Regular rhythm. Normal S1. Normal S2.       Murmurs: There is no murmur.      No gallop.  No click. No rub.   Edema:     Peripheral edema absent.   Musculoskeletal: Normal range of motion. Skin:     General: Skin is warm and dry.   Neurological:      Mental Status: Alert and oriented to person, place, and time.      GCS: GCS eye subscore is 4. GCS verbal subscore is 5. GCS motor subscore is 6.   Psychiatric:         Speech: Speech normal.         Behavior: Behavior normal.         Thought Content: Thought content normal.         " Judgment: Judgment normal.         Pertinent Test Results:  Results from last 7 days   Lab Units 06/07/23  0612 06/05/23  0054 06/03/23  0732 06/02/23  0216   SODIUM mmol/L 142 147* 151* 151*   POTASSIUM mmol/L 3.2* 3.5 3.5 3.6   CHLORIDE mmol/L 111* 113* 113* 113*   CO2 mmol/L 24.1 25.9 23.2 22.6   BUN mg/dL 4* 6* 8 16   CREATININE mg/dL 1.22 1.35* 1.47* 1.72*   GLUCOSE mg/dL 108* 132* 76 108*   CALCIUM mg/dL 9.2 9.1 9.5 9.7   AST (SGOT) U/L 11  --  38 27   ALT (SGPT) U/L 8  --  12 10     Results from last 7 days   Lab Units 06/02/23  0420 06/02/23  0216   HSTROP T ng/L 62* 57*     Results from last 7 days   Lab Units 06/08/23  0734 06/07/23  0612 06/06/23 0623 06/05/23 0054 06/04/23  0612 06/03/23  0732 06/02/23  0216   WBC 10*3/mm3 4.14 3.77 3.68 3.99 5.16 6.30 5.92   HEMOGLOBIN g/dL 9.1* 8.5* 8.6* 9.1* 9.6* 10.3* 10.7*   HEMATOCRIT % 28.5* 26.8* 26.3* 28.1* 29.4* 32.3* 33.8*   PLATELETS 10*3/mm3 183 161 161 172 199 227 265     Results from last 7 days   Lab Units 06/06/23  0623 06/05/23  0054 06/04/23  1456 06/04/23  0612 06/03/23  1735 06/03/23  0732 06/03/23  0027 06/02/23 0216 06/01/23  1908   INR   --   --   --   --   --   --   --   --  1.10   APTT seconds 34.6 78.7* 68.5* 101.4* 94.3* 68.6* 53.9*   < > 38.4*    < > = values in this interval not displayed.     Results from last 7 days   Lab Units 06/08/23  0734 06/05/23  0054   MAGNESIUM mg/dL 1.4* 1.7           Invalid input(s): LDLCALC                Medication Review:   dilTIAZem CD, 180 mg, Oral, Q24H  famotidine, 20 mg, Oral, BID  folic acid, 1 mg, Oral, Daily  magnesium sulfate, 4 g, Intravenous, Once  metoprolol tartrate, 25 mg, Oral, Q12H  multivitamin, 1 tablet, Oral, Daily  nicotine, 1 patch, Transdermal, Q24H  rosuvastatin, 5 mg, Oral, Nightly  sucralfate, 1 g, Oral, 4x Daily AC & at Bedtime  thiamine, 100 mg, Oral, Daily              Assessment & Plan     Active Hospital Problems    Diagnosis  POA    **Generalized abdominal pain [R10.84]  Yes       Resolved Hospital Problems   No resolved problems to display.        Abdominal pain with nausea, vomiting, diarrhea: Resolved  Atrial fibrillation with RVR: Heart rates now more controlled.  Patient is not on systemic anticoagulation secondary to complaints issues as well as previous GI bleeding  CRISTIANO on CKD: Appreciate nephrology inputs  History of alcohol abuse  CAD with prior stenting to the RCA in 2000 and to the LAD in 2006  Recent sinus and junctional bradycardia requiring transvenous pacing/2023.  Metabolic etiology and currently recovered.  No PPM  Elevated high-sensitivity troponin: Secondary to renal dysfunction.  No signs of ACS  Frequent PACs and PVCs  Hypertension  Hyponatremia    Patient is SR on Bedside monitor    Denies abd pain    Continue metoprolol tartrate, no systemic AC secondary to compliance and GI bleeding.     Plan to transfer to SNF when bed available.  Cardiology okay with discharge.  Cardiology services will sign off.  Please call us if needs arise.    Follow-up with [facility] heart specialist in the outpatient setting.      RICHMOND Flower  [facility]  06/08/23  09:10 EDT

## 2023-06-08 NOTE — PROGRESS NOTES
"Daily progress note    Primary care physician  Dr. Prescott    Chief complaint  Doing same with no new issues    History of present illness  72-year-old -American male with history of paroxysmal atrial fibrillation coronary artery disease hyperlipidemia alcohol abuse tobacco abuse and chronic kidney disease stage III who was recently discharged from the hospital after management of hypotensive shock and also found to be in complete heart block which resolved.  Patient presented to [facility] with abdominal pain nausea vomiting diarrhea.  Patient is back to alcohol use but not abusing any more.  Patient denies any fever chills cough congestion chest pain shortness of breath and answer question appropriately.  Patient work-up in ER revealed acute kidney injury secondary to volume depletion admitted for observation.     REVIEW OF SYSTEMS  Unremarkable except generalized weakness    PHYSICAL EXAM  Blood pressure 138/78, pulse 83, temperature 99.3 °F (37.4 °C), temperature source Oral, resp. rate 16, height 175.3 cm (69\"), weight 68.8 kg (151 lb 10.8 oz), SpO2 98 %.    Constitutional: Awake and alert in no respiratory distress  HEENT: Unremarkable  Neck:  Supple  Cardiovascular: Normal rate, regular rhythm and normal heart sounds.   Pulmonary/Chest: Effort normal and breath sounds normal.  Abdominal: Soft.  No distension.  Nontender bowel sounds positive  Musculoskeletal: No edema, tenderness or deformity.   Neurological:  No focal deficit  Skin: Skin is warm and dry. Pt. is not diaphoretic.  Psychiatric: Mood, affect normal.  He is pleasant and cooperative.    LAB RESULTS  Lab Results (last 24 hours)       Procedure Component Value Units Date/Time    Renal Function Panel [[id number]]  (Abnormal) Collected: 06/08/23 0734    Specimen: Blood Updated: 06/08/23 0930     Glucose 88 mg/dL      BUN 6 mg/dL      Creatinine 1.34 mg/dL      Sodium 143 mmol/L      Potassium 3.9 mmol/L      Chloride 109 mmol/L      " CO2 28.5 mmol/L      Calcium 10.3 mg/dL      Albumin 3.4 g/dL      Phosphorus 1.8 mg/dL      Anion Gap 5.5 mmol/L      BUN/Creatinine Ratio 4.5     eGFR 55.6 mL/min/1.73     Narrative:      GFR Normal >60  Chronic Kidney Disease <60  Kidney Failure <15    The GFR formula is only valid for adults with stable renal function between ages 18 and 70.    Magnesium [[id number]]  (Abnormal) Collected: 06/08/23 0734    Specimen: Blood Updated: 06/08/23 0806     Magnesium 1.4 mg/dL     Uric Acid [[id number]]  (Normal) Collected: 06/08/23 0734    Specimen: Blood Updated: 06/08/23 0806     Uric Acid 6.2 mg/dL     CBC & Differential [[id number]]  (Abnormal) Collected: 06/08/23 0734    Specimen: Blood Updated: 06/08/23 0746    Narrative:      The following orders were created for panel order CBC & Differential.  Procedure                               Abnormality         Status                     ---------                               -----------         ------                     CBC Auto Differential[[id number]]        Abnormal            Final result                 Please view results for these tests on the individual orders.    CBC Auto Differential [[id number]]  (Abnormal) Collected: 06/08/23 0734    Specimen: Blood Updated: 06/08/23 0746     WBC 4.14 10*3/mm3      RBC 2.92 10*6/mm3      Hemoglobin 9.1 g/dL      Hematocrit 28.5 %      MCV 97.6 fL      MCH 31.2 pg      MCHC 31.9 g/dL      RDW 13.1 %      RDW-SD 45.9 fl      MPV 10.5 fL      Platelets 183 10*3/mm3      Neutrophil % 61.1 %      Lymphocyte % 24.4 %      Monocyte % 12.1 %      Eosinophil % 1.7 %      Basophil % 0.5 %      Immature Grans % 0.2 %      Neutrophils, Absolute 2.53 10*3/mm3      Lymphocytes, Absolute 1.01 10*3/mm3      Monocytes, Absolute 0.50 10*3/mm3      Eosinophils, Absolute 0.07 10*3/mm3      Basophils, Absolute 0.02 10*3/mm3      Immature Grans, Absolute 0.01 10*3/mm3      nRBC 0.0 /100 WBC           Imaging Results (Last 24 Hours)       ** No  results found for the last 24 hours. **          ECG 12 Lead             Component  Ref Range & Units 05:49  (6/1/23) 1 mo ago  (4/14/23) 1 mo ago  (4/10/23) 1 mo ago  (4/10/23) 10 mo ago  (7/19/22) 10 mo ago  (7/19/22) 11 mo ago  (6/25/22)   QT Interval  ms 361  305  610  582  387  390  398    Resulting Agency BH ECG BH ECG BH ECG BH ECG BH ECG BH ECG BH ECG             HEART RATE= 109  bpm  RR Interval= 550  ms  RI Interval= 168  ms  P Horizontal Axis= -28  deg  P Front Axis= 77  deg  QRSD Interval= 92  ms  QT Interval= 361  ms  QRS Axis= 71  deg  T Wave Axis= 49  deg  - BORDERLINE ECG -  Sinus tachycardia   Atrial premature complex  Borderline prolonged QT interval  PVCs have resolved             Current Facility-Administered Medications:     acetaminophen (TYLENOL) tablet 650 mg, 650 mg, Oral, Q6H PRN, Lexa Carpenter MD, 650 mg at 06/05/23 2300    dilTIAZem CD (CARDIZEM CD) 24 hr capsule 180 mg, 180 mg, Oral, Q24H, Ann Marie Jennings MD, 180 mg at 06/08/23 0806    famotidine (PEPCID) tablet 20 mg, 20 mg, Oral, BID, Chino Cole MD, 20 mg at 06/08/23 0805    folic acid (FOLVITE) tablet 1 mg, 1 mg, Oral, Daily, Nicole Lewis MD, 1 mg at 06/08/23 0806    HYDROcodone-acetaminophen (NORCO) 5-325 MG per tablet 1 tablet, 1 tablet, Oral, Q6H PRN, Chino Cole MD    hydrOXYzine (ATARAX) tablet 25 mg, 25 mg, Oral, 4x Daily PRN, Chino Cole MD, 25 mg at 06/08/23 0805    LORazepam (ATIVAN) injection 0.25 mg, 0.25 mg, Intravenous, Q6H PRN, Chino Cole MD    Magnesium Standard Dose Replacement - Follow Nurse / BPA Driven Protocol, , Does not apply, PRN, Nicole Lewis MD    melatonin tablet 5 mg, 5 mg, Oral, Nightly PRN, Lexa Carpenter MD, 5 mg at 06/07/23 2036    metoprolol tartrate (LOPRESSOR) tablet 25 mg, 25 mg, Oral, Q12H, Ann Marie Jennings MD, 25 mg at 06/08/23 0805    multivitamin (THERAGRAN) tablet 1 tablet, 1 tablet, Oral, Daily, Chino Cole MD, 1 tablet at 06/08/23 0805     nicotine (NICODERM CQ) 14 MG/24HR patch 1 patch, 1 patch, Transdermal, Q24H, Nicole Lewis MD, 1 patch at 06/08/23 0806    ondansetron (ZOFRAN) injection 4 mg, 4 mg, Intravenous, Q6H PRN, Jolie Bundy MD, 4 mg at 06/06/23 1802    Phosphorus Replacement - Follow Nurse / BPA Driven Protocol, , Does not apply, PRN, Jolie Bundy MD    rosuvastatin (CRESTOR) tablet 5 mg, 5 mg, Oral, Nightly, Jolie Bundy MD, 5 mg at 06/07/23 2036    [COMPLETED] Insert Peripheral IV, , , Once **AND** sodium chloride 0.9 % flush 10 mL, 10 mL, Intravenous, PRN, Moses Blanco MD, 10 mL at 06/03/23 2144    sucralfate (CARAFATE) tablet 1 g, 1 g, Oral, 4x Daily AC & at Bedtime, Jolie Bundy MD, 1 g at 06/08/23 1206    thiamine (VITAMIN B-1) tablet 100 mg, 100 mg, Oral, Daily, Jolie Bundy MD, 100 mg at 06/08/23 0805     ASSESSMENT  Abdominal pain nausea vomiting diarrhea resolved  Acute kidney injury resolved  Recent complete heart block and hypotensive shock resolved  History of alcohol abuse  Paroxysmal atrial fibrillation  Coronary artery disease  Hyperlipidemia  History of CVA  Chronic kidney disease stage III  Gastroesophageal reflux disease    PLAN  Discharge to subacute rehab  Discharge summary dictated    JOLIE BUNDY MD    Copied text in this note has been reviewed and is accurate as of 06/08/23

## 2023-06-12 NOTE — CASE MANAGEMENT/SOCIAL WORK
Case Management Discharge Note      Final Note: [facility] via pv    Provided Post Acute Provider List?: N/A  Provided Post Acute Provider Quality & Resource List?: N/A    Selected Continued Care - Discharged on 6/8/2023 Admission date: 6/1/2023 - Discharge disposition: Skilled Nursing Facility (DC - External)      Destination Coordination complete.      Service Provider Selected Services Address Phone Fax Patient Preferred    [facility] [address] [place] [id number] [phone] --              Durable Medical Equipment    No services have been selected for the patient.                Dialysis/Infusion    No services have been selected for the patient.                Home Medical Care    No services have been selected for the patient.                Therapy    No services have been selected for the patient.                Community Resources    No services have been selected for the patient.                Community & DME    No services have been selected for the patient.                    Selected Continued Care - Prior Encounters Includes continued care and service providers with selected services from prior encounters from 3/3/2023 to 6/8/2023      Discharged on 4/18/2023 Admission date: 4/10/2023 - Discharge disposition: Home-Health Care [facility]      Home Medical Care       Service Provider Selected Services Address Phone Fax Patient Preferred    [facility] [address] [place] [address] [phone] --                          Transportation Services  Private: Car    Final Discharge Disposition Code: 03 - skilled nursing facility (SNF)

## 2024-02-04 ENCOUNTER — HOSPITAL ENCOUNTER (INPATIENT)
Facility: HOSPITAL | Age: 74
LOS: 25 days | Discharge: NURSING, REHAB OR LONG-TERM CARE | End: 2024-03-01
Attending: EMERGENCY MEDICINE | Admitting: HOSPITALIST
Payer: MEDICARE

## 2024-02-04 ENCOUNTER — APPOINTMENT (OUTPATIENT)
Dept: GENERAL RADIOLOGY | Facility: HOSPITAL | Age: 74
End: 2024-02-04
Payer: MEDICARE

## 2024-02-04 ENCOUNTER — APPOINTMENT (OUTPATIENT)
Dept: CT IMAGING | Facility: HOSPITAL | Age: 74
End: 2024-02-04
Payer: MEDICARE

## 2024-02-04 DIAGNOSIS — R41.82 ALTERED MENTAL STATUS, UNSPECIFIED ALTERED MENTAL STATUS TYPE: Primary | ICD-10-CM

## 2024-02-04 DIAGNOSIS — N17.9 AKI (ACUTE KIDNEY INJURY): ICD-10-CM

## 2024-02-04 LAB
ALBUMIN SERPL-MCNC: 4.5 G/DL (ref 3.5–5.2)
ALBUMIN/GLOB SERPL: 1.4 G/DL
ALP SERPL-CCNC: 67 U/L (ref 39–117)
ALT SERPL W P-5'-P-CCNC: 18 U/L (ref 1–41)
AMMONIA BLD-SCNC: 22 UMOL/L (ref 16–60)
AMPHET+METHAMPHET UR QL: NEGATIVE
ANION GAP SERPL CALCULATED.3IONS-SCNC: 17.3 MMOL/L (ref 5–15)
APTT PPP: 28.7 SECONDS (ref 22.7–35.4)
AST SERPL-CCNC: 36 U/L (ref 1–40)
BARBITURATES UR QL SCN: NEGATIVE
BASOPHILS # BLD AUTO: 0.03 10*3/MM3 (ref 0–0.2)
BASOPHILS NFR BLD AUTO: 0.9 % (ref 0–1.5)
BENZODIAZ UR QL SCN: NEGATIVE
BILIRUB SERPL-MCNC: 0.3 MG/DL (ref 0–1.2)
BILIRUB UR QL STRIP: NEGATIVE
BUN SERPL-MCNC: 23 MG/DL (ref 8–23)
BUN/CREAT SERPL: 11.3 (ref 7–25)
CALCIUM SPEC-SCNC: 9.9 MG/DL (ref 8.6–10.5)
CANNABINOIDS SERPL QL: NEGATIVE
CHLORIDE SERPL-SCNC: 94 MMOL/L (ref 98–107)
CLARITY UR: CLEAR
CO2 SERPL-SCNC: 20.7 MMOL/L (ref 22–29)
COCAINE UR QL: NEGATIVE
COLOR UR: YELLOW
CREAT SERPL-MCNC: 2.04 MG/DL (ref 0.76–1.27)
DEPRECATED RDW RBC AUTO: 48.7 FL (ref 37–54)
EGFRCR SERPLBLD CKD-EPI 2021: 33.4 ML/MIN/1.73
EOSINOPHIL # BLD AUTO: 0.05 10*3/MM3 (ref 0–0.4)
EOSINOPHIL NFR BLD AUTO: 1.5 % (ref 0.3–6.2)
ERYTHROCYTE [DISTWIDTH] IN BLOOD BY AUTOMATED COUNT: 13.1 % (ref 12.3–15.4)
ETHANOL BLD-MCNC: <10 MG/DL (ref 0–10)
ETHANOL UR QL: <0.01 %
FENTANYL UR-MCNC: NEGATIVE NG/ML
GLOBULIN UR ELPH-MCNC: 3.3 GM/DL
GLUCOSE BLDC GLUCOMTR-MCNC: 76 MG/DL (ref 70–130)
GLUCOSE SERPL-MCNC: 60 MG/DL (ref 65–99)
GLUCOSE UR STRIP-MCNC: NEGATIVE MG/DL
HCT VFR BLD AUTO: 37 % (ref 37.5–51)
HGB BLD-MCNC: 11.8 G/DL (ref 13–17.7)
HGB UR QL STRIP.AUTO: NEGATIVE
IMM GRANULOCYTES # BLD AUTO: 0.01 10*3/MM3 (ref 0–0.05)
IMM GRANULOCYTES NFR BLD AUTO: 0.3 % (ref 0–0.5)
INR PPP: 0.99 (ref 0.9–1.1)
KETONES UR QL STRIP: ABNORMAL
LEUKOCYTE ESTERASE UR QL STRIP.AUTO: NEGATIVE
LYMPHOCYTES # BLD AUTO: 1.34 10*3/MM3 (ref 0.7–3.1)
LYMPHOCYTES NFR BLD AUTO: 40.1 % (ref 19.6–45.3)
MCH RBC QN AUTO: 32 PG (ref 26.6–33)
MCHC RBC AUTO-ENTMCNC: 31.9 G/DL (ref 31.5–35.7)
MCV RBC AUTO: 100.3 FL (ref 79–97)
METHADONE UR QL SCN: NEGATIVE
MONOCYTES # BLD AUTO: 0.37 10*3/MM3 (ref 0.1–0.9)
MONOCYTES NFR BLD AUTO: 11.1 % (ref 5–12)
NEUTROPHILS NFR BLD AUTO: 1.54 10*3/MM3 (ref 1.7–7)
NEUTROPHILS NFR BLD AUTO: 46.1 % (ref 42.7–76)
NITRITE UR QL STRIP: NEGATIVE
NRBC BLD AUTO-RTO: 0 /100 WBC (ref 0–0.2)
OPIATES UR QL: NEGATIVE
OXYCODONE UR QL SCN: NEGATIVE
PH UR STRIP.AUTO: 5.5 [PH] (ref 5–8)
PLATELET # BLD AUTO: 209 10*3/MM3 (ref 140–450)
PMV BLD AUTO: 9.1 FL (ref 6–12)
POTASSIUM SERPL-SCNC: 4.5 MMOL/L (ref 3.5–5.2)
PROT SERPL-MCNC: 7.8 G/DL (ref 6–8.5)
PROT UR QL STRIP: NEGATIVE
PROTHROMBIN TIME: 13.2 SECONDS (ref 11.7–14.2)
QT INTERVAL: 441 MS
QTC INTERVAL: 466 MS
RBC # BLD AUTO: 3.69 10*6/MM3 (ref 4.14–5.8)
SODIUM SERPL-SCNC: 132 MMOL/L (ref 136–145)
SP GR UR STRIP: 1.01 (ref 1–1.03)
TROPONIN T SERPL HS-MCNC: 30 NG/L
UROBILINOGEN UR QL STRIP: ABNORMAL
WBC NRBC COR # BLD AUTO: 3.34 10*3/MM3 (ref 3.4–10.8)

## 2024-02-04 PROCEDURE — 80307 DRUG TEST PRSMV CHEM ANLYZR: CPT | Performed by: NURSE PRACTITIONER

## 2024-02-04 PROCEDURE — 82077 ASSAY SPEC XCP UR&BREATH IA: CPT | Performed by: NURSE PRACTITIONER

## 2024-02-04 PROCEDURE — 85730 THROMBOPLASTIN TIME PARTIAL: CPT | Performed by: NURSE PRACTITIONER

## 2024-02-04 PROCEDURE — 84484 ASSAY OF TROPONIN QUANT: CPT | Performed by: NURSE PRACTITIONER

## 2024-02-04 PROCEDURE — 25810000003 SODIUM CHLORIDE 0.9 % SOLUTION: Performed by: HOSPITALIST

## 2024-02-04 PROCEDURE — G0378 HOSPITAL OBSERVATION PER HR: HCPCS

## 2024-02-04 PROCEDURE — 81003 URINALYSIS AUTO W/O SCOPE: CPT | Performed by: NURSE PRACTITIONER

## 2024-02-04 PROCEDURE — 36415 COLL VENOUS BLD VENIPUNCTURE: CPT

## 2024-02-04 PROCEDURE — 85610 PROTHROMBIN TIME: CPT | Performed by: NURSE PRACTITIONER

## 2024-02-04 PROCEDURE — 25810000003 SODIUM CHLORIDE 0.9 % SOLUTION: Performed by: PHYSICIAN ASSISTANT

## 2024-02-04 PROCEDURE — 71045 X-RAY EXAM CHEST 1 VIEW: CPT

## 2024-02-04 PROCEDURE — 82948 REAGENT STRIP/BLOOD GLUCOSE: CPT

## 2024-02-04 PROCEDURE — 93005 ELECTROCARDIOGRAM TRACING: CPT | Performed by: NURSE PRACTITIONER

## 2024-02-04 PROCEDURE — 99285 EMERGENCY DEPT VISIT HI MDM: CPT

## 2024-02-04 PROCEDURE — 70450 CT HEAD/BRAIN W/O DYE: CPT

## 2024-02-04 PROCEDURE — 80053 COMPREHEN METABOLIC PANEL: CPT | Performed by: NURSE PRACTITIONER

## 2024-02-04 PROCEDURE — 85025 COMPLETE CBC W/AUTO DIFF WBC: CPT | Performed by: NURSE PRACTITIONER

## 2024-02-04 PROCEDURE — 93010 ELECTROCARDIOGRAM REPORT: CPT | Performed by: INTERNAL MEDICINE

## 2024-02-04 PROCEDURE — 82140 ASSAY OF AMMONIA: CPT | Performed by: NURSE PRACTITIONER

## 2024-02-04 RX ORDER — SODIUM CHLORIDE 9 MG/ML
50 INJECTION, SOLUTION INTRAVENOUS CONTINUOUS
Status: DISCONTINUED | OUTPATIENT
Start: 2024-02-04 | End: 2024-02-06

## 2024-02-04 RX ORDER — ROSUVASTATIN CALCIUM 5 MG/1
5 TABLET, COATED ORAL NIGHTLY
Status: DISCONTINUED | OUTPATIENT
Start: 2024-02-04 | End: 2024-02-07

## 2024-02-04 RX ORDER — HYDROXYZINE HYDROCHLORIDE 25 MG/1
25 TABLET, FILM COATED ORAL 4 TIMES DAILY PRN
Status: DISCONTINUED | OUTPATIENT
Start: 2024-02-04 | End: 2024-03-01 | Stop reason: HOSPADM

## 2024-02-04 RX ORDER — FOLIC ACID 1 MG/1
1 TABLET ORAL DAILY
Status: DISCONTINUED | OUTPATIENT
Start: 2024-02-04 | End: 2024-03-01 | Stop reason: HOSPADM

## 2024-02-04 RX ORDER — DILTIAZEM HYDROCHLORIDE 180 MG/1
180 CAPSULE, COATED, EXTENDED RELEASE ORAL
Status: DISCONTINUED | OUTPATIENT
Start: 2024-02-04 | End: 2024-02-16

## 2024-02-04 RX ORDER — DOCUSATE SODIUM 100 MG/1
100 CAPSULE, LIQUID FILLED ORAL 2 TIMES DAILY
Status: DISCONTINUED | OUTPATIENT
Start: 2024-02-04 | End: 2024-03-01 | Stop reason: HOSPADM

## 2024-02-04 RX ORDER — SUCRALFATE 1 G/1
1 TABLET ORAL
Status: DISCONTINUED | OUTPATIENT
Start: 2024-02-04 | End: 2024-03-01 | Stop reason: HOSPADM

## 2024-02-04 RX ORDER — ROSUVASTATIN CALCIUM 10 MG/1
10 TABLET, COATED ORAL DAILY
Status: DISCONTINUED | OUTPATIENT
Start: 2024-02-04 | End: 2024-02-04

## 2024-02-04 RX ORDER — FAMOTIDINE 20 MG/1
20 TABLET, FILM COATED ORAL 2 TIMES DAILY
Status: DISCONTINUED | OUTPATIENT
Start: 2024-02-04 | End: 2024-03-01 | Stop reason: HOSPADM

## 2024-02-04 RX ORDER — SODIUM CHLORIDE 0.9 % (FLUSH) 0.9 %
10 SYRINGE (ML) INJECTION AS NEEDED
Status: DISCONTINUED | OUTPATIENT
Start: 2024-02-04 | End: 2024-02-28

## 2024-02-04 RX ORDER — FAMOTIDINE 20 MG/1
20 TABLET, FILM COATED ORAL
Status: DISCONTINUED | OUTPATIENT
Start: 2024-02-04 | End: 2024-02-04

## 2024-02-04 RX ORDER — ESCITALOPRAM OXALATE 5 MG/1
5 TABLET ORAL DAILY
COMMUNITY
End: 2024-02-15 | Stop reason: HOSPADM

## 2024-02-04 RX ORDER — DIPHENOXYLATE HYDROCHLORIDE AND ATROPINE SULFATE 2.5; .025 MG/1; MG/1
1 TABLET ORAL DAILY
Status: DISCONTINUED | OUTPATIENT
Start: 2024-02-04 | End: 2024-03-01 | Stop reason: HOSPADM

## 2024-02-04 RX ORDER — PANTOPRAZOLE SODIUM 40 MG/1
40 TABLET, DELAYED RELEASE ORAL
Status: DISCONTINUED | OUTPATIENT
Start: 2024-02-05 | End: 2024-02-04

## 2024-02-04 RX ORDER — PANTOPRAZOLE SODIUM 40 MG/1
40 TABLET, DELAYED RELEASE ORAL
Status: DISCONTINUED | OUTPATIENT
Start: 2024-02-04 | End: 2024-02-04

## 2024-02-04 RX ADMIN — METOPROLOL TARTRATE 25 MG: 25 TABLET, FILM COATED ORAL at 09:44

## 2024-02-04 RX ADMIN — FOLIC ACID 1 MG: 1 TABLET ORAL at 09:36

## 2024-02-04 RX ADMIN — SODIUM CHLORIDE 500 ML: 9 INJECTION, SOLUTION INTRAVENOUS at 09:36

## 2024-02-04 RX ADMIN — DILTIAZEM HYDROCHLORIDE 180 MG: 180 CAPSULE, COATED, EXTENDED RELEASE ORAL at 09:36

## 2024-02-04 RX ADMIN — SUCRALFATE 1 G: 1 TABLET ORAL at 16:50

## 2024-02-04 RX ADMIN — Medication 1 TABLET: at 09:36

## 2024-02-04 RX ADMIN — HYDROXYZINE HYDROCHLORIDE 25 MG: 25 TABLET ORAL at 21:55

## 2024-02-04 RX ADMIN — HYDROXYZINE HYDROCHLORIDE 25 MG: 25 TABLET ORAL at 14:30

## 2024-02-04 RX ADMIN — Medication 100 MG: at 09:36

## 2024-02-04 RX ADMIN — FAMOTIDINE 20 MG: 20 TABLET, FILM COATED ORAL at 09:44

## 2024-02-04 RX ADMIN — SODIUM CHLORIDE 75 ML/HR: 9 INJECTION, SOLUTION INTRAVENOUS at 16:50

## 2024-02-04 NOTE — H&P
History and physical    Primary care physician  Dr. Prescott    Chief complaint  Generalized weakness    History of present illness  73-year-old -American male with history of alcohol abuse paroxysmal atrial fibrillation coronary artery disease hyperlipidemia and chronic kidney disease stage III who is well-known to our service and apparently continues to abuse alcohol and recently started on a antidepressant brought to the emergency room with generalized weakness and altered mental status.  Patient in no respiratory distress and answer question appropriately and workup in ER revealed acute metabolic encephalopathy admit for management.  At the time of examination he denies any chest pain shortness of breath palpitation and asking for anxiety medication and wants to eat regular diet.    PAST MEDICAL HISTORY   Alcohol abuse      Arthritis      Disease of thyroid gland      Elevated cholesterol      GERD (gastroesophageal reflux disease)      History of transfusion      Hypertensive urgency      Myocardial infarction      Sleep apnea      Stroke        PAST SURGICAL HISTORY              Procedure Laterality Date    BACK SURGERY        CARDIAC CATHETERIZATION        CARDIAC ELECTROPHYSIOLOGY PROCEDURE N/A 4/10/2023     Procedure: Temporary Pacemaker;  Surgeon: Vaibhav Bonilla MD;  Location: [facility] INVASIVE LOCATION;  Service: Cardiovascular;  Laterality: N/A;    COLONOSCOPY        ENDOSCOPY        FRACTURE SURGERY        JOINT REPLACEMENT        SKIN BIOPSY        TOTAL HIP ARTHROPLASTY Right 06/27/2022     Procedure: TOTAL HIP ARTHROPLASTY ANTERIOR WITH HANA TABLE;  Surgeon: Willian Quiles MD;  Location: [facility] OR;  Service: Orthopedics;  Laterality: Right;  carli Bhatt. hana         FAMILY HISTORY  No family history on file.     SOCIAL HISTORY                 Socioeconomic History    Marital status: Single   Tobacco Use    Smoking status: Every Day       Packs/day: .5       Types:  "Cigarettes    Smokeless tobacco: Never    Tobacco comments:       tried to provide education declined irritated w/ attempt smoked \" depends on what I feel like.\"   Vaping Use    Vaping Use: Never used   Substance and Sexual Activity    Alcohol use: Not Currently    Drug use: No    Sexual activity: Defer         ALLERGIES  Mirtazapine and Sertraline  Home medications reviewed     REVIEW OF SYSTEMS  All systems reviewed and negative except for those discussed in HPI.     PHYSICAL EXAM   Blood pressure 165/62, pulse 78, temperature 97.9 °F (36.6 °C), temperature source Oral, resp. rate 17, height 175.3 cm (69\"), weight 75.3 kg (166 lb), SpO2 99%.    Constitutional:       General: He is awake. He is not in acute distress.     Appearance: He is not toxic-appearing.   HENT:      Head: Normocephalic and atraumatic.   Eyes:      General: Lids are normal. Vision grossly intact.   Cardiovascular:      Rate and Rhythm: Normal rate and regular rhythm.      Heart sounds: Normal heart sounds.   Pulmonary:      Effort: Pulmonary effort is normal.      Breath sounds: Normal breath sounds and air entry.   Abdominal:      General: Bowel sounds are normal.      Palpations: Abdomen is soft.      Tenderness: There is no abdominal tenderness.   Musculoskeletal:      Cervical back: Normal range of motion.   Skin:     General: Skin is warm and dry.      Coloration: Skin is not pale.   Neurological:      General: No focal deficit present.      Mental Status: He is alert and oriented to person, place, and time.      Comments: No focal deficit  Psychiatric:         Attention and Perception: Attention normal.         Mood and Affect: Mood normal.         Speech: Speech normal.         Behavior: Behavior is cooperative.     LAB RESULTS  Lab Results (last 24 hours)       Procedure Component Value Units Date/Time    POC Glucose Once [[id number]]  (Normal) Collected: 02/04/24 0705    Specimen: Blood Updated: 02/04/24 0940     Glucose 76 mg/dL     " Urine Drug Screen - Urine, Clean Catch [[id number]]  (Normal) Collected: 02/04/24 0547    Specimen: Urine, Clean Catch Updated: 02/04/24 0626     Amphet/Methamphet, Screen Negative     Barbiturates Screen, Urine Negative     Benzodiazepine Screen, Urine Negative     Cocaine Screen, Urine Negative     Opiate Screen Negative     THC, Screen, Urine Negative     Methadone Screen, Urine Negative     Oxycodone Screen, Urine Negative     Fentanyl, Urine Negative    Narrative:      Negative Thresholds Per Drugs Screened:    Amphetamines                 500 ng/ml  Barbiturates                 200 ng/ml  Benzodiazepines              100 ng/ml  Cocaine                      300 ng/ml  Methadone                    300 ng/ml  Opiates                      300 ng/ml  Oxycodone                    100 ng/ml  THC                           50 ng/ml  Fentanyl                       5 ng/ml      The Normal Value for all drugs tested is negative. This report includes final unconfirmed screening results to be used for medical treatment purposes only. Unconfirmed results must not be used for non-medical purposes such as employment or legal testing. Clinical consideration should be applied to any drug of abuse test, particularly when unconfirmed results are used.            Comprehensive Metabolic Panel [[id number]]  (Abnormal) Collected: 02/04/24 0545    Specimen: Blood from Arm, Left Updated: 02/04/24 0618     Glucose 60 mg/dL      BUN 23 mg/dL      Creatinine 2.04 mg/dL      Sodium 132 mmol/L      Potassium 4.5 mmol/L      Chloride 94 mmol/L      CO2 20.7 mmol/L      Calcium 9.9 mg/dL      Total Protein 7.8 g/dL      Albumin 4.5 g/dL      ALT (SGPT) 18 U/L      AST (SGOT) 36 U/L      Alkaline Phosphatase 67 U/L      Total Bilirubin 0.3 mg/dL      Globulin 3.3 gm/dL      A/G Ratio 1.4 g/dL      BUN/Creatinine Ratio 11.3     Anion Gap 17.3 mmol/L      eGFR 33.4 mL/min/1.73     Narrative:      GFR Normal >60  Chronic Kidney Disease <60  Kidney  Failure <15    The GFR formula is only valid for adults with stable renal function between ages 18 and 70.    Single High Sensitivity Troponin T [[id number]]  (Abnormal) Collected: 02/04/24 0545    Specimen: Blood from Arm, Left Updated: 02/04/24 0618     HS Troponin T 30 ng/L     Narrative:      High Sensitive Troponin T Reference Range:  <14.0 ng/L- Negative Female for AMI  <22.0 ng/L- Negative Male for AMI  >=14 - Abnormal Female indicating possible myocardial injury.  >=22 - Abnormal Male indicating possible myocardial injury.   Clinicians would have to utilize clinical acumen, EKG, Troponin, and serial changes to determine if it is an Acute Myocardial Infarction or myocardial injury due to an underlying chronic condition.         Ethanol [[id number]] Collected: 02/04/24 0545    Specimen: Blood from Arm, Left Updated: 02/04/24 0618     Ethanol <10 mg/dL      Ethanol % <0.010 %     Protime-INR [[id number]]  (Normal) Collected: 02/04/24 0545    Specimen: Blood from Arm, Left Updated: 02/04/24 0617     Protime 13.2 Seconds      INR 0.99    aPTT [[id number]]  (Normal) Collected: 02/04/24 0545    Specimen: Blood from Arm, Left Updated: 02/04/24 0617     PTT 28.7 seconds     Ammonia [[id number]]  (Normal) Collected: 02/04/24 0545    Specimen: Blood from Arm, Left Updated: 02/04/24 0613     Ammonia 22 umol/L     Urinalysis With Culture If Indicated - Urine, Clean Catch [[id number]]  (Abnormal) Collected: 02/04/24 0547    Specimen: Urine, Clean Catch Updated: 02/04/24 0609     Color, UA Yellow     Appearance, UA Clear     pH, UA 5.5     Specific Gravity, UA 1.011     Glucose, UA Negative     Ketones, UA Trace     Bilirubin, UA Negative     Blood, UA Negative     Protein, UA Negative     Leuk Esterase, UA Negative     Nitrite, UA Negative     Urobilinogen, UA 0.2 E.U./dL    Narrative:      In absence of clinical symptoms, the presence of pyuria, bacteria, and/or nitrites on the urinalysis result does not correlate with  infection.  Urine microscopic not indicated.    CBC & Differential [[id number]]  (Abnormal) Collected: 02/04/24 0545    Specimen: Blood from Arm, Left Updated: 02/04/24 0559    Narrative:      The following orders were created for panel order CBC & Differential.  Procedure                               Abnormality         Status                     ---------                               -----------         ------                     CBC Auto Differential[[id number]]        Abnormal            Final result                 Please view results for these tests on the individual orders.    CBC Auto Differential [[id number]]  (Abnormal) Collected: 02/04/24 0545    Specimen: Blood from Arm, Left Updated: 02/04/24 0559     WBC 3.34 10*3/mm3      RBC 3.69 10*6/mm3      Hemoglobin 11.8 g/dL      Hematocrit 37.0 %      .3 fL      MCH 32.0 pg      MCHC 31.9 g/dL      RDW 13.1 %      RDW-SD 48.7 fl      MPV 9.1 fL      Platelets 209 10*3/mm3      Neutrophil % 46.1 %      Lymphocyte % 40.1 %      Monocyte % 11.1 %      Eosinophil % 1.5 %      Basophil % 0.9 %      Immature Grans % 0.3 %      Neutrophils, Absolute 1.54 10*3/mm3      Lymphocytes, Absolute 1.34 10*3/mm3      Monocytes, Absolute 0.37 10*3/mm3      Eosinophils, Absolute 0.05 10*3/mm3      Basophils, Absolute 0.03 10*3/mm3      Immature Grans, Absolute 0.01 10*3/mm3      nRBC 0.0 /100 WBC           Imaging Results (Last 24 Hours)       Procedure Component Value Units Date/Time    CT Head Without Contrast [[id number]] Collected: 02/04/24 0534     Updated: 02/04/24 0539    Narrative:      CT OF THE HEAD WITHOUT CONTRAST     HISTORY: Altered mental status     COMPARISON: July 19, 2022     TECHNIQUE: Axial CT imaging was obtained through the brain. No IV  contrast was administered.     FINDINGS:  No acute intracranial hemorrhage is seen. There is diffuse atrophy. Old  lacunar infarct is noted within the left thalamus. There is  periventricular and deep white matter  microangiopathic change. There is  no midline shift or mass effect. Mucous retention cyst is noted within  the left ethmoid sinus. There is additional mucous retention cyst noted  within the right sphenoid sinus. Mastoid air cells are clear.       Impression:      No acute intracranial findings.     Radiation dose reduction techniques were utilized, including automated  exposure control and exposure modulation based on body size.        This report was finalized on 2/4/2024 5:36 AM by Dr. Martha Telles M.D on Workstation: [id number]       XR Chest 1 View [[id number]] Collected: 02/04/24 0438     Updated: 02/04/24 0442    Narrative:      SINGLE VIEW OF THE CHEST     HISTORY: Altered mental status     COMPARISON: July 5, 2023     FINDINGS:  Heart size is within normal limits. No pneumothorax, pleural effusion,  or acute infiltrate is seen. There is calcification of the aorta. There  is a coronary artery stent. Old left-sided rib fracture is noted.       Impression:      No acute findings.     This report was finalized on 2/4/2024 4:38 AM by Dr. Martha Telles M.D on Workstation: [id number]             Results  Scan on 2/4/2024 0415 by New Onbase, [place]: ECG 12-LEAD         Author: -- Service: -- Author Type: --   Filed: Date of Service: Creation Time:   Status: (Other)   HEART RATE= 67  bpm  RR Interval= 896  ms  AK Interval= 193  ms  P Horizontal Axis= -48  deg  P Front Axis= 82  deg  QRSD Interval= 82  ms  QT Interval= 441  ms  QTcB= 466  ms  QRS Axis= 47  deg  T Wave Axis= 51  deg  - ABNORMAL ECG -  Sinus rhythm  RSR' in V1 or V2, probably normal variant  Consider left ventricular hypertrophy  Anterior ST elevation, probably due to LVH          Current Facility-Administered Medications:     dilTIAZem CD (CARDIZEM CD) 24 hr capsule 180 mg, 180 mg, Oral, Q24H, Chino Cole MD, 180 mg at 02/04/24 0936    docusate sodium (COLACE) capsule 100 mg, 100 mg, Oral, BID, Chino Cole MD    folic acid (FOLVITE)  tablet 1 mg, 1 mg, Oral, Daily, Jolie Cole MD, 1 mg at 02/04/24 0936    hydrOXYzine (ATARAX) tablet 25 mg, 25 mg, Oral, 4x Daily PRN, Jolie Cole MD    metoprolol tartrate (LOPRESSOR) tablet 25 mg, 25 mg, Oral, Q12H, Jolie Cole MD, 25 mg at 02/04/24 0944    multivitamin (THERAGRAN) tablet 1 tablet, 1 tablet, Oral, Daily, Jolie Cole MD, 1 tablet at 02/04/24 0936    pantoprazole (PROTONIX) EC tablet 40 mg, 40 mg, Oral, BID AC, Jolie Cole MD    rosuvastatin (CRESTOR) tablet 10 mg, 10 mg, Oral, Daily, Jolie Cole MD    Insert Peripheral IV, , , Once **AND** sodium chloride 0.9 % flush 10 mL, 10 mL, Intravenous, PRN, Salma Grullon, [place]    sodium chloride 0.9 % infusion, 75 mL/hr, Intravenous, Continuous, Jolie Cole MD, Last Rate: 75 mL/hr at 02/04/24 1111, 75 mL/hr at 02/04/24 1111    sucralfate (CARAFATE) tablet 1 g, 1 g, Oral, 4x Daily AC & at Bedtime, Jolie Cole MD    thiamine (VITAMIN B-1) tablet 100 mg, 100 mg, Oral, Daily, Jolie Cole MD, 100 mg at 02/04/24 0936     ASSESSMENT  Acute psychosis  Acute kidney injury  Depression recently started on Lexapro  History of alcohol abuse  Paroxysmal atrial fibrillation  Coronary artery disease  Hyperlipidemia  History of CVA  Chronic kidney disease stage III  Gastroesophageal reflux disease    PLAN  Admit  IVF  Discontinue Lexapro  Detox medications  Psych consult  Adjust home medications  Stress ulcer DVT prophylaxis  Supportive care  PT OT  Patient is full code  Discussed with nursing staff  Follow closely further recommendation current hospital course    JOLIE COLE MD

## 2024-02-04 NOTE — ED TRIAGE NOTES
"[id number] with c/o generalized weakness. EMS was called by PD, who had been called by patient because he felt like he was \"in a dream.\"  Initially pt told ems he felt like he couldn't move, couldn't figure out what was going on.  Pt able to ambulate to stretcher per ems.  A&O x 4.   "

## 2024-02-04 NOTE — PLAN OF CARE
Goal Outcome Evaluation:  Plan of Care Reviewed With: patient        Progress: improving       New admit to floor from ED this morning for altered mental status. Patient is alert to self, month and year. Gait unsteady. Nephro and psych consulted.

## 2024-02-04 NOTE — ED NOTES
"Nursing report ED to floor  Yuval A Loja  73 y.o.  male    HPI :   Chief Complaint   Patient presents with    Weakness - Generalized       Admitting doctor:   Chino Cole MD    Admitting diagnosis:   The primary encounter diagnosis was Altered mental status, unspecified altered mental status type. A diagnosis of CRISTIANO (acute kidney injury) was also pertinent to this visit.    Code status:   Current Code Status       Date Active Code Status Order ID Comments User Context       Prior            Allergies:   Mirtazapine and Sertraline    Isolation:   No active isolations    Intake and Output  No intake or output data in the 24 hours ending 02/04/24 0721    Weight:       02/04/24  0346   Weight: 75.3 kg (166 lb)       Most recent vitals:   Vitals:    02/04/24 0346 02/04/24 0416 02/04/24 0601 02/04/24 0645   BP: 147/66 129/64 162/72    Pulse: 68 68 74 86   Resp: 16      Temp: 98.4 °F (36.9 °C)      TempSrc: Tympanic      SpO2: 100% 98% 97% 97%   Weight: 75.3 kg (166 lb)      Height: 175.3 cm (69\")          Active LDAs/IV Access:   Lines, Drains & Airways       Active LDAs       Name Placement date Placement time Site Days    Peripheral IV 02/04/24 0606 Anterior;Left Hand 02/04/24  0606  Hand  less than 1                    Labs (abnormal labs have a star):   Labs Reviewed   COMPREHENSIVE METABOLIC PANEL - Abnormal; Notable for the following components:       Result Value    Glucose 60 (*)     Creatinine 2.04 (*)     Sodium 132 (*)     Chloride 94 (*)     CO2 20.7 (*)     Anion Gap 17.3 (*)     eGFR 33.4 (*)     All other components within normal limits    Narrative:     GFR Normal >60  Chronic Kidney Disease <60  Kidney Failure <15    The GFR formula is only valid for adults with stable renal function between ages 18 and 70.   SINGLE HSTROPONIN T - Abnormal; Notable for the following components:    HS Troponin T 30 (*)     All other components within normal limits    Narrative:     High Sensitive Troponin T Reference " Range:  <14.0 ng/L- Negative Female for AMI  <22.0 ng/L- Negative Male for AMI  >=14 - Abnormal Female indicating possible myocardial injury.  >=22 - Abnormal Male indicating possible myocardial injury.   Clinicians would have to utilize clinical acumen, EKG, Troponin, and serial changes to determine if it is an Acute Myocardial Infarction or myocardial injury due to an underlying chronic condition.        URINALYSIS W/ CULTURE IF INDICATED - Abnormal; Notable for the following components:    Ketones, UA Trace (*)     All other components within normal limits    Narrative:     In absence of clinical symptoms, the presence of pyuria, bacteria, and/or nitrites on the urinalysis result does not correlate with infection.  Urine microscopic not indicated.   CBC WITH AUTO DIFFERENTIAL - Abnormal; Notable for the following components:    WBC 3.34 (*)     RBC 3.69 (*)     Hemoglobin 11.8 (*)     Hematocrit 37.0 (*)     .3 (*)     Neutrophils, Absolute 1.54 (*)     All other components within normal limits   PROTIME-INR - Normal   APTT - Normal   AMMONIA - Normal   URINE DRUG SCREEN - Normal    Narrative:     Negative Thresholds Per Drugs Screened:    Amphetamines                 500 ng/ml  Barbiturates                 200 ng/ml  Benzodiazepines              100 ng/ml  Cocaine                      300 ng/ml  Methadone                    300 ng/ml  Opiates                      300 ng/ml  Oxycodone                    100 ng/ml  THC                           50 ng/ml  Fentanyl                       5 ng/ml      The Normal Value for all drugs tested is negative. This report includes final unconfirmed screening results to be used for medical treatment purposes only. Unconfirmed results must not be used for non-medical purposes such as employment or legal testing. Clinical consideration should be applied to any drug of abuse test, particularly when unconfirmed results are used.           ETHANOL   CBC AND DIFFERENTIAL     Narrative:     The following orders were created for panel order CBC & Differential.  Procedure                               Abnormality         Status                     ---------                               -----------         ------                     CBC Auto Differential[[id number]]        Abnormal            Final result                 Please view results for these tests on the individual orders.       EKG:   ECG 12 Lead Altered Mental Status   Preliminary Result   HEART RATE= 67  bpm   RR Interval= 896  ms   IA Interval= 193  ms   P Horizontal Axis= -48  deg   P Front Axis= 82  deg   QRSD Interval= 82  ms   QT Interval= 441  ms   QTcB= 466  ms   QRS Axis= 47  deg   T Wave Axis= 51  deg   - ABNORMAL ECG -   Sinus rhythm   RSR' in V1 or V2, probably normal variant   Consider left ventricular hypertrophy   Anterior ST elevation, probably due to LVH   Electronically Signed By:    Date and Time of Study: 2024-02-04 04:14:10          Meds given in ED:   Medications   sodium chloride 0.9 % flush 10 mL (has no administration in time range)   sodium chloride 0.9 % bolus 500 mL (has no administration in time range)       Imaging results:  CT Head Without Contrast    Result Date: 2/4/2024  No acute intracranial findings.  Radiation dose reduction techniques were utilized, including automated exposure control and exposure modulation based on body size.   This report was finalized on 2/4/2024 5:36 AM by Dr. Martha Telles M.D on Workstation: [email or web address]      XR Chest 1 View    Result Date: 2/4/2024  No acute findings.  This report was finalized on 2/4/2024 4:38 AM by Dr. Martha Telles M.D on Workstation: [email or web address]       Ambulatory status:   - ambulatory with assist, uses cane    Social issues:   Social History     Socioeconomic History    Marital status: Single   Tobacco Use    Smoking status: Every Day     Packs/day: .5     Types: Cigarettes    Smokeless tobacco: Never    Tobacco comments:     tried to  "provide education declined irritated w/ attempt smoked \" depends on what I feel like.\"   Vaping Use    Vaping Use: Never used   Substance and Sexual Activity    Alcohol use: Not Currently    Drug use: No    Sexual activity: Defer       NIH Stroke Scale:       Alessandro Obrien RN  02/04/24 07:21 EST        "

## 2024-02-04 NOTE — Clinical Note
Level of Care: Telemetry [5]  Diagnosis: Altered mental status [780.97.ICD-9-CM]  Admitting Physician: LOLA HALE [[id number]]  Attending Physician: LOLA HALE [[id number]]

## 2024-02-04 NOTE — ED PROVIDER NOTES
" EMERGENCY DEPARTMENT ENCOUNTER  Room Number:  02/02  PCP: Alessia Prescott APRN  Independent Historians: Patient and EMS      HPI:  Chief Complaint: had concerns including Weakness - Generalized.     Context: Yuval Loja is a 73 y.o. male with a medical history of alcohol abuse, fatty liver, CAD, methamphetamine abuse, CKD, CAD, A-fib, COPD, who presents to the emergency department via EMS for altered mental status.  EMS informs they were called by police because the patient called 911 stating he was in a dream.  EMS reports the patient is ambulatory and answers all the questions appropriately.  Patient admits to headache.  Patient informs me he feels as if he is crazy, states he is \"in a dream\".  Patient advises he has been experiencing episodes of feeling like he is \"in a dream\" over the last few days.  He does admit to having his medication changed, states he was put on Lexapro.  Patient's medication bottles were at bedside, Lexapro was filled on December 27, 2023.  Additionally, patient reports when he feels like he is \"in a dream\" he cannot move and has a hard time figuring out what is reality.  Patient denies fever, chills, lightheadedness, dizziness, numbness, tingling, unilateral extremity weakness, syncope, chest pain, shortness of breath, abdominal pain, nausea, vomiting, or other complaints.    Review of prior external notes (non-ED) -and- Review of prior external test results outside of this encounter:   December 27, 2023: Family medicine office visit.  Patient was seen for hospital follow-up from recent admission at [facility].  Patient reported he was experiencing nausea.  Patient admitted to drinking alcohol, stated he had cut back.  Patient expressed anxiety.  He was prescribed hydroxyzine, Lexapro, and Protonix.      PAST MEDICAL HISTORY  Active Ambulatory Problems     Diagnosis Date Noted    Alcoholic ketoacidosis 05/23/2017    Alcohol dependence 05/23/2017    Methamphetamine " abuse 05/23/2017    Fatty liver, alcoholic 05/23/2017    Nausea vomiting and diarrhea 05/23/2017    Alcoholic liver disease 05/23/2017    Metabolic acidosis 05/23/2017    Tobacco abuse 05/23/2017    Coronary artery disease involving native coronary artery of native heart without angina pectoris 05/24/2017    Tachycardia 05/24/2017    Sinus tachycardia 04/17/2019    Chronic kidney disease, stage 3 04/17/2019    Medically noncompliant 04/18/2019    Visual hallucinations 04/18/2020    Psychosis 04/18/2020    Hyponatremia 04/18/2020    CAD (coronary artery disease) 04/18/2020    Leukopenia 04/18/2020    Symptomatic anemia 04/18/2020    Headache 04/18/2020    Substance abuse 04/18/2020    Gastrointestinal hemorrhage 10/22/2020    CRISTIANO (acute kidney injury) 10/23/2020    Hyperkalemia 10/23/2020    Right lower quadrant abdominal pain 03/15/2022    New onset atrial fibrillation 03/15/2022    History of drug abuse 03/15/2022    COPD (chronic obstructive pulmonary disease) 03/15/2022    Right inguinal hernia 03/15/2022    Constipation 03/15/2022    Status post right hip replacement 06/27/2022    Right hip pain 07/19/2022    Sinus bradycardia 04/10/2023    Generalized abdominal pain 06/01/2023     Resolved Ambulatory Problems     Diagnosis Date Noted    Dehydration 04/17/2019    Functional diarrhea 04/17/2019    Closed fracture of neck of right femur, initial encounter 06/25/2022     Past Medical History:   Diagnosis Date    Alcohol abuse     Arthritis     Disease of thyroid gland     Elevated cholesterol     GERD (gastroesophageal reflux disease)     History of transfusion     Hypertensive urgency     Myocardial infarction     Sleep apnea     Stroke          PAST SURGICAL HISTORY  Past Surgical History:   Procedure Laterality Date    BACK SURGERY      CARDIAC CATHETERIZATION      CARDIAC ELECTROPHYSIOLOGY PROCEDURE N/A 4/10/2023    Procedure: Temporary Pacemaker;  Surgeon: Vaibhav Bonilla MD;  Location: [facility]  "INVASIVE LOCATION;  Service: Cardiovascular;  Laterality: N/A;    COLONOSCOPY      ENDOSCOPY      FRACTURE SURGERY      JOINT REPLACEMENT      SKIN BIOPSY      TOTAL HIP ARTHROPLASTY Right 06/27/2022    Procedure: TOTAL HIP ARTHROPLASTY ANTERIOR WITH HANA TABLE;  Surgeon: Willian Quiles MD;  Location: [facility] OR;  Service: Orthopedics;  Laterality: Right;  carli Bhatt. hanann         FAMILY HISTORY  No family history on file.      SOCIAL HISTORY  Social History     Socioeconomic History    Marital status: Single   Tobacco Use    Smoking status: Every Day     Packs/day: .5     Types: Cigarettes    Smokeless tobacco: Never    Tobacco comments:     tried to provide education declined irritated w/ attempt smoked \" depends on what I feel like.\"   Vaping Use    Vaping Use: Never used   Substance and Sexual Activity    Alcohol use: Not Currently    Drug use: No    Sexual activity: Defer         ALLERGIES  Mirtazapine and Sertraline      REVIEW OF SYSTEMS  Included in HPI  All systems reviewed and negative except for those discussed in HPI.      PHYSICAL EXAM    I have reviewed the triage vital signs and nursing notes.    ED Triage Vitals [02/04/24 0346]   Temp Heart Rate Resp BP SpO2   98.4 °F (36.9 °C) 68 16 147/66 100 %      Temp src Heart Rate Source Patient Position BP Location FiO2 (%)   Tympanic Monitor -- -- --       Physical Exam  Vitals and nursing note reviewed.   Constitutional:       General: He is awake. He is not in acute distress.     Appearance: He is not toxic-appearing.   HENT:      Head: Normocephalic and atraumatic.   Eyes:      General: Lids are normal. Vision grossly intact.   Cardiovascular:      Rate and Rhythm: Normal rate and regular rhythm.      Heart sounds: Normal heart sounds.   Pulmonary:      Effort: Pulmonary effort is normal.      Breath sounds: Normal breath sounds and air entry.   Abdominal:      General: Bowel sounds are normal.      Palpations: Abdomen is soft.      " Tenderness: There is no abdominal tenderness.   Musculoskeletal:      Cervical back: Normal range of motion.   Skin:     General: Skin is warm and dry.      Coloration: Skin is not pale.   Neurological:      General: No focal deficit present.      Mental Status: He is alert and oriented to person, place, and time.      GCS: GCS eye subscore is 4. GCS verbal subscore is 5. GCS motor subscore is 6.      Comments:  Cranial nerves II-XII are intact.  Sensation is intact and symmetric throughout, no deficit.  Motor function is 5/5 and symmetric in the extremities x 4.  There is no facial droop, aphasia, dysarthria, speech is clear and coherent.  Normal FTN.         Psychiatric:         Attention and Perception: Attention normal.         Mood and Affect: Mood normal.         Speech: Speech normal.         Behavior: Behavior is cooperative.             LAB RESULTS  Recent Results (from the past 24 hour(s))   ECG 12 Lead Altered Mental Status    Collection Time: 02/04/24  4:14 AM   Result Value Ref Range    QT Interval 441 ms    QTC Interval 466 ms   CBC Auto Differential    Collection Time: 02/04/24  5:45 AM    Specimen: Arm, Left; Blood   Result Value Ref Range    WBC 3.34 (L) 3.40 - 10.80 10*3/mm3    RBC 3.69 (L) 4.14 - 5.80 10*6/mm3    Hemoglobin 11.8 (L) 13.0 - 17.7 g/dL    Hematocrit 37.0 (L) 37.5 - 51.0 %    .3 (H) 79.0 - 97.0 fL    MCH 32.0 26.6 - 33.0 pg    MCHC 31.9 31.5 - 35.7 g/dL    RDW 13.1 12.3 - 15.4 %    RDW-SD 48.7 37.0 - 54.0 fl    MPV 9.1 6.0 - 12.0 fL    Platelets 209 140 - 450 10*3/mm3    Neutrophil % 46.1 42.7 - 76.0 %    Lymphocyte % 40.1 19.6 - 45.3 %    Monocyte % 11.1 5.0 - 12.0 %    Eosinophil % 1.5 0.3 - 6.2 %    Basophil % 0.9 0.0 - 1.5 %    Immature Grans % 0.3 0.0 - 0.5 %    Neutrophils, Absolute 1.54 (L) 1.70 - 7.00 10*3/mm3    Lymphocytes, Absolute 1.34 0.70 - 3.10 10*3/mm3    Monocytes, Absolute 0.37 0.10 - 0.90 10*3/mm3    Eosinophils, Absolute 0.05 0.00 - 0.40 10*3/mm3     Basophils, Absolute 0.03 0.00 - 0.20 10*3/mm3    Immature Grans, Absolute 0.01 0.00 - 0.05 10*3/mm3    nRBC 0.0 0.0 - 0.2 /100 WBC         RADIOLOGY  CT Head Without Contrast    Result Date: 2/4/2024  CT OF THE HEAD WITHOUT CONTRAST  HISTORY: Altered mental status  COMPARISON: July 19, 2022  TECHNIQUE: Axial CT imaging was obtained through the brain. No IV contrast was administered.  FINDINGS: No acute intracranial hemorrhage is seen. There is diffuse atrophy. Old lacunar infarct is noted within the left thalamus. There is periventricular and deep white matter microangiopathic change. There is no midline shift or mass effect. Mucous retention cyst is noted within the left ethmoid sinus. There is additional mucous retention cyst noted within the right sphenoid sinus. Mastoid air cells are clear.      No acute intracranial findings.  Radiation dose reduction techniques were utilized, including automated exposure control and exposure modulation based on body size.   This report was finalized on 2/4/2024 5:36 AM by Dr. Martha Telles M.D on Workstation: [id number]      XR Chest 1 View    Result Date: 2/4/2024  SINGLE VIEW OF THE CHEST  HISTORY: Altered mental status  COMPARISON: July 5, 2023  FINDINGS: Heart size is within normal limits. No pneumothorax, pleural effusion, or acute infiltrate is seen. There is calcification of the aorta. There is a coronary artery stent. Old left-sided rib fracture is noted.      No acute findings.  This report was finalized on 2/4/2024 4:38 AM by Dr. Martha Telles M.D on Workstation: [id number]         MEDICATIONS GIVEN IN ER  Medications   sodium chloride 0.9 % flush 10 mL (has no administration in time range)           OUTPATIENT MEDICATION MANAGEMENT:  No current Epic-ordered facility-administered medications on file.     Current Outpatient Medications Ordered in Epic   Medication Sig Dispense Refill    dilTIAZem CD (CARDIZEM CD) 180 MG 24 hr capsule Take 1 capsule by  mouth Daily for 30 days. 30 capsule 0    docusate sodium (COLACE) 100 MG capsule Take 1 capsule by mouth 2 (Two) Times a Day.      hydrOXYzine (ATARAX) 25 MG tablet Take 1 tablet by mouth 4 (Four) Times a Day As Needed for Anxiety.      metoprolol tartrate (LOPRESSOR) 25 MG tablet Take 1 tablet by mouth Every 12 (Twelve) Hours for 30 days. 60 tablet 0    pantoprazole (PROTONIX) 40 MG EC tablet Take 1 tablet by mouth Daily.      rosuvastatin (CRESTOR) 10 MG tablet Take 1 tablet by mouth Daily.      sucralfate (CARAFATE) 1 GM/10ML suspension Take 10 mL by mouth 4 (Four) Times a Day With Meals & at Bedtime.      thiamine (VITAMIN B-1) 100 MG tablet  tablet Take 1 tablet by mouth Daily.             PROGRESS, DATA ANALYSIS, CONSULTS, AND MEDICAL DECISION MAKING  ORDERS PLACED DURING THIS VISIT:  Orders Placed This Encounter   Procedures    CT Head Without Contrast    XR Chest 1 View    Comprehensive Metabolic Panel    Protime-INR    aPTT    Ammonia    Single High Sensitivity Troponin T    Ethanol    Urine Drug Screen - Urine, Clean Catch    Urinalysis With Culture If Indicated - Urine, Clean Catch    CBC Auto Differential    ECG 12 Lead Altered Mental Status    Insert Peripheral IV    CBC & Differential         Differential diagnosis includes but is not limited to:   ICH, cerebral edema, alcohol intoxication, metabolic encephalopathy, pneumonia, etc.    ED Course:  ED Course as of 02/04/24 0605   Sun Feb 04, 2024   0414 EKG interpretation: Normal sinus rhythm, rate 67, anterior LVH, no significant change from previous EKG in July 2023: As interpreted by me [AR]   0500 XR Chest 1 View  Reviewed.  My interpretation is no infiltrate, no pneumothorax. [AR]   0515 I discussed the case with Dr. Molina and they agree to evaluate the patient at the bedside.    [AR]   0555 CT Head Without Contrast  Reviewed. [AR]   0600 Patient care transferred to Phyllis Campbell PA-C.  Patient is pending lab results and disposition. [AR]      ED  Course User Index  [AR] Salma Grullon APRN           Please note that portions of this document were completed with a voice recognition program.    Note Disclaimer: At [facility], we believe that sharing information builds trust and better relationships. You are receiving this note because you recently visited [facility]. It is possible you will see health information before a provider has talked with you about it. This kind of information can be easy to misunderstand. To help you fully understand what it means for your health, we urge you to discuss this note with your provider.       Salma Grullon APRN  02/04/24 0605

## 2024-02-04 NOTE — ED PROVIDER NOTES
ED Course as of 02/04/24 0714   Sun Feb 04, 2024   0414 EKG interpretation: Normal sinus rhythm, rate 67, anterior LVH, no significant change from previous EKG in July 2023: As interpreted by me [AR]   0500 XR Chest 1 View  Reviewed.  My interpretation is no infiltrate, no pneumothorax. [AR]   0515 I discussed the case with Dr. Molina and they agree to evaluate the patient at the bedside.    [AR]   0555 CT Head Without Contrast  Reviewed. [AR]   0600 Patient care transferred to Phyllis Campbell PA-C.  Patient is pending lab results and disposition. [AR]   0600 Pt care assumed from RICHMOND Ulloa, pending labs and disposition. [MP]   0643 WBC(!): 3.34 [MP]   0643 Hemoglobin(!): 11.8 [MP]   0643 BUN: 23 [MP]   0644 Creatinine(!): 2.04  1.40 on 12/16/23 [MP]   0713 I spoke with Dr. Cole with [facility].  Reviewed patient presentation and ED findings.  He agrees to admit patient to a telemetry observation bed. [MP]      ED Course User Index  [AR] Salma Grullon APRN  [MP] Phyllis Campbell PA-C Pleiss, Melanie M, PA-C  02/04/24 0714

## 2024-02-04 NOTE — CONSULTS
[facility] Consult Note      Patient Name: Yuval Loja  : 1950  MRN: [id number]  Primary Care Physician:  Alessia Prescott APRN  Referring Physician: Kev Molina MD  Date of admission: 2024    Subjective     Reason for Consult:  CRISTIANO on CKD3a    HPI:   Yuval Loja is a 73 y.o. male with CKD3a (baseline SCr 1.4) followed in our office, admitted earlier today for further evaluation of confusion and weakness.  Full PMH outlined below; pertinent is alcoholism, tobacco abuse, COPD, and CAD.  SCR 2.0.    States that he started taking a new medication for depression, after which his confusion developed  No fever or chills  Breathing is comfortable on room air; no chest pain  No leg swelling or orthopnea  States that his appetite is fair, but believes weight has been stable    Review of Systems:   Not reliably obtainable from the patient.    Personal History     Past Medical History:   Diagnosis Date    Alcohol abuse     Arthritis     CAD (coronary artery disease)     Chronic kidney disease, stage 3     COPD (chronic obstructive pulmonary disease)     Disease of thyroid gland     Elevated cholesterol     Fatty liver, alcoholic     GERD (gastroesophageal reflux disease)     History of transfusion     Hypertensive urgency     Metabolic acidosis     Myocardial infarction     Sinus tachycardia     Sleep apnea     Stroke        Past Surgical History:   Procedure Laterality Date    BACK SURGERY      CARDIAC CATHETERIZATION      CARDIAC ELECTROPHYSIOLOGY PROCEDURE N/A 4/10/2023    Procedure: Temporary Pacemaker;  Surgeon: Vaibhav Bonilla MD;  Location: [facility] INVASIVE LOCATION;  Service: Cardiovascular;  Laterality: N/A;    COLONOSCOPY      ENDOSCOPY      FRACTURE SURGERY      JOINT REPLACEMENT      SKIN BIOPSY      TOTAL HIP ARTHROPLASTY Right 2022    Procedure: TOTAL HIP ARTHROPLASTY ANTERIOR WITH HANA TABLE;  Surgeon: Willian Quiles MD;  Location:  "[facility] MAIN OR;  Service: Orthopedics;  Laterality: Right;  mile Bhatt       Family History: family history is not on file.    Social History:  reports that he has been smoking cigarettes. He has been smoking an average of .5 packs per day. He has never used smokeless tobacco. He reports that he does not currently use alcohol. He reports that he does not use drugs.    Home Medications:  Prior to Admission medications    Medication Sig Start Date End Date Taking? Authorizing Provider   dilTIAZem CD (CARDIZEM CD) 180 MG 24 hr capsule Take 1 capsule by mouth Daily for 30 days. 6/9/23 2/4/24 Yes Chino Cole MD   docusate sodium (COLACE) 100 MG capsule Take 1 capsule by mouth 2 (Two) Times a Day.   Yes Sarbjit Paris MD   hydrOXYzine (ATARAX) 25 MG tablet Take 1 tablet by mouth 4 (Four) Times a Day As Needed for Anxiety. 7/17/20  Yes Sarbjit Paris MD   metoprolol tartrate (LOPRESSOR) 25 MG tablet Take 1 tablet by mouth Every 12 (Twelve) Hours for 30 days. 6/8/23 2/4/24 Yes Chino Cole MD   pantoprazole (PROTONIX) 40 MG EC tablet Take 1 tablet by mouth Daily. 7/17/20  Yes Sarbjit Paris MD   escitalopram (LEXAPRO) 5 MG tablet Take 1 tablet by mouth Daily.    Sarbjit Paris MD   rosuvastatin (CRESTOR) 10 MG tablet Take 1 tablet by mouth Daily.    Sarbjit Paris MD   sucralfate (CARAFATE) 1 GM/10ML suspension Take 10 mL by mouth 4 (Four) Times a Day With Meals & at Bedtime.    Sarbjit Paris MD   thiamine (VITAMIN B-1) 100 MG tablet  tablet Take 1 tablet by mouth Daily.    Sarbjit Paris MD       Allergies:  Allergies   Allergen Reactions    Mirtazapine Other (See Comments)     confused    Sertraline Anxiety, Diarrhea and Nausea And Vomiting     Also \"hallucinations\"       Objective     Vitals:   Temp:  [97.9 °F (36.6 °C)-98.4 °F (36.9 °C)] 98.2 °F (36.8 °C)  Heart Rate:  [68-86] 75  Resp:  [16-17] 17  BP: (129-165)/(62-72) 144/64    Intake/Output Summary (Last 24 " hours) at 2/4/2024 1608  Last data filed at 2/4/2024 1528  Gross per 24 hour   Intake 120 ml   Output 600 ml   Net -480 ml       Physical Exam:   Constitutional: Awake, alert, NAD, oriented, blunted, chronically ill, thin  HEENT: Sclera anicteric, no conjunctival injection, dry MM  Neck: Supple, no carotid bruit, trachea at midline, no JVD  Respiratory: Coarse but mostly clear; not labored on room air   Cardiovascular: RRR, no rub  Gastrointestinal: BS +, soft, nontender and nondistended  : No palpable bladder  Musculoskeletal: No edema, no clubbing or cyanosis  Psychiatric: Appropriate affect, cooperative, knows month and year  Neurologic: No asterixis but +tremulous, moving all extremities, normal speech  Skin: Warm and dry       Scheduled Meds:     dilTIAZem CD, 180 mg, Oral, Q24H  docusate sodium, 100 mg, Oral, BID  famotidine, 20 mg, Oral, BID  folic acid, 1 mg, Oral, Daily  metoprolol tartrate, 25 mg, Oral, Q12H  multivitamin, 1 tablet, Oral, Daily  rosuvastatin, 5 mg, Oral, Nightly  sucralfate, 1 g, Oral, 4x Daily AC & at Bedtime  thiamine, 100 mg, Oral, Daily      IV Meds:   sodium chloride, 75 mL/hr, Last Rate: 75 mL/hr (02/04/24 1430)        Results Reviewed:   I have personally reviewed the results from the time of this admission to 2/4/2024 16:08 EST     Lab Results   Component Value Date    GLUCOSE 60 (L) 02/04/2024    CALCIUM 9.9 02/04/2024     (L) 02/04/2024    K 4.5 02/04/2024    CO2 20.7 (L) 02/04/2024    CL 94 (L) 02/04/2024    BUN 23 02/04/2024    CREATININE 2.04 (H) 02/04/2024    EGFRIFAFRI >60 10/11/2022    BCR 11.3 02/04/2024    ANIONGAP 17.3 (H) 02/04/2024      Lab Results   Component Value Date    MG 1.6 12/12/2023    PHOS 1.8 (C) 06/08/2023    ALBUMIN 4.5 02/04/2024           Assessment / Plan       Altered mental status      ASSESSMENT:  1.  CRISTIANO on CKD3a, nonoliguric, likely prerenal from reduced appetite (+ketones in urine) while confused.  Probably hypovolemic; hyponatremia with  liver disease and poor nutrition.  Normal potassium and likely NAGMA. Scurry urinalysis  2.  Confusion and weakness  3.  Alcoholism and liver disease  4.  Depression, recently placed on Lexapro, which has been DC'd  5.  DDD with prior stroke  6.  History of atrial fibrillation: In NSR currently    PLAN:  1.  IVF (normal saline)  2.  I encouraged him to eat and drink  3.  Bladder scan for completeness' sake  4.  Surveillance labs    Thank you for involving us in the care of Yuval Loja.  Please feel free to call with any questions.    Thomas Weiner MD  02/04/24  16:08 [facility]    [facility]  [phone]      Please note that portions of this note were completed with a voice recognition program.

## 2024-02-05 PROBLEM — R41.82 ALTERED MENTAL STATE: Status: ACTIVE | Noted: 2024-02-05

## 2024-02-05 LAB
ALBUMIN SERPL-MCNC: 4.6 G/DL (ref 3.5–5.2)
ALBUMIN/GLOB SERPL: 1.4 G/DL
ALP SERPL-CCNC: 69 U/L (ref 39–117)
ALT SERPL W P-5'-P-CCNC: 18 U/L (ref 1–41)
ANION GAP SERPL CALCULATED.3IONS-SCNC: 14.5 MMOL/L (ref 5–15)
AST SERPL-CCNC: 35 U/L (ref 1–40)
BASOPHILS # BLD AUTO: 0.02 10*3/MM3 (ref 0–0.2)
BASOPHILS NFR BLD AUTO: 0.4 % (ref 0–1.5)
BILIRUB SERPL-MCNC: 0.3 MG/DL (ref 0–1.2)
BUN SERPL-MCNC: 21 MG/DL (ref 8–23)
BUN/CREAT SERPL: 10.9 (ref 7–25)
CALCIUM SPEC-SCNC: 10.4 MG/DL (ref 8.6–10.5)
CHLORIDE SERPL-SCNC: 102 MMOL/L (ref 98–107)
CHOLEST SERPL-MCNC: 232 MG/DL (ref 0–200)
CO2 SERPL-SCNC: 22.5 MMOL/L (ref 22–29)
CREAT SERPL-MCNC: 1.92 MG/DL (ref 0.76–1.27)
DEPRECATED RDW RBC AUTO: 47.7 FL (ref 37–54)
EGFRCR SERPLBLD CKD-EPI 2021: 35.9 ML/MIN/1.73
EOSINOPHIL # BLD AUTO: 0.07 10*3/MM3 (ref 0–0.4)
EOSINOPHIL NFR BLD AUTO: 1.4 % (ref 0.3–6.2)
ERYTHROCYTE [DISTWIDTH] IN BLOOD BY AUTOMATED COUNT: 13 % (ref 12.3–15.4)
GLOBULIN UR ELPH-MCNC: 3.4 GM/DL
GLUCOSE SERPL-MCNC: 141 MG/DL (ref 65–99)
HBA1C MFR BLD: 4.3 % (ref 4.8–5.6)
HCT VFR BLD AUTO: 36.6 % (ref 37.5–51)
HDLC SERPL-MCNC: 109 MG/DL (ref 40–60)
HGB BLD-MCNC: 11.9 G/DL (ref 13–17.7)
IMM GRANULOCYTES # BLD AUTO: 0.01 10*3/MM3 (ref 0–0.05)
IMM GRANULOCYTES NFR BLD AUTO: 0.2 % (ref 0–0.5)
LDLC SERPL CALC-MCNC: 102 MG/DL (ref 0–100)
LDLC/HDLC SERPL: 0.89 {RATIO}
LYMPHOCYTES # BLD AUTO: 1.69 10*3/MM3 (ref 0.7–3.1)
LYMPHOCYTES NFR BLD AUTO: 34.4 % (ref 19.6–45.3)
MAGNESIUM SERPL-MCNC: 2.2 MG/DL (ref 1.6–2.4)
MCH RBC QN AUTO: 32.4 PG (ref 26.6–33)
MCHC RBC AUTO-ENTMCNC: 32.5 G/DL (ref 31.5–35.7)
MCV RBC AUTO: 99.7 FL (ref 79–97)
MONOCYTES # BLD AUTO: 0.56 10*3/MM3 (ref 0.1–0.9)
MONOCYTES NFR BLD AUTO: 11.4 % (ref 5–12)
NEUTROPHILS NFR BLD AUTO: 2.56 10*3/MM3 (ref 1.7–7)
NEUTROPHILS NFR BLD AUTO: 52.2 % (ref 42.7–76)
NRBC BLD AUTO-RTO: 0 /100 WBC (ref 0–0.2)
NT-PROBNP SERPL-MCNC: 755 PG/ML (ref 0–1800)
PHOSPHATE SERPL-MCNC: 1.6 MG/DL (ref 2.5–4.5)
PLATELET # BLD AUTO: 207 10*3/MM3 (ref 140–450)
PMV BLD AUTO: 10.1 FL (ref 6–12)
POTASSIUM SERPL-SCNC: 3.9 MMOL/L (ref 3.5–5.2)
PROT SERPL-MCNC: 8 G/DL (ref 6–8.5)
RBC # BLD AUTO: 3.67 10*6/MM3 (ref 4.14–5.8)
SODIUM SERPL-SCNC: 139 MMOL/L (ref 136–145)
TRIGL SERPL-MCNC: 128 MG/DL (ref 0–150)
TSH SERPL DL<=0.05 MIU/L-ACNC: 2.74 UIU/ML (ref 0.27–4.2)
VLDLC SERPL-MCNC: 21 MG/DL (ref 5–40)
WBC NRBC COR # BLD AUTO: 4.91 10*3/MM3 (ref 3.4–10.8)

## 2024-02-05 PROCEDURE — 25810000003 SODIUM CHLORIDE 0.9 % SOLUTION: Performed by: INTERNAL MEDICINE

## 2024-02-05 PROCEDURE — 80061 LIPID PANEL: CPT | Performed by: HOSPITALIST

## 2024-02-05 PROCEDURE — 83036 HEMOGLOBIN GLYCOSYLATED A1C: CPT | Performed by: HOSPITALIST

## 2024-02-05 PROCEDURE — 84443 ASSAY THYROID STIM HORMONE: CPT | Performed by: HOSPITALIST

## 2024-02-05 PROCEDURE — 83880 ASSAY OF NATRIURETIC PEPTIDE: CPT | Performed by: HOSPITALIST

## 2024-02-05 PROCEDURE — 83735 ASSAY OF MAGNESIUM: CPT | Performed by: INTERNAL MEDICINE

## 2024-02-05 PROCEDURE — 84100 ASSAY OF PHOSPHORUS: CPT | Performed by: INTERNAL MEDICINE

## 2024-02-05 PROCEDURE — 80053 COMPREHEN METABOLIC PANEL: CPT | Performed by: HOSPITALIST

## 2024-02-05 PROCEDURE — 85025 COMPLETE CBC W/AUTO DIFF WBC: CPT | Performed by: HOSPITALIST

## 2024-02-05 PROCEDURE — 25810000003 SODIUM CHLORIDE 0.9 % SOLUTION: Performed by: HOSPITALIST

## 2024-02-05 RX ORDER — LORAZEPAM 1 MG/1
1 TABLET ORAL
Status: DISCONTINUED | OUTPATIENT
Start: 2024-02-05 | End: 2024-02-10

## 2024-02-05 RX ORDER — HALOPERIDOL 5 MG/ML
5 INJECTION INTRAMUSCULAR EVERY 6 HOURS PRN
Status: DISCONTINUED | OUTPATIENT
Start: 2024-02-05 | End: 2024-02-28

## 2024-02-05 RX ORDER — HALOPERIDOL 1 MG/1
1 TABLET ORAL 3 TIMES DAILY
Status: DISCONTINUED | OUTPATIENT
Start: 2024-02-05 | End: 2024-02-08 | Stop reason: SDUPTHER

## 2024-02-05 RX ORDER — LORAZEPAM 2 MG/ML
2 INJECTION INTRAMUSCULAR
Status: DISCONTINUED | OUTPATIENT
Start: 2024-02-05 | End: 2024-02-10

## 2024-02-05 RX ORDER — LORAZEPAM 2 MG/ML
1 INJECTION INTRAMUSCULAR
Status: DISCONTINUED | OUTPATIENT
Start: 2024-02-05 | End: 2024-02-10

## 2024-02-05 RX ORDER — LORAZEPAM 1 MG/1
2 TABLET ORAL
Status: DISCONTINUED | OUTPATIENT
Start: 2024-02-05 | End: 2024-02-10

## 2024-02-05 RX ADMIN — DOCUSATE SODIUM 100 MG: 100 CAPSULE, LIQUID FILLED ORAL at 09:35

## 2024-02-05 RX ADMIN — HALOPERIDOL 1 MG: 1 TABLET ORAL at 14:44

## 2024-02-05 RX ADMIN — DILTIAZEM HYDROCHLORIDE 180 MG: 180 CAPSULE, COATED, EXTENDED RELEASE ORAL at 09:34

## 2024-02-05 RX ADMIN — Medication 100 MG: at 09:33

## 2024-02-05 RX ADMIN — HYDROXYZINE HYDROCHLORIDE 25 MG: 25 TABLET ORAL at 12:41

## 2024-02-05 RX ADMIN — SUCRALFATE 1 G: 1 TABLET ORAL at 09:32

## 2024-02-05 RX ADMIN — LORAZEPAM 2 MG: 1 TABLET ORAL at 09:29

## 2024-02-05 RX ADMIN — METOPROLOL TARTRATE 25 MG: 25 TABLET, FILM COATED ORAL at 09:31

## 2024-02-05 RX ADMIN — LORAZEPAM 1 MG: 1 TABLET ORAL at 16:55

## 2024-02-05 RX ADMIN — FAMOTIDINE 20 MG: 20 TABLET, FILM COATED ORAL at 09:34

## 2024-02-05 RX ADMIN — FOLIC ACID 1 MG: 1 TABLET ORAL at 09:33

## 2024-02-05 RX ADMIN — SUCRALFATE 1 G: 1 TABLET ORAL at 11:42

## 2024-02-05 RX ADMIN — HYDROXYZINE HYDROCHLORIDE 25 MG: 25 TABLET ORAL at 05:04

## 2024-02-05 RX ADMIN — METOPROLOL TARTRATE 25 MG: 25 TABLET, FILM COATED ORAL at 16:55

## 2024-02-05 RX ADMIN — SODIUM CHLORIDE 75 ML/HR: 9 INJECTION, SOLUTION INTRAVENOUS at 10:45

## 2024-02-05 RX ADMIN — SODIUM PHOSPHATE, MONOBASIC, MONOHYDRATE AND SODIUM PHOSPHATE, DIBASIC, ANHYDROUS 15 MMOL: 142; 276 INJECTION, SOLUTION INTRAVENOUS at 20:47

## 2024-02-05 RX ADMIN — Medication 1 TABLET: at 09:34

## 2024-02-05 RX ADMIN — LORAZEPAM 1 MG: 1 TABLET ORAL at 13:43

## 2024-02-05 NOTE — SIGNIFICANT NOTE
02/05/24 1104   OTHER   Discipline physical therapist   Rehab Time/Intention   Session Not Performed other (see comments)  (RN reports patient is no appropriate for PT this AM secondary to aggitation. PT will check back at a later date.)   Recommendation   PT - Next Appointment 02/06/24

## 2024-02-05 NOTE — SIGNIFICANT NOTE
02/05/24 0811   OTHER   Discipline occupational therapist   Rehab Time/Intention   Session Not Performed other (see comments)  (Pt is aggitated and confused, appears to be mobilizing well. He is charted to be out walking in the halls. OT to continue to follow for OT needs when behaviours are appropriate for OT eval if needed.)   Recommendation   OT - Next Appointment 02/06/24

## 2024-02-05 NOTE — NURSING NOTE
"Patient became very agitated and continued to get out of his bed. Nursing staff attempted to maintain safety for patient AND staff as patient was shaking his cane at staff. Patient then continued to walk in the hallway. He became less agitated but would be easily angered if approached. Patient has refused vitals signs. Has refused to wear heart monitor but did finally calm down and go to sleep. Patient keeps talking about someone who is \"on the deed to his house kicking him out\"   "

## 2024-02-05 NOTE — CONSULTS
"IDENTIFYING INFORMATION: The patient is a 73-year-old -American male admitted with altered mental status and alcohol abuse.    CHIEF COMPLAINT: None given    INFORMANT: Chart, patient is uncooperative with interview    RELIABILITY: Fair    HISTORY OF PRESENT ILLNESS: The patient is a 73-year-old -American male admitted after he had called police reporting that he was \"in a dream and feeling like he was crazy\".  Blood alcohol on admission was less than 10.  The patient had become angry this morning demanding to leave the hospital and swinging his cane at staff members, reporting concerns that someone was trying to kick him out of his home.  He had refused vital signs and refused whether he has heart monitor.  When seen today, the patient is a bed but does not participate in interview to any significant degree mumbling a few words and feigning sleep.    PAST PSYCHIATRIC HISTORY: Patient has history of alcohol dependence.  He was apparently recently started on Atarax for anxiety as well as Lexapro.  This medication has not been continued.    PAST MEDICAL HISTORY: Significant for history of arthritis, hypothyroidism, elevated cholesterol, GERD, hypertensive urgency, myocardial infarction, sleep apnea, stroke    MEDICATIONS:   Current Facility-Administered Medications   Medication Dose Route Frequency Provider Last Rate Last Admin    dilTIAZem CD (CARDIZEM CD) 24 hr capsule 180 mg  180 mg Oral Q24H Chino Cole MD   180 mg at 02/05/24 0934    docusate sodium (COLACE) capsule 100 mg  100 mg Oral BID Chino Cole MD   100 mg at 02/05/24 0935    famotidine (PEPCID) tablet 20 mg  20 mg Oral BID Chino Cole MD   20 mg at 02/05/24 0934    folic acid (FOLVITE) tablet 1 mg  1 mg Oral Daily Chino Cole MD   1 mg at 02/05/24 0933    haloperidol (HALDOL) tablet 1 mg  1 mg Oral TID Hipolito Ramon III, MD        haloperidol lactate (HALDOL) injection 5 mg  5 mg Intramuscular Q6H PRN Yenny, " Hipolito Ha III, MD        hydrOXYzine (ATARAX) tablet 25 mg  25 mg Oral 4x Daily PRN Chino Cole MD   25 mg at 02/05/24 0504    LORazepam (ATIVAN) tablet 1 mg  1 mg Oral Q1H PRN Chino Cole MD        Or    LORazepam (ATIVAN) injection 1 mg  1 mg Intravenous Q1H PRN Chino Cole MD        Or    LORazepam (ATIVAN) tablet 2 mg  2 mg Oral Q1H PRN Chino Cole MD   2 mg at 02/05/24 0929    Or    LORazepam (ATIVAN) injection 2 mg  2 mg Intravenous Q1H PRN Chino Cole MD        Or    LORazepam (ATIVAN) injection 2 mg  2 mg Intravenous Q15 Min PRN Chino Cole MD        Or    LORazepam (ATIVAN) injection 2 mg  2 mg Intramuscular Q15 Min PRN Chino Cole MD        metoprolol tartrate (LOPRESSOR) tablet 25 mg  25 mg Oral Q12H Chino Cole MD   25 mg at 02/05/24 0931    multivitamin (THERAGRAN) tablet 1 tablet  1 tablet Oral Daily Chino Cole MD   1 tablet at 02/05/24 0934    rosuvastatin (CRESTOR) tablet 5 mg  5 mg Oral Nightly Chino Cole MD        sodium chloride 0.9 % flush 10 mL  10 mL Intravenous PRN Salma Grullon APRN        sodium chloride 0.9 % infusion  75 mL/hr Intravenous Continuous Chino Cole MD 75 mL/hr at 02/05/24 1045 75 mL/hr at 02/05/24 1045    sucralfate (CARAFATE) tablet 1 g  1 g Oral 4x Daily AC & at Bedtime Chino Cole MD   1 g at 02/05/24 1142    thiamine (VITAMIN B-1) tablet 100 mg  100 mg Oral Daily Chino Cole MD   100 mg at 02/05/24 0933         ALLERGIES: Mirtazapine and sertraline    FAMILY HISTORY: Not obtained    SOCIAL HISTORY: The chart indicates that the patient is a heavy user of alcohol.    MENTAL STATUS EXAM: The patient is an elderly -American male a bed with wrist restraints in place.  He seems groggy and does not participate to any significant degree in interview.    ASSETS/LIABILITIES: To be assessed    DIAGNOSTIC IMPRESSION: Alcohol use disorder, probable delirium tremens, medical problems as noted previously    PLAN: I would recommend continuation of  the patient's current CIWA based alcohol detox protocol and have added scheduled and as needed haloperidol to address any episodes of agitation.  I will continue to follow with you.

## 2024-02-05 NOTE — PROGRESS NOTES
"Daily progress note    Primary care physician  Dr. Prescott    Subjective  Confused agitated and wants to leave.  Patient in no respite distress not tachycardic or tachypneic.    History of present illness  73-year-old -American male with history of  alcohol abuse paroxysmal atrial fibrillation coronary artery disease hyperlipidemia and chronic kidney disease stage III who is well-known to our service and apparently continues to abuse alcohol and recently started on a antidepressant brought to the emergency room with generalized weakness and altered mental status.  Patient in no respiratory distress and answer question appropriately and workup in ER revealed acute metabolic encephalopathy admit for management.  At the time of examination he denies any chest pain shortness of breath palpitation and asking for anxiety medication and wants to eat regular diet.     REVIEW OF SYSTEMS  All systems reviewed and negative except for those discussed in HPI.     PHYSICAL EXAM   Blood pressure 126/94, pulse 106, temperature 98.5 °F (36.9 °C), temperature source Oral, resp. rate 20, height 175.3 cm (69\"), weight 75.3 kg (166 lb), SpO2 97%.    Constitutional:       General: He is awake. He is not in acute distress.     Appearance: He is not toxic-appearing.   HENT:      Head: Normocephalic and atraumatic.   Eyes:      General: Lids are normal. Vision grossly intact.   Cardiovascular:      Rate and Rhythm: Normal rate and regular rhythm.      Heart sounds: Normal heart sounds.   Pulmonary:      Effort: Pulmonary effort is normal.      Breath sounds: Normal breath sounds and air entry.   Abdominal:      General: Bowel sounds are normal.      Palpations: Abdomen is soft.      Tenderness: There is no abdominal tenderness.   Musculoskeletal:      Cervical back: Normal range of motion.   Skin:     General: Skin is warm and dry.      Coloration: Skin is not pale.   Neurological:      General: No focal deficit present.      Mental " Status: He is alert and oriented to person, place, and time.      Comments: No focal deficit  Psychiatric:         Attention and Perception: Attention normal.         Mood and Affect: Mood normal.         Speech: Speech normal.         Behavior: Behavior is cooperative.     LAB RESULTS  Lab Results (last 24 hours)       Procedure Component Value Units Date/Time    Phosphorus [[id number]]  (Abnormal) Collected: 02/05/24 0535    Specimen: Blood Updated: 02/05/24 0704     Phosphorus 1.6 mg/dL     BNP [[id number]]  (Normal) Collected: 02/05/24 0535    Specimen: Blood Updated: 02/05/24 0656     proBNP 755.0 pg/mL     Narrative:      This assay is used as an aid in the diagnosis of individuals suspected of having heart failure. It can be used as an aid in the diagnosis of acute decompensated heart failure (ADHF) in patients presenting with signs and symptoms of ADHF to the emergency department (ED). In addition, NT-proBNP of <300 pg/mL indicates ADHF is not likely.    Age Range Result Interpretation  NT-proBNP Concentration (pg/mL:      <50             Positive            >450                   Gray                 300-450                    Negative             <300    50-75           Positive            >900                  Gray                300-900                  Negative            <300      >75             Positive            >1800                  Gray                300-1800                  Negative            <300    TSH [[id number]]  (Normal) Collected: 02/05/24 0535    Specimen: Blood Updated: 02/05/24 0656     TSH 2.740 uIU/mL     Comprehensive Metabolic Panel [[id number]]  (Abnormal) Collected: 02/05/24 0535    Specimen: Blood Updated: 02/05/24 0652     Glucose 141 mg/dL      BUN 21 mg/dL      Creatinine 1.92 mg/dL      Sodium 139 mmol/L      Potassium 3.9 mmol/L      Chloride 102 mmol/L      CO2 22.5 mmol/L      Calcium 10.4 mg/dL      Total Protein 8.0 g/dL      Albumin 4.6 g/dL      ALT (SGPT) 18 U/L       AST (SGOT) 35 U/L      Alkaline Phosphatase 69 U/L      Total Bilirubin 0.3 mg/dL      Globulin 3.4 gm/dL      A/G Ratio 1.4 g/dL      BUN/Creatinine Ratio 10.9     Anion Gap 14.5 mmol/L      eGFR 35.9 mL/min/1.73     Narrative:      GFR Normal >60  Chronic Kidney Disease <60  Kidney Failure <15    The GFR formula is only valid for adults with stable renal function between ages 18 and 70.    Magnesium [[id number]]  (Normal) Collected: 02/05/24 0535    Specimen: Blood Updated: 02/05/24 0652     Magnesium 2.2 mg/dL     Lipid Panel [[id number]]  (Abnormal) Collected: 02/05/24 0535    Specimen: Blood Updated: 02/05/24 0652     Total Cholesterol 232 mg/dL      Triglycerides 128 mg/dL      HDL Cholesterol 109 mg/dL      LDL Cholesterol  102 mg/dL      VLDL Cholesterol 21 mg/dL      LDL/HDL Ratio 0.89    Narrative:      Cholesterol Reference Ranges  (U.S. Department of Health and Human Services ATP III Classifications)    Desirable          <200 mg/dL  Borderline High    200-239 mg/dL  High Risk          >240 mg/dL      Triglyceride Reference Ranges  (U.S. Department of Health and Human Services ATP III Classifications)    Normal           <150 mg/dL  Borderline High  150-199 mg/dL  High             200-499 mg/dL  Very High        >500 mg/dL    HDL Reference Ranges  (U.S. Department of Health and Human Services ATP III Classifications)    Low     <40 mg/dl (major risk factor for CHD)  High    >60 mg/dl ('negative' risk factor for CHD)        LDL Reference Ranges  (U.S. Department of Health and Human Services ATP III Classifications)    Optimal          <100 mg/dL  Near Optimal     100-129 mg/dL  Borderline High  130-159 mg/dL  High             160-189 mg/dL  Very High        >189 mg/dL    Hemoglobin A1c [[id number]]  (Abnormal) Collected: 02/05/24 0535    Specimen: Blood Updated: 02/05/24 0643     Hemoglobin A1C 4.30 %     Narrative:      Hemoglobin A1C Ranges:    Increased Risk for Diabetes  5.7% to 6.4%  Diabetes                      >= 6.5%  Diabetic Goal                < 7.0%    CBC & Differential [[id number]]  (Abnormal) Collected: 02/05/24 0535    Specimen: Blood Updated: 02/05/24 0641    Narrative:      The following orders were created for panel order CBC & Differential.  Procedure                               Abnormality         Status                     ---------                               -----------         ------                     CBC Auto Differential[[id number]]        Abnormal            Final result                 Please view results for these tests on the individual orders.    CBC Auto Differential [[id number]]  (Abnormal) Collected: 02/05/24 0535    Specimen: Blood Updated: 02/05/24 0641     WBC 4.91 10*3/mm3      RBC 3.67 10*6/mm3      Hemoglobin 11.9 g/dL      Hematocrit 36.6 %      MCV 99.7 fL      MCH 32.4 pg      MCHC 32.5 g/dL      RDW 13.0 %      RDW-SD 47.7 fl      MPV 10.1 fL      Platelets 207 10*3/mm3      Neutrophil % 52.2 %      Lymphocyte % 34.4 %      Monocyte % 11.4 %      Eosinophil % 1.4 %      Basophil % 0.4 %      Immature Grans % 0.2 %      Neutrophils, Absolute 2.56 10*3/mm3      Lymphocytes, Absolute 1.69 10*3/mm3      Monocytes, Absolute 0.56 10*3/mm3      Eosinophils, Absolute 0.07 10*3/mm3      Basophils, Absolute 0.02 10*3/mm3      Immature Grans, Absolute 0.01 10*3/mm3      nRBC 0.0 /100 WBC           Imaging Results (Last 24 Hours)       ** No results found for the last 24 hours. **          Results  Scan on 2/4/2024 0415 by [employer], [place]: ECG 12-LEAD         Author: -- Service: -- Author Type: --   Filed: Date of Service: Creation Time:   Status: (Other)   HEART RATE= 67  bpm  RR Interval= 896  ms  VA Interval= 193  ms  P Horizontal Axis= -48  deg  P Front Axis= 82  deg  QRSD Interval= 82  ms  QT Interval= 441  ms  QTcB= 466  ms  QRS Axis= 47  deg  T Wave Axis= 51  deg  - ABNORMAL ECG -  Sinus rhythm  RSR' in V1 or V2, probably normal variant  Consider left ventricular  hypertrophy  Anterior ST elevation, probably due to LVH          Current Facility-Administered Medications:     dilTIAZem CD (CARDIZEM CD) 24 hr capsule 180 mg, 180 mg, Oral, Q24H, Chino Cole MD, 180 mg at 02/05/24 0934    docusate sodium (COLACE) capsule 100 mg, 100 mg, Oral, BID, Chino Cole MD, 100 mg at 02/05/24 0935    famotidine (PEPCID) tablet 20 mg, 20 mg, Oral, BID, Chino Cole MD, 20 mg at 02/05/24 0934    folic acid (FOLVITE) tablet 1 mg, 1 mg, Oral, Daily, Chino Cole MD, 1 mg at 02/05/24 0933    haloperidol (HALDOL) tablet 1 mg, 1 mg, Oral, TID, Hipolito Ramon III, MD    haloperidol lactate (HALDOL) injection 5 mg, 5 mg, Intramuscular, Q6H PRN, Hipolito Ramon III, MD    hydrOXYzine (ATARAX) tablet 25 mg, 25 mg, Oral, 4x Daily PRN, Chino Cole MD, 25 mg at 02/05/24 1241    LORazepam (ATIVAN) tablet 1 mg, 1 mg, Oral, Q1H PRN, 1 mg at 02/05/24 1343 **OR** LORazepam (ATIVAN) injection 1 mg, 1 mg, Intravenous, Q1H PRN **OR** LORazepam (ATIVAN) tablet 2 mg, 2 mg, Oral, Q1H PRN, 2 mg at 02/05/24 0929 **OR** LORazepam (ATIVAN) injection 2 mg, 2 mg, Intravenous, Q1H PRN **OR** LORazepam (ATIVAN) injection 2 mg, 2 mg, Intravenous, Q15 Min PRN **OR** LORazepam (ATIVAN) injection 2 mg, 2 mg, Intramuscular, Q15 Min PRN, Chino Cole MD    metoprolol tartrate (LOPRESSOR) tablet 25 mg, 25 mg, Oral, Q12H, Chino Cole MD, 25 mg at 02/05/24 0931    multivitamin (THERAGRAN) tablet 1 tablet, 1 tablet, Oral, Daily, Chino Cole MD, 1 tablet at 02/05/24 0934    rosuvastatin (CRESTOR) tablet 5 mg, 5 mg, Oral, Nightly, Chino Cole MD    [COMPLETED] Insert Peripheral IV, , , Once **AND** sodium chloride 0.9 % flush 10 mL, 10 mL, Intravenous, PRN, Reesor, Salma, APRN    sodium chloride 0.9 % infusion, 75 mL/hr, Intravenous, Continuous, Chino Cole MD, Last Rate: 75 mL/hr at 02/05/24 1045, 75 mL/hr at 02/05/24 1045    sucralfate (CARAFATE) tablet 1 g, 1 g, Oral, 4x Daily AC & at  Bedtime, Jolie Cole MD, 1 g at 02/05/24 1142    thiamine (VITAMIN B-1) tablet 100 mg, 100 mg, Oral, Daily, Jolie Cole MD, 100 mg at 02/05/24 0933     ASSESSMENT  Alcohol withdrawal syndrome  Acute kidney injury  Depression   History of alcohol abuse  Paroxysmal atrial fibrillation  Coronary artery disease  Hyperlipidemia  History of CVA  Chronic kidney disease stage III  Gastroesophageal reflux disease    PLAN  CPM  IVF  Detox medications  CIWA protocol  72-hour hold  Psych consult appreciated  Adjust home medications  Stress ulcer DVT prophylaxis  Supportive care  PT OT  Discussed with nursing staff  Follow closely further recommendation current hospital course    JOLIE COLE MD    Copied text in this note has been reviewed and is accurate as of 02/05/24

## 2024-02-05 NOTE — PROGRESS NOTES
[facility] Progress Note      Patient Name: Yuval Loja  : 1950  MRN: [id number]  Primary Care Physician:  Alessia Prescott APRN  Date of admission: 2024    Subjective     Interval History:   Very agitated and combative earlier; in restraints  Finally sleeping; I did not awaken him  Previous notes read    Review of Systems:   As noted above    Objective     Vitals:   Temp:  [98.5 °F (36.9 °C)] 98.5 °F (36.9 °C)  Heart Rate:  [] 106  Resp:  [18-20] 20  BP: (126-147)/(65-94) 126/94  No intake or output data in the 24 hours ending 24    Physical Exam:    Constitutional: Sleeping, chronically ill, thin  HEENT: Sclera anicteric, no conjunctival injection, dry MM  Neck: Supple, no carotid bruit, trachea at midline, no JVD  Respiratory: Coarse anteriorly; not labored on room air   Cardiovascular: RR, tachycardic, no rub  Gastrointestinal: BS +, soft, nondistended  : No palpable bladder  Musculoskeletal: No edema, no clubbing or cyanosis  Psychiatric: Sleeping; in restraints  Neurologic: Sleeping  Skin: Warm and dry      Scheduled Meds:     dilTIAZem CD, 180 mg, Oral, Q24H  docusate sodium, 100 mg, Oral, BID  famotidine, 20 mg, Oral, BID  folic acid, 1 mg, Oral, Daily  haloperidol, 1 mg, Oral, TID  metoprolol tartrate, 25 mg, Oral, TID  multivitamin, 1 tablet, Oral, Daily  rosuvastatin, 5 mg, Oral, Nightly  sucralfate, 1 g, Oral, 4x Daily AC & at Bedtime  thiamine, 100 mg, Oral, Daily      IV Meds:   sodium chloride, 50 mL/hr, Last Rate: 50 mL/hr (24 1701)        Results Reviewed:   I have personally reviewed the results from the time of this admission to 2024 18:14 EST     Results from last 7 days   Lab Units 24  0535 24  0545   SODIUM mmol/L 139 132*   POTASSIUM mmol/L 3.9 4.5   CHLORIDE mmol/L 102 94*   CO2 mmol/L 22.5 20.7*   BUN mg/dL 21 23   CREATININE mg/dL 1.92* 2.04*   CALCIUM mg/dL 10.4 9.9   BILIRUBIN mg/dL 0.3 0.3   ALK PHOS  U/L 69 67   ALT (SGPT) U/L 18 18   AST (SGOT) U/L 35 36   GLUCOSE mg/dL 141* 60*       Estimated Creatinine Clearance: 35.4 mL/min (A) (by C-G formula based on SCr of 1.92 mg/dL (H)).    Results from last 7 days   Lab Units 02/05/24  0535   MAGNESIUM mg/dL 2.2   PHOSPHORUS mg/dL 1.6*             Results from last 7 days   Lab Units 02/05/24  0535 02/04/24  0545   WBC 10*3/mm3 4.91 3.34*   HEMOGLOBIN g/dL 11.9* 11.8*   PLATELETS 10*3/mm3 207 209       Results from last 7 days   Lab Units 02/04/24  0545   INR  0.99       Assessment / Plan     ASSESSMENT:  1.  CRISTIANO on CKD3a, nonoliguric, unchanged: likely prerenal from reduced appetite (+ketones in urine) while confused.  Probably hypovolemic; hyponatremia with liver disease and poor nutrition, improving with saline. Normal potassium.  [place] urinalysis  2.  Confusion and weakness, with suspected withdrawal syndrome  3.  Alcoholism and liver disease  4.  Depression, recently placed on Lexapro, which has been DC'd  5.  DDD with prior stroke  6.  History of atrial fibrillation: In NSR currently    PLAN:  1.  Continue NS at 50 mL/h  2.  Potassium phosphate IV  3.  Surveillance labs    Thank you for involving us in the care of Yuval Loja.  Please feel free to call with any questions.    Thomas Weiner MD  02/05/24  18:14 RUST    [facility]  [phone]    Please note that portions of this note were completed with a voice recognition program.

## 2024-02-05 NOTE — CASE MANAGEMENT/SOCIAL WORK
Discharge Planning Assessment  [facility]     Patient Name: Yuval Loja  MRN: [id number]  Today's Date: 2/5/2024    Admit Date: 2/4/2024    Plan: Undetermined- awaiting PT/OT   Discharge Needs Assessment       Row Name 02/05/24 1308       Living Environment    People in Home alone    Current Living Arrangements home    Potentially Unsafe Housing Conditions none    Primary Care Provided by self;child(starr)    Provides Primary Care For no one, unable/limited ability to care for self    Family Caregiver if Needed child(starr), adult    Quality of Family Relationships helpful;involved    Able to Return to Prior Arrangements other (see comments)       Resource/Environmental Concerns    Resource/Environmental Concerns none       Transition Planning    Patient/Family Anticipates Transition to home with help/services;inpatient rehabilitation facility;long-term care facility    Patient/Family Anticipated Services at Transition none    Transportation Anticipated family or friend will provide       Discharge Needs Assessment    Readmission Within the Last 30 Days no previous admission in last 30 days    Equipment Currently Used at Home cane, straight    Concerns to be Addressed decision-making;discharge planning;adjustment to diagnosis/illness;compliance issue    Anticipated Changes Related to Illness inability to care for self    Equipment Needed After Discharge other (see comments)    Discharge Facility/Level of Care Needs nursing facility, skilled;assisted living facility;home with home health    Current Discharge Risk chronically ill;cognitively impaired;lives alone                   Discharge Plan       Row Name 02/05/24 1303       Plan    Plan Undetermined- awaiting PT/OT    Roadmap to Recovery Yes    Patient/Family in Agreement with Plan yes    Provided Post Acute Provider List? Yes    Post Acute Provider List Nursing Home;Home Health    Provided Post Acute Provider Quality & Resource List? Yes    Delivered To  Support Person    Method of Delivery Telephone    Plan Comments Per chart pt confused. [facility] called pts dtr Torri Loja [phone] via outbound call, introduced self and explained CCP role. Verified facesheet and confirmed local pharmacy is [facility] [address]. Pt enrolled in meds to beds. PCP is Alessia FRAGOSO. Dtr reports pt does have a HCS and she is the designee. She asks [facility] to add her brother Yuval Loja Jr [phone] to contact list. Pt lives alone in a s/s home with no steps to enter. Pt does limited driving and dtr assists with appointments and groceries. Pt is normally IADL within home, but occasional uses a cane. She states pt uses no other DME. Pt has been to [facility] in the past and also used [facility]. CCP discussed dc planning and she does have concerns regarding pt living alone and keeping track of his meds/ po intake. She states pt has never been formally diagnosed with Dementia, but she believes he does have it. Discussed in home caregivers, living with family, and memory care facilities and LT nursing home. Pt does have a hx of EtOH use, he walks or drives to a store. She told his friends to stop bringing EtOH to pt in the past. CCP explained if pt confused at dc, will need 24/7 care. Also awaiting PT/OT eval. CCP will follow up tomorrow for further dc planning. Jagdish RDZ/TANYA                  Continued Care and Services - Admitted Since 2/4/2024    Coordination has not been started for this encounter.       Expected Discharge Date and Time       Expected Discharge Date Expected Discharge Time    Feb 8, 2024            Demographic Summary       Row Name 02/05/24 1307       General Information    Admission Type inpatient    Required Notices Provided Important Message from Medicare    Referral Source admission list    Reason for Consult discharge planning;decision-making    Preferred Language English       Contact Information    Permission Granted to Share Info With facility  ;family/designee                   Functional Status       Row Name 02/05/24 1301       Functional Status    Usual Activity Tolerance good    Current Activity Tolerance fair       Functional Status, IADL    Medications independent    Meal Preparation independent    Housekeeping assistive person    Laundry assistive person    Shopping assistive person       Employment/    Employment Status retired                   Psychosocial    No documentation.                  Abuse/Neglect    No documentation.                  Legal    No documentation.                  Substance Abuse    No documentation.                  Patient Forms    No documentation.                     Marianne Pettit RN

## 2024-02-05 NOTE — PLAN OF CARE
Goal Outcome Evaluation:   Patient remains confused. Patient is on room air. Patient has been restless throughout shift. Patient remains in restraints and 72 hour restraint and has not been redirectable. Family has been updated and reports that patient usually has to be in restraints when he is hospitalized. Bed alarm set, call light within reach, bed set at lowest position and sitter is at bedside.

## 2024-02-06 LAB
ALBUMIN SERPL-MCNC: 3.7 G/DL (ref 3.5–5.2)
ALBUMIN/GLOB SERPL: 1.3 G/DL
ALP SERPL-CCNC: 58 U/L (ref 39–117)
ALT SERPL W P-5'-P-CCNC: 16 U/L (ref 1–41)
ANION GAP SERPL CALCULATED.3IONS-SCNC: 10.2 MMOL/L (ref 5–15)
AST SERPL-CCNC: 33 U/L (ref 1–40)
BASOPHILS # BLD AUTO: 0.02 10*3/MM3 (ref 0–0.2)
BASOPHILS NFR BLD AUTO: 0.5 % (ref 0–1.5)
BILIRUB SERPL-MCNC: 0.3 MG/DL (ref 0–1.2)
BUN SERPL-MCNC: 12 MG/DL (ref 8–23)
BUN/CREAT SERPL: 7.8 (ref 7–25)
CALCIUM SPEC-SCNC: 9.2 MG/DL (ref 8.6–10.5)
CHLORIDE SERPL-SCNC: 108 MMOL/L (ref 98–107)
CO2 SERPL-SCNC: 22.8 MMOL/L (ref 22–29)
CREAT SERPL-MCNC: 1.54 MG/DL (ref 0.76–1.27)
DEPRECATED RDW RBC AUTO: 48.8 FL (ref 37–54)
EGFRCR SERPLBLD CKD-EPI 2021: 46.7 ML/MIN/1.73
EOSINOPHIL # BLD AUTO: 0.08 10*3/MM3 (ref 0–0.4)
EOSINOPHIL NFR BLD AUTO: 2.1 % (ref 0.3–6.2)
ERYTHROCYTE [DISTWIDTH] IN BLOOD BY AUTOMATED COUNT: 13.1 % (ref 12.3–15.4)
GLOBULIN UR ELPH-MCNC: 2.8 GM/DL
GLUCOSE SERPL-MCNC: 102 MG/DL (ref 65–99)
HCT VFR BLD AUTO: 31 % (ref 37.5–51)
HGB BLD-MCNC: 10.2 G/DL (ref 13–17.7)
IMM GRANULOCYTES # BLD AUTO: 0.01 10*3/MM3 (ref 0–0.05)
IMM GRANULOCYTES NFR BLD AUTO: 0.3 % (ref 0–0.5)
LYMPHOCYTES # BLD AUTO: 1.36 10*3/MM3 (ref 0.7–3.1)
LYMPHOCYTES NFR BLD AUTO: 36.5 % (ref 19.6–45.3)
MCH RBC QN AUTO: 33.2 PG (ref 26.6–33)
MCHC RBC AUTO-ENTMCNC: 32.9 G/DL (ref 31.5–35.7)
MCV RBC AUTO: 101 FL (ref 79–97)
MONOCYTES # BLD AUTO: 0.38 10*3/MM3 (ref 0.1–0.9)
MONOCYTES NFR BLD AUTO: 10.2 % (ref 5–12)
NEUTROPHILS NFR BLD AUTO: 1.88 10*3/MM3 (ref 1.7–7)
NEUTROPHILS NFR BLD AUTO: 50.4 % (ref 42.7–76)
NRBC BLD AUTO-RTO: 0 /100 WBC (ref 0–0.2)
PHOSPHATE SERPL-MCNC: 2.4 MG/DL (ref 2.5–4.5)
PLATELET # BLD AUTO: 163 10*3/MM3 (ref 140–450)
PMV BLD AUTO: 10.1 FL (ref 6–12)
POTASSIUM SERPL-SCNC: 3.7 MMOL/L (ref 3.5–5.2)
PROT SERPL-MCNC: 6.5 G/DL (ref 6–8.5)
RBC # BLD AUTO: 3.07 10*6/MM3 (ref 4.14–5.8)
SODIUM SERPL-SCNC: 141 MMOL/L (ref 136–145)
WBC NRBC COR # BLD AUTO: 3.73 10*3/MM3 (ref 3.4–10.8)

## 2024-02-06 PROCEDURE — 97162 PT EVAL MOD COMPLEX 30 MIN: CPT

## 2024-02-06 PROCEDURE — 84100 ASSAY OF PHOSPHORUS: CPT | Performed by: INTERNAL MEDICINE

## 2024-02-06 PROCEDURE — 85025 COMPLETE CBC W/AUTO DIFF WBC: CPT | Performed by: HOSPITALIST

## 2024-02-06 PROCEDURE — 97166 OT EVAL MOD COMPLEX 45 MIN: CPT

## 2024-02-06 PROCEDURE — 80053 COMPREHEN METABOLIC PANEL: CPT | Performed by: HOSPITALIST

## 2024-02-06 PROCEDURE — 25010000002 LORAZEPAM PER 2 MG: Performed by: HOSPITALIST

## 2024-02-06 PROCEDURE — 97535 SELF CARE MNGMENT TRAINING: CPT

## 2024-02-06 PROCEDURE — 25010000002 HALOPERIDOL LACTATE PER 5 MG: Performed by: SPECIALIST

## 2024-02-06 RX ADMIN — LORAZEPAM 1 MG: 2 INJECTION INTRAMUSCULAR; INTRAVENOUS at 13:17

## 2024-02-06 RX ADMIN — Medication 1 TABLET: at 08:17

## 2024-02-06 RX ADMIN — METOPROLOL TARTRATE 25 MG: 25 TABLET, FILM COATED ORAL at 20:21

## 2024-02-06 RX ADMIN — FOLIC ACID 1 MG: 1 TABLET ORAL at 08:17

## 2024-02-06 RX ADMIN — SUCRALFATE 1 G: 1 TABLET ORAL at 11:45

## 2024-02-06 RX ADMIN — Medication 100 MG: at 08:17

## 2024-02-06 RX ADMIN — SUCRALFATE 1 G: 1 TABLET ORAL at 20:21

## 2024-02-06 RX ADMIN — DILTIAZEM HYDROCHLORIDE 180 MG: 180 CAPSULE, COATED, EXTENDED RELEASE ORAL at 08:17

## 2024-02-06 RX ADMIN — LORAZEPAM 2 MG: 2 INJECTION INTRAMUSCULAR; INTRAVENOUS at 17:27

## 2024-02-06 RX ADMIN — ROSUVASTATIN CALCIUM 5 MG: 5 TABLET, FILM COATED ORAL at 20:21

## 2024-02-06 RX ADMIN — METOPROLOL TARTRATE 25 MG: 25 TABLET, FILM COATED ORAL at 08:17

## 2024-02-06 RX ADMIN — HALOPERIDOL 1 MG: 1 TABLET ORAL at 20:21

## 2024-02-06 RX ADMIN — HALOPERIDOL 1 MG: 1 TABLET ORAL at 08:17

## 2024-02-06 RX ADMIN — DOCUSATE SODIUM 100 MG: 100 CAPSULE, LIQUID FILLED ORAL at 08:17

## 2024-02-06 RX ADMIN — HYDROXYZINE HYDROCHLORIDE 25 MG: 25 TABLET ORAL at 08:17

## 2024-02-06 RX ADMIN — HALOPERIDOL LACTATE 5 MG: 5 INJECTION, SOLUTION INTRAMUSCULAR at 00:49

## 2024-02-06 RX ADMIN — LORAZEPAM 2 MG: 2 INJECTION INTRAMUSCULAR; INTRAVENOUS at 21:39

## 2024-02-06 RX ADMIN — HALOPERIDOL 1 MG: 1 TABLET ORAL at 14:00

## 2024-02-06 RX ADMIN — FAMOTIDINE 20 MG: 20 TABLET, FILM COATED ORAL at 20:22

## 2024-02-06 RX ADMIN — DOCUSATE SODIUM 100 MG: 100 CAPSULE, LIQUID FILLED ORAL at 20:22

## 2024-02-06 RX ADMIN — SUCRALFATE 1 G: 1 TABLET ORAL at 08:17

## 2024-02-06 RX ADMIN — FAMOTIDINE 20 MG: 20 TABLET, FILM COATED ORAL at 08:17

## 2024-02-06 NOTE — PROGRESS NOTES
"Daily progress note    Primary care physician  Dr. Prescott    Subjective  Awake and alert and pleasantly confused in no distress    History of present illness  73-year-old -American male with history of alcohol abuse paroxysmal atrial fibrillation coronary artery disease hyperlipidemia and chronic kidney disease stage III who is well-known to our service and apparently continues to abuse alcohol and recently started on a antidepressant brought to the emergency room with generalized weakness and altered mental status.  Patient in no respiratory distress and answer question appropriately and workup in ER revealed acute metabolic encephalopathy admit for management.  At the time of examination he denies any chest pain shortness of breath palpitation and asking for anxiety medication and wants to eat regular diet.     REVIEW OF SYSTEMS  All systems reviewed and negative except for those discussed in HPI.     PHYSICAL EXAM   Blood pressure 121/57, pulse 72, temperature 98.6 °F (37 °C), temperature source Oral, resp. rate 18, height 175.3 cm (69\"), weight 75.3 kg (166 lb), SpO2 98%.    Constitutional:       General: He is awake. He is not in acute distress.     Appearance: He is not toxic-appearing.   HENT:      Head: Normocephalic and atraumatic.   Eyes:      General: Lids are normal. Vision grossly intact.   Cardiovascular:      Rate and Rhythm: Normal rate and regular rhythm.      Heart sounds: Normal heart sounds.   Pulmonary:      Effort: Pulmonary effort is normal.      Breath sounds: Normal breath sounds and air entry.   Abdominal:      General: Bowel sounds are normal.      Palpations: Abdomen is soft.      Tenderness: There is no abdominal tenderness.   Musculoskeletal:      Cervical back: Normal range of motion.   Skin:     General: Skin is warm and dry.      Coloration: Skin is not pale.   Neurological:      General: No focal deficit present.      Mental Status: He is alert and oriented to person, place, " and time.      Comments: No focal deficit  Psychiatric:         Attention and Perception: Attention normal.         Mood and Affect: Mood normal.         Speech: Speech normal.         Behavior: Behavior is cooperative.     LAB RESULTS  Lab Results (last 24 hours)       Procedure Component Value Units Date/Time    Phosphorus [[id number]]  (Abnormal) Collected: 02/06/24 0306    Specimen: Blood Updated: 02/06/24 1127     Phosphorus 2.4 mg/dL     Comprehensive Metabolic Panel [[id number]]  (Abnormal) Collected: 02/06/24 0306    Specimen: Blood Updated: 02/06/24 0447     Glucose 102 mg/dL      BUN 12 mg/dL      Creatinine 1.54 mg/dL      Sodium 141 mmol/L      Potassium 3.7 mmol/L      Chloride 108 mmol/L      CO2 22.8 mmol/L      Calcium 9.2 mg/dL      Total Protein 6.5 g/dL      Albumin 3.7 g/dL      ALT (SGPT) 16 U/L      AST (SGOT) 33 U/L      Alkaline Phosphatase 58 U/L      Total Bilirubin 0.3 mg/dL      Globulin 2.8 gm/dL      A/G Ratio 1.3 g/dL      BUN/Creatinine Ratio 7.8     Anion Gap 10.2 mmol/L      eGFR 46.7 mL/min/1.73     Narrative:      GFR Normal >60  Chronic Kidney Disease <60  Kidney Failure <15    The GFR formula is only valid for adults with stable renal function between ages 18 and 70.    CBC & Differential [[id number]]  (Abnormal) Collected: 02/06/24 0306    Specimen: Blood Updated: 02/06/24 0429    Narrative:      The following orders were created for panel order CBC & Differential.  Procedure                               Abnormality         Status                     ---------                               -----------         ------                     CBC Auto Differential[[id number]]        Abnormal            Final result                 Please view results for these tests on the individual orders.    CBC Auto Differential [[id number]]  (Abnormal) Collected: 02/06/24 0306    Specimen: Blood Updated: 02/06/24 0429     WBC 3.73 10*3/mm3      RBC 3.07 10*6/mm3      Hemoglobin 10.2 g/dL       Hematocrit 31.0 %      .0 fL      MCH 33.2 pg      MCHC 32.9 g/dL      RDW 13.1 %      RDW-SD 48.8 fl      MPV 10.1 fL      Platelets 163 10*3/mm3      Neutrophil % 50.4 %      Lymphocyte % 36.5 %      Monocyte % 10.2 %      Eosinophil % 2.1 %      Basophil % 0.5 %      Immature Grans % 0.3 %      Neutrophils, Absolute 1.88 10*3/mm3      Lymphocytes, Absolute 1.36 10*3/mm3      Monocytes, Absolute 0.38 10*3/mm3      Eosinophils, Absolute 0.08 10*3/mm3      Basophils, Absolute 0.02 10*3/mm3      Immature Grans, Absolute 0.01 10*3/mm3      nRBC 0.0 /100 WBC           Imaging Results (Last 24 Hours)       ** No results found for the last 24 hours. **          Results  Scan on 2/4/2024 0415 by [facility], [place]: ECG 12-LEAD         Author: -- Service: -- Author Type: --   Filed: Date of Service: Creation Time:   Status: (Other)   HEART RATE= 67  bpm  RR Interval= 896  ms  MA Interval= 193  ms  P Horizontal Axis= -48  deg  P Front Axis= 82  deg  QRSD Interval= 82  ms  QT Interval= 441  ms  QTcB= 466  ms  QRS Axis= 47  deg  T Wave Axis= 51  deg  - ABNORMAL ECG -  Sinus rhythm  RSR' in V1 or V2, probably normal variant  Consider left ventricular hypertrophy  Anterior ST elevation, probably due to LVH          Current Facility-Administered Medications:     dilTIAZem CD (CARDIZEM CD) 24 hr capsule 180 mg, 180 mg, Oral, Q24H, Chino Cole MD, 180 mg at 02/06/24 0817    docusate sodium (COLACE) capsule 100 mg, 100 mg, Oral, BID, Chino Cole MD, 100 mg at 02/06/24 0817    famotidine (PEPCID) tablet 20 mg, 20 mg, Oral, BID, Chino Cole MD, 20 mg at 02/06/24 0817    folic acid (FOLVITE) tablet 1 mg, 1 mg, Oral, Daily, Chino Cole MD, 1 mg at 02/06/24 0817    haloperidol (HALDOL) tablet 1 mg, 1 mg, Oral, TID, Hipolito Ramon III, MD, 1 mg at 02/06/24 1400    haloperidol lactate (HALDOL) injection 5 mg, 5 mg, Intramuscular, Q6H PRN, Hipolito Ramon III, MD, 5 mg at 02/06/24 0049    hydrOXYzine  (ATARAX) tablet 25 mg, 25 mg, Oral, 4x Daily PRN, Jolie Cole MD, 25 mg at 02/06/24 0817    LORazepam (ATIVAN) tablet 1 mg, 1 mg, Oral, Q1H PRN, 1 mg at 02/05/24 1655 **OR** LORazepam (ATIVAN) injection 1 mg, 1 mg, Intravenous, Q1H PRN, 1 mg at 02/06/24 1317 **OR** LORazepam (ATIVAN) tablet 2 mg, 2 mg, Oral, Q1H PRN, 2 mg at 02/05/24 0929 **OR** LORazepam (ATIVAN) injection 2 mg, 2 mg, Intravenous, Q1H PRN **OR** LORazepam (ATIVAN) injection 2 mg, 2 mg, Intravenous, Q15 Min PRN **OR** LORazepam (ATIVAN) injection 2 mg, 2 mg, Intramuscular, Q15 Min PRN, Jolie Cole MD    metoprolol tartrate (LOPRESSOR) tablet 25 mg, 25 mg, Oral, TID, Jolie Cole MD, 25 mg at 02/06/24 0817    multivitamin (THERAGRAN) tablet 1 tablet, 1 tablet, Oral, Daily, Jolie Cole MD, 1 tablet at 02/06/24 0817    rosuvastatin (CRESTOR) tablet 5 mg, 5 mg, Oral, Nightly, Jolie Cole MD    [COMPLETED] Insert Peripheral IV, , , Once **AND** sodium chloride 0.9 % flush 10 mL, 10 mL, Intravenous, PRN, Salma Grullon APRN    sucralfate (CARAFATE) tablet 1 g, 1 g, Oral, 4x Daily AC & at Bedtime, Jolie Cole MD, 1 g at 02/06/24 1145    thiamine (VITAMIN B-1) tablet 100 mg, 100 mg, Oral, Daily, Jolie Cole MD, 100 mg at 02/06/24 0817     ASSESSMENT  Alcohol withdrawal syndrome  Acute kidney injury  Depression   History of alcohol abuse  Paroxysmal atrial fibrillation  Coronary artery disease  Hyperlipidemia  History of CVA  Chronic kidney disease stage III  Gastroesophageal reflux disease    PLAN  CPM  IVF  Detox medications  CIWA protocol  Psych consult appreciated  Adjust home medications  Stress ulcer DVT prophylaxis  Supportive care  PT OT  Discussed with nursing staff  Follow closely further recommendation current hospital course    JOLIE COLE MD    Copied text in this note has been reviewed and is accurate as of 02/06/24

## 2024-02-06 NOTE — CONSULTS
Pt called out to  as  was walking by in the [place]. Pt asked  where he was and why.  responded that pt is in a room at [facility], but could not answer medical questions; referred pt to RN.

## 2024-02-06 NOTE — THERAPY EVALUATION
Patient Name: Yuval Loja  : 1950    MRN: [id number]                              Today's Date: 2024       Admit Date: 2024    Visit Dx:     ICD-10-CM ICD-9-CM   1. Altered mental status, unspecified altered mental status type  R41.82 780.97   2. CRISTIANO (acute kidney injury)  N17.9 584.9     Patient Active Problem List   Diagnosis    Alcoholic ketoacidosis    Alcohol dependence    Methamphetamine abuse    Fatty liver, alcoholic    Nausea vomiting and diarrhea    Alcoholic liver disease    Metabolic acidosis    Tobacco abuse    Coronary artery disease involving native coronary artery of native heart without angina pectoris    Tachycardia    Sinus tachycardia    Chronic kidney disease, stage 3    Medically noncompliant    Visual hallucinations    Psychosis    Hyponatremia    CAD (coronary artery disease)    Leukopenia    Symptomatic anemia    Headache    Substance abuse    Gastrointestinal hemorrhage    CRISTIANO (acute kidney injury)    Hyperkalemia    Right lower quadrant abdominal pain    New onset atrial fibrillation    History of drug abuse    COPD (chronic obstructive pulmonary disease)    Right inguinal hernia    Constipation    Status post right hip replacement    Right hip pain    Sinus bradycardia    Generalized abdominal pain    Altered mental status    Altered mental state     Past Medical History:   Diagnosis Date    Alcohol abuse     Arthritis     CAD (coronary artery disease)     Chronic kidney disease, stage 3     COPD (chronic obstructive pulmonary disease)     Disease of thyroid gland     Elevated cholesterol     Fatty liver, alcoholic     GERD (gastroesophageal reflux disease)     History of transfusion     Hypertensive urgency     Metabolic acidosis     Myocardial infarction     Sinus tachycardia     Sleep apnea     Stroke      Past Surgical History:   Procedure Laterality Date    BACK SURGERY      CARDIAC CATHETERIZATION      CARDIAC ELECTROPHYSIOLOGY PROCEDURE N/A 4/10/2023     Procedure: Temporary Pacemaker;  Surgeon: Vaibhav Bonilla MD;  Location: [facility] CATH INVASIVE LOCATION;  Service: Cardiovascular;  Laterality: N/A;    COLONOSCOPY      ENDOSCOPY      FRACTURE SURGERY      JOINT REPLACEMENT      SKIN BIOPSY      TOTAL HIP ARTHROPLASTY Right 06/27/2022    Procedure: TOTAL HIP ARTHROPLASTY ANTERIOR WITH HANA TABLE;  Surgeon: Willian Quiles MD;  Location: [facility] MAIN OR;  Service: Orthopedics;  Laterality: Right;  [place]      General Information       Row Name 02/06/24 1027          OT Time and Intention    Document Type evaluation  -     Mode of Treatment individual therapy;occupational therapy  -       Row Name 02/06/24 1027          General Information    Patient Profile Reviewed yes  -     Prior Level of Function independent:  Patient states he was independent at home prior to arrival to hospital  -     Existing Precautions/Restrictions fall  -     Barriers to Rehab medically complex;previous functional deficit;cognitive status  -       Row Name 02/06/24 1027          Living Environment    People in Home alone  -       Row Name 02/06/24 1027          Cognition    Orientation Status (Cognition) oriented to;person;place  -       Row Name 02/06/24 1027          Safety Issues, Functional Mobility    Safety Issues Affecting Function (Mobility) ability to follow commands;at risk behavior observed;awareness of need for assistance;insight into deficits/self-awareness;problem-solving;safety precautions follow-through/compliance;safety precaution awareness  -     Impairments Affecting Function (Mobility) balance;cognition;pain;strength;endurance/activity tolerance  -     Cognitive Impairments, Mobility Safety/Performance attention;impulsivity;insight into deficits/self-awareness;problem-solving/reasoning  -               User Key  (r) = Recorded By, (t) = Taken By, (c) = Cosigned By      Initials Name Provider Type     Yvette Galdamez, OT  Occupational Therapist                     Mobility/ADL's       [facility] 02/06/24 1029          Bed Mobility    Bed Mobility bed mobility (all) activities  -     All Activities, [place] (Bed Mobility) minimum assist (75% patient effort)  -     Bed Mobility, Safety Issues cognitive deficits limit understanding  -LH       Row Name 02/06/24 1029          Transfers    Transfers sit-stand transfer;stand-sit transfer  -LH       Row Name 02/06/24 1029          Sit-Stand Transfer    Sit-Stand [place] (Transfers) minimum assist (75% patient effort)  -     Comment, (Sit-Stand Transfer) A x2  -LH       Row Name 02/06/24 1029          Stand-Sit Transfer    Stand-Sit [place] (Transfers) contact guard  -     Comment, (Stand-Sit Transfer) [facility] x2  -LH       Row Name 02/06/24 1029          Functional Mobility    Functional Mobility- Ind. Level minimum assist (75% patient effort);2 person assist required  -     Functional Mobility- Safety Issues balance decreased during turns;step length decreased;weight-shifting ability decreased  -     Patient was able to Ambulate yes  -LH       Row Name 02/06/24 1029          Activities of Daily Living    BADL Assessment/Intervention grooming  -LH       Row Name 02/06/24 1029          Grooming Assessment/Training    [place] Level (Grooming) grooming skills;oral care regimen;wash face, hands;contact guard assist;minimum assist (75% patient effort)  -     Position (Grooming) sink side  -               User Key  (r) = Recorded By, (t) = Taken By, (c) = Cosigned By      Initials Name Provider Type     Yvette Galdamez OT Occupational Therapist                   Obj/Interventions       Row Name 02/06/24 1031          Sensory Assessment (Somatosensory)    Sensory Assessment (Somatosensory) sensation intact  -LH       Row Name 02/06/24 1031          Vision Assessment/Intervention    Visual Impairment/Limitations WFL  -LH       Row Name 02/06/24 1031          Range of  Motion Comprehensive    General Range of Motion no range of motion deficits identified  -[facility] Name 02/06/24 1031          Strength Comprehensive (MMT)    Comment, General Manual Muscle Testing (MMT) Assessment generalized deconditioning  -[facility] Name 02/06/24 1031          Motor Skills    Motor Skills functional endurance  -LH       Row Name 02/06/24 1031          Balance    Balance Assessment sitting static balance;sitting dynamic balance;sit to stand dynamic balance;standing static balance;standing dynamic balance  -     Static Sitting Balance standby assist  -     Dynamic Sitting Balance standby assist  -     Position, Sitting Balance unsupported;sitting edge of bed  -     Sit to Stand Dynamic Balance minimal assist  -     Static Standing Balance minimal assist;2-person assist  [facility] x2  -     Dynamic Standing Balance minimal assist;2-person assist  -     Position/Device Used, Standing Balance other (see comments)  [facility] x2  -     Balance Interventions sitting;standing;occupation based/functional task;sit to stand  -               User Key  (r) = Recorded By, (t) = Taken By, (c) = Cosigned By      Initials Name Provider Type     Yvette Galdamez OT Occupational Therapist                   Goals/Plan       Row Name 02/06/24 1036          Bed Mobility Goal 1 (OT)    Activity/Assistive Device (Bed Mobility Goal 1, OT) bed mobility activities, all  -     [place] Level/Cues Needed (Bed Mobility Goal 1, OT) modified independence  -     Time Frame (Bed Mobility Goal 1, OT) short term goal (STG);2 weeks  -LH       Row Name 02/06/24 1036          Transfer Goal 1 (OT)    Activity/Assistive Device (Transfer Goal 1, OT) transfers, all  -     [place] Level/Cues Needed (Transfer Goal 1, OT) modified independence  -     Time Frame (Transfer Goal 1, OT) short term goal (STG);2 weeks  [facility]     Progress/Outcome (Transfer Goal 1, OT) new goal  -[facility] Name 02/06/24 1036           Dressing Goal 1 (OT)    Activity/Device (Dressing Goal 1, OT) dressing skills, all;upper body dressing;lower body dressing  -     [place]/Cues Needed (Dressing Goal 1, OT) modified independence  -     Time Frame (Dressing Goal 1, OT) short term goal (STG);2 weeks  -     Progress/Outcome (Dressing Goal 1, OT) new [facility]  -       Row Name 02/06/24 1036          Toileting Goal 1 (OT)    Activity/Device (Toileting Goal 1, OT) toileting skills, all  -     [place] Level/Cues Needed (Toileting Goal 1, OT) modified independence  [facility]     Time Frame (Toileting Goal 1, OT) short term goal (STG);2 weeks  -     Progress/Outcome (Toileting Goal 1, OT) new goal  -       Row Name 02/06/24 1036          Grooming Goal 1 (OT)    Activity/Device (Grooming Goal 1, OT) grooming skills, all  -     [place] (Grooming Goal 1, OT) modified independence  [facility]     Time Frame (Grooming Goal 1, OT) short term goal (STG);2 weeks  -     Progress/Outcome (Grooming Goal 1, OT) new [facility]  -       Row Name 02/06/24 1036          Therapy Assessment/Plan (OT)    Planned Therapy Interventions (OT) activity tolerance training;BADL retraining;functional balance retraining;occupation/activity based interventions;patient/caregiver education/training;strengthening exercise;transfer/mobility retraining  -               User Key  (r) = Recorded By, (t) = Taken By, (c) = Cosigned By      Initials Name Provider Type     Yvette Galdamez, OT Occupational Therapist                   Clinical Impression       Row Name 02/06/24 1032          Pain Assessment    Pre/Posttreatment Pain Comment reports headache, generalized pain, not able to identify a number  -     Pain Intervention(s) Ambulation/increased activity;Repositioned  -       Row Name 02/06/24 1032          Plan of Care Review    Plan of Care Reviewed With patient  -     Outcome Evaluation Patient is a 73 year old male admitted to [facility] with AMS. Patient reports that he lives  at home alone and is independent with ADLs, mobility with a walking stick, has been agitated and combative during hospital stay, with some confusion. Patient only oriented to self and place, and in soft restraints upon OT arrival. Patient performed bed mobility with min A, heavy use of bed rail. Patient able to come to stand with min A, performed ambulation to sink with min A, HHA x2 for stability and support. Patient would benefit from walker for stability. Patient performed grooming/hygiene standing at sink with CGA, and requires min A with brushing teeth. Patient will continue to benefit from skilled OT to address current functional deficits, anticipate need for SNF at [facility], as patient would not be safe to return home alone in his current condition.  -       Row Name 02/06/24 1032          Therapy Assessment/Plan (OT)    Rehab Potential (OT) fair, will monitor progress closely  -     Criteria for Skilled Therapeutic Interventions Met (OT) yes;meets criteria;skilled treatment is necessary  -     Therapy Frequency (OT) 5 times/wk  -       Row Name 02/06/24 1032          Therapy Plan Review/Discharge Plan (OT)    Anticipated Discharge Disposition (OT) skilled nursing facility  -       Row Name 02/06/24 1032          Positioning and Restraints    Pre-Treatment Position in bed  -     Post Treatment Position bed  -     In Bed call light within reach;encouraged to call for assist;patient within staff view;with other staff  -     Restraints reapplied:  -               User Key  (r) = Recorded By, (t) = Taken By, (c) = Cosigned By      Initials Name Provider Type     Yvette Galdamez, OT Occupational Therapist                   Outcome Measures       Row Name 02/06/24 1036          How much help from another is currently needed...    Putting on and taking off regular lower body clothing? 2  -LH     Bathing (including washing, rinsing, and drying) 2  -     Toileting (which includes using toilet bed  pan or urinal) 3  -     Putting on and taking off regular upper body clothing 3  -     Taking care of personal grooming (such as brushing teeth) 3  -     Eating meals 3  -     AM-PAC 6 Clicks Score (OT) 16  -       Row Name 02/06/24 1036          Modified [place] Scale    Modified [place] Scale 3 - Moderate disability.  Requiring some help, but able to walk without assistance.  -       Row Name 02/06/24 1036          Functional Assessment    Outcome Measure Options AM-PAC 6 Clicks Daily Activity (OT);Modified [place]  -               User Key  (r) = Recorded By, (t) = Taken By, (c) = Cosigned By      Initials Name Provider Type     Yvette Galdamez OT Occupational Therapist                    Occupational Therapy Education       Title: PT OT SLP Therapies (In Progress)       Topic: Occupational Therapy (Done)       Point: ADL training (Done)       Description:   Instruct learner(s) on proper safety adaptation and remediation techniques during self care or transfers.   Instruct in proper use of assistive devices.                  Learning Progress Summary             Patient Acceptance, E,TB, VU by  at 2/6/2024 1036    Comment: Instructed in role of OT, discharge planning, home safety, fall prevention                         Point: Home exercise program (Done)       Description:   Instruct learner(s) on appropriate technique for monitoring, assisting and/or progressing therapeutic exercises/activities.                  Learning Progress Summary             Patient Acceptance, E,TB, VU by  at 2/6/2024 1036    Comment: Instructed in role of OT, discharge planning, home safety, fall prevention                         Point: Precautions (Done)       Description:   Instruct learner(s) on prescribed precautions during self-care and functional transfers.                  Learning Progress Summary             Patient Acceptance, E,TB, VU by  at 2/6/2024 1036    Comment: Instructed in role of OT, discharge  planning, home safety, fall prevention                         Point: Body mechanics (Done)       Description:   Instruct learner(s) on proper positioning and spine alignment during self-care, functional mobility activities and/or exercises.                  Learning Progress Summary             Patient Acceptance, E,TB, VU by  at 2/6/2024 1036    Comment: Instructed in role of OT, discharge planning, home safety, fall prevention                                         User Key       Initials Effective Dates Name Provider Type Discipline     08/31/23 -  Yvette Galdamez, OT Occupational Therapist OT                  OT Recommendation and Plan  Planned Therapy Interventions (OT): activity tolerance training, BADL retraining, functional balance retraining, occupation/activity based interventions, patient/caregiver education/training, strengthening exercise, transfer/mobility retraining  Therapy Frequency (OT): 5 times/wk  Plan of Care Review  Plan of Care Reviewed With: patient  Outcome Evaluation: Patient is a 73 year old male admitted to [facility] with AMS. Patient reports that he lives at home alone and is independent with ADLs, mobility with a walking stick, has been agitated and combative during hospital stay, with some confusion. Patient only oriented to self and place, and in soft restraints upon OT arrival. Patient performed bed mobility with min A, heavy use of bed rail. Patient able to come to stand with min A, performed ambulation to sink with min A, HHA x2 for stability and support. Patient would benefit from walker for stability. Patient performed grooming/hygiene standing at sink with CGA, and requires min A with brushing teeth. Patient will continue to benefit from skilled OT to address current functional deficits, anticipate need for SNF at [facility] [place], as patient would not be safe to return home alone in his current condition.     Time Calculation:   Evaluation Complexity (OT)  Review Occupational  Profile/Medical/Therapy History Complexity: expanded/moderate complexity  Assessment, Occupational Performance/Identification of Deficit Complexity: 3-5 performance deficits  Clinical Decision Making Complexity (OT): detailed assessment/moderate complexity  Overall Complexity of Evaluation (OT): moderate complexity     Time Calculation- OT       Row Name 02/06/24 1037             Time Calculation- OT    OT Start Time 0838  -      OT Stop Time 0859  -      OT Time Calculation (min) 21 min  -      Total Timed Code Minutes- OT 11 minute(s)  -      OT Non-Billable Time (min) 10 min  -      OT Received On 02/06/24  -      OT - Next Appointment 02/07/24  -      OT Goal Re-Cert Due Date 02/20/24  -         Timed Charges    85274 - OT Self Care/Mgmt Minutes 11  -LH         Untimed Charges    OT Eval/Re-eval Minutes 10  -LH         Total Minutes    Timed Charges Total Minutes 11  -LH      Untimed Charges Total Minutes 10  -LH       Total Minutes 21  -LH                User Key  (r) = Recorded By, (t) = Taken By, (c) = Cosigned By      Initials Name Provider Type     Yvette Galdamez OT Occupational Therapist                  Therapy Charges for Today       Code Description Service Date Service Provider Modifiers Qty    [id number] HC OT SELF CARE/MGMT/TRAIN EA 15 MIN 2/6/2024 Yvette Galdamez OT GO 1    [id number] HC OT EVAL MOD COMPLEXITY 3 2/6/2024 Yvette Galdamez OT GO 1                 Yvette Galdamez OT  2/6/2024

## 2024-02-06 NOTE — THERAPY EVALUATION
Patient Name: Yuval Loja  : 1950    MRN: [id number]                              Today's Date: 2024       Admit Date: 2024    Visit Dx:     ICD-10-CM ICD-9-CM   1. Altered mental status, unspecified altered mental status type  R41.82 780.97   2. CRISTIANO (acute kidney injury)  N17.9 584.9     Patient Active Problem List   Diagnosis    Alcoholic ketoacidosis    Alcohol dependence    Methamphetamine abuse    Fatty liver, alcoholic    Nausea vomiting and diarrhea    Alcoholic liver disease    Metabolic acidosis    Tobacco abuse    Coronary artery disease involving native coronary artery of native heart without angina pectoris    Tachycardia    Sinus tachycardia    Chronic kidney disease, stage 3    Medically noncompliant    Visual hallucinations    Psychosis    Hyponatremia    CAD (coronary artery disease)    Leukopenia    Symptomatic anemia    Headache    Substance abuse    Gastrointestinal hemorrhage    CRISTIANO (acute kidney injury)    Hyperkalemia    Right lower quadrant abdominal pain    New onset atrial fibrillation    History of drug abuse    COPD (chronic obstructive pulmonary disease)    Right inguinal hernia    Constipation    Status post right hip replacement    Right hip pain    Sinus bradycardia    Generalized abdominal pain    Altered mental status    Altered mental state     Past Medical History:   Diagnosis Date    Alcohol abuse     Arthritis     CAD (coronary artery disease)     Chronic kidney disease, stage 3     COPD (chronic obstructive pulmonary disease)     Disease of thyroid gland     Elevated cholesterol     Fatty liver, alcoholic     GERD (gastroesophageal reflux disease)     History of transfusion     Hypertensive urgency     Metabolic acidosis     Myocardial infarction     Sinus tachycardia     Sleep apnea     Stroke      Past Surgical History:   Procedure Laterality Date    BACK SURGERY      CARDIAC CATHETERIZATION      CARDIAC ELECTROPHYSIOLOGY PROCEDURE N/A 4/10/2023     Procedure: Temporary Pacemaker;  Surgeon: Vaibhav Bonilla MD;  Location: [facility] CATH INVASIVE LOCATION;  Service: Cardiovascular;  Laterality: N/A;    COLONOSCOPY      ENDOSCOPY      FRACTURE SURGERY      JOINT REPLACEMENT      SKIN BIOPSY      TOTAL HIP ARTHROPLASTY Right 06/27/2022    Procedure: TOTAL HIP ARTHROPLASTY ANTERIOR WITH HANA TABLE;  Surgeon: Willian Quiles MD;  Location: [facility] MAIN OR;  Service: Orthopedics;  Laterality: Right;  Depuy, carli. [email or web address]      General Information       Row Name 02/06/24 1047          Physical Therapy Time and Intention    Document Type evaluation  -     Mode of Treatment individual therapy;physical therapy  -       Row Name 02/06/24 1047          General Information    Prior Level of Function independent:;gait;transfer;bed mobility  walks with a cane  -     Existing Precautions/Restrictions fall  -     Barriers to Rehab medically complex;cognitive status  -       Row Name 02/06/24 1047          Living Environment    People in Home alone  -       Row Name 02/06/24 1047          Cognition    Orientation Status (Cognition) oriented to;person;place  -[facility] Name 02/06/24 1047          Safety Issues, Functional Mobility    Impairments Affecting Function (Mobility) balance;cognition;pain;strength;endurance/activity tolerance  -               User Key  (r) = Recorded By, (t) = Taken By, (c) = Cosigned By      Initials Name Provider Type     Alla Acevedo PT Physical Therapist                   Mobility       Row Name 02/06/24 1051          Bed Mobility    Bed Mobility supine-sit;sit-supine  -     Supine-Sit [place] (Bed Mobility) verbal cues;standby assist  -     Sit-Supine [place] (Bed Mobility) verbal cues;standby assist  -     Assistive Device (Bed Mobility) bed rails;head of bed elevated  -       Row Name 02/06/24 1051          Sit-Stand Transfer    Sit-Stand [place] (Transfers) verbal cues;nonverbal cues  (demo/gesture);minimum assist (75% patient effort)  -     Assistive Device (Sit-Stand Transfers) --  A  -       Row Name 02/06/24 1051          Gait/Stairs (Locomotion)    [place] Level (Gait) verbal cues;nonverbal cues (demo/gesture);minimum assist (75% patient effort)  -     Assistive Device (Gait) --  HHA  -     Distance in Feet (Gait) 35  -     Deviations/Abnormal Patterns (Gait) alicia decreased;gait speed decreased;stride length decreased;festinating/shuffling  -     Comment, (Gait/Stairs) pt with LOB x2 during ambulation, requiring Crystal to correct, pt reports R thigh pain and stiffness  -               User Key  (r) = Recorded By, (t) = Taken By, (c) = Cosigned By      Initials Name Provider Type     Alla Acevedo, PT Physical Therapist                   Obj/Interventions       Row Name 02/06/24 1058          Range of Motion Comprehensive    General Range of Motion no range of motion deficits identified  -       Row Name 02/06/24 1058          Strength Comprehensive (MMT)    Comment, General Manual Muscle Testing (MMT) Assessment some generalized weakness noted with functional mobility,  BLE grossly >/=3+/5  -       Row Name 02/06/24 1058          Balance    Balance Assessment standing static balance;standing dynamic balance  -     Static Standing Balance verbal cues;non-verbal cues (demo/gesture);minimal assist  -     Dynamic Standing Balance verbal cues;non-verbal cues (demo/gesture);minimal assist  -     Position/Device Used, Standing Balance --  A  -               User Key  (r) = Recorded By, (t) = Taken By, (c) = Cosigned By      Initials Name Provider Type     Alla Acevedo, PT Physical Therapist                   Goals/Plan       Row Name 02/06/24 1104          Bed Mobility Goal 1 (PT)    Activity/Assistive Device (Bed Mobility Goal 1, PT) bed mobility activities, all  -     [place] Level/Cues Needed (Bed Mobility Goal 1, PT) supervision required   -CH     Time Frame (Bed Mobility Goal 1, PT) 1 week  -       Row Name 02/06/24 1104          Transfer Goal 1 (PT)    Activity/Assistive Device (Transfer Goal 1, PT) transfers, all  -CH     [place] Level/Cues Needed (Transfer Goal 1, PT) supervision required  -CH     Time Frame (Transfer Goal 1, PT) 1 week  -       Row Name 02/06/24 1104          Gait Training Goal 1 (PT)    Activity/Assistive Device (Gait Training Goal 1, PT) gait (walking locomotion)  -CH     [place] Level (Gait Training Goal 1, PT) supervision required  -CH     Distance (Gait Training Goal 1, PT) 150  -CH     Time Frame (Gait Training Goal 1, PT) 1 week  -       Row Name 02/06/24 1104          Therapy Assessment/Plan (PT)    Planned Therapy Interventions (PT) balance training;bed mobility training;home exercise program;gait training;patient/family education;strengthening;transfer training  -               User Key  (r) = Recorded By, (t) = Taken By, (c) = Cosigned By      Initials Name Provider Type     Alla Acevedo, PT Physical Therapist                   Clinical Impression       Row Name 02/06/24 1101          Pain    Pretreatment Pain Rating 0/10 - no pain  -     Posttreatment Pain Rating 5/10  -CH     Pain Location - Side/Orientation Right  -     Pain Location lower  -     Pain Location - extremity  -       Row Name 02/06/24 1101          Plan of Care Review    Plan of Care Reviewed With patient  -     Outcome Evaluation Pt is a 74 yo M who was admitted with AMS and history of ETOH. Pt presents to PT with some impaired functional mobility and gait secondary to generalized weakness, impaired balance, and decreased activity tolerance. Pt may benefit from skilled PT to address strength, mobility, and gait.  -       Row Name 02/06/24 1101          Therapy Assessment/Plan (PT)    Patient/Family Therapy Goals Statement (PT) to return to [facility]  -     Rehab Potential (PT) good, to achieve stated therapy goals   -     Criteria for Skilled Interventions Met (PT) skilled treatment is necessary  -     Therapy Frequency (PT) 5 times/wk  -       Row Name 02/06/24 1101          Positioning and Restraints    Pre-Treatment Position in bed  -     Post Treatment Position bed  -CH     In Bed supine;call light within reach;encouraged to call for assist  sitter present, pt not in restraints when PT enterred room and not reapplied upon exit, pt also without bed exit alarm  -               User Key  (r) = Recorded By, (t) = Taken By, (c) = Cosigned By      Initials Name Provider Type     Alla Acevedo, PT Physical Therapist                   Outcome Measures       Row Name 02/06/24 1105          How much help from another person do you currently need...    Turning from your back to your side while in flat bed without using bedrails? 3  -CH     Moving from lying on back to sitting on the side of a flat bed without bedrails? 3  -CH     Moving to and from a bed to a chair (including a wheelchair)? 3  -CH     Standing up from a chair using your arms (e.g., wheelchair, bedside chair)? 3  -CH     Climbing 3-5 steps with a railing? 1  -CH     To walk in hospital room? 3  -     AM-PAC 6 Clicks Score (PT) 16  -CH     Highest Level of Mobility Goal 5 --> Static standing  -       Row Name 02/06/24 1036          Modified [place] Scale    Modified Mary Scale 3 - Moderate disability.  Requiring some help, but able to walk without assistance.  -       Row Name 02/06/24 1105 02/06/24 1036       Functional Assessment    Outcome Measure Options AM-PAC 6 Clicks Basic Mobility (PT)  - AM-PAC 6 Clicks Daily Activity (OT);Modified [place]  -              User Key  (r) = Recorded By, (t) = Taken By, (c) = Cosigned By      Initials Name Provider Type     Alla Acevedo, PT Physical Therapist    Yvette Seymour OT Occupational Therapist                                 Physical Therapy Education       Title: PT OT SLP Therapies  (In Progress)       Topic: Physical Therapy (In Progress)       Point: Mobility training (Done)       Learning Progress Summary             Patient Acceptance, E,TB,D, VU,NR by  at 2/6/2024 1105    Acceptance, E, NL,NR by PP at 2/6/2024 0951                         Point: Home exercise program (Not Started)       Learner Progress:  Not documented in this visit.              Point: Body mechanics (Done)       Learning Progress Summary             Patient Acceptance, E,TB,D, VU,NR by  at 2/6/2024 1105                         Point: Precautions (Done)       Learning Progress Summary             Patient Acceptance, E,TB,D, VU,NR by  at 2/6/2024 1105    Acceptance, E, NL,NR by PP at 2/6/2024 0951                                         User Key       Initials Effective Dates Name Provider Type Discipline     06/16/21 -  Alla Acevedo, PT Physical Therapist PT    PP 08/11/20 -  Celeste Montague RN Registered Nurse Nurse                  PT Recommendation and Plan  Planned Therapy Interventions (PT): balance training, bed mobility training, home exercise program, gait training, patient/family education, strengthening, transfer training  Plan of Care Reviewed With: patient  Outcome Evaluation: Pt is a 74 yo M who was admitted with AMS and history of ETOH. Pt presents to PT with some impaired functional mobility and gait secondary to generalized weakness, impaired balance, and decreased activity tolerance. Pt may benefit from skilled PT to address strength, mobility, and gait.     Time Calculation:         PT Charges       Row Name 02/06/24 1106             Time Calculation    Start Time 1030  -      Stop Time 1040  -      Time Calculation (min) 10 min  -      PT Received On 02/06/24  -      PT - Next Appointment 02/07/24  -      PT Goal Re-Cert Due Date 02/13/24  -                User Key  (r) = Recorded By, (t) = Taken By, (c) = Cosigned By      Initials Name Provider Type     Alla Acevedo,  PT Physical Therapist                  Therapy Charges for Today       Code Description Service Date Service Provider Modifiers Qty    [id number] HC PT EVAL MOD COMPLEXITY 3 2/6/2024 Alla Acevedo, PT GP 1            PT G-Codes  Outcome Measure Options: AM-PAC 6 Clicks Basic Mobility (PT)  AM-PAC 6 Clicks Score (PT): 16  AM-PAC 6 Clicks Score (OT): 16  Modified Mary Scale: 3 - Moderate disability.  Requiring some help, but able to walk without assistance.  PT Discharge Summary  Anticipated Discharge Disposition (PT): skilled nursing facility    Alla Acevedo, PT  2/6/2024

## 2024-02-06 NOTE — PROGRESS NOTES
[facility] Progress Note      Patient Name: Yuval Loja  : 1950  MRN: [id number]  Primary Care Physician:  Alessia Prescott APRN  Date of admission: 2024    Subjective     Interval History:   Calm; out of restraints; sitting up in bed  States that his appetite is good; no N/V  Speech at times, though, is nonsensical and unintelligible    Review of Systems:   As noted above    Objective     Vitals:   Temp:  [97.5 °F (36.4 °C)-98.5 °F (36.9 °C)] 98.2 °F (36.8 °C)  Heart Rate:  [69-86] 80  Resp:  [18-20] 18  BP: (126-162)/(53-94) 146/62    Intake/Output Summary (Last 24 hours) at 2024 1058  Last data filed at 2024 0800  Gross per 24 hour   Intake 240 ml   Output 250 ml   Net -10 ml       Physical Exam:    Constitutional: Awake, calm, at times unintelligible and nonsensical speech  HEENT: Sclera anicteric, no conjunctival injection, MMM  Neck: Supple, no carotid bruit, trachea at midline, no JVD  Respiratory: Coarse anteriorly; not labored on room air   Cardiovascular: RRR no rub  Gastrointestinal: BS +, soft, nondistended  : No palpable bladder  Musculoskeletal: No edema, no clubbing or cyanosis  Psychiatric: Correctly identified month and year  Neurologic: Awake, moving all extremities  Skin: Warm and dry      Scheduled Meds:     dilTIAZem CD, 180 mg, Oral, Q24H  docusate sodium, 100 mg, Oral, BID  famotidine, 20 mg, Oral, BID  folic acid, 1 mg, Oral, Daily  haloperidol, 1 mg, Oral, TID  metoprolol tartrate, 25 mg, Oral, TID  multivitamin, 1 tablet, Oral, Daily  rosuvastatin, 5 mg, Oral, Nightly  sucralfate, 1 g, Oral, 4x Daily AC & at Bedtime  thiamine, 100 mg, Oral, Daily      IV Meds:   sodium chloride, 50 mL/hr, Last Rate: 50 mL/hr (24 1701)        Results Reviewed:   I have personally reviewed the results from the time of this admission to 2024 10:58 EST     Results from last 7 days   Lab Units 24  0306 24  0535 24  0545   SODIUM  mmol/L 141 139 132*   POTASSIUM mmol/L 3.7 3.9 4.5   CHLORIDE mmol/L 108* 102 94*   CO2 mmol/L 22.8 22.5 20.7*   BUN mg/dL 12 21 23   CREATININE mg/dL 1.54* 1.92* 2.04*   CALCIUM mg/dL 9.2 10.4 9.9   BILIRUBIN mg/dL 0.3 0.3 0.3   ALK PHOS U/L 58 69 67   ALT (SGPT) U/L 16 18 18   AST (SGOT) U/L 33 35 36   GLUCOSE mg/dL 102* 141* 60*       Estimated Creatinine Clearance: 44.1 mL/min (A) (by C-G formula based on SCr of 1.54 mg/dL (H)).    Results from last 7 days   Lab Units 02/05/24  0535   MAGNESIUM mg/dL 2.2   PHOSPHORUS mg/dL 1.6*             Results from last 7 days   Lab Units 02/06/24  0306 02/05/24  0535 02/04/24  0545   WBC 10*3/mm3 3.73 4.91 3.34*   HEMOGLOBIN g/dL 10.2* 11.9* 11.8*   PLATELETS 10*3/mm3 163 207 209       Results from last 7 days   Lab Units 02/04/24  0545   INR  0.99       Assessment / Plan     ASSESSMENT:  1.  CRISTIANO on CKD3a, nonoliguric, improving: likely prerenal from reduced appetite (+ketones in urine) while confused.  Volume status is stable; hyponatremia resolved with saline.  Low phosphorus yesterday.  [place] urinalysis  2.  Confusion and weakness, with suspected withdrawal syndrome  3.  Alcoholism and liver disease  4.  Depression, recently placed on Lexapro, which has been DC'd  5.  DDD with prior stroke  6.  History of atrial fibrillation: In NSR currently    PLAN:  1.  Stop IVF since he is eating better  2.  Await phosphorus level; will replace if low  3.  Surveillance labs    Thank you for involving us in the care of Yuval Loja.  Please feel free to call with any questions.    Thomas Weiner MD  02/06/24  10:58 EST    [facility]  [phone]    Please note that portions of this note were completed with a voice recognition program.

## 2024-02-06 NOTE — PROGRESS NOTES
The patient awakens briefly for interview today.  He continues to require a sitter and restraints.  Staff reports that he has been spitting out medication.  I have encouraged use of IM as needed medications as needed.  Continuing to follow.

## 2024-02-06 NOTE — PLAN OF CARE
Problem: Adult Inpatient Plan of Care  Goal: Plan of Care Review  Outcome: Ongoing, Progressing  Flowsheets  Taken 2/6/2024 0948 by Celeste Montague, RN  Outcome Evaluation: Alert and oriented x 1, to self. Sitter at bedside. Restraints as ordered. Able to successfully administer am medications with repetitive encouragement. Monitoring.  Taken 2/6/2024 0434 by Rubi Jimenez, RN  Progress: improving  Plan of Care Reviewed With: patient  Goal: Patient-Specific Goal (Individualized)  Outcome: Ongoing, Progressing  Goal: Absence of Hospital-Acquired Illness or Injury  Outcome: Ongoing, Progressing  Intervention: Identify and Manage Fall Risk  Description: Perform standard risk assessment on admission using a validated tool or comprehensive approach appropriate to the patient; reassess fall risk frequently, with change in status or transfer to another level of care.  Communicate fall injury risk to interprofessional healthcare team.  Determine need for increased observation, equipment and environmental modification, such as low bed, signage and supportive, nonskid footwear.  Adjust safety measures to individual developmental age, stage and identified risk factors.  Reinforce the importance of safety and physical activity with patient and family.  Perform regular intentional rounding to assess need for position change, pain assessment and personal needs, including assistance with toileting.  Recent Flowsheet Documentation  Taken 2/6/2024 0800 by Celeste Montague, RN  Safety Promotion/Fall Prevention:   assistive device/personal items within reach   clutter free environment maintained   fall prevention program maintained   nonskid shoes/slippers when out of bed   room organization consistent   safety round/check completed  Intervention: Prevent Skin Injury  Description: Perform a screening for skin injury risk, such as pressure or moisture associated skin damage on admission and at regular intervals throughout hospital  stay.  Keep all areas of skin (especially folds) clean and dry.  Maintain adequate skin hydration.  Relieve and redistribute pressure and protect bony prominences; implement measures based on patient-specific risk factors.  Match turning and repositioning schedule to clinical condition.  Encourage weight shift frequently; assist with reposition if unable to complete independently.  Float heels off bed; avoid pressure on the Achilles tendon.  Keep skin free from extended contact with medical devices.  Encourage functional activity and mobility, as early as tolerated.  Use aids (e.g., slide boards, mechanical lift) during transfer.  Recent Flowsheet Documentation  Taken 2/6/2024 0800 by Celeste Montague, RN  Body Position:   left   tilted   neutral body alignment   neutral head position  Skin Protection:   adhesive use limited   tubing/devices free from skin contact   skin sealant/moisture barrier applied  Intervention: Prevent and Manage VTE (Venous Thromboembolism) Risk  Description: Assess for VTE (venous thromboembolism) risk.  Encourage and assist with early ambulation.  Initiate and maintain compression or other therapy, as indicated, based on identified risk in accordance with organizational protocol and provider order.  Encourage both active and passive leg exercises while in bed, if unable to ambulate.  Recent Flowsheet Documentation  Taken 2/6/2024 0945 by Celeste Montague, RN  Activity Management: up to bedside commode  Taken 2/6/2024 0800 by Celeste Montague, RN  Activity Management: bedrest  Intervention: Prevent Infection  Description: Maintain skin and mucous membrane integrity; promote hand, oral and pulmonary hygiene.  Optimize fluid balance, nutrition, sleep and glycemic control to maximize infection resistance.  Identify potential sources of infection early to prevent or mitigate progression of infection (e.g., wound, lines, devices).  Evaluate ongoing need for invasive devices; remove promptly when no  longer indicated.  Recent Flowsheet Documentation  Taken 2/6/2024 0800 by Celeste Montague, RN  Infection Prevention:   environmental surveillance performed   equipment surfaces disinfected   hand hygiene promoted   single patient room provided  Goal: Optimal Comfort and Wellbeing  Outcome: Ongoing, Progressing  Intervention: Provide Person-Centered Care  Description: Use a family-focused approach to care.  Develop trust and rapport by proactively providing information, encouraging questions, addressing concerns and offering reassurance.  Acknowledge emotional response to hospitalization.  Recognize and utilize personal coping strategies.  Honor spiritual and cultural preferences.  Recent Flowsheet Documentation  Taken 2/6/2024 0800 by Celeste Montague, RN  Trust Relationship/Rapport:   care explained   choices provided   emotional support provided   empathic listening provided   questions answered   reassurance provided   questions encouraged   thoughts/feelings acknowledged  Goal: Readiness for Transition of Care  Outcome: Ongoing, Progressing     Problem: COPD (Chronic Obstructive Pulmonary Disease) Comorbidity  Goal: Maintenance of COPD Symptom Control  Outcome: Ongoing, Progressing  Intervention: Maintain COPD-Symptom Control  Description: Evaluate adherence to management plan (e.g., medication, trigger avoidance, infection prevention, self-monitoring).  Advocate for continuation of home regimen, including medication, method of delivery, schedule and symptom monitoring.  Anticipate the need for breathing techniques and activity pacing to minimize fatigue and breathlessness.  Assess for proper use of inhaled medication and delivery technique; assist or reinstruct if needed.  Evaluate effectiveness of coping skills; encourage expression of feelings, expectations and concerns related to disease management and quality of life; reinforce education to enhance management plan and wellbeing.  Recent Flowsheet  Documentation  Taken 2/6/2024 0800 by Celeste Montague RN  Medication Review/Management: medications reviewed     Problem: Hypertension Comorbidity  Goal: Blood Pressure in Desired Range  Outcome: Ongoing, Progressing  Intervention: Maintain Blood Pressure Management  Description: Evaluate adherence to home antihypertensive regimen (e.g., exercise and activity, diet modification, medication).  Provide scheduled antihypertensive medication; consider administration time and effects (e.g., avoid giving diuretic prior to bedtime).  Monitor response to antihypertensive medication therapy (e.g., blood pressure, electrolyte levels, medication effects).  Minimize risk of orthostatic hypotension; encourage caution with position changes, particularly if elderly.  Recent Flowsheet Documentation  Taken 2/6/2024 0800 by Celeste Montague RN  Medication Review/Management: medications reviewed     Problem: Skin Injury Risk Increased  Goal: Skin Health and Integrity  Outcome: Ongoing, Progressing  Intervention: Optimize Skin Protection  Description: Perform a full pressure injury risk assessment, as indicated by screening, upon admission to care unit.  Reassess skin (injury risk, full inspection) frequently (e.g., scheduled interval, with change in condition) to provide optimal early detection and prevention.  Maintain adequate tissue perfusion (e.g., encourage fluid balance; avoid crossing legs, constrictive clothing or devices) to promote tissue oxygenation.  Maintain head of bed at lowest degree of elevation tolerated, considering medical condition and other restrictions.  Avoid positioning onto an area that remains reddened.  Minimize incontinence and moisture (e.g., toileting schedule; moisture-wicking pad, diaper or incontinence collection device; skin moisture barrier).  Cleanse skin promptly and gently when soiled utilizing a pH-balanced cleanser.  Relieve and redistribute pressure (e.g., scheduled position changes, weight  shifts, use of support surface, medical device repositioning, protective dressing application, use of positioning device, microclimate control, use of pressure-injury-monitor  Encourage increased activity, such as sitting in a chair at the bedside or early mobilization, when able to tolerate.  Recent Flowsheet Documentation  Taken 2/6/2024 0800 by Celeste Montague RN  Pressure Reduction Techniques:   frequent weight shift encouraged   heels elevated off bed   rest period provided between sit times   weight shift assistance provided  Head of Bed (HOB) Positioning: HOB at 20-30 degrees  Pressure Reduction Devices:   alternating pressure pump (ADD)   pressure-redistributing mattress utilized  Skin Protection:   adhesive use limited   tubing/devices free from skin contact   skin sealant/moisture barrier applied     Problem: Fall Injury Risk  Goal: Absence of Fall and Fall-Related Injury  Outcome: Ongoing, Progressing  Intervention: Identify and Manage Contributors  Description: Develop a fall prevention plan with the patient and caregiver/family.  Provide reorientation, appropriate sensory stimulation and routines with changes in mental status to decrease risk of fall.  Promote use of personal vision and auditory aids.  Assess assistance level required for safe and effective self-care; provide support as needed, such as toileting, mobilization. For age 65 and older, implement timed toileting with assistance.  Encourage physical activity, such as performance of mobility and self-care at highest level of patient ability, multicomponent exercise program and provision of appropriate assistive devices.  If fall occurs, assess the severity of injury; implement fall injury protocol. Determine the cause and revise fall injury prevention plan.  Regularly review medication contribution to fall risk; adjust medication administration times to minimize risk of falling.  Consider risk related to polypharmacy and age.  Balance adequate  pain management with potential for oversedation.  Recent Flowsheet Documentation  Taken 2/6/2024 0800 by Celeste Montague, RN  Medication Review/Management: medications reviewed  Intervention: Promote Injury-Free Environment  Description: Provide a safe, barrier-free environment that encourages independent activity.  Keep care area uncluttered and well-lighted.  Determine need for increased observation or monitoring.  Avoid use of devices that minimize mobility, such as restraints or indwelling urinary catheter.  Recent Flowsheet Documentation  Taken 2/6/2024 0800 by Celeste Montague, RN  Safety Promotion/Fall Prevention:   assistive device/personal items within reach   clutter free environment maintained   fall prevention program maintained   nonskid shoes/slippers when out of bed   room organization consistent   safety round/check completed     Problem: Restraint, Nonviolent  Goal: Absence of Harm or Injury  Outcome: Ongoing, Progressing  Intervention: Implement Least Restrictive Safety Strategies  Description: Consider personal and environmental factors contributing to nonviolent or self-destructive behavior.  Utilize diversional activity/alternative device protection.  Document alternative strategies and less restrictive intervention attempts and their effects.  Clearly describe/record behavior leading to need for restraint.  Use the least restrictive method of restraint.  Recognize restraint should only be used if needed to improve the patient's wellbeing and less restrictive interventions have been determined to be ineffective.  Reassess continued need frequently. Remove restraint as soon as unsafe situation is resolved.  Recent Flowsheet Documentation  Taken 2/6/2024 0800 by Celeste Montague, RN  Medical Device Protection:   torso covered   tubing secured   IV pole/bag removed from visual field  Less Restrictive Alternative:   1:1 observation maintained   appropriate expression promoted   bed alarm in use   calming  techniques promoted   coping techniques promoted   emotional support provided   medication offered   physical activity promoted   positive reinforcement provided   problem-solving encouraged   safety enhancements provided   security enhancements provided   self-care activities encouraged   sensory stimulation limited   spiritual support provided   surveillance provided  De-Escalation Techniques:   1:1 observation initiated   appropriate behavior reinforced   diversional activity encouraged   increased round frequency   physical activity promoted   quiet time facilitated   reoriented   stimulation decreased   verbally redirected  Diversional Activities: television  Intervention: Protect Dignity, Rights, and Personal Wellbeing  Description: Explain restraint rationale and procedure to patient and family/support person prior to application.  Regularly assess personal needs and for changes in behavior and clinical condition, as well as physical and emotional wellbeing.  Provide reassurance and emotional support.  Recent Flowsheet Documentation  Taken 2/6/2024 0800 by Celeste Montague, RN  Trust Relationship/Rapport:   care explained   choices provided   emotional support provided   empathic listening provided   questions answered   reassurance provided   questions encouraged   thoughts/feelings acknowledged  Intervention: Protect Skin and Joint Integrity  Description: Frequently check restraint application site and document findings.  Release and replace at regular intervals per facility protocol.  Assist with frequent joint range of motion activity.  Recent Flowsheet Documentation  Taken 2/6/2024 0800 by Celeste Montague, RN  Body Position:   left   tilted   neutral body alignment   neutral head position   Goal Outcome Evaluation:              Outcome Evaluation: Alert and oriented x 1, to self. Sitter at bedside. Restraints as ordered. Able to successfully administer am medications with repetitive encouragement.  Monitoring.

## 2024-02-06 NOTE — PLAN OF CARE
Goal Outcome Evaluation:  Plan of Care Reviewed With: patient           Outcome Evaluation: Patient is a 73 year old male admitted to [facility] with AMS. Patient reports that he lives at home alone and is independent with ADLs, mobility with a walking stick, has been agitated and combative during hospital stay, with some confusion. Patient only oriented to self and place, and in soft restraints upon OT arrival. Patient performed bed mobility with min A, heavy use of bed rail. Patient able to come to stand with min A, performed ambulation to sink with min A, HHA x2 for stability and support. Patient would benefit from walker for stability. Patient performed grooming/hygiene standing at sink with CGA, and requires min A with brushing teeth. Patient will continue to benefit from skilled OT to address current functional deficits, anticipate need for SNF at acute [place], as patient would not be safe to return home alone in his current condition.      Anticipated Discharge Disposition (OT): skilled nursing facility

## 2024-02-06 NOTE — PLAN OF CARE
Goal Outcome Evaluation:  Plan of Care Reviewed With: patient           Outcome Evaluation: Pt is a 74 yo M who was admitted with AMS and history of ETOH. Pt presents to PT with some impaired functional mobility and gait secondary to generalized weakness, impaired balance, and decreased activity tolerance. Pt may benefit from skilled PT to address strength, mobility, and gait.      Anticipated Discharge Disposition (PT): skilled nursing facility

## 2024-02-06 NOTE — PLAN OF CARE
Problem: Adult Inpatient Plan of Care  Goal: Plan of Care Review  Outcome: Ongoing, Progressing  Flowsheets (Taken 2/6/2024 0434)  Progress: improving  Plan of Care Reviewed With: patient  Outcome Evaluation: vss. no c/o pain. restraints remain in place. sitter at bedside. agitated at times. CIWAs <8. refusing PO meds. IM haldol admininstered x1. IVFs. sleeping off and on during shift.  Goal: Patient-Specific Goal (Individualized)  Outcome: Ongoing, Progressing  Goal: Absence of Hospital-Acquired Illness or Injury  Outcome: Ongoing, Progressing  Intervention: Identify and Manage Fall Risk  Recent Flowsheet Documentation  Taken 2/6/2024 0413 by Rubi Jimenez, RN  Safety Promotion/Fall Prevention: safety round/check completed  Taken 2/6/2024 0236 by Rubi Jimenez, RN  Safety Promotion/Fall Prevention: safety round/check completed  Taken 2/6/2024 0051 by Rubi Jimenez, RN  Safety Promotion/Fall Prevention: safety round/check completed  Taken 2/5/2024 2200 by Rubi Jimenez RN  Safety Promotion/Fall Prevention: safety round/check completed  Taken 2/5/2024 2000 by Rubi Jimenez RN  Safety Promotion/Fall Prevention: safety round/check completed  Goal: Optimal Comfort and Wellbeing  Outcome: Ongoing, Progressing  Intervention: Provide Person-Centered Care  Recent Flowsheet Documentation  Taken 2/6/2024 0051 by Rubi Jimenez, RN  Trust Relationship/Rapport:   choices provided   care explained  Taken 2/5/2024 2000 by Rubi Jimenez RN  Trust Relationship/Rapport:   care explained   choices provided  Goal: Readiness for Transition of Care  Outcome: Ongoing, Progressing     Problem: COPD (Chronic Obstructive Pulmonary Disease) Comorbidity  Goal: Maintenance of COPD Symptom Control  Outcome: Ongoing, Progressing  Intervention: Maintain COPD-Symptom Control  Recent Flowsheet Documentation  Taken 2/6/2024 0051 by Rubi Jimenez, RN  Medication Review/Management: medications reviewed  Taken  2/5/2024 2000 by Rubi Jimenez, RN  Medication Review/Management: medications reviewed     Problem: Hypertension Comorbidity  Goal: Blood Pressure in Desired Range  Outcome: Ongoing, Progressing  Intervention: Maintain Blood Pressure Management  Recent Flowsheet Documentation  Taken 2/6/2024 0051 by Rubi Jimenez, RN  Medication Review/Management: medications reviewed  Taken 2/5/2024 2000 by Rubi Jimenez, RN  Medication Review/Management: medications reviewed     Problem: Skin Injury Risk Increased  Goal: Skin Health and Integrity  Outcome: Ongoing, Progressing     Problem: Fall Injury Risk  Goal: Absence of Fall and Fall-Related Injury  Outcome: Ongoing, Progressing  Intervention: Identify and Manage Contributors  Recent Flowsheet Documentation  Taken 2/6/2024 0051 by Rubi Jimenez, RN  Medication Review/Management: medications reviewed  Taken 2/5/2024 2000 by Rubi Jimenez RN  Medication Review/Management: medications reviewed  Intervention: Promote Injury-Free Environment  Recent Flowsheet Documentation  Taken 2/6/2024 0413 by Rubi Jimenez, RN  Safety Promotion/Fall Prevention: safety round/check completed  Taken 2/6/2024 0236 by Rubi Jimenez, RN  Safety Promotion/Fall Prevention: safety round/check completed  Taken 2/6/2024 0051 by Rubi Jimenez, RN  Safety Promotion/Fall Prevention: safety round/check completed  Taken 2/5/2024 2200 by Rubi Jimenez, RN  Safety Promotion/Fall Prevention: safety round/check completed  Taken 2/5/2024 2000 by Rubi Jimenez, RN  Safety Promotion/Fall Prevention: safety round/check completed     Problem: Restraint, Nonviolent  Goal: Absence of Harm or Injury  Outcome: Ongoing, Progressing  Intervention: Implement Least Restrictive Safety Strategies  Recent Flowsheet Documentation  Taken 2/6/2024 0400 by Rubi Jimenez, RN  Medical Device Protection: tubing secured  Less Restrictive Alternative: calming techniques promoted  De-Escalation  Techniques: stimulation decreased  Diversional Activities: television  Taken 2/6/2024 0200 by Rubi Jimenez RN  Medical Device Protection: tubing secured  Less Restrictive Alternative: calming techniques promoted  De-Escalation Techniques: stimulation decreased  Diversional Activities: television  Taken 2/6/2024 0000 by Rubi Jimenez RN  Medical Device Protection: tubing secured  Less Restrictive Alternative: calming techniques promoted  De-Escalation Techniques: stimulation decreased  Diversional Activities: television  Taken 2/5/2024 2200 by Rubi Jimenez RN  Medical Device Protection: tubing secured  Less Restrictive Alternative: calming techniques promoted  De-Escalation Techniques: stimulation decreased  Diversional Activities: television  Taken 2/5/2024 2000 by Rubi Jimenez RN  Medical Device Protection: tubing secured  Less Restrictive Alternative: calming techniques promoted  De-Escalation Techniques: stimulation decreased  Diversional Activities: television  Intervention: Protect Dignity, Rights, and Personal Wellbeing  Recent Flowsheet Documentation  Taken 2/6/2024 0051 by Rubi Jimenez, RN  Trust Relationship/Rapport:   choices provided   care explained  Taken 2/5/2024 2000 by Rubi Jimenez, RN  Trust Relationship/Rapport:   care explained   choices provided   Goal Outcome Evaluation:  Plan of Care Reviewed With: patient        Progress: improving  Outcome Evaluation: vss. no c/o pain. restraints remain in place. sitter at bedside. agitated at times. CIWAs <8. refusing PO meds. IM haldol admininstered x1. IVFs. sleeping off and on during shift.

## 2024-02-07 LAB
ALBUMIN SERPL-MCNC: 3.7 G/DL (ref 3.5–5.2)
ANION GAP SERPL CALCULATED.3IONS-SCNC: 9 MMOL/L (ref 5–15)
BASOPHILS # BLD AUTO: 0.02 10*3/MM3 (ref 0–0.2)
BASOPHILS NFR BLD AUTO: 0.5 % (ref 0–1.5)
BUN SERPL-MCNC: 9 MG/DL (ref 8–23)
BUN/CREAT SERPL: 6.5 (ref 7–25)
CALCIUM SPEC-SCNC: 9.7 MG/DL (ref 8.6–10.5)
CHLORIDE SERPL-SCNC: 110 MMOL/L (ref 98–107)
CO2 SERPL-SCNC: 24 MMOL/L (ref 22–29)
CREAT SERPL-MCNC: 1.38 MG/DL (ref 0.76–1.27)
DEPRECATED RDW RBC AUTO: 48.3 FL (ref 37–54)
EGFRCR SERPLBLD CKD-EPI 2021: 53.3 ML/MIN/1.73
EOSINOPHIL # BLD AUTO: 0.12 10*3/MM3 (ref 0–0.4)
EOSINOPHIL NFR BLD AUTO: 3.1 % (ref 0.3–6.2)
ERYTHROCYTE [DISTWIDTH] IN BLOOD BY AUTOMATED COUNT: 13 % (ref 12.3–15.4)
GLUCOSE SERPL-MCNC: 96 MG/DL (ref 65–99)
HCT VFR BLD AUTO: 32.1 % (ref 37.5–51)
HGB BLD-MCNC: 10.5 G/DL (ref 13–17.7)
IMM GRANULOCYTES # BLD AUTO: 0 10*3/MM3 (ref 0–0.05)
IMM GRANULOCYTES NFR BLD AUTO: 0 % (ref 0–0.5)
LYMPHOCYTES # BLD AUTO: 1.52 10*3/MM3 (ref 0.7–3.1)
LYMPHOCYTES NFR BLD AUTO: 39.4 % (ref 19.6–45.3)
MAGNESIUM SERPL-MCNC: 1.7 MG/DL (ref 1.6–2.4)
MCH RBC QN AUTO: 33.1 PG (ref 26.6–33)
MCHC RBC AUTO-ENTMCNC: 32.7 G/DL (ref 31.5–35.7)
MCV RBC AUTO: 101.3 FL (ref 79–97)
MONOCYTES # BLD AUTO: 0.43 10*3/MM3 (ref 0.1–0.9)
MONOCYTES NFR BLD AUTO: 11.1 % (ref 5–12)
NEUTROPHILS NFR BLD AUTO: 1.77 10*3/MM3 (ref 1.7–7)
NEUTROPHILS NFR BLD AUTO: 45.9 % (ref 42.7–76)
NRBC BLD AUTO-RTO: 0 /100 WBC (ref 0–0.2)
PHOSPHATE SERPL-MCNC: 2.6 MG/DL (ref 2.5–4.5)
PLATELET # BLD AUTO: 156 10*3/MM3 (ref 140–450)
PMV BLD AUTO: 9.7 FL (ref 6–12)
POTASSIUM SERPL-SCNC: 3.7 MMOL/L (ref 3.5–5.2)
RBC # BLD AUTO: 3.17 10*6/MM3 (ref 4.14–5.8)
SODIUM SERPL-SCNC: 143 MMOL/L (ref 136–145)
WBC NRBC COR # BLD AUTO: 3.86 10*3/MM3 (ref 3.4–10.8)

## 2024-02-07 PROCEDURE — 25010000002 MAGNESIUM SULFATE 2 GM/50ML SOLUTION: Performed by: INTERNAL MEDICINE

## 2024-02-07 PROCEDURE — 83735 ASSAY OF MAGNESIUM: CPT | Performed by: INTERNAL MEDICINE

## 2024-02-07 PROCEDURE — 85025 COMPLETE CBC W/AUTO DIFF WBC: CPT | Performed by: HOSPITALIST

## 2024-02-07 PROCEDURE — 97530 THERAPEUTIC ACTIVITIES: CPT

## 2024-02-07 PROCEDURE — 80069 RENAL FUNCTION PANEL: CPT | Performed by: INTERNAL MEDICINE

## 2024-02-07 PROCEDURE — 97116 GAIT TRAINING THERAPY: CPT

## 2024-02-07 PROCEDURE — 25010000002 LORAZEPAM PER 2 MG: Performed by: HOSPITALIST

## 2024-02-07 RX ORDER — MAGNESIUM SULFATE HEPTAHYDRATE 40 MG/ML
2 INJECTION, SOLUTION INTRAVENOUS ONCE
Status: COMPLETED | OUTPATIENT
Start: 2024-02-07 | End: 2024-02-07

## 2024-02-07 RX ORDER — ROSUVASTATIN CALCIUM 40 MG/1
40 TABLET, COATED ORAL NIGHTLY
Status: DISCONTINUED | OUTPATIENT
Start: 2024-02-07 | End: 2024-02-19

## 2024-02-07 RX ADMIN — DILTIAZEM HYDROCHLORIDE 180 MG: 180 CAPSULE, COATED, EXTENDED RELEASE ORAL at 09:19

## 2024-02-07 RX ADMIN — HALOPERIDOL 1 MG: 1 TABLET ORAL at 14:17

## 2024-02-07 RX ADMIN — DOCUSATE SODIUM 100 MG: 100 CAPSULE, LIQUID FILLED ORAL at 09:20

## 2024-02-07 RX ADMIN — LORAZEPAM 1 MG: 1 TABLET ORAL at 14:36

## 2024-02-07 RX ADMIN — METOPROLOL TARTRATE 25 MG: 25 TABLET, FILM COATED ORAL at 09:19

## 2024-02-07 RX ADMIN — LORAZEPAM 2 MG: 2 INJECTION INTRAMUSCULAR; INTRAVENOUS at 13:15

## 2024-02-07 RX ADMIN — SUCRALFATE 1 G: 1 TABLET ORAL at 09:18

## 2024-02-07 RX ADMIN — SUCRALFATE 1 G: 1 TABLET ORAL at 17:00

## 2024-02-07 RX ADMIN — FOLIC ACID 1 MG: 1 TABLET ORAL at 09:20

## 2024-02-07 RX ADMIN — LORAZEPAM 1 MG: 1 TABLET ORAL at 20:50

## 2024-02-07 RX ADMIN — MAGNESIUM SULFATE HEPTAHYDRATE 2 G: 40 INJECTION, SOLUTION INTRAVENOUS at 14:20

## 2024-02-07 RX ADMIN — SUCRALFATE 1 G: 1 TABLET ORAL at 20:48

## 2024-02-07 RX ADMIN — HALOPERIDOL 1 MG: 1 TABLET ORAL at 09:18

## 2024-02-07 RX ADMIN — DOCUSATE SODIUM 100 MG: 100 CAPSULE, LIQUID FILLED ORAL at 20:48

## 2024-02-07 RX ADMIN — Medication 100 MG: at 09:20

## 2024-02-07 RX ADMIN — LORAZEPAM 2 MG: 2 INJECTION INTRAMUSCULAR; INTRAVENOUS at 22:24

## 2024-02-07 RX ADMIN — SUCRALFATE 1 G: 1 TABLET ORAL at 12:09

## 2024-02-07 RX ADMIN — HALOPERIDOL 1 MG: 1 TABLET ORAL at 20:49

## 2024-02-07 RX ADMIN — METOPROLOL TARTRATE 25 MG: 25 TABLET, FILM COATED ORAL at 20:49

## 2024-02-07 RX ADMIN — METOPROLOL TARTRATE 25 MG: 25 TABLET, FILM COATED ORAL at 17:00

## 2024-02-07 RX ADMIN — ROSUVASTATIN CALCIUM 40 MG: 40 TABLET, FILM COATED ORAL at 20:51

## 2024-02-07 RX ADMIN — LORAZEPAM 1 MG: 1 TABLET ORAL at 17:00

## 2024-02-07 RX ADMIN — Medication 1 TABLET: at 09:20

## 2024-02-07 RX ADMIN — HYDROXYZINE HYDROCHLORIDE 25 MG: 25 TABLET ORAL at 12:36

## 2024-02-07 RX ADMIN — FAMOTIDINE 20 MG: 20 TABLET, FILM COATED ORAL at 20:49

## 2024-02-07 RX ADMIN — FAMOTIDINE 20 MG: 20 TABLET, FILM COATED ORAL at 09:19

## 2024-02-07 NOTE — CASE MANAGEMENT/SOCIAL WORK
Continued Stay Note  [facility]     Patient Name: Yuval Loja  MRN: [id number]  Today's Date: 2/7/2024    Admit Date: 2/4/2024    Plan: Home with 24/7 care and HH vs SNF to LTC placement   Discharge Plan       Row Name 02/07/24 0843       Plan    Plan Home with 24/7 care and HH vs SNF to LTC placement    Patient/Family in Agreement with Plan yes    Plan Comments Reviewed clinicals, and called pts dtr to discuss dc planning. Explained anticipate pt needing 24/7care at home/live with family vs SNF to LTC placement. She states she is agreeable for [facility]CP making referrals to SNF to LTC placement and will further speak with her brother regarding plans. Dtr asked about guardianship resources. CCP explained could email her resources for her to review. She requests email to [email or web address]. Area SNF referrals and area HH referrals made. Pt would need [employer] [facility] SNF pre-cert. sohail RDZ/CCP                   Discharge Codes    No documentation.                 Expected Discharge Date and Time       Expected Discharge Date Expected Discharge Time    Feb 8, 2024               Marianne Pettit RN

## 2024-02-07 NOTE — PROGRESS NOTES
"Daily progress note    Primary care physician  Dr. Prescott    Subjective  Doing same with no new issues but remained pleasantly confused    History of present illness  73-year-old -American male with history of alcohol abuse paroxysmal atrial fibrillation coronary artery disease hyperlipidemia and chronic kidney disease stage III who is well-known to our service and apparently continues to abuse alcohol and recently started on a antidepressant brought to the emergency room with generalized weakness and altered mental status.  Patient in no respiratory distress and answer question appropriately and workup in ER revealed acute metabolic encephalopathy admit for management.  At the time of examination he denies any chest pain shortness of breath palpitation and asking for anxiety medication and wants to eat regular diet.     REVIEW OF SYSTEMS  Unable to obtain     PHYSICAL EXAM   Blood pressure 145/68, pulse 82, temperature 98.9 °F (37.2 °C), temperature source Oral, resp. rate 18, height 175.3 cm (69\"), weight 75.3 kg (166 lb), SpO2 98%.    Constitutional:       General: He is awake. He is not in acute distress.     Appearance: He is not toxic-appearing.   HENT:      Head: Normocephalic and atraumatic.   Eyes:      General: Lids are normal. Vision grossly intact.   Cardiovascular:      Rate and Rhythm: Normal rate and regular rhythm.      Heart sounds: Normal heart sounds.   Pulmonary:      Effort: Pulmonary effort is normal.      Breath sounds: Normal breath sounds and air entry.   Abdominal:      General: Bowel sounds are normal.      Palpations: Abdomen is soft.      Tenderness: There is no abdominal tenderness.   Musculoskeletal:      Cervical back: Normal range of motion.   Skin:     General: Skin is warm and dry.      Coloration: Skin is not pale.   Neurological:      General: No focal deficit present.      Mental Status: He is alert and oriented to person, place, and time.      Comments: No focal " deficit  Psychiatric:         Attention and Perception: Attention normal.         Mood and Affect: Mood normal.         Speech: Speech normal.         Behavior: Behavior is cooperative.     LAB RESULTS  Lab Results (last 24 hours)       Procedure Component Value Units Date/Time    Renal Function Panel [[id number]]  (Abnormal) Collected: 02/07/24 0432    Specimen: Blood Updated: 02/07/24 0549     Glucose 96 mg/dL      BUN 9 mg/dL      Creatinine 1.38 mg/dL      Sodium 143 mmol/L      Potassium 3.7 mmol/L      Chloride 110 mmol/L      CO2 24.0 mmol/L      Calcium 9.7 mg/dL      Albumin 3.7 g/dL      Phosphorus 2.6 mg/dL      Anion Gap 9.0 mmol/L      BUN/Creatinine Ratio 6.5     eGFR 53.3 mL/min/1.73     Narrative:      GFR Normal >60  Chronic Kidney Disease <60  Kidney Failure <15    The GFR formula is only valid for adults with stable renal function between ages 18 and 70.    Magnesium [[id number]]  (Normal) Collected: 02/07/24 0432    Specimen: Blood Updated: 02/07/24 0549     Magnesium 1.7 mg/dL     CBC & Differential [[id number]]  (Abnormal) Collected: 02/07/24 0432    Specimen: Blood Updated: 02/07/24 0531    Narrative:      The following orders were created for panel order CBC & Differential.  Procedure                               Abnormality         Status                     ---------                               -----------         ------                     CBC Auto Differential[[id number]]        Abnormal            Final result                 Please view results for these tests on the individual orders.    CBC Auto Differential [[id number]]  (Abnormal) Collected: 02/07/24 0432    Specimen: Blood Updated: 02/07/24 0531     WBC 3.86 10*3/mm3      RBC 3.17 10*6/mm3      Hemoglobin 10.5 g/dL      Hematocrit 32.1 %      .3 fL      MCH 33.1 pg      MCHC 32.7 g/dL      RDW 13.0 %      RDW-SD 48.3 fl      MPV 9.7 fL      Platelets 156 10*3/mm3      Neutrophil % 45.9 %      Lymphocyte % 39.4 %      Monocyte  % 11.1 %      Eosinophil % 3.1 %      Basophil % 0.5 %      Immature Grans % 0.0 %      Neutrophils, Absolute 1.77 10*3/mm3      Lymphocytes, Absolute 1.52 10*3/mm3      Monocytes, Absolute 0.43 10*3/mm3      Eosinophils, Absolute 0.12 10*3/mm3      Basophils, Absolute 0.02 10*3/mm3      Immature Grans, Absolute 0.00 10*3/mm3      nRBC 0.0 /100 WBC           Imaging Results (Last 24 Hours)       ** No results found for the last 24 hours. **          Results  Scan on 2/4/2024 0415 by [employer], [place]: ECG 12-LEAD         Author: -- Service: -- Author Type: --   Filed: Date of Service: Creation Time:   Status: (Other)   HEART RATE= 67  bpm  RR Interval= 896  ms  WA Interval= 193  ms  P Horizontal Axis= -48  deg  P Front Axis= 82  deg  QRSD Interval= 82  ms  QT Interval= 441  ms  QTcB= 466  ms  QRS Axis= 47  deg  T Wave Axis= 51  deg  - ABNORMAL ECG -  Sinus rhythm  RSR' in V1 or V2, probably normal variant  Consider left ventricular hypertrophy  Anterior ST elevation, probably due to LVH          Current Facility-Administered Medications:     dilTIAZem CD (CARDIZEM CD) 24 hr capsule 180 mg, 180 mg, Oral, Q24H, Chino Cole MD, 180 mg at 02/07/24 0919    docusate sodium (COLACE) capsule 100 mg, 100 mg, Oral, BID, Chino Cole MD, 100 mg at 02/07/24 0920    famotidine (PEPCID) tablet 20 mg, 20 mg, Oral, BID, Chino Cole MD, 20 mg at 02/07/24 0919    folic acid (FOLVITE) tablet 1 mg, 1 mg, Oral, Daily, Chino Cole MD, 1 mg at 02/07/24 0920    haloperidol (HALDOL) tablet 1 mg, 1 mg, Oral, TID, Hipolito Ramon III, MD, 1 mg at 02/07/24 1417    haloperidol lactate (HALDOL) injection 5 mg, 5 mg, Intramuscular, Q6H PRN, Hipolito Ramon III, MD, 5 mg at 02/06/24 0049    hydrOXYzine (ATARAX) tablet 25 mg, 25 mg, Oral, 4x Daily PRN, Chino Cole MD, 25 mg at 02/07/24 1236    LORazepam (ATIVAN) tablet 1 mg, 1 mg, Oral, Q1H PRN, 1 mg at 02/07/24 1436 **OR** LORazepam (ATIVAN) injection 1 mg, 1 mg,  Intravenous, Q1H PRN, 1 mg at 02/06/24 1317 **OR** LORazepam (ATIVAN) tablet 2 mg, 2 mg, Oral, Q1H PRN, 2 mg at 02/05/24 0929 **OR** LORazepam (ATIVAN) injection 2 mg, 2 mg, Intravenous, Q1H PRN, 2 mg at 02/07/24 1315 **OR** LORazepam (ATIVAN) injection 2 mg, 2 mg, Intravenous, Q15 Min PRN, 2 mg at 02/06/24 1727 **OR** LORazepam (ATIVAN) injection 2 mg, 2 mg, Intramuscular, Q15 Min PRN, Jolie Cole MD    metoprolol tartrate (LOPRESSOR) tablet 25 mg, 25 mg, Oral, TID, Jolie Cole MD, 25 mg at 02/07/24 0919    multivitamin (THERAGRAN) tablet 1 tablet, 1 tablet, Oral, Daily, Jolie Cole MD, 1 tablet at 02/07/24 0920    rosuvastatin (CRESTOR) tablet 5 mg, 5 mg, Oral, Nightly, Jolie Coel MD, 5 mg at 02/06/24 2021    [COMPLETED] Insert Peripheral IV, , , Once **AND** sodium chloride 0.9 % flush 10 mL, 10 mL, Intravenous, PRN, Salma Grullon, [place]    sucralfate (CARAFATE) tablet 1 g, 1 g, Oral, 4x Daily AC & at Bedtime, Jolie Cole MD, 1 g at 02/07/24 1209    thiamine (VITAMIN B-1) tablet 100 mg, 100 mg, Oral, Daily, Jolie Cole MD, 100 mg at 02/07/24 0920     ASSESSMENT  Alcohol withdrawal syndrome  Acute kidney injury  Depression   History of alcohol abuse  Paroxysmal atrial fibrillation  Coronary artery disease  Hyperlipidemia  History of CVA  Chronic kidney disease stage III  Gastroesophageal reflux disease    PLAN  CPM  Discontinue IV fluid  Continue detox medications  Continue [facility] protocol  Psych consult appreciated  Adjust home medications  Stress ulcer DVT prophylaxis  Nephrology to follow patient  Supportive care  PT OT  Discussed with nursing staff  Follow closely further recommendation current hospital course    JOLIE COLE MD    Copied text in this note has been reviewed and is accurate as of 02/07/24

## 2024-02-07 NOTE — PROGRESS NOTES
The patient seems improved today.  He is up in a chair and is pleasant cooperative and oriented x 1 when seen.  Charting does not indicate any specific behavioral issues.  He seems to be improving.

## 2024-02-07 NOTE — PROGRESS NOTES
[facility] Progress Note      Patient Name: Yuval Loja  : 1950  MRN: [id number]  Primary Care Physician:  Alessia Prescott APRN  Date of admission: 2024    Subjective     Interval History:   Sitting in a chair; calm; feels fine  States that his appetite is good; no N/V  No dizziness when standing earlier    Review of Systems:   As noted above    Objective     Vitals:   Temp:  [98.4 °F (36.9 °C)-98.9 °F (37.2 °C)] 98.9 °F (37.2 °C)  Heart Rate:  [105] 105  Resp:  [18] 18  BP: ()/(67-74) 101/74    Intake/Output Summary (Last 24 hours) at 2024 1323  Last data filed at 2024 0900  Gross per 24 hour   Intake 450 ml   Output --   Net 450 ml       Physical Exam:    Constitutional: Awake, calm, cooperative  HEENT: Sclera anicteric, no conjunctival injection, MMM  Neck: Supple, no carotid bruit, trachea at midline, no JVD  Respiratory: Coarse anteriorly; not labored on room air   Cardiovascular: RR, tachycardic, no rub  Gastrointestinal: BS +, soft, nondistended, not distended  : No palpable bladder  Musculoskeletal: No edema, no clubbing or cyanosis  Psychiatric: Oriented  Neurologic: Awake, moving all extremities  Skin: Warm and dry      Scheduled Meds:     dilTIAZem CD, 180 mg, Oral, Q24H  docusate sodium, 100 mg, Oral, BID  famotidine, 20 mg, Oral, BID  folic acid, 1 mg, Oral, Daily  haloperidol, 1 mg, Oral, TID  metoprolol tartrate, 25 mg, Oral, TID  multivitamin, 1 tablet, Oral, Daily  rosuvastatin, 5 mg, Oral, Nightly  sucralfate, 1 g, Oral, 4x Daily AC & at Bedtime  thiamine, 100 mg, Oral, Daily      IV Meds:          Results Reviewed:   I have personally reviewed the results from the time of this admission to 2024 13:23 EST     Results from last 7 days   Lab Units 24  0432 24  0306 24  0535 24  0545   SODIUM mmol/L 143 141 139 132*   POTASSIUM mmol/L 3.7 3.7 3.9 4.5   CHLORIDE mmol/L 110* 108* 102 94*   CO2 mmol/L 24.0 22.8  22.5 20.7*   BUN mg/dL 9 12 21 23   CREATININE mg/dL 1.38* 1.54* 1.92* 2.04*   CALCIUM mg/dL 9.7 9.2 10.4 9.9   BILIRUBIN mg/dL  --  0.3 0.3 0.3   ALK PHOS U/L  --  58 69 67   ALT (SGPT) U/L  --  16 18 18   AST (SGOT) U/L  --  33 35 36   GLUCOSE mg/dL 96 102* 141* 60*       Estimated Creatinine Clearance: 49.3 mL/min (A) (by C-G formula based on SCr of 1.38 mg/dL (H)).    Results from last 7 days   Lab Units 02/07/24  0432 02/06/24  0306 02/05/24  0535   MAGNESIUM mg/dL 1.7  --  2.2   PHOSPHORUS mg/dL 2.6 2.4* 1.6*             Results from last 7 days   Lab Units 02/07/24  0432 02/06/24  0306 02/05/24  0535 02/04/24  0545   WBC 10*3/mm3 3.86 3.73 4.91 3.34*   HEMOGLOBIN g/dL 10.5* 10.2* 11.9* 11.8*   PLATELETS 10*3/mm3 156 163 207 209       Results from last 7 days   Lab Units 02/04/24  0545   INR  0.99       Assessment / Plan     ASSESSMENT:  1.  CRISTIANO on CKD3a, nonoliguric, improving:  likely prerenal from reduced appetite (+ketones in urine) while confused.  Volume status is stable; hyponatremia resolved with saline.  Low-normal magnesium.  [place] urinalysis  2.  Confusion and weakness, with suspected withdrawal syndrome, improving  3.  Alcoholism and liver disease  4.  Depression, recently placed on Lexapro, which has been DC'd  5.  DDD with prior stroke  6.  History of atrial fibrillation: in NSR currently    PLAN:  1.  Magnesium sulfate 2 g IV  2.  Encouraged him to eat and drink  3.  Hopefully home soon    Thank you for involving us in the care of Yuval Loja.  Please feel free to call with any questions.    Thomas Weiner MD  02/07/24  13:23 [facility]    [facility]  [phone]    Please note that portions of this note were completed with a voice recognition program.

## 2024-02-07 NOTE — DISCHARGE PLACEMENT REQUEST
"Hannah Altman (73 y.o. Male)       Date of Birth   1950    Social Security Number       Address   [address] [place] [address]    Home Phone   [phone]    MRN   [id number]       [facility]    Marital Status   Single                            Admission Date   2/4/24    Admission Type   Emergency    Admitting Provider   Chino Cole MD    Attending Provider   Chino Cole MD    Department, Room/Bed   [address], [address]/1       Discharge Date       Discharge Disposition       Discharge Destination                                 Attending Provider: Chino Cole MD    Allergies: Mirtazapine, Sertraline    Isolation: None   Infection: None   Code Status: CPR    Ht: 175.3 cm (69\")   Wt: 75.3 kg (166 lb)    Admission Cmt: None   Principal Problem: Altered mental status [R41.82]                   Active Insurance as of 2/4/2024       Primary Coverage       Payor Plan Insurance Group Employer/Plan Group    HUMANA MEDICARE REPLACEMENT HUMANA MED ADV HMO [id number]       Payor Plan Address Payor Plan Phone Number Payor Plan Fax Number Effective Dates    [address] [address] [phone]  1/1/2023 - None Entered    [facility] [id number]         Subscriber Name Subscriber Birth Date Member ID       HANNAH ALTMAN 1950 [id number]                     Emergency Contacts        (Rel.) Home Phone Work Phone Mobile Phone    Torri Altman (Daughter) [phone]                "

## 2024-02-07 NOTE — PLAN OF CARE
Goal Outcome Evaluation:   Patient remains confused and only oriented to self. Patient is on room air. Patient got agitated today and security had to be called to get patient back to bed. Patient is now in three point restraints. Bed in lowest position, call light within reach and bed alarm set and audible.

## 2024-02-07 NOTE — PLAN OF CARE
Goal Outcome Evaluation:  Plan of Care Reviewed With: patient           Outcome Evaluation: Pt agreed to PT session after some encouragement, pt required MOD assist for bed mobility, pt vijay STS w/min assist , req 2 attempts, pt vijay amb ~40ft rwx, assist to guide rwx, cues to stay centered in rwx, LOB x3 and ataxic today, pt not appropriate for cane this session, agreed to trial rwx and it did help some, will need SNU at DC as he is below baseline, safety risk for falls, vijay seated LE exer x10 reps, diff keeping on task      Anticipated Discharge Disposition (PT): skilled nursing facility

## 2024-02-07 NOTE — THERAPY TREATMENT NOTE
Patient Name: Yuval Loja  : 1950    MRN: [id number]                              Today's Date: 2024       Admit Date: 2024    Visit Dx:     ICD-10-CM ICD-9-CM   1. Altered mental status, unspecified altered mental status type  R41.82 780.97   2. CRISTIANO (acute kidney injury)  N17.9 584.9     Patient Active Problem List   Diagnosis    Alcoholic ketoacidosis    Alcohol dependence    Methamphetamine abuse    Fatty liver, alcoholic    Nausea vomiting and diarrhea    Alcoholic liver disease    Metabolic acidosis    Tobacco abuse    Coronary artery disease involving native coronary artery of native heart without angina pectoris    Tachycardia    Sinus tachycardia    Chronic kidney disease, stage 3    Medically noncompliant    Visual hallucinations    Psychosis    Hyponatremia    CAD (coronary artery disease)    Leukopenia    Symptomatic anemia    Headache    Substance abuse    Gastrointestinal hemorrhage    CRISTIANO (acute kidney injury)    Hyperkalemia    Right lower quadrant abdominal pain    New onset atrial fibrillation    History of drug abuse    COPD (chronic obstructive pulmonary disease)    Right inguinal hernia    Constipation    Status post right hip replacement    Right hip pain    Sinus bradycardia    Generalized abdominal pain    Altered mental status    Altered mental state     Past Medical History:   Diagnosis Date    Alcohol abuse     Arthritis     CAD (coronary artery disease)     Chronic kidney disease, stage 3     COPD (chronic obstructive pulmonary disease)     Disease of thyroid gland     Elevated cholesterol     Fatty liver, alcoholic     GERD (gastroesophageal reflux disease)     History of transfusion     Hypertensive urgency     Metabolic acidosis     Myocardial infarction     Sinus tachycardia     Sleep apnea     Stroke      Past Surgical History:   Procedure Laterality Date    BACK SURGERY      CARDIAC CATHETERIZATION      CARDIAC ELECTROPHYSIOLOGY PROCEDURE N/A 4/10/2023     Procedure: Temporary Pacemaker;  Surgeon: Vaibhav Bonilla MD;  Location: [facility] CATH INVASIVE LOCATION;  Service: Cardiovascular;  Laterality: N/A;    COLONOSCOPY      ENDOSCOPY      FRACTURE SURGERY      JOINT REPLACEMENT      SKIN BIOPSY      TOTAL HIP ARTHROPLASTY Right 06/27/2022    Procedure: TOTAL HIP ARTHROPLASTY ANTERIOR WITH HANA TABLE;  Surgeon: Willian Quiles MD;  Location: [facility] MAIN OR;  Service: Orthopedics;  Laterality: Right;  Depuy, carli. everett      General Information       Row Name 02/07/24 1129          Physical Therapy Time and Intention    Document Type therapy note (daily note)  -     Mode of Treatment individual therapy;physical therapy  -       Row Name 02/07/24 1129          General Information    Patient Profile Reviewed yes  -     Existing Precautions/Restrictions fall  -     Barriers to Rehab medically complex;cognitive status  -       Row Name 02/07/24 1129          Living Environment    People in Home alone  -       Row Name 02/07/24 1129          Cognition    Orientation Status (Cognition) oriented to;person;place  -       Row Name 02/07/24 1129          Safety Issues, Functional Mobility    Safety Issues Affecting Function (Mobility) ability to follow commands;awareness of need for assistance;insight into deficits/self-awareness;judgment;positioning of assistive device;problem-solving;safety precaution awareness;safety precautions follow-through/compliance;sequencing abilities  -     Impairments Affecting Function (Mobility) balance;cognition;coordination;endurance/activity tolerance;pain;strength  -     Cognitive Impairments, Mobility Safety/Performance attention;awareness, need for assistance;insight into deficits/self-awareness;judgment;problem-solving/reasoning;safety precaution awareness;safety precaution follow-through;sequencing abilities  -               User Key  (r) = Recorded By, (t) = Taken By, (c) = Cosigned By      Initials Name  Provider Type    Celeste Nascimento PTA Physical Therapist Assistant                   Mobility       Row Name 02/07/24 1132          Bed Mobility    Bed Mobility supine-sit  -JM     Supine-Sit [place] (Bed Mobility) moderate assist (50% patient effort);verbal cues;nonverbal cues (demo/gesture)  -     Sit-Supine [place] (Bed Mobility) not tested  -     Assistive Device (Bed Mobility) head of bed elevated  -     Comment, (Bed Mobility) pt in an odd position in bed on his side , req more help for bed mobility this visit  -       Row Name 02/07/24 1132          Sit-Stand Transfer    Sit-Stand [place] (Transfers) minimum assist (75% patient effort);verbal cues;nonverbal cues (demo/gesture)  -     Assistive Device (Sit-Stand Transfers) walker, front-wheeled  -ALBERTO     Comment, (Sit-Stand Transfer) no cane available to try, but had LOB w/use of rwx  -       Row Name 02/07/24 1132          Gait/Stairs (Locomotion)    Distance in Feet (Gait) 40  -JM     Deviations/Abnormal Patterns (Gait) alicia decreased;festinating/shuffling;ataxic;base of support, narrow;stride length decreased;antalgic  -     Bilateral Gait Deviations forward flexed posture  -               User Key  (r) = Recorded By, (t) = Taken By, (c) = Cosigned By      Initials Name Provider Type    Celeste Nascimento PTA Physical Therapist Assistant                   Obj/Interventions       Row Name 02/07/24 1156          Motor Skills    Therapeutic Exercise --  APs, LAQs, seated MIP x10 reps  -               User Key  (r) = Recorded By, (t) = Taken By, (c) = Cosigned By      Initials Name Provider Type    Celeste Nascimento PTA Physical Therapist Assistant                   Goals/Plan    No documentation.                  Clinical Impression       Row Name 02/07/24 1157          Pain    Pretreatment Pain Rating 0/10 - no pain  -     Posttreatment Pain Rating 6/10  -     Pain Location - Side/Orientation Right  -     Pain  Location - --  thigh  -     Pain Intervention(s) Repositioned;Elevated;Rest  -       Row Name 02/07/24 1157          Plan of Care Review    Plan of Care Reviewed With patient  -     Outcome Evaluation Pt agreed to PT session after some encouragement, pt required MOD assist for bed mobility, pt vijay STS w/min assist , req 2 attempts, pt vijay amb ~40ft rwx, assist to guide rwx, cues to stay centered in rwx, LOB x3 and ataxic today, pt not appropriate for cane this session, agreed to trial rwx and it did help some, will need SNU at [place] as he is below baseline, safety risk for falls, vijay seated LE exer x10 reps, diff keeping on task  -       Row Name 02/07/24 1157          Therapy Assessment/Plan (PT)    Rehab Potential (PT) good, to achieve stated therapy goals  -     Criteria for Skilled Interventions Met (PT) yes  -       Row Name 02/07/24 1157          Vital Signs    O2 Delivery Pre Treatment room air  -       Row Name 02/07/24 1157          Positioning and Restraints    Pre-Treatment Position in bed  -     Post Treatment Position chair  -     In Chair reclined;call light within reach;encouraged to call for assist;exit alarm on;notified ns  -               User Key  (r) = Recorded By, (t) = Taken By, (c) = Cosigned By      Initials Name Provider Type    Celeste Nascimento PTA Physical Therapist Assistant                   Outcome Measures       Row Name 02/07/24 1202          How much help from another person do you currently need...    Turning from your back to your side while in flat bed without using bedrails? 3  -JM     Moving from lying on back to sitting on the side of a flat bed without bedrails? 2  -JM     Moving to and from a bed to a chair (including a wheelchair)? 3  -JM     Standing up from a chair using your arms (e.g., wheelchair, bedside chair)? 3  -JM     Climbing 3-5 steps with a railing? 1  -JM     To walk in hospital room? 2  -JM     AM-PAC 6 Clicks Score (PT) 14  -JM      Highest Level of Mobility Goal 4 --> Transfer to chair/commode  -               User Key  (r) = Recorded By, (t) = Taken By, (c) = Cosigned By      Initials Name Provider Type    Celeste Nascimento PTA Physical Therapist Assistant                                 Physical Therapy Education       Title: PT OT SLP Therapies (In Progress)       Topic: Physical Therapy (In Progress)       Point: Mobility training (In Progress)       Learning Progress Summary             Patient Acceptance, E,D, NR by  at 2/7/2024 1204    Acceptance, E,TB,D, VU,NR by  at 2/6/2024 1105    Acceptance, E, NL,NR by PP at 2/6/2024 0951                         Point: Home exercise program (In Progress)       Learning Progress Summary             Patient Acceptance, E,D, NR by  at 2/7/2024 1204                         Point: Body mechanics (In Progress)       Learning Progress Summary             Patient Acceptance, E,D, NR by  at 2/7/2024 1204    Acceptance, E,TB,D, VU,NR by  at 2/6/2024 1105                         Point: Precautions (In Progress)       Learning Progress Summary             Patient Acceptance, E,D, NR by  at 2/7/2024 1204    Acceptance, E,TB,D, VU,NR by  at 2/6/2024 1105    Acceptance, E, NL,NR by PP at 2/6/2024 0951                                         User Key       Initials Effective Dates Name Provider Type [facility] 06/16/21 -  Alla Acevedo, PT Physical Therapist PT     03/07/18 -  Celeste Molina PTA Physical Therapist Assistant PT    PP 08/11/20 -  Celeste Montague RN Registered Nurse Nurse                  PT Recommendation and Plan     Plan of Care Reviewed With: patient  Outcome Evaluation: Pt agreed to PT session after some encouragement, pt required MOD assist for bed mobility, pt vijay STS w/min assist , req 2 attempts, pt vijay amb ~40ft rwx, assist to guide rwx, cues to stay centered in rwx, LOB x3 and ataxic today, pt not appropriate for cane this session, agreed to trial rwx  and it did help some, will need SNU at [place] as he is below baseline, safety risk for falls, vijay seated LE exer x10 reps, diff keeping on task     Time Calculation:         PT Charges       Row Name 02/07/24 1204             Time Calculation    Start Time 0950  -      Stop Time 1026  -      Time Calculation (min) 36 min  -ALBERTO      PT Received On 02/07/24  -ALBERTO      PT - Next Appointment 02/08/24  -ALBERTO                User Key  (r) = Recorded By, (t) = Taken By, (c) = Cosigned By      Initials Name Provider Type    Celeste Nascimento PTA Physical Therapist Assistant                  Therapy Charges for Today       Code Description Service Date Service Provider Modifiers Qty    83740981755 HC GAIT TRAINING EA 15 MIN 2/7/2024 Celeste Molina PTA GP 1    58336097753 HC PT THERAPEUTIC ACT EA 15 MIN 2/7/2024 Celeste Molina PTA GP 2            PT G-Codes  Outcome Measure Options: AM-PAC 6 Clicks Basic Mobility (PT)  AM-PAC 6 Clicks Score (PT): 14  AM-PAC 6 Clicks Score (OT): 16  Modified Karnes Scale: 3 - Moderate disability.  Requiring some help, but able to walk without assistance.  PT Discharge Summary  Anticipated Discharge Disposition (PT): skilled nursing facility    Celeste Molina PTA  2/7/2024

## 2024-02-07 NOTE — PROGRESS NOTES
"Nutrition Services    Patient Name:  Yuval Loja  YOB: 1950  MRN: [id number]  Admit Date:  2/4/2024  Assessment Date:  02/07/24    NUTRITION SCREENING      Reason for Encounter MST score 2+ , age   Diagnosis/Problem AMS, CRISTIANO       PO Diet Diet: Regular/House Diet; Texture: Regular Texture (IDDSI 7); Fluid Consistency: Thin (IDDSI 0)   Supplements    PO Intake % 50%       Medications MAR reviewed by RD   Labs  Listed below, reviewed   Physical Findings Disoriented, agitated, room air   GI Function 2/6 last BM   Skin Status intact       Height  Weight  BMI  Weight Trend     Height: 175.3 cm (69\")  Weight: 75.3 kg (166 lb) (02/04/24 0346)  Body mass index is 24.51 kg/m².  Stable       Nutrition Problem (PES) No nutrition diagnosis at this time.       Intervention/Plan Visited pt who had just finished breakfast  he states appetite is OK.  No request at this time.      RD to follow up per protocol.     Results from last 7 days   Lab Units 02/07/24  0432 02/06/24  0306 02/05/24  0535 02/04/24  0545   SODIUM mmol/L 143 141 139 132*   POTASSIUM mmol/L 3.7 3.7 3.9 4.5   CHLORIDE mmol/L 110* 108* 102 94*   CO2 mmol/L 24.0 22.8 22.5 20.7*   BUN mg/dL 9 12 21 23   CREATININE mg/dL 1.38* 1.54* 1.92* 2.04*   CALCIUM mg/dL 9.7 9.2 10.4 9.9   BILIRUBIN mg/dL  --  0.3 0.3 0.3   ALK PHOS U/L  --  58 69 67   ALT (SGPT) U/L  --  16 18 18   AST (SGOT) U/L  --  33 35 36   GLUCOSE mg/dL 96 102* 141* 60*     Results from last 7 days   Lab Units 02/07/24  0432 02/06/24  0306 02/05/24  0535   MAGNESIUM mg/dL 1.7  --  2.2   PHOSPHORUS mg/dL 2.6   < > 1.6*   HEMOGLOBIN g/dL 10.5*   < > 11.9*   HEMATOCRIT % 32.1*   < > 36.6*   TRIGLYCERIDES mg/dL  --   --  128    < > = values in this interval not displayed.     Lab Results   Component Value Date    HGBA1C 4.30 (L) 02/05/2024         Electronically signed by:  Kitty Aguilar RD  02/07/24 09:55 EST  "

## 2024-02-07 NOTE — CONSULTS
Pt called out as I walked past his room. I entered and introduced myself as a . He complained about his restraints bothering him and asked me to help undo them for him. I apologized that I was not able to help him, but stated I would mention it to the nurse; I consulted with KENDELL Anne.

## 2024-02-08 LAB
ALBUMIN SERPL-MCNC: 4 G/DL (ref 3.5–5.2)
ANION GAP SERPL CALCULATED.3IONS-SCNC: 12.4 MMOL/L (ref 5–15)
BASOPHILS # BLD AUTO: 0.03 10*3/MM3 (ref 0–0.2)
BASOPHILS NFR BLD AUTO: 0.7 % (ref 0–1.5)
BUN SERPL-MCNC: 8 MG/DL (ref 8–23)
BUN/CREAT SERPL: 6.2 (ref 7–25)
CALCIUM SPEC-SCNC: 9.9 MG/DL (ref 8.6–10.5)
CHLORIDE SERPL-SCNC: 106 MMOL/L (ref 98–107)
CO2 SERPL-SCNC: 25.6 MMOL/L (ref 22–29)
CREAT SERPL-MCNC: 1.3 MG/DL (ref 0.76–1.27)
DEPRECATED RDW RBC AUTO: 49.1 FL (ref 37–54)
EGFRCR SERPLBLD CKD-EPI 2021: 57.3 ML/MIN/1.73
EOSINOPHIL # BLD AUTO: 0.09 10*3/MM3 (ref 0–0.4)
EOSINOPHIL NFR BLD AUTO: 2.2 % (ref 0.3–6.2)
ERYTHROCYTE [DISTWIDTH] IN BLOOD BY AUTOMATED COUNT: 13.1 % (ref 12.3–15.4)
GLUCOSE SERPL-MCNC: 102 MG/DL (ref 65–99)
HCT VFR BLD AUTO: 35.7 % (ref 37.5–51)
HGB BLD-MCNC: 11.4 G/DL (ref 13–17.7)
IMM GRANULOCYTES # BLD AUTO: 0.02 10*3/MM3 (ref 0–0.05)
IMM GRANULOCYTES NFR BLD AUTO: 0.5 % (ref 0–0.5)
LYMPHOCYTES # BLD AUTO: 0.95 10*3/MM3 (ref 0.7–3.1)
LYMPHOCYTES NFR BLD AUTO: 22.9 % (ref 19.6–45.3)
MAGNESIUM SERPL-MCNC: 1.8 MG/DL (ref 1.6–2.4)
MCH RBC QN AUTO: 32.5 PG (ref 26.6–33)
MCHC RBC AUTO-ENTMCNC: 31.9 G/DL (ref 31.5–35.7)
MCV RBC AUTO: 101.7 FL (ref 79–97)
MONOCYTES # BLD AUTO: 0.48 10*3/MM3 (ref 0.1–0.9)
MONOCYTES NFR BLD AUTO: 11.6 % (ref 5–12)
NEUTROPHILS NFR BLD AUTO: 2.58 10*3/MM3 (ref 1.7–7)
NEUTROPHILS NFR BLD AUTO: 62.1 % (ref 42.7–76)
NRBC BLD AUTO-RTO: 0.2 /100 WBC (ref 0–0.2)
PHOSPHATE SERPL-MCNC: 2.6 MG/DL (ref 2.5–4.5)
PLATELET # BLD AUTO: 168 10*3/MM3 (ref 140–450)
PMV BLD AUTO: 9.2 FL (ref 6–12)
POTASSIUM SERPL-SCNC: 3.5 MMOL/L (ref 3.5–5.2)
RBC # BLD AUTO: 3.51 10*6/MM3 (ref 4.14–5.8)
SODIUM SERPL-SCNC: 144 MMOL/L (ref 136–145)
WBC NRBC COR # BLD AUTO: 4.15 10*3/MM3 (ref 3.4–10.8)

## 2024-02-08 PROCEDURE — 80069 RENAL FUNCTION PANEL: CPT | Performed by: INTERNAL MEDICINE

## 2024-02-08 PROCEDURE — 83735 ASSAY OF MAGNESIUM: CPT | Performed by: INTERNAL MEDICINE

## 2024-02-08 PROCEDURE — 85025 COMPLETE CBC W/AUTO DIFF WBC: CPT | Performed by: HOSPITALIST

## 2024-02-08 RX ORDER — HALOPERIDOL 2 MG/1
2 TABLET ORAL 3 TIMES DAILY
Status: DISCONTINUED | OUTPATIENT
Start: 2024-02-08 | End: 2024-03-01 | Stop reason: HOSPADM

## 2024-02-08 RX ORDER — POTASSIUM CHLORIDE 750 MG/1
40 TABLET, FILM COATED, EXTENDED RELEASE ORAL ONCE
Status: DISCONTINUED | OUTPATIENT
Start: 2024-02-08 | End: 2024-02-10

## 2024-02-08 RX ADMIN — HALOPERIDOL 2 MG: 2 TABLET ORAL at 21:29

## 2024-02-08 RX ADMIN — FAMOTIDINE 20 MG: 20 TABLET, FILM COATED ORAL at 21:29

## 2024-02-08 RX ADMIN — SUCRALFATE 1 G: 1 TABLET ORAL at 21:29

## 2024-02-08 RX ADMIN — METOPROLOL TARTRATE 25 MG: 25 TABLET, FILM COATED ORAL at 21:29

## 2024-02-08 NOTE — PROGRESS NOTES
"Daily progress note    Primary care physician  Dr. Prescott    Subjective  Doing same with no new issues but remained pleasantly confused    History of present illness  73-year-old -American male with history of alcohol abuse paroxysmal atrial fibrillation coronary artery disease hyperlipidemia and chronic kidney disease stage III who is well-known to our service and apparently continues to abuse alcohol and recently started on a antidepressant brought to the emergency room with generalized weakness and altered mental status.  Patient in no respiratory distress and answer question appropriately and workup in ER revealed acute metabolic encephalopathy admit for management.  At the time of examination he denies any chest pain shortness of breath palpitation and asking for anxiety medication and wants to eat regular diet.     REVIEW OF SYSTEMS  Unable to obtain     PHYSICAL EXAM   Blood pressure 126/71, pulse 75, temperature 98.4 °F (36.9 °C), temperature source Oral, resp. rate 18, height 175.3 cm (69\"), weight 75.3 kg (166 lb), SpO2 98%.    Constitutional:       General: He is awake. He is not in acute distress.     Appearance: He is not toxic-appearing.   HENT:      Head: Normocephalic and atraumatic.   Eyes:      General: Lids are normal. Vision grossly intact.   Cardiovascular:      Rate and Rhythm: Normal rate and regular rhythm.      Heart sounds: Normal heart sounds.   Pulmonary:      Effort: Pulmonary effort is normal.      Breath sounds: Normal breath sounds and air entry.   Abdominal:      General: Bowel sounds are normal.      Palpations: Abdomen is soft.      Tenderness: There is no abdominal tenderness.   Musculoskeletal:      Cervical back: Normal range of motion.   Skin:     General: Skin is warm and dry.      Coloration: Skin is not pale.   Neurological:      General: No focal deficit present.      Mental Status: He is alert and oriented to person, place, and time.      Comments: No focal " deficit  Psychiatric:         Attention and Perception: Attention normal.         Mood and Affect: Mood normal.         Speech: Speech normal.         Behavior: Behavior is cooperative.     LAB RESULTS  Lab Results (last 24 hours)       Procedure Component Value Units Date/Time    Renal Function Panel [[id number]]  (Abnormal) Collected: 02/08/24 0751    Specimen: Blood Updated: 02/08/24 0822     Glucose 102 mg/dL      BUN 8 mg/dL      Creatinine 1.30 mg/dL      Sodium 144 mmol/L      Potassium 3.5 mmol/L      Chloride 106 mmol/L      CO2 25.6 mmol/L      Calcium 9.9 mg/dL      Albumin 4.0 g/dL      Phosphorus 2.6 mg/dL      Anion Gap 12.4 mmol/L      BUN/Creatinine Ratio 6.2     eGFR 57.3 mL/min/1.73     Narrative:      GFR Normal >60  Chronic Kidney Disease <60  Kidney Failure <15    The GFR formula is only valid for adults with stable renal function between ages 18 and 70.    Magnesium [[id number]]  (Normal) Collected: 02/08/24 0751    Specimen: Blood Updated: 02/08/24 0822     Magnesium 1.8 mg/dL     CBC & Differential [[id number]]  (Abnormal) Collected: 02/08/24 0751    Specimen: Blood Updated: 02/08/24 0807    Narrative:      The following orders were created for panel order CBC & Differential.  Procedure                               Abnormality         Status                     ---------                               -----------         ------                     CBC Auto Differential[[id number]]        Abnormal            Final result                 Please view results for these tests on the individual orders.    CBC Auto Differential [[id number]]  (Abnormal) Collected: 02/08/24 0751    Specimen: Blood Updated: 02/08/24 0807     WBC 4.15 10*3/mm3      RBC 3.51 10*6/mm3      Hemoglobin 11.4 g/dL      Hematocrit 35.7 %      .7 fL      MCH 32.5 pg      MCHC 31.9 g/dL      RDW 13.1 %      RDW-SD 49.1 fl      MPV 9.2 fL      Platelets 168 10*3/mm3      Neutrophil % 62.1 %      Lymphocyte % 22.9 %       Monocyte % 11.6 %      Eosinophil % 2.2 %      Basophil % 0.7 %      Immature Grans % 0.5 %      Neutrophils, Absolute 2.58 10*3/mm3      Lymphocytes, Absolute 0.95 10*3/mm3      Monocytes, Absolute 0.48 10*3/mm3      Eosinophils, Absolute 0.09 10*3/mm3      Basophils, Absolute 0.03 10*3/mm3      Immature Grans, Absolute 0.02 10*3/mm3      nRBC 0.2 /100 WBC           Imaging Results (Last 24 Hours)       ** No results found for the last 24 hours. **          Results  Scan on 2/4/2024 0415 by [facility] On[employer], [place]: ECG 12-LEAD         Author: -- Service: -- Author Type: --   Filed: Date of Service: Creation Time:   Status: (Other)   HEART RATE= 67  bpm  RR Interval= 896  ms  ME Interval= 193  ms  P Horizontal Axis= -48  deg  P Front Axis= 82  deg  QRSD Interval= 82  ms  QT Interval= 441  ms  QTcB= 466  ms  QRS Axis= 47  deg  T Wave Axis= 51  deg  - ABNORMAL ECG -  Sinus rhythm  RSR' in V1 or V2, probably normal variant  Consider left ventricular hypertrophy  Anterior ST elevation, probably due to LVH          Current Facility-Administered Medications:     dilTIAZem CD (CARDIZEM CD) 24 hr capsule 180 mg, 180 mg, Oral, Q24H, Chino Cole MD, 180 mg at 02/07/24 0919    docusate sodium (COLACE) capsule 100 mg, 100 mg, Oral, BID, Chino Cole MD, 100 mg at 02/07/24 2048    famotidine (PEPCID) tablet 20 mg, 20 mg, Oral, BID, Chino Cole MD, 20 mg at 02/07/24 2049    folic acid (FOLVITE) tablet 1 mg, 1 mg, Oral, Daily, Chino Cole MD, 1 mg at 02/07/24 0920    haloperidol (HALDOL) tablet 2 mg, 2 mg, Oral, TID, Hipolito Ramon III, MD    haloperidol lactate (HALDOL) injection 5 mg, 5 mg, Intramuscular, Q6H PRN, Hipolito Ramon III, MD, 5 mg at 02/06/24 0049    hydrOXYzine (ATARAX) tablet 25 mg, 25 mg, Oral, 4x Daily PRN, Chino Cole MD, 25 mg at 02/07/24 1236    LORazepam (ATIVAN) tablet 1 mg, 1 mg, Oral, Q1H PRN, 1 mg at 02/07/24 2050 **OR** LORazepam (ATIVAN) injection 1 mg, 1 mg, Intravenous,  Q1H PRN, 1 mg at 02/06/24 1317 **OR** LORazepam (ATIVAN) tablet 2 mg, 2 mg, Oral, Q1H PRN, 2 mg at 02/05/24 0929 **OR** LORazepam (ATIVAN) injection 2 mg, 2 mg, Intravenous, Q1H PRN, 2 mg at 02/07/24 2224 **OR** LORazepam (ATIVAN) injection 2 mg, 2 mg, Intravenous, Q15 Min PRN, 2 mg at 02/06/24 1727 **OR** LORazepam (ATIVAN) injection 2 mg, 2 mg, Intramuscular, Q15 Min PRN, Jolie Cole MD    metoprolol tartrate (LOPRESSOR) tablet 25 mg, 25 mg, Oral, TID, Jolie Cole MD, 25 mg at 02/07/24 2049    multivitamin (THERAGRAN) tablet 1 tablet, 1 tablet, Oral, Daily, Jolie Cole MD, 1 tablet at 02/07/24 0920    potassium chloride (K-DUR,KLOR-CON) ER tablet 40 mEq, 40 mEq, Oral, Once, Thomas Weiner MD    rosuvastatin (CRESTOR) tablet 40 mg, 40 mg, Oral, Nightly, Jolie Cole MD, 40 mg at 02/07/24 2051    [COMPLETED] Insert Peripheral IV, , , Once **AND** sodium chloride 0.9 % flush 10 mL, 10 mL, Intravenous, PRN, Salma Grullon APRN    sucralfate (CARAFATE) tablet 1 g, 1 g, Oral, 4x Daily AC & at Bedtime, Jolie Cole MD, 1 g at 02/07/24 2048    thiamine (VITAMIN B-1) tablet 100 mg, 100 mg, Oral, Daily, Jolie Cole MD, 100 mg at 02/07/24 0920     ASSESSMENT  Alcohol withdrawal syndrome  Acute kidney injury  Depression   History of alcohol abuse  Paroxysmal atrial fibrillation  Coronary artery disease  Hyperlipidemia  History of CVA  Chronic kidney disease stage III  Gastroesophageal reflux disease    PLAN  CPM  Continue detox medications  Continue CIWA protocol  Psych consult appreciated  Adjust home medications  Stress ulcer DVT prophylaxis  Nephrology to follow patient  Supportive care  PT OT  Discussed with nursing staff and family and patient not safe to live by himself  Discharge planning    JOLIE COLE MD    Copied text in this note has been reviewed and is accurate as of 02/08/24

## 2024-02-08 NOTE — PROGRESS NOTES
The patient is back in wrist restraints and awakens only briefly for attempted interview today.  Charting indicates continued lack of cooperation, spitting of meds and last night required security to place him back in bed.  I have added scheduled haloperidol to the patient is currently prescribed as needed Haldol.

## 2024-02-08 NOTE — PLAN OF CARE
Problem: Adult Inpatient Plan of Care  Goal: Plan of Care Review  Outcome: Ongoing, Progressing  Flowsheets (Taken 2/8/2024 0324)  Plan of Care Reviewed With: patient  Outcome Evaluation: Pt is combative and uncooperative. Took meds, then pit them out. POC- fall precautions, CIWA protocol. Continues to need restraints and frequent redirection and reorientation.  Goal: Absence of Hospital-Acquired Illness or Injury  Outcome: Ongoing, Progressing  Intervention: Identify and Manage Fall Risk  Recent Flowsheet Documentation  Taken 2/8/2024 0210 by Dasha Au, RN  Safety Promotion/Fall Prevention:   assistive device/personal items within reach   clutter free environment maintained   fall prevention program maintained   lighting adjusted   nonskid shoes/slippers when out of bed   room organization consistent   safety round/check completed  Taken 2/8/2024 0016 by Dasha Au, RN  Safety Promotion/Fall Prevention:   assistive device/personal items within reach   clutter free environment maintained   fall prevention program maintained   lighting adjusted   nonskid shoes/slippers when out of bed   room organization consistent   safety round/check completed  Taken 2/7/2024 2220 by Dasha Au, KENDELL  Safety Promotion/Fall Prevention:   assistive device/personal items within reach   clutter free environment maintained   fall prevention program maintained   lighting adjusted   nonskid shoes/slippers when out of bed   room organization consistent   safety round/check completed  Taken 2/7/2024 2045 by Dasha Au, RN  Safety Promotion/Fall Prevention:   assistive device/personal items within reach   activity supervised   clutter free environment maintained   fall prevention program maintained   lighting adjusted   nonskid shoes/slippers when out of bed   room organization consistent   safety round/check completed  Intervention: Prevent Skin Injury  Recent Flowsheet Documentation  Taken 2/8/2024 0210 by Dasha Au,  RN  Body Position: position changed independently  Taken 2/8/2024 0016 by Dasha Au RN  Body Position: position changed independently  Taken 2/7/2024 2220 by Dasha Au RN  Body Position: position changed independently  Taken 2/7/2024 2045 by Dasha Au RN  Body Position: position changed independently  Skin Protection:   adhesive use limited   incontinence pads utilized   pectin skin barriers applied   transparent dressing maintained   tubing/devices free from skin contact  Intervention: Prevent and Manage VTE (Venous Thromboembolism) Risk  Recent Flowsheet Documentation  Taken 2/8/2024 0210 by Dasha Au RN  Activity Management: activity minimized  Taken 2/8/2024 0016 by Dasha Au RN  Activity Management: activity minimized  Taken 2/7/2024 2220 by Dasha Au RN  Activity Management: activity minimized  Taken 2/7/2024 2045 by Dasha Au RN  Activity Management:   activity encouraged   dorsiflexion/plantar flexion performed  VTE Prevention/Management: patient refused intervention  Intervention: Prevent Infection  Recent Flowsheet Documentation  Taken 2/7/2024 2045 by Dasha Au RN  Infection Prevention:   environmental surveillance performed   hand hygiene promoted   personal protective equipment utilized   rest/sleep promoted   single patient room provided  Goal: Optimal Comfort and Wellbeing  Outcome: Ongoing, Progressing  Intervention: Provide Person-Centered Care  Recent Flowsheet Documentation  Taken 2/7/2024 2045 by Dasha Au RN  Trust Relationship/Rapport:   care explained   questions answered   questions encouraged  Goal: Readiness for Transition of Care  Outcome: Ongoing, Progressing     Problem: Hypertension Comorbidity  Goal: Blood Pressure in Desired Range  Outcome: Ongoing, Progressing  Intervention: Maintain Blood Pressure Management  Recent Flowsheet Documentation  Taken 2/7/2024 2045 by Dasha Au RN  Medication Review/Management: medications reviewed      Problem: Skin Injury Risk Increased  Goal: Skin Health and Integrity  Outcome: Ongoing, Progressing  Intervention: Optimize Skin Protection  Recent Flowsheet Documentation  Taken 2/7/2024 2045 by Dasha Au RN  Pressure Reduction Techniques:   frequent weight shift encouraged   heels elevated off bed   weight shift assistance provided  Pressure Reduction Devices:   alternating pressure pump (ADD)   heel offloading device utilized  Skin Protection:   adhesive use limited   incontinence pads utilized   pectin skin barriers applied   transparent dressing maintained   tubing/devices free from skin contact     Problem: Restraint, Nonviolent  Goal: Absence of Harm or Injury  Outcome: Ongoing, Progressing  Intervention: Implement Least Restrictive Safety Strategies  Recent Flowsheet Documentation  Taken 2/8/2024 0200 by Dasha Au RN  Medical Device Protection: tubing secured  Less Restrictive Alternative:   bed alarm in use   surveillance provided   sensory stimulation limited  De-Escalation Techniques:   appropriate behavior reinforced   diversional activity encouraged   increased round frequency   reoriented   verbally redirected  Diversional Activities: television  Taken 2/7/2024 2356 by Dasha Au RN  Medical Device Protection: tubing secured  Less Restrictive Alternative: bed alarm in use  De-Escalation Techniques: increased round frequency  Diversional Activities: television  Taken 2/7/2024 2200 by Dasha Au RN  Medical Device Protection: tubing secured  Less Restrictive Alternative:   bed alarm in use   calming techniques promoted  De-Escalation Techniques:   appropriate behavior reinforced   diversional activity encouraged   increased round frequency   reoriented  Diversional Activities: television  Taken 2/7/2024 2045 by Dasha Au RN  Medical Device Protection: tubing secured  Diversional Activities: television  Taken 2/7/2024 2000 by Dasha Au RN  Medical Device Protection: tubing  secured  Less Restrictive Alternative:   appropriate expression promoted   bed alarm in use   environment adjusted   surveillance provided  De-Escalation Techniques:   appropriate behavior reinforced   increased round frequency   diversional activity encouraged  Diversional Activities: television  Intervention: Protect Dignity, Rights, and Personal Wellbeing  Recent Flowsheet Documentation  Taken 2/7/2024 2045 by Dasha Au RN  Trust Relationship/Rapport:   care explained   questions answered   questions encouraged  Intervention: Protect Skin and Joint Integrity  Recent Flowsheet Documentation  Taken 2/8/2024 0210 by Dasha Au RN  Body Position: position changed independently  Taken 2/8/2024 0016 by Dasha Au RN  Body Position: position changed independently  Taken 2/7/2024 2220 by Dasha Au RN  Body Position: position changed independently  Taken 2/7/2024 2045 by Dasha Au RN  Body Position: position changed independently   Goal Outcome Evaluation:  Plan of Care Reviewed With: patient           Outcome Evaluation: Pt is combative and uncooperative. Took meds, then pit them out. POC- fall precautions, CIWA protocol. Continues to need restraints and frequent redirection and reorientation.

## 2024-02-08 NOTE — PROGRESS NOTES
[facility] Progress Note      Patient Name: Yuval Loja  : 1950  MRN: [id number]  Primary Care Physician:  Alessia Prescott APRN  Date of admission: 2024    Subjective     Interval History:   Lying in bed; agitated again last night; security called  Denies shortness of breath and chest pain  States that he ate well yesterday, but that he has not yet had breakfast    Review of Systems:   As noted above    Objective     Vitals:   Temp:  [98.2 °F (36.8 °C)-98.9 °F (37.2 °C)] 98.9 °F (37.2 °C)  Heart Rate:  [75-82] 75  Resp:  [17-18] 18  BP: (117-155)/(67-84) 155/84    Intake/Output Summary (Last 24 hours) at 2024 1020  Last data filed at 2024 2045  Gross per 24 hour   Intake 120 ml   Output --   Net 120 ml       Physical Exam:    Constitutional: Awake, cooperative  HEENT: Sclera anicteric, no conjunctival injection, MMM  Neck: Supple, no carotid bruit, trachea at midline, no JVD  Respiratory: Coarse anteriorly; not labored on room air   Cardiovascular: RRR, no rub  Gastrointestinal: BS +, soft, nondistended, not distended  : No palpable bladder  Musculoskeletal: No edema, no clubbing or cyanosis  Psychiatric: Oriented to self  Neurologic: Awake, moving all extremities  Skin: Warm and dry      Scheduled Meds:     dilTIAZem CD, 180 mg, Oral, Q24H  docusate sodium, 100 mg, Oral, BID  famotidine, 20 mg, Oral, BID  folic acid, 1 mg, Oral, Daily  haloperidol, 1 mg, Oral, TID  metoprolol tartrate, 25 mg, Oral, TID  multivitamin, 1 tablet, Oral, Daily  rosuvastatin, 40 mg, Oral, Nightly  sucralfate, 1 g, Oral, 4x Daily AC & at Bedtime  thiamine, 100 mg, Oral, Daily      IV Meds:          Results Reviewed:   I have personally reviewed the results from the time of this admission to 2024 10:20 EST     Results from last 7 days   Lab Units 24  0751 24  0432 24  0306 24  0535 24  0545   SODIUM mmol/L 144 143 141 139 132*   POTASSIUM mmol/L 3.5  3.7 3.7 3.9 4.5   CHLORIDE mmol/L 106 110* 108* 102 94*   CO2 mmol/L 25.6 24.0 22.8 22.5 20.7*   BUN mg/dL 8 9 12 21 23   CREATININE mg/dL 1.30* 1.38* 1.54* 1.92* 2.04*   CALCIUM mg/dL 9.9 9.7 9.2 10.4 9.9   BILIRUBIN mg/dL  --   --  0.3 0.3 0.3   ALK PHOS U/L  --   --  58 69 67   ALT (SGPT) U/L  --   --  16 18 18   AST (SGOT) U/L  --   --  33 35 36   GLUCOSE mg/dL 102* 96 102* 141* 60*       Estimated Creatinine Clearance: 52.3 mL/min (A) (by C-G formula based on SCr of 1.3 mg/dL (H)).    Results from last 7 days   Lab Units 02/08/24  0751 02/07/24  0432 02/06/24  0306 02/05/24  0535   MAGNESIUM mg/dL 1.8 1.7  --  2.2   PHOSPHORUS mg/dL 2.6 2.6 2.4* 1.6*             Results from last 7 days   Lab Units 02/08/24  0751 02/07/24  0432 02/06/24  0306 02/05/24  0535 02/04/24  0545   WBC 10*3/mm3 4.15 3.86 3.73 4.91 3.34*   HEMOGLOBIN g/dL 11.4* 10.5* 10.2* 11.9* 11.8*   PLATELETS 10*3/mm3 168 156 163 207 209       Results from last 7 days   Lab Units 02/04/24  0545   INR  0.99       Assessment / Plan     ASSESSMENT:  1.  CRISTIANO on CKD3a, nonoliguric, improving and back to baseline:  was prerenal from reduced appetite (+ketones in urine) while confused.  Volume status is stable; mild hypernatremia.  [place] urinalysis  2.  Confusion, with suspected withdrawal syndrome, waxing and waning  3.  Alcoholism and liver disease  4.  Depression, recently placed on Lexapro, which has been DC'd  5.  DDD with prior stroke  6.  History of atrial fibrillation: in NSR currently    PLAN:  1.  Oral potassium, if he will take  2.  Surveillance labs    Thank you for involving us in the care of Yuval Loja.  Please feel free to call with any questions.    Thomas Weiner MD  02/08/24  10:20 [facility]    [facility]  [phone]    Please note that portions of this note were completed with a voice recognition program.

## 2024-02-08 NOTE — PLAN OF CARE
Goal Outcome Evaluation:    Patient has been pretty sleepy today. He remains in restraints for pulling lines and monitors off and attempting to get up out of bed. He is still very confused, but no tremors, sweats, hallucinations, nausea or headaches. He thinks he is at home, he is adamantly refusing to take medications despite multiple efforts. His daughter even tried to help encourage him to take them, but he would not do it. No injury or harm from restraints. Will continue to [facility]

## 2024-02-09 LAB
ALBUMIN SERPL-MCNC: 4.2 G/DL (ref 3.5–5.2)
ANION GAP SERPL CALCULATED.3IONS-SCNC: 10.3 MMOL/L (ref 5–15)
BASOPHILS # BLD AUTO: 0.02 10*3/MM3 (ref 0–0.2)
BASOPHILS NFR BLD AUTO: 0.5 % (ref 0–1.5)
BUN SERPL-MCNC: 10 MG/DL (ref 8–23)
BUN/CREAT SERPL: 7.4 (ref 7–25)
CALCIUM SPEC-SCNC: 10.1 MG/DL (ref 8.6–10.5)
CHLORIDE SERPL-SCNC: 108 MMOL/L (ref 98–107)
CO2 SERPL-SCNC: 24.7 MMOL/L (ref 22–29)
CREAT SERPL-MCNC: 1.35 MG/DL (ref 0.76–1.27)
DEPRECATED RDW RBC AUTO: 44.9 FL (ref 37–54)
EGFRCR SERPLBLD CKD-EPI 2021: 54.8 ML/MIN/1.73
EOSINOPHIL # BLD AUTO: 0.08 10*3/MM3 (ref 0–0.4)
EOSINOPHIL NFR BLD AUTO: 1.9 % (ref 0.3–6.2)
ERYTHROCYTE [DISTWIDTH] IN BLOOD BY AUTOMATED COUNT: 12.7 % (ref 12.3–15.4)
GLUCOSE SERPL-MCNC: 92 MG/DL (ref 65–99)
HCT VFR BLD AUTO: 35.7 % (ref 37.5–51)
HGB BLD-MCNC: 11.6 G/DL (ref 13–17.7)
IMM GRANULOCYTES # BLD AUTO: 0.01 10*3/MM3 (ref 0–0.05)
IMM GRANULOCYTES NFR BLD AUTO: 0.2 % (ref 0–0.5)
LYMPHOCYTES # BLD AUTO: 1.39 10*3/MM3 (ref 0.7–3.1)
LYMPHOCYTES NFR BLD AUTO: 32.9 % (ref 19.6–45.3)
MAGNESIUM SERPL-MCNC: 1.8 MG/DL (ref 1.6–2.4)
MCH RBC QN AUTO: 31.8 PG (ref 26.6–33)
MCHC RBC AUTO-ENTMCNC: 32.5 G/DL (ref 31.5–35.7)
MCV RBC AUTO: 97.8 FL (ref 79–97)
MONOCYTES # BLD AUTO: 0.63 10*3/MM3 (ref 0.1–0.9)
MONOCYTES NFR BLD AUTO: 14.9 % (ref 5–12)
NEUTROPHILS NFR BLD AUTO: 2.09 10*3/MM3 (ref 1.7–7)
NEUTROPHILS NFR BLD AUTO: 49.6 % (ref 42.7–76)
NRBC BLD AUTO-RTO: 0 /100 WBC (ref 0–0.2)
PHOSPHATE SERPL-MCNC: 3.2 MG/DL (ref 2.5–4.5)
PLATELET # BLD AUTO: 187 10*3/MM3 (ref 140–450)
PMV BLD AUTO: 10.2 FL (ref 6–12)
POTASSIUM SERPL-SCNC: 3.6 MMOL/L (ref 3.5–5.2)
RBC # BLD AUTO: 3.65 10*6/MM3 (ref 4.14–5.8)
SODIUM SERPL-SCNC: 143 MMOL/L (ref 136–145)
WBC NRBC COR # BLD AUTO: 4.22 10*3/MM3 (ref 3.4–10.8)

## 2024-02-09 PROCEDURE — 83735 ASSAY OF MAGNESIUM: CPT | Performed by: INTERNAL MEDICINE

## 2024-02-09 PROCEDURE — 85025 COMPLETE CBC W/AUTO DIFF WBC: CPT | Performed by: HOSPITALIST

## 2024-02-09 PROCEDURE — 80069 RENAL FUNCTION PANEL: CPT | Performed by: INTERNAL MEDICINE

## 2024-02-09 PROCEDURE — 97110 THERAPEUTIC EXERCISES: CPT

## 2024-02-09 RX ORDER — POTASSIUM CHLORIDE 750 MG/1
40 TABLET, FILM COATED, EXTENDED RELEASE ORAL ONCE
Status: COMPLETED | OUTPATIENT
Start: 2024-02-09 | End: 2024-02-09

## 2024-02-09 RX ADMIN — FAMOTIDINE 20 MG: 20 TABLET, FILM COATED ORAL at 20:51

## 2024-02-09 RX ADMIN — HALOPERIDOL 2 MG: 2 TABLET ORAL at 15:08

## 2024-02-09 RX ADMIN — SUCRALFATE 1 G: 1 TABLET ORAL at 11:54

## 2024-02-09 RX ADMIN — POTASSIUM CHLORIDE 40 MEQ: 750 TABLET, EXTENDED RELEASE ORAL at 18:06

## 2024-02-09 RX ADMIN — SUCRALFATE 1 G: 1 TABLET ORAL at 18:06

## 2024-02-09 RX ADMIN — ROSUVASTATIN CALCIUM 40 MG: 40 TABLET, FILM COATED ORAL at 20:51

## 2024-02-09 RX ADMIN — SUCRALFATE 1 G: 1 TABLET ORAL at 08:11

## 2024-02-09 RX ADMIN — Medication 100 MG: at 08:10

## 2024-02-09 RX ADMIN — HALOPERIDOL 2 MG: 2 TABLET ORAL at 08:09

## 2024-02-09 RX ADMIN — DOCUSATE SODIUM 100 MG: 100 CAPSULE, LIQUID FILLED ORAL at 20:51

## 2024-02-09 RX ADMIN — METOPROLOL TARTRATE 25 MG: 25 TABLET, FILM COATED ORAL at 15:08

## 2024-02-09 RX ADMIN — FOLIC ACID 1 MG: 1 TABLET ORAL at 08:09

## 2024-02-09 RX ADMIN — HALOPERIDOL 2 MG: 2 TABLET ORAL at 20:51

## 2024-02-09 RX ADMIN — METOPROLOL TARTRATE 25 MG: 25 TABLET, FILM COATED ORAL at 08:09

## 2024-02-09 RX ADMIN — DILTIAZEM HYDROCHLORIDE 180 MG: 180 CAPSULE, COATED, EXTENDED RELEASE ORAL at 08:08

## 2024-02-09 RX ADMIN — FAMOTIDINE 20 MG: 20 TABLET, FILM COATED ORAL at 08:09

## 2024-02-09 RX ADMIN — SUCRALFATE 1 G: 1 TABLET ORAL at 20:51

## 2024-02-09 RX ADMIN — DOCUSATE SODIUM 100 MG: 100 CAPSULE, LIQUID FILLED ORAL at 08:09

## 2024-02-09 RX ADMIN — METOPROLOL TARTRATE 25 MG: 25 TABLET, FILM COATED ORAL at 20:51

## 2024-02-09 RX ADMIN — Medication 1 TABLET: at 08:10

## 2024-02-09 NOTE — PROGRESS NOTES
[facility] Progress Note      Patient Name: Yuval Loja  : 1950  MRN: [id number]  Primary Care Physician:  Alessia Prescott APRN  Date of admission: 2024    Subjective     Interval History:   Calm and cooperative; out of restraints  Denies shortness of breath and chest pain; appetite fine  Wants to know when he can go home    Review of Systems:   As noted above    Objective     Vitals:   Temp:  [97.9 °F (36.6 °C)-98.4 °F (36.9 °C)] 98.4 °F (36.9 °C)  Heart Rate:  [] 79  Resp:  [18] 18  BP: (133-181)/(54-83) 133/54    Intake/Output Summary (Last 24 hours) at 2024 1743  Last data filed at 2024 1828  Gross per 24 hour   Intake 120 ml   Output --   Net 120 ml       Physical Exam:    Constitutional: Awake, cooperative, thin, chronically ill  HEENT: Sclera anicteric, no conjunctival injection, MMM  Neck: Supple, no carotid bruit, trachea at midline, no JVD  Respiratory: Coarse anteriorly; not labored on room air   Cardiovascular: RRR, no rub  Gastrointestinal: BS +, soft, nondistended, not distended  : No palpable bladder  Musculoskeletal: No edema, no clubbing or cyanosis  Psychiatric: Oriented to self  Neurologic: Awake, moving all extremities  Skin: Warm and dry      Scheduled Meds:     dilTIAZem CD, 180 mg, Oral, Q24H  docusate sodium, 100 mg, Oral, BID  famotidine, 20 mg, Oral, BID  folic acid, 1 mg, Oral, Daily  haloperidol, 2 mg, Oral, TID  metoprolol tartrate, 25 mg, Oral, TID  multivitamin, 1 tablet, Oral, Daily  potassium chloride, 40 mEq, Oral, Once  rosuvastatin, 40 mg, Oral, Nightly  sucralfate, 1 g, Oral, 4x Daily AC & at Bedtime  thiamine, 100 mg, Oral, Daily      IV Meds:          Results Reviewed:   I have personally reviewed the results from the time of this admission to 2024 17:43 EST     Results from last 7 days   Lab Units 24  0438 24  0751 24  0432 24  0306 24  0535 24  0545   SODIUM mmol/L 143 144  143 141 139 132*   POTASSIUM mmol/L 3.6 3.5 3.7 3.7 3.9 4.5   CHLORIDE mmol/L 108* 106 110* 108* 102 94*   CO2 mmol/L 24.7 25.6 24.0 22.8 22.5 20.7*   BUN mg/dL 10 8 9 12 21 23   CREATININE mg/dL 1.35* 1.30* 1.38* 1.54* 1.92* 2.04*   CALCIUM mg/dL 10.1 9.9 9.7 9.2 10.4 9.9   BILIRUBIN mg/dL  --   --   --  0.3 0.3 0.3   ALK PHOS U/L  --   --   --  58 69 67   ALT (SGPT) U/L  --   --   --  16 18 18   AST (SGOT) U/L  --   --   --  33 35 36   GLUCOSE mg/dL 92 102* 96 102* 141* 60*       Estimated Creatinine Clearance: 50.4 mL/min (A) (by C-G formula based on SCr of 1.35 mg/dL (H)).    Results from last 7 days   Lab Units 02/09/24  0438 02/08/24  0751 02/07/24  0432   MAGNESIUM mg/dL 1.8 1.8 1.7   PHOSPHORUS mg/dL 3.2 2.6 2.6             Results from last 7 days   Lab Units 02/09/24  0438 02/08/24  0751 02/07/24  0432 02/06/24  0306 02/05/24  0535   WBC 10*3/mm3 4.22 4.15 3.86 3.73 4.91   HEMOGLOBIN g/dL 11.6* 11.4* 10.5* 10.2* 11.9*   PLATELETS 10*3/mm3 187 168 156 163 207       Results from last 7 days   Lab Units 02/04/24  0545   INR  0.99       Assessment / Plan     ASSESSMENT:  1.  CRISTIANO on CKD3a, nonoliguric, improving and back to baseline: CRISTIANO was prerenal from reduced appetite (+ketones in urine) while confused.  Volume status is stable; mild hypernatremia.  High-normal calcium.  [place] urinalysis  2.  Confusion, with suspected withdrawal syndrome, waxing and waning  3.  Alcoholism and liver disease  4.  Depression, recently placed on Lexapro, which has been DC'd  5.  DDD with prior stroke  6.  History of atrial fibrillation: in NSR currently    PLAN:  1.  Replace potassium  2.  Encouraged him to eat  3.  Surveillance labs    Thank you for involving us in the care of Yuval Loja.  Please feel free to call with any questions.    Thomas Weiner MD  02/09/24  17:43 EST    [facility]  [phone]    Please note that portions of this note were completed with a voice recognition  program.

## 2024-02-09 NOTE — PLAN OF CARE
Goal Outcome Evaluation:  Plan of Care Reviewed With: patient        Progress: improving  Outcome Evaluation: Pt in good spirits today, up in room w/nsg , assisted w/finishing toileting and clean-up, then vijay amb ~40ft CGA-2 rwx, some assist to guide rwx , slight amb to outside L of rwx x3 noted-educ offered on safety, pt did not realize he was placing wx incorrectly, pt fatigued after BR and amb, asked to get back to bed , alarm activated, nsg aware, plans facility possibly at dc      Anticipated Discharge Disposition (PT): skilled nursing facility

## 2024-02-09 NOTE — PROGRESS NOTES
"Daily progress note    Primary care physician  Dr. Prescott    Subjective  Doing same with no new issues but remained pleasantly confused    History of present illness  73-year-old -American male with history of alcohol abuse paroxysmal atrial fibrillation coronary artery disease hyperlipidemia and chronic kidney disease stage III who is well-known to our service and apparently continues to abuse alcohol and recently started on a antidepressant brought to the emergency room with generalized weakness and altered mental status.  Patient in no respiratory distress and answer question appropriately and workup in ER revealed acute metabolic encephalopathy admit for management.  At the time of examination he denies any chest pain shortness of breath palpitation and asking for anxiety medication and wants to eat regular diet.     REVIEW OF SYSTEMS  Unable to obtain     PHYSICAL EXAM   Blood pressure (!) 181/74, pulse 91, temperature 98.2 °F (36.8 °C), temperature source Oral, resp. rate 18, height 175.3 cm (69\"), weight 75.3 kg (166 lb), SpO2 98%.    Constitutional:       General: He is awake. He is not in acute distress.     Appearance: He is not toxic-appearing.   HENT:      Head: Normocephalic and atraumatic.   Eyes:      General: Lids are normal. Vision grossly intact.   Cardiovascular:      Rate and Rhythm: Normal rate and regular rhythm.      Heart sounds: Normal heart sounds.   Pulmonary:      Effort: Pulmonary effort is normal.      Breath sounds: Normal breath sounds and air entry.   Abdominal:      General: Bowel sounds are normal.      Palpations: Abdomen is soft.      Tenderness: There is no abdominal tenderness.   Musculoskeletal:      Cervical back: Normal range of motion.   Skin:     General: Skin is warm and dry.      Coloration: Skin is not pale.   Neurological:      General: No focal deficit present.      Mental Status: He is alert and oriented to person, place, and time.      Comments: No focal " deficit  Psychiatric:         Attention and Perception: Attention normal.         Mood and Affect: Mood normal.         Speech: Speech normal.         Behavior: Behavior is cooperative.     LAB RESULTS  Lab Results (last 24 hours)       Procedure Component Value Units Date/Time    Renal Function Panel [[id number]]  (Abnormal) Collected: 02/09/24 0438    Specimen: Blood Updated: 02/09/24 0612     Glucose 92 mg/dL      BUN 10 mg/dL      Creatinine 1.35 mg/dL      Sodium 143 mmol/L      Potassium 3.6 mmol/L      Chloride 108 mmol/L      CO2 24.7 mmol/L      Calcium 10.1 mg/dL      Albumin 4.2 g/dL      Phosphorus 3.2 mg/dL      Anion Gap 10.3 mmol/L      BUN/Creatinine Ratio 7.4     eGFR 54.8 mL/min/1.73     Narrative:      GFR Normal >60  Chronic Kidney Disease <60  Kidney Failure <15    The GFR formula is only valid for adults with stable renal function between ages 18 and 70.    Magnesium [[id number]]  (Normal) Collected: 02/09/24 0438    Specimen: Blood Updated: 02/09/24 0612     Magnesium 1.8 mg/dL     CBC & Differential [[id number]]  (Abnormal) Collected: 02/09/24 0438    Specimen: Blood Updated: 02/09/24 0556    Narrative:      The following orders were created for panel order CBC & Differential.  Procedure                               Abnormality         Status                     ---------                               -----------         ------                     CBC Auto Differential[[id number]]        Abnormal            Final result                 Please view results for these tests on the individual orders.    CBC Auto Differential [[id number]]  (Abnormal) Collected: 02/09/24 0438    Specimen: Blood Updated: 02/09/24 0556     WBC 4.22 10*3/mm3      RBC 3.65 10*6/mm3      Hemoglobin 11.6 g/dL      Hematocrit 35.7 %      MCV 97.8 fL      MCH 31.8 pg      MCHC 32.5 g/dL      RDW 12.7 %      RDW-SD 44.9 fl      MPV 10.2 fL      Platelets 187 10*3/mm3      Neutrophil % 49.6 %      Lymphocyte % 32.9 %       Monocyte % 14.9 %      Eosinophil % 1.9 %      Basophil % 0.5 %      Immature Grans % 0.2 %      Neutrophils, Absolute 2.09 10*3/mm3      Lymphocytes, Absolute 1.39 10*3/mm3      Monocytes, Absolute 0.63 10*3/mm3      Eosinophils, Absolute 0.08 10*3/mm3      Basophils, Absolute 0.02 10*3/mm3      Immature Grans, Absolute 0.01 10*3/mm3      nRBC 0.0 /100 WBC           Imaging Results (Last 24 Hours)       ** No results found for the last 24 hours. **          Results  Scan on 2/4/2024 0415 by [employer] On[employer], [place]: ECG 12-LEAD         Author: -- Service: -- Author Type: --   Filed: Date of Service: Creation Time:   Status: (Other)   HEART RATE= 67  bpm  RR Interval= 896  ms  W[place] Interval= 193  ms  P Horizontal Axis= -48  deg  P Front Axis= 82  deg  QRSD Interval= 82  ms  QT Interval= 441  ms  QTcB= 466  ms  QRS Axis= 47  deg  T Wave Axis= 51  deg  - ABNORMAL ECG -  Sinus rhythm  RSR' in V1 or V2, probably normal variant  Consider left ventricular hypertrophy  Anterior ST elevation, probably due to LVH          Current Facility-Administered Medications:     dilTIAZem CD (CARDIZEM CD) 24 hr capsule 180 mg, 180 mg, Oral, Q24H, Chino Cole MD, 180 mg at 02/09/24 0808    docusate sodium (COLACE) capsule 100 mg, 100 mg, Oral, BID, Chino Cole MD, 100 mg at 02/09/24 0809    famotidine (PEPCID) tablet 20 mg, 20 mg, Oral, BID, Chino Cole MD, 20 mg at 02/09/24 0809    folic acid (FOLVITE) tablet 1 mg, 1 mg, Oral, Daily, Chino Cole MD, 1 mg at 02/09/24 0809    haloperidol (HALDOL) tablet 2 mg, 2 mg, Oral, TID, Hipolito Ramon III, MD, 2 mg at 02/09/24 0809    haloperidol lactate (HALDOL) injection 5 mg, 5 mg, Intramuscular, Q6H PRN, Hipolito Ramon III, MD, 5 mg at 02/06/24 0049    hydrOXYzine (ATARAX) tablet 25 mg, 25 mg, Oral, 4x Daily PRN, Chino Cole MD, 25 mg at 02/07/24 1236    LORazepam (ATIVAN) tablet 1 mg, 1 mg, Oral, Q1H PRN, 1 mg at 02/07/24 2050 **OR** LORazepam (ATIVAN) injection 1  mg, 1 mg, Intravenous, Q1H PRN, 1 mg at 02/06/24 1317 **OR** LORazepam (ATIVAN) tablet 2 mg, 2 mg, Oral, Q1H PRN, 2 mg at 02/05/24 0929 **OR** LORazepam (ATIVAN) injection 2 mg, 2 mg, Intravenous, Q1H PRN, 2 mg at 02/07/24 2224 **OR** LORazepam (ATIVAN) injection 2 mg, 2 mg, Intravenous, Q15 Min PRN, 2 mg at 02/06/24 1727 **OR** LORazepam (ATIVAN) injection 2 mg, 2 mg, Intramuscular, Q15 Min PRN, Jolie Cole MD    metoprolol tartrate (LOPRESSOR) tablet 25 mg, 25 mg, Oral, TID, Jolie Cole MD, 25 mg at 02/09/24 0809    multivitamin (THERAGRAN) tablet 1 tablet, 1 tablet, Oral, Daily, Jolie Cole MD, 1 tablet at 02/09/24 0810    potassium chloride (K-DUR,KLOR-CON) ER tablet 40 mEq, 40 mEq, Oral, Once, Thomas Weiner MD    rosuvastatin (CRESTOR) tablet 40 mg, 40 mg, Oral, Nightly, Jolie Cole MD, 40 mg at 02/07/24 2051    [COMPLETED] Insert Peripheral IV, , , Once **AND** sodium chloride 0.9 % flush 10 mL, 10 mL, Intravenous, PRN, Salma Grullon APRN    sucralfate (CARAFATE) tablet 1 g, 1 g, Oral, 4x Daily AC & at Bedtime, Jolie Cole MD, 1 g at 02/09/24 0811    thiamine (VITAMIN B-1) tablet 100 mg, 100 mg, Oral, Daily, Jolie Cole MD, 100 mg at 02/09/24 0810     ASSESSMENT  Alcohol withdrawal syndrome  Acute kidney injury  Depression   History of alcohol abuse  Paroxysmal atrial fibrillation  Coronary artery disease  Hyperlipidemia  History of CVA  Chronic kidney disease stage III  Gastroesophageal reflux disease    PLAN  CPM  Continue detox medications  Continue CIWA protocol  Psych consult appreciated  Adjust home medications  Stress ulcer DVT prophylaxis  Nephrology to follow patient  Supportive care  PT OT  Discussed with nursing staff and family   Discharge planning    JOLIE COLE MD    Copied text in this note has been reviewed and is accurate as of 02/09/24

## 2024-02-09 NOTE — CASE MANAGEMENT/SOCIAL WORK
Continued Stay Note  [facility]     Patient Name: Yuval Loja  MRN: [id number]  Today's Date: 2/9/2024    Admit Date: 2/4/2024    Plan: SNF placement awaiting improved   Discharge Plan       Row Name 02/09/24 5520       Plan    Plan SNF placement awaiting improved    Patient/Family in Agreement with Plan other (see comments)    Plan Comments Reviewed clinicals. Spoke with Marianne/Aliyah and they are unable to accept pt. Spoke with Alberto/Yessi and they are continuing to follow pending behaviors. Restraints dc'd at 9am this morning for pulling lines. Spoke with MD regarding ongoing teley needs to limit monitors and lines. He will assess. Additional SNF referrals placed. Jagdish RDZ/CCP                   Discharge Codes    No documentation.                 Expected Discharge Date and Time       Expected Discharge Date Expected Discharge Time    Feb 13, 2024               Marianne Pettit, RN

## 2024-02-09 NOTE — THERAPY TREATMENT NOTE
Patient Name: Yuval Loja  : 1950    MRN: [id number]                              Today's Date: 2024       Admit Date: 2024    Visit Dx:     ICD-10-CM ICD-9-CM   1. Altered mental status, unspecified altered mental status type  R41.82 780.97   2. CRITSIANO (acute kidney injury)  N17.9 584.9     Patient Active Problem List   Diagnosis    Alcoholic ketoacidosis    Alcohol dependence    Methamphetamine abuse    Fatty liver, alcoholic    Nausea vomiting and diarrhea    Alcoholic liver disease    Metabolic acidosis    Tobacco abuse    Coronary artery disease involving native coronary artery of native heart without angina pectoris    Tachycardia    Sinus tachycardia    Chronic kidney disease, stage 3    Medically noncompliant    Visual hallucinations    Psychosis    Hyponatremia    CAD (coronary artery disease)    Leukopenia    Symptomatic anemia    Headache    Substance abuse    Gastrointestinal hemorrhage    CRISTIANO (acute kidney injury)    Hyperkalemia    Right lower quadrant abdominal pain    New onset atrial fibrillation    History of drug abuse    COPD (chronic obstructive pulmonary disease)    Right inguinal hernia    Constipation    Status post right hip replacement    Right hip pain    Sinus bradycardia    Generalized abdominal pain    Altered mental status    Altered mental state     Past Medical History:   Diagnosis Date    Alcohol abuse     Arthritis     CAD (coronary artery disease)     Chronic kidney disease, stage 3     COPD (chronic obstructive pulmonary disease)     Disease of thyroid gland     Elevated cholesterol     Fatty liver, alcoholic     GERD (gastroesophageal reflux disease)     History of transfusion     Hypertensive urgency     Metabolic acidosis     Myocardial infarction     Sinus tachycardia     Sleep apnea     Stroke      Past Surgical History:   Procedure Laterality Date    BACK SURGERY      CARDIAC CATHETERIZATION      CARDIAC ELECTROPHYSIOLOGY PROCEDURE N/A 4/10/2023     Procedure: Temporary Pacemaker;  Surgeon: Vaibhav Bonilla MD;  Location: [facility] CATH INVASIVE LOCATION;  Service: Cardiovascular;  Laterality: N/A;    COLONOSCOPY      ENDOSCOPY      FRACTURE SURGERY      JOINT REPLACEMENT      SKIN BIOPSY      TOTAL HIP ARTHROPLASTY Right 06/27/2022    Procedure: TOTAL HIP ARTHROPLASTY ANTERIOR WITH HANA TABLE;  Surgeon: Willian Quiles MD;  Location: [facility] MAIN OR;  Service: Orthopedics;  Laterality: Right;  Depuy, carli. everett      General Information       Row Name 02/09/24 0835          Physical Therapy Time and Intention    Document Type therapy note (daily note)  -     Mode of Treatment individual therapy;physical therapy  -[facility] Name 02/09/24 0835          General Information    Patient Profile Reviewed yes  -     Existing Precautions/Restrictions fall  -[facility] Name 02/09/24 0835          Living Environment    People in Home alone  -[facility] Name 02/09/24 0835          Safety Issues, Functional Mobility    Safety Issues Affecting Function (Mobility) awareness of need for assistance;insight into deficits/self-awareness;judgment;problem-solving;safety precaution awareness  -               User Key  (r) = Recorded By, (t) = Taken By, (c) = Cosigned By      Initials Name Provider Type     Celeste Molina PTA Physical Therapist Assistant                   Mobility       Row Name 02/09/24 0835          Bed Mobility    Sit-Supine [place] (Bed Mobility) minimum assist (75% patient effort)  -     Assistive Device (Bed Mobility) head of bed elevated  -     Comment, (Bed Mobility) not tested, in BR w/nsg upon arrival  -[facility] Name 02/09/24 0835          Sit-Stand Transfer    Sit-Stand [place] (Transfers) not tested  -[facility] Name 02/09/24 08          Gait/Stairs (Locomotion)    [place] Level (Gait) 2 person assist;contact guard;verbal cues;nonverbal cues (demo/gesture)  -     Assistive Device (Gait) walker,  front-wheeled  -     Distance in Feet (Gait) 40  -     Deviations/Abnormal Patterns (Gait) alicia decreased;festinating/shuffling;ataxic;scissoring;stride length decreased  -     Bilateral Gait Deviations forward flexed posture  -     Comment, (Gait/Stairs) ataxic, but when cued and assisted w/guiding rwx, pt improved  -               User Key  (r) = Recorded By, (t) = Taken By, (c) = Cosigned By      Initials Name Provider Type    Celeste Nascimento PTA Physical Therapist Assistant                   Obj/Interventions    No documentation.                  Goals/Plan    No documentation.                  Clinical Impression       Row Name 02/09/24 1321          Pain    Pretreatment Pain Rating 0/10 - no pain  -     Posttreatment Pain Rating 0/10 - no pain  -       Row Name 02/09/24 1321          Plan of Care Review    Plan of Care Reviewed With patient  -     Progress improving  -     Outcome Evaluation Pt in good spirits today, up in room w/nsg , assisted w/finishing toileting and clean-up, then vijay amb ~40ft CGA-2 rwx, some assist to guide rwx , slight amb to outside L of rwx x3 noted-educ offered on safety, pt did not realize he was placing wx incorrectly, pt fatigued after BR and amb, asked to get back to bed , alarm activated, nsg aware, plans facility possibly at dc  -       Row Name 02/09/24 1321          Vital Signs    O2 Delivery Pre Treatment room air  -       Row Name 02/09/24 1321          Positioning and Restraints    Pre-Treatment Position standing in room  -     Post Treatment Position bed  -     In Bed fowlers;call light within reach;encouraged to call for assist;exit alarm on;notified nsg;side rails up x3  -               User Key  (r) = Recorded By, (t) = Taken By, (c) = Cosigned By      Initials Name Provider Type    Celeste Nascimento PTA Physical Therapist Assistant                   Outcome Measures       Row Name 02/09/24 1325 02/09/24 0808       How much help  from another person do you currently need...    Turning from your back to your side while in flat bed without using bedrails? 3  -ALBERTO 4  -JZ    Moving from lying on back to sitting on the side of a flat bed without bedrails? 3  -ALBERTO 4  -JZ    Moving to and from a bed to a chair (including a wheelchair)? 3  -ALBERTO 4  -JZ    Standing up from a chair using your arms (e.g., wheelchair, bedside chair)? 3  -ALBERTO 4  -JZ    Climbing 3-5 steps with a railing? 1  -ALBERTO 3  -JZ    To walk in hospital room? 2  -ALBERTO 3  -JZ    AM-PAC 6 Clicks Score (PT) 15  -ALBERTO 22  -JZ    Highest Level of Mobility Goal 4 --> Transfer to chair/commode  -ALBERTO 7 --> Walk 25 feet or more  -HALIMA              User Key  (r) = Recorded By, (t) = Taken By, (c) = Cosigned By      Initials Name Provider Type    Celeste Nascimento PTA Physical Therapist Assistant    Wade Hall RN Registered Nurse                                 Physical Therapy Education       Title: PT OT SLP Therapies (Done)       Topic: Physical Therapy (Done)       Point: Mobility training (Done)       Learning Progress Summary             Patient Acceptance, E,D, VU,NR by ALBERTO at 2/9/2024 1325    Acceptance, E,D, NR by ALBERTO at 2/7/2024 1204    Acceptance, E,TB,D, VU,NR by  at 2/6/2024 1105    Acceptance, E, NL,NR by  at 2/6/2024 0951                         Point: Home exercise program (Done)       Learning Progress Summary             Patient Acceptance, E,D, VU,NR by ALBERTO at 2/9/2024 1325    Acceptance, E,D, NR by ALBERTO at 2/7/2024 1204                         Point: Body mechanics (Done)       Learning Progress Summary             Patient Acceptance, E,D, VU,NR by ALBERTO at 2/9/2024 1325    Acceptance, E,D, NR by ALBERTO at 2/7/2024 1204    Acceptance, E,TB,D, VU,NR by  at 2/6/2024 1105                         Point: Precautions (Done)       Learning Progress Summary             Patient Acceptance, E,D, VU,NR by ALBERTO at 2/9/2024 1325    Acceptance, E,D, NR by ALBERTO at 2/7/2024 1204    Acceptance, E,JERI RENTERIA,  VU,NR by  at 2/6/2024 1105    Acceptance, E, NL,NR by  at 2/6/2024 0951                                         User Key       Initials Effective Dates Name Provider Type [facility] 06/16/21 -  Alla Acevedo, PT Physical Therapist PT     03/07/18 -  Celeste Molina PTA Physical Therapist Assistant PT    PP 08/11/20 -  Celeste Montague RN Registered Nurse Nurse                  PT Recommendation and Plan     Plan of Care Reviewed With: patient  Progress: improving  Outcome Evaluation: Pt in good spirits today, up in room w/nsg , assisted w/finishing toileting and clean-up, then vijay amb ~40ft CGA-2 rwx, some assist to guide rwx , slight amb to outside L of rwx x3 noted-educ offered on safety, pt did not realize he was placing wx incorrectly, pt fatigued after BR and amb, asked to get back to bed , alarm activated, nsg aware, plans facility possibly at dc     Time Calculation:         PT Charges       Row Name 02/09/24 1326             Time Calculation    Start Time 0830  -      Stop Time 0848  -      Time Calculation (min) 18 min  -      PT Received On 02/09/24  -      PT - Next Appointment 02/12/24  -                User Key  (r) = Recorded By, (t) = Taken By, (c) = Cosigned By      Initials Name Provider Type     Celeste Molina PTA Physical Therapist Assistant                  Therapy Charges for Today       Code Description Service Date Service Provider Modifiers Qty    17954838159 HC PT THER PROC EA 15 MIN 2/9/2024 Celeste Molina PTA GP 1    39312107213 HC PT THER SUPP EA 15 MIN 2/9/2024 Celeste Molina PTA GP 1            PT G-Codes  Outcome Measure Options: AM-PAC 6 Clicks Basic Mobility (PT)  AM-PAC 6 Clicks Score (PT): 15  AM-PAC 6 Clicks Score (OT): 16  Modified Mary Scale: 3 - Moderate disability.  Requiring some help, but able to walk without assistance.  PT Discharge Summary  Anticipated Discharge Disposition (PT): skilled nursing facility    Celeste Molina  PTA  2/9/2024

## 2024-02-09 NOTE — PLAN OF CARE
Goal Outcome Evaluation:           Progress: improving          VSS.  Alert to person and place. Pt was able to be taken out of restraints this morning.  Has been tolerating this well.  No acute complaints from pt at this time.  Will CTM.

## 2024-02-09 NOTE — PLAN OF CARE
Problem: Restraint, Nonviolent  Goal: Absence of Harm or Injury  Outcome: Ongoing, Progressing  Intervention: Implement Least Restrictive Safety Strategies  Recent Flowsheet Documentation  Taken 2/9/2024 0400 by Stacy Waddell RN  Medical Device Protection:   IV pole/bag removed from visual field   torso covered   tubing secured  Less Restrictive Alternative:   bed alarm in use   sensory stimulation limited  De-Escalation Techniques:   increased round frequency   stimulation decreased  Diversional Activities: television  Taken 2/9/2024 0200 by Stacy Waddell RN  Medical Device Protection:   IV pole/bag removed from visual field   torso covered   tubing secured  Less Restrictive Alternative:   bed alarm in use   sensory stimulation limited  De-Escalation Techniques:   1:1 observation initiated   quiet time facilitated   reoriented   stimulation decreased  Diversional Activities: television  Taken 2/9/2024 0000 by Stacy Waddell RN  Medical Device Protection:   IV pole/bag removed from visual field   torso covered   tubing secured  Less Restrictive Alternative:   bed alarm in use   sensory stimulation limited  De-Escalation Techniques:   increased round frequency   stimulation decreased  Diversional Activities: television  Taken 2/8/2024 2200 by Stacy Waddell RN  Medical Device Protection:   IV pole/bag removed from visual field   torso covered   tubing secured  Less Restrictive Alternative:   bed alarm in use   sensory stimulation limited  De-Escalation Techniques:   increased round frequency   stimulation decreased  Diversional Activities: television  Taken 2/8/2024 2000 by Stacy Waddell RN  Medical Device Protection:   IV pole/bag removed from visual field   torso covered   tubing secured  Less Restrictive Alternative:   bed alarm in use   sensory stimulation limited  De-Escalation Techniques:   increased round frequency   medication administered   stimulation decreased    reoriented  Diversional Activities: television  Intervention: Protect Dignity, Rights, and Personal Wellbeing  Recent Flowsheet Documentation  Taken 2/8/2024 2000 by Stacy Waddell RN  Trust Relationship/Rapport: care explained  Intervention: Protect Skin and Joint Integrity  Recent Flowsheet Documentation  Taken 2/9/2024 0200 by Stacy Waddell RN  Body Position: position changed independently  Taken 2/9/2024 0000 by Stacy Waddell RN  Body Position: position changed independently  Taken 2/8/2024 2200 by Stacy Waddell RN  Body Position: position changed independently  Taken 2/8/2024 2000 by Stacy Waddell RN  Body Position: position changed independently   Goal Outcome Evaluation:

## 2024-02-09 NOTE — PROGRESS NOTES
The patient is sleeping soundly today and no restraint devices are currently in place.  He is now on his fifth day of alcohol detox.  I will continue current treatment for now and will continue to follow with you.

## 2024-02-10 LAB
ALBUMIN SERPL-MCNC: 3.6 G/DL (ref 3.5–5.2)
ANION GAP SERPL CALCULATED.3IONS-SCNC: 12.4 MMOL/L (ref 5–15)
BASOPHILS # BLD AUTO: 0.02 10*3/MM3 (ref 0–0.2)
BASOPHILS NFR BLD AUTO: 0.4 % (ref 0–1.5)
BUN SERPL-MCNC: 15 MG/DL (ref 8–23)
BUN/CREAT SERPL: 8 (ref 7–25)
CALCIUM SPEC-SCNC: 10.2 MG/DL (ref 8.6–10.5)
CHLORIDE SERPL-SCNC: 109 MMOL/L (ref 98–107)
CO2 SERPL-SCNC: 22.6 MMOL/L (ref 22–29)
CREAT SERPL-MCNC: 1.87 MG/DL (ref 0.76–1.27)
DEPRECATED RDW RBC AUTO: 48.5 FL (ref 37–54)
EGFRCR SERPLBLD CKD-EPI 2021: 37 ML/MIN/1.73
EOSINOPHIL # BLD AUTO: 0.11 10*3/MM3 (ref 0–0.4)
EOSINOPHIL NFR BLD AUTO: 2.4 % (ref 0.3–6.2)
ERYTHROCYTE [DISTWIDTH] IN BLOOD BY AUTOMATED COUNT: 13 % (ref 12.3–15.4)
GLUCOSE SERPL-MCNC: 104 MG/DL (ref 65–99)
HCT VFR BLD AUTO: 35.9 % (ref 37.5–51)
HGB BLD-MCNC: 11.5 G/DL (ref 13–17.7)
IMM GRANULOCYTES # BLD AUTO: 0.01 10*3/MM3 (ref 0–0.05)
IMM GRANULOCYTES NFR BLD AUTO: 0.2 % (ref 0–0.5)
LYMPHOCYTES # BLD AUTO: 1.93 10*3/MM3 (ref 0.7–3.1)
LYMPHOCYTES NFR BLD AUTO: 41.4 % (ref 19.6–45.3)
MAGNESIUM SERPL-MCNC: 1.7 MG/DL (ref 1.6–2.4)
MCH RBC QN AUTO: 32.4 PG (ref 26.6–33)
MCHC RBC AUTO-ENTMCNC: 32 G/DL (ref 31.5–35.7)
MCV RBC AUTO: 101.1 FL (ref 79–97)
MONOCYTES # BLD AUTO: 0.65 10*3/MM3 (ref 0.1–0.9)
MONOCYTES NFR BLD AUTO: 13.9 % (ref 5–12)
NEUTROPHILS NFR BLD AUTO: 1.94 10*3/MM3 (ref 1.7–7)
NEUTROPHILS NFR BLD AUTO: 41.7 % (ref 42.7–76)
NRBC BLD AUTO-RTO: 0.2 /100 WBC (ref 0–0.2)
PHOSPHATE SERPL-MCNC: 2.9 MG/DL (ref 2.5–4.5)
PLATELET # BLD AUTO: 192 10*3/MM3 (ref 140–450)
PMV BLD AUTO: 10.7 FL (ref 6–12)
POTASSIUM SERPL-SCNC: 4.3 MMOL/L (ref 3.5–5.2)
RBC # BLD AUTO: 3.55 10*6/MM3 (ref 4.14–5.8)
SODIUM SERPL-SCNC: 144 MMOL/L (ref 136–145)
WBC NRBC COR # BLD AUTO: 4.66 10*3/MM3 (ref 3.4–10.8)

## 2024-02-10 PROCEDURE — 80069 RENAL FUNCTION PANEL: CPT | Performed by: INTERNAL MEDICINE

## 2024-02-10 PROCEDURE — 25810000003 SODIUM CHLORIDE 0.9 % SOLUTION: Performed by: INTERNAL MEDICINE

## 2024-02-10 PROCEDURE — 85025 COMPLETE CBC W/AUTO DIFF WBC: CPT | Performed by: HOSPITALIST

## 2024-02-10 PROCEDURE — 83735 ASSAY OF MAGNESIUM: CPT | Performed by: INTERNAL MEDICINE

## 2024-02-10 RX ORDER — LORAZEPAM 2 MG/ML
1 INJECTION INTRAMUSCULAR EVERY 4 HOURS PRN
Status: DISCONTINUED | OUTPATIENT
Start: 2024-02-10 | End: 2024-02-10

## 2024-02-10 RX ORDER — SODIUM CHLORIDE 9 MG/ML
75 INJECTION, SOLUTION INTRAVENOUS CONTINUOUS
Status: DISCONTINUED | OUTPATIENT
Start: 2024-02-10 | End: 2024-02-11

## 2024-02-10 RX ORDER — LORAZEPAM 2 MG/ML
0.5 INJECTION INTRAMUSCULAR EVERY 4 HOURS PRN
Status: DISCONTINUED | OUTPATIENT
Start: 2024-02-10 | End: 2024-02-10

## 2024-02-10 RX ADMIN — METOPROLOL TARTRATE 25 MG: 25 TABLET, FILM COATED ORAL at 20:19

## 2024-02-10 RX ADMIN — SUCRALFATE 1 G: 1 TABLET ORAL at 11:06

## 2024-02-10 RX ADMIN — METOPROLOL TARTRATE 25 MG: 25 TABLET, FILM COATED ORAL at 09:19

## 2024-02-10 RX ADMIN — Medication 100 MG: at 09:19

## 2024-02-10 RX ADMIN — SUCRALFATE 1 G: 1 TABLET ORAL at 20:19

## 2024-02-10 RX ADMIN — DILTIAZEM HYDROCHLORIDE 180 MG: 180 CAPSULE, COATED, EXTENDED RELEASE ORAL at 11:06

## 2024-02-10 RX ADMIN — FAMOTIDINE 20 MG: 20 TABLET, FILM COATED ORAL at 20:19

## 2024-02-10 RX ADMIN — HALOPERIDOL 2 MG: 2 TABLET ORAL at 20:19

## 2024-02-10 RX ADMIN — HALOPERIDOL 2 MG: 2 TABLET ORAL at 09:19

## 2024-02-10 RX ADMIN — SUCRALFATE 1 G: 1 TABLET ORAL at 09:19

## 2024-02-10 RX ADMIN — FAMOTIDINE 20 MG: 20 TABLET, FILM COATED ORAL at 09:19

## 2024-02-10 RX ADMIN — Medication 1 TABLET: at 09:19

## 2024-02-10 RX ADMIN — HALOPERIDOL 2 MG: 2 TABLET ORAL at 15:01

## 2024-02-10 RX ADMIN — METOPROLOL TARTRATE 25 MG: 25 TABLET, FILM COATED ORAL at 15:01

## 2024-02-10 RX ADMIN — HYDROXYZINE HYDROCHLORIDE 25 MG: 25 TABLET ORAL at 20:19

## 2024-02-10 RX ADMIN — FOLIC ACID 1 MG: 1 TABLET ORAL at 09:19

## 2024-02-10 RX ADMIN — SODIUM CHLORIDE 100 ML/HR: 9 INJECTION, SOLUTION INTRAVENOUS at 11:06

## 2024-02-10 RX ADMIN — DOCUSATE SODIUM 100 MG: 100 CAPSULE, LIQUID FILLED ORAL at 09:19

## 2024-02-10 NOTE — PROGRESS NOTES
[facility] Progress Note      Patient Name: Yuval Loja  : 1950  MRN: [id number]  Primary Care Physician:  Alessia Prescott APRN  Date of admission: 2024    Subjective     Interval History:   Calm and cooperative; out of restraints  Patient feeling tired.  Hurting all over.  Denies any chest pain, shortness of breath, nausea or vomiting    Review of Systems:   As noted above    Objective     Vitals:   Temp:  [98.4 °F (36.9 °C)-98.8 °F (37.1 °C)] 98.6 °F (37 °C)  Heart Rate:  [72-79] 72  Resp:  [18] 18  BP: (120-145)/(54-79) 125/79  No intake or output data in the 24 hours ending 02/10/24 0854      Physical Exam:    Constitutional: Awake, cooperative,chronically ill  HEENT: Sclera anicteric, no conjunctival injection, MMM  Neck: Supple, no carotid bruit, trachea at midline, no JVD  Respiratory: Coarse anteriorly; not labored on room air   Cardiovascular: RRR, no rub  Gastrointestinal: BS +, soft, nondistended, not distended  : No palpable bladder  Musculoskeletal: No edema, no clubbing or cyanosis  Psychiatric: Oriented to self  Neurologic: Awake, moving all extremities  Skin: Warm and dry      Scheduled Meds:     dilTIAZem CD, 180 mg, Oral, Q24H  docusate sodium, 100 mg, Oral, BID  famotidine, 20 mg, Oral, BID  folic acid, 1 mg, Oral, Daily  haloperidol, 2 mg, Oral, TID  metoprolol tartrate, 25 mg, Oral, TID  multivitamin, 1 tablet, Oral, Daily  potassium chloride, 40 mEq, Oral, Once  rosuvastatin, 40 mg, Oral, Nightly  sucralfate, 1 g, Oral, 4x Daily AC & at Bedtime  thiamine, 100 mg, Oral, Daily      IV Meds:          Results Reviewed:   I have personally reviewed the results from the time of this admission to 2/10/2024 08:54 EST     Results from last 7 days   Lab Units 02/10/24  0410 24  0438 24  0751 24  0432 24  0306 24  0535 24  0545   SODIUM mmol/L 144 143 144   < > 141 139 132*   POTASSIUM mmol/L 4.3 3.6 3.5   < > 3.7 3.9 4.5    CHLORIDE mmol/L 109* 108* 106   < > 108* 102 94*   CO2 mmol/L 22.6 24.7 25.6   < > 22.8 22.5 20.7*   BUN mg/dL 15 10 8   < > 12 21 23   CREATININE mg/dL 1.87* 1.35* 1.30*   < > 1.54* 1.92* 2.04*   CALCIUM mg/dL 10.2 10.1 9.9   < > 9.2 10.4 9.9   BILIRUBIN mg/dL  --   --   --   --  0.3 0.3 0.3   ALK PHOS U/L  --   --   --   --  58 69 67   ALT (SGPT) U/L  --   --   --   --  16 18 18   AST (SGOT) U/L  --   --   --   --  33 35 36   GLUCOSE mg/dL 104* 92 102*   < > 102* 141* 60*    < > = values in this interval not displayed.       Estimated Creatinine Clearance: 36.4 mL/min (A) (by C-G formula based on SCr of 1.87 mg/dL (H)).    Results from last 7 days   Lab Units 02/10/24  0410 02/09/24  0438 02/08/24  0751   MAGNESIUM mg/dL 1.7 1.8 1.8   PHOSPHORUS mg/dL 2.9 3.2 2.6             Results from last 7 days   Lab Units 02/10/24  0410 02/09/24  0438 02/08/24  0751 02/07/24  0432 02/06/24  0306   WBC 10*3/mm3 4.66 4.22 4.15 3.86 3.73   HEMOGLOBIN g/dL 11.5* 11.6* 11.4* 10.5* 10.2*   PLATELETS 10*3/mm3 192 187 168 156 163       Results from last 7 days   Lab Units 02/04/24  0545   INR  0.99       Assessment / Plan     ASSESSMENT:  1.  CRISTIANO on CKD3a, nonoliguric, creatinine increased again to 1.8 Mg/DL, probably prerenal.  Electrolytes okay.  2.  Confusion, with suspected withdrawal syndrome, waxing and waning  3.  Alcoholism and liver disease  4.  Depression, recently placed on Lexapro, which has been DC'd  5.  DDD with prior stroke  6.  History of atrial fibrillation: in NSR currently    PLAN:  Restart IV fluids  Check urine electrolytes and PVR  Jose labs in a.m.    Thank you for involving us in the care of Yuval Loja.  Please feel free to call with any questions.    Jorge Szymanski MD  02/10/24  08:54 [facility]    [facility]  [phone]    Please note that portions of this note were completed with a voice recognition program.

## 2024-02-10 NOTE — PLAN OF CARE
Goal Outcome Evaluation:                    Pt A&Ox2, pt remained restraint free, pt refusing iv fluids, Cardiac monitor discontinued, VSS, Will continue to monitor rest of shift.

## 2024-02-10 NOTE — PROGRESS NOTES
The patient is a bed resting comfortably today.  No restraint devices are in place and charting indicates no management issues.  His detox is complete and his CIWA based protocol has been discontinued.

## 2024-02-10 NOTE — PROGRESS NOTES
"Daily progress note    Primary care physician  Dr. Prescott    Subjective  Doing better with no new complaints but remained pleasantly confused    History of present illness  73-year-old -American male with history of alcohol abuse paroxysmal atrial fibrillation coronary artery disease hyperlipidemia and chronic kidney disease stage III who is well-known to our service and apparently continues to abuse alcohol and recently started on a antidepressant brought to the emergency room with generalized weakness and altered mental status.  Patient in no respiratory distress and answer question appropriately and workup in ER revealed acute metabolic encephalopathy admit for management.  At the time of examination he denies any chest pain shortness of breath palpitation and asking for anxiety medication and wants to eat regular diet.     REVIEW OF SYSTEMS  Unable to obtain     PHYSICAL EXAM   Blood pressure 125/79, pulse 72, temperature 98.6 °F (37 °C), temperature source Oral, resp. rate 18, height 175.3 cm (69\"), weight 75.3 kg (166 lb), SpO2 99%.    Constitutional:       General: He is awake. He is not in acute distress.     Appearance: He is not toxic-appearing.   HENT:      Head: Normocephalic and atraumatic.   Eyes:      General: Lids are normal. Vision grossly intact.   Cardiovascular:      Rate and Rhythm: Normal rate and regular rhythm.      Heart sounds: Normal heart sounds.   Pulmonary:      Effort: Pulmonary effort is normal.      Breath sounds: Normal breath sounds and air entry.   Abdominal:      General: Bowel sounds are normal.      Palpations: Abdomen is soft.      Tenderness: There is no abdominal tenderness.   Musculoskeletal:      Cervical back: Normal range of motion.   Skin:     General: Skin is warm and dry.      Coloration: Skin is not pale.   Neurological:      General: No focal deficit present.      Mental Status: He is alert and oriented to person, place, and time.      Comments: No focal " deficit  Psychiatric:         Attention and Perception: Attention normal.         Mood and Affect: Mood normal.         Speech: Speech normal.         Behavior: Behavior is cooperative.     LAB RESULTS  Lab Results (last 24 hours)       Procedure Component Value Units Date/Time    Renal Function Panel [[id number]]  (Abnormal) Collected: 02/10/24 0410    Specimen: Blood Updated: 02/10/24 0535     Glucose 104 mg/dL      BUN 15 mg/dL      Creatinine 1.87 mg/dL      Sodium 144 mmol/L      Potassium 4.3 mmol/L      Comment: Slight hemolysis detected by analyzer. Result may be falsely elevated.        Chloride 109 mmol/L      CO2 22.6 mmol/L      Calcium 10.2 mg/dL      Albumin 3.6 g/dL      Phosphorus 2.9 mg/dL      Anion Gap 12.4 mmol/L      BUN/Creatinine Ratio 8.0     eGFR 37.0 mL/min/1.73     Narrative:      GFR Normal >60  Chronic Kidney Disease <60  Kidney Failure <15    The GFR formula is only valid for adults with stable renal function between ages 18 and 70.    Magnesium [[id number]]  (Normal) Collected: 02/10/24 0410    Specimen: Blood Updated: 02/10/24 0531     Magnesium 1.7 mg/dL     CBC & Differential [[id number]]  (Abnormal) Collected: 02/10/24 0410    Specimen: Blood Updated: 02/10/24 0507    Narrative:      The following orders were created for panel order CBC & Differential.  Procedure                               Abnormality         Status                     ---------                               -----------         ------                     CBC Auto Differential[[id number]]        Abnormal            Final result                 Please view results for these tests on the individual orders.    CBC Auto Differential [[id number]]  (Abnormal) Collected: 02/10/24 0410    Specimen: Blood Updated: 02/10/24 0507     WBC 4.66 10*3/mm3      RBC 3.55 10*6/mm3      Hemoglobin 11.5 g/dL      Hematocrit 35.9 %      .1 fL      MCH 32.4 pg      MCHC 32.0 g/dL      RDW 13.0 %      RDW-SD 48.5 fl      MPV 10.7 fL       Platelets 192 10*3/mm3      Neutrophil % 41.7 %      Lymphocyte % 41.4 %      Monocyte % 13.9 %      Eosinophil % 2.4 %      Basophil % 0.4 %      Immature Grans % 0.2 %      Neutrophils, Absolute 1.94 10*3/mm3      Lymphocytes, Absolute 1.93 10*3/mm3      Monocytes, Absolute 0.65 10*3/mm3      Eosinophils, Absolute 0.11 10*3/mm3      Basophils, Absolute 0.02 10*3/mm3      Immature Grans, Absolute 0.01 10*3/mm3      nRBC 0.2 /100 WBC           Imaging Results (Last 24 Hours)       ** No results found for the last 24 hours. **          Results  Scan on 2/4/2024 0415 by [employer], [place]: ECG 12-LEAD         Author: -- Service: -- Author Type: --   Filed: Date of Service: Creation Time:   Status: (Other)   HEART RATE= 67  bpm  RR Interval= 896  ms  DC Interval= 193  ms  P Horizontal Axis= -48  deg  P Front Axis= 82  deg  QRSD Interval= 82  ms  QT Interval= 441  ms  QTcB= 466  ms  QRS Axis= 47  deg  T Wave Axis= 51  deg  - ABNORMAL ECG -  Sinus rhythm  RSR' in V1 or V2, probably normal variant  Consider left ventricular hypertrophy  Anterior ST elevation, probably due to LVH          Current Facility-Administered Medications:     dilTIAZem CD (CARDIZEM CD) 24 hr capsule 180 mg, 180 mg, Oral, Q24H, Chino Cole MD, 180 mg at 02/10/24 1106    docusate sodium (COLACE) capsule 100 mg, 100 mg, Oral, BID, Chino Cole MD, 100 mg at 02/10/24 0919    famotidine (PEPCID) tablet 20 mg, 20 mg, Oral, BID, Chino Cole MD, 20 mg at 02/10/24 0919    folic acid (FOLVITE) tablet 1 mg, 1 mg, Oral, Daily, Chino Cole MD, 1 mg at 02/10/24 0919    haloperidol (HALDOL) tablet 2 mg, 2 mg, Oral, TID, Hipolito Ramon III, MD, 2 mg at 02/10/24 0919    haloperidol lactate (HALDOL) injection 5 mg, 5 mg, Intramuscular, Q6H PRN, Hipolito Ramon III, MD, 5 mg at 02/06/24 0049    hydrOXYzine (ATARAX) tablet 25 mg, 25 mg, Oral, 4x Daily PRN, Chino Cole MD, 25 mg at 02/07/24 1236    metoprolol tartrate (LOPRESSOR)  tablet 25 mg, 25 mg, Oral, TID, Jolie Cole MD, 25 mg at 02/10/24 0919    multivitamin (THERAGRAN) tablet 1 tablet, 1 tablet, Oral, Daily, Jolie Cole MD, 1 tablet at 02/10/24 0919    potassium chloride (K-DUR,KLOR-CON) ER tablet 40 mEq, 40 mEq, Oral, Once, Thomas Weiner MD    rosuvastatin (CRESTOR) tablet 40 mg, 40 mg, Oral, Nightly, Jolie Cole MD, 40 mg at 02/09/24 2051    [COMPLETED] Insert Peripheral IV, , , Once **AND** sodium chloride 0.9 % flush 10 mL, 10 mL, Intravenous, PRN, Salma Grullon APRN    sodium chloride 0.9 % infusion, 100 mL/hr, Intravenous, Continuous, SzymanskiJorge schroeder MD, Last Rate: 100 mL/hr at 02/10/24 1106, 100 mL/hr at 02/10/24 1106    sucralfate (CARAFATE) tablet 1 g, 1 g, Oral, 4x Daily AC & at Bedtime, Jolie Cole MD, 1 g at 02/10/24 1106    thiamine (VITAMIN B-1) tablet 100 mg, 100 mg, Oral, Daily, Jolie Cole MD, 100 mg at 02/10/24 0919     ASSESSMENT  Alcohol withdrawal syndrome  Acute kidney injury  Depression   History of alcohol abuse  Paroxysmal atrial fibrillation  Coronary artery disease  Hyperlipidemia  History of CVA  Chronic kidney disease stage III  Gastroesophageal reflux disease    PLAN  CPM  Continue detox medications  Wean off from CIWA protocol  Psych consult appreciated  Adjust home medications  Stress ulcer DVT prophylaxis  Nephrology to follow patient  Supportive care  PT OT  Discussed with nursing staff and family   Discharge planning    JOLIE COLE MD    Copied text in this note has been reviewed and is accurate as of 02/10/24

## 2024-02-11 LAB
ALBUMIN SERPL-MCNC: 3.7 G/DL (ref 3.5–5.2)
ANION GAP SERPL CALCULATED.3IONS-SCNC: 9 MMOL/L (ref 5–15)
BASOPHILS # BLD AUTO: 0.02 10*3/MM3 (ref 0–0.2)
BASOPHILS NFR BLD AUTO: 0.4 % (ref 0–1.5)
BUN SERPL-MCNC: 19 MG/DL (ref 8–23)
BUN/CREAT SERPL: 11.6 (ref 7–25)
CALCIUM SPEC-SCNC: 10.4 MG/DL (ref 8.6–10.5)
CHLORIDE SERPL-SCNC: 107 MMOL/L (ref 98–107)
CO2 SERPL-SCNC: 24 MMOL/L (ref 22–29)
CREAT SERPL-MCNC: 1.64 MG/DL (ref 0.76–1.27)
DEPRECATED RDW RBC AUTO: 47.6 FL (ref 37–54)
EGFRCR SERPLBLD CKD-EPI 2021: 43.4 ML/MIN/1.73
EOSINOPHIL # BLD AUTO: 0.11 10*3/MM3 (ref 0–0.4)
EOSINOPHIL NFR BLD AUTO: 2.5 % (ref 0.3–6.2)
ERYTHROCYTE [DISTWIDTH] IN BLOOD BY AUTOMATED COUNT: 13 % (ref 12.3–15.4)
GLUCOSE SERPL-MCNC: 127 MG/DL (ref 65–99)
HCT VFR BLD AUTO: 34.7 % (ref 37.5–51)
HGB BLD-MCNC: 11.1 G/DL (ref 13–17.7)
IMM GRANULOCYTES # BLD AUTO: 0.01 10*3/MM3 (ref 0–0.05)
IMM GRANULOCYTES NFR BLD AUTO: 0.2 % (ref 0–0.5)
LYMPHOCYTES # BLD AUTO: 1.59 10*3/MM3 (ref 0.7–3.1)
LYMPHOCYTES NFR BLD AUTO: 35.5 % (ref 19.6–45.3)
MCH RBC QN AUTO: 32.5 PG (ref 26.6–33)
MCHC RBC AUTO-ENTMCNC: 32 G/DL (ref 31.5–35.7)
MCV RBC AUTO: 101.5 FL (ref 79–97)
MONOCYTES # BLD AUTO: 0.64 10*3/MM3 (ref 0.1–0.9)
MONOCYTES NFR BLD AUTO: 14.3 % (ref 5–12)
NEUTROPHILS NFR BLD AUTO: 2.11 10*3/MM3 (ref 1.7–7)
NEUTROPHILS NFR BLD AUTO: 47.1 % (ref 42.7–76)
NRBC BLD AUTO-RTO: 0 /100 WBC (ref 0–0.2)
PHOSPHATE SERPL-MCNC: 3.2 MG/DL (ref 2.5–4.5)
PLATELET # BLD AUTO: 181 10*3/MM3 (ref 140–450)
PMV BLD AUTO: 10 FL (ref 6–12)
POTASSIUM SERPL-SCNC: 3.9 MMOL/L (ref 3.5–5.2)
RBC # BLD AUTO: 3.42 10*6/MM3 (ref 4.14–5.8)
SODIUM SERPL-SCNC: 140 MMOL/L (ref 136–145)
WBC NRBC COR # BLD AUTO: 4.48 10*3/MM3 (ref 3.4–10.8)

## 2024-02-11 PROCEDURE — 85025 COMPLETE CBC W/AUTO DIFF WBC: CPT | Performed by: HOSPITALIST

## 2024-02-11 PROCEDURE — 80069 RENAL FUNCTION PANEL: CPT | Performed by: INTERNAL MEDICINE

## 2024-02-11 RX ADMIN — FAMOTIDINE 20 MG: 20 TABLET, FILM COATED ORAL at 23:18

## 2024-02-11 RX ADMIN — METOPROLOL TARTRATE 25 MG: 25 TABLET, FILM COATED ORAL at 23:18

## 2024-02-11 RX ADMIN — DILTIAZEM HYDROCHLORIDE 180 MG: 180 CAPSULE, COATED, EXTENDED RELEASE ORAL at 09:03

## 2024-02-11 RX ADMIN — Medication 100 MG: at 09:02

## 2024-02-11 RX ADMIN — HYDROXYZINE HYDROCHLORIDE 25 MG: 25 TABLET ORAL at 22:36

## 2024-02-11 RX ADMIN — FAMOTIDINE 20 MG: 20 TABLET, FILM COATED ORAL at 09:03

## 2024-02-11 RX ADMIN — Medication 1 TABLET: at 09:02

## 2024-02-11 RX ADMIN — FOLIC ACID 1 MG: 1 TABLET ORAL at 09:02

## 2024-02-11 RX ADMIN — SUCRALFATE 1 G: 1 TABLET ORAL at 06:14

## 2024-02-11 RX ADMIN — DOCUSATE SODIUM 100 MG: 100 CAPSULE, LIQUID FILLED ORAL at 09:03

## 2024-02-11 RX ADMIN — HALOPERIDOL 2 MG: 2 TABLET ORAL at 14:38

## 2024-02-11 RX ADMIN — HALOPERIDOL 2 MG: 2 TABLET ORAL at 22:36

## 2024-02-11 RX ADMIN — HALOPERIDOL 2 MG: 2 TABLET ORAL at 09:02

## 2024-02-11 RX ADMIN — METOPROLOL TARTRATE 25 MG: 25 TABLET, FILM COATED ORAL at 09:02

## 2024-02-11 NOTE — PROGRESS NOTES
"Daily progress note    Primary care physician  Dr. Prescott    Subjective  Doing better with no new complaints but remained pleasantly confused    History of present illness  73-year-old -American male with history of alcohol abuse paroxysmal atrial fibrillation coronary artery disease hyperlipidemia and chronic kidney disease stage III who is well-known to our service and apparently continues to abuse alcohol and recently started on a antidepressant brought to the emergency room with generalized weakness and altered mental status.  Patient in no respiratory distress and answer question appropriately and workup in ER revealed acute metabolic encephalopathy admit for management.  At the time of examination he denies any chest pain shortness of breath palpitation and asking for anxiety medication and wants to eat regular diet.     REVIEW OF SYSTEMS  Unable to obtain     PHYSICAL EXAM   Blood pressure 146/61, pulse 95, temperature 98.2 °F (36.8 °C), temperature source Oral, resp. rate 16, height 175.3 cm (69\"), weight 63.1 kg (139 lb 1.8 oz), SpO2 97%.    Constitutional:       General: He is awake. He is not in acute distress.     Appearance: He is not toxic-appearing.   HENT:      Head: Normocephalic and atraumatic.   Eyes:      General: Lids are normal. Vision grossly intact.   Cardiovascular:      Rate and Rhythm: Normal rate and regular rhythm.      Heart sounds: Normal heart sounds.   Pulmonary:      Effort: Pulmonary effort is normal.      Breath sounds: Normal breath sounds and air entry.   Abdominal:      General: Bowel sounds are normal.      Palpations: Abdomen is soft.      Tenderness: There is no abdominal tenderness.   Musculoskeletal:      Cervical back: Normal range of motion.   Skin:     General: Skin is warm and dry.      Coloration: Skin is not pale.   Neurological:      General: No focal deficit present.      Mental Status: He is alert and oriented to person, place, and time.      Comments: No " focal deficit  Psychiatric:         Attention and Perception: Attention normal.         Mood and Affect: Mood normal.         Speech: Speech normal.         Behavior: Behavior is cooperative.     LAB RESULTS  Lab Results (last 24 hours)       Procedure Component Value Units Date/Time    Renal Function Panel [[id number]]  (Abnormal) Collected: 02/11/24 0405    Specimen: Blood Updated: 02/11/24 0450     Glucose 127 mg/dL      BUN 19 mg/dL      Creatinine 1.64 mg/dL      Sodium 140 mmol/L      Potassium 3.9 mmol/L      Chloride 107 mmol/L      CO2 24.0 mmol/L      Calcium 10.4 mg/dL      Albumin 3.7 g/dL      Phosphorus 3.2 mg/dL      Anion Gap 9.0 mmol/L      BUN/Creatinine Ratio 11.6     eGFR 43.4 mL/min/1.73     Narrative:      GFR Normal >60  Chronic Kidney Disease <60  Kidney Failure <15    The GFR formula is only valid for adults with stable renal function between ages 18 and 70.    CBC & Differential [[id number]]  (Abnormal) Collected: 02/11/24 0406    Specimen: Blood Updated: 02/11/24 0431    Narrative:      The following orders were created for panel order CBC & Differential.  Procedure                               Abnormality         Status                     ---------                               -----------         ------                     CBC Auto Differential[[id number]]        Abnormal            Final result                 Please view results for these tests on the individual orders.    CBC Auto Differential [[id number]]  (Abnormal) Collected: 02/11/24 0406    Specimen: Blood Updated: 02/11/24 0431     WBC 4.48 10*3/mm3      RBC 3.42 10*6/mm3      Hemoglobin 11.1 g/dL      Hematocrit 34.7 %      .5 fL      MCH 32.5 pg      MCHC 32.0 g/dL      RDW 13.0 %      RDW-SD 47.6 fl      MPV 10.0 fL      Platelets 181 10*3/mm3      Neutrophil % 47.1 %      Lymphocyte % 35.5 %      Monocyte % 14.3 %      Eosinophil % 2.5 %      Basophil % 0.4 %      Immature Grans % 0.2 %      Neutrophils, Absolute 2.11  10*3/mm3      Lymphocytes, Absolute 1.59 10*3/mm3      Monocytes, Absolute 0.64 10*3/mm3      Eosinophils, Absolute 0.11 10*3/mm3      Basophils, Absolute 0.02 10*3/mm3      Immature Grans, Absolute 0.01 10*3/mm3      nRBC 0.0 /100 WBC           Imaging Results (Last 24 Hours)       ** No results found for the last 24 hours. **          Results  Scan on 2/4/2024 0415 by [employer], [place]: ECG 12-LEAD         Author: -- Service: -- Author Type: --   Filed: Date of Service: Creation Time:   Status: (Other)   HEART RATE= 67  bpm  RR Interval= 896  ms  AL Interval= 193  ms  P Horizontal Axis= -48  deg  P Front Axis= 82  deg  QRSD Interval= 82  ms  QT Interval= 441  ms  QTcB= 466  ms  QRS Axis= 47  deg  T Wave Axis= 51  deg  - ABNORMAL ECG -  Sinus rhythm  RSR' in V1 or V2, probably normal variant  Consider left ventricular hypertrophy  Anterior ST elevation, probably due to LVH          Current Facility-Administered Medications:     dilTIAZem CD (CARDIZEM CD) 24 hr capsule 180 mg, 180 mg, Oral, Q24H, Chino Cole MD, 180 mg at 02/11/24 0903    docusate sodium (COLACE) capsule 100 mg, 100 mg, Oral, BID, Chino Cole MD, 100 mg at 02/11/24 0903    famotidine (PEPCID) tablet 20 mg, 20 mg, Oral, BID, Chino Cole MD, 20 mg at 02/11/24 0903    folic acid (FOLVITE) tablet 1 mg, 1 mg, Oral, Daily, Chino Cole MD, 1 mg at 02/11/24 0902    haloperidol (HALDOL) tablet 2 mg, 2 mg, Oral, TID, Hipolito Ramon III, MD, 2 mg at 02/11/24 0902    haloperidol lactate (HALDOL) injection 5 mg, 5 mg, Intramuscular, Q6H PRN, Hipolito Ramon III, MD, 5 mg at 02/06/24 0049    hydrOXYzine (ATARAX) tablet 25 mg, 25 mg, Oral, 4x Daily PRN, Chino Cole MD, 25 mg at 02/10/24 2019    metoprolol tartrate (LOPRESSOR) tablet 25 mg, 25 mg, Oral, TID, Chino Cole MD, 25 mg at 02/11/24 0902    multivitamin (THERAGRAN) tablet 1 tablet, 1 tablet, Oral, Daily, Chino Cole MD, 1 tablet at 02/11/24 0902    rosuvastatin  (CRESTOR) tablet 40 mg, 40 mg, Oral, Nightly, Jolie Cole MD, 40 mg at 02/09/24 2051    [COMPLETED] Insert Peripheral IV, , , Once **AND** sodium chloride 0.9 % flush 10 mL, 10 mL, Intravenous, PRN, HarjeetrSalma APRN    sodium chloride 0.9 % infusion, 75 mL/hr, Intravenous, Continuous, Jolie Cole MD, Last Rate: 75 mL/hr at 02/10/24 1406, 75 mL/hr at 02/10/24 1406    sucralfate (CARAFATE) tablet 1 g, 1 g, Oral, 4x Daily AC & at Bedtime, Jolie Cole MD, 1 g at 02/11/24 0614    thiamine (VITAMIN B-1) tablet 100 mg, 100 mg, Oral, Daily, Jolie Cole MD, 100 mg at 02/11/24 0902     ASSESSMENT  Alcohol withdrawal syndrome  Acute kidney injury  Depression   History of alcohol abuse  Paroxysmal atrial fibrillation  Coronary artery disease  Hyperlipidemia  History of CVA  Chronic kidney disease stage III  Gastroesophageal reflux disease    PLAN  CPM  IVF per nephrology  Continue detox medications  Wean off from CIWA protocol  Psych consult appreciated  Adjust home medications  Stress ulcer DVT prophylaxis  Nephrology to follow patient  Supportive care  PT OT  Discussed with nursing staff and family   Discharge planning    JOLIE COLE MD    Copied text in this note has been reviewed and is accurate as of 02/11/24

## 2024-02-11 NOTE — PLAN OF CARE
Goal Outcome Evaluation:      Pt is alert to self only.  Pt has been pleasant and had no behavioral issues this shift, thus far. He has not agreed to IV fluids at this time. His daughter remained with patient until after his shower and his room was changed.                                          Is Cyclosporine Contraindicated?: No Tremfya Monitoring Guidelines: A yearly test for tuberculosis is required while taking Tremfya. Diagnosis (Required): Psoriasis Tremfya Dosing: 100 mg SC week 0 and week 4 then every 8 weeks thereafter Detail Level: Zone Comments: patient has been on Tremfya x2 years, 1 injection every 8 weeks Pregnancy And Lactation Warning Text: The risk during pregnancy and breastfeeding is uncertain with this medication.

## 2024-02-11 NOTE — PROGRESS NOTES
[facility] Progress Note      Patient Name: Yuval Loja  : 1950  MRN: [id number]  Primary Care Physician:  Alessia Prescott APRN  Date of admission: 2024    Subjective     Interval History:       Patient feeling tired.  Hurting all over.  Denies any chest pain, shortness of breath, nausea or vomiting    Review of Systems:   As noted above    Objective     Vitals:   Temp:  [97.5 °F (36.4 °C)-99 °F (37.2 °C)] 99 °F (37.2 °C)  Heart Rate:  [87-92] 87  Resp:  [17-18] 17  BP: (137-148)/(65-75) 137/75    Intake/Output Summary (Last 24 hours) at 2024 0903  Last data filed at 2/10/2024 1232  Gross per 24 hour   Intake 240 ml   Output 101 ml   Net 139 ml         Physical Exam:    Constitutional: Awake, cooperative,chronically ill  HEENT: Sclera anicteric, no conjunctival injection, MMM  Neck: Supple, no carotid bruit, trachea at midline, no JVD  Respiratory: Coarse anteriorly; not labored on room air   Cardiovascular: RRR, no rub  Gastrointestinal: BS +, soft, nondistended, not distended  : No palpable bladder  Musculoskeletal: No edema, no clubbing or cyanosis  Psychiatric: Oriented to self  Neurologic: Awake, moving all extremities  Skin: Warm and dry      Scheduled Meds:     dilTIAZem CD, 180 mg, Oral, Q24H  docusate sodium, 100 mg, Oral, BID  famotidine, 20 mg, Oral, BID  folic acid, 1 mg, Oral, Daily  haloperidol, 2 mg, Oral, TID  metoprolol tartrate, 25 mg, Oral, TID  multivitamin, 1 tablet, Oral, Daily  rosuvastatin, 40 mg, Oral, Nightly  sucralfate, 1 g, Oral, 4x Daily AC & at Bedtime  thiamine, 100 mg, Oral, Daily      IV Meds:   sodium chloride, 75 mL/hr, Last Rate: 75 mL/hr (02/10/24 1406)          Results Reviewed:   I have personally reviewed the results from the time of this admission to 2024 09:03 EST     Results from last 7 days   Lab Units 24  0405 02/10/24  0410 24  0438 24  0432 24  0306 24  0535   SODIUM mmol/L 140 144  143   < > 141 139   POTASSIUM mmol/L 3.9 4.3 3.6   < > 3.7 3.9   CHLORIDE mmol/L 107 109* 108*   < > 108* 102   CO2 mmol/L 24.0 22.6 24.7   < > 22.8 22.5   BUN mg/dL 19 15 10   < > 12 21   CREATININE mg/dL 1.64* 1.87* 1.35*   < > 1.54* 1.92*   CALCIUM mg/dL 10.4 10.2 10.1   < > 9.2 10.4   BILIRUBIN mg/dL  --   --   --   --  0.3 0.3   ALK PHOS U/L  --   --   --   --  58 69   ALT (SGPT) U/L  --   --   --   --  16 18   AST (SGOT) U/L  --   --   --   --  33 35   GLUCOSE mg/dL 127* 104* 92   < > 102* 141*    < > = values in this interval not displayed.       Estimated Creatinine Clearance: 34.7 mL/min (A) (by C-G formula based on SCr of 1.64 mg/dL (H)).    Results from last 7 days   Lab Units 02/11/24  0405 02/10/24  0410 02/09/24  0438 02/08/24  0751   MAGNESIUM mg/dL  --  1.7 1.8 1.8   PHOSPHORUS mg/dL 3.2 2.9 3.2 2.6             Results from last 7 days   Lab Units 02/11/24  0406 02/10/24  0410 02/09/24  0438 02/08/24  0751 02/07/24  0432   WBC 10*3/mm3 4.48 4.66 4.22 4.15 3.86   HEMOGLOBIN g/dL 11.1* 11.5* 11.6* 11.4* 10.5*   PLATELETS 10*3/mm3 181 192 187 168 156               Assessment / Plan     ASSESSMENT:  1.  CRISTIANO on CKD3a, nonoliguric, probably prerenal.  Electrolytes okay.  Creatinine started to improve with IV hydration, 1.64 Mg/DL this morning.  Electrolytes, volume status okay  2.  Confusion, with suspected withdrawal syndrome, waxing and waning  3.  Alcoholism and liver disease  4.  Depression, recently placed on Lexapro, which has been DC'd  5.  DDD with prior stroke  6.  History of atrial fibrillation: in NSR currently    PLAN:  Continue gentle IV hydration  Repeat labs in a.m.    Thank you for involving us in the care of Yuval Loja.  Please feel free to call with any questions.    Jorge Szymanski MD  02/11/24  09:03 [facility]    [facility]  [phone]    Please note that portions of this note were completed with a voice recognition program.

## 2024-02-12 LAB
ALBUMIN SERPL-MCNC: 3.8 G/DL (ref 3.5–5.2)
ANION GAP SERPL CALCULATED.3IONS-SCNC: 11.5 MMOL/L (ref 5–15)
BACTERIA UR QL AUTO: NORMAL /HPF
BASOPHILS # BLD AUTO: 0.03 10*3/MM3 (ref 0–0.2)
BASOPHILS NFR BLD AUTO: 0.7 % (ref 0–1.5)
BILIRUB UR QL STRIP: NEGATIVE
BUN SERPL-MCNC: 14 MG/DL (ref 8–23)
BUN/CREAT SERPL: 9.2 (ref 7–25)
CALCIUM SPEC-SCNC: 9.9 MG/DL (ref 8.6–10.5)
CHLORIDE SERPL-SCNC: 105 MMOL/L (ref 98–107)
CLARITY UR: CLEAR
CO2 SERPL-SCNC: 25.5 MMOL/L (ref 22–29)
COLOR UR: YELLOW
CREAT SERPL-MCNC: 1.52 MG/DL (ref 0.76–1.27)
DEPRECATED RDW RBC AUTO: 44.8 FL (ref 37–54)
EGFRCR SERPLBLD CKD-EPI 2021: 47.5 ML/MIN/1.73
EOSINOPHIL # BLD AUTO: 0.12 10*3/MM3 (ref 0–0.4)
EOSINOPHIL NFR BLD AUTO: 2.7 % (ref 0.3–6.2)
ERYTHROCYTE [DISTWIDTH] IN BLOOD BY AUTOMATED COUNT: 12.8 % (ref 12.3–15.4)
GLUCOSE SERPL-MCNC: 98 MG/DL (ref 65–99)
GLUCOSE UR STRIP-MCNC: NEGATIVE MG/DL
HCT VFR BLD AUTO: 32.2 % (ref 37.5–51)
HGB BLD-MCNC: 10.4 G/DL (ref 13–17.7)
HGB UR QL STRIP.AUTO: NEGATIVE
HYALINE CASTS UR QL AUTO: NORMAL /LPF
IMM GRANULOCYTES # BLD AUTO: 0.01 10*3/MM3 (ref 0–0.05)
IMM GRANULOCYTES NFR BLD AUTO: 0.2 % (ref 0–0.5)
KETONES UR QL STRIP: NEGATIVE
LEUKOCYTE ESTERASE UR QL STRIP.AUTO: NEGATIVE
LYMPHOCYTES # BLD AUTO: 1.65 10*3/MM3 (ref 0.7–3.1)
LYMPHOCYTES NFR BLD AUTO: 37.6 % (ref 19.6–45.3)
MCH RBC QN AUTO: 31.6 PG (ref 26.6–33)
MCHC RBC AUTO-ENTMCNC: 32.3 G/DL (ref 31.5–35.7)
MCV RBC AUTO: 97.9 FL (ref 79–97)
MONOCYTES # BLD AUTO: 0.64 10*3/MM3 (ref 0.1–0.9)
MONOCYTES NFR BLD AUTO: 14.6 % (ref 5–12)
NEUTROPHILS NFR BLD AUTO: 1.94 10*3/MM3 (ref 1.7–7)
NEUTROPHILS NFR BLD AUTO: 44.2 % (ref 42.7–76)
NITRITE UR QL STRIP: NEGATIVE
NRBC BLD AUTO-RTO: 0 /100 WBC (ref 0–0.2)
PH UR STRIP.AUTO: 6 [PH] (ref 5–8)
PHOSPHATE SERPL-MCNC: 3.3 MG/DL (ref 2.5–4.5)
PLATELET # BLD AUTO: 216 10*3/MM3 (ref 140–450)
PMV BLD AUTO: 9.8 FL (ref 6–12)
POTASSIUM SERPL-SCNC: 4.1 MMOL/L (ref 3.5–5.2)
PROT UR QL STRIP: ABNORMAL
RBC # BLD AUTO: 3.29 10*6/MM3 (ref 4.14–5.8)
RBC # UR STRIP: NORMAL /HPF
REF LAB TEST METHOD: NORMAL
SODIUM SERPL-SCNC: 142 MMOL/L (ref 136–145)
SODIUM UR-SCNC: 28 MMOL/L
SP GR UR STRIP: 1.01 (ref 1–1.03)
SQUAMOUS #/AREA URNS HPF: NORMAL /HPF
UROBILINOGEN UR QL STRIP: ABNORMAL
WBC # UR STRIP: NORMAL /HPF
WBC NRBC COR # BLD AUTO: 4.39 10*3/MM3 (ref 3.4–10.8)

## 2024-02-12 PROCEDURE — 80069 RENAL FUNCTION PANEL: CPT | Performed by: INTERNAL MEDICINE

## 2024-02-12 PROCEDURE — 84300 ASSAY OF URINE SODIUM: CPT | Performed by: INTERNAL MEDICINE

## 2024-02-12 PROCEDURE — 81001 URINALYSIS AUTO W/SCOPE: CPT | Performed by: INTERNAL MEDICINE

## 2024-02-12 PROCEDURE — 97110 THERAPEUTIC EXERCISES: CPT

## 2024-02-12 PROCEDURE — 85025 COMPLETE CBC W/AUTO DIFF WBC: CPT | Performed by: HOSPITALIST

## 2024-02-12 RX ADMIN — METOPROLOL TARTRATE 25 MG: 25 TABLET, FILM COATED ORAL at 16:39

## 2024-02-12 RX ADMIN — HYDROXYZINE HYDROCHLORIDE 25 MG: 25 TABLET ORAL at 11:26

## 2024-02-12 RX ADMIN — SUCRALFATE 1 G: 1 TABLET ORAL at 06:17

## 2024-02-12 RX ADMIN — HALOPERIDOL 2 MG: 2 TABLET ORAL at 08:07

## 2024-02-12 RX ADMIN — FAMOTIDINE 20 MG: 20 TABLET, FILM COATED ORAL at 08:07

## 2024-02-12 RX ADMIN — DOCUSATE SODIUM 100 MG: 100 CAPSULE, LIQUID FILLED ORAL at 20:39

## 2024-02-12 RX ADMIN — DILTIAZEM HYDROCHLORIDE 180 MG: 180 CAPSULE, COATED, EXTENDED RELEASE ORAL at 08:07

## 2024-02-12 RX ADMIN — SUCRALFATE 1 G: 1 TABLET ORAL at 16:39

## 2024-02-12 RX ADMIN — FAMOTIDINE 20 MG: 20 TABLET, FILM COATED ORAL at 20:39

## 2024-02-12 RX ADMIN — FOLIC ACID 1 MG: 1 TABLET ORAL at 08:07

## 2024-02-12 RX ADMIN — Medication 100 MG: at 08:07

## 2024-02-12 RX ADMIN — Medication 1 TABLET: at 08:07

## 2024-02-12 RX ADMIN — ROSUVASTATIN CALCIUM 40 MG: 40 TABLET, FILM COATED ORAL at 20:39

## 2024-02-12 RX ADMIN — HALOPERIDOL 2 MG: 2 TABLET ORAL at 20:39

## 2024-02-12 RX ADMIN — SUCRALFATE 1 G: 1 TABLET ORAL at 11:27

## 2024-02-12 RX ADMIN — HYDROXYZINE HYDROCHLORIDE 25 MG: 25 TABLET ORAL at 20:39

## 2024-02-12 RX ADMIN — HALOPERIDOL 2 MG: 2 TABLET ORAL at 14:02

## 2024-02-12 RX ADMIN — METOPROLOL TARTRATE 25 MG: 25 TABLET, FILM COATED ORAL at 20:39

## 2024-02-12 RX ADMIN — METOPROLOL TARTRATE 25 MG: 25 TABLET, FILM COATED ORAL at 08:07

## 2024-02-12 RX ADMIN — SUCRALFATE 1 G: 1 TABLET ORAL at 20:39

## 2024-02-12 RX ADMIN — DOCUSATE SODIUM 100 MG: 100 CAPSULE, LIQUID FILLED ORAL at 08:07

## 2024-02-12 NOTE — SIGNIFICANT NOTE
02/12/24 1111   OTHER   Discipline occupational therapist   Rehab Time/Intention   Session Not Performed other (see comments)  (observed pt walking around unit this date, no further acute OT needs indicated, will sign off.)

## 2024-02-12 NOTE — PLAN OF CARE
Goal Outcome Evaluation:              Outcome Evaluation: pt is A&Ox2, pt refused IV fluids, MD aware, pt refused all afternoon medications, pt still remained out of restraints. bed alarm on, call light within reach. VSS, Will continue to monitor rest of shift.

## 2024-02-12 NOTE — PLAN OF CARE
Goal Outcome Evaluation:  Plan of Care Reviewed With: patient        Progress: improving     No acute changes this shift. Alert to self. Easily redirectable today.

## 2024-02-12 NOTE — PROGRESS NOTES
"Daily progress note    Primary care physician  Dr. Prescott    Subjective  Doing same with no new complaint and wants to go home.  Patient remains pleasantly confused    History of present illness  73-year-old -American male with history of alcohol abuse paroxysmal atrial fibrillation coronary artery disease hyperlipidemia and chronic kidney disease stage III who is well-known to our service and apparently continues to abuse alcohol and recently started on a antidepressant brought to the emergency room with generalized weakness and altered mental status.  Patient in no respiratory distress and answer question appropriately and workup in ER revealed acute metabolic encephalopathy admit for management.  At the time of examination he denies any chest pain shortness of breath palpitation and asking for anxiety medication and wants to eat regular diet.     REVIEW OF SYSTEMS  Unable to obtain     PHYSICAL EXAM   Blood pressure 122/55, pulse 94, temperature 98.3 °F (36.8 °C), temperature source Oral, resp. rate 16, height 175.3 cm (69\"), weight 63.1 kg (139 lb 1.8 oz), SpO2 98%.    Constitutional:       General: He is awake. He is not in acute distress.     Appearance: He is not toxic-appearing.   HENT:      Head: Normocephalic and atraumatic.   Eyes:      General: Lids are normal. Vision grossly intact.   Cardiovascular:      Rate and Rhythm: Normal rate and regular rhythm.      Heart sounds: Normal heart sounds.   Pulmonary:      Effort: Pulmonary effort is normal.      Breath sounds: Normal breath sounds and air entry.   Abdominal:      General: Bowel sounds are normal.      Palpations: Abdomen is soft.      Tenderness: There is no abdominal tenderness.   Musculoskeletal:      Cervical back: Normal range of motion.   Skin:     General: Skin is warm and dry.      Coloration: Skin is not pale.   Neurological:      General: No focal deficit present.      Mental Status: He is alert and oriented to person, place, and " time.      Comments: No focal deficit  Psychiatric:         Attention and Perception: Attention normal.         Mood and Affect: Mood normal.         Speech: Speech normal.         Behavior: Behavior is cooperative.     LAB RESULTS  Lab Results (last 24 hours)       Procedure Component Value Units Date/Time    Renal Function Panel [[id number]]  (Abnormal) Collected: 02/12/24 0331    Specimen: Blood Updated: 02/12/24 0427     Glucose 98 mg/dL      BUN 14 mg/dL      Creatinine 1.52 mg/dL      Sodium 142 mmol/L      Potassium 4.1 mmol/L      Chloride 105 mmol/L      CO2 25.5 mmol/L      Calcium 9.9 mg/dL      Albumin 3.8 g/dL      Phosphorus 3.3 mg/dL      Anion Gap 11.5 mmol/L      BUN/Creatinine Ratio 9.2     eGFR 47.5 mL/min/1.73     Narrative:      GFR Normal >60  Chronic Kidney Disease <60  Kidney Failure <15    The GFR formula is only valid for adults with stable renal function between ages 18 and 70.    CBC & Differential [[id number]]  (Abnormal) Collected: 02/12/24 0331    Specimen: Blood Updated: 02/12/24 0409    Narrative:      The following orders were created for panel order CBC & Differential.  Procedure                               Abnormality         Status                     ---------                               -----------         ------                     CBC Auto Differential[[id number]]        Abnormal            Final result                 Please view results for these tests on the individual orders.    CBC Auto Differential [[id number]]  (Abnormal) Collected: 02/12/24 0331    Specimen: Blood Updated: 02/12/24 0409     WBC 4.39 10*3/mm3      RBC 3.29 10*6/mm3      Hemoglobin 10.4 g/dL      Hematocrit 32.2 %      MCV 97.9 fL      MCH 31.6 pg      MCHC 32.3 g/dL      RDW 12.8 %      RDW-SD 44.8 fl      MPV 9.8 fL      Platelets 216 10*3/mm3      Neutrophil % 44.2 %      Lymphocyte % 37.6 %      Monocyte % 14.6 %      Eosinophil % 2.7 %      Basophil % 0.7 %      Immature Grans % 0.2 %       Neutrophils, Absolute 1.94 10*3/mm3      Lymphocytes, Absolute 1.65 10*3/mm3      Monocytes, Absolute 0.64 10*3/mm3      Eosinophils, Absolute 0.12 10*3/mm3      Basophils, Absolute 0.03 10*3/mm3      Immature Grans, Absolute 0.01 10*3/mm3      nRBC 0.0 /100 WBC           Imaging Results (Last 24 Hours)       ** No results found for the last 24 hours. **          Results  Scan on 2/4/2024 0415 by New Onbase, [place]: ECG 12-LEAD         Author: -- Service: -- Author Type: --   Filed: Date of Service: Creation Time:   Status: (Other)   HEART RATE= 67  bpm  RR Interval= 896  ms  AL Interval= 193  ms  P Horizontal Axis= -48  deg  P Front Axis= 82  deg  QRSD Interval= 82  ms  QT Interval= 441  ms  QTcB= 466  ms  QRS Axis= 47  deg  T Wave Axis= 51  deg  - ABNORMAL ECG -  Sinus rhythm  RSR' in V1 or V2, probably normal variant  Consider left ventricular hypertrophy  Anterior ST elevation, probably due to LVH          Current Facility-Administered Medications:     dilTIAZem CD (CARDIZEM CD) 24 hr capsule 180 mg, 180 mg, Oral, Q24H, Chino Cole MD, 180 mg at 02/12/24 0807    docusate sodium (COLACE) capsule 100 mg, 100 mg, Oral, BID, Chino Cole MD, 100 mg at 02/12/24 0807    famotidine (PEPCID) tablet 20 mg, 20 mg, Oral, BID, Chino Cole MD, 20 mg at 02/12/24 0807    folic acid (FOLVITE) tablet 1 mg, 1 mg, Oral, Daily, Chino Cole MD, 1 mg at 02/12/24 0807    haloperidol (HALDOL) tablet 2 mg, 2 mg, Oral, TID, Hipolito Ramon III, MD, 2 mg at 02/12/24 0807    haloperidol lactate (HALDOL) injection 5 mg, 5 mg, Intramuscular, Q6H PRN, Hipolito Ramon III, MD, 5 mg at 02/06/24 0049    hydrOXYzine (ATARAX) tablet 25 mg, 25 mg, Oral, 4x Daily PRN, Chino Cole MD, 25 mg at 02/12/24 1126    metoprolol tartrate (LOPRESSOR) tablet 25 mg, 25 mg, Oral, TID, Chino Cole MD, 25 mg at 02/12/24 0807    multivitamin (THERAGRAN) tablet 1 tablet, 1 tablet, Oral, Daily, Chino Cole MD, 1 tablet at 02/12/24  0807    rosuvastatin (CRESTOR) tablet 40 mg, 40 mg, Oral, Nightly, oJlie Cole MD, 40 mg at 02/09/24 2051    [COMPLETED] Insert Peripheral IV, , , Once **AND** sodium chloride 0.9 % flush 10 mL, 10 mL, Intravenous, PRN, Salma Grullon APRN    sucralfate (CARAFATE) tablet 1 g, 1 g, Oral, 4x Daily AC & at Bedtime, Jolie Cole MD, 1 g at 02/12/24 1127    thiamine (VITAMIN B-1) tablet 100 mg, 100 mg, Oral, Daily, Jolie Cole MD, 100 mg at 02/12/24 0807     ASSESSMENT  Alcohol withdrawal syndrome  Acute kidney injury resolved  Depression   History of alcohol abuse  Paroxysmal atrial fibrillation  Coronary artery disease  Hyperlipidemia  History of CVA  Chronic kidney disease stage III  Gastroesophageal reflux disease    PLAN  CPM  Discontinue IV  Continue detox medications  Wean off from CIWA protocol  Psych consult appreciated  Adjust home medications  Stress ulcer DVT prophylaxis  Nephrology to follow patient  Supportive care  PT OT  Discussed with nursing staff and family   Subacute rehab once bed available    JOLIE COLE MD    Copied text in this note has been reviewed and is accurate as of 02/12/24

## 2024-02-12 NOTE — PROGRESS NOTES
"The patient offers no new complaints when seen today.  Staff reports that he has been no management issue.  He is oriented to person and is able to read from the computer that he is at [facility].  Staff reports that the patient's behavior is fairly appropriately \"when we let him do what he wants\".  Disposition to a memory care unit is being sought.  Continuing current treatment.  "

## 2024-02-12 NOTE — NURSING NOTE
Pt alert to self only, agitated at beginning of shift. Just walked pt around the nurse's station. Pt currently refusing all medications. 2210 During rounding, pt was laying down in bed, asked if he needed, he said no. 2220 Pt came out of room, walked with RN again in hallway. Pt did agree to some medication. 2315 Pt told PCT he would take his stomach and blood pressure medication. RN went and administered po metoprolol tartrate 25mg and po famotidine 20mg. Pt did sleep several hours. RN was unable to assess lungs and bowel functions due to pt's refusal.

## 2024-02-12 NOTE — PLAN OF CARE
Goal Outcome Evaluation:  Plan of Care Reviewed With: patient        Progress: improving  Outcome Evaluation: Pt agreed to amb , vijay amb a total of 150ft, but took ~6 rest breaks for fatigue and educ opportunities, cues for step length safety and posture, pt did agree to use rwx , states he is waiting for his ride and then he is leaving, pt pleasant today, but does not like to sit still very long-constant assist req from nsg to re-direct for safety      Anticipated Discharge Disposition (PT): other (see comments) (unsure, varies with pt's mood on what is appropriate)

## 2024-02-12 NOTE — CASE MANAGEMENT/SOCIAL WORK
Continued Stay Note  [facility]     Patient Name: Yuval Loja  MRN: [id number]  Today's Date: 2/12/2024    Admit Date: 2/4/2024    Plan: Pending   Discharge Plan       Row Name 02/12/24 1435       Plan    Plan Pending    Plan Comments CCP reviewed SNF denials from last week. CCP resent referrals since patient has been out of restraints. CCP left HIPPA compliant voicemail for patient's daughter for return call. CCP will need to discuss d/c plans with patient's daughter regarding home with family assistance/HH (will need 24/7 care) vs.LTC memory care. Patient maybe too high functioning for SNF and will either need to d/c to LTC vs. home with assistance and 24/7 care. Shonna LR                   Discharge Codes    No documentation.                 Expected Discharge Date and Time       Expected Discharge Date Expected Discharge Time    Feb 13, 2024               ADELINE Gray

## 2024-02-12 NOTE — THERAPY TREATMENT NOTE
Patient Name: Yuval Loja  : 1950    MRN: [id number]                              Today's Date: 2024       Admit Date: 2024    Visit Dx:     ICD-10-CM ICD-9-CM   1. Altered mental status, unspecified altered mental status type  R41.82 780.97   2. CRISTIANO (acute kidney injury)  N17.9 584.9     Patient Active Problem List   Diagnosis    Alcoholic ketoacidosis    Alcohol dependence    Methamphetamine abuse    Fatty liver, alcoholic    Nausea vomiting and diarrhea    Alcoholic liver disease    Metabolic acidosis    Tobacco abuse    Coronary artery disease involving native coronary artery of native heart without angina pectoris    Tachycardia    Sinus tachycardia    Chronic kidney disease, stage 3    Medically noncompliant    Visual hallucinations    Psychosis    Hyponatremia    CAD (coronary artery disease)    Leukopenia    Symptomatic anemia    Headache    Substance abuse    Gastrointestinal hemorrhage    CRISTIANO (acute kidney injury)    Hyperkalemia    Right lower quadrant abdominal pain    New onset atrial fibrillation    History of drug abuse    COPD (chronic obstructive pulmonary disease)    Right inguinal hernia    Constipation    Status post right hip replacement    Right hip pain    Sinus bradycardia    Generalized abdominal pain    Altered mental status    Altered mental state     Past Medical History:   Diagnosis Date    Alcohol abuse     Arthritis     CAD (coronary artery disease)     Chronic kidney disease, stage 3     COPD (chronic obstructive pulmonary disease)     Disease of thyroid gland     Elevated cholesterol     Fatty liver, alcoholic     GERD (gastroesophageal reflux disease)     History of transfusion     Hypertensive urgency     Metabolic acidosis     Myocardial infarction     Sinus tachycardia     Sleep apnea     Stroke      Past Surgical History:   Procedure Laterality Date    BACK SURGERY      CARDIAC CATHETERIZATION      CARDIAC ELECTROPHYSIOLOGY PROCEDURE N/A 4/10/2023     Procedure: Temporary Pacemaker;  Surgeon: Vaibhav Bonilla MD;  Location: [facility] CATH INVASIVE LOCATION;  Service: Cardiovascular;  Laterality: N/A;    COLONOSCOPY      ENDOSCOPY      FRACTURE SURGERY      JOINT REPLACEMENT      SKIN BIOPSY      TOTAL HIP ARTHROPLASTY Right 06/27/2022    Procedure: TOTAL HIP ARTHROPLASTY ANTERIOR WITH HANA TABLE;  Surgeon: Willian Quiles MD;  Location: [facility] MAIN OR;  Service: Orthopedics;  Laterality: Right;  Depuy, carli. everett      General Information       Row Name 02/12/24 1252          Physical Therapy Time and Intention    Document Type therapy note (daily note)  -     Mode of Treatment individual therapy;physical therapy  -       Row Name 02/12/24 1252          General Information    Patient Profile Reviewed yes  -     Existing Precautions/Restrictions fall  -       Row Name 02/12/24 1252          Safety Issues, Functional Mobility    Cognitive Impairments, Mobility Safety/Performance insight into deficits/self-awareness;judgment;problem-solving/reasoning;safety precaution awareness  -               User Key  (r) = Recorded By, (t) = Taken By, (c) = Cosigned By      Initials Name Provider Type     Celeste Molina PTA Physical Therapist Assistant                   Mobility       Row Name 02/12/24 1252          Bed Mobility    Comment, (Bed Mobility) up in hallway w/nsg upon arrival  -       Row Name 02/12/24 1252          Gait/Stairs (Locomotion)    [place] Level (Gait) contact guard;standby assist;verbal cues  -     Assistive Device (Gait) walker, front-wheeled  -     Distance in Feet (Gait) 25  150ft IN TOTAL-6 standing rests due to taking breaks during educ on safety and rwx placement, attempted to have pt turn around and go back to room for exer, but he wanted to prove he could make a laP  -     Deviations/Abnormal Patterns (Gait) alicia decreased;festinating/shuffling;stride length decreased  -     Bilateral Gait Deviations  forward flexed posture  -               User Key  (r) = Recorded By, (t) = Taken By, (c) = Cosigned By      Initials Name Provider Type    Celeste Nascimento PTA Physical Therapist Assistant                   Obj/Interventions    No documentation.                  Goals/Plan    No documentation.                  Clinical Impression       Row Name 02/12/24 1255          Pain    Pretreatment Pain Rating 0/10 - no pain  -     Posttreatment Pain Rating 0/10 - no pain  -       Row Name 02/12/24 1255          Plan of Care Review    Plan of Care Reviewed With patient  -     Outcome Evaluation Pt agreed to amb , vijay amb a total of 150ft, but took ~6 rest breaks for fatigue and educ opportunities, cues for step length safety and posture, pt did agree to use rwx , states he is waiting for his ride and then he is leaving, pt pleasant today, but does not like to sit still very long-constant assist req from [facility] to re-direct for safety  -[facility] Name 02/12/24 1255          Therapy Assessment/Plan (PT)    Rehab Potential (PT) good, to achieve stated therapy goals  -     Criteria for Skilled Interventions Met (PT) yes  -[facility] Name 02/12/24 1255          Positioning and Restraints    Pre-Treatment Position other (comment)  in [place] w/nsg  -     Post Treatment Position chair  -JM     In Chair sitting;encouraged to call for assist  call light near, but did not want on his lap, no alarm as pt moving about too much , nsg keeping close eye on pt-at desk upon arrival  -               User Key  (r) = Recorded By, (t) = Taken By, (c) = Cosigned By      Initials Name Provider Type    Celeste Nascimento PTA Physical Therapist Assistant                   Outcome Measures       Row Name 02/12/24 1731 02/12/24 0800       How much help from another person do you currently need...    Turning from your back to your side while in flat bed without using bedrails? 4  -JM 4  -SM    Moving from lying on back to sitting on  the side of a flat bed without bedrails? 4  - 4  -SM    Moving to and from a bed to a chair (including a wheelchair)? 4  -JM 4  -SM    Standing up from a chair using your arms (e.g., wheelchair, bedside chair)? 4  -JM 4  -SM    Climbing 3-5 steps with a railing? 3  -JM 4  -SM    To walk in hospital room? 3  -JM 4  -SM    AM-PAC 6 Clicks Score (PT) 22  - 24  -SM    Highest Level of Mobility Goal 7 --> Walk 25 feet or more  - 8 --> Walked 250 feet or more  -SM              User Key  (r) = Recorded By, (t) = Taken By, (c) = Cosigned By      Initials Name Provider Type    Celeste Nascimento PTA Physical Therapist Assistant    Darlene Dorantes, RN Registered Nurse                                 Physical Therapy Education       Title: PT OT SLP Therapies (In Progress)       Topic: Physical Therapy (In Progress)       Point: Mobility training (In Progress)       Learning Progress Summary             Patient Acceptance, E,D, NR by ALBERTO at 2/12/2024 1255    Acceptance, E,TB, VU by DIONNE at 2/10/2024 1745    Acceptance, E,D, VU,NR by ALBERTO at 2/9/2024 1325    Acceptance, E,D, NR by ALBERTO at 2/7/2024 1204    Acceptance, E,TB,D, VU,NR by  at 2/6/2024 1105    Acceptance, E, NL,NR by PP at 2/6/2024 0951                         Point: Home exercise program (In Progress)       Learning Progress Summary             Patient Acceptance, E,D, NR by ALBERTO at 2/12/2024 1255    Acceptance, E,TB, VU by TA at 2/10/2024 1745    Acceptance, E,D, VU,NR by ALBERTO at 2/9/2024 1325    Acceptance, E,D, NR by ALBERTO at 2/7/2024 1204                         Point: Body mechanics (In Progress)       Learning Progress Summary             Patient Acceptance, E,D, NR by ALBERTO at 2/12/2024 1255    Acceptance, E,TB, VU by DIONNE at 2/10/2024 1745    Acceptance, E,D, VU,NR by ALBERTO at 2/9/2024 1325    Acceptance, E,D, NR by ALBERTO at 2/7/2024 1204    Acceptance, E,TB,D, VU,NR by  at 2/6/2024 1105                         Point: Precautions (In Progress)       Learning  Progress Summary             Patient Acceptance, E,D, NR by  at 2/12/2024 1255    Acceptance, E,TB, VU by  at 2/10/2024 1745    Acceptance, E,D, VU,NR by  at 2/9/2024 1325    Acceptance, E,D, NR by  at 2/7/2024 1204    Acceptance, E,TB,D, VU,NR by  at 2/6/2024 1105    Acceptance, E, NL,NR by PP at 2/6/2024 0951                                         User Key       Initials Effective Dates Name Provider Type Discipline     06/16/21 -  Alla Acevedo, PT Physical Therapist PT     03/07/18 -  Celeste Molina PTA Physical Therapist Assistant PT    DIONNE 07/18/22 -  Alyce JORGE-Joo Alonso, RN Registered Nurse Nurse    PP 08/11/20 -  Celeste Montague RN Registered Nurse Nurse                  PT Recommendation and Plan     Plan of Care Reviewed With: patient  Progress: improving  Outcome Evaluation: Pt agreed to amb , vijay amb a total of 150ft, but took ~6 rest breaks for fatigue and educ opportunities, cues for step length safety and posture, pt did agree to use rwx , states he is waiting for his ride and then he is leaving, pt pleasant today, but does not like to sit still very long-constant assist req from nsg to re-direct for safety     Time Calculation:         PT Charges       Row Name 02/12/24 1255             Time Calculation    Start Time 1100  -      Stop Time 1123  -      Time Calculation (min) 23 min  -      PT Received On 02/12/24  -      PT - Next Appointment 02/13/24  -                User Key  (r) = Recorded By, (t) = Taken By, (c) = Cosigned By      Initials Name Provider Type     Celeste Molina PTA Physical Therapist Assistant                  Therapy Charges for Today       Code Description Service Date Service Provider Modifiers Qty    [id number] HC PT THER PROC EA 15 MIN 2/12/2024 Celeste Molina PTA GP 2            PT G-Codes  Outcome Measure Options: AM-PAC 6 Clicks Basic Mobility (PT)  AM-PAC 6 Clicks Score (PT): 22  AM-PAC 6 Clicks Score (OT): 16  Modified  [employer] Scale: 3 - Moderate disability.  Requiring some help, but able to walk without assistance.  PT Discharge Summary  Anticipated Discharge Disposition (PT): other (see comments) (unsure, varies with pt's mood on what is appropriate)    Celeste Molina, PTA  2/12/2024

## 2024-02-12 NOTE — PROGRESS NOTES
[facility] Progress Note      Patient Name: Yuval Loja  : 1950  MRN: [id number]  Primary Care Physician:  Alessia Prescott APRN  Date of admission: 2024    Subjective     Interval History:     Overnight event  Patient sitting on the side of the bed  Denies any chest pain, shortness of breath or palpitations  Denies abdominal pain    Urinating spontaneously      Review of Systems:   As noted above    Objective     Vitals:   Temp:  [98.2 °F (36.8 °C)-99 °F (37.2 °C)] 98.7 °F (37.1 °C)  Heart Rate:  [75-99] 99  Resp:  [16] 16  BP: (127-143)/(49-87) 138/82    Intake/Output Summary (Last 24 hours) at 2024 0929  Last data filed at 2024 1342  Gross per 24 hour   Intake 20 ml   Output --   Net 20 ml         Physical Exam:    Constitutional: Awake, cooperative,chronically ill  HEENT: Sclera anicteric, no conjunctival injection, MMM  Neck: Supple, no carotid bruit, trachea at midline, no JVD  Respiratory: Coarse anteriorly; not labored on room air   Cardiovascular: RRR, no rub  Gastrointestinal: BS +, soft, nondistended, not distended  : No palpable bladder  Musculoskeletal: No edema, no clubbing or cyanosis  Neurologic: Alert no focalization diffuse muscle wasting  Skin: Warm and dry      Scheduled Meds:     dilTIAZem CD, 180 mg, Oral, Q24H  docusate sodium, 100 mg, Oral, BID  famotidine, 20 mg, Oral, BID  folic acid, 1 mg, Oral, Daily  haloperidol, 2 mg, Oral, TID  metoprolol tartrate, 25 mg, Oral, TID  multivitamin, 1 tablet, Oral, Daily  rosuvastatin, 40 mg, Oral, Nightly  sucralfate, 1 g, Oral, 4x Daily AC & at Bedtime  thiamine, 100 mg, Oral, Daily      IV Meds:            Results Reviewed:   I have personally reviewed the results from the time of this admission to 2024 09:29 EST     Results from last 7 days   Lab Units 24  0331 24  0405 02/10/24  0410 24  0432 24  0306   SODIUM mmol/L 142 140 144   < > 141   POTASSIUM mmol/L 4.1 3.9  4.3   < > 3.7   CHLORIDE mmol/L 105 107 109*   < > 108*   CO2 mmol/L 25.5 24.0 22.6   < > 22.8   BUN mg/dL 14 19 15   < > 12   CREATININE mg/dL 1.52* 1.64* 1.87*   < > 1.54*   CALCIUM mg/dL 9.9 10.4 10.2   < > 9.2   BILIRUBIN mg/dL  --   --   --   --  0.3   ALK PHOS U/L  --   --   --   --  58   ALT (SGPT) U/L  --   --   --   --  16   AST (SGOT) U/L  --   --   --   --  33   GLUCOSE mg/dL 98 127* 104*   < > 102*    < > = values in this interval not displayed.       Estimated Creatinine Clearance: 37.5 mL/min (A) (by C-G formula based on SCr of 1.52 mg/dL (H)).    Results from last 7 days   Lab Units 02/12/24  0331 02/11/24  0405 02/10/24  0410 02/09/24  0438 02/08/24  0751   MAGNESIUM mg/dL  --   --  1.7 1.8 1.8   PHOSPHORUS mg/dL 3.3 3.2 2.9 3.2 2.6             Results from last 7 days   Lab Units 02/12/24  0331 02/11/24  0406 02/10/24  0410 02/09/24  0438 02/08/24  0751   WBC 10*3/mm3 4.39 4.48 4.66 4.22 4.15   HEMOGLOBIN g/dL 10.4* 11.1* 11.5* 11.6* 11.4*   PLATELETS 10*3/mm3 216 181 192 187 168               Assessment / Plan     ASSESSMENT:  1.  CRISTIANO on CKD3a, nonoliguric, probably prerenal.  Electrolytes okay.  Creatinine started to improve with IV hydration, creatinine down to 1.4-1.5 CT scan abdomen pelvis no hydronephrosis or nephrolithiasis urinalysis proteinuria  309 mg/g   2.  Confusion, with suspected withdrawal syndrome, waxing and waning.  On Haldol  3.  Alcoholism and liver disease currently on folic acid, thiamine  4.  Depression, recently placed on Lexapro, which has been DC'd  5.  DDD with prior stroke.  Followed by PT and OT  6.  History of atrial fibrillation: Rate controlled on diltiazem and metoprolol    PLAN:  -Renal function currently at baseline  -Electrolyte and volume status stable  -Upon discharge patient can be followed closely in renal clinic    Thank you for involving us in the care of Yvual Loja.  Please feel free to call with any questions.    Richy Duran,  MD  02/12/24  09:29 [facility]    [facility]  [phone]    Please note that portions of this note were completed with a voice recognition program.

## 2024-02-13 LAB
ANION GAP SERPL CALCULATED.3IONS-SCNC: 16.8 MMOL/L (ref 5–15)
BUN SERPL-MCNC: 13 MG/DL (ref 8–23)
BUN/CREAT SERPL: 5.9 (ref 7–25)
CALCIUM SPEC-SCNC: 10.8 MG/DL (ref 8.6–10.5)
CHLORIDE SERPL-SCNC: 101 MMOL/L (ref 98–107)
CO2 SERPL-SCNC: 22.2 MMOL/L (ref 22–29)
CREAT SERPL-MCNC: 2.22 MG/DL (ref 0.76–1.27)
EGFRCR SERPLBLD CKD-EPI 2021: 30.1 ML/MIN/1.73
GLUCOSE SERPL-MCNC: 125 MG/DL (ref 65–99)
POTASSIUM SERPL-SCNC: 4 MMOL/L (ref 3.5–5.2)
SODIUM SERPL-SCNC: 140 MMOL/L (ref 136–145)

## 2024-02-13 PROCEDURE — 80048 BASIC METABOLIC PNL TOTAL CA: CPT | Performed by: HOSPITALIST

## 2024-02-13 PROCEDURE — 97110 THERAPEUTIC EXERCISES: CPT

## 2024-02-13 RX ADMIN — HALOPERIDOL 2 MG: 2 TABLET ORAL at 15:04

## 2024-02-13 RX ADMIN — HALOPERIDOL 2 MG: 2 TABLET ORAL at 08:20

## 2024-02-13 RX ADMIN — METOPROLOL TARTRATE 25 MG: 25 TABLET, FILM COATED ORAL at 08:20

## 2024-02-13 RX ADMIN — FOLIC ACID 1 MG: 1 TABLET ORAL at 08:19

## 2024-02-13 RX ADMIN — METOPROLOL TARTRATE 25 MG: 25 TABLET, FILM COATED ORAL at 15:04

## 2024-02-13 RX ADMIN — Medication 100 MG: at 08:20

## 2024-02-13 RX ADMIN — FAMOTIDINE 20 MG: 20 TABLET, FILM COATED ORAL at 08:20

## 2024-02-13 RX ADMIN — Medication 1 TABLET: at 08:19

## 2024-02-13 RX ADMIN — DOCUSATE SODIUM 100 MG: 100 CAPSULE, LIQUID FILLED ORAL at 08:20

## 2024-02-13 RX ADMIN — DILTIAZEM HYDROCHLORIDE 180 MG: 180 CAPSULE, COATED, EXTENDED RELEASE ORAL at 08:19

## 2024-02-13 RX ADMIN — SUCRALFATE 1 G: 1 TABLET ORAL at 08:09

## 2024-02-13 NOTE — THERAPY TREATMENT NOTE
Patient Name: Yuval Loja  : 1950    MRN: [id number]                              Today's Date: 2024       Admit Date: 2024    Visit Dx:     ICD-10-CM ICD-9-CM   1. Altered mental status, unspecified altered mental status type  R41.82 780.97   2. CRISTIANO (acute kidney injury)  N17.9 584.9     Patient Active Problem List   Diagnosis    Alcoholic ketoacidosis    Alcohol dependence    Methamphetamine abuse    Fatty liver, alcoholic    Nausea vomiting and diarrhea    Alcoholic liver disease    Metabolic acidosis    Tobacco abuse    Coronary artery disease involving native coronary artery of native heart without angina pectoris    Tachycardia    Sinus tachycardia    Chronic kidney disease, stage 3    Medically noncompliant    Visual hallucinations    Psychosis    Hyponatremia    CAD (coronary artery disease)    Leukopenia    Symptomatic anemia    Headache    Substance abuse    Gastrointestinal hemorrhage    CRISTIANO (acute kidney injury)    Hyperkalemia    Right lower quadrant abdominal pain    New onset atrial fibrillation    History of drug abuse    COPD (chronic obstructive pulmonary disease)    Right inguinal hernia    Constipation    Status post right hip replacement    Right hip pain    Sinus bradycardia    Generalized abdominal pain    Altered mental status    Altered mental state     Past Medical History:   Diagnosis Date    Alcohol abuse     Arthritis     CAD (coronary artery disease)     Chronic kidney disease, stage 3     COPD (chronic obstructive pulmonary disease)     Disease of thyroid gland     Elevated cholesterol     Fatty liver, alcoholic     GERD (gastroesophageal reflux disease)     History of transfusion     Hypertensive urgency     Metabolic acidosis     Myocardial infarction     Sinus tachycardia     Sleep apnea     Stroke      Past Surgical History:   Procedure Laterality Date    BACK SURGERY      CARDIAC CATHETERIZATION      CARDIAC ELECTROPHYSIOLOGY PROCEDURE N/A 4/10/2023     Procedure: Temporary Pacemaker;  Surgeon: Vaibhav Bonilla MD;  Location: [facility] CATH INVASIVE LOCATION;  Service: Cardiovascular;  Laterality: N/A;    COLONOSCOPY      ENDOSCOPY      FRACTURE SURGERY      JOINT REPLACEMENT      SKIN BIOPSY      TOTAL HIP ARTHROPLASTY Right 06/27/2022    Procedure: TOTAL HIP ARTHROPLASTY ANTERIOR WITH HANA TABLE;  Surgeon: Willian Quiles MD;  Location: [facility] MAIN OR;  Service: Orthopedics;  Laterality: Right;  Depuy, carlianalisa floyd      General Information       Row Name 02/13/24 1445          Physical Therapy Time and Intention    Document Type therapy note (daily note)  -     Mode of Treatment individual therapy;physical therapy  -       Row Name 02/13/24 1445          General Information    Patient Profile Reviewed yes  -     Existing Precautions/Restrictions fall  -       Row Name 02/13/24 1445          Cognition    Orientation Status (Cognition) oriented to;person  -       Row Name 02/13/24 1445          Safety Issues, Functional Mobility    Impairments Affecting Function (Mobility) balance;cognition;coordination;strength;postural/trunk control  -               User Key  (r) = Recorded By, (t) = Taken By, (c) = Cosigned By      Initials Name Provider Type    Celeste Nascimento PTA Physical Therapist Assistant                   Mobility       Row Name 02/13/24 145          Gait/Stairs (Locomotion)    Bledsoe Level (Gait) contact guard;standby assist  -     Assistive Device (Gait) walker, front-wheeled  -     Distance in Feet (Gait) 40  -JM     Deviations/Abnormal Patterns (Gait) alicia decreased;festinating/shuffling;base of support, narrow  -     Bilateral Gait Deviations forward flexed posture  -     Comment, (Gait/Stairs) roni required to guide rwx  -               User Key  (r) = Recorded By, (t) = Taken By, (c) = Cosigned By      Initials Name Provider Type    Celeste Nascimento PTA Physical Therapist Assistant                    "Obj/Interventions       [facility] Name 02/13/24 1500          Motor Skills    Therapeutic Exercise --  \"no thanks\", pt reported being tired  -               User Key  (r) = Recorded By, (t) = Taken By, (c) = Cosigned By      Initials Name Provider Type    Celeste Nascimento, LAUREL Physical Therapist Assistant                   Goals/Plan    No documentation.                  Clinical Impression       [facility] Name 02/13/24 1503          Pain    Pretreatment Pain Rating 0/10 - no pain  -     Posttreatment Pain Rating 0/10 - no pain  -     Pre/Posttreatment Pain Comment but holding his back once in room  -     Pain Intervention(s) Repositioned  -[facility] Name 02/13/24 1503          Plan of Care Review    Plan of Care Reviewed With patient  -     Progress no change  -     Outcome Evaluation Pt agreed to PT session, decr amb dist this date, declined exer, placed back in chair w/call light near, but rarely uses it, pt vijay amb ~40 ft w/rwx this date, encouragement required to participate today , nsg aware pt gets up ad [place], but less agitated if he can move around, pleasant again this visit, did not c/o pain, but holding his low back once in room, decr safety awareness remains  -[facility] Name 02/13/24 1503          Therapy Assessment/Plan (PT)    Rehab Potential (PT) good, to achieve stated therapy goals  -     Criteria for Skilled Interventions Met (PT) yes  -[facility] Name 02/13/24 1503          Vital Signs    O2 Delivery Pre Treatment room air  -[facility] Name 02/13/24 1503          Positioning and Restraints    Pre-Treatment Position other (comment)  standing in hallway at [facility] station  -     Post Treatment Position chair  -     In Chair sitting;call light within reach;encouraged to call for assist;notified [facility]  up in hallway upon entry, no alarms on due to agitation  -               User Key  (r) = Recorded By, (t) = Taken By, (c) = Cosigned By      Initials Name Provider Type    ALBERTO Molina, " LAUREL Alonso Physical Therapist Assistant                   Outcome Measures       Row Name 02/13/24 1510 02/13/24 0800       How much help from another person do you currently need...    Turning from your back to your side while in flat bed without using bedrails? 4  -JM 4  -RF    Moving from lying on back to sitting on the side of a flat bed without bedrails? 4  -JM 4  -RF    Moving to and from a bed to a chair (including a wheelchair)? 3  -JM 4  -RF    Standing up from a chair using your arms (e.g., wheelchair, bedside chair)? 3  - 4  -RF    Climbing 3-5 steps with a railing? 1  - 3  -RF    To walk in hospital room? 3  - 3  -RF    AM-PAC 6 Clicks Score (PT) 18  - 22  -RF    Highest Level of Mobility Goal 6 --> Walk 10 steps or more  -JM 7 --> Walk 25 feet or more  -RF              User Key  (r) = Recorded By, (t) = Taken By, (c) = Cosigned By      Initials Name Provider Type    Celeste Nascimento PTA Physical Therapist Assistant    RF Ann Marie Sam RN Registered Nurse                                 Physical Therapy Education       Title: PT OT SLP Therapies (In Progress)       Topic: Physical Therapy (In Progress)       Point: Mobility training (In Progress)       Learning Progress Summary             Patient Acceptance, E,D, NR by ALBERTO at 2/13/2024 1512    Acceptance, E,D, NR by ALBERTO at 2/12/2024 1255    Acceptance, E,TB, VU by TA at 2/10/2024 1745    Acceptance, E,D, VU,NR by ALBERTO at 2/9/2024 1325    Acceptance, E,D, NR by ALBERTO at 2/7/2024 1204    Acceptance, E,TB,D, VU,NR by  at 2/6/2024 1105    Acceptance, E, NL,NR by PP at 2/6/2024 0951                         Point: Home exercise program (In Progress)       Learning Progress Summary             Patient Acceptance, E,D, NR by ALBERTO at 2/13/2024 1512    Acceptance, E,D, NR by ALBERTO at 2/12/2024 1255    Acceptance, E,TB, VU by TA at 2/10/2024 1745    Acceptance, E,D, VU,NR by ALBERTO at 2/9/2024 1325    Acceptance, E,D, NR by ALBERTO at 2/7/2024 1207                          Point: Body mechanics (In Progress)       Learning Progress Summary             Patient Acceptance, E,D, NR by  at 2/13/2024 1512    Acceptance, E,D, NR by ALBERTO at 2/12/2024 1255    Acceptance, E,TB, VU by TA at 2/10/2024 1745    Acceptance, E,D, VU,NR by JM at 2/9/2024 1325    Acceptance, E,D, NR by  at 2/7/2024 1204    Acceptance, E,TB,D, VU,NR by  at 2/6/2024 1105                         Point: Precautions (In Progress)       Learning Progress Summary             Patient Acceptance, E,D, NR by  at 2/13/2024 1512    Acceptance, E,D, NR by ALBERTO at 2/12/2024 1255    Acceptance, E,TB, VU by TA at 2/10/2024 1745    Acceptance, E,D, VU,NR by JM at 2/9/2024 1325    Acceptance, E,D, NR by  at 2/7/2024 1204    Acceptance, E,TB,D, VU,NR by  at 2/6/2024 1105    Acceptance, E, NL,NR by PP at 2/6/2024 0951                                         User Key       Initials Effective Dates Name Provider Type Discipline     06/16/21 -  Alla Acevedo, PT Physical Therapist PT     03/07/18 -  Celeste Molina PTA Physical Therapist Assistant PT    DIONNE 07/18/22 -  Alyce JORGE-Joo Alonso, RN Registered Nurse Nurse    PP 08/11/20 -  Celeste Montague RN Registered Nurse Nurse                  PT Recommendation and Plan     Plan of Care Reviewed With: patient  Progress: no change  Outcome Evaluation: Pt agreed to PT session, decr amb dist this date, declined exer, placed back in chair w/call light near, but rarely uses it, pt vijay amb ~40 ft w/rwx this date, encouragement required to participate today , nsg aware pt gets up ad [place], but less agitated if he can move around, pleasant again this visit, did not c/o pain, but holding his low back once in room, decr safety awareness remains     Time Calculation:         PT Charges       Row Name 02/13/24 1512             Time Calculation    Start Time 1420  -      Stop Time 1437  -      Time Calculation (min) 17 min  -ALBERTO      PT Received On 02/13/24  -ALBERTO       PT - Next Appointment 02/14/24  -ALBERTO                User Key  (r) = Recorded By, (t) = Taken By, (c) = Cosigned By      Initials Name Provider Type    Celeste Nascimento PTA Physical Therapist Assistant                  Therapy Charges for Today       Code Description Service Date Service Provider Modifiers Qty    98764161511 HC PT THER PROC EA 15 MIN 2/12/2024 Celeste Molina PTA GP 2    89889597492 HC PT THER PROC EA 15 MIN 2/13/2024 Celeste Molina PTA GP 1            PT G-Codes  Outcome Measure Options: AM-PAC 6 Clicks Basic Mobility (PT)  AM-PAC 6 Clicks Score (PT): 18  AM-PAC 6 Clicks Score (OT): 16  Modified Mray Scale: 3 - Moderate disability.  Requiring some help, but able to walk without assistance.  PT Discharge Summary  Anticipated Discharge Disposition (PT): other (see comments) (still unsure of DC disposition, CCP communicating w/family)    Celeste Molina PTA  2/13/2024

## 2024-02-13 NOTE — PLAN OF CARE
Goal Outcome Evaluation:  Plan of Care Reviewed With: patient           Outcome Evaluation: VSS. Alert and oriented to self. Pt very content when able to walk around nurses station but gets easily agitated when asked to go to his room. Pt to be transfered to [facility] surge unit. Refuses telemetry box and has been going into other patients rooms.  Plan is to be discharged as soon as there is an approval.

## 2024-02-13 NOTE — PLAN OF CARE
Goal Outcome Evaluation:  Plan of Care Reviewed With: patient        Progress: no change  Outcome Evaluation: Pt agreed to PT session, decr amb dist this date, declined exer, placed back in chair w/call light near, but rarely uses it, pt vijay amb ~40 ft w/rwx this date, encouragement required to participate today , nsg aware pt gets up ad [place], but less agitated if he can move around, pleasant again this visit, did not c/o pain, but holding his low back once in room, decr safety awareness remains      Anticipated Discharge Disposition (PT): other (see comments) (still unsure of DC disposition, CCP communicating w/family)

## 2024-02-13 NOTE — PROGRESS NOTES
"Daily progress note    Primary care physician  Dr. Prescott    Subjective  Doing same with no new complaint and wants to go home.  Patient remains pleasantly confused    History of present illness  73-year-old -American male with history of alcohol abuse paroxysmal atrial fibrillation coronary artery disease hyperlipidemia and chronic kidney disease stage III who is well-known to our service and apparently continues to abuse alcohol and recently started on a antidepressant brought to the emergency room with generalized weakness and altered mental status.  Patient in no respiratory distress and answer question appropriately and workup in ER revealed acute metabolic encephalopathy admit for management.  At the time of examination he denies any chest pain shortness of breath palpitation and asking for anxiety medication and wants to eat regular diet.     REVIEW OF SYSTEMS  Unable to obtain     PHYSICAL EXAM   Blood pressure 129/68, pulse 88, temperature 98.4 °F (36.9 °C), temperature source Oral, resp. rate 16, height 175.3 cm (69\"), weight 63.1 kg (139 lb 1.8 oz), SpO2 98%.    Constitutional:       General: He is awake. He is not in acute distress.     Appearance: He is not toxic-appearing.   HENT:      Head: Normocephalic and atraumatic.   Eyes:      General: Lids are normal. Vision grossly intact.   Cardiovascular:      Rate and Rhythm: Normal rate and regular rhythm.      Heart sounds: Normal heart sounds.   Pulmonary:      Effort: Pulmonary effort is normal.      Breath sounds: Normal breath sounds and air entry.   Abdominal:      General: Bowel sounds are normal.      Palpations: Abdomen is soft.      Tenderness: There is no abdominal tenderness.   Musculoskeletal:      Cervical back: Normal range of motion.   Skin:     General: Skin is warm and dry.      Coloration: Skin is not pale.   Neurological:      General: No focal deficit present.      Mental Status: He is alert and oriented to person, place, and " time.      Comments: No focal deficit  Psychiatric:         Attention and Perception: Attention normal.         Mood and Affect: Mood normal.         Speech: Speech normal.         Behavior: Behavior is cooperative.     LAB RESULTS  Lab Results (last 24 hours)       Procedure Component Value Units Date/Time    Sodium, Urine, Random - Urine, Clean Catch [[id number]] Collected: 02/12/24 1653    Specimen: Urine, Clean Catch Updated: 02/12/24 1745     Sodium, Urine 28 mmol/L     Narrative:      Reference intervals for random urine have not been established.  Clinical usage is dependent upon physician's interpretation in combination with other laboratory tests.       Urinalysis With Microscopic If Indicated (No Culture) - Urine, Clean Catch [[id number]]  (Abnormal) Collected: 02/12/24 1653    Specimen: Urine, Clean Catch Updated: 02/12/24 1720     Color, UA Yellow     Appearance, UA Clear     pH, UA 6.0     Specific Gravity, UA 1.012     Glucose, UA Negative     Ketones, UA Negative     Bilirubin, UA Negative     Blood, UA Negative     Protein, UA 30 mg/dL (1+)     Leuk Esterase, UA Negative     Nitrite, UA Negative     Urobilinogen, UA 0.2 E.U./dL    Urinalysis, Microscopic Only - Urine, Clean Catch [[id number]] Collected: 02/12/24 1653    Specimen: Urine, Clean Catch Updated: 02/12/24 1720     RBC, UA 0-2 /HPF      WBC, UA 0-2 /HPF      Bacteria, UA None Seen /HPF      Squamous Epithelial Cells, UA 0-2 /HPF      Hyaline Casts, UA 3-6 /LPF      Methodology Automated Microscopy          Imaging Results (Last 24 Hours)       ** No results found for the last 24 hours. **          Results  Scan on 2/4/2024 0415 by [employer] Onbase, [place]: ECG 12-LEAD         Author: -- Service: -- Author Type: --   Filed: Date of Service: Creation Time:   Status: (Other)   HEART RATE= 67  bpm  RR Interval= 896  ms  IA Interval= 193  ms  P Horizontal Axis= -48  deg  P Front Axis= 82  deg  QRSD Interval= 82  ms  QT Interval= 441  ms  QTcB= 466   ms  QRS Axis= 47  deg  T Wave Axis= 51  deg  - ABNORMAL ECG -  Sinus rhythm  RSR' in V1 or V2, probably normal variant  Consider left ventricular hypertrophy  Anterior ST elevation, probably due to LVH          Current Facility-Administered Medications:     dilTIAZem CD (CARDIZEM CD) 24 hr capsule 180 mg, 180 mg, Oral, Q24H, Chino Cole MD, 180 mg at 02/13/24 0819    docusate sodium (COLACE) capsule 100 mg, 100 mg, Oral, BID, Chino Cole MD, 100 mg at 02/13/24 0820    famotidine (PEPCID) tablet 20 mg, 20 mg, Oral, BID, Chino Cole MD, 20 mg at 02/13/24 0820    folic acid (FOLVITE) tablet 1 mg, 1 mg, Oral, Daily, Chino Cole MD, 1 mg at 02/13/24 0819    haloperidol (HALDOL) tablet 2 mg, 2 mg, Oral, TID, Hipolito Ramon III, MD, 2 mg at 02/13/24 0820    haloperidol lactate (HALDOL) injection 5 mg, 5 mg, Intramuscular, Q6H PRN, Hipolito Ramon III, MD, 5 mg at 02/06/24 0049    hydrOXYzine (ATARAX) tablet 25 mg, 25 mg, Oral, 4x Daily PRN, Chino Cole MD, 25 mg at 02/12/24 2039    metoprolol tartrate (LOPRESSOR) tablet 25 mg, 25 mg, Oral, TID, Chino Cole MD, 25 mg at 02/13/24 0820    multivitamin (THERAGRAN) tablet 1 tablet, 1 tablet, Oral, Daily, Chino Cole MD, 1 tablet at 02/13/24 0819    rosuvastatin (CRESTOR) tablet 40 mg, 40 mg, Oral, Nightly, Chino Cole MD, 40 mg at 02/12/24 2039    [COMPLETED] Insert Peripheral IV, , , Once **AND** sodium chloride 0.9 % flush 10 mL, 10 mL, Intravenous, PRN, Salma Grullon APRN    sucralfate (CARAFATE) tablet 1 g, 1 g, Oral, 4x Daily AC & at Bedtime, Chino Cole MD, 1 g at 02/13/24 0809    thiamine (VITAMIN B-1) tablet 100 mg, 100 mg, Oral, Daily, Chino Cole MD, 100 mg at 02/13/24 0820     ASSESSMENT  Alcohol withdrawal syndrome  Acute kidney injury resolved  Depression   History of alcohol abuse  Paroxysmal atrial fibrillation  Coronary artery disease  Hyperlipidemia  History of CVA  Chronic kidney disease stage III  Gastroesophageal  reflux disease    PLAN  CPM  Discontinue IV  Continue detox medications  Wean off from CIWA protocol  Psych consult appreciated  Adjust home medications  Stress ulcer DVT prophylaxis  Nephrology to follow patient  Supportive care  PT OT  Discussed with nursing staff and family   Subacute rehab once bed available    JOLIE BUNDY MD    Copied text in this note has been reviewed and is accurate as of 02/13/24

## 2024-02-13 NOTE — PROGRESS NOTES
The patient is pleasant and cooperative during today's interview.  Charting indicates that he has been alert to self and easily redirectable.  He appears to be nearing discharge.

## 2024-02-13 NOTE — PROGRESS NOTES
[facility] Progress Note      Patient Name: Yuval Loja  : 1950  MRN: [id number]  Primary Care Physician:  Alessia Prescott APRN  Date of admission: 2024    Subjective     Interval History:       Patient sitting on the side of the bed  Denies any chest pain, shortness of breath or palpitations  Denies abdominal pain  Having regular bowel movements    Urinating spontaneously    No fevers or chills      Review of Systems:   As noted above    Objective     Vitals:   Temp:  [98.1 °F (36.7 °C)-98.4 °F (36.9 °C)] 98.4 °F (36.9 °C)  Heart Rate:  [77-94] 88  Resp:  [16] 16  BP: (122-135)/(55-74) 129/68    Intake/Output Summary (Last 24 hours) at 2024 0730  Last data filed at 2024 1700  Gross per 24 hour   Intake 220 ml   Output 250 ml   Net -30 ml         Physical Exam:    Constitutional: Awake, cooperative,chronically ill  HEENT: Sclera anicteric, no conjunctival injection, MMM  Neck: Supple, no carotid bruit, trachea at midline, no JVD  Respiratory: Coarse anteriorly; not labored on room air   Cardiovascular: RRR, no rub  Gastrointestinal: BS +, soft, nondistended, not distended  : No palpable bladder  Musculoskeletal: No edema, no clubbing or cyanosis  Neurologic: Alert no focalization diffuse muscle wasting  Skin: Warm and dry      Scheduled Meds:     dilTIAZem CD, 180 mg, Oral, Q24H  docusate sodium, 100 mg, Oral, BID  famotidine, 20 mg, Oral, BID  folic acid, 1 mg, Oral, Daily  haloperidol, 2 mg, Oral, TID  metoprolol tartrate, 25 mg, Oral, TID  multivitamin, 1 tablet, Oral, Daily  rosuvastatin, 40 mg, Oral, Nightly  sucralfate, 1 g, Oral, 4x Daily AC & at Bedtime  thiamine, 100 mg, Oral, Daily      IV Meds:            Results Reviewed:   I have personally reviewed the results from the time of this admission to 2024 07:30 EST     Results from last 7 days   Lab Units 24  0331 24  0405 02/10/24  0410   SODIUM mmol/L 142 140 144   POTASSIUM mmol/L  4.1 3.9 4.3   CHLORIDE mmol/L 105 107 109*   CO2 mmol/L 25.5 24.0 22.6   BUN mg/dL 14 19 15   CREATININE mg/dL 1.52* 1.64* 1.87*   CALCIUM mg/dL 9.9 10.4 10.2   GLUCOSE mg/dL 98 127* 104*       Estimated Creatinine Clearance: 37.5 mL/min (A) (by C-G formula based on SCr of 1.52 mg/dL (H)).    Results from last 7 days   Lab Units 02/12/24  0331 02/11/24 0405 02/10/24  0410 02/09/24  0438 02/08/24  0751   MAGNESIUM mg/dL  --   --  1.7 1.8 1.8   PHOSPHORUS mg/dL 3.3 3.2 2.9 3.2 2.6             Results from last 7 days   Lab Units 02/12/24 0331 02/11/24 0406 02/10/24  0410 02/09/24  0438 02/08/24  0751   WBC 10*3/mm3 4.39 4.48 4.66 4.22 4.15   HEMOGLOBIN g/dL 10.4* 11.1* 11.5* 11.6* 11.4*   PLATELETS 10*3/mm3 216 181 192 187 168               Assessment / Plan     ASSESSMENT:  1.  CRISTIANO on CKD3a, nonoliguric, probably prerenal.  Electrolytes okay.  Creatinine started to improve with IV hydration, creatinine down to 1.4-1.5 CT scan abdomen pelvis no hydronephrosis or nephrolithiasis urinalysis proteinuria  309 mg/g   2.  Confusion, with suspected withdrawal syndrome, waxing and waning.  On Haldol  3.  Alcoholism and liver disease currently on folic acid, thiamine  4.  Depression, recently placed on Lexapro, which has been DC'd  5.  DDD with prior stroke.  Followed by PT and OT  6.  History of atrial fibrillation: Rate controlled on diltiazem and metoprolol    PLAN:  -Renal function currently at baseline , Cr 1.5  -Electrolyte and volume status stable. Blood pressure controlled   -Upon discharge patient can be followed closely in renal clinic    Thank you for involving us in the care of Yuval Loja.  Please feel free to call with any questions.    Richy Duran MD  02/13/24  07:30 [facility]    [facility]  [phone]    Please note that portions of this note were completed with a voice recognition program.

## 2024-02-13 NOTE — CASE MANAGEMENT/SOCIAL WORK
Continued Stay Note  [facility]     Patient Name: Yuval Loja  MRN: [id number]  Today's Date: 2/13/2024    Admit Date: 2/4/2024    Plan: Pending   Discharge Plan       Row Name 02/13/24 1506       Plan    Plan Pending    Plan Comments CCP with patient's daughter. States she is unable to [id number] care at home at this time. Her brother is moving back to town this weekend to help but they are unable to care for him at home at this time and is unable to private pay for in home caregivers. CCP discussed in home assistance through Medicaid waivers and was agreeable to referral. CCP spoke with Randi/Ciarra for referral.  CCP discussed patient is ambulating too well for insurance to approve him to go to short term rehab. CCP discussed LTC memory care; patient's daughter agreeable to CCP placing referrals. Patient's daughter states she has been talking with her  about guardianship for patient. CCP discussed with Manager/Oneyda SMART. CCP will follow and assist as needd. HARLAN LR                   Discharge Codes    No documentation.                 Expected Discharge Date and Time       Expected Discharge Date Expected Discharge Time    Feb 13, 2024               ADELINE Gray

## 2024-02-13 NOTE — PROGRESS NOTES
"Nutrition Services    Patient Name:  Yuval Loja  YOB: 1950  MRN: [id number]  Admit Date:  2/4/2024    Nutrition Follow Up    Assessment Date:  02/13/24    Encounter Information         Current Summary AMS, weakness, alcohol withdraw syndrome, afib, CKD     Current Nutrition Orders & Evaluation of Intake       Oral Nutrition     Current PO Diet Diet: Regular/House Diet; Texture: Regular Texture (IDDSI 7); Fluid Consistency: Thin (IDDSI 0)   Supplement Boost Glucose Control   PO Evaluation     PO Intake % 25-75%    Factors Affecting Intake  decreased appetite   --  Anthropometrics          Height    Weight Height: 175.3 cm (69\")  Weight: 63.1 kg (139 lb 1.8 oz) (02/11/24 0500)    BMI kg/m2 Body mass index is 20.54 kg/m².  Normal/Healthy (18.4 - 24.9)          Labs        Pertinent Labs Reviewed, listed below     Results from last 7 days   Lab Units 02/12/24  0331 02/11/24  0405 02/10/24  0410   SODIUM mmol/L 142 140 144   POTASSIUM mmol/L 4.1 3.9 4.3   CHLORIDE mmol/L 105 107 109*   CO2 mmol/L 25.5 24.0 22.6   BUN mg/dL 14 19 15   CREATININE mg/dL 1.52* 1.64* 1.87*   CALCIUM mg/dL 9.9 10.4 10.2   GLUCOSE mg/dL 98 127* 104*     Results from last 7 days   Lab Units 02/12/24 0331 02/11/24  0405 02/10/24  0410 02/09/24  0438 02/08/24  0751   MAGNESIUM mg/dL  --   --  1.7 1.8 1.8   PHOSPHORUS mg/dL 3.3   < > 2.9 3.2 2.6   HEMOGLOBIN g/dL 10.4*   < > 11.5* 11.6* 11.4*   HEMATOCRIT % 32.2*   < > 35.9* 35.7* 35.7*   WBC 10*3/mm3 4.39   < > 4.66 4.22 4.15   ALBUMIN g/dL 3.8   < > 3.6 4.2 4.0    < > = values in this interval not displayed.     Results from last 7 days   Lab Units 02/12/24 0331 02/11/24  0406 02/10/24  0410 02/09/24  0438 02/08/24  0751   PLATELETS 10*3/mm3 216 181 192 187 168     SARS-CoV-2, CLARICE   Date Value Ref Range Status   10/11/2022 Negative Negative Final     Comment:     The 2019-CoV rRT-PCR Assay is only for use under a Food and Drug Administration Emergency Use Authorization. The " performance characteristics of the assay were verified by the Clinical Microbiology Laboratory at the [facility].   Results should be used in conjunction with the patient's clinical symptoms, medical history, and other clinical/laboratory findings to determine an overall clinical diagnosis. Negative results do not preclude infection with SARS-CoV-2 (COVID-19).    Test parameters have not been validated for screening asymptomatic patients.     Lab Results   Component Value Date    HGBA1C 4.30 (L) 02/05/2024          Medications            Scheduled Medications dilTIAZem CD, 180 mg, Oral, Q24H  docusate sodium, 100 mg, Oral, BID  famotidine, 20 mg, Oral, BID  folic acid, 1 mg, Oral, Daily  haloperidol, 2 mg, Oral, TID  metoprolol tartrate, 25 mg, Oral, TID  multivitamin, 1 tablet, Oral, Daily  rosuvastatin, 40 mg, Oral, Nightly  sucralfate, 1 g, Oral, 4x Daily AC & at Bedtime  thiamine, 100 mg, Oral, Daily        Infusions      PRN Medications   haloperidol lactate    hydrOXYzine    [COMPLETED] Insert Peripheral IV **AND** sodium chloride     Physical Findings          General Appearance disoriented, room air   Oral/Mouth Cavity tooth or teeth missing   Edema  no edema   Gastrointestinal last bowel movement: 2/13   Skin  skin intact   Tubes/Drains/Lines none   NFPE Not indicated at this time   --  NUTRITION INTERVENTION / PLAN OF CARE  Intervention Goal         Intervention Goal(s) Maintain nutrition status, Increase intake, Maintain weight, and PO intake goal %: 75+     Nutrition Intervention         RD Action Interview for preferences, Supplement provided, Encourage intake, Continue to monitor, and Care plan reviewed     Nutrition Prescription         Diet Prescription     Supplement Prescription Boost Glucose Control   EN/PN Prescription    New Prescription Ordered? Continue same per protocol   --  Monitor/Evaluation        Monitor Per protocol   Discharge Needs Pending clinical course     RD  to follow up per protocol.    Electronically signed by:  Kitty Aguilar RD  02/13/24 14:20 EST

## 2024-02-13 NOTE — NURSING NOTE
Patient transferred to  via transport without difficulty.  Arrived to unit at approximately 1730.  Patient ambulated around nursing station with standby assist.  Generalized weakness noted.  Patient alert to self.  Speech is difficult to understand at times.  Patient refused 1800 medication. No IV access.

## 2024-02-13 NOTE — CASE MANAGEMENT/SOCIAL WORK
Continued Stay Note  [facility]     Patient Name: Yuval Loja  MRN: [id number]  Today's Date: 2/13/2024    Admit Date: 2/4/2024    Plan: Pending   Discharge Plan       Row Name 02/13/24 1622       Plan    Plan Comments Received call from Karolina from [facility] she will have business office reach out to daughter tomorrow - they would accept if can secure a payor source, even if Medicaid pending. Will continue to monitor for new or changing discharge needs. Little SMART RN CCP.      Row Name 02/13/24 1506       Plan    Plan Pending    Plan Comments CCP with patient's daughter. States she is unable to [id number] care at home at this time. Her brother is moving back to town this weekend to help but they are unable to care for him at home at this time and is unable to private pay for in home caregivers. CCP discussed in home assistance through Medicaid waivers and was agreeable to referral. CCP spoke with Randi/Ciarra for referral.  CCP discussed patient is ambulating too well for insurance to approve him to go to short term rehab. CCP discussed LTC memory care; patient's daughter agreeable to CCP placing referrals. Patient's daughter states she has been talking with her  about guardianship for patient. CCP discussed with Manager/Oneyda SMART. CCP will follow and assist as needd. HARLAN SELLERS CSW                   Discharge Codes    No documentation.                 Expected Discharge Date and Time       Expected Discharge Date Expected Discharge Time    Feb 15, 2024               Little Tran RN

## 2024-02-14 PROCEDURE — 97530 THERAPEUTIC ACTIVITIES: CPT

## 2024-02-14 RX ADMIN — DILTIAZEM HYDROCHLORIDE 180 MG: 180 CAPSULE, COATED, EXTENDED RELEASE ORAL at 09:21

## 2024-02-14 RX ADMIN — FAMOTIDINE 20 MG: 20 TABLET, FILM COATED ORAL at 09:35

## 2024-02-14 RX ADMIN — HALOPERIDOL 2 MG: 2 TABLET ORAL at 09:21

## 2024-02-14 NOTE — CASE MANAGEMENT/SOCIAL WORK
Continued Stay Note  [facility]     Patient Name: Yuval Loja  MRN: [id number]  Today's Date: 2/14/2024    Admit Date: 2/4/2024    Plan: Pending   Discharge Plan       Row Name 02/14/24 1401       Plan    Plan Pending    Plan Comments CCP spoke with patient's daughter regarding d/c plans and moving forward with LTC placement. Patient's daughter states her dad did tell her there was POA paperwork. Patient's daughter states she plans on going to his house this afternoon to try and find POA paperwork. Patient's daughter states she is thinking about bringing patient home with 24/7 care if she is able to find POA paperwork. Patient's daughter still plans on pursuing guardianship. Patient's daughter will call CCP back this afternoon and will let CCP know if there is POA paperwork and if she plans to bring patient home with 24/7 care or if CCP needs to seek LTC/memory care. Shonna LR                   Discharge Codes    No documentation.                 Expected Discharge Date and Time       Expected Discharge Date Expected Discharge Time    Feb 15, 2024               ADELINE Gray

## 2024-02-14 NOTE — CASE MANAGEMENT/SOCIAL WORK
Continued Stay Note  [facility]     Patient Name: Yuval Loja  MRN: [id number]  Today's Date: 2/14/2024    Admit Date: 2/4/2024    Plan: SNF with possible LTC/memory care   Discharge Plan       Row Name 02/14/24 1521       Plan    Plan SNF with possible LTC/memory care    Plan Comments CCP spoke with patient's daughter and discussed differences between POA/Guardianship. Patient's daughter is looking to see if patient has POA paperwork but has completed paperwork for guardianship. Patient's daughter will talk with guardianship office today for possible emergency guardianship. Patient's daughter states her goal is to bring patient home with her but needs more time to get guardianship and care in place. CCP reviewed PT notes from past two days; discussed looking at SNF with possible LTC memory care. Patient's daughter agreeable. CCP resent referrals. CCP left voicemail for [facility] to see if they can accept for SNF with possible LTC. will need pre-cert. Shonna LR      Row Name 02/14/24 1401       Plan    Plan Pending    Plan Comments CCP spoke with patient's daughter regarding d/c plans and moving forward with LTC placement. Patient's daughter states her dad did tell her there was POA paperwork. Patient's daughter states she plans on going to his house this afternoon to try and find POA paperwork. Patient's daughter states she is thinking about bringing patient home with 24/7 care if she is able to find POA paperwork. Patient's daughter still plans on pursuing guardianship. Patient's daughter will call CCP back this afternoon and will let CCP know if there is POA paperwork and if she plans to bring patient home with 24/7 care or if CCP needs to seek LTC/memory care. Referrals are still pending... Shonna LR                   Discharge Codes    No documentation.                 Expected Discharge Date and Time       Expected Discharge Date Expected Discharge Time    Feb 15, 2024                YONATHAN GrayW

## 2024-02-14 NOTE — PROGRESS NOTES
"Daily progress note    Primary care physician  Dr. Prescott    Subjective  Doing same with no new complaint and remained pleasantly confused    History of present illness  73-year-old -American male with history of alcohol abuse paroxysmal atrial fibrillation coronary artery disease hyperlipidemia and chronic kidney disease stage III who is well-known to our service and apparently continues to abuse alcohol and recently started on a antidepressant brought to the emergency room with generalized weakness and altered mental status.  Patient in no respiratory distress and answer question appropriately and workup in ER revealed acute metabolic encephalopathy admit for management.  At the time of examination he denies any chest pain shortness of breath palpitation and asking for anxiety medication and wants to eat regular diet.     REVIEW OF SYSTEMS  Unable to obtain     PHYSICAL EXAM   Blood pressure 141/75, pulse 78, temperature 97 °F (36.1 °C), temperature source Oral, resp. rate 16, height 175.3 cm (69\"), weight 63.1 kg (139 lb 1.8 oz), SpO2 99%.    Constitutional:       General: He is awake. He is not in acute distress.     Appearance: He is not toxic-appearing.   HENT:      Head: Normocephalic and atraumatic.   Eyes:      General: Lids are normal. Vision grossly intact.   Cardiovascular:      Rate and Rhythm: Normal rate and regular rhythm.      Heart sounds: Normal heart sounds.   Pulmonary:      Effort: Pulmonary effort is normal.      Breath sounds: Normal breath sounds and air entry.   Abdominal:      General: Bowel sounds are normal.      Palpations: Abdomen is soft.      Tenderness: There is no abdominal tenderness.   Musculoskeletal:      Cervical back: Normal range of motion.   Skin:     General: Skin is warm and dry.      Coloration: Skin is not pale.   Neurological:      General: No focal deficit present.      Mental Status: He is alert and oriented to person, place, and time.      Comments: No focal " deficit  Psychiatric:         Attention and Perception: Attention normal.         Mood and Affect: Mood normal.         Speech: Speech normal.         Behavior: Behavior is cooperative.     LAB RESULTS  Lab Results (last 24 hours)       Procedure Component Value Units Date/Time    Basic Metabolic Panel [[id number]]  (Abnormal) Collected: 02/13/24 1410    Specimen: Blood Updated: 02/13/24 1459     Glucose 125 mg/dL      BUN 13 mg/dL      Creatinine 2.22 mg/dL      Sodium 140 mmol/L      Potassium 4.0 mmol/L      Chloride 101 mmol/L      CO2 22.2 mmol/L      Calcium 10.8 mg/dL      BUN/Creatinine Ratio 5.9     Anion Gap 16.8 mmol/L      eGFR 30.1 mL/min/1.73     Narrative:      GFR Normal >60  Chronic Kidney Disease <60  Kidney Failure <15    The GFR formula is only valid for adults with stable renal function between ages 18 and 70.          Imaging Results (Last 24 Hours)       ** No results found for the last 24 hours. **          Results  Scan on 2/4/2024 0415 by [employer], [place]: ECG 12-LEAD         Author: -- Service: -- Author Type: --   Filed: Date of Service: Creation Time:   Status: (Other)   HEART RATE= 67  bpm  RR Interval= 896  ms  AK Interval= 193  ms  P Horizontal Axis= -48  deg  P Front Axis= 82  deg  QRSD Interval= 82  ms  QT Interval= 441  ms  QTcB= 466  ms  QRS Axis= 47  deg  T Wave Axis= 51  deg  - ABNORMAL ECG -  Sinus rhythm  RSR' in V1 or V2, probably normal variant  Consider left ventricular hypertrophy  Anterior ST elevation, probably due to LVH          Current Facility-Administered Medications:     dilTIAZem CD (CARDIZEM CD) 24 hr capsule 180 mg, 180 mg, Oral, Q24H, Chino Cole MD, 180 mg at 02/14/24 0921    docusate sodium (COLACE) capsule 100 mg, 100 mg, Oral, BID, Chino Cole MD, 100 mg at 02/13/24 0820    famotidine (PEPCID) tablet 20 mg, 20 mg, Oral, BID, Chino Cole MD, 20 mg at 02/14/24 0935    folic acid (FOLVITE) tablet 1 mg, 1 mg, Oral, Daily, Chino Cole MD, 1 mg at  02/13/24 0819    haloperidol (HALDOL) tablet 2 mg, 2 mg, Oral, TID, Hipolito Ramon III, MD, 2 mg at 02/14/24 0921    haloperidol lactate (HALDOL) injection 5 mg, 5 mg, Intramuscular, Q6H PRN, Hipolito Ramon III, MD, 5 mg at 02/06/24 0049    hydrOXYzine (ATARAX) tablet 25 mg, 25 mg, Oral, 4x Daily PRN, Jolie Cole MD, 25 mg at 02/12/24 2039    metoprolol tartrate (LOPRESSOR) tablet 25 mg, 25 mg, Oral, TID, Jolie Cole MD, 25 mg at 02/13/24 1504    multivitamin (THERAGRAN) tablet 1 tablet, 1 tablet, Oral, Daily, Jolie Cole MD, 1 tablet at 02/13/24 0819    rosuvastatin (CRESTOR) tablet 40 mg, 40 mg, Oral, Nightly, Jolie Cole MD, 40 mg at 02/12/24 2039    sodium chloride 0.9 % bolus 500 mL, 500 mL, Intravenous, Once, Richy Duran MD    [COMPLETED] Insert Peripheral IV, , , Once **AND** sodium chloride 0.9 % flush 10 mL, 10 mL, Intravenous, PRN, Salma Grullon APRN    sucralfate (CARAFATE) tablet 1 g, 1 g, Oral, 4x Daily AC & at Bedtime, Jolie Cole MD, 1 g at 02/13/24 0809    thiamine (VITAMIN B-1) tablet 100 mg, 100 mg, Oral, Daily, Jolie Cole MD, 100 mg at 02/13/24 0820     ASSESSMENT  Alcohol withdrawal syndrome  Acute kidney injury resolved  Depression   History of alcohol abuse  Paroxysmal atrial fibrillation  Coronary artery disease  Hyperlipidemia  History of CVA  Chronic kidney disease stage III  Gastroesophageal reflux disease    PLAN  CPM  Discontinue IV  Continue detox medications  Wean off from CIWA protocol  Psych consult appreciated  Adjust home medications  Stress ulcer DVT prophylaxis  Nephrology to follow patient  Supportive care  PT OT  Discussed with nursing staff and family   Subacute rehab once bed available    JOLIE COLE MD    Copied text in this note has been reviewed and is accurate as of 02/14/24

## 2024-02-14 NOTE — PROGRESS NOTES
The patient is in bed resting comfortably today.  Charting does indicate ongoing periods of agitation and aggression and difficulty with redirection.  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

## 2024-02-14 NOTE — THERAPY TREATMENT NOTE
Patient Name: Yuval Loja  : 1950    MRN: [id number]                              Today's Date: 2024       Admit Date: 2024    Visit Dx:     ICD-10-CM ICD-9-CM   1. Altered mental status, unspecified altered mental status type  R41.82 780.97   2. CRISTIANO (acute kidney injury)  N17.9 584.9     Patient Active Problem List   Diagnosis    Alcoholic ketoacidosis    Alcohol dependence    Methamphetamine abuse    Fatty liver, alcoholic    Nausea vomiting and diarrhea    Alcoholic liver disease    Metabolic acidosis    Tobacco abuse    Coronary artery disease involving native coronary artery of native heart without angina pectoris    Tachycardia    Sinus tachycardia    Chronic kidney disease, stage 3    Medically noncompliant    Visual hallucinations    Psychosis    Hyponatremia    CAD (coronary artery disease)    Leukopenia    Symptomatic anemia    Headache    Substance abuse    Gastrointestinal hemorrhage    CRISTIANO (acute kidney injury)    Hyperkalemia    Right lower quadrant abdominal pain    New onset atrial fibrillation    History of drug abuse    COPD (chronic obstructive pulmonary disease)    Right inguinal hernia    Constipation    Status post right hip replacement    Right hip pain    Sinus bradycardia    Generalized abdominal pain    Altered mental status    Altered mental state     Past Medical History:   Diagnosis Date    Alcohol abuse     Arthritis     CAD (coronary artery disease)     Chronic kidney disease, stage 3     COPD (chronic obstructive pulmonary disease)     Disease of thyroid gland     Elevated cholesterol     Fatty liver, alcoholic     GERD (gastroesophageal reflux disease)     History of transfusion     Hypertensive urgency     Metabolic acidosis     Myocardial infarction     Nausea vomiting and diarrhea 2017    Sinus tachycardia     Sleep apnea     Stroke      Past Surgical History:   Procedure Laterality Date    BACK SURGERY      CARDIAC CATHETERIZATION      CARDIAC  ELECTROPHYSIOLOGY PROCEDURE N/A 4/10/2023    Procedure: Temporary Pacemaker;  Surgeon: Vaibhav Bonilla MD;  Location: [facility] CATH INVASIVE LOCATION;  Service: Cardiovascular;  Laterality: N/A;    COLONOSCOPY      ENDOSCOPY      FRACTURE SURGERY      JOINT REPLACEMENT      SKIN BIOPSY      TOTAL HIP ARTHROPLASTY Right 06/27/2022    Procedure: TOTAL HIP ARTHROPLASTY ANTERIOR WITH HANA TABLE;  Surgeon: Willian Quiles MD;  Location: [facility] MAIN OR;  Service: Orthopedics;  Laterality: Right;  Depuy, carlianalisa floyd      General Information       Row Name 02/14/24 1349          Physical Therapy Time and Intention    Document Type therapy note (daily note)  -     Mode of Treatment individual therapy;physical therapy  -       Row Name 02/14/24 1349          General Information    Patient Profile Reviewed yes  -     Existing Precautions/Restrictions fall  -       Row Name 02/14/24 1349          Cognition    Orientation Status (Cognition) oriented to;person  -       Row Name 02/14/24 1349          Safety Issues, Functional Mobility    Impairments Affecting Function (Mobility) balance;cognition;coordination;strength;postural/trunk control  -               User Key  (r) = Recorded By, (t) = Taken By, (c) = Cosigned By      Initials Name Provider Type     Rupali Nixon PT Physical Therapist                   Mobility       Row Name 02/14/24 1350          Bed Mobility    Bed Mobility supine-sit  -     All Activities, [place] (Bed Mobility) standby assist  -       Row Name 02/14/24 1350          Sit-Stand Transfer    Sit-Stand [place] (Transfers) contact guard  -     Assistive Device (Sit-Stand Transfers) walker, front-wheeled  -       Row Name 02/14/24 1350          Gait/Stairs (Locomotion)    [place] Level (Gait) standby assist;contact guard  -     Assistive Device (Gait) walker, front-wheeled  -     Distance in Feet (Gait) 8ft  -     Deviations/Abnormal Patterns (Gait)  alicia decreased;festinating/shuffling  -     Bilateral Gait Deviations forward flexed posture  -     Comment, (Gait/Stairs) Gait slow and shuffled.  -               User Key  (r) = Recorded By, (t) = Taken By, (c) = Cosigned By      Initials Name Provider Type     Rupali Nixon PT Physical Therapist                   Obj/Interventions       Row Name 02/14/24 1350          Balance    Balance Assessment sitting static balance;sit to stand dynamic balance;standing static balance;standing dynamic balance  -     Static Sitting Balance standby assist  -     Position, Sitting Balance sitting edge of bed  -     Sit to Stand Dynamic Balance standby assist  -     Static Standing Balance standby assist  -     Dynamic Standing Balance standby assist;contact guard  -     Position/Device Used, Standing Balance supported;walker, front-wheeled  -     Balance Interventions sitting;standing;sit to stand;supported;static;dynamic  -               User Key  (r) = Recorded By, (t) = Taken By, (c) = Cosigned By      Initials Name Provider Type     Rupali Nixon PT Physical Therapist                   Goals/Plan       Row Name 02/14/24 1355          Bed Mobility Goal 1 (PT)    Progress/Outcomes (Bed Mobility Goal 1, PT) goal met  -       Row Name 02/14/24 1355          Transfer Goal 1 (PT)    Activity/Assistive Device (Transfer Goal 1, PT) sit-to-stand/stand-to-sit;bed-to-chair/chair-to-bed  -     Time Frame (Transfer Goal 1, PT) 1 week  -     Progress/Outcome (Transfer Goal 1, PT) goal revised this date  -[facility] Name 02/14/24 1358          Gait Training Goal 1 (PT)    Activity/Assistive Device (Gait Training Goal 1, PT) gait (walking locomotion);walker, rolling  -     [place] Level (Gait Training Goal 1, PT) supervision required  -     Distance (Gait Training Goal 1, PT) 150  -SM     Time Frame (Gait Training Goal 1, PT) 1 week  -     Progress/Outcome (Gait Training Goal 1, PT)  goal revised this date  -               User Key  (r) = Recorded By, (t) = Taken By, (c) = Cosigned By      Initials Name Provider Type    Rupali Villagomez PT Physical Therapist                   Clinical Impression       Row Name 02/14/24 1351          Pain    Pretreatment Pain Rating 0/10 - no pain  -SM     Posttreatment Pain Rating 0/10 - no pain  -SM       Row Name 02/14/24 1351          Plan of Care Review    Plan of Care Reviewed With patient  -SM     Outcome Evaluation Patient seen for PT session this PM. Patient supine in bed upon arrival. Patient appears AO to self only. Patient mumbling and talking non sensically throughout session. Difficult to understand at times. Extensive time spent between movements due to patient rambling. Patient sat up to EOB with SBA. Patient performed STS from EOB with SBA. Patient ambulated 8ft to the chair with rwx and SBA-CGA. Gait slow and shuffled. Patient declined further. Patient sitting [facility] eating lunch at end of session. PT will continue to monitor.  -       Row Name 02/14/24 1351          Therapy Assessment/Plan (PT)    Therapy Frequency (PT) 3 times/wk  -       Row Name 02/14/24 1351          Vital Signs    Pre Patient Position Supine  -SM     Intra Patient Position Standing  -SM     Post Patient Position Sitting  -SM       Row Name 02/14/24 1351          Positioning and Restraints    Pre-Treatment Position in bed  -SM     Post Treatment Position chair  -SM     In Chair notified nsg;reclined;call light within reach;encouraged to call for assist;exit alarm on  -               User Key  (r) = Recorded By, (t) = Taken By, (c) = Cosigned By      Initials Name Provider Type    Rupali Villagomez PT Physical Therapist                   Outcome Measures       Row Name 02/14/24 1354 02/14/24 0900       How much help from another person do you currently need...    Turning from your back to your side while in flat bed without using bedrails? 4  -SM 4  -GRAYSON     Moving from lying on back to sitting on the side of a flat bed without bedrails? 4  -SM 4  -GRAYSON    Moving to and from a bed to a chair (including a wheelchair)? 3  -SM 3  -GRAYSON    Standing up from a chair using your arms (e.g., wheelchair, bedside chair)? 3  -SM 3  -GRAYSON    Climbing 3-5 steps with a railing? 2  -SM 1  -GRAYSON    To walk in hospital room? 3  -SM 3  -GRAYSON    AM-PAC 6 Clicks Score (PT) 19  -SM 18  -GRAYSON    Highest Level of Mobility Goal 6 --> Walk 10 steps or more  -SM 6 --> Walk 10 steps or more  -GRAYSON              User Key  (r) = Recorded By, (t) = Taken By, (c) = Cosigned By      Initials Name Provider Type    Samara Moran, RN Registered Nurse    Rupali Villagomez, PT Physical Therapist                                 Physical Therapy Education       Title: PT OT SLP Therapies (In Progress)       Topic: Physical Therapy (In Progress)       Point: Mobility training (In Progress)       Learning Progress Summary             Patient Acceptance, E, NR by KIMBERLY at 2/14/2024 1354    Acceptance, E,D, NR by ALBERTO at 2/13/2024 1512    Acceptance, E,D, NR by ALBERTO at 2/12/2024 1255    Acceptance, E,TB, VU by DIONNE at 2/10/2024 1745    Acceptance, E,D, VU,NR by ALBERTO at 2/9/2024 1325    Acceptance, E,D, NR by ALBERTO at 2/7/2024 1204    Acceptance, E,TB,D, VU,NR by CH at 2/6/2024 1105    Acceptance, E, NL,NR by PP at 2/6/2024 0951                         Point: Home exercise program (In Progress)       Learning Progress Summary             Patient Acceptance, E, NR by KIMBERLY at 2/14/2024 1354    Acceptance, E,D, NR by ALBERTO at 2/13/2024 1512    Acceptance, E,D, NR by ALBERTO at 2/12/2024 1255    Acceptance, E,TB, VU by TA at 2/10/2024 1745    Acceptance, E,D, VU,NR by ALBERTO at 2/9/2024 1325    Acceptance, E,D, NR by ALBERTO at 2/7/2024 1204                         Point: Body mechanics (In Progress)       Learning Progress Summary             Patient Acceptance, E, NR by KIMBERLY at 2/14/2024 1354    Acceptance, E,D, NR by ALBERTO at 2/13/2024 1512    Acceptance, E,D,  NR by ALBERTO at 2/12/2024 1255    Acceptance, E,TB, VU by TA at 2/10/2024 1745    Acceptance, E,D, VU,NR by ALBERTO at 2/9/2024 1325    Acceptance, E,D, NR by  at 2/7/2024 1204    Acceptance, E,TB,D, VU,NR by  at 2/6/2024 1105                         Point: Precautions (In Progress)       Learning Progress Summary             Patient Acceptance, E, NR by  at 2/14/2024 1354    Acceptance, E,D, NR by  at 2/13/2024 1512    Acceptance, E,D, NR by ALBERTO at 2/12/2024 1255    Acceptance, E,TB, VU by TA at 2/10/2024 1745    Acceptance, E,D, VU,NR by ALBERTO at 2/9/2024 1325    Acceptance, E,D, NR by  at 2/7/2024 1204    Acceptance, E,TB,D, VU,NR by  at 2/6/2024 1105    Acceptance, E, NL,NR by  at 2/6/2024 0951                                         User Key       Initials Effective Dates Name Provider Type Discipline     06/16/21 -  Alla Acevedo, PT Physical Therapist PT     03/07/18 -  Celeste Molina PTA Physical Therapist Assistant PT     07/18/22 -  Alyce JORGE-Joo Alonso, RN Registered Nurse Nurse     08/11/20 -  Celeste Montague RN Registered Nurse Nurse     05/02/22 -  Rupali Nixon PT Physical Therapist PT                  PT Recommendation and Plan     Plan of Care Reviewed With: patient  Outcome Evaluation: Patient seen for PT session this PM. Patient supine in bed upon arrival. Patient appears AO to self only. Patient mumbling and talking non sensically throughout session. Difficult to understand at times. Extensive time spent between movements due to patient rambling. Patient sat up to EOB with SBA. Patient performed STS from EOB with SBA. Patient ambulated 8ft to the chair with rwx and SBA-CGA. Gait slow and shuffled. Patient declined further. Patient sitting [facility] eating lunch at end of session. PT will continue to monitor.     Time Calculation:         PT Charges       Row Name 02/14/24 1355             Time Calculation    Start Time 1327  -      Stop Time 1344  -      Time  Calculation (min) 17 min  -SM      PT Received On 02/14/24  -SM      PT - Next Appointment 02/16/24  -      PT Goal Re-Cert Due Date 02/21/24  -SM         Time Calculation- PT    Total Timed Code Minutes- PT 17 minute(s)  -SM         Timed Charges    17066 - PT Therapeutic Activity Minutes 17  -SM         Total Minutes    Timed Charges Total Minutes 17  -SM       Total Minutes 17  -SM                User Key  (r) = Recorded By, (t) = Taken By, (c) = Cosigned By      Initials Name Provider Type     Rupali Nixon, BI Physical Therapist                  Therapy Charges for Today       Code Description Service Date Service Provider Modifiers Qty    96329100133 HC PT THERAPEUTIC ACT EA 15 MIN 2/14/2024 Rupali Nixon, PT GP 1            PT G-Codes  Outcome Measure Options: AM-PAC 6 Clicks Basic Mobility (PT)  AM-PAC 6 Clicks Score (PT): 19  AM-PAC 6 Clicks Score (OT): 16  Modified Carver Scale: 3 - Moderate disability.  Requiring some help, but able to walk without assistance.       Rupali Nixon PT  2/14/2024

## 2024-02-14 NOTE — PLAN OF CARE
Goal Outcome Evaluation:  Plan of Care Reviewed With: patient           Outcome Evaluation: Patient seen for PT session this PM. Patient supine in bed upon arrival. Patient appears AO to self only. Patient mumbling and talking non sensically throughout session. Difficult to understand at times. Extensive time spent between movements due to patient rambling. Patient sat up to EOB with SBA. Patient performed STS from EOB with SBA. Patient ambulated 8ft to the chair with rwx and SBA-CGA. Gait slow and shuffled. Patient declined further. Patient sitting [facility] eating lunch at end of session. PT will continue to monitor.

## 2024-02-14 NOTE — PROGRESS NOTES
[facility] Progress Note      Patient Name: Yuval Loja  : 1950  MRN: [id number]  Primary Care Physician:  Alessia Prescott APRN  Date of admission: 2024    Subjective     Interval History:     Overnight no event  Patient lying in bed comfortable offers no complaints  Tolerating oral intake without abdominal pain  Denies any chest pain, shortness of palpitations    Urinating spontaneously    No fevers or chills      Review of Systems:   As noted above    Objective     Vitals:   Temp:  [97 °F (36.1 °C)-98.4 °F (36.9 °C)] 97 °F (36.1 °C)  Heart Rate:  [78-88] 78  Resp:  [16] 16  BP: (129-141)/(68-75) 141/75    Intake/Output Summary (Last 24 hours) at 2024 0701  Last data filed at 2024 2347  Gross per 24 hour   Intake 300 ml   Output --   Net 300 ml         Physical Exam:    Constitutional: Awake, cooperative,chronically ill  HEENT: Sclera anicteric, no conjunctival injection, MMM  Neck: Supple, no carotid bruit, trachea at midline, no JVD  Respiratory: Coarse anteriorly; not labored on room air   Cardiovascular: RRR, no rub  Gastrointestinal: BS +, soft, nondistended, not distended  : No palpable bladder  Musculoskeletal: No edema, no clubbing or cyanosis  Neurologic: Alert no focalization diffuse muscle wasting  Skin: Warm and dry      Scheduled Meds:     dilTIAZem CD, 180 mg, Oral, Q24H  docusate sodium, 100 mg, Oral, BID  famotidine, 20 mg, Oral, BID  folic acid, 1 mg, Oral, Daily  haloperidol, 2 mg, Oral, TID  metoprolol tartrate, 25 mg, Oral, TID  multivitamin, 1 tablet, Oral, Daily  rosuvastatin, 40 mg, Oral, Nightly  sucralfate, 1 g, Oral, 4x Daily AC & at Bedtime  thiamine, 100 mg, Oral, Daily      IV Meds:            Results Reviewed:   I have personally reviewed the results from the time of this admission to 2024 07:01 EST     Results from last 7 days   Lab Units 24  1410 24  0331 24  0405   SODIUM mmol/L 140 142 140   POTASSIUM  mmol/L 4.0 4.1 3.9   CHLORIDE mmol/L 101 105 107   CO2 mmol/L 22.2 25.5 24.0   BUN mg/dL 13 14 19   CREATININE mg/dL 2.22* 1.52* 1.64*   CALCIUM mg/dL 10.8* 9.9 10.4   GLUCOSE mg/dL 125* 98 127*       Estimated Creatinine Clearance: 25.7 mL/min (A) (by C-G formula based on SCr of 2.22 mg/dL (H)).    Results from last 7 days   Lab Units 02/12/24  0331 02/11/24  0405 02/10/24  0410 02/09/24  0438 02/08/24  0751   MAGNESIUM mg/dL  --   --  1.7 1.8 1.8   PHOSPHORUS mg/dL 3.3 3.2 2.9 3.2 2.6             Results from last 7 days   Lab Units 02/12/24  0331 02/11/24  0406 02/10/24  0410 02/09/24  0438 02/08/24  0751   WBC 10*3/mm3 4.39 4.48 4.66 4.22 4.15   HEMOGLOBIN g/dL 10.4* 11.1* 11.5* 11.6* 11.4*   PLATELETS 10*3/mm3 216 181 192 187 168               Assessment / Plan     ASSESSMENT:  1.  CRISTIANO on CKD3a, nonoliguric, probably prerenal.  Electrolytes okay.  Creatinine started to improve with medical management CT scan abdomen pelvis no hydronephrosis or nephrolithiasis urinalysis proteinuria  309 mg/g   2.  Confusion/encephalopathy, with suspected withdrawal syndrome, waxing and waning.  On Haldol  3.  Alcoholism and liver disease currently on folic acid, thiamine  4.  Depression, recently placed on Lexapro, which has been DC'd  5.  DDD with prior stroke.  Followed by PT and OT  6.  Chronic atrial fibrillation: Rate controlled on diltiazem and metoprolol    PLAN:  -Creatinine function above baseline, pending repeat lab work this a.m.   -Electrolytes stable  -Will obtain bladder scan +  ml bolus    Thank you for involving us in the care of Yuval Loja.  Please feel free to call with any questions.    Richy Duran MD  02/14/24  07:01 [facility]    [facility]  [phone]    Please note that portions of this note were completed with a voice recognition program.

## 2024-02-14 NOTE — PLAN OF CARE
Goal Outcome Evaluation:  Plan of Care Reviewed With: patient           Outcome Evaluation: Patient alert and oriented to self. Patient can be agitated and aggressive when asked to go into his room or to not go into other patients room/nursing station, etc. Patient ambulates in his room and hallway consistently. Patient is refusing medications.

## 2024-02-14 NOTE — PLAN OF CARE
Goal Outcome Evaluation:patient alert to person today. Refusing most of meds and care.Continue to look for placement.

## 2024-02-15 LAB
ANION GAP SERPL CALCULATED.3IONS-SCNC: 14.4 MMOL/L (ref 5–15)
BUN SERPL-MCNC: 23 MG/DL (ref 8–23)
BUN/CREAT SERPL: 9.7 (ref 7–25)
CALCIUM SPEC-SCNC: 10.5 MG/DL (ref 8.6–10.5)
CHLORIDE SERPL-SCNC: 105 MMOL/L (ref 98–107)
CO2 SERPL-SCNC: 24.6 MMOL/L (ref 22–29)
CREAT SERPL-MCNC: 2.37 MG/DL (ref 0.76–1.27)
EGFRCR SERPLBLD CKD-EPI 2021: 27.9 ML/MIN/1.73
GLUCOSE SERPL-MCNC: 141 MG/DL (ref 65–99)
POTASSIUM SERPL-SCNC: 4 MMOL/L (ref 3.5–5.2)
SODIUM SERPL-SCNC: 144 MMOL/L (ref 136–145)

## 2024-02-15 PROCEDURE — 80048 BASIC METABOLIC PNL TOTAL CA: CPT | Performed by: HOSPITALIST

## 2024-02-15 RX ORDER — ROSUVASTATIN CALCIUM 40 MG/1
40 TABLET, COATED ORAL NIGHTLY
Qty: 30 TABLET | Refills: 0 | Status: SHIPPED | OUTPATIENT
Start: 2024-02-15 | End: 2024-03-01 | Stop reason: HOSPADM

## 2024-02-15 RX ORDER — HALOPERIDOL 2 MG/1
2 TABLET ORAL 3 TIMES DAILY
Qty: 90 TABLET | Refills: 0 | Status: ON HOLD | OUTPATIENT
Start: 2024-02-15 | End: 2024-03-16

## 2024-02-15 RX ORDER — FOLIC ACID 1 MG/1
1 TABLET ORAL DAILY
Qty: 30 TABLET | Refills: 0 | Status: ON HOLD | OUTPATIENT
Start: 2024-02-16 | End: 2024-03-17

## 2024-02-15 RX ORDER — DIPHENOXYLATE HYDROCHLORIDE AND ATROPINE SULFATE 2.5; .025 MG/1; MG/1
1 TABLET ORAL DAILY
Qty: 30 TABLET | Refills: 0 | Status: ON HOLD | OUTPATIENT
Start: 2024-02-16 | End: 2024-03-17

## 2024-02-15 RX ADMIN — METOPROLOL TARTRATE 25 MG: 25 TABLET, FILM COATED ORAL at 22:48

## 2024-02-15 NOTE — DISCHARGE SUMMARY
Discharge summary    Date of admission 2/4/2024  Date of discharge March 1, 2024    Final diagnosis  Alcohol withdrawal syndrome  Acute kidney injury resolved  Depression   History of alcohol abuse  Paroxysmal atrial fibrillation  Coronary artery disease  Hyperlipidemia  History of CVA  Chronic kidney disease stage III  Gastroesophageal reflux disease    Discharge medications    Current Facility-Administered Medications:     dilTIAZem CD (CARDIZEM CD) 24 hr capsule 180 mg, 180 mg, Oral, Q24H, Chino Cole MD, 180 mg at 02/14/24 0921    docusate sodium (COLACE) capsule 100 mg, 100 mg, Oral, BID, Chino Cole MD, 100 mg at 02/13/24 0820    famotidine (PEPCID) tablet 20 mg, 20 mg, Oral, BID, Chino Cole MD, 20 mg at 02/14/24 0935    folic acid (FOLVITE) tablet 1 mg, 1 mg, Oral, Daily, Chino Cole MD, 1 mg at 02/13/24 0819    haloperidol (HALDOL) tablet 2 mg, 2 mg, Oral, TID, Hipolito Ramon III, MD, 2 mg at 02/14/24 0921    haloperidol lactate (HALDOL) injection 5 mg, 5 mg, Intramuscular, Q6H PRN, Hipolito Ramon III, MD, 5 mg at 02/06/24 0049    hydrOXYzine (ATARAX) tablet 25 mg, 25 mg, Oral, 4x Daily PRN, Chino Cole MD, 25 mg at 02/12/24 2039    metoprolol tartrate (LOPRESSOR) tablet 25 mg, 25 mg, Oral, TID, Chino Cole MD, 25 mg at 02/13/24 1504    multivitamin (THERAGRAN) tablet 1 tablet, 1 tablet, Oral, Daily, Chino Cole MD, 1 tablet at 02/13/24 0819    rosuvastatin (CRESTOR) tablet 40 mg, 40 mg, Oral, Nightly, Chino Cole MD, 40 mg at 02/12/24 2039    sodium chloride 0.9 % bolus 500 mL, 500 mL, Intravenous, Once, Richy Duran MD    [COMPLETED] Insert Peripheral IV, , , Once **AND** sodium chloride 0.9 % flush 10 mL, 10 mL, Intravenous, PRN, Salma Grullon APRN    sucralfate (CARAFATE) tablet 1 g, 1 g, Oral, 4x Daily AC & at Bedtime, Chino Cole MD, 1 g at 02/13/24 0809    thiamine (VITAMIN B-1) tablet 100 mg, 100 mg, Oral, Daily, Chino Cole MD, 100 mg at  02/13/24 0820     Consults obtained  Nephrology  Psychiatry  Spiritual care    Procedures  None    Hospital course  73-year-old -American male who is well-known to our service with history of atrial fibrillation coronary artery disease hypertension hyperlipidemia CVA and chronic kidney disease stage III admitted to emergency room with generalized weakness.  Patient workup in ER revealed acute kidney injury admitted for management.  Patient admitted treated for acute kidney injury and also developed alcohol withdrawal syndrome with history of alcohol abuse and required CIWA protocol and detox medication patient remained pleasantly confused followed by psychiatry and treated with Haldol.  Patient mental status returned to baseline and was initially planned to go to subacute rehab and family decided to take him home with family support.  Patient was walking all over but remained pleasantly confused.  Patient also followed by nephrology and cleared for discharge.    Addendum  Patient discharge was held as he he was not safe to be discharged by himself and family was not willing to take him home and difficult disposition and finally found a place where he can be assisted and family will provide supportive care.  Patient is medically stable and cleared for discharge.  Patient is walking all over and independent in eating and following all commands.    Discharge diet regular    Activity as tolerated with supervision    Medication as above    Follow-up with prime doctor in 1 week and follow-up with nephrology and psychiatry per the instructions and take medication as directed.    JOLIE BUNDY MD

## 2024-02-15 NOTE — PLAN OF CARE
"Goal Outcome Evaluation:  Plan of Care Reviewed With: patient        Progress: no change  Outcome Evaluation: Pt up and about in room and hallway fidgeting with things (computer, phone, call light) --difficult to understand at times--talks very low and some nonsensicle talk at time.  Suspicious of staff and others--watching out--states \"they know where I live now\" to staff.  Allowed nurse to listen with stethoscope, flushed IV, didn't want to let me but--did it fast and he was okay with it.  Refused to take any meds.  Slept most of night.                               "

## 2024-02-15 NOTE — PROGRESS NOTES
The patient is pleasantly confused when seen today.  Staff reports no management issues but charting does indicate selective compliance with medications and care.  The chart indicates that the patient may be going home with daughter.  I will continue to follow until discharge does occur.  If patient is discharged home, I would recommend home health follow-up, though I am doubtful that the patient would comply with home health care.

## 2024-02-15 NOTE — PROGRESS NOTES
[facility] Progress Note      Patient Name: Yuval Loja  : 1950  MRN: [id number]  Primary Care Physician:  Alessia Prescott APRN  Date of admission: 2024    Subjective     Interval History:     Patient walking down the halls  Upon encounter with patient he continues to be  soft spoken, having all current conversation of random topics, clearly having ideas of confabulation    As per nursing staff eating and drinking normally    Patient continues to refuse medications and IV's    Review of Systems:   As noted above    Objective     Vitals:   Temp:  [96 °F (35.6 °C)-97.9 °F (36.6 °C)] 97.9 °F (36.6 °C)  Heart Rate:  [] 109  Resp:  [16] 16  BP: (127-152)/(62-87) 127/62    Intake/Output Summary (Last 24 hours) at 2/15/2024 1731  Last data filed at 2/15/2024 0900  Gross per 24 hour   Intake 250 ml   Output --   Net 250 ml         Physical Exam:    Constitutional: Awake, cooperative,chronically ill  HEENT: Sclera anicteric, no conjunctival injection, MMM  Neck: Supple, no carotid bruit, trachea at midline, no JVD  Respiratory: Coarse anteriorly; not labored on room air   Cardiovascular: RRR, no rub  Gastrointestinal: BS +, soft, nondistended, not distended  : No palpable bladder  Musculoskeletal: No edema, no clubbing or cyanosis  Neurologic: Alert no focalization diffuse muscle wasting  Skin: Warm and dry      Scheduled Meds:     dilTIAZem CD, 180 mg, Oral, Q24H  docusate sodium, 100 mg, Oral, BID  famotidine, 20 mg, Oral, BID  folic acid, 1 mg, Oral, Daily  haloperidol, 2 mg, Oral, TID  metoprolol tartrate, 25 mg, Oral, TID  multivitamin, 1 tablet, Oral, Daily  rosuvastatin, 40 mg, Oral, Nightly  sodium chloride, 500 mL, Intravenous, Once  sucralfate, 1 g, Oral, 4x Daily AC & at Bedtime  thiamine, 100 mg, Oral, Daily      IV Meds:            Results Reviewed:   I have personally reviewed the results from the time of this admission to 2/15/2024 17:31 EST     Results  from last 7 days   Lab Units 02/15/24  0854 02/13/24  1410 02/12/24  0331   SODIUM mmol/L 144 140 142   POTASSIUM mmol/L 4.0 4.0 4.1   CHLORIDE mmol/L 105 101 105   CO2 mmol/L 24.6 22.2 25.5   BUN mg/dL 23 13 14   CREATININE mg/dL 2.37* 2.22* 1.52*   CALCIUM mg/dL 10.5 10.8* 9.9   GLUCOSE mg/dL 141* 125* 98       Estimated Creatinine Clearance: 24 mL/min (A) (by C-G formula based on SCr of 2.37 mg/dL (H)).    Results from last 7 days   Lab Units 02/12/24  0331 02/11/24  0405 02/10/24  0410 02/09/24  0438   MAGNESIUM mg/dL  --   --  1.7 1.8   PHOSPHORUS mg/dL 3.3 3.2 2.9 3.2             Results from last 7 days   Lab Units 02/12/24 0331 02/11/24  0406 02/10/24  0410 02/09/24  0438   WBC 10*3/mm3 4.39 4.48 4.66 4.22   HEMOGLOBIN g/dL 10.4* 11.1* 11.5* 11.6*   PLATELETS 10*3/mm3 216 181 192 187               Assessment / Plan     ASSESSMENT:  1.  CRISTIANO on CKD3a, nonoliguric, probably prerenal.  Creatinine started to improve with medical management CT scan abdomen pelvis no hydronephrosis or nephrolithiasis urinalysis proteinuria  309 mg/g   2.  Confusion/encephalopathy, with suspected withdrawal syndrome, waxing and waning.  On Haldol  3.  Alcoholism and liver disease currently on folic acid, thiamine  4.  Depression, recently placed on Lexapro, which has been DC'd  5.  DDD with prior stroke.  Followed by PT and OT  6.  Chronic atrial fibrillation: Rate controlled on diltiazem and metoprolol  7.  Alzheimer's disease as per psychiatry    PLAN:  -Renal function seems to be stabilizing, likely secondary to confusion with decreased oral intake, likely as per nursing staff eating and drinking appropriately.  -Electrolytes ,Vital signs and volume status stable  -Patient awaiting placement, due to his Alzheimer's dementia family unable to provide for his care.  Care management assisting On placement      Discussed with nursing staff    Thank you for involving us in the care of Yuval A Loja.  Please feel free to call with any  questions.    Richy uDran MD  02/15/24  17:31 EST    [facility]  [phone]    Please note that portions of this note were completed with a voice recognition program.

## 2024-02-15 NOTE — PLAN OF CARE
Goal Outcome Evaluation:    Pleasantly confused, able to state name and age. Up ad [place], ambulating several times this morning. Refused medications, educated on importance of each medication. Able to do limited assessment of lungs and heart sounds and radial pulse. VSS on room air, CTM. Hopefully d/c home, pending daughter transportation.

## 2024-02-15 NOTE — PROGRESS NOTES
"Daily progress note    Primary care physician  Dr. Prescott    Subjective  Doing same with no new complaint and remained pleasantly confused.  Patient family wants to take him home.    History of present illness  73-year-old -American male with history of alcohol abuse paroxysmal atrial fibrillation coronary artery disease hyperlipidemia and chronic kidney disease stage III who is well-known to our service and apparently continues to abuse alcohol and recently started on a antidepressant brought to the emergency room with generalized weakness and altered mental status.  Patient in no respiratory distress and answer question appropriately and workup in ER revealed acute metabolic encephalopathy admit for management.  At the time of examination he denies any chest pain shortness of breath palpitation and asking for anxiety medication and wants to eat regular diet.     REVIEW OF SYSTEMS  Unable to obtain     PHYSICAL EXAM   Blood pressure 127/62, pulse 109, temperature 97.9 °F (36.6 °C), temperature source Oral, resp. rate 16, height 175.3 cm (69\"), weight 63.1 kg (139 lb 1.8 oz), SpO2 100%.    Constitutional:       General: He is awake. He is not in acute distress.     Appearance: He is not toxic-appearing.   HENT:      Head: Normocephalic and atraumatic.   Eyes:      General: Lids are normal. Vision grossly intact.   Cardiovascular:      Rate and Rhythm: Normal rate and regular rhythm.      Heart sounds: Normal heart sounds.   Pulmonary:      Effort: Pulmonary effort is normal.      Breath sounds: Normal breath sounds and air entry.   Abdominal:      General: Bowel sounds are normal.      Palpations: Abdomen is soft.      Tenderness: There is no abdominal tenderness.   Musculoskeletal:      Cervical back: Normal range of motion.   Skin:     General: Skin is warm and dry.      Coloration: Skin is not pale.   Neurological:      General: No focal deficit present.      Mental Status: He is alert and oriented to " person, place, and time.      Comments: No focal deficit  Psychiatric:         Attention and Perception: Attention normal.         Mood and Affect: Mood normal.         Speech: Speech normal.         Behavior: Behavior is cooperative.     LAB RESULTS  Lab Results (last 24 hours)       Procedure Component Value Units Date/Time    Basic Metabolic Panel [[id number]]  (Abnormal) Collected: 02/15/24 0854    Specimen: Blood Updated: 02/15/24 0958     Glucose 141 mg/dL      BUN 23 mg/dL      Creatinine 2.37 mg/dL      Sodium 144 mmol/L      Potassium 4.0 mmol/L      Chloride 105 mmol/L      CO2 24.6 mmol/L      Calcium 10.5 mg/dL      BUN/Creatinine Ratio 9.7     Anion Gap 14.4 mmol/L      eGFR 27.9 mL/min/1.73     Narrative:      GFR Normal >60  Chronic Kidney Disease <60  Kidney Failure <15    The GFR formula is only valid for adults with stable renal function between ages 18 and 70.          Imaging Results (Last 24 Hours)       ** No results found for the last 24 hours. **          Results  Scan on 2/4/2024 0415 by New Onbase, [place]: ECG 12-LEAD         Author: -- Service: -- Author Type: --   Filed: Date of Service: Creation Time:   Status: (Other)   HEART RATE= 67  bpm  RR Interval= 896  ms  UT Interval= 193  ms  P Horizontal Axis= -48  deg  P Front Axis= 82  deg  QRSD Interval= 82  ms  QT Interval= 441  ms  QTcB= 466  ms  QRS Axis= 47  deg  T Wave Axis= 51  deg  - ABNORMAL ECG -  Sinus rhythm  RSR' in V1 or V2, probably normal variant  Consider left ventricular hypertrophy  Anterior ST elevation, probably due to LVH          Current Facility-Administered Medications:     dilTIAZem CD (CARDIZEM CD) 24 hr capsule 180 mg, 180 mg, Oral, Q24H, Chino Cole MD, 180 mg at 02/14/24 0921    docusate sodium (COLACE) capsule 100 mg, 100 mg, Oral, BID, Chino Cole MD, 100 mg at 02/13/24 0820    famotidine (PEPCID) tablet 20 mg, 20 mg, Oral, BID, Chino Cole MD, 20 mg at 02/14/24 0935    folic acid (FOLVITE) tablet 1 mg, 1  mg, Oral, Daily, Jolie Cole MD, 1 mg at 02/13/24 0819    haloperidol (HALDOL) tablet 2 mg, 2 mg, Oral, TID, Hipolito Ramon III, MD, 2 mg at 02/14/24 0921    haloperidol lactate (HALDOL) injection 5 mg, 5 mg, Intramuscular, Q6H PRN, Hipolito Ramon III, MD, 5 mg at 02/06/24 0049    hydrOXYzine (ATARAX) tablet 25 mg, 25 mg, Oral, 4x Daily PRN, Jolie Cole MD, 25 mg at 02/12/24 2039    metoprolol tartrate (LOPRESSOR) tablet 25 mg, 25 mg, Oral, TID, Jolie Cole MD, 25 mg at 02/13/24 1504    multivitamin (THERAGRAN) tablet 1 tablet, 1 tablet, Oral, Daily, Jolie Cole MD, 1 tablet at 02/13/24 0819    rosuvastatin (CRESTOR) tablet 40 mg, 40 mg, Oral, Nightly, Jolie Cole MD, 40 mg at 02/12/24 2039    sodium chloride 0.9 % bolus 500 mL, 500 mL, Intravenous, Once, Richy Duran MD    [COMPLETED] Insert Peripheral IV, , , Once **AND** sodium chloride 0.9 % flush 10 mL, 10 mL, Intravenous, PRN, Salma Grullon, [place]    sucralfate (CARAFATE) tablet 1 g, 1 g, Oral, 4x Daily AC & at Bedtime, Jolie Cole MD, 1 g at 02/13/24 0809    thiamine (VITAMIN B-1) tablet 100 mg, 100 mg, Oral, Daily, Jolie Cole MD, 100 mg at 02/13/24 0820     ASSESSMENT  Alcohol withdrawal syndrome  Acute kidney injury resolved  Depression   History of alcohol abuse  Paroxysmal atrial fibrillation  Coronary artery disease  Hyperlipidemia  History of CVA  Chronic kidney disease stage III  Gastroesophageal reflux disease    PLAN  Discharge home with family support if okay with all  Discharge summary dictated    JOLIE COLE MD    Copied text in this note has been reviewed and is accurate as of 02/15/24

## 2024-02-15 NOTE — CASE MANAGEMENT/SOCIAL WORK
Continued Stay Note  [facility]     Patient Name: Yuval Loja  MRN: [id number]  Today's Date: 2/15/2024    Admit Date: 2/4/2024    Plan: SNF with possible LTC/memory care   Discharge Plan       Row Name 02/15/24 1159       Plan    Plan Comments Left message for daughter/ Torri.                   Discharge Codes    No documentation.                 Expected Discharge Date and Time       Expected Discharge Date Expected Discharge Time    Feb 17, 2024               Ann Marie Olivares RN

## 2024-02-16 PROCEDURE — 97116 GAIT TRAINING THERAPY: CPT

## 2024-02-16 RX ORDER — DILTIAZEM HYDROCHLORIDE 120 MG/1
120 CAPSULE, COATED, EXTENDED RELEASE ORAL
Status: DISCONTINUED | OUTPATIENT
Start: 2024-02-17 | End: 2024-03-01 | Stop reason: HOSPADM

## 2024-02-16 RX ADMIN — HALOPERIDOL 2 MG: 2 TABLET ORAL at 21:31

## 2024-02-16 RX ADMIN — Medication 1 TABLET: at 09:27

## 2024-02-16 RX ADMIN — Medication 100 MG: at 09:26

## 2024-02-16 RX ADMIN — HYDROXYZINE HYDROCHLORIDE 25 MG: 25 TABLET ORAL at 21:31

## 2024-02-16 RX ADMIN — METOPROLOL TARTRATE 25 MG: 25 TABLET, FILM COATED ORAL at 09:27

## 2024-02-16 RX ADMIN — METOPROLOL TARTRATE 25 MG: 25 TABLET, FILM COATED ORAL at 21:31

## 2024-02-16 RX ADMIN — HALOPERIDOL 2 MG: 2 TABLET ORAL at 09:26

## 2024-02-16 RX ADMIN — FOLIC ACID 1 MG: 1 TABLET ORAL at 09:26

## 2024-02-16 NOTE — PLAN OF CARE
Goal Outcome Evaluation:  Plan of Care Reviewed With: patient        Progress: improving  Outcome Evaluation: Pt seen for PT treatment today. Pt was standing out in the hallway near [facility] [place]. Pt ready to go home and needed redirection to head back to his room. Pt ambulated about 40ft back to his room with rwx and supervision. Pt's gait is slow but no unsteadiness or LOB noted. Pt was able to gather belonings and place into a bag around the room with supervision. Will follow pt peripherally for d/c needs.

## 2024-02-16 NOTE — PROGRESS NOTES
"Daily progress note    Primary care physician  Dr. Prescott    Subjective  Patient and family changed their mind.  Patient doing same with no new complaint and remained pleasantly confused    History of present illness  73-year-old -American male with history of alcohol abuse paroxysmal atrial fibrillation coronary artery disease hyperlipidemia and chronic kidney disease stage III who is well-known to our service and apparently continues to abuse alcohol and recently started on a antidepressant brought to the emergency room with generalized weakness and altered mental status.  Patient in no respiratory distress and answer question appropriately and workup in ER revealed acute metabolic encephalopathy admit for management.  At the time of examination he denies any chest pain shortness of breath palpitation and asking for anxiety medication and wants to eat regular diet.     REVIEW OF SYSTEMS  Unable to obtain     PHYSICAL EXAM   Blood pressure 121/64, pulse 51, temperature 96.8 °F (36 °C), temperature source Oral, resp. rate 16, height 175.3 cm (69\"), weight 63.1 kg (139 lb 1.8 oz), SpO2 100%.    Constitutional:       General: He is awake. He is not in acute distress.     Appearance: He is not toxic-appearing.   HENT:      Head: Normocephalic and atraumatic.   Eyes:      General: Lids are normal. Vision grossly intact.   Cardiovascular:      Rate and Rhythm: Normal rate and regular rhythm.      Heart sounds: Normal heart sounds.   Pulmonary:      Effort: Pulmonary effort is normal.      Breath sounds: Normal breath sounds and air entry.   Abdominal:      General: Bowel sounds are normal.      Palpations: Abdomen is soft.      Tenderness: There is no abdominal tenderness.   Musculoskeletal:      Cervical back: Normal range of motion.   Skin:     General: Skin is warm and dry.      Coloration: Skin is not pale.   Neurological:      General: No focal deficit present.      Mental Status: He is alert and oriented to " person, place, and time.      Comments: No focal deficit  Psychiatric:         Attention and Perception: Attention normal.         Mood and Affect: Mood normal.         Speech: Speech normal.         Behavior: Behavior is cooperative.     LAB RESULTS  Lab Results (last 24 hours)       ** No results found for the last 24 hours. **          Imaging Results (Last 24 Hours)       ** No results found for the last 24 hours. **          Results  Scan on 2/4/2024 0415 by [employer], [place]: ECG 12-LEAD         Author: -- Service: -- Author Type: --   Filed: Date of Service: Creation Time:   Status: (Other)   HEART RATE= 67  bpm  RR Interval= 896  ms  WI Interval= 193  ms  P Horizontal Axis= -48  deg  P Front Axis= 82  deg  QRSD Interval= 82  ms  QT Interval= 441  ms  QTcB= 466  ms  QRS Axis= 47  deg  T Wave Axis= 51  deg  - ABNORMAL ECG -  Sinus rhythm  RSR' in V1 or V2, probably normal variant  Consider left ventricular hypertrophy  Anterior ST elevation, probably due to LVH          Current Facility-Administered Medications:     dilTIAZem CD (CARDIZEM CD) 24 hr capsule 180 mg, 180 mg, Oral, Q24H, Chino Cole MD, 180 mg at 02/14/24 0921    docusate sodium (COLACE) capsule 100 mg, 100 mg, Oral, BID, Chino Cole MD, 100 mg at 02/13/24 0820    famotidine (PEPCID) tablet 20 mg, 20 mg, Oral, BID, Chino Cole MD, 20 mg at 02/14/24 0935    folic acid (FOLVITE) tablet 1 mg, 1 mg, Oral, Daily, Chino Cole MD, 1 mg at 02/16/24 0926    haloperidol (HALDOL) tablet 2 mg, 2 mg, Oral, TID, Hipolito Ramon III, MD, 2 mg at 02/16/24 0926    haloperidol lactate (HALDOL) injection 5 mg, 5 mg, Intramuscular, Q6H PRN, Hipolito Ramon III, MD, 5 mg at 02/06/24 0049    hydrOXYzine (ATARAX) tablet 25 mg, 25 mg, Oral, 4x Daily PRN, Chino Cole MD, 25 mg at 02/12/24 2039    metoprolol tartrate (LOPRESSOR) tablet 25 mg, 25 mg, Oral, TID, Chino Cole MD, 25 mg at 02/16/24 0927    multivitamin (THERAGRAN) tablet 1  tablet, 1 tablet, Oral, Daily, Jolie Cole MD, 1 tablet at 02/16/24 0927    rosuvastatin (CRESTOR) tablet 40 mg, 40 mg, Oral, Nightly, Jolie Cole MD, 40 mg at 02/12/24 2039    sodium chloride 0.9 % bolus 500 mL, 500 mL, Intravenous, Once, Richy Duran MD    [COMPLETED] Insert Peripheral IV, , , Once **AND** sodium chloride 0.9 % flush 10 mL, 10 mL, Intravenous, PRN, Salma Grullon APRN    sucralfate (CARAFATE) tablet 1 g, 1 g, Oral, 4x Daily AC & at Bedtime, Jolie Cole MD, 1 g at 02/13/24 0809    thiamine (VITAMIN B-1) tablet 100 mg, 100 mg, Oral, Daily, Jolie Cole MD, 100 mg at 02/16/24 0926     ASSESSMENT  Alcohol withdrawal syndrome  Acute kidney injury resolved  Depression   History of alcohol abuse  Paroxysmal atrial fibrillation  Coronary artery disease  Hyperlipidemia  History of CVA  Chronic kidney disease stage III  Gastroesophageal reflux disease    PLAN  CPM  Continue detox medications  Adjust home medications  Stress ulcer DVT prophylaxis  Nephrology to follow patient  Supportive care  PT OT  Discussed with nursing staff and family   Subacute rehab once bed available    JOLIE COLE MD    Copied text in this note has been reviewed and is accurate as of 02/16/24

## 2024-02-16 NOTE — CASE MANAGEMENT/SOCIAL WORK
Continued Stay Note  [facility]     Patient Name: Yuval Loja  MRN: [id number]  Today's Date: 2/16/2024    Admit Date: 2/4/2024    Plan: TBD   Discharge Plan       Row Name 02/16/24 1525       Plan    Plan TBD    Patient/Family in Agreement with Plan yes    Plan Comments Spoke with patient's daughter via outbound call. Introduced self. Daughter states that she needs paperwork from hospital to complete POA. CCP printed and left in patient's cubby. Notified unit secretary.                   Discharge Codes    No documentation.                 Expected Discharge Date and Time       Expected Discharge Date Expected Discharge Time    Feb 17, 2024               Ann Marie Olivares RN

## 2024-02-16 NOTE — PLAN OF CARE
Goal Outcome Evaluation:    Pleasantly confused, up and down throughout the day wandering in [place]. Allowed RN to administer some medications, still refusing full assessments. VSS on room air currently, IV present to R arm. No distress noted currently, sitting in chair. PT/OT to work with patient.

## 2024-02-16 NOTE — THERAPY TREATMENT NOTE
Patient Name: Yuval Loja  : 1950    MRN: [id number]                              Today's Date: 2024       Admit Date: 2024    Visit Dx:     ICD-10-CM ICD-9-CM   1. Altered mental status, unspecified altered mental status type  R41.82 780.97   2. CRISTIANO (acute kidney injury)  N17.9 584.9     Patient Active Problem List   Diagnosis    Alcoholic ketoacidosis    Alcohol dependence    Methamphetamine abuse    Fatty liver, alcoholic    Nausea vomiting and diarrhea    Alcoholic liver disease    Metabolic acidosis    Tobacco abuse    Coronary artery disease involving native coronary artery of native heart without angina pectoris    Tachycardia    Sinus tachycardia    Chronic kidney disease, stage 3    Medically noncompliant    Visual hallucinations    Psychosis    Hyponatremia    CAD (coronary artery disease)    Leukopenia    Symptomatic anemia    Headache    Substance abuse    Gastrointestinal hemorrhage    CIRSTIANO (acute kidney injury)    Hyperkalemia    Right lower quadrant abdominal pain    New onset atrial fibrillation    History of drug abuse    COPD (chronic obstructive pulmonary disease)    Right inguinal hernia    Constipation    Status post right hip replacement    Right hip pain    Sinus bradycardia    Generalized abdominal pain    Altered mental status    Altered mental state     Past Medical History:   Diagnosis Date    Alcohol abuse     Arthritis     CAD (coronary artery disease)     Chronic kidney disease, stage 3     COPD (chronic obstructive pulmonary disease)     Disease of thyroid gland     Elevated cholesterol     Fatty liver, alcoholic     GERD (gastroesophageal reflux disease)     History of transfusion     Hypertensive urgency     Metabolic acidosis     Myocardial infarction     Nausea vomiting and diarrhea 2017    Sinus tachycardia     Sleep apnea     Stroke      Past Surgical History:   Procedure Laterality Date    BACK SURGERY      CARDIAC CATHETERIZATION      CARDIAC  ELECTROPHYSIOLOGY PROCEDURE N/A 4/10/2023    Procedure: Temporary Pacemaker;  Surgeon: Vaibhav Bonilla MD;  Location: [facility] CATH INVASIVE LOCATION;  Service: Cardiovascular;  Laterality: N/A;    COLONOSCOPY      ENDOSCOPY      FRACTURE SURGERY      JOINT REPLACEMENT      SKIN BIOPSY      TOTAL HIP ARTHROPLASTY Right 06/27/2022    Procedure: TOTAL HIP ARTHROPLASTY ANTERIOR WITH HANA TABLE;  Surgeon: Willian Quiles MD;  Location: [facility] MAIN OR;  Service: Orthopedics;  Laterality: Right;  Depuy, mile floyd      General Information       Row Name 02/16/24 1030          Physical Therapy Time and Intention    Document Type therapy note (daily note)  -EB     Mode of Treatment individual therapy;physical therapy  -       Row Name 02/16/24 1030          General Information    Patient Profile Reviewed yes  -EB     Existing Precautions/Restrictions fall  -       Row Name 02/16/24 1030          Cognition    Orientation Status (Cognition) oriented to;person;place  -       Row Name 02/16/24 1030          Safety Issues, Functional Mobility    Impairments Affecting Function (Mobility) endurance/activity tolerance;strength;cognition  -               User Key  (r) = Recorded By, (t) = Taken By, (c) = Cosigned By      Initials Name Provider Type    EB Linad Acuña PTA Physical Therapist Assistant                   Mobility       Row Name 02/16/24 1031          Bed Mobility    Comment, (Bed Mobility) NT-pt in hallway upon arrival  -       Row Name 02/16/24 1031          Sit-Stand Transfer    Comment, (Sit-Stand Transfer) NT-pt up in hallway upon arrival and then sat in the chair.  -       Row Name 02/16/24 1031          Gait/Stairs (Locomotion)    Nicollet Level (Gait) supervision  -EB     Assistive Device (Gait) walker, front-wheeled  -EB     Distance in Feet (Gait) 40ft  -EB     Deviations/Abnormal Patterns (Gait) alicia decreased;gait speed decreased;stride length decreased  -EB     Bilateral  Gait Deviations forward flexed posture  -EB     Comment, (Gait/Stairs) gait slow but no unsteadiness  -EB               User Key  (r) = Recorded By, (t) = Taken By, (c) = Cosigned By      Initials Name Provider Type    Linda Ramey PTA Physical Therapist Assistant                   Obj/Interventions    No documentation.                  Goals/Plan    No documentation.                  Clinical Impression       Row Name 02/16/24 1032          Plan of Care Review    Plan of Care Reviewed With patient  -EB     Progress improving  -EB     Outcome Evaluation Pt seen for PT treatment today. Pt was standing out in the [place] [facility] [place] Pt ready to go home and needed redirection to head back to his room. Pt ambulated about 40ft back to his room with rwx and supervision. Pt's gait is slow but no unsteadiness or LOB noted. Pt was able to gather belonings and place into a bag around the room with supervision. Will follow pt peripherally for d/c needs.  -EB       Row Name 02/16/24 1032          Therapy Assessment/Plan (PT)    Therapy Frequency (PT) 3 times/wk  -EB       Row Name 02/16/24 1032          Positioning and Restraints    Pre-Treatment Position other (comment)  outside the hallway.  -EB     Post Treatment Position chair  -EB     In Chair sitting;encouraged to call for assist;call light within reach  pt is up adlib, no chair alarm  -EB               User Key  (r) = Recorded By, (t) = Taken By, (c) = Cosigned By      Initials Name Provider Type    Linda Ramey PTA Physical Therapist Assistant                   Outcome Measures       Row Name 02/16/24 1037 02/16/24 0927       How much help from another person do you currently need...    Turning from your back to your side while in flat bed without using bedrails? 4  -EB 4  -KS    Moving from lying on back to sitting on the side of a flat bed without bedrails? 4  -EB 4  -KS    Moving to and from a bed to a chair (including a wheelchair)? 4  -EB 4  -KS     Standing up from a chair using your arms (e.g., wheelchair, bedside chair)? 4  -EB 4  -KS    Climbing 3-5 steps with a railing? 3  -EB 4  -KS    To walk in hospital room? 4  -EB 4  -KS    AM-PAC 6 Clicks Score (PT) 23  -EB 24  -KS    Highest Level of Mobility Goal 7 --> Walk 25 feet or more  -EB 8 --> Walked 250 feet or more  -KS              User Key  (r) = Recorded By, (t) = Taken By, (c) = Cosigned By      Initials Name Provider Type    Linda Ramey PTA Physical Therapist Assistant    Angi Zaragoza, RN Registered Nurse                                 Physical Therapy Education       Title: PT OT SLP Therapies (In Progress)       Topic: Physical Therapy (In Progress)       Point: Mobility training (In Progress)       Learning Progress Summary             Patient Acceptance, E, NR by KOFI at 2/16/2024 1038    Acceptance, E, NR by KIMBERLY at 2/14/2024 1354    Acceptance, E,D, NR by ALBERTO at 2/13/2024 1512    Acceptance, E,D, NR by ALBERTO at 2/12/2024 1255    Acceptance, E,TB, VU by DIONNE at 2/10/2024 1745    Acceptance, E,D, VU,NR by ALBERTO at 2/9/2024 1325    Acceptance, E,D, NR by ALBERTO at 2/7/2024 1204    Acceptance, E,TB,D, VU,NR by CH at 2/6/2024 1105    Acceptance, E, NL,NR by PP at 2/6/2024 0951                         Point: Home exercise program (In Progress)       Learning Progress Summary             Patient Acceptance, E, NR by KIMBERLY at 2/14/2024 1354    Acceptance, E,D, NR by ALBERTO at 2/13/2024 1512    Acceptance, E,D, NR by ALBERTO at 2/12/2024 1255    Acceptance, E,TB, VU by TA at 2/10/2024 1745    Acceptance, E,D, VU,NR by ALBERTO at 2/9/2024 1325    Acceptance, E,D, NR by ALBERTO at 2/7/2024 1204                         Point: Body mechanics (In Progress)       Learning Progress Summary             Patient Acceptance, E, NR by KIMBERLY at 2/14/2024 1354    Acceptance, E,D, NR by ALBERTO at 2/13/2024 1512    Acceptance, E,D, NR by ALBERTO at 2/12/2024 1255    Acceptance, E,TB, VU by DIONNE at 2/10/2024 1745    Acceptance, E,D, VU,NR by ALBERTO at 2/9/2024  1325    Acceptance, E,D, NR by JM at 2/7/2024 1204    Acceptance, E,TB,D, VU,NR by  at 2/6/2024 1105                         Point: Precautions (In Progress)       Learning Progress Summary             Patient Acceptance, E, NR by  at 2/14/2024 1354    Acceptance, E,D, NR by JM at 2/13/2024 1512    Acceptance, E,D, NR by JM at 2/12/2024 1255    Acceptance, E,TB, VU by TA at 2/10/2024 1745    Acceptance, E,D, VU,NR by JM at 2/9/2024 1325    Acceptance, E,D, NR by JM at 2/7/2024 1204    Acceptance, E,TB,D, VU,NR by  at 2/6/2024 1105    Acceptance, E, NL,NR by PP at 2/6/2024 0951                                         User Key       Initials Effective Dates Name Provider Type Discipline     06/16/21 -  Alla Acevedo, PT Physical Therapist PT     03/07/18 -  Celeste Molina PTA Physical Therapist Assistant PT     07/18/22 -  Alyce JORGE-Joo Alonso, RN Registered Nurse Nurse     02/14/23 -  Linda Acuña PTA Physical Therapist Assistant PT    PP 08/11/20 -  Celeste Montague, RN Registered Nurse Nurse     05/02/22 -  Rupali Nixon, BI Physical Therapist PT                  PT Recommendation and Plan     Plan of Care Reviewed With: patient  Progress: improving  Outcome Evaluation: Pt seen for PT treatment today. Pt was standing out in the hallway near [facility] station. Pt ready to go home and needed redirection to head back to his room. Pt ambulated about 40ft back to his room with rwx and supervision. Pt's gait is slow but no unsteadiness or LOB noted. Pt was able to gather belonings and place into a bag around the room with supervision. Will follow pt peripherally for d/c needs.     Time Calculation:         PT Charges       Row Name 02/16/24 1030             Time Calculation    Start Time 1000  -EB      Stop Time 1012  -EB      Time Calculation (min) 12 min  -EB      PT Received On 02/16/24  -EB      PT - Next Appointment 02/19/24  -EB         Time Calculation- PT    Total Timed Code Minutes-  PT 12 minute(s)  -KOFI                User Key  (r) = Recorded By, (t) = Taken By, (c) = Cosigned By      Initials Name Provider Type    Linda Ramey PTA Physical Therapist Assistant                  Therapy Charges for Today       Code Description Service Date Service Provider Modifiers Qty    [id number] HC GAIT TRAINING EA 15 MIN 2/16/2024 Linda Acuña PTA GP 1            PT G-Codes  Outcome Measure Options: AM-PAC 6 Clicks Basic Mobility (PT)  AM-PAC 6 Clicks Score (PT): 23  AM-PAC 6 Clicks Score (OT): 16  Modified [place] Scale: 3 - Moderate disability.  Requiring some help, but able to walk without assistance.       Linda Acuña PTA  2/16/2024

## 2024-02-16 NOTE — CASE MANAGEMENT/SOCIAL WORK
Continued Stay Note  Psychiatric     Patient Name: Yuval Loja  MRN: [id number]  Today's Date: 2/16/2024    Admit Date: 2/4/2024    Plan: SNF then home with 24/7 caregivers.   Discharge Plan    Spoke with daughter/ Torri regarding discharge planning. Daughter stated that she does not want LTC, she stated that her brother was coming back to help but would be a week or better before he would be available. Daughter is pursuing POA, has been to [facility] to file paperwork on Tuesday and then returned on Wednesday to file emergency POA paperwork. Daughter stated that patient currently has the funds to pay for 24/7 care but until she is able to access she could not provide. [facility] obtained permission to make further referrals; daughter stated agreement and understanding. Referral made to [facility]; patient does not currently meet criteria. Left message for Alberto/ Signature. Left message for Nova/ Valerie. Spoke with for Marcell/ Annemarie. Patient's family plans for patient to receive short term rehab to maximize independence to return home.                   Discharge Codes    No documentation.                 Expected Discharge Date and Time       Expected Discharge Date Expected Discharge Time    Feb 15, 2024               Ann Marie Olivares RN

## 2024-02-16 NOTE — PROGRESS NOTES
While still somewhat cantankerous, the patient has been no management problem.  From a psychiatric standpoint he appears to be at baseline and is stable for discharge at any time.  I will sign off.

## 2024-02-16 NOTE — NURSING NOTE
Pt remained free from falls, finally agreed to take metoprolol after nurse had mentioned to pt that he was diagnosed a while ago with a-fib and that was one of the meds that he takes.  He thought about it a while and agreed to take it--he remembered having had a stroke--would not take any other meds.  Seemed more calm/pleasant this shift, stayed in room more, slept a few hours.  Whispers often--afraid someone will hear him--didn't want anyone to know that he took a pill.  Had a few snacks overnight.  A&Ox2-3 at times.

## 2024-02-16 NOTE — PROGRESS NOTES
[facility] Progress Note      Patient Name: Yuval Loja  : 1950  MRN: [id number]  Primary Care Physician:  Alessia Prescott APRN  Date of admission: 2024    Subjective     Interval History:     Patient offers no complaint  Continues to be his soft-spoken  Speaking about random topics w ideas of confabulation    Care management team working on placement    Review of Systems:   As noted above    Objective     Vitals:   Temp:  [96.8 °F (36 °C)-98.2 °F (36.8 °C)] 96.8 °F (36 °C)  Heart Rate:  [] 51  Resp:  [14-16] 16  BP: (121-157)/(64-77) 121/64    Intake/Output Summary (Last 24 hours) at 2024 1451  Last data filed at 2024 0927  Gross per 24 hour   Intake 200 ml   Output --   Net 200 ml         Physical Exam:    Constitutional: Awake, confused  chronically ill  HEENT: Sclera anicteric, no conjunctival injection, MMM  Neck: Supple, no carotid bruit, trachea at midline, no JVD  Respiratory: Coarse anteriorly; not labored on room air   Cardiovascular: RRR, no rub  Gastrointestinal: BS +, soft, nondistended, not distended  : No palpable bladder  Musculoskeletal: No edema, no clubbing or cyanosis  Neurologic: Confused  Skin: Warm and dry      Scheduled Meds:     [START ON 2024] dilTIAZem CD, 120 mg, Oral, Q24H  docusate sodium, 100 mg, Oral, BID  famotidine, 20 mg, Oral, BID  folic acid, 1 mg, Oral, Daily  haloperidol, 2 mg, Oral, TID  metoprolol tartrate, 25 mg, Oral, TID  multivitamin, 1 tablet, Oral, Daily  rosuvastatin, 40 mg, Oral, Nightly  sodium chloride, 500 mL, Intravenous, Once  sucralfate, 1 g, Oral, 4x Daily AC & at Bedtime  thiamine, 100 mg, Oral, Daily      IV Meds:            Results Reviewed:   I have personally reviewed the results from the time of this admission to 2024 14:51 EST     Results from last 7 days   Lab Units 02/15/24  0854 24  1410 24  0331   SODIUM mmol/L 144 140 142   POTASSIUM mmol/L 4.0 4.0 4.1   CHLORIDE  mmol/L 105 101 105   CO2 mmol/L 24.6 22.2 25.5   BUN mg/dL 23 13 14   CREATININE mg/dL 2.37* 2.22* 1.52*   CALCIUM mg/dL 10.5 10.8* 9.9   GLUCOSE mg/dL 141* 125* 98       Estimated Creatinine Clearance: 24 mL/min (A) (by C-G formula based on SCr of 2.37 mg/dL (H)).    Results from last 7 days   Lab Units 02/12/24 0331 02/11/24  0405 02/10/24  0410   MAGNESIUM mg/dL  --   --  1.7   PHOSPHORUS mg/dL 3.3 3.2 2.9             Results from last 7 days   Lab Units 02/12/24 0331 02/11/24  0406 02/10/24  0410   WBC 10*3/mm3 4.39 4.48 4.66   HEMOGLOBIN g/dL 10.4* 11.1* 11.5*   PLATELETS 10*3/mm3 216 181 192               Assessment / Plan     ASSESSMENT:  1.  CRISTIANO on CKD3a, nonoliguric, probably prerenal.  Creatinine started to improve with medical management CT scan abdomen pelvis no hydronephrosis or nephrolithiasis urinalysis proteinuria  309 mg/g   2.  Confusion/encephalopathy, with suspected withdrawal syndrome, waxing and waning.  On Haldol  3.  Alcoholism and liver disease currently on folic acid, thiamine  4.  Depression, recently placed on Lexapro, which has been DC'd  5.  DDD with prior stroke.  Followed by PT and OT  6.  Chronic atrial fibrillation: Rate controlled on diltiazem and metoprolol  7.  Alzheimer's disease as per psychiatry    PLAN:  -Renal function seems to be stabilizing, likely secondary Alzheimer's with decreased oral intake.   -Patient awaiting placement, due to his Alzheimer's dementia family unable to provide for his care.  Care management assisting On placement      Discussed with nursing staff    Thank you for involving us in the care of Yuval Loja.  Please feel free to call with any questions.    Richy Duran MD  02/16/24  14:51 EST    [facility]  [phone]    Please note that portions of this note were completed with a voice recognition program.

## 2024-02-17 LAB
ANION GAP SERPL CALCULATED.3IONS-SCNC: 10 MMOL/L (ref 5–15)
BUN SERPL-MCNC: 20 MG/DL (ref 8–23)
BUN/CREAT SERPL: 11.4 (ref 7–25)
CALCIUM SPEC-SCNC: 10.3 MG/DL (ref 8.6–10.5)
CHLORIDE SERPL-SCNC: 107 MMOL/L (ref 98–107)
CO2 SERPL-SCNC: 27 MMOL/L (ref 22–29)
CREAT SERPL-MCNC: 1.75 MG/DL (ref 0.76–1.27)
EGFRCR SERPLBLD CKD-EPI 2021: 40.1 ML/MIN/1.73
GLUCOSE SERPL-MCNC: 91 MG/DL (ref 65–99)
POTASSIUM SERPL-SCNC: 4.5 MMOL/L (ref 3.5–5.2)
SODIUM SERPL-SCNC: 144 MMOL/L (ref 136–145)

## 2024-02-17 PROCEDURE — 80048 BASIC METABOLIC PNL TOTAL CA: CPT | Performed by: HOSPITALIST

## 2024-02-17 NOTE — PLAN OF CARE
Goal Outcome Evaluation:      Patient was refusing medications at beginning of shift, however this RN was able to get him to take a few of his meds when he called out for something to help him sleep (Haldol & metoprolol).  Patient talks and walks the halls a lot.  Enjoys eating & snacking, has left his IV alone thus far, S/L, oriented to self, swallows pills, R.A.

## 2024-02-17 NOTE — PLAN OF CARE
Goal Outcome Evaluation:         VSS. Patient is alert to self and is forgetful. Ambulating in the halls and will set with staff. Referrals placed to facilities for discharge. Daughter picked up information packet need to file for emergency POA. Patient did not take any medications today for me.

## 2024-02-17 NOTE — PROGRESS NOTES
"Daily progress note    Primary care physician  Dr. Prescott    Subjective  Doing same with no new issues and remained pleasantly confused    History of present illness  73-year-old -American male with history of alcohol abuse paroxysmal atrial fibrillation coronary artery disease hyperlipidemia and chronic kidney disease stage III who is well-known to our service and apparently continues to abuse alcohol and recently started on a antidepressant brought to the emergency room with generalized weakness and altered mental status.  Patient in no respiratory distress and answer question appropriately and workup in ER revealed acute metabolic encephalopathy admit for management.  At the time of examination he denies any chest pain shortness of breath palpitation and asking for anxiety medication and wants to eat regular diet.     REVIEW OF SYSTEMS  Unable to obtain     PHYSICAL EXAM   Blood pressure 130/49, pulse 86, temperature 98.4 °F (36.9 °C), temperature source Oral, resp. rate 16, height 175.3 cm (69\"), weight 63.1 kg (139 lb 1.8 oz), SpO2 100%.    Constitutional:       General: He is awake. He is not in acute distress.     Appearance: He is not toxic-appearing.   HENT:      Head: Normocephalic and atraumatic.   Eyes:      General: Lids are normal. Vision grossly intact.   Cardiovascular:      Rate and Rhythm: Normal rate and regular rhythm.      Heart sounds: Normal heart sounds.   Pulmonary:      Effort: Pulmonary effort is normal.      Breath sounds: Normal breath sounds and air entry.   Abdominal:      General: Bowel sounds are normal.      Palpations: Abdomen is soft.      Tenderness: There is no abdominal tenderness.   Musculoskeletal:      Cervical back: Normal range of motion.   Skin:     General: Skin is warm and dry.      Coloration: Skin is not pale.   Neurological:      General: No focal deficit present.      Mental Status: He is alert and oriented to person, place, and time.      Comments: No focal " deficit  Psychiatric:         Attention and Perception: Attention normal.         Mood and Affect: Mood normal.         Speech: Speech normal.         Behavior: Behavior is cooperative.     LAB RESULTS  Lab Results (last 24 hours)       Procedure Component Value Units Date/Time    Basic Metabolic Panel [[id number]]  (Abnormal) Collected: 02/17/24 0637    Specimen: Blood Updated: 02/17/24 0747     Glucose 91 mg/dL      BUN 20 mg/dL      Creatinine 1.75 mg/dL      Sodium 144 mmol/L      Potassium 4.5 mmol/L      Chloride 107 mmol/L      CO2 27.0 mmol/L      Calcium 10.3 mg/dL      BUN/Creatinine Ratio 11.4     Anion Gap 10.0 mmol/L      eGFR 40.1 mL/min/1.73     Narrative:      GFR Normal >60  Chronic Kidney Disease <60  Kidney Failure <15    The GFR formula is only valid for adults with stable renal function between ages 18 and 70.          Imaging Results (Last 24 Hours)       ** No results found for the last 24 hours. **          Results  Scan on 2/4/2024 0415 by [employer], [place]: ECG 12-LEAD         Author: -- Service: -- Author Type: --   Filed: Date of Service: Creation Time:   Status: (Other)   HEART RATE= 67  bpm  RR Interval= 896  ms  [place] Interval= 193  ms  P Horizontal Axis= -48  deg  P Front Axis= 82  deg  QRSD Interval= 82  ms  QT Interval= 441  ms  QTcB= 466  ms  QRS Axis= 47  deg  T Wave Axis= 51  deg  - ABNORMAL ECG -  Sinus rhythm  RSR' in V1 or V2, probably normal variant  Consider left ventricular hypertrophy  Anterior ST elevation, probably due to LVH          Current Facility-Administered Medications:     dilTIAZem CD (CARDIZEM CD) 24 hr capsule 120 mg, 120 mg, Oral, Q24H, Richy Duran MD    docusate sodium (COLACE) capsule 100 mg, 100 mg, Oral, BID, Chino Cole MD, 100 mg at 02/13/24 0820    famotidine (PEPCID) tablet 20 mg, 20 mg, Oral, BID, Chino Cole MD, 20 mg at 02/14/24 0935    folic acid (FOLVITE) tablet 1 mg, 1 mg, Oral, Daily, Chino Cole MD, 1 mg at 02/16/24 0926     haloperidol (HALDOL) tablet 2 mg, 2 mg, Oral, TID, Hipolito Ramon III, MD, 2 mg at 02/16/24 2131    haloperidol lactate (HALDOL) injection 5 mg, 5 mg, Intramuscular, Q6H PRN, Hipolito Ramon III, MD, 5 mg at 02/06/24 0049    hydrOXYzine (ATARAX) tablet 25 mg, 25 mg, Oral, 4x Daily PRN, Jolie Cole MD, 25 mg at 02/16/24 2131    metoprolol tartrate (LOPRESSOR) tablet 25 mg, 25 mg, Oral, TID, Jolie Cole MD, 25 mg at 02/16/24 2131    multivitamin (THERAGRAN) tablet 1 tablet, 1 tablet, Oral, Daily, Jolie Cole MD, 1 tablet at 02/16/24 0927    rosuvastatin (CRESTOR) tablet 40 mg, 40 mg, Oral, Nightly, Jolie Cole MD, 40 mg at 02/12/24 2039    sodium chloride 0.9 % bolus 500 mL, 500 mL, Intravenous, Once, Richy Duran MD    [COMPLETED] Insert Peripheral IV, , , Once **AND** sodium chloride 0.9 % flush 10 mL, 10 mL, Intravenous, PRN, Salma Grullon APRN    sucralfate (CARAFATE) tablet 1 g, 1 g, Oral, 4x Daily AC & at Bedtime, Jolie Cole MD, 1 g at 02/13/24 0809    thiamine (VITAMIN B-1) tablet 100 mg, 100 mg, Oral, Daily, Jolie Cole MD, 100 mg at 02/16/24 0926     ASSESSMENT  Alcohol withdrawal syndrome  Acute kidney injury resolved  Depression   History of alcohol abuse  Paroxysmal atrial fibrillation  Coronary artery disease  Hyperlipidemia  History of CVA  Chronic kidney disease stage III  Gastroesophageal reflux disease    PLAN  CPM  Continue detox medications  Adjust home medications  Stress ulcer DVT prophylaxis  Nephrology to follow patient  Supportive care  PT OT  Discussed with nursing staff and family   Subacute rehab once bed available    JOLIE COLE MD    Copied text in this note has been reviewed and is accurate as of 02/17/24

## 2024-02-17 NOTE — PROGRESS NOTES
[facility] Progress Note      Patient Name: Yuval Loja  : 1950  MRN: [id number]  Primary Care Physician:  Alessia Prescott APRN  Date of admission: 2024    Subjective     Interval History:     Patient offers no complaint  Continues to be confused  Speaking random topics    Review of Systems:   As noted above    Objective     Vitals:   Temp:  [98.2 °F (36.8 °C)-98.4 °F (36.9 °C)] 98.4 °F (36.9 °C)  Heart Rate:  [83-86] 86  Resp:  [16] 16  BP: (129-130)/(49-70) 130/49  No intake or output data in the 24 hours ending 24 1214        Physical Exam:    Constitutional: Awake, confused  chronically ill  HEENT: Sclera anicteric, no conjunctival injection, MMM  Neck: Supple, no carotid bruit, trachea at midline, no JVD  Respiratory: Coarse anteriorly; not labored on room air   Cardiovascular: RRR, no rub  Gastrointestinal: BS +, soft, nondistended, not distended  : No palpable bladder  Musculoskeletal: No edema, no clubbing or cyanosis  Neurologic: Confused  Skin: Warm and dry      Scheduled Meds:     dilTIAZem CD, 120 mg, Oral, Q24H  docusate sodium, 100 mg, Oral, BID  famotidine, 20 mg, Oral, BID  folic acid, 1 mg, Oral, Daily  haloperidol, 2 mg, Oral, TID  metoprolol tartrate, 25 mg, Oral, TID  multivitamin, 1 tablet, Oral, Daily  rosuvastatin, 40 mg, Oral, Nightly  sodium chloride, 500 mL, Intravenous, Once  sucralfate, 1 g, Oral, 4x Daily AC & at Bedtime  thiamine, 100 mg, Oral, Daily      IV Meds:            Results Reviewed:   I have personally reviewed the results from the time of this admission to 2024 12:14 EST     Results from last 7 days   Lab Units 24  0637 02/15/24  0854 24  1410   SODIUM mmol/L 144 144 140   POTASSIUM mmol/L 4.5 4.0 4.0   CHLORIDE mmol/L 107 105 101   CO2 mmol/L 27.0 24.6 22.2   BUN mg/dL 20 23 13   CREATININE mg/dL 1.75* 2.37* 2.22*   CALCIUM mg/dL 10.3 10.5 10.8*   GLUCOSE mg/dL 91 141* 125*       Estimated Creatinine  Clearance: 32.6 mL/min (A) (by C-G formula based on SCr of 1.75 mg/dL (H)).    Results from last 7 days   Lab Units 02/12/24  0331 02/11/24  0405   PHOSPHORUS mg/dL 3.3 3.2             Results from last 7 days   Lab Units 02/12/24  0331 02/11/24  0406   WBC 10*3/mm3 4.39 4.48   HEMOGLOBIN g/dL 10.4* 11.1*   PLATELETS 10*3/mm3 216 181               Assessment / Plan     ASSESSMENT:  1.  CRISTIANO on CKD3a, nonoliguric, likely prerenal.  From decreased oral intake and encephalopathy creatinine started to improve with medical management CT scan abdomen pelvis no hydronephrosis or nephrolithiasis urinalysis proteinuria  309 mg/g   2.  Confusion/encephalopathy, with suspected withdrawal syndrome, waxing and waning.  On Haldol  3.  Alcoholism and liver disease currently on folic acid, thiamine  4.  Depression, recently placed on Lexapro, which has been DC'd  5.  DDD with prior stroke.  Followed by PT and OT  6.  Chronic atrial fibrillation: Rate controlled on diltiazem and metoprolol  7.  Alzheimer's disease as per psychiatry    PLAN:  -Creatinine function down to 1.7.  Volume status electrolytes stable  -Patient awaiting placement.  Care management assisting On placement      Thank you for involving us in the care of Yuval Loja.  Please feel free to call with any questions.    Richy Duran MD  02/17/24  12:14 [facility]    [facility]  [phone]    Please note that portions of this note were completed with a voice recognition program.

## 2024-02-18 NOTE — PROGRESS NOTES
[facility] Progress Note      Patient Name: Yuval Loja  : 1950  MRN: [id number]  Primary Care Physician:  Alessia Prescott APRN  Date of admission: 2024    Subjective     Interval History:     Overnight  Patient continues to ambulate in the halls  Patient just ate his lunch  Patient is comfortable sitting on recliner  Is a language today is more appropriate, with a normal voice tone    Review of Systems:   As noted above    Objective     Vitals:   Temp:  [98.4 °F (36.9 °C)-98.8 °F (37.1 °C)] 98.6 °F (37 °C)  Heart Rate:  [] 102  Resp:  [16] 16  BP: (143-153)/(54-82) 143/82  No intake or output data in the 24 hours ending 24 1329        Physical Exam:    Constitutional: Awake, confused  chronically ill  HEENT: Sclera anicteric, no conjunctival injection, MMM  Neck: Supple, no carotid bruit, trachea at midline, no JVD  Respiratory: Coarse anteriorly; not labored on room air   Cardiovascular: RRR, no rub  Gastrointestinal: BS +, soft, nondistended, not distended  : No palpable bladder  Musculoskeletal: No edema, no clubbing or cyanosis  Neurologic: Confused  Skin: Warm and dry      Scheduled Meds:     dilTIAZem CD, 120 mg, Oral, Q24H  docusate sodium, 100 mg, Oral, BID  famotidine, 20 mg, Oral, BID  folic acid, 1 mg, Oral, Daily  haloperidol, 2 mg, Oral, TID  metoprolol tartrate, 25 mg, Oral, TID  multivitamin, 1 tablet, Oral, Daily  rosuvastatin, 40 mg, Oral, Nightly  sodium chloride, 500 mL, Intravenous, Once  sucralfate, 1 g, Oral, 4x Daily AC & at Bedtime  thiamine, 100 mg, Oral, Daily      IV Meds:            Results Reviewed:   I have personally reviewed the results from the time of this admission to 2024 13:29 EST     Results from last 7 days   Lab Units 24  0637 02/15/24  0854 24  1410   SODIUM mmol/L 144 144 140   POTASSIUM mmol/L 4.5 4.0 4.0   CHLORIDE mmol/L 107 105 101   CO2 mmol/L 27.0 24.6 22.2   BUN mg/dL 20 23 13   CREATININE  mg/dL 1.75* 2.37* 2.22*   CALCIUM mg/dL 10.3 10.5 10.8*   GLUCOSE mg/dL 91 141* 125*       Estimated Creatinine Clearance: 32.6 mL/min (A) (by C-G formula based on SCr of 1.75 mg/dL (H)).    Results from last 7 days   Lab Units 02/12/24  0331   PHOSPHORUS mg/dL 3.3             Results from last 7 days   Lab Units 02/12/24  0331   WBC 10*3/mm3 4.39   HEMOGLOBIN g/dL 10.4*   PLATELETS 10*3/mm3 216               Assessment / Plan     ASSESSMENT:  1.  CRISTIANO on CKD3a, nonoliguric, likely prerenal.  From decreased oral intake and encephalopathy creatinine started to improve with medical management CT scan abdomen pelvis no hydronephrosis or nephrolithiasis urinalysis proteinuria  309 mg/g   2.  Confusion/encephalopathy, with suspected withdrawal syndrome, waxing and waning.  On Haldol  3.  Alcoholism and liver disease currently on folic acid, thiamine  4.  Depression, recently placed on Lexapro, which has been DC'd  5.  DDD with prior stroke.  Followed by PT and OT  6.  Chronic atrial fibrillation: Rate controlled on diltiazem and metoprolol  7.  Alzheimer's disease as per psychiatry    PLAN:  -Renal function at baseline.  Volume status stable  -Appreciate care management , on placement      Thank you for involving us in the care of Yuval Loja.  Please feel free to call with any questions.    Richy Duran MD  02/18/24  13:29 [facility]    [facility]  [phone]    Please note that portions of this note were completed with a voice recognition program.

## 2024-02-18 NOTE — PROGRESS NOTES
"Daily progress note    Primary care physician  Dr. Prescott    Subjective  Doing same with no new issues and remained pleasantly confused    History of present illness  73-year-old -American male with history of alcohol abuse paroxysmal atrial fibrillation coronary artery disease hyperlipidemia and chronic kidney disease stage III who is well-known to our service and apparently continues to abuse alcohol and recently started on a antidepressant brought to the emergency room with generalized weakness and altered mental status.  Patient in no respiratory distress and answer question appropriately and workup in ER revealed acute metabolic encephalopathy admit for management.  At the time of examination he denies any chest pain shortness of breath palpitation and asking for anxiety medication and wants to eat regular diet.     REVIEW OF SYSTEMS  Unable to obtain     PHYSICAL EXAM   Blood pressure 143/82, pulse 102, temperature 98.6 °F (37 °C), temperature source Oral, resp. rate 16, height 175.3 cm (69\"), weight 63.1 kg (139 lb 1.8 oz), SpO2 100%.    Constitutional:       General: He is awake. He is not in acute distress.     Appearance: He is not toxic-appearing.   HENT:      Head: Normocephalic and atraumatic.   Eyes:      General: Lids are normal. Vision grossly intact.   Cardiovascular:      Rate and Rhythm: Normal rate and regular rhythm.      Heart sounds: Normal heart sounds.   Pulmonary:      Effort: Pulmonary effort is normal.      Breath sounds: Normal breath sounds and air entry.   Abdominal:      General: Bowel sounds are normal.      Palpations: Abdomen is soft.      Tenderness: There is no abdominal tenderness.   Musculoskeletal:      Cervical back: Normal range of motion.   Skin:     General: Skin is warm and dry.      Coloration: Skin is not pale.   Neurological:      General: No focal deficit present.      Mental Status: He is alert and oriented to person, place, and time.      Comments: No focal " deficit  Psychiatric:         Attention and Perception: Attention normal.         Mood and Affect: Mood normal.         Speech: Speech normal.         Behavior: Behavior is cooperative.     LAB RESULTS  Lab Results (last 24 hours)       ** No results found for the last 24 hours. **          Imaging Results (Last 24 Hours)       ** No results found for the last 24 hours. **          Results  Scan on 2/4/2024 0415 by [employer] On[employer], [place]: ECG 12-LEAD         Author: -- Service: -- Author Type: --   Filed: Date of Service: Creation Time:   Status: (Other)   HEART RATE= 67  bpm  RR Interval= 896  ms  TX Interval= 193  ms  P Horizontal Axis= -48  deg  P Front Axis= 82  deg  QRSD Interval= 82  ms  QT Interval= 441  ms  QTcB= 466  ms  QRS Axis= 47  deg  T Wave Axis= 51  deg  - ABNORMAL ECG -  Sinus rhythm  RSR' in V1 or V2, probably normal variant  Consider left ventricular hypertrophy  Anterior ST elevation, probably due to LVH          Current Facility-Administered Medications:     dilTIAZem CD (CARDIZEM CD) 24 hr capsule 120 mg, 120 mg, Oral, Q24H, Richy Duran MD    docusate sodium (COLACE) capsule 100 mg, 100 mg, Oral, BID, Chino Cole MD, 100 mg at 02/13/24 0820    famotidine (PEPCID) tablet 20 mg, 20 mg, Oral, BID, Chino Cole MD, 20 mg at 02/14/24 0935    folic acid (FOLVITE) tablet 1 mg, 1 mg, Oral, Daily, Chino Cole MD, 1 mg at 02/16/24 0926    haloperidol (HALDOL) tablet 2 mg, 2 mg, Oral, TID, Hipolito Ramon III, MD, 2 mg at 02/16/24 2131    haloperidol lactate (HALDOL) injection 5 mg, 5 mg, Intramuscular, Q6H PRN, Hipolito Ramon III, MD, 5 mg at 02/06/24 0049    hydrOXYzine (ATARAX) tablet 25 mg, 25 mg, Oral, 4x Daily PRN, Chino Cole MD, 25 mg at 02/16/24 2131    metoprolol tartrate (LOPRESSOR) tablet 25 mg, 25 mg, Oral, TID, Chino Cole MD, 25 mg at 02/16/24 2131    multivitamin (THERAGRAN) tablet 1 tablet, 1 tablet, Oral, Daily, Chino Cole MD, 1 tablet at 02/16/24  0927    rosuvastatin (CRESTOR) tablet 40 mg, 40 mg, Oral, Nightly, Jolie Cole MD, 40 mg at 02/12/24 2039    sodium chloride 0.9 % bolus 500 mL, 500 mL, Intravenous, Once, Richy Duran MD    [COMPLETED] Insert Peripheral IV, , , Once **AND** sodium chloride 0.9 % flush 10 mL, 10 mL, Intravenous, PRN, Salma Grullon, [place]    sucralfate (CARAFATE) tablet 1 g, 1 g, Oral, 4x Daily AC & at Bedtime, Jolie Cole MD, 1 g at 02/13/24 0809    thiamine (VITAMIN B-1) tablet 100 mg, 100 mg, Oral, Daily, Jolie Cole MD, 100 mg at 02/16/24 0926     ASSESSMENT  Alcohol withdrawal syndrome  Acute kidney injury resolved  Depression   History of alcohol abuse  Paroxysmal atrial fibrillation  Coronary artery disease  Hyperlipidemia  History of CVA  Chronic kidney disease stage III  Gastroesophageal reflux disease    PLAN  CPM  Continue detox medications  Adjust home medications  Stress ulcer DVT prophylaxis  Nephrology to follow patient  Supportive care  PT OT  Discussed with nursing staff and family   Subacute rehab once bed available    JOLIE COLE MD    Copied text in this note has been reviewed and is accurate as of 02/18/24

## 2024-02-18 NOTE — PLAN OF CARE
Goal Outcome Evaluation:   Pt refused all of his medications tonight, seeking out staff often, didn't walk as much this evening

## 2024-02-19 RX ORDER — ROSUVASTATIN CALCIUM 5 MG/1
5 TABLET, COATED ORAL NIGHTLY
Status: DISCONTINUED | OUTPATIENT
Start: 2024-02-19 | End: 2024-03-01 | Stop reason: HOSPADM

## 2024-02-19 NOTE — PROGRESS NOTES
[facility] Progress Note      Patient Name: Yuval Loja  : 1950  MRN: [id number]  Primary Care Physician:  Alessia Prescott APRN  Date of admission: 2024    Subjective     Interval History:       Overnight no event  Patient lying in bed comfortable     offers no complaint    Review of Systems:   As noted above    Objective     Vitals:   Temp:  [98.2 °F (36.8 °C)-98.8 °F (37.1 °C)] 98.2 °F (36.8 °C)  Heart Rate:  [] 97  Resp:  [16] 16  BP: (134-142)/(58-79) 140/79    Intake/Output Summary (Last 24 hours) at 2024 0740  Last data filed at 2024 0414  Gross per 24 hour   Intake 440 ml   Output --   Net 440 ml           Physical Exam:    Constitutional: Awake, confused  chronically ill  HEENT: Sclera anicteric, no conjunctival injection, MMM  Neck: Supple, no carotid bruit, trachea at midline, no JVD  Respiratory: Coarse anteriorly; not labored on room air   Cardiovascular: RRR, no rub  Gastrointestinal: BS +, soft, nondistended, not distended  : No palpable bladder  Musculoskeletal: No edema, no clubbing or cyanosis  Neurologic: Confused  Skin: Warm and dry      Scheduled Meds:     dilTIAZem CD, 120 mg, Oral, Q24H  docusate sodium, 100 mg, Oral, BID  famotidine, 20 mg, Oral, BID  folic acid, 1 mg, Oral, Daily  haloperidol, 2 mg, Oral, TID  metoprolol tartrate, 25 mg, Oral, TID  multivitamin, 1 tablet, Oral, Daily  rosuvastatin, 40 mg, Oral, Nightly  sucralfate, 1 g, Oral, 4x Daily AC & at Bedtime  thiamine, 100 mg, Oral, Daily      IV Meds:            Results Reviewed:   I have personally reviewed the results from the time of this admission to 2024 07:40 EST     Results from last 7 days   Lab Units 24  0637 02/15/24  0854 24  1410   SODIUM mmol/L 144 144 140   POTASSIUM mmol/L 4.5 4.0 4.0   CHLORIDE mmol/L 107 105 101   CO2 mmol/L 27.0 24.6 22.2   BUN mg/dL 20 23 13   CREATININE mg/dL 1.75* 2.37* 2.22*   CALCIUM mg/dL 10.3 10.5 10.8*    GLUCOSE mg/dL 91 141* 125*       Estimated Creatinine Clearance: 32.6 mL/min (A) (by C-G formula based on SCr of 1.75 mg/dL (H)).        Assessment / Plan     ASSESSMENT:  1.  CRISTIANO on CKD3a, nonoliguric, likely prerenal.  From decreased oral intake and encephalopathy creatinine started to improve with medical management CT scan abdomen pelvis no hydronephrosis or nephrolithiasis urinalysis proteinuria  309 mg/g   2.  Confusion/encephalopathy, with suspected withdrawal syndrome, waxing and waning.  On Haldol  3.  Alcoholism and liver disease currently on folic acid, thiamine  4.  Depression, recently placed on Lexapro, which has been DC'd  5.  DDD with prior stroke.  Followed by PT and OT  6.  Chronic atrial fibrillation: Rate controlled on diltiazem and metoprolol  7.  Alzheimer's disease as per psychiatry    PLAN:  -Renal function at baseline.   -Volume status and electrolytes stable.  -Patient awaiting placement      Thank you for involving us in the care of Yuval Loja.  Please feel free to call with any questions.    Richy Duran MD  02/19/24  07:40 [facility] [place]    [facility]  [phone]    Please note that portions of this note were completed with a voice recognition program.

## 2024-02-19 NOTE — PLAN OF CARE
Goal Outcome Evaluation:         A&O x 1-2, confusion present, refusing all medications this shift, anxious with episodes of combativeness throughout shift, up ad [place]ville, room air, no IV access, continue with current plan of care.

## 2024-02-19 NOTE — PROGRESS NOTES
"Daily progress note    Primary care physician  Dr. Prescott    Subjective  Doing same with no new issues and remained pleasantly confused    History of present illness  73-year-old -American male with history of alcohol abuse paroxysmal atrial fibrillation coronary artery disease hyperlipidemia and chronic kidney disease stage III who is well-known to our service and apparently continues to abuse alcohol and recently started on a antidepressant brought to the emergency room with generalized weakness and altered mental status.  Patient in no respiratory distress and answer question appropriately and workup in ER revealed acute metabolic encephalopathy admit for management.  At the time of examination he denies any chest pain shortness of breath palpitation and asking for anxiety medication and wants to eat regular diet.     REVIEW OF SYSTEMS  Unable to obtain     PHYSICAL EXAM   Blood pressure 145/55, pulse 97, temperature 98.2 °F (36.8 °C), temperature source Oral, resp. rate 16, height 175.3 cm (69\"), weight 63.1 kg (139 lb 1.8 oz), SpO2 96%.    Constitutional:       General: He is awake. He is not in acute distress.     Appearance: He is not toxic-appearing.   HENT:      Head: Normocephalic and atraumatic.   Eyes:      General: Lids are normal. Vision grossly intact.   Cardiovascular:      Rate and Rhythm: Normal rate and regular rhythm.      Heart sounds: Normal heart sounds.   Pulmonary:      Effort: Pulmonary effort is normal.      Breath sounds: Normal breath sounds and air entry.   Abdominal:      General: Bowel sounds are normal.      Palpations: Abdomen is soft.      Tenderness: There is no abdominal tenderness.   Musculoskeletal:      Cervical back: Normal range of motion.   Skin:     General: Skin is warm and dry.      Coloration: Skin is not pale.   Neurological:      General: No focal deficit present.      Mental Status: He is alert and oriented to person, place, and time.      Comments: No focal " deficit  Psychiatric:         Attention and Perception: Attention normal.         Mood and Affect: Mood normal.         Speech: Speech normal.         Behavior: Behavior is cooperative.     LAB RESULTS  Lab Results (last 24 hours)       ** No results found for the last 24 hours. **          Imaging Results (Last 24 Hours)       ** No results found for the last 24 hours. **          Results  Scan on 2/4/2024 0415 by [employer], [place]: ECG 12-LEAD         Author: -- Service: -- Author Type: --   Filed: Date of Service: Creation Time:   Status: (Other)   HEART RATE= 67  bpm  RR Interval= 896  ms  OH Interval= 193  ms  P Horizontal Axis= -48  deg  P Front Axis= 82  deg  QRSD Interval= 82  ms  QT Interval= 441  ms  QTcB= 466  ms  QRS Axis= 47  deg  T Wave Axis= 51  deg  - ABNORMAL ECG -  Sinus rhythm  RSR' in V1 or V2, probably normal variant  Consider left ventricular hypertrophy  Anterior ST elevation, probably due to LVH          Current Facility-Administered Medications:     dilTIAZem CD (CARDIZEM CD) 24 hr capsule 120 mg, 120 mg, Oral, Q24H, Richy Duran MD    docusate sodium (COLACE) capsule 100 mg, 100 mg, Oral, BID, Chino Cole MD, 100 mg at 02/13/24 0820    famotidine (PEPCID) tablet 20 mg, 20 mg, Oral, BID, Chino Cole MD, 20 mg at 02/14/24 0935    folic acid (FOLVITE) tablet 1 mg, 1 mg, Oral, Daily, Chino Cole MD, 1 mg at 02/16/24 0926    haloperidol (HALDOL) tablet 2 mg, 2 mg, Oral, TID, Hipolito Ramon III, MD, 2 mg at 02/16/24 2131    haloperidol lactate (HALDOL) injection 5 mg, 5 mg, Intramuscular, Q6H PRN, Hipolito Ramon III, MD, 5 mg at 02/06/24 0049    hydrOXYzine (ATARAX) tablet 25 mg, 25 mg, Oral, 4x Daily PRN, Chino Cole MD, 25 mg at 02/16/24 2131    metoprolol tartrate (LOPRESSOR) tablet 25 mg, 25 mg, Oral, TID, Chino Cole MD, 25 mg at 02/16/24 2131    multivitamin (THERAGRAN) tablet 1 tablet, 1 tablet, Oral, Daily, Chino Cole MD, 1 tablet at 02/16/24  0927    rosuvastatin (CRESTOR) tablet 40 mg, 40 mg, Oral, Nightly, Jolie Cole MD, 40 mg at 02/12/24 2039    [COMPLETED] Insert Peripheral IV, , , Once **AND** sodium chloride 0.9 % flush 10 mL, 10 mL, Intravenous, PRN, Salma Grullon APRN    sucralfate (CARAFATE) tablet 1 g, 1 g, Oral, 4x Daily AC & at Bedtime, Jolie Cole MD, 1 g at 02/13/24 0809    thiamine (VITAMIN B-1) tablet 100 mg, 100 mg, Oral, Daily, Jolie Cole MD, 100 mg at 02/16/24 0926     ASSESSMENT  Alcohol withdrawal syndrome  Acute kidney injury resolved  Depression   History of alcohol abuse  Paroxysmal atrial fibrillation  Coronary artery disease  Hyperlipidemia  History of CVA  Chronic kidney disease stage III  Gastroesophageal reflux disease    PLAN  CPM  Continue detox medications  Adjust home medications  Stress ulcer DVT prophylaxis  Nephrology to follow patient  Supportive care  PT OT  Discussed with nursing staff and family   Subacute rehab once bed available    JOLIE COLE MD    Copied text in this note has been reviewed and is accurate as of 02/19/24

## 2024-02-19 NOTE — CASE MANAGEMENT/SOCIAL WORK
Continued Stay Note  [facility]     Patient Name: Yuval Loja  MRN: [id number]  Today's Date: 2/19/2024    Admit Date: 2/4/2024    Plan: TBD   Discharge Plan       Row Name 02/19/24 1446       Plan    Plan TBD    Plan Comments Spoke with patient's daughter/ Torri. Introduced self. Daughter states that she had just spoken with patient's son and that they are going to have to go LTC private pay. Daughter stated also that she is supposed to be hearing about POA papers, but as today is a holiday the court is not open and that she is going down in the morning. Spoke with Marcell/ Annemarie not able to meet patient's needs. Spoke with Nova/ Valerie, is looking into [facility] unit availability.                   Discharge Codes    No documentation.                 Expected Discharge Date and Time       Expected Discharge Date Expected Discharge Time    Feb 19, 2024               Ann Marie Olivares RN

## 2024-02-20 NOTE — PROGRESS NOTES
"Daily progress note    Primary care physician  Dr. Prescott    Subjective  Doing same with no new issues and remained pleasantly confused    History of present illness  73-year-old -American male with history of alcohol abuse paroxysmal atrial fibrillation coronary artery disease hyperlipidemia and chronic kidney disease stage III who is well-known to our service and apparently continues to abuse alcohol and recently started on a antidepressant brought to the emergency room with generalized weakness and altered mental status.  Patient in no respiratory distress and answer question appropriately and workup in ER revealed acute metabolic encephalopathy admit for management.  At the time of examination he denies any chest pain shortness of breath palpitation and asking for anxiety medication and wants to eat regular diet.     REVIEW OF SYSTEMS  Unable to obtain     PHYSICAL EXAM   Blood pressure 123/64, pulse 87, temperature 97.3 °F (36.3 °C), temperature source Oral, resp. rate 16, height 175.3 cm (69\"), weight 63.1 kg (139 lb 1.8 oz), SpO2 97%.    Constitutional:       General: He is awake. He is not in acute distress.     Appearance: He is not toxic-appearing.   HENT:      Head: Normocephalic and atraumatic.   Eyes:      General: Lids are normal. Vision grossly intact.   Cardiovascular:      Rate and Rhythm: Normal rate and regular rhythm.      Heart sounds: Normal heart sounds.   Pulmonary:      Effort: Pulmonary effort is normal.      Breath sounds: Normal breath sounds and air entry.   Abdominal:      General: Bowel sounds are normal.      Palpations: Abdomen is soft.      Tenderness: There is no abdominal tenderness.   Musculoskeletal:      Cervical back: Normal range of motion.   Skin:     General: Skin is warm and dry.      Coloration: Skin is not pale.   Neurological:      General: No focal deficit present.      Mental Status: He is alert and oriented to person, place, and time.      Comments: No focal " deficit  Psychiatric:         Attention and Perception: Attention normal.         Mood and Affect: Mood normal.         Speech: Speech normal.         Behavior: Behavior is cooperative.     LAB RESULTS  Lab Results (last 24 hours)       ** No results found for the last 24 hours. **          Imaging Results (Last 24 Hours)       ** No results found for the last 24 hours. **          Results  Scan on 2/4/2024 0415 by [employer], [place]: ECG 12-LEAD         Author: -- Service: -- Author Type: --   Filed: Date of Service: Creation Time:   Status: (Other)   HEART RATE= 67  bpm  RR Interval= 896  ms  VT Interval= 193  ms  P Horizontal Axis= -48  deg  P Front Axis= 82  deg  QRSD Interval= 82  ms  QT Interval= 441  ms  QTcB= 466  ms  QRS Axis= 47  deg  T Wave Axis= 51  deg  - ABNORMAL ECG -  Sinus rhythm  RSR' in V1 or V2, probably normal variant  Consider left ventricular hypertrophy  Anterior ST elevation, probably due to LVH          Current Facility-Administered Medications:     dilTIAZem CD (CARDIZEM CD) 24 hr capsule 120 mg, 120 mg, Oral, Q24H, Richy Duran MD    docusate sodium (COLACE) capsule 100 mg, 100 mg, Oral, BID, Chino Cole MD, 100 mg at 02/13/24 0820    famotidine (PEPCID) tablet 20 mg, 20 mg, Oral, BID, Chino Cole MD, 20 mg at 02/14/24 0935    folic acid (FOLVITE) tablet 1 mg, 1 mg, Oral, Daily, Chino Cole MD, 1 mg at 02/16/24 0926    haloperidol (HALDOL) tablet 2 mg, 2 mg, Oral, TID, Hipolito Ramon III, MD, 2 mg at 02/16/24 2131    haloperidol lactate (HALDOL) injection 5 mg, 5 mg, Intramuscular, Q6H PRN, Hipolito Ramon III, MD, 5 mg at 02/06/24 0049    hydrOXYzine (ATARAX) tablet 25 mg, 25 mg, Oral, 4x Daily PRN, Chino Cole MD, 25 mg at 02/16/24 2131    metoprolol tartrate (LOPRESSOR) tablet 25 mg, 25 mg, Oral, TID, Chino Cole MD, 25 mg at 02/16/24 2131    multivitamin (THERAGRAN) tablet 1 tablet, 1 tablet, Oral, Daily, Chino Cole MD, 1 tablet at 02/16/24  0927    rosuvastatin (CRESTOR) tablet 5 mg, 5 mg, Oral, Nightly, Jolie Cole MD    [COMPLETED] Insert Peripheral IV, , , Once **AND** sodium chloride 0.9 % flush 10 mL, 10 mL, Intravenous, PRN, HarjeetrSalma APRN    sucralfate (CARAFATE) tablet 1 g, 1 g, Oral, 4x Daily AC & at Bedtime, Jolie Cole MD, 1 g at 02/13/24 0809    thiamine (VITAMIN B-1) tablet 100 mg, 100 mg, Oral, Daily, Jolie Cole MD, 100 mg at 02/16/24 0926     ASSESSMENT  Alcohol withdrawal syndrome  Acute kidney injury resolved  Depression   History of alcohol abuse  Paroxysmal atrial fibrillation  Coronary artery disease  Hyperlipidemia  History of CVA  Chronic kidney disease stage III  Gastroesophageal reflux disease    PLAN  CPM  Continue detox medications  Adjust home medications  Stress ulcer DVT prophylaxis  Nephrology to follow patient  Supportive care  PT OT  Discussed with nursing staff and family   Awaiting subacute rehab bed versus home with family support    JOLIE COLE MD    Copied text in this note has been reviewed and is accurate as of 02/20/24

## 2024-02-20 NOTE — PLAN OF CARE
Goal Outcome Evaluation:    PT remains pleasantly confused. Still apprehensive to medications ordered, allowing limited assessment to be completed this morning but agreed to allow RN later. Tolerating regular diet, no complaints of pain or nausea. Ambulated around unit and room. VSS on room air, patient up in chair currently, no needs voiced at this time.

## 2024-02-20 NOTE — PROGRESS NOTES
[facility] Progress Note      Patient Name: Yuval Loja  : 1950  MRN: [id number]  Primary Care Physician:  Alessia Prescott APRN  Date of admission: 2024    Subjective     Interval History:       Patient stated that he had a good night  Eating better  Continues to ambulate through the halls  Patient is staying when I am going to be discharged  Currently sitting on a chair  More alert and interactive    Review of Systems:   As noted above    Objective     Vitals:   Temp:  [97.5 °F (36.4 °C)] 97.5 °F (36.4 °C)  Heart Rate:  [101] 101  Resp:  [16] 16  BP: (130-145)/(55-59) 130/59  No intake or output data in the 24 hours ending 24 0812          Physical Exam:    Constitutional: Awake,  chronically ill  HEENT: Sclera anicteric, no conjunctival injection, MMM  Neck: Supple, no carotid bruit, trachea at midline, no JVD  Respiratory: Coarse anteriorly; not labored on room air   Cardiovascular: RRR, no rub  Gastrointestinal: BS +, soft, nondistended, not distended  : No palpable bladder  Musculoskeletal: No edema, no clubbing or cyanosis  Neurologic: Alert no focalization  Skin: Warm and dry      Scheduled Meds:     dilTIAZem CD, 120 mg, Oral, Q24H  docusate sodium, 100 mg, Oral, BID  famotidine, 20 mg, Oral, BID  folic acid, 1 mg, Oral, Daily  haloperidol, 2 mg, Oral, TID  metoprolol tartrate, 25 mg, Oral, TID  multivitamin, 1 tablet, Oral, Daily  rosuvastatin, 5 mg, Oral, Nightly  sucralfate, 1 g, Oral, 4x Daily AC & at Bedtime  thiamine, 100 mg, Oral, Daily      IV Meds:            Results Reviewed:   I have personally reviewed the results from the time of this admission to 2024 08:12 EST     Results from last 7 days   Lab Units 24  0637 02/15/24  0854 24  1410   SODIUM mmol/L 144 144 140   POTASSIUM mmol/L 4.5 4.0 4.0   CHLORIDE mmol/L 107 105 101   CO2 mmol/L 27.0 24.6 22.2   BUN mg/dL 20 23 13   CREATININE mg/dL 1.75* 2.37* 2.22*   CALCIUM mg/dL  10.3 10.5 10.8*   GLUCOSE mg/dL 91 141* 125*       Estimated Creatinine Clearance: 32.6 mL/min (A) (by C-G formula based on SCr of 1.75 mg/dL (H)).        Assessment / Plan     ASSESSMENT:  1.  CRISTIANO on CKD3a, nonoliguric, likely prerenal.  From decreased oral intake and encephalopathy creatinine started to improve with medical management CT scan abdomen pelvis no hydronephrosis or nephrolithiasis urinalysis proteinuria  309 mg/g   2.  Confusion/encephalopathy, with suspected withdrawal syndrome, waxing and waning.  On Haldol  3.  Alcoholism and liver disease currently on folic acid, thiamine  4.  Depression, recently placed on Lexapro, which has been DC'd  5.  DDD with prior stroke.  Followed by PT and OT  6.  Chronic atrial fibrillation: Rate controlled on diltiazem and metoprolol  7.  Alzheimer's disease as per psychiatry    PLAN:  -Renal function at baseline and hemodynamically stable  -Awaiting placement    Thank you for involving us in the care of Yuval Loja.  Please feel free to call with any questions.    Richy Duran MD  02/20/24  08:12 [facility]    [facility]  [phone]    Please note that portions of this note were completed with a voice recognition program.

## 2024-02-21 NOTE — PLAN OF CARE
Goal Outcome Evaluation:      Uneventful night, refuses medication

## 2024-02-21 NOTE — PROGRESS NOTES
"Nutrition Services    Patient Name:  Yuval Loja  YOB: 1950  MRN: [id number]  Admit Date:  2/4/2024    Nutrition Follow Up    Assessment Date:  02/21/24    Encounter Information         Current Summary AMS, weakness, alcohol withdraw syndrome, afib, CKD     Current Nutrition Orders & Evaluation of Intake       Oral Nutrition     Current PO Diet Diet: Regular/House Diet; Texture: Regular Texture (IDDSI 7); Fluid Consistency: Thin (IDDSI 0)   Supplement Boost Glucose Control, Daily   PO Evaluation     PO Intake % 25-75%    Factors Affecting Intake  decreased appetite   --  Anthropometrics          Height    Weight Height: 175.3 cm (69\")  Weight: 63.1 kg (139 lb 1.8 oz) (02/11/24 0500)    BMI kg/m2 Body mass index is 20.54 kg/m².  Normal/Healthy (18.4 - 24.9)          Labs        Pertinent Labs Reviewed, listed below     Results from last 7 days   Lab Units 02/17/24  0637 02/15/24  0854   SODIUM mmol/L 144 144   POTASSIUM mmol/L 4.5 4.0   CHLORIDE mmol/L 107 105   CO2 mmol/L 27.0 24.6   BUN mg/dL 20 23   CREATININE mg/dL 1.75* 2.37*   CALCIUM mg/dL 10.3 10.5   GLUCOSE mg/dL 91 141*                 SARS-CoV-2, CLARICE   Date Value Ref Range Status   10/11/2022 Negative Negative Final     Comment:     The 2019-CoV rRT-PCR Assay is only for use under a Food and Drug Administration Emergency Use Authorization. The performance characteristics of the assay were verified by the Clinical Microbiology Laboratory at the [facility].   Results should be used in conjunction with the patient's clinical symptoms, medical history, and other clinical/laboratory findings to determine an overall clinical diagnosis. Negative results do not preclude infection with SARS-CoV-2 (COVID-19).    Test parameters have not been validated for screening asymptomatic patients.     Lab Results   Component Value Date    HGBA1C 4.30 (L) 02/05/2024          Medications            Scheduled Medications dilTIAZem CD, " 120 mg, Oral, Q24H  docusate sodium, 100 mg, Oral, BID  famotidine, 20 mg, Oral, BID  folic acid, 1 mg, Oral, Daily  haloperidol, 2 mg, Oral, TID  metoprolol tartrate, 25 mg, Oral, TID  multivitamin, 1 tablet, Oral, Daily  rosuvastatin, 5 mg, Oral, Nightly  sucralfate, 1 g, Oral, 4x Daily AC & at Bedtime  thiamine, 100 mg, Oral, Daily        Infusions      PRN Medications   haloperidol lactate    hydrOXYzine    [COMPLETED] Insert Peripheral IV **AND** sodium chloride     Physical Findings          General Appearance confused, disoriented, room air   Oral/Mouth Cavity tooth or teeth missing   Edema  no edema   Gastrointestinal last bowel movement: unsure (RN unable to witness)   Skin  skin intact   Tubes/Drains/Lines none   NFPE Not indicated at this time   --  NUTRITION INTERVENTION / PLAN OF CARE  Intervention Goal         Intervention Goal(s) Maintain nutrition status, Increase intake, Maintain weight, and PO intake goal %: 75+     Nutrition Intervention         RD Action Interview for preferences, Supplement provided, Encourage intake, Continue to monitor, and Care plan reviewed     Nutrition Prescription         Diet Prescription     Supplement Prescription Boost Glucose Control, Daily   EN/PN Prescription    New Prescription Ordered? Continue same per protocol   --  Monitor/Evaluation        Monitor Per protocol   Discharge Needs Pending clinical course     RD to follow up per protocol.    Electronically signed by:  Rosio Casillas RD  02/21/24 15:29 EST

## 2024-02-21 NOTE — PLAN OF CARE
Goal Outcome Evaluation:    Confusion noted baseline. PT ambulating unit quite often, pleasant to all staff today. Still refusing medications, allowed minimal assessment did allow vital check although. No complaints currently, sitting in chair in room. No distress noted currently, call light within reach.

## 2024-02-21 NOTE — PROGRESS NOTES
"Daily progress note    Primary care physician  Dr. Prescott    Subjective  Doing same with no new issues and remained pleasantly confused    History of present illness  73-year-old -American male with history of alcohol abuse paroxysmal atrial fibrillation coronary artery disease hyperlipidemia and chronic kidney disease stage III who is well-known to our service and apparently continues to abuse alcohol and recently started on a antidepressant brought to the emergency room with generalized weakness and altered mental status.  Patient in no respiratory distress and answer question appropriately and workup in ER revealed acute metabolic encephalopathy admit for management.  At the time of examination he denies any chest pain shortness of breath palpitation and asking for anxiety medication and wants to eat regular diet.     REVIEW OF SYSTEMS  Unable to obtain     PHYSICAL EXAM   Blood pressure 109/61, pulse 89, temperature 98.8 °F (37.1 °C), temperature source Oral, resp. rate 16, height 175.3 cm (69\"), weight 63.1 kg (139 lb 1.8 oz), SpO2 99%.    Constitutional:       General: He is awake. He is not in acute distress.     Appearance: He is not toxic-appearing.   HENT:      Head: Normocephalic and atraumatic.   Eyes:      General: Lids are normal. Vision grossly intact.   Cardiovascular:      Rate and Rhythm: Normal rate and regular rhythm.      Heart sounds: Normal heart sounds.   Pulmonary:      Effort: Pulmonary effort is normal.      Breath sounds: Normal breath sounds and air entry.   Abdominal:      General: Bowel sounds are normal.      Palpations: Abdomen is soft.      Tenderness: There is no abdominal tenderness.   Musculoskeletal:      Cervical back: Normal range of motion.   Skin:     General: Skin is warm and dry.      Coloration: Skin is not pale.   Neurological:      General: No focal deficit present.      Mental Status: He is alert and oriented to person, place, and time.      Comments: No focal " deficit  Psychiatric:         Attention and Perception: Attention normal.         Mood and Affect: Mood normal.         Speech: Speech normal.         Behavior: Behavior is cooperative.     LAB RESULTS  Lab Results (last 24 hours)       ** No results found for the last 24 hours. **          Imaging Results (Last 24 Hours)       ** No results found for the last 24 hours. **          Results  Scan on 2/4/2024 0415 by [employer], [place]: ECG 12-LEAD         Author: -- Service: -- Author Type: --   Filed: Date of Service: Creation Time:   Status: (Other)   HEART RATE= 67  bpm  RR Interval= 896  ms  OR Interval= 193  ms  P Horizontal Axis= -48  deg  P Front Axis= 82  deg  QRSD Interval= 82  ms  QT Interval= 441  ms  QTcB= 466  ms  QRS Axis= 47  deg  T Wave Axis= 51  deg  - ABNORMAL ECG -  Sinus rhythm  RSR' in V1 or V2, probably normal variant  Consider left ventricular hypertrophy  Anterior ST elevation, probably due to LVH          Current Facility-Administered Medications:     dilTIAZem CD (CARDIZEM CD) 24 hr capsule 120 mg, 120 mg, Oral, Q24H, Richy Duran MD    docusate sodium (COLACE) capsule 100 mg, 100 mg, Oral, BID, Chino Cole MD, 100 mg at 02/13/24 0820    famotidine (PEPCID) tablet 20 mg, 20 mg, Oral, BID, Chino Cole MD, 20 mg at 02/14/24 0935    folic acid (FOLVITE) tablet 1 mg, 1 mg, Oral, Daily, Chino Cole MD, 1 mg at 02/16/24 0926    haloperidol (HALDOL) tablet 2 mg, 2 mg, Oral, TID, Hipolito Ramon III, MD, 2 mg at 02/16/24 2131    haloperidol lactate (HALDOL) injection 5 mg, 5 mg, Intramuscular, Q6H PRN, Hipolito Ramon III, MD, 5 mg at 02/06/24 0049    hydrOXYzine (ATARAX) tablet 25 mg, 25 mg, Oral, 4x Daily PRN, Chino Cole MD, 25 mg at 02/16/24 2131    metoprolol tartrate (LOPRESSOR) tablet 25 mg, 25 mg, Oral, TID, Chino Cole MD, 25 mg at 02/16/24 2131    multivitamin (THERAGRAN) tablet 1 tablet, 1 tablet, Oral, Daily, Chino Cole MD, 1 tablet at 02/16/24  0927    rosuvastatin (CRESTOR) tablet 5 mg, 5 mg, Oral, Nightly, Jolie Cole MD    [COMPLETED] Insert Peripheral IV, , , Once **AND** sodium chloride 0.9 % flush 10 mL, 10 mL, Intravenous, PRN, ReeciaranrSalma APRN    sucralfate (CARAFATE) tablet 1 g, 1 g, Oral, 4x Daily AC & at Bedtime, Jolie Cole MD, 1 g at 02/13/24 0809    thiamine (VITAMIN B-1) tablet 100 mg, 100 mg, Oral, Daily, Jolie Cole MD, 100 mg at 02/16/24 0926     ASSESSMENT  Alcohol withdrawal syndrome  Acute kidney injury resolved  Depression   History of alcohol abuse  Paroxysmal atrial fibrillation  Coronary artery disease  Hyperlipidemia  History of CVA  Chronic kidney disease stage III  Gastroesophageal reflux disease    PLAN  CPM  Continue detox medications  Adjust home medications  Stress ulcer DVT prophylaxis  Nephrology to follow patient  Supportive care  PT OT  Discussed with nursing staff and family   Subacute rehab versus home with family support    JOLIE COLE MD    Copied text in this note has been reviewed and is accurate as of 02/21/24

## 2024-02-22 NOTE — PROGRESS NOTES
"Daily progress note    Primary care physician  Dr. Prescott    Subjective  Doing same with no new issues and remained pleasantly confused    History of present illness  73-year-old -American male with history of alcohol abuse paroxysmal atrial fibrillation coronary artery disease hyperlipidemia and chronic kidney disease stage III who is well-known to our service and apparently continues to abuse alcohol and recently started on a antidepressant brought to the emergency room with generalized weakness and altered mental status.  Patient in no respiratory distress and answer question appropriately and workup in ER revealed acute metabolic encephalopathy admit for management.  At the time of examination he denies any chest pain shortness of breath palpitation and asking for anxiety medication and wants to eat regular diet.     REVIEW OF SYSTEMS  Unable to obtain     PHYSICAL EXAM   Blood pressure 127/72, pulse 109, temperature 98.8 °F (37.1 °C), temperature source Oral, resp. rate 16, height 175.3 cm (69\"), weight 63.1 kg (139 lb 1.8 oz), SpO2 99%.    Constitutional:       General: He is awake. He is not in acute distress.     Appearance: He is not toxic-appearing.   HENT:      Head: Normocephalic and atraumatic.   Eyes:      General: Lids are normal. Vision grossly intact.   Cardiovascular:      Rate and Rhythm: Normal rate and regular rhythm.      Heart sounds: Normal heart sounds.   Pulmonary:      Effort: Pulmonary effort is normal.      Breath sounds: Normal breath sounds and air entry.   Abdominal:      General: Bowel sounds are normal.      Palpations: Abdomen is soft.      Tenderness: There is no abdominal tenderness.   Musculoskeletal:      Cervical back: Normal range of motion.   Skin:     General: Skin is warm and dry.      Coloration: Skin is not pale.   Neurological:      General: No focal deficit present.      Mental Status: He is alert and oriented to person, place, and time.      Comments: No focal " deficit  Psychiatric:         Attention and Perception: Attention normal.         Mood and Affect: Mood normal.         Speech: Speech normal.         Behavior: Behavior is cooperative.     LAB RESULTS  Lab Results (last 24 hours)       ** No results found for the last 24 hours. **          Imaging Results (Last 24 Hours)       ** No results found for the last 24 hours. **          Results  Scan on 2/4/2024 0415 by [employer], [place]: ECG 12-LEAD         Author: -- Service: -- Author Type: --   Filed: Date of Service: Creation Time:   Status: (Other)   HEART RATE= 67  bpm  RR Interval= 896  ms  NH Interval= 193  ms  P Horizontal Axis= -48  deg  P Front Axis= 82  deg  QRSD Interval= 82  ms  QT Interval= 441  ms  QTcB= 466  ms  QRS Axis= 47  deg  T Wave Axis= 51  deg  - ABNORMAL ECG -  Sinus rhythm  RSR' in V1 or V2, probably normal variant  Consider left ventricular hypertrophy  Anterior ST elevation, probably due to LVH          Current Facility-Administered Medications:     dilTIAZem CD (CARDIZEM CD) 24 hr capsule 120 mg, 120 mg, Oral, Q24H, Richy Duran MD    docusate sodium (COLACE) capsule 100 mg, 100 mg, Oral, BID, Chino Cole MD, 100 mg at 02/13/24 0820    famotidine (PEPCID) tablet 20 mg, 20 mg, Oral, BID, Chino Cole MD, 20 mg at 02/14/24 0935    folic acid (FOLVITE) tablet 1 mg, 1 mg, Oral, Daily, Chino Cole MD, 1 mg at 02/16/24 0926    haloperidol (HALDOL) tablet 2 mg, 2 mg, Oral, TID, Hipolito Ramon III, MD, 2 mg at 02/16/24 2131    haloperidol lactate (HALDOL) injection 5 mg, 5 mg, Intramuscular, Q6H PRN, Hipolito Ramon III, MD, 5 mg at 02/06/24 0049    hydrOXYzine (ATARAX) tablet 25 mg, 25 mg, Oral, 4x Daily PRN, Chino Cole MD, 25 mg at 02/16/24 2131    metoprolol tartrate (LOPRESSOR) tablet 25 mg, 25 mg, Oral, TID, Chino Cole MD, 25 mg at 02/16/24 2131    multivitamin (THERAGRAN) tablet 1 tablet, 1 tablet, Oral, Daily, Chino Cole MD, 1 tablet at 02/16/24  0927    rosuvastatin (CRESTOR) tablet 5 mg, 5 mg, Oral, Nightly, Jolie Cole MD    [COMPLETED] Insert Peripheral IV, , , Once **AND** sodium chloride 0.9 % flush 10 mL, 10 mL, Intravenous, PRN, ReeciaranrSalma APRN    sucralfate (CARAFATE) tablet 1 g, 1 g, Oral, 4x Daily AC & at Bedtime, Jolie Cole MD, 1 g at 02/13/24 0809    thiamine (VITAMIN B-1) tablet 100 mg, 100 mg, Oral, Daily, Jolie Cole MD, 100 mg at 02/16/24 0926     ASSESSMENT  Alcohol withdrawal syndrome  Acute kidney injury resolved  Depression   History of alcohol abuse  Paroxysmal atrial fibrillation  Coronary artery disease  Hyperlipidemia  History of CVA  Chronic kidney disease stage III  Gastroesophageal reflux disease    PLAN  CPM  Continue detox medications  Adjust home medications  Stress ulcer DVT prophylaxis  Nephrology to follow patient  Supportive care  PT OT  Discussed with nursing staff and family   Subacute rehab versus home with family support    JOLIE COLE MD    Copied text in this note has been reviewed and is accurate as of 02/22/24

## 2024-02-22 NOTE — NURSING NOTE
Patient refusing all care at this time. The patient is refusing vitals, assesment and all meds. Patient is irritable while ambulating ad [place] in the [place].

## 2024-02-22 NOTE — PLAN OF CARE
Goal Outcome Evaluation:      No change, refused medication & v/s

## 2024-02-22 NOTE — PROGRESS NOTES
[facility] Progress Note      Patient Name: Yuval Loja  : 1950  MRN: [id number]  Primary Care Physician:  Alessia Prescott APRN  Date of admission: 2024    Subjective     Interval History:     Overnight no event  Patient walking down the halls  More interactive  Currently sitting on the chair on his room  Offers no complaints    Review of Systems:   As noted above    Objective     Vitals:   Heart Rate:  [109] 109  BP: (127)/(72) 127/72    Intake/Output Summary (Last 24 hours) at 2024 0910  Last data filed at 2024 1900  Gross per 24 hour   Intake --   Output 0 ml   Net 0 ml             Physical Exam:    Constitutional: Awake,  chronically ill  HEENT: Sclera anicteric, no conjunctival injection, MMM  Neck: Supple, no carotid bruit, trachea at midline, no JVD  Respiratory: Coarse anteriorly; not labored on room air   Cardiovascular: RRR, no rub  Gastrointestinal: BS +, soft, nondistended, not distended  : No palpable bladder  Musculoskeletal: No edema, no clubbing or cyanosis  Neurologic: Alert no focalization  Skin: Warm and dry      Scheduled Meds:     dilTIAZem CD, 120 mg, Oral, Q24H  docusate sodium, 100 mg, Oral, BID  famotidine, 20 mg, Oral, BID  folic acid, 1 mg, Oral, Daily  haloperidol, 2 mg, Oral, TID  metoprolol tartrate, 25 mg, Oral, TID  multivitamin, 1 tablet, Oral, Daily  rosuvastatin, 5 mg, Oral, Nightly  sucralfate, 1 g, Oral, 4x Daily AC & at Bedtime  thiamine, 100 mg, Oral, Daily      IV Meds:            Results Reviewed:   I have personally reviewed the results from the time of this admission to 2024 09:10 EST     Results from last 7 days   Lab Units 24  0637   SODIUM mmol/L 144   POTASSIUM mmol/L 4.5   CHLORIDE mmol/L 107   CO2 mmol/L 27.0   BUN mg/dL 20   CREATININE mg/dL 1.75*   CALCIUM mg/dL 10.3   GLUCOSE mg/dL 91       Estimated Creatinine Clearance: 32.6 mL/min (A) (by C-G formula based on SCr of 1.75 mg/dL  (H)).        Assessment / Plan     ASSESSMENT:  1.  CRISTIANO on CKD3a, nonoliguric, likely prerenal.  From decreased oral intake and encephalopathy creatinine started to improve with medical management CT scan abdomen pelvis no hydronephrosis or nephrolithiasis urinalysis proteinuria  309 mg/g   2.  Confusion/encephalopathy, with suspected withdrawal syndrome, waxing and waning.  On Haldol  3.  Alcoholism and liver disease currently on folic acid, thiamine  4.  Depression, recently placed on Lexapro, which has been DC'd  5.  DDD with prior stroke.  Followed by PT and OT  6.  Chronic atrial fibrillation: Rate controlled on diltiazem and metoprolol  7.  Alzheimer's disease as per psychiatry    PLAN:  -Renal function at baseline  -Electrolyte and volume status stable  -Upon discharge patient can be followed closely in renal clinic  -Will continue to follow prn     Thank you for involving us in the care of Yuval Loja.  Please feel free to call with any questions.    Richy Druan MD  02/22/24  09:10 [facility]    [facility]  [phone]    Please note that portions of this note were completed with a voice recognition program.

## 2024-02-23 NOTE — PLAN OF CARE
Goal Outcome Evaluation:   Pt is pleasantly confused this shift. Pt is up ad neville. Refusing all medication and care at this time. Has slept throughout most of the night. Will continue to monitor.

## 2024-02-23 NOTE — PROGRESS NOTES
[facility] Progress Note      Patient Name: Yuval Loja  : 1950  MRN: [id number]  Primary Care Physician:  Alessia Prescott APRN  Date of admission: 2024    Subjective     Interval History:     Overnight no event    Patient continues to be pleasantly confused intermittently  Continues to ambulate freely through the halls  Easily reoriented and not combative  Eating without difficulty      Review of Systems:   As noted above    Objective     Vitals:      No intake or output data in the 24 hours ending 24 1417            Physical Exam:    Constitutional: Awake,  chronically ill  HEENT: Sclera anicteric, no conjunctival injection, MMM  Neck: Supple, no carotid bruit, trachea at midline, no JVD  Respiratory: Coarse anteriorly; not labored on room air   Cardiovascular: RRR, no rub  Gastrointestinal: BS +, soft, nondistended, not distended  : No palpable bladder  Musculoskeletal: No edema, no clubbing or cyanosis  Neurologic: Alert no focalization  Skin: Warm and dry      Scheduled Meds:     dilTIAZem CD, 120 mg, Oral, Q24H  docusate sodium, 100 mg, Oral, BID  famotidine, 20 mg, Oral, BID  folic acid, 1 mg, Oral, Daily  haloperidol, 2 mg, Oral, TID  metoprolol tartrate, 25 mg, Oral, TID  multivitamin, 1 tablet, Oral, Daily  rosuvastatin, 5 mg, Oral, Nightly  sucralfate, 1 g, Oral, 4x Daily AC & at Bedtime  thiamine, 100 mg, Oral, Daily      IV Meds:            Results Reviewed:   I have personally reviewed the results from the time of this admission to 2024 14:17 EST     Results from last 7 days   Lab Units 24  0637   SODIUM mmol/L 144   POTASSIUM mmol/L 4.5   CHLORIDE mmol/L 107   CO2 mmol/L 27.0   BUN mg/dL 20   CREATININE mg/dL 1.75*   CALCIUM mg/dL 10.3   GLUCOSE mg/dL 91       Estimated Creatinine Clearance: 32.6 mL/min (A) (by C-G formula based on SCr of 1.75 mg/dL (H)).        Assessment / Plan     ASSESSMENT:  1.  CRISTIANO on CKD3a, nonoliguric, likely  prerenal.  From decreased oral intake and encephalopathy creatinine started to improve with medical management CT scan abdomen pelvis no hydronephrosis or nephrolithiasis urinalysis proteinuria  309 mg/g   2.  Confusion/encephalopathy, with suspected withdrawal syndrome, waxing and waning.  On Haldol  3.  Alcoholism and liver disease currently on folic acid, thiamine  4.  Depression, recently placed on Lexapro, which has been DC'd  5.  DDD with prior stroke.  Followed by PT and OT  6.  Chronic atrial fibrillation: Rate controlled on diltiazem and metoprolol  7.  Alzheimer's disease as per psychiatry    PLAN:  -Renal function at baseline, last creatinine 1.7  -Upon discharge patient can be followed closely in renal clinic  -Will continue to follow prn     Thank you for involving us in the care of Yuval Loja.  Please feel free to call with any questions.    Richy Duran MD  02/23/24  14:17 [facility]    [facility]  [phone]    Please note that portions of this note were completed with a voice recognition program.

## 2024-02-23 NOTE — PLAN OF CARE
Goal Outcome Evaluation:  Patient is alert x3. Disoriented to situation.  Refuses all medications.  Patient allowed this RN to take blood pressure this a.m.  Up ad neville, using walker.

## 2024-02-23 NOTE — CASE MANAGEMENT/SOCIAL WORK
Continued Stay Note  [facility]     Patient Name: Yuval Loja  MRN: [id number]  Today's Date: 2/23/2024    Admit Date: 2/4/2024    Plan: TBD   Discharge Plan       Row Name 02/23/24 1606       Plan    Plan Comments Daughter at bedside with patient. Daughter states that she has been granted POA. CCP to get copies and place in chart.                   Discharge Codes    No documentation.                 Expected Discharge Date and Time       Expected Discharge Date Expected Discharge Time    Feb 26, 2024               Ann Marie Olivares RN

## 2024-02-23 NOTE — PROGRESS NOTES
"Daily progress note    Primary care physician  Dr. Prescott    Subjective  Doing same with no new issues and following all commands and walking all over the place.    History of present illness  73-year-old -American male with history of alcohol abuse paroxysmal atrial fibrillation coronary artery disease hyperlipidemia and chronic kidney disease stage III who is well-known to our service and apparently continues to abuse alcohol and recently started on a antidepressant brought to the emergency room with generalized weakness and altered mental status.  Patient in no respiratory distress and answer question appropriately and workup in ER revealed acute metabolic encephalopathy admit for management.  At the time of examination he denies any chest pain shortness of breath palpitation and asking for anxiety medication and wants to eat regular diet.     REVIEW OF SYSTEMS  Unable to obtain     PHYSICAL EXAM   Blood pressure 127/72, pulse 109, temperature 98.8 °F (37.1 °C), temperature source Oral, resp. rate 16, height 175.3 cm (69\"), weight 63.1 kg (139 lb 1.8 oz), SpO2 99%.    Constitutional:       General: He is awake. He is not in acute distress.     Appearance: He is not toxic-appearing.   HENT:      Head: Normocephalic and atraumatic.   Eyes:      General: Lids are normal. Vision grossly intact.   Cardiovascular:      Rate and Rhythm: Normal rate and regular rhythm.      Heart sounds: Normal heart sounds.   Pulmonary:      Effort: Pulmonary effort is normal.      Breath sounds: Normal breath sounds and air entry.   Abdominal:      General: Bowel sounds are normal.      Palpations: Abdomen is soft.      Tenderness: There is no abdominal tenderness.   Musculoskeletal:      Cervical back: Normal range of motion.   Skin:     General: Skin is warm and dry.      Coloration: Skin is not pale.   Neurological:      General: No focal deficit present.      Mental Status: He is alert and oriented to person, place, and " time.      Comments: No focal deficit  Psychiatric:         Attention and Perception: Attention normal.         Mood and Affect: Mood normal.         Speech: Speech normal.         Behavior: Behavior is cooperative.     LAB RESULTS  Lab Results (last 24 hours)       ** No results found for the last 24 hours. **          Imaging Results (Last 24 Hours)       ** No results found for the last 24 hours. **          Results  Scan on 2/4/2024 0415 by [employer], [place]: ECG 12-LEAD         Author: -- Service: -- Author Type: --   Filed: Date of Service: Creation Time:   Status: (Other)   HEART RATE= 67  bpm  RR Interval= 896  ms  MD Interval= 193  ms  P Horizontal Axis= -48  deg  P Front Axis= 82  deg  QRSD Interval= 82  ms  QT Interval= 441  ms  QTcB= 466  ms  QRS Axis= 47  deg  T Wave Axis= 51  deg  - ABNORMAL ECG -  Sinus rhythm  RSR' in V1 or V2, probably normal variant  Consider left ventricular hypertrophy  Anterior ST elevation, probably due to LVH          Current Facility-Administered Medications:     dilTIAZem CD (CARDIZEM CD) 24 hr capsule 120 mg, 120 mg, Oral, Q24H, Richy Duran MD    docusate sodium (COLACE) capsule 100 mg, 100 mg, Oral, BID, Chino Cole MD, 100 mg at 02/13/24 0820    famotidine (PEPCID) tablet 20 mg, 20 mg, Oral, BID, Chino Cole MD, 20 mg at 02/14/24 0935    folic acid (FOLVITE) tablet 1 mg, 1 mg, Oral, Daily, Chino Cole MD, 1 mg at 02/16/24 0926    haloperidol (HALDOL) tablet 2 mg, 2 mg, Oral, TID, Hipolito Ramon III, MD, 2 mg at 02/16/24 2131    haloperidol lactate (HALDOL) injection 5 mg, 5 mg, Intramuscular, Q6H PRN, Hipolito Ramon III, MD, 5 mg at 02/06/24 0049    hydrOXYzine (ATARAX) tablet 25 mg, 25 mg, Oral, 4x Daily PRN, Chino Cole MD, 25 mg at 02/16/24 2131    metoprolol tartrate (LOPRESSOR) tablet 25 mg, 25 mg, Oral, TID, Chino Cole MD, 25 mg at 02/16/24 2131    multivitamin (THERAGRAN) tablet 1 tablet, 1 tablet, Oral, Daily, Kelsey,  MD Jolie, 1 tablet at 02/16/24 0927    rosuvastatin (CRESTOR) tablet 5 mg, 5 mg, Oral, Nightly, Jolie Cole MD    [COMPLETED] Insert Peripheral IV, , , Once **AND** sodium chloride 0.9 % flush 10 mL, 10 mL, Intravenous, PRN, Salma Grullon APRN    sucralfate (CARAFATE) tablet 1 g, 1 g, Oral, 4x Daily AC & at Bedtime, Jolie Cole MD, 1 g at 02/13/24 0809    thiamine (VITAMIN B-1) tablet 100 mg, 100 mg, Oral, Daily, Jolie Cole MD, 100 mg at 02/16/24 0926     ASSESSMENT  Alcohol withdrawal syndrome  Acute kidney injury resolved  Depression   History of alcohol abuse  Paroxysmal atrial fibrillation  Coronary artery disease  Hyperlipidemia  History of CVA  Chronic kidney disease stage III  Gastroesophageal reflux disease    PLAN  CPM  Continue detox medications  Adjust home medications  Stress ulcer DVT prophylaxis  Nephrology to follow patient  Supportive care  PT OT  Discussed with nursing staff and family   Subacute rehab versus home with family support    JOLIE COLE MD    Copied text in this note has been reviewed and is accurate as of 02/23/24

## 2024-02-24 RX ORDER — ACETAMINOPHEN 325 MG/1
650 TABLET ORAL EVERY 6 HOURS PRN
Status: DISCONTINUED | OUTPATIENT
Start: 2024-02-24 | End: 2024-03-01 | Stop reason: HOSPADM

## 2024-02-24 RX ADMIN — ACETAMINOPHEN 325MG 325 MG: 325 TABLET ORAL at 12:22

## 2024-02-24 RX ADMIN — ACETAMINOPHEN 325MG 325 MG: 325 TABLET ORAL at 15:45

## 2024-02-24 NOTE — PLAN OF CARE
Goal Outcome Evaluation:      Patient oriented x3, confused to situation.  Refused all meds today.  Requested tylenol one tablet at a time for headache.  Up ad neville with walker.  Ambulated halls several times.  VSS.

## 2024-02-24 NOTE — PROGRESS NOTES
"    [facility] Progress Note      Patient Name: Yuval Loja  : 1950  MRN: [id number]  Primary Care Physician:  Alessia Prescott APRN  Date of admission: 2024    Subjective     Interval History:   Patient at the nurses station, wants the heat turned up in his room.  States he feels fine and wants to go home.   No shortness of breath, chest pain, or urinary complaints       Review of Systems:   As noted above    Objective     Vitals:   Temp:  [98.2 °F (36.8 °C)-98.4 °F (36.9 °C)] 98.2 °F (36.8 °C)  Heart Rate:  [73-82] 82  Resp:  [14-16] 16  BP: (108-135)/(52-67) 135/56  No intake or output data in the 24 hours ending 24 4019        Physical Exam:    Constitutional: Awake, pleasantly confused, frail   HEENT: Sclera anicteric, no conjunctival injection, MMM  Neck: Supple, no carotid bruit, trachea at midline, no JVD  Respiratory: Diminished with shallow effort, no crackles; not labored  Cardiovascular: RRR, no rub  Gastrointestinal: BS +, soft, nondistended, not distended  : No palpable bladder  Musculoskeletal: No edema, no clubbing or cyanosis  Neurologic: Alert, oriented to self and location  Skin: Warm and dry      Scheduled Meds:     dilTIAZem CD, 120 mg, Oral, Q24H  docusate sodium, 100 mg, Oral, BID  famotidine, 20 mg, Oral, BID  folic acid, 1 mg, Oral, Daily  haloperidol, 2 mg, Oral, TID  metoprolol tartrate, 25 mg, Oral, TID  multivitamin, 1 tablet, Oral, Daily  rosuvastatin, 5 mg, Oral, Nightly  sucralfate, 1 g, Oral, 4x Daily AC & at Bedtime  thiamine, 100 mg, Oral, Daily      IV Meds:            Results Reviewed:   I have personally reviewed the results from the time of this admission to 2024 18:53 EST           Invalid input(s): \"LABALBU\", \"PROT\"      Estimated Creatinine Clearance: 32.6 mL/min (A) (by C-G formula based on SCr of 1.75 mg/dL (H)).        Assessment / Plan     ASSESSMENT:  1.  CRISTIANO on CKD3a, nonoliguric, stable: CRISTIANO prerenal from " decreased oral intake, frequent hypotension, and fluctuating volume status.  CT scan unrevealing; subnephrotic proteinuria.  No recent renal labs.  2.  Confusion/encephalopathy, with suspected withdrawal syndrome, waxing and waning.  On Haldol  3.  Alcoholism and liver disease currently on folic acid, thiamine  4.  Depression, recently placed on Lexapro, which has been DC'd  5.  DDD with prior stroke.  Followed by PT and OT  6.  Chronic atrial fibrillation: Rate controlled on diltiazem and metoprolol  7.  Alzheimer's disease as per psychiatry    PLAN:  Await placement in nursing home  Barring any surprises in labs tomorrow, will see next on Monday, 2/26  Will arrange follow-up in our office    Thank you for involving us in the care of Yuval Loja.  Please feel free to call with any questions.    Thomas Weiner MD  02/24/24  18:53 EST    [facility]  [phone]    Please note that portions of this note were completed with a voice recognition program.

## 2024-02-24 NOTE — PROGRESS NOTES
"Enter Query Response Below      Query Response: Clinically insignificant.             If applicable, please update the problem list.     Patient: Yuval Loja        : 1950  Account: [id number]           Admit Date: 2024        How to Respond to this query:       a. Click New Note     b. Answer query within the yellow box.                c. Update the Problem List, if applicable.      If you have any questions about this query contact me at: [email or web address]    Dr. Weiner,     Patient admitted with ETOH withdrawal, CRISTIANO/CKD.  Sodium: 132, 139, 141, 143, 144, 143, 140, 142, 144.  Per nephrology notes - \"Probably hypovolemic; hyponatremia with liver disease and poor nutrition, improving  with saline. \" and \"Volume status is stable; hyponatremia resolved with saline.\"  NaCl 0.9% fluid bolus given.     Please clarify the following:  Hyponatremia- clinically significant  Hyponatremia- clinically insignificant  Other- specify______  Unable to determine    By submitting this query, we are merely seeking further clarification of documentation to accurately reflect all conditions that you are monitoring, evaluating, treating or that extend the hospitalization or utilize additional resources of care. Please utilize your independent clinical judgment when addressing the question(s) above.     This query and your response, once completed, will be entered into the legal medical record.    Sincerely,  Daina Barnhart RN BSN  Clinical Documentation Integrity Program     "

## 2024-02-24 NOTE — PLAN OF CARE
Goal Outcome Evaluation:   AOX3 this shift. Pleasant and agreeable to head to toe assessment and vitals. Medications refused. No complaints of pain.

## 2024-02-24 NOTE — PROGRESS NOTES
"Daily progress note    Primary care physician  Dr. Prescott    Subjective  Doing same with no new complaints except headache    History of present illness  73-year-old -American male with history of alcohol abuse paroxysmal atrial fibrillation coronary artery disease hyperlipidemia and chronic kidney disease stage III who is well-known to our service and apparently continues to abuse alcohol and recently started on a antidepressant brought to the emergency room with generalized weakness and altered mental status.  Patient in no respiratory distress and answer question appropriately and workup in ER revealed acute metabolic encephalopathy admit for management.  At the time of examination he denies any chest pain shortness of breath palpitation and asking for anxiety medication and wants to eat regular diet.     REVIEW OF SYSTEMS  Unable to obtain     PHYSICAL EXAM   Blood pressure 135/56, pulse 82, temperature 98.2 °F (36.8 °C), temperature source Oral, resp. rate 16, height 175.3 cm (69\"), weight 63.1 kg (139 lb 1.8 oz), SpO2 100%.    Constitutional:       General: He is awake. He is not in acute distress.     Appearance: He is not toxic-appearing.   HENT:      Head: Normocephalic and atraumatic.   Eyes:      General: Lids are normal. Vision grossly intact.   Cardiovascular:      Rate and Rhythm: Normal rate and regular rhythm.      Heart sounds: Normal heart sounds.   Pulmonary:      Effort: Pulmonary effort is normal.      Breath sounds: Normal breath sounds and air entry.   Abdominal:      General: Bowel sounds are normal.      Palpations: Abdomen is soft.      Tenderness: There is no abdominal tenderness.   Musculoskeletal:      Cervical back: Normal range of motion.   Skin:     General: Skin is warm and dry.      Coloration: Skin is not pale.   Neurological:      General: No focal deficit present.      Mental Status: He is alert and oriented to person, place, and time.      Comments: No focal " deficit  Psychiatric:         Attention and Perception: Attention normal.         Mood and Affect: Mood normal.         Speech: Speech normal.         Behavior: Behavior is cooperative.     LAB RESULTS  Lab Results (last 24 hours)       ** No results found for the last 24 hours. **          Imaging Results (Last 24 Hours)       ** No results found for the last 24 hours. **          Results  Scan on 2/4/2024 0415 by [employer], [place]: ECG 12-LEAD         Author: -- Service: -- Author Type: --   Filed: Date of Service: Creation Time:   Status: (Other)   HEART RATE= 67  bpm  RR Interval= 896  ms  AR Interval= 193  ms  P Horizontal Axis= -48  deg  P Front Axis= 82  deg  QRSD Interval= 82  ms  QT Interval= 441  ms  QTcB= 466  ms  QRS Axis= 47  deg  T Wave Axis= 51  deg  - ABNORMAL ECG -  Sinus rhythm  RSR' in V1 or V2, probably normal variant  Consider left ventricular hypertrophy  Anterior ST elevation, probably due to LVH          Current Facility-Administered Medications:     acetaminophen (TYLENOL) tablet 650 mg, 650 mg, Oral, Q6H PRN, Chino Cole MD, 325 mg at 02/24/24 1222    dilTIAZem CD (CARDIZEM CD) 24 hr capsule 120 mg, 120 mg, Oral, Q24H, Richy Duran MD    docusate sodium (COLACE) capsule 100 mg, 100 mg, Oral, BID, Chino Cole MD, 100 mg at 02/13/24 0820    famotidine (PEPCID) tablet 20 mg, 20 mg, Oral, BID, Chino Cole MD, 20 mg at 02/14/24 0935    folic acid (FOLVITE) tablet 1 mg, 1 mg, Oral, Daily, Chino Cole MD, 1 mg at 02/16/24 0926    haloperidol (HALDOL) tablet 2 mg, 2 mg, Oral, TID, Hipolito Ramon III, MD, 2 mg at 02/16/24 2131    haloperidol lactate (HALDOL) injection 5 mg, 5 mg, Intramuscular, Q6H PRN, Hipolito Ramon III, MD, 5 mg at 02/06/24 0049    hydrOXYzine (ATARAX) tablet 25 mg, 25 mg, Oral, 4x Daily PRN, Chino Cole MD, 25 mg at 02/16/24 2131    metoprolol tartrate (LOPRESSOR) tablet 25 mg, 25 mg, Oral, TID, Chino Cole MD, 25 mg at 02/16/24  2131    multivitamin (THERAGRAN) tablet 1 tablet, 1 tablet, Oral, Daily, Jolie Cole MD, 1 tablet at 02/16/24 0927    rosuvastatin (CRESTOR) tablet 5 mg, 5 mg, Oral, Nightly, Jolie Cole MD    [COMPLETED] Insert Peripheral IV, , , Once **AND** sodium chloride 0.9 % flush 10 mL, 10 mL, Intravenous, PRN, Salma Grullon APRN    sucralfate (CARAFATE) tablet 1 g, 1 g, Oral, 4x Daily AC & at Bedtime, Jolie Cole MD, 1 g at 02/13/24 0809    thiamine (VITAMIN B-1) tablet 100 mg, 100 mg, Oral, Daily, Jolie Cole MD, 100 mg at 02/16/24 0926     ASSESSMENT  Alcohol withdrawal syndrome  Acute kidney injury resolved  Depression   History of alcohol abuse  Paroxysmal atrial fibrillation  Coronary artery disease  Hyperlipidemia  History of CVA  Chronic kidney disease stage III  Gastroesophageal reflux disease    PLAN  CPM  Continue detox medications  Tylenol as needed  Adjust home medications  Stress ulcer DVT prophylaxis  Nephrology to follow patient  Supportive care  PT OT  Discussed with nursing staff and family   Subacute rehab versus home with family support    JOLIE COLE MD    Copied text in this note has been reviewed and is accurate as of 02/24/24

## 2024-02-25 RX ADMIN — HALOPERIDOL 2 MG: 2 TABLET ORAL at 18:51

## 2024-02-25 NOTE — PLAN OF CARE
Patient is up ad neville on the unit. Patient refused all meds this shift. Patient easily directable.

## 2024-02-25 NOTE — PLAN OF CARE
Goal Outcome Evaluation:            Patient rested well through the night, refused labs (uncooperative). Bed in low position, call light and belongings in reach, ambulatory, able to make needs known, confused. Plan of care is ongoing.

## 2024-02-25 NOTE — PROGRESS NOTES
"Daily progress note    Primary care physician  Dr. Prescott    Subjective  Doing same with no new complaints and remained pleasantly confused    History of present illness  73-year-old -American male with history of alcohol abuse paroxysmal atrial fibrillation coronary artery disease hyperlipidemia and chronic kidney disease stage III who is well-known to our service and apparently continues to abuse alcohol and recently started on a antidepressant brought to the emergency room with generalized weakness and altered mental status.  Patient in no respiratory distress and answer question appropriately and workup in ER revealed acute metabolic encephalopathy admit for management.  At the time of examination he denies any chest pain shortness of breath palpitation and asking for anxiety medication and wants to eat regular diet.     REVIEW OF SYSTEMS  Unable to obtain     PHYSICAL EXAM   Blood pressure 127/57, pulse 74, temperature 98.2 °F (36.8 °C), temperature source Oral, resp. rate 17, height 175.3 cm (69\"), weight 63.1 kg (139 lb 1.8 oz), SpO2 100%.    Constitutional:       General: He is awake. He is not in acute distress.     Appearance: He is not toxic-appearing.   HENT:      Head: Normocephalic and atraumatic.   Eyes:      General: Lids are normal. Vision grossly intact.   Cardiovascular:      Rate and Rhythm: Normal rate and regular rhythm.      Heart sounds: Normal heart sounds.   Pulmonary:      Effort: Pulmonary effort is normal.      Breath sounds: Normal breath sounds and air entry.   Abdominal:      General: Bowel sounds are normal.      Palpations: Abdomen is soft.      Tenderness: There is no abdominal tenderness.   Musculoskeletal:      Cervical back: Normal range of motion.   Skin:     General: Skin is warm and dry.      Coloration: Skin is not pale.   Neurological:      General: No focal deficit present.      Mental Status: He is alert and oriented to person, place, and time.      Comments: No " focal deficit  Psychiatric:         Attention and Perception: Attention normal.         Mood and Affect: Mood normal.         Speech: Speech normal.         Behavior: Behavior is cooperative.     LAB RESULTS  Lab Results (last 24 hours)       ** No results found for the last 24 hours. **          Imaging Results (Last 24 Hours)       ** No results found for the last 24 hours. **          Results  Scan on 2/4/2024 0415 by [employer], [place]: ECG 12-LEAD         Author: -- Service: -- Author Type: --   Filed: Date of Service: Creation Time:   Status: (Other)   HEART RATE= 67  bpm  RR Interval= 896  ms  PA Interval= 193  ms  P Horizontal Axis= -48  deg  P Front Axis= 82  deg  QRSD Interval= 82  ms  QT Interval= 441  ms  QTcB= 466  ms  QRS Axis= 47  deg  T Wave Axis= 51  deg  - ABNORMAL ECG -  Sinus rhythm  RSR' in V1 or V2, probably normal variant  Consider left ventricular hypertrophy  Anterior ST elevation, probably due to LVH          Current Facility-Administered Medications:     acetaminophen (TYLENOL) tablet 650 mg, 650 mg, Oral, Q6H PRN, Chino Cole MD, 325 mg at 02/24/24 1545    dilTIAZem CD (CARDIZEM CD) 24 hr capsule 120 mg, 120 mg, Oral, Q24H, Richy Duran MD    docusate sodium (COLACE) capsule 100 mg, 100 mg, Oral, BID, Chino Cole MD, 100 mg at 02/13/24 0820    famotidine (PEPCID) tablet 20 mg, 20 mg, Oral, BID, Chion Cole MD, 20 mg at 02/14/24 0935    folic acid (FOLVITE) tablet 1 mg, 1 mg, Oral, Daily, Chino Cole MD, 1 mg at 02/16/24 0926    haloperidol (HALDOL) tablet 2 mg, 2 mg, Oral, TID, Hipolito Ramon III, MD, 2 mg at 02/16/24 2131    haloperidol lactate (HALDOL) injection 5 mg, 5 mg, Intramuscular, Q6H PRN, Hipolito Ramon III, MD, 5 mg at 02/06/24 0049    hydrOXYzine (ATARAX) tablet 25 mg, 25 mg, Oral, 4x Daily PRN, Chino Cole MD, 25 mg at 02/16/24 2131    metoprolol tartrate (LOPRESSOR) tablet 25 mg, 25 mg, Oral, TID, Chino Cole MD, 25 mg at  02/16/24 2131    multivitamin (THERAGRAN) tablet 1 tablet, 1 tablet, Oral, Daily, Jolie Cole MD, 1 tablet at 02/16/24 0927    rosuvastatin (CRESTOR) tablet 5 mg, 5 mg, Oral, Nightly, Jolie Cole MD    [COMPLETED] Insert Peripheral IV, , , Once **AND** sodium chloride 0.9 % flush 10 mL, 10 mL, Intravenous, PRN, Salma Grullon APRN    sucralfate (CARAFATE) tablet 1 g, 1 g, Oral, 4x Daily AC & at Bedtime, Jolie Cole MD, 1 g at 02/13/24 0809    thiamine (VITAMIN B-1) tablet 100 mg, 100 mg, Oral, Daily, Jolie Cole MD, 100 mg at 02/16/24 0926     ASSESSMENT  Alcohol withdrawal syndrome  Acute kidney injury resolved  Depression   History of alcohol abuse  Paroxysmal atrial fibrillation  Coronary artery disease  Hyperlipidemia  History of CVA  Chronic kidney disease stage III  Gastroesophageal reflux disease    PLAN  CPM  Continue detox medications  Tylenol as needed  Adjust home medications  Stress ulcer DVT prophylaxis  Nephrology to follow patient  Supportive care  PT OT  Discussed with nursing staff and family   Subacute rehab versus home with family support once arrangement made    JOLIE COLE MD    Copied text in this note has been reviewed and is accurate as of 02/25/24

## 2024-02-26 RX ADMIN — ACETAMINOPHEN 325MG 650 MG: 325 TABLET ORAL at 17:49

## 2024-02-26 RX ADMIN — ACETAMINOPHEN 325MG 650 MG: 325 TABLET ORAL at 09:35

## 2024-02-26 RX ADMIN — HALOPERIDOL 2 MG: 2 TABLET ORAL at 20:00

## 2024-02-26 RX ADMIN — HYDROXYZINE HYDROCHLORIDE 25 MG: 25 TABLET ORAL at 21:45

## 2024-02-26 NOTE — CASE MANAGEMENT/SOCIAL WORK
Continued Stay Note  [facility]     Patient Name: Yuval Loja  MRN: [id number]  Today's Date: 2/26/2024    Admit Date: 2/4/2024    Plan: TBD   Discharge Plan       Row Name 02/26/24 0946       Plan    Plan TBD    Patient/Family in Agreement with Plan yes    Plan Comments Spoke with Antionette/ Shannan can not meet patient's needs. Left message for [facility]. Spoke with Haily Munsonfarideh Rodgers can not meet patient's needs. Milo/ [facility] no memory care unit, can not meet patient's needs. Left message for [facility]/ [facility] and [facility]. Spoke with Alexandra/ [facility], no memory care, can not meet patient's needs. Spoke with Karolina/ [facility], [place]; will have business office reach out to daughter to discuss financials. Left message for [facility]/ [facility]. [facility]/ [facility], no memory care, cannot meet patient's needs. [facility], no memory care, cannot meet patient's needs. Spoke with Calista/ [facility], memory care currently full, still considering. Spoke with [facility]/ [facility], requested referral be faxed to [phone]; faxed and sent through [employer]. Left message for Amanda with [facility]/ [facility]. Left message for Darlene/ [place]. [facility] [place], no male beds available. [facility]. Spoke with Ioana/ [place] memory care currently full requested referral be resent; referral updated in [facility]. Spoke with patient's daughter/ Torri voiced understanding of current discharge plan; CCP inquired about [facility] consultation, daughter agreeable. Spoke with Celeste/ [facility] gave patient and daughter's information, stated will reach out to POA/ daughter.                   Discharge Codes    No documentation.                 Expected Discharge Date and Time       Expected Discharge Date Expected Discharge Time    Feb 26, 2024               Ann Marie Olivares RN

## 2024-02-26 NOTE — CASE MANAGEMENT/SOCIAL WORK
Continued Stay Note  [facility]     Patient Name: Yuval Loja  MRN: [id number]  Today's Date: 2/26/2024    Admit Date: 2/4/2024    Plan: TBD   Discharge Plan       Row Name 02/26/24 1016       Plan    Plan Comments Received call from [facility] cannot accepted d/t alcohol abuse.      Row Name 02/26/24 0946       Plan    Plan TBD    Patient/Family in Agreement with Plan yes    Plan Comments Spoke with Antionette/ Shannan can not meet patient's needs. Left message for [facility]. Spoke with Haily Rodgers can not meet patient's needs. Milo/ [facility] no memory care unit, can not meet patient's needs. Left message for [facility]/ [facility]. Spoke with Alexandra/ [facility], no memory care, can not meet patient's needs. Spoke with Karolina/ [facility]; will have business office reach out to daughter to discuss financials. Left message for [facility]/ [facility]. [facility]/ [facility], no memory care, cannot meet patient's needs. [facility], no memory care, cannot meet patient's needs. Spoke with Calista/ [facility], memory care currently full, still considering. Spoke with [facility], requested referral be faxed to [phone]; faxed and sent through [employer]. Left message for Amanda with [facility]/ [facility]. Left message for [place]/ [place]. [facility] [place], no male beds available. Left message for Kandy/ [facility] [place]. Spoke with Ioana/ [place] memory care currently full requested referral be resent; referral updated in [employer]. Spoke with patient's daughter/ Torri voiced understanding of current discharge plan; CCP inquired about [facility] consultation, daughter agreeable. Spoke with Celeste/ [facility] gave patient and daughter's information, stated will reach out to POA/ daughter.                   Discharge Codes    No documentation.                 Expected  Discharge Date and Time       Expected Discharge Date Expected Discharge Time    Feb 26, 2024               Ann Marie Olivares RN

## 2024-02-26 NOTE — PROGRESS NOTES
"Daily progress note    Primary care physician  Dr. Prescott    Subjective  Doing same with no new complaints and remained pleasantly confused    History of present illness  73-year-old -American male with history of alcohol abuse paroxysmal atrial fibrillation coronary artery disease hyperlipidemia and chronic kidney disease stage III who is well-known to our service and apparently continues to abuse alcohol and recently started on a antidepressant brought to the emergency room with generalized weakness and altered mental status.  Patient in no respiratory distress and answer question appropriately and workup in ER revealed acute metabolic encephalopathy admit for management.  At the time of examination he denies any chest pain shortness of breath palpitation and asking for anxiety medication and wants to eat regular diet.     REVIEW OF SYSTEMS  Unable to obtain     PHYSICAL EXAM   Blood pressure 143/70, pulse 102, temperature 97.9 °F (36.6 °C), temperature source Oral, resp. rate 18, height 175.3 cm (69\"), weight 63.1 kg (139 lb 1.8 oz), SpO2 99%.    Constitutional:       General: He is awake. He is not in acute distress.     Appearance: He is not toxic-appearing.   HENT:      Head: Normocephalic and atraumatic.   Eyes:      General: Lids are normal. Vision grossly intact.   Cardiovascular:      Rate and Rhythm: Normal rate and regular rhythm.      Heart sounds: Normal heart sounds.   Pulmonary:      Effort: Pulmonary effort is normal.      Breath sounds: Normal breath sounds and air entry.   Abdominal:      General: Bowel sounds are normal.      Palpations: Abdomen is soft.      Tenderness: There is no abdominal tenderness.   Musculoskeletal:      Cervical back: Normal range of motion.   Skin:     General: Skin is warm and dry.      Coloration: Skin is not pale.   Neurological:      General: No focal deficit present.      Mental Status: He is alert and oriented to person, place, and time.      Comments: No " focal deficit  Psychiatric:         Attention and Perception: Attention normal.         Mood and Affect: Mood normal.         Speech: Speech normal.         Behavior: Behavior is cooperative.     LAB RESULTS  Lab Results (last 24 hours)       ** No results found for the last 24 hours. **          Imaging Results (Last 24 Hours)       ** No results found for the last 24 hours. **          Results  Scan on 2/4/2024 0415 by [employer], [place]: ECG 12-LEAD         Author: -- Service: -- Author Type: --   Filed: Date of Service: Creation Time:   Status: (Other)   HEART RATE= 67  bpm  RR Interval= 896  ms  MS Interval= 193  ms  P Horizontal Axis= -48  deg  P Front Axis= 82  deg  QRSD Interval= 82  ms  QT Interval= 441  ms  QTcB= 466  ms  QRS Axis= 47  deg  T Wave Axis= 51  deg  - ABNORMAL ECG -  Sinus rhythm  RSR' in V1 or V2, probably normal variant  Consider left ventricular hypertrophy  Anterior ST elevation, probably due to LVH          Current Facility-Administered Medications:     acetaminophen (TYLENOL) tablet 650 mg, 650 mg, Oral, Q6H PRN, Chino Cole MD, 650 mg at 02/26/24 0935    dilTIAZem CD (CARDIZEM CD) 24 hr capsule 120 mg, 120 mg, Oral, Q24H, Richy Duran MD    docusate sodium (COLACE) capsule 100 mg, 100 mg, Oral, BID, Chino Cole MD, 100 mg at 02/13/24 0820    famotidine (PEPCID) tablet 20 mg, 20 mg, Oral, BID, Chino Cole MD, 20 mg at 02/14/24 0935    folic acid (FOLVITE) tablet 1 mg, 1 mg, Oral, Daily, Chino Cole MD, 1 mg at 02/16/24 0926    haloperidol (HALDOL) tablet 2 mg, 2 mg, Oral, TID, Hipolito Ramon III, MD, 2 mg at 02/25/24 1851    haloperidol lactate (HALDOL) injection 5 mg, 5 mg, Intramuscular, Q6H PRN, Hipolito Ramon III, MD, 5 mg at 02/06/24 0049    hydrOXYzine (ATARAX) tablet 25 mg, 25 mg, Oral, 4x Daily PRN, Chino Cole MD, 25 mg at 02/16/24 2131    metoprolol tartrate (LOPRESSOR) tablet 25 mg, 25 mg, Oral, TID, Chino Cole MD, 25 mg at  02/16/24 2131    multivitamin (THERAGRAN) tablet 1 tablet, 1 tablet, Oral, Daily, Jolie Cole MD, 1 tablet at 02/16/24 0927    rosuvastatin (CRESTOR) tablet 5 mg, 5 mg, Oral, Nightly, Jolie Cole MD    [COMPLETED] Insert Peripheral IV, , , Once **AND** sodium chloride 0.9 % flush 10 mL, 10 mL, Intravenous, PRN, Salma Grullon APRN    sucralfate (CARAFATE) tablet 1 g, 1 g, Oral, 4x Daily AC & at Bedtime, Jolie Cole MD, 1 g at 02/13/24 0809    thiamine (VITAMIN B-1) tablet 100 mg, 100 mg, Oral, Daily, Jolie Cole MD, 100 mg at 02/16/24 0926     ASSESSMENT  Alcohol withdrawal syndrome  Acute kidney injury resolved  Depression   History of alcohol abuse  Paroxysmal atrial fibrillation  Coronary artery disease  Hyperlipidemia  History of CVA  Chronic kidney disease stage III  Gastroesophageal reflux disease    PLAN  CPM  Continue detox medications  Tylenol as needed  Adjust home medications  Stress ulcer DVT prophylaxis  Nephrology to follow patient  Supportive care  PT OT  Discussed with nursing staff and family   Subacute rehab versus home with family support once arrangement made    JOLIE COLE MD    Copied text in this note has been reviewed and is accurate as of 02/26/24

## 2024-02-26 NOTE — PROGRESS NOTES
"    [facility] Progress Note      Patient Name: Yuval Loja  : 1950  MRN: [id number]  Primary Care Physician:  Alessia Prescott APRN  Date of admission: 2024    Subjective     Interval History:   Sitting in his room; had walked the floors earlier without problem  Appetite fine; no N/V  No shortness of breath on room air  Refuses medications and refuses lab draws    Review of Systems:   As noted above    Objective     Vitals:   Temp:  [97.9 °F (36.6 °C)] 97.9 °F (36.6 °C)  Heart Rate:  [] 102  Resp:  [18] 18  BP: (143-146)/(70-73) 143/70  No intake or output data in the 24 hours ending 24        Physical Exam:    Constitutional: Awake, knows month and year and place, frail   HEENT: Sclera anicteric, no conjunctival injection, MMM  Neck: Supple, no carotid bruit, trachea at midline, no JVD  Respiratory: Diminished, no crackles; not labored  Cardiovascular: Irregularly irregular, tachycardic, no rub  Gastrointestinal: BS +, soft, nondistended, nontender  : No palpable bladder  Musculoskeletal: No edema, no clubbing or cyanosis  Neurologic: Alert, oriented, moves all limbs  Skin: Warm and dry    Psychiatric: Odd affect    Scheduled Meds:     dilTIAZem CD, 120 mg, Oral, Q24H  docusate sodium, 100 mg, Oral, BID  famotidine, 20 mg, Oral, BID  folic acid, 1 mg, Oral, Daily  haloperidol, 2 mg, Oral, TID  metoprolol tartrate, 25 mg, Oral, TID  multivitamin, 1 tablet, Oral, Daily  rosuvastatin, 5 mg, Oral, Nightly  sucralfate, 1 g, Oral, 4x Daily AC & at Bedtime  thiamine, 100 mg, Oral, Daily      IV Meds:            Results Reviewed:   I have personally reviewed the results from the time of this admission to 2024 18:41 EST           Invalid input(s): \"LABALBU\", \"PROT\"      Estimated Creatinine Clearance: 32.6 mL/min (A) (by C-G formula based on SCr of 1.75 mg/dL (H)).        Assessment / Plan     ASSESSMENT:  1.  CRISTIANO on CKD3a, nonoliguric, stable at last check: " CRISTIANO prerenal from decreased oral intake, frequent hypotension, and fluctuating volume status.  CT scan unrevealing; subnephrotic proteinuria.  No recent renal labs.  2.  Confusion/encephalopathy, with suspected withdrawal syndrome, waxing and waning.  On Haldol  3.  Alcoholism and liver disease currently on folic acid, thiamine  4.  Hypertension, controlled  5.  DDD with prior stroke.  Followed by PT and OT  6.  Chronic atrial fibrillation: Rate controlled on diltiazem and metoprolol  7.  Alzheimer's disease as per psychiatry    PLAN:  Will not order labs since he refuses to be stuck  Nursing home [facility] from [facility]    Thank you for involving us in the care of Yuval Loja.  Please feel free to call with any questions.    Thomas Weiner MD  02/26/24  18:41 [facility]    [facility]  [phone]    Please note that portions of this note were completed with a voice recognition program.

## 2024-02-26 NOTE — PLAN OF CARE
Goal Outcome Evaluation:  Alert and oriented to time place and situation.  Refuses to take any oral medication, except acetaminophen x 2 today for complaints of headache and lower abdominal discomfort.  Up ad [place]ville.  Uses walker most of time when out of bed.  Discharge planning ongoing.

## 2024-02-27 RX ADMIN — HALOPERIDOL 2 MG: 2 TABLET ORAL at 18:26

## 2024-02-27 RX ADMIN — ACETAMINOPHEN 325MG 650 MG: 325 TABLET ORAL at 20:41

## 2024-02-27 RX ADMIN — HYDROXYZINE HYDROCHLORIDE 25 MG: 25 TABLET ORAL at 20:41

## 2024-02-27 RX ADMIN — HYDROXYZINE HYDROCHLORIDE 25 MG: 25 TABLET ORAL at 13:50

## 2024-02-27 NOTE — PLAN OF CARE
Goal Outcome Evaluation:  Patient ambulated around unit, utilizing a walker. Shaved today.  Up ad neville.  VSS.  Refuses to take scheduled medications.  Will take PRN anxiety medication occasionally.

## 2024-02-27 NOTE — CASE MANAGEMENT/SOCIAL WORK
Continued Stay Note  [facility]     Patient Name: Yuval Loja  MRN: [id number]  Today's Date: 2/27/2024    Admit Date: 2/4/2024    Plan: TBD   Discharge Plan       Row Name 02/27/24 1009       Plan    Plan TBD    Patient/Family in Agreement with Plan yes    Plan Comments Return call from [facility]. They are unable to accept at this time; facility had wrongly accepted in [facility]. Spoke with Quiana/ daughter states she is working on his LTC insurance, and that she is spoke with Celeste with [facility].                   Discharge Codes    No documentation.                 Expected Discharge Date and Time       Expected Discharge Date Expected Discharge Time    Feb 28, 2024               Ann Marie Olivares RN

## 2024-02-27 NOTE — PROGRESS NOTES
"Nutrition Services    Patient Name:  Yuval Loja  YOB: 1950  MRN: [id number]  Admit Date:  2/4/2024    Nutrition Follow Up    Assessment Date:  02/27/24    Nutrition follow up completed. Appetite has been fine. Pt has been agitated today. Reportedly has been refusing lab draws and oral medications except tylenol. Discharge planning in progress.    REC:  Continue Boost GC once daily for additional calories/protein and to support PO intake  Will CTM PO/ONS intake    RD to follow per protocol.     Encounter Information         Current Summary AMS, weakness, alcohol withdraw syndrome, afib, CKD     Current Nutrition Orders & Evaluation of Intake       Oral Nutrition     Current PO Diet Diet: Regular/House Diet; Texture: Regular Texture (IDDSI 7); Fluid Consistency: Thin (IDDSI 0)   Supplement Boost Glucose Control, Daily   PO Evaluation     PO Intake % Not well documented     Factors Affecting Intake  No factors at this time   --  Anthropometrics          Height    Weight Height: 175.3 cm (69\")  Weight: 63.1 kg (139 lb 1.8 oz) (02/11/24 0500)    BMI kg/m2 Body mass index is 20.54 kg/m².  Normal/Healthy (18.4 - 24.9)          Labs        Pertinent Labs Pt refuses lab draws            Invalid input(s): \"LABALBU\", \"PROT\"                SARS-CoV-2, CLARICE   Date Value Ref Range Status   10/11/2022 Negative Negative Final     Comment:     The 2019-CoV rRT-PCR Assay is only for use under a Food and Drug Administration Emergency Use Authorization. The performance characteristics of the assay were verified by the Clinical Microbiology Laboratory at the [facility].   Results should be used in conjunction with the patient's clinical symptoms, medical history, and other clinical/laboratory findings to determine an overall clinical diagnosis. Negative results do not preclude infection with SARS-CoV-2 (COVID-19).    Test parameters have not been validated for screening asymptomatic patients. "     Lab Results   Component Value Date    HGBA1C 4.30 (L) 02/05/2024          Medications            Scheduled Medications dilTIAZem CD, 120 mg, Oral, Q24H  docusate sodium, 100 mg, Oral, BID  famotidine, 20 mg, Oral, BID  folic acid, 1 mg, Oral, Daily  haloperidol, 2 mg, Oral, TID  metoprolol tartrate, 25 mg, Oral, TID  multivitamin, 1 tablet, Oral, Daily  rosuvastatin, 5 mg, Oral, Nightly  sucralfate, 1 g, Oral, 4x Daily AC & at Bedtime  thiamine, 100 mg, Oral, Daily        Infusions      PRN Medications   acetaminophen    haloperidol lactate    hydrOXYzine    [COMPLETED] Insert Peripheral IV **AND** sodium chloride     Physical Findings          General Appearance alert, flat affect, oriented, room air, unkempt, distrustful, paranoid   Oral/Mouth Cavity tooth or teeth missing, breath malodorous    Edema  no edema   Gastrointestinal diarrhea, non-distended , normoactive, last bowel movement: 2/26   Skin  RN SUDEEP    Tubes/Drains/Lines none   NFPE Not indicated at this time   --  NUTRITION INTERVENTION / PLAN OF CARE  Intervention Goal         Intervention Goal(s) Maintain nutrition status, Maintain intake, Maintain weight, and PO intake goal %: 75+     Nutrition Intervention         RD Action Encourage intake, Continue to monitor, and Care plan reviewed     Nutrition Prescription         Diet Prescription     Supplement Prescription Boost Glucose Control, Daily   EN/PN Prescription    New Prescription Ordered? Continue same per protocol   --  Monitor/Evaluation        Monitor Per protocol   Discharge Needs Pending clinical course     RD to follow up per protocol.    Electronically signed by:  Nela Acosta RDN, MARLEY  02/27/24 09:58 EST

## 2024-02-27 NOTE — CASE MANAGEMENT/SOCIAL WORK
Continued Stay Note  [facility]     Patient Name: Yuval Loja  MRN: [id number]  Today's Date: 2/27/2024    Admit Date: 2/4/2024    Plan: TBD   Discharge Plan       Row Name 02/27/24 1009       Plan    Plan TBD    Patient/Family in Agreement with Plan unable to assess    Plan Comments Left message for patient's daughter explaining have an accepting facility. Left message for [place]/ [place]. Patient accepted at [facility] unsure when bed available, awaiting return call. Recieved call from [facility] unable to accept.                   Discharge Codes    No documentation.                 Expected Discharge Date and Time       Expected Discharge Date Expected Discharge Time    Feb 28, 2024               Ann Marie Olivares RN

## 2024-02-27 NOTE — PROGRESS NOTES
"Daily progress note    Primary care physician  Dr. Prescott    Subjective  Doing same with no new complaints     History of present illness  73-year-old -American male with history of alcohol abuse paroxysmal atrial fibrillation coronary artery disease hyperlipidemia and chronic kidney disease stage III who is well-known to our service and apparently continues to abuse alcohol and recently started on a antidepressant brought to the emergency room with generalized weakness and altered mental status.  Patient in no respiratory distress and answer question appropriately and workup in ER revealed acute metabolic encephalopathy admit for management.  At the time of examination he denies any chest pain shortness of breath palpitation and asking for anxiety medication and wants to eat regular diet.     REVIEW OF SYSTEMS  Unable to obtain     PHYSICAL EXAM   Blood pressure 135/62, pulse 92, temperature 98.8 °F (37.1 °C), temperature source Oral, resp. rate 16, height 175.3 cm (69\"), weight 63.1 kg (139 lb 1.8 oz), SpO2 100%.    Constitutional:       General: He is awake. He is not in acute distress.     Appearance: He is not toxic-appearing.   HENT:      Head: Normocephalic and atraumatic.   Eyes:      General: Lids are normal. Vision grossly intact.   Cardiovascular:      Rate and Rhythm: Normal rate and regular rhythm.      Heart sounds: Normal heart sounds.   Pulmonary:      Effort: Pulmonary effort is normal.      Breath sounds: Normal breath sounds and air entry.   Abdominal:      General: Bowel sounds are normal.      Palpations: Abdomen is soft.      Tenderness: There is no abdominal tenderness.   Musculoskeletal:      Cervical back: Normal range of motion.   Skin:     General: Skin is warm and dry.      Coloration: Skin is not pale.   Neurological:      General: No focal deficit present.      Mental Status: He is alert and oriented to person, place, and time.      Comments: No focal deficit  Psychiatric:        "  Attention and Perception: Attention normal.         Mood and Affect: Mood normal.         Speech: Speech normal.         Behavior: Behavior is cooperative.     LAB RESULTS  Lab Results (last 24 hours)       ** No results found for the last 24 hours. **          Imaging Results (Last 24 Hours)       ** No results found for the last 24 hours. **          Results  Scan on 2/4/2024 0415 by [facility], [place]: ECG 12-LEAD         Author: -- Service: -- Author Type: --   Filed: Date of Service: Creation Time:   Status: (Other)   HEART RATE= 67  bpm  RR Interval= 896  ms  MO Interval= 193  ms  P Horizontal Axis= -48  deg  P Front Axis= 82  deg  QRSD Interval= 82  ms  QT Interval= 441  ms  QTcB= 466  ms  QRS Axis= 47  deg  T Wave Axis= 51  deg  - ABNORMAL ECG -  Sinus rhythm  RSR' in V1 or V2, probably normal variant  Consider left ventricular hypertrophy  Anterior ST elevation, probably due to LVH          Current Facility-Administered Medications:     acetaminophen (TYLENOL) tablet 650 mg, 650 mg, Oral, Q6H PRN, Chino Cole MD, 650 mg at 02/26/24 1749    dilTIAZem CD (CARDIZEM CD) 24 hr capsule 120 mg, 120 mg, Oral, Q24H, Richy Duran MD    docusate sodium (COLACE) capsule 100 mg, 100 mg, Oral, BID, Chino Cole MD, 100 mg at 02/13/24 0820    famotidine (PEPCID) tablet 20 mg, 20 mg, Oral, BID, Chino Cole MD, 20 mg at 02/14/24 0935    folic acid (FOLVITE) tablet 1 mg, 1 mg, Oral, Daily, Chino Cole MD, 1 mg at 02/16/24 0926    haloperidol (HALDOL) tablet 2 mg, 2 mg, Oral, TID, Hipolito Ramon III, MD, 2 mg at 02/26/24 2000    haloperidol lactate (HALDOL) injection 5 mg, 5 mg, Intramuscular, Q6H PRN, Hipolito Ramon III, MD, 5 mg at 02/06/24 0049    hydrOXYzine (ATARAX) tablet 25 mg, 25 mg, Oral, 4x Daily PRN, Chino Cole MD, 25 mg at 02/27/24 1350    metoprolol tartrate (LOPRESSOR) tablet 25 mg, 25 mg, Oral, TID, Chino Cole MD, 25 mg at 02/16/24 7088    multivitamin (THERAGRAN)  tablet 1 tablet, 1 tablet, Oral, Daily, Jolie Cole MD, 1 tablet at 02/16/24 0927    rosuvastatin (CRESTOR) tablet 5 mg, 5 mg, Oral, Nightly, Jolie Cole MD    [COMPLETED] Insert Peripheral IV, , , Once **AND** sodium chloride 0.9 % flush 10 mL, 10 mL, Intravenous, PRN, Salma Grullon APRN    sucralfate (CARAFATE) tablet 1 g, 1 g, Oral, 4x Daily AC & at Bedtime, Jolie Cole MD, 1 g at 02/13/24 0809    thiamine (VITAMIN B-1) tablet 100 mg, 100 mg, Oral, Daily, Jolie Cole MD, 100 mg at 02/16/24 0926     ASSESSMENT  Alcohol withdrawal syndrome  Acute kidney injury resolved  Depression   History of alcohol abuse  Paroxysmal atrial fibrillation  Coronary artery disease  Hyperlipidemia  History of CVA  Chronic kidney disease stage III  Gastroesophageal reflux disease    PLAN  CPM  Continue detox medications  Tylenol as needed  Adjust home medications  Stress ulcer DVT prophylaxis  Nephrology to follow patient  Supportive care  PT OT  Discussed with nursing staff and family   Subacute rehab versus home with family support once arrangement made    JOLIE COLE MD    Copied text in this note has been reviewed and is accurate as of 02/27/24

## 2024-02-27 NOTE — PLAN OF CARE
Goal Outcome Evaluation:                 Patient resting in bed, call light and belongings within reach, patient able to make needs known, bed in low position. Haldol and atarax dosed r/t patient exibiting s/s of increased agitaion. Plan of care is ongoing.

## 2024-02-28 RX ORDER — DICLOFENAC SODIUM 30 MG/G
2 GEL TOPICAL 2 TIMES DAILY PRN
Status: DISCONTINUED | OUTPATIENT
Start: 2024-02-28 | End: 2024-03-01 | Stop reason: HOSPADM

## 2024-02-28 RX ADMIN — DICLOFENAC SODIUM 2 G: 30 GEL TOPICAL at 15:29

## 2024-02-28 RX ADMIN — HYDROXYZINE HYDROCHLORIDE 25 MG: 25 TABLET ORAL at 20:46

## 2024-02-28 RX ADMIN — HALOPERIDOL 2 MG: 2 TABLET ORAL at 20:46

## 2024-02-28 RX ADMIN — DICLOFENAC SODIUM 2 G: 30 GEL TOPICAL at 20:46

## 2024-02-28 RX ADMIN — ACETAMINOPHEN 325MG 650 MG: 325 TABLET ORAL at 20:51

## 2024-02-28 NOTE — PLAN OF CARE
Goal Outcome Evaluation:   Refusal of scheduled medications. Voltaren cream ordered; applied to r-hand for arthritic pain. Daughter visit today. Ambulating hallway ad neville.

## 2024-02-28 NOTE — CASE MANAGEMENT/SOCIAL WORK
Continued Stay Note  [facility]     Patient Name: Yuval Loja  MRN: [id number]  Today's Date: 2/28/2024    Admit Date: 2/4/2024    Plan: TBD   Discharge Plan       Row Name 02/28/24 1607       Plan    Plan TBD    Patient/Family in Agreement with Plan yes    Plan Comments Spoke with patient and family at bedside. Daughter stated that she has placed a deposit on an apartment for patient. Director of [facility] to come speak with patient today at bedside. Family states that his name is Charbel and he would like to speak with CCP.                   Discharge Codes    No documentation.                 Expected Discharge Date and Time       Expected Discharge Date Expected Discharge Time    Mar 1, 2024               Ann Marie Olivares RN

## 2024-02-28 NOTE — PROGRESS NOTES
"Daily progress note    Primary care physician  Dr. Prescott    Subjective  Doing same with no new complaints and remained pleasantly confused    History of present illness  73-year-old -American male with history of alcohol abuse paroxysmal atrial fibrillation coronary artery disease hyperlipidemia and chronic kidney disease stage III who is well-known to our service and apparently continues to abuse alcohol and recently started on a antidepressant brought to the emergency room with generalized weakness and altered mental status.  Patient in no respiratory distress and answer question appropriately and workup in ER revealed acute metabolic encephalopathy admit for management.  At the time of examination he denies any chest pain shortness of breath palpitation and asking for anxiety medication and wants to eat regular diet.     REVIEW OF SYSTEMS  Unable to obtain     PHYSICAL EXAM   Blood pressure 113/56, pulse 94, temperature 98.2 °F (36.8 °C), temperature source Oral, resp. rate 16, height 175.3 cm (69\"), weight 63.1 kg (139 lb 1.8 oz), SpO2 99%.    Constitutional:       General: He is awake. He is not in acute distress.     Appearance: He is not toxic-appearing.   HENT:      Head: Normocephalic and atraumatic.   Eyes:      General: Lids are normal. Vision grossly intact.   Cardiovascular:      Rate and Rhythm: Normal rate and regular rhythm.      Heart sounds: Normal heart sounds.   Pulmonary:      Effort: Pulmonary effort is normal.      Breath sounds: Normal breath sounds and air entry.   Abdominal:      General: Bowel sounds are normal.      Palpations: Abdomen is soft.      Tenderness: There is no abdominal tenderness.   Musculoskeletal:      Cervical back: Normal range of motion.   Skin:     General: Skin is warm and dry.      Coloration: Skin is not pale.   Neurological:      General: No focal deficit present.      Mental Status: He is alert and oriented to person, place, and time.      Comments: No " focal deficit  Psychiatric:         Attention and Perception: Attention normal.         Mood and Affect: Mood normal.         Speech: Speech normal.         Behavior: Behavior is cooperative.     LAB RESULTS  Lab Results (last 24 hours)       ** No results found for the last 24 hours. **          Imaging Results (Last 24 Hours)       ** No results found for the last 24 hours. **          Results  Scan on 2/4/2024 0415 by [employer], [place]: ECG 12-LEAD         Author: -- Service: -- Author Type: --   Filed: Date of Service: Creation Time:   Status: (Other)   HEART RATE= 67  bpm  RR Interval= 896  ms  RI Interval= 193  ms  P Horizontal Axis= -48  deg  P Front Axis= 82  deg  QRSD Interval= 82  ms  QT Interval= 441  ms  QTcB= 466  ms  QRS Axis= 47  deg  T Wave Axis= 51  deg  - ABNORMAL ECG -  Sinus rhythm  RSR' in V1 or V2, probably normal variant  Consider left ventricular hypertrophy  Anterior ST elevation, probably due to LVH          Current Facility-Administered Medications:     acetaminophen (TYLENOL) tablet 650 mg, 650 mg, Oral, Q6H PRN, Chino Cole MD, 650 mg at 02/27/24 2041    Diclofenac Sodium 3 % gel 2 g, 2 g, Topical, BID PRN, Chino Cole MD    dilTIAZem CD (CARDIZEM CD) 24 hr capsule 120 mg, 120 mg, Oral, Q24H, Richy Duran MD    docusate sodium (COLACE) capsule 100 mg, 100 mg, Oral, BID, Chino Cole MD, 100 mg at 02/13/24 0820    famotidine (PEPCID) tablet 20 mg, 20 mg, Oral, BID, Chino Cole MD, 20 mg at 02/14/24 0935    folic acid (FOLVITE) tablet 1 mg, 1 mg, Oral, Daily, Chino Cole MD, 1 mg at 02/16/24 0926    haloperidol (HALDOL) tablet 2 mg, 2 mg, Oral, TID, Hipolito Ramon III, MD, 2 mg at 02/27/24 1826    haloperidol lactate (HALDOL) injection 5 mg, 5 mg, Intramuscular, Q6H PRN, Hipolito Ramon III, MD, 5 mg at 02/06/24 0049    hydrOXYzine (ATARAX) tablet 25 mg, 25 mg, Oral, 4x Daily PRN, Chino Cole MD, 25 mg at 02/27/24 2041    metoprolol tartrate  (LOPRESSOR) tablet 25 mg, 25 mg, Oral, TID, Jolie Cole MD, 25 mg at 02/16/24 2131    multivitamin (THERAGRAN) tablet 1 tablet, 1 tablet, Oral, Daily, Jolie Cole MD, 1 tablet at 02/16/24 0927    rosuvastatin (CRESTOR) tablet 5 mg, 5 mg, Oral, Nightly, Jolie Cloe MD    [COMPLETED] Insert Peripheral IV, , , Once **AND** sodium chloride 0.9 % flush 10 mL, 10 mL, Intravenous, PRN, Salma Grullon APRN    sucralfate (CARAFATE) tablet 1 g, 1 g, Oral, 4x Daily AC & at Bedtime, Jolie Cole MD, 1 g at 02/13/24 0809    thiamine (VITAMIN B-1) tablet 100 mg, 100 mg, Oral, Daily, Jolie Cole MD, 100 mg at 02/16/24 0926     ASSESSMENT  Alcohol withdrawal syndrome  Acute kidney injury resolved  Depression   History of alcohol abuse  Paroxysmal atrial fibrillation  Coronary artery disease  Hyperlipidemia  History of CVA  Chronic kidney disease stage III  Gastroesophageal reflux disease    PLAN  CPM  Continue detox medications  Tylenol as needed  Adjust home medications  Stress ulcer DVT prophylaxis  Supportive care  PT OT  Discussed with nursing staff and family   Subacute rehab versus home with family support once arrangement made    JOLIE COLE MD    Copied text in this note has been reviewed and is accurate as of 02/28/24

## 2024-02-29 RX ADMIN — FAMOTIDINE 20 MG: 20 TABLET, FILM COATED ORAL at 18:17

## 2024-02-29 RX ADMIN — ROSUVASTATIN CALCIUM 5 MG: 5 TABLET, FILM COATED ORAL at 20:31

## 2024-02-29 RX ADMIN — HALOPERIDOL 2 MG: 2 TABLET ORAL at 20:30

## 2024-02-29 RX ADMIN — DICLOFENAC SODIUM 2 G: 30 GEL TOPICAL at 08:24

## 2024-02-29 RX ADMIN — HALOPERIDOL 2 MG: 2 TABLET ORAL at 14:12

## 2024-02-29 RX ADMIN — HYDROXYZINE HYDROCHLORIDE 25 MG: 25 TABLET ORAL at 18:17

## 2024-02-29 RX ADMIN — HALOPERIDOL 2 MG: 2 TABLET ORAL at 08:23

## 2024-02-29 RX ADMIN — SUCRALFATE 1 G: 1 TABLET ORAL at 20:32

## 2024-02-29 RX ADMIN — ACETAMINOPHEN 325MG 650 MG: 325 TABLET ORAL at 19:24

## 2024-02-29 RX ADMIN — ACETAMINOPHEN 325MG 650 MG: 325 TABLET ORAL at 08:23

## 2024-02-29 NOTE — CASE MANAGEMENT/SOCIAL WORK
Continued Stay Note  [facility]     Patient Name: Yuval Loja  MRN: [id number]  Today's Date: 2/29/2024    Admit Date: 2/4/2024    Plan: Ankur JACKSON   Discharge Plan       Row Name 02/29/24 1455       Plan    Plan Ankur JACKSON    Patient/Family in Agreement with Plan yes    Plan Comments Spoke with Zandra/ Ankur JACKSON Physician Medical Evaluation form faxed per request with recent chest x-ray to [phone] for placement. Spoke with patient's daughter/ POA to update with discharge information. Patient could potentially be discharged to [facility] tomorrow 3/1.                   Discharge Codes    No documentation.                 Expected Discharge Date and Time       Expected Discharge Date Expected Discharge Time    Mar 1, 2024               Ann Marie Olivares RN

## 2024-02-29 NOTE — PLAN OF CARE
Patient up ad neville. Patient took haldol this shift and allowed staff to get his vitals. The patient has no complaints this shift.

## 2024-02-29 NOTE — PROGRESS NOTES
"Daily progress note    Primary care physician  Dr. Prescott    Subjective  Doing same with no new complaints and remained pleasantly confused    History of present illness  73-year-old -American male with history of alcohol abuse paroxysmal atrial fibrillation coronary artery disease hyperlipidemia and chronic kidney disease stage III who is well-known to our service and apparently continues to abuse alcohol and recently started on a antidepressant brought to the emergency room with generalized weakness and altered mental status.  Patient in no respiratory distress and answer question appropriately and workup in ER revealed acute metabolic encephalopathy admit for management.  At the time of examination he denies any chest pain shortness of breath palpitation and asking for anxiety medication and wants to eat regular diet.     REVIEW OF SYSTEMS  Unable to obtain     PHYSICAL EXAM   Blood pressure 110/65, pulse 109, temperature 98.1 °F (36.7 °C), temperature source Oral, resp. rate 18, height 175.3 cm (69\"), weight 63.1 kg (139 lb 1.8 oz), SpO2 96%.    Constitutional:       General: He is awake. He is not in acute distress.     Appearance: He is not toxic-appearing.   HENT:      Head: Normocephalic and atraumatic.   Eyes:      General: Lids are normal. Vision grossly intact.   Cardiovascular:      Rate and Rhythm: Normal rate and regular rhythm.      Heart sounds: Normal heart sounds.   Pulmonary:      Effort: Pulmonary effort is normal.      Breath sounds: Normal breath sounds and air entry.   Abdominal:      General: Bowel sounds are normal.      Palpations: Abdomen is soft.      Tenderness: There is no abdominal tenderness.   Musculoskeletal:      Cervical back: Normal range of motion.   Skin:     General: Skin is warm and dry.      Coloration: Skin is not pale.   Neurological:      General: No focal deficit present.      Mental Status: He is alert and oriented to person, place, and time.      Comments: No " focal deficit  Psychiatric:         Attention and Perception: Attention normal.         Mood and Affect: Mood normal.         Speech: Speech normal.         Behavior: Behavior is cooperative.     LAB RESULTS  Lab Results (last 24 hours)       ** No results found for the last 24 hours. **          Imaging Results (Last 24 Hours)       ** No results found for the last 24 hours. **          Results  Scan on 2/4/2024 0415 by [employer], [place]: ECG 12-LEAD         Author: -- Service: -- Author Type: --   Filed: Date of Service: Creation Time:   Status: (Other)   HEART RATE= 67  bpm  RR Interval= 896  ms  HI Interval= 193  ms  P Horizontal Axis= -48  deg  P Front Axis= 82  deg  QRSD Interval= 82  ms  QT Interval= 441  ms  QTcB= 466  ms  QRS Axis= 47  deg  T Wave Axis= 51  deg  - ABNORMAL ECG -  Sinus rhythm  RSR' in V1 or V2, probably normal variant  Consider left ventricular hypertrophy  Anterior ST elevation, probably due to LVH          Current Facility-Administered Medications:     acetaminophen (TYLENOL) tablet 650 mg, 650 mg, Oral, Q6H PRN, Chino Cole MD, 650 mg at 02/29/24 0823    Diclofenac Sodium 3 % gel 2 g, 2 g, Topical, BID PRN, Chino Cole MD, 2 g at 02/29/24 0824    dilTIAZem CD (CARDIZEM CD) 24 hr capsule 120 mg, 120 mg, Oral, Q24H, Richy Duran MD    docusate sodium (COLACE) capsule 100 mg, 100 mg, Oral, BID, Chino Cole MD, 100 mg at 02/13/24 0820    famotidine (PEPCID) tablet 20 mg, 20 mg, Oral, BID, Chino Cole MD, 20 mg at 02/14/24 0935    folic acid (FOLVITE) tablet 1 mg, 1 mg, Oral, Daily, Chino Cole MD, 1 mg at 02/16/24 0926    haloperidol (HALDOL) tablet 2 mg, 2 mg, Oral, TID, Hipolito Ramon III, MD, 2 mg at 02/29/24 0823    hydrOXYzine (ATARAX) tablet 25 mg, 25 mg, Oral, 4x Daily PRN, Chino Cole MD, 25 mg at 02/28/24 2046    metoprolol tartrate (LOPRESSOR) tablet 25 mg, 25 mg, Oral, TID, Chino Cole MD, 25 mg at 02/16/24 2131    multivitamin (THERAGRAN)  tablet 1 tablet, 1 tablet, Oral, Daily, Jolie Cole MD, 1 tablet at 02/16/24 0927    rosuvastatin (CRESTOR) tablet 5 mg, 5 mg, Oral, Nightly, Jolie Cole MD    sucralfate (CARAFATE) tablet 1 g, 1 g, Oral, 4x Daily AC & at Bedtime, Jolie Cole MD, 1 g at 02/13/24 0809    thiamine (VITAMIN B-1) tablet 100 mg, 100 mg, Oral, Daily, Jolie Cole MD, 100 mg at 02/16/24 0926     ASSESSMENT  Alcohol withdrawal syndrome  Acute kidney injury resolved  Depression   History of alcohol abuse  Paroxysmal atrial fibrillation  Coronary artery disease  Hyperlipidemia  History of CVA  Chronic kidney disease stage III  Gastroesophageal reflux disease    PLAN  CPM  Continue detox medications  Tylenol as needed  Adjust home medications  Stress ulcer DVT prophylaxis  Supportive care  PT OT  Discussed with nursing staff and family   Discharge to skilled nursing facility in a.m.    JOLIE COLE MD    Copied text in this note has been reviewed and is accurate as of 02/29/24

## 2024-03-01 VITALS
RESPIRATION RATE: 18 BRPM | OXYGEN SATURATION: 98 % | HEIGHT: 69 IN | BODY MASS INDEX: 20.6 KG/M2 | SYSTOLIC BLOOD PRESSURE: 143 MMHG | WEIGHT: 139.11 LBS | TEMPERATURE: 98.1 F | DIASTOLIC BLOOD PRESSURE: 69 MMHG | HEART RATE: 111 BPM

## 2024-03-01 RX ORDER — ROSUVASTATIN CALCIUM 5 MG/1
5 TABLET, COATED ORAL NIGHTLY
Qty: 30 TABLET | Refills: 0 | Status: ON HOLD | OUTPATIENT
Start: 2024-03-01 | End: 2024-03-31

## 2024-03-01 RX ORDER — DILTIAZEM HYDROCHLORIDE 120 MG/1
120 CAPSULE, COATED, EXTENDED RELEASE ORAL
Qty: 30 CAPSULE | Refills: 0 | Status: ON HOLD | OUTPATIENT
Start: 2024-03-02 | End: 2024-04-01

## 2024-03-01 RX ADMIN — HALOPERIDOL 2 MG: 2 TABLET ORAL at 14:50

## 2024-03-01 RX ADMIN — HALOPERIDOL 2 MG: 2 TABLET ORAL at 09:32

## 2024-03-01 RX ADMIN — ACETAMINOPHEN 325MG 650 MG: 325 TABLET ORAL at 06:57

## 2024-03-01 NOTE — PLAN OF CARE
Goal Outcome Evaluation:            Patient rested well throughout the night. Patient is on room air, bed in low position, call light and belongings are within reach, patient is able to make needs known. There are no needs at this time. Plan of care is ongoing.

## 2024-03-01 NOTE — CASE MANAGEMENT/SOCIAL WORK
Case Management Discharge Note      Final Note: Ankur SINGH via family    Provided Post Acute Provider List?: Yes  Post Acute Provider List: Nursing Home, Home Health  Provided Post Acute Provider Quality & Resource List?: Yes  Delivered To: Support Person  Method of Delivery: Telephone    Selected Continued Care - Discharged on 3/1/2024 Admission date: 2/4/2024 - Discharge disposition: Skilled Nursing Facility (DC - External)      Destination    No services have been selected for the patient.                Durable Medical Equipment    No services have been selected for the patient.                Dialysis/Infusion    No services have been selected for the patient.                Home Medical Care    No services have been selected for the patient.                Therapy    No services have been selected for the patient.                Community Resources    No services have been selected for the patient.                Community & DME    No services have been selected for the patient.                         Final Discharge Disposition Code: 01 - home or self-care

## 2024-03-01 NOTE — PROGRESS NOTES
"Daily progress note    Primary care physician  Dr. Prescott    Subjective  Doing same with no new complaints and remained pleasantly confused    History of present illness  73-year-old -American male with history of alcohol abuse paroxysmal atrial fibrillation coronary artery disease hyperlipidemia and chronic kidney disease stage III who is well-known to our service and apparently continues to abuse alcohol and recently started on a antidepressant brought to the emergency room with generalized weakness and altered mental status.  Patient in no respiratory distress and answer question appropriately and workup in ER revealed acute metabolic encephalopathy admit for management.  At the time of examination he denies any chest pain shortness of breath palpitation and asking for anxiety medication and wants to eat regular diet.     REVIEW OF SYSTEMS  Unable to obtain     PHYSICAL EXAM   Blood pressure 143/69, pulse 111, temperature 98.1 °F (36.7 °C), temperature source Oral, resp. rate 18, height 175.3 cm (69\"), weight 63.1 kg (139 lb 1.8 oz), SpO2 98%.    Constitutional:       General: He is awake. He is not in acute distress.     Appearance: He is not toxic-appearing.   HENT:      Head: Normocephalic and atraumatic.   Eyes:      General: Lids are normal. Vision grossly intact.   Cardiovascular:      Rate and Rhythm: Normal rate and regular rhythm.      Heart sounds: Normal heart sounds.   Pulmonary:      Effort: Pulmonary effort is normal.      Breath sounds: Normal breath sounds and air entry.   Abdominal:      General: Bowel sounds are normal.      Palpations: Abdomen is soft.      Tenderness: There is no abdominal tenderness.   Musculoskeletal:      Cervical back: Normal range of motion.   Skin:     General: Skin is warm and dry.      Coloration: Skin is not pale.   Neurological:      General: No focal deficit present.      Mental Status: He is alert and oriented to person, place, and time.      Comments: No " focal deficit  Psychiatric:         Attention and Perception: Attention normal.         Mood and Affect: Mood normal.         Speech: Speech normal.         Behavior: Behavior is cooperative.     LAB RESULTS  Lab Results (last 24 hours)       ** No results found for the last 24 hours. **          Imaging Results (Last 24 Hours)       ** No results found for the last 24 hours. **          Results  Scan on 2/4/2024 0415 by [employer], [place]: ECG 12-LEAD         Author: -- Service: -- Author Type: --   Filed: Date of Service: Creation Time:   Status: (Other)   HEART RATE= 67  bpm  RR Interval= 896  ms  MS Interval= 193  ms  P Horizontal Axis= -48  deg  P Front Axis= 82  deg  QRSD Interval= 82  ms  QT Interval= 441  ms  QTcB= 466  ms  QRS Axis= 47  deg  T Wave Axis= 51  deg  - ABNORMAL ECG -  Sinus rhythm  RSR' in V1 or V2, probably normal variant  Consider left ventricular hypertrophy  Anterior ST elevation, probably due to LVH          Current Facility-Administered Medications:     acetaminophen (TYLENOL) tablet 650 mg, 650 mg, Oral, Q6H PRN, Chino Cole MD, 650 mg at 03/01/24 0657    Diclofenac Sodium 3 % gel 2 g, 2 g, Topical, BID PRN, Chino Cole MD, 2 g at 02/29/24 0824    dilTIAZem CD (CARDIZEM CD) 24 hr capsule 120 mg, 120 mg, Oral, Q24H, Richy Duran MD    docusate sodium (COLACE) capsule 100 mg, 100 mg, Oral, BID, Chino Cole MD, 100 mg at 02/13/24 0820    famotidine (PEPCID) tablet 20 mg, 20 mg, Oral, BID, Chino Cole MD, 20 mg at 02/29/24 1817    folic acid (FOLVITE) tablet 1 mg, 1 mg, Oral, Daily, Chino Cole MD, 1 mg at 02/16/24 0926    haloperidol (HALDOL) tablet 2 mg, 2 mg, Oral, TID, Hipolito Ramon III, MD, 2 mg at 03/01/24 0932    hydrOXYzine (ATARAX) tablet 25 mg, 25 mg, Oral, 4x Daily PRN, Chino Cole MD, 25 mg at 02/29/24 1817    metoprolol tartrate (LOPRESSOR) tablet 25 mg, 25 mg, Oral, TID, Chino Cole MD, 25 mg at 02/16/24 2131    multivitamin (THERAGRAN)  tablet 1 tablet, 1 tablet, Oral, Daily, Jolie Cole MD, 1 tablet at 02/16/24 0927    rosuvastatin (CRESTOR) tablet 5 mg, 5 mg, Oral, Nightly, Jolie Cole MD, 5 mg at 02/29/24 2031    sucralfate (CARAFATE) tablet 1 g, 1 g, Oral, 4x Daily AC & at Bedtime, Jolie Cole MD, 1 g at 02/29/24 2032    thiamine (VITAMIN B-1) tablet 100 mg, 100 mg, Oral, Daily, Jolie Cole MD, 100 mg at 02/16/24 0926     ASSESSMENT  Alcohol withdrawal syndrome  Acute kidney injury resolved  Depression   History of alcohol abuse  Paroxysmal atrial fibrillation  Coronary artery disease  Hyperlipidemia  History of CVA  Chronic kidney disease stage III  Gastroesophageal reflux disease    PLAN  Discharge to assisted living with family support  Discharge summary dictated    JOLIE COLE MD    Copied text in this note has been reviewed and is accurate as of 03/01/24

## 2024-03-01 NOTE — PROGRESS NOTES
"    [facility] Progress Note      Patient Name: Yuval Loja  : 1950  MRN: [id number]  Primary Care Physician:  Alessia Prescott APRN  Date of admission: 2024    Subjective     Interval History:   Wandering the halls of the unit using his walker  Does not feel dizzy; breathing is comfortable on RA  Appetite fine; no N/V      Review of Systems:   As noted above    Objective     Vitals:   Temp:  [98.1 °F (36.7 °C)-98.8 °F (37.1 °C)] 98.1 °F (36.7 °C)  Heart Rate:  [101-111] 111  Resp:  [18] 18  BP: (110-143)/(65-99) 143/69  No intake or output data in the 24 hours ending 24        Physical Exam:    Constitutional: Awake, knows month and year and place, frail, chronically ill  HEENT: Sclera anicteric, no conjunctival injection, MMM  Neck: Supple, no carotid bruit, trachea at midline, no JVD  Respiratory: Diminished, no crackles; not labored  Cardiovascular: Irregularly irregular, tachycardic, no rub  Gastrointestinal: BS +, soft, nondistended, nontender  : No palpable bladder  Musculoskeletal: No edema, no clubbing or cyanosis  Neurologic: Alert, oriented, moves all limbs  Skin: Warm and dry    Psychiatric: Odd affect    Scheduled Meds:     dilTIAZem CD, 120 mg, Oral, Q24H  docusate sodium, 100 mg, Oral, BID  famotidine, 20 mg, Oral, BID  folic acid, 1 mg, Oral, Daily  haloperidol, 2 mg, Oral, TID  metoprolol tartrate, 25 mg, Oral, TID  multivitamin, 1 tablet, Oral, Daily  rosuvastatin, 5 mg, Oral, Nightly  sucralfate, 1 g, Oral, 4x Daily AC & at Bedtime  thiamine, 100 mg, Oral, Daily      IV Meds:            Results Reviewed:   I have personally reviewed the results from the time of this admission to 2024 19:34 EST           Invalid input(s): \"LABALBU\", \"PROT\"      Estimated Creatinine Clearance: 32.6 mL/min (A) (by C-G formula based on SCr of 1.75 mg/dL (H)).        Assessment / Plan     ASSESSMENT:  1.  CRISTIANO on CKD3a, nonoliguric, stable at last check:  CRISTIANO " prerenal from decreased oral intake, frequent hypotension, and fluctuating volume status.  CT scan unrevealing; subnephrotic proteinuria.  No recent renal labs.  2.  Confusion/encephalopathy, with suspected withdrawal syndrome, waxing and waning.  On Haldol  3.  Alcoholism and liver disease currently on folic acid, thiamine  4.  Hypertension, acceptable  5.  DDD with prior stroke.  Followed by PT and OT  6.  Chronic atrial fibrillation: Rate controlled on diltiazem and metoprolol  7.  Alzheimer's disease as per psychiatry    PLAN:  Will not order labs since he refuses to be stuck  Nursing home anytime from [facility]    Thank you for involving us in the care of Yuval Loja.  Please feel free to call with any questions.    Thomas Weiner MD  02/29/24  19:34 [place]    [facility]  [phone]    Please note that portions of this note were completed with a voice recognition program.

## 2024-03-02 ENCOUNTER — HOSPITAL ENCOUNTER (EMERGENCY)
Facility: HOSPITAL | Age: 74
Discharge: HOME | End: 2024-03-03
Attending: EMERGENCY MEDICINE
Payer: MEDICARE

## 2024-03-02 ENCOUNTER — APPOINTMENT (OUTPATIENT)
Dept: GENERAL RADIOLOGY | Facility: HOSPITAL | Age: 74
End: 2024-03-02
Payer: MEDICARE

## 2024-03-02 DIAGNOSIS — R11.0 NAUSEA: Primary | ICD-10-CM

## 2024-03-02 DIAGNOSIS — R53.1 GENERALIZED WEAKNESS: ICD-10-CM

## 2024-03-02 LAB
ALBUMIN SERPL-MCNC: 4 G/DL (ref 3.5–5.2)
ALBUMIN/GLOB SERPL: 1.5 G/DL
ALP SERPL-CCNC: 64 U/L (ref 39–117)
ALT SERPL W P-5'-P-CCNC: 17 U/L (ref 1–41)
ANION GAP SERPL CALCULATED.3IONS-SCNC: 11 MMOL/L (ref 5–15)
AST SERPL-CCNC: 23 U/L (ref 1–40)
BASOPHILS # BLD AUTO: 0.02 10*3/MM3 (ref 0–0.2)
BASOPHILS NFR BLD AUTO: 0.5 % (ref 0–1.5)
BILIRUB SERPL-MCNC: <0.2 MG/DL (ref 0–1.2)
BUN SERPL-MCNC: 20 MG/DL (ref 8–23)
BUN/CREAT SERPL: 11.8 (ref 7–25)
CALCIUM SPEC-SCNC: 10.1 MG/DL (ref 8.6–10.5)
CHLORIDE SERPL-SCNC: 100 MMOL/L (ref 98–107)
CO2 SERPL-SCNC: 26 MMOL/L (ref 22–29)
CREAT SERPL-MCNC: 1.7 MG/DL (ref 0.76–1.27)
DEPRECATED RDW RBC AUTO: 48 FL (ref 37–54)
EGFRCR SERPLBLD CKD-EPI 2021: 41.5 ML/MIN/1.73
EOSINOPHIL # BLD AUTO: 0.2 10*3/MM3 (ref 0–0.4)
EOSINOPHIL NFR BLD AUTO: 4.5 % (ref 0.3–6.2)
ERYTHROCYTE [DISTWIDTH] IN BLOOD BY AUTOMATED COUNT: 12.9 % (ref 12.3–15.4)
ETHANOL BLD-MCNC: 15 MG/DL (ref 0–10)
ETHANOL UR QL: 0.01 %
GLOBULIN UR ELPH-MCNC: 2.6 GM/DL
GLUCOSE SERPL-MCNC: 99 MG/DL (ref 65–99)
HCT VFR BLD AUTO: 33.3 % (ref 37.5–51)
HGB BLD-MCNC: 10.5 G/DL (ref 13–17.7)
IMM GRANULOCYTES # BLD AUTO: 0.01 10*3/MM3 (ref 0–0.05)
IMM GRANULOCYTES NFR BLD AUTO: 0.2 % (ref 0–0.5)
LIPASE SERPL-CCNC: 66 U/L (ref 13–60)
LYMPHOCYTES # BLD AUTO: 2.02 10*3/MM3 (ref 0.7–3.1)
LYMPHOCYTES NFR BLD AUTO: 45.7 % (ref 19.6–45.3)
MAGNESIUM SERPL-MCNC: 1.5 MG/DL (ref 1.6–2.4)
MCH RBC QN AUTO: 31.8 PG (ref 26.6–33)
MCHC RBC AUTO-ENTMCNC: 31.5 G/DL (ref 31.5–35.7)
MCV RBC AUTO: 100.9 FL (ref 79–97)
MONOCYTES # BLD AUTO: 0.51 10*3/MM3 (ref 0.1–0.9)
MONOCYTES NFR BLD AUTO: 11.5 % (ref 5–12)
NEUTROPHILS NFR BLD AUTO: 1.66 10*3/MM3 (ref 1.7–7)
NEUTROPHILS NFR BLD AUTO: 37.6 % (ref 42.7–76)
NRBC BLD AUTO-RTO: 0.5 /100 WBC (ref 0–0.2)
PLATELET # BLD AUTO: 191 10*3/MM3 (ref 140–450)
PMV BLD AUTO: 9.6 FL (ref 6–12)
POTASSIUM SERPL-SCNC: 4.3 MMOL/L (ref 3.5–5.2)
PROT SERPL-MCNC: 6.6 G/DL (ref 6–8.5)
RBC # BLD AUTO: 3.3 10*6/MM3 (ref 4.14–5.8)
SODIUM SERPL-SCNC: 137 MMOL/L (ref 136–145)
TROPONIN T SERPL HS-MCNC: 36 NG/L
WBC NRBC COR # BLD AUTO: 4.42 10*3/MM3 (ref 3.4–10.8)

## 2024-03-02 PROCEDURE — 71045 X-RAY EXAM CHEST 1 VIEW: CPT

## 2024-03-02 PROCEDURE — 96374 THER/PROPH/DIAG INJ IV PUSH: CPT

## 2024-03-02 PROCEDURE — 96361 HYDRATE IV INFUSION ADD-ON: CPT

## 2024-03-02 PROCEDURE — 83690 ASSAY OF LIPASE: CPT | Performed by: NURSE PRACTITIONER

## 2024-03-02 PROCEDURE — 82077 ASSAY SPEC XCP UR&BREATH IA: CPT | Performed by: NURSE PRACTITIONER

## 2024-03-02 PROCEDURE — 84484 ASSAY OF TROPONIN QUANT: CPT | Performed by: NURSE PRACTITIONER

## 2024-03-02 PROCEDURE — 93005 ELECTROCARDIOGRAM TRACING: CPT | Performed by: NURSE PRACTITIONER

## 2024-03-02 PROCEDURE — 80053 COMPREHEN METABOLIC PANEL: CPT | Performed by: NURSE PRACTITIONER

## 2024-03-02 PROCEDURE — 93010 ELECTROCARDIOGRAM REPORT: CPT | Performed by: INTERNAL MEDICINE

## 2024-03-02 PROCEDURE — 25010000002 ONDANSETRON PER 1 MG: Performed by: NURSE PRACTITIONER

## 2024-03-02 PROCEDURE — 25810000003 SODIUM CHLORIDE 0.9 % SOLUTION: Performed by: NURSE PRACTITIONER

## 2024-03-02 PROCEDURE — 83735 ASSAY OF MAGNESIUM: CPT | Performed by: NURSE PRACTITIONER

## 2024-03-02 PROCEDURE — 99284 EMERGENCY DEPT VISIT MOD MDM: CPT

## 2024-03-02 PROCEDURE — 85025 COMPLETE CBC W/AUTO DIFF WBC: CPT | Performed by: NURSE PRACTITIONER

## 2024-03-02 RX ORDER — SODIUM CHLORIDE 0.9 % (FLUSH) 0.9 %
10 SYRINGE (ML) INJECTION AS NEEDED
Status: DISCONTINUED | OUTPATIENT
Start: 2024-03-02 | End: 2024-03-03 | Stop reason: HOSPADM

## 2024-03-02 RX ORDER — ONDANSETRON 2 MG/ML
4 INJECTION INTRAMUSCULAR; INTRAVENOUS ONCE
Status: COMPLETED | OUTPATIENT
Start: 2024-03-02 | End: 2024-03-02

## 2024-03-02 RX ADMIN — ONDANSETRON 4 MG: 2 INJECTION INTRAMUSCULAR; INTRAVENOUS at 22:35

## 2024-03-02 RX ADMIN — SODIUM CHLORIDE 500 ML: 9 INJECTION, SOLUTION INTRAVENOUS at 22:34

## 2024-03-03 ENCOUNTER — APPOINTMENT (OUTPATIENT)
Dept: GENERAL RADIOLOGY | Facility: HOSPITAL | Age: 74
End: 2024-03-03
Payer: MEDICARE

## 2024-03-03 ENCOUNTER — HOSPITAL ENCOUNTER (OUTPATIENT)
Facility: HOSPITAL | Age: 74
Setting detail: OBSERVATION
Discharge: NURSING, REHAB OR LONG-TERM CARE | End: 2024-03-05
Attending: EMERGENCY MEDICINE | Admitting: HOSPITALIST
Payer: MEDICARE

## 2024-03-03 VITALS
HEIGHT: 69 IN | SYSTOLIC BLOOD PRESSURE: 96 MMHG | TEMPERATURE: 98.7 F | BODY MASS INDEX: 20.54 KG/M2 | DIASTOLIC BLOOD PRESSURE: 51 MMHG | HEART RATE: 51 BPM | RESPIRATION RATE: 18 BRPM | OXYGEN SATURATION: 94 %

## 2024-03-03 DIAGNOSIS — N18.9 ACUTE RENAL FAILURE SUPERIMPOSED ON CHRONIC KIDNEY DISEASE, UNSPECIFIED ACUTE RENAL FAILURE TYPE, UNSPECIFIED CKD STAGE: ICD-10-CM

## 2024-03-03 DIAGNOSIS — I95.9 HYPOTENSION, UNSPECIFIED HYPOTENSION TYPE: ICD-10-CM

## 2024-03-03 DIAGNOSIS — E87.5 HYPERKALEMIA: ICD-10-CM

## 2024-03-03 DIAGNOSIS — N17.9 ACUTE RENAL FAILURE SUPERIMPOSED ON CHRONIC KIDNEY DISEASE, UNSPECIFIED ACUTE RENAL FAILURE TYPE, UNSPECIFIED CKD STAGE: ICD-10-CM

## 2024-03-03 DIAGNOSIS — R00.1 BRADYCARDIA: ICD-10-CM

## 2024-03-03 DIAGNOSIS — T50.901A ACCIDENTAL OVERDOSE, INITIAL ENCOUNTER: Primary | ICD-10-CM

## 2024-03-03 LAB
ALBUMIN SERPL-MCNC: 4.2 G/DL (ref 3.5–5.2)
ALBUMIN/GLOB SERPL: 1.6 G/DL
ALP SERPL-CCNC: 114 U/L (ref 39–117)
ALT SERPL W P-5'-P-CCNC: 67 U/L (ref 1–41)
ANION GAP SERPL CALCULATED.3IONS-SCNC: 12 MMOL/L (ref 5–15)
ANION GAP SERPL CALCULATED.3IONS-SCNC: 16 MMOL/L (ref 5–15)
ANION GAP SERPL CALCULATED.3IONS-SCNC: 17 MMOL/L (ref 5–15)
APAP SERPL-MCNC: <5 MCG/ML (ref 0–30)
AST SERPL-CCNC: 97 U/L (ref 1–40)
BASOPHILS # BLD AUTO: 0.02 10*3/MM3 (ref 0–0.2)
BASOPHILS NFR BLD AUTO: 0.3 % (ref 0–1.5)
BILIRUB SERPL-MCNC: 0.3 MG/DL (ref 0–1.2)
BUN SERPL-MCNC: 26 MG/DL (ref 8–23)
BUN/CREAT SERPL: 10 (ref 7–25)
BUN/CREAT SERPL: 9.3 (ref 7–25)
BUN/CREAT SERPL: 9.6 (ref 7–25)
CALCIUM SPEC-SCNC: 10.3 MG/DL (ref 8.6–10.5)
CALCIUM SPEC-SCNC: 12.5 MG/DL (ref 8.6–10.5)
CALCIUM SPEC-SCNC: 9.8 MG/DL (ref 8.6–10.5)
CHLORIDE SERPL-SCNC: 100 MMOL/L (ref 98–107)
CHLORIDE SERPL-SCNC: 100 MMOL/L (ref 98–107)
CHLORIDE SERPL-SCNC: 98 MMOL/L (ref 98–107)
CO2 SERPL-SCNC: 21 MMOL/L (ref 22–29)
CO2 SERPL-SCNC: 22 MMOL/L (ref 22–29)
CO2 SERPL-SCNC: 25 MMOL/L (ref 22–29)
CREAT SERPL-MCNC: 2.59 MG/DL (ref 0.76–1.27)
CREAT SERPL-MCNC: 2.71 MG/DL (ref 0.76–1.27)
CREAT SERPL-MCNC: 2.81 MG/DL (ref 0.76–1.27)
DEPRECATED RDW RBC AUTO: 48.7 FL (ref 37–54)
EGFRCR SERPLBLD CKD-EPI 2021: 22.7 ML/MIN/1.73
EGFRCR SERPLBLD CKD-EPI 2021: 23.7 ML/MIN/1.73
EGFRCR SERPLBLD CKD-EPI 2021: 25.1 ML/MIN/1.73
EOSINOPHIL # BLD AUTO: 0.02 10*3/MM3 (ref 0–0.4)
EOSINOPHIL NFR BLD AUTO: 0.3 % (ref 0.3–6.2)
ERYTHROCYTE [DISTWIDTH] IN BLOOD BY AUTOMATED COUNT: 12.7 % (ref 12.3–15.4)
ETHANOL BLD-MCNC: <10 MG/DL (ref 0–10)
ETHANOL UR QL: <0.01 %
GEN 5 2HR TROPONIN T REFLEX: 47 NG/L
GLOBULIN UR ELPH-MCNC: 2.6 GM/DL
GLUCOSE BLDC GLUCOMTR-MCNC: 136 MG/DL (ref 70–130)
GLUCOSE BLDC GLUCOMTR-MCNC: 150 MG/DL (ref 70–130)
GLUCOSE SERPL-MCNC: 128 MG/DL (ref 65–99)
GLUCOSE SERPL-MCNC: 195 MG/DL (ref 65–99)
GLUCOSE SERPL-MCNC: 199 MG/DL (ref 65–99)
HBV SURFACE AG SERPL QL IA: NORMAL
HCT VFR BLD AUTO: 36.7 % (ref 37.5–51)
HGB BLD-MCNC: 11.6 G/DL (ref 13–17.7)
IMM GRANULOCYTES # BLD AUTO: 0.08 10*3/MM3 (ref 0–0.05)
IMM GRANULOCYTES NFR BLD AUTO: 1.2 % (ref 0–0.5)
LYMPHOCYTES # BLD AUTO: 0.9 10*3/MM3 (ref 0.7–3.1)
LYMPHOCYTES NFR BLD AUTO: 13.7 % (ref 19.6–45.3)
MAGNESIUM SERPL-MCNC: 1.8 MG/DL (ref 1.6–2.4)
MCH RBC QN AUTO: 32.2 PG (ref 26.6–33)
MCHC RBC AUTO-ENTMCNC: 31.6 G/DL (ref 31.5–35.7)
MCV RBC AUTO: 101.9 FL (ref 79–97)
MONOCYTES # BLD AUTO: 0.43 10*3/MM3 (ref 0.1–0.9)
MONOCYTES NFR BLD AUTO: 6.5 % (ref 5–12)
NEUTROPHILS NFR BLD AUTO: 5.14 10*3/MM3 (ref 1.7–7)
NEUTROPHILS NFR BLD AUTO: 78 % (ref 42.7–76)
NRBC BLD AUTO-RTO: 1.2 /100 WBC (ref 0–0.2)
PLATELET # BLD AUTO: 169 10*3/MM3 (ref 140–450)
PMV BLD AUTO: 9.6 FL (ref 6–12)
POTASSIUM SERPL-SCNC: 5.2 MMOL/L (ref 3.5–5.2)
POTASSIUM SERPL-SCNC: 5.3 MMOL/L (ref 3.5–5.2)
POTASSIUM SERPL-SCNC: 6.8 MMOL/L (ref 3.5–5.2)
PROCALCITONIN SERPL-MCNC: 0.25 NG/ML (ref 0–0.25)
PROT SERPL-MCNC: 6.8 G/DL (ref 6–8.5)
QT INTERVAL: 493 MS
QT INTERVAL: 510 MS
QTC INTERVAL: 432 MS
QTC INTERVAL: 450 MS
RBC # BLD AUTO: 3.6 10*6/MM3 (ref 4.14–5.8)
SALICYLATES SERPL-MCNC: <0.3 MG/DL
SODIUM SERPL-SCNC: 137 MMOL/L (ref 136–145)
TROPONIN T DELTA: -7 NG/L
TROPONIN T SERPL HS-MCNC: 54 NG/L
WBC NRBC COR # BLD AUTO: 6.59 10*3/MM3 (ref 3.4–10.8)

## 2024-03-03 PROCEDURE — 87340 HEPATITIS B SURFACE AG IA: CPT | Performed by: INTERNAL MEDICINE

## 2024-03-03 PROCEDURE — 82077 ASSAY SPEC XCP UR&BREATH IA: CPT | Performed by: EMERGENCY MEDICINE

## 2024-03-03 PROCEDURE — 96365 THER/PROPH/DIAG IV INF INIT: CPT

## 2024-03-03 PROCEDURE — 25810000003 SODIUM CHLORIDE 0.9 % SOLUTION: Performed by: EMERGENCY MEDICINE

## 2024-03-03 PROCEDURE — 83735 ASSAY OF MAGNESIUM: CPT | Performed by: INTERNAL MEDICINE

## 2024-03-03 PROCEDURE — 99285 EMERGENCY DEPT VISIT HI MDM: CPT

## 2024-03-03 PROCEDURE — G0378 HOSPITAL OBSERVATION PER HR: HCPCS

## 2024-03-03 PROCEDURE — 25010000002 ATROPINE SULFATE: Performed by: EMERGENCY MEDICINE

## 2024-03-03 PROCEDURE — 96367 TX/PROPH/DG ADDL SEQ IV INF: CPT

## 2024-03-03 PROCEDURE — 82948 REAGENT STRIP/BLOOD GLUCOSE: CPT

## 2024-03-03 PROCEDURE — 93005 ELECTROCARDIOGRAM TRACING: CPT | Performed by: EMERGENCY MEDICINE

## 2024-03-03 PROCEDURE — 90791 PSYCH DIAGNOSTIC EVALUATION: CPT | Performed by: MARRIAGE & FAMILY THERAPIST

## 2024-03-03 PROCEDURE — 80053 COMPREHEN METABOLIC PANEL: CPT | Performed by: EMERGENCY MEDICINE

## 2024-03-03 PROCEDURE — 71045 X-RAY EXAM CHEST 1 VIEW: CPT

## 2024-03-03 PROCEDURE — 80179 DRUG ASSAY SALICYLATE: CPT | Performed by: EMERGENCY MEDICINE

## 2024-03-03 PROCEDURE — 80143 DRUG ASSAY ACETAMINOPHEN: CPT | Performed by: EMERGENCY MEDICINE

## 2024-03-03 PROCEDURE — 36415 COLL VENOUS BLD VENIPUNCTURE: CPT

## 2024-03-03 PROCEDURE — 25010000002 CALCIUM GLUCONATE-NACL 1-0.675 GM/50ML-% SOLUTION: Performed by: EMERGENCY MEDICINE

## 2024-03-03 PROCEDURE — 63710000001 INSULIN REGULAR HUMAN PER 5 UNITS: Performed by: EMERGENCY MEDICINE

## 2024-03-03 PROCEDURE — 84484 ASSAY OF TROPONIN QUANT: CPT | Performed by: EMERGENCY MEDICINE

## 2024-03-03 PROCEDURE — 96375 TX/PRO/DX INJ NEW DRUG ADDON: CPT

## 2024-03-03 PROCEDURE — 25810000003 SODIUM CHLORIDE 0.9 % SOLUTION: Performed by: INTERNAL MEDICINE

## 2024-03-03 PROCEDURE — 96366 THER/PROPH/DIAG IV INF ADDON: CPT

## 2024-03-03 PROCEDURE — 84145 PROCALCITONIN (PCT): CPT | Performed by: EMERGENCY MEDICINE

## 2024-03-03 PROCEDURE — 99214 OFFICE O/P EST MOD 30 MIN: CPT | Performed by: INTERNAL MEDICINE

## 2024-03-03 PROCEDURE — 93010 ELECTROCARDIOGRAM REPORT: CPT | Performed by: INTERNAL MEDICINE

## 2024-03-03 PROCEDURE — 25010000002 THIAMINE HCL 200 MG/2ML SOLUTION: Performed by: EMERGENCY MEDICINE

## 2024-03-03 PROCEDURE — 85025 COMPLETE CBC W/AUTO DIFF WBC: CPT | Performed by: EMERGENCY MEDICINE

## 2024-03-03 RX ORDER — SODIUM CHLORIDE 9 MG/ML
75 INJECTION, SOLUTION INTRAVENOUS CONTINUOUS
Status: DISCONTINUED | OUTPATIENT
Start: 2024-03-03 | End: 2024-03-05

## 2024-03-03 RX ORDER — THIAMINE HYDROCHLORIDE 100 MG/ML
200 INJECTION, SOLUTION INTRAMUSCULAR; INTRAVENOUS ONCE
Status: COMPLETED | OUTPATIENT
Start: 2024-03-03 | End: 2024-03-03

## 2024-03-03 RX ORDER — FAMOTIDINE 20 MG/1
20 TABLET, FILM COATED ORAL 2 TIMES DAILY
Status: DISCONTINUED | OUTPATIENT
Start: 2024-03-03 | End: 2024-03-05 | Stop reason: HOSPADM

## 2024-03-03 RX ORDER — FOLIC ACID 1 MG/1
1 TABLET ORAL DAILY
Status: DISCONTINUED | OUTPATIENT
Start: 2024-03-03 | End: 2024-03-05 | Stop reason: HOSPADM

## 2024-03-03 RX ORDER — IBUPROFEN 600 MG/1
1 TABLET ORAL
Status: DISCONTINUED | OUTPATIENT
Start: 2024-03-03 | End: 2024-03-05

## 2024-03-03 RX ORDER — INSULIN LISPRO 100 [IU]/ML
2-7 INJECTION, SOLUTION INTRAVENOUS; SUBCUTANEOUS
Status: DISCONTINUED | OUTPATIENT
Start: 2024-03-03 | End: 2024-03-05

## 2024-03-03 RX ORDER — DEXTROSE MONOHYDRATE 25 G/50ML
25 INJECTION, SOLUTION INTRAVENOUS
Status: DISCONTINUED | OUTPATIENT
Start: 2024-03-03 | End: 2024-03-05

## 2024-03-03 RX ORDER — DEXTROSE MONOHYDRATE 25 G/50ML
25 INJECTION, SOLUTION INTRAVENOUS ONCE
Status: COMPLETED | OUTPATIENT
Start: 2024-03-03 | End: 2024-03-03

## 2024-03-03 RX ORDER — CALCIUM GLUCONATE 20 MG/ML
1000 INJECTION, SOLUTION INTRAVENOUS ONCE
Status: COMPLETED | OUTPATIENT
Start: 2024-03-03 | End: 2024-03-03

## 2024-03-03 RX ORDER — NICOTINE POLACRILEX 4 MG
15 LOZENGE BUCCAL
Status: DISCONTINUED | OUTPATIENT
Start: 2024-03-03 | End: 2024-03-05

## 2024-03-03 RX ORDER — CALCIUM GLUCONATE 20 MG/ML
1000 INJECTION, SOLUTION INTRAVENOUS ONCE
Status: DISCONTINUED | OUTPATIENT
Start: 2024-03-03 | End: 2024-03-03

## 2024-03-03 RX ORDER — DEXTROSE MONOHYDRATE 25 G/50ML
25 INJECTION, SOLUTION INTRAVENOUS ONCE
Status: DISCONTINUED | OUTPATIENT
Start: 2024-03-03 | End: 2024-03-03

## 2024-03-03 RX ORDER — DIPHENOXYLATE HYDROCHLORIDE AND ATROPINE SULFATE 2.5; .025 MG/1; MG/1
1 TABLET ORAL DAILY
Status: DISCONTINUED | OUTPATIENT
Start: 2024-03-03 | End: 2024-03-05 | Stop reason: HOSPADM

## 2024-03-03 RX ORDER — SODIUM CHLORIDE 9 MG/ML
1000 INJECTION, SOLUTION INTRAVENOUS ONCE
Status: DISCONTINUED | OUTPATIENT
Start: 2024-03-03 | End: 2024-03-03

## 2024-03-03 RX ORDER — HALOPERIDOL 2 MG/1
2 TABLET ORAL EVERY 8 HOURS SCHEDULED
Status: DISCONTINUED | OUTPATIENT
Start: 2024-03-03 | End: 2024-03-03

## 2024-03-03 RX ORDER — SODIUM CHLORIDE 9 MG/ML
1000 INJECTION, SOLUTION INTRAVENOUS ONCE
Status: COMPLETED | OUTPATIENT
Start: 2024-03-03 | End: 2024-03-03

## 2024-03-03 RX ORDER — THIAMINE HYDROCHLORIDE 100 MG/ML
200 INJECTION, SOLUTION INTRAMUSCULAR; INTRAVENOUS ONCE
Status: DISCONTINUED | OUTPATIENT
Start: 2024-03-03 | End: 2024-03-03

## 2024-03-03 RX ADMIN — INSULIN HUMAN 5 UNITS: 100 INJECTION, SOLUTION PARENTERAL at 11:20

## 2024-03-03 RX ADMIN — DEXTROSE MONOHYDRATE 25 G: 25 INJECTION, SOLUTION INTRAVENOUS at 11:18

## 2024-03-03 RX ADMIN — FOLIC ACID 1 MG: 5 INJECTION, SOLUTION INTRAMUSCULAR; INTRAVENOUS; SUBCUTANEOUS at 10:18

## 2024-03-03 RX ADMIN — Medication 1 TABLET: at 13:41

## 2024-03-03 RX ADMIN — SODIUM CHLORIDE 100 ML/HR: 9 INJECTION, SOLUTION INTRAVENOUS at 13:41

## 2024-03-03 RX ADMIN — ATROPINE SULFATE 0.5 MG: 0.1 INJECTION INTRAVENOUS at 10:22

## 2024-03-03 RX ADMIN — FOLIC ACID 1 MG: 1 TABLET ORAL at 13:41

## 2024-03-03 RX ADMIN — SODIUM ZIRCONIUM CYCLOSILICATE 10 G: 10 POWDER, FOR SUSPENSION ORAL at 11:13

## 2024-03-03 RX ADMIN — SODIUM ZIRCONIUM CYCLOSILICATE 10 G: 10 POWDER, FOR SUSPENSION ORAL at 17:31

## 2024-03-03 RX ADMIN — THIAMINE HYDROCHLORIDE 200 MG: 100 INJECTION, SOLUTION INTRAMUSCULAR; INTRAVENOUS at 10:16

## 2024-03-03 RX ADMIN — SODIUM BICARBONATE 50 MEQ: 84 INJECTION INTRAVENOUS at 11:21

## 2024-03-03 RX ADMIN — Medication 100 MG: at 14:18

## 2024-03-03 RX ADMIN — SODIUM CHLORIDE 1000 ML: 9 INJECTION, SOLUTION INTRAVENOUS at 10:05

## 2024-03-03 RX ADMIN — CALCIUM GLUCONATE 1000 MG: 20 INJECTION, SOLUTION INTRAVENOUS at 10:40

## 2024-03-03 NOTE — CONSULTS
[facility] Consult Note      Patient Name: Yuval Loja  : 1950  MRN: [id number]  Primary Care Physician:  Alessia Prescott APRN  Referring Physician: No ref. provider found  Date of admission: 3/3/2024    Subjective     Reason for Consult:  CRISTIANO hyperkalemia CKD3    HPI:   Yuval Loja is a 73 y.o. male with CKD stage 3B, HTN, CAD, CVA, AFIB, Alzheimers who presented to ER for  possible accidental overdose of meds, thinking he took extra haldol by mistake.  On arrival found to have CRISTIANO and severe hyperkalemia, Cr 2.8 and K 6.8.   Bradycardic and hypotensive, HR low 40s and BP 77/46 (improved to 110s/50s with IVF bolus).  He has been given D50, insulin, amp of bicarb, lokelma, and atropine.  He's not on ACE/ARB but is on metoprolol.     He actually was in ER yesterday for nausea after drinking beer, and K/Cr stable 4.3/1.7, latter consistent with baseline mind 1's during prolonged recent hospitalization  to 3/1/24.  We followed then for CRISTIANO, with peak Cr 2.3, and last Cr in hospital was 1.7 on  - he apparently refused labs rest of stay, which was prolonged in nature due to encephalopathy, ETOH withdrawal.  Ultimately went home rather than to rehab.      Denies dyspnea, swelling, diarrhea, dysuria, or recent NSAID use.  He's not really sure what medication he took too much of.  Repeat labs just now show improvement in K to 5.2 and Cr down slightly 2.6.  HR remains mid 40s.  SBP up to 120 mm Hg.    Review of Systems:   14 point review of systems is otherwise negative except for mentioned above on HPI    Personal History     Past Medical History:   Diagnosis Date    Alcohol abuse     Arthritis     CAD (coronary artery disease)     Chronic kidney disease, stage 3     COPD (chronic obstructive pulmonary disease)     Disease of thyroid gland     Elevated cholesterol     Fatty liver, alcoholic     GERD (gastroesophageal reflux disease)     History of transfusion      Hypertensive urgency     Metabolic acidosis     Myocardial infarction     Nausea vomiting and diarrhea 05/23/2017    Sinus tachycardia     Sleep apnea     Stroke        Past Surgical History:   Procedure Laterality Date    BACK SURGERY      CARDIAC CATHETERIZATION      CARDIAC ELECTROPHYSIOLOGY PROCEDURE N/A 4/10/2023    Procedure: Temporary Pacemaker;  Surgeon: Vaibhav Bonilla MD;  Location: [facility] INVASIVE LOCATION;  Service: Cardiovascular;  Laterality: N/A;    COLONOSCOPY      ENDOSCOPY      FRACTURE SURGERY      JOINT REPLACEMENT      SKIN BIOPSY      TOTAL HIP ARTHROPLASTY Right 06/27/2022    Procedure: TOTAL HIP ARTHROPLASTY ANTERIOR WITH HANA TABLE;  Surgeon: Willian Quiles MD;  Location: [facility] OR;  Service: Orthopedics;  Laterality: Right;  Nova, carli. hana       Family History: family history is not on file.    Social History:  reports that he has been smoking cigarettes. He has never used smokeless tobacco. He reports that he does not currently use alcohol. He reports that he does not use drugs.    Home Medications:  Prior to Admission medications    Medication Sig Start Date End Date Taking? Authorizing Provider   dilTIAZem CD (CARDIZEM CD) 120 MG 24 hr capsule Take 1 capsule by mouth Daily for 30 days. 3/2/24 4/1/24  Chino Cole MD   docusate sodium (COLACE) 100 MG capsule Take 1 capsule by mouth 2 (Two) Times a Day.    ProviderSarbjit MD   folic acid (FOLVITE) 1 MG tablet Take 1 tablet by mouth Daily for 30 days. 2/16/24 3/17/24  Chino Cole MD   haloperidol (HALDOL) 2 MG tablet Take 1 tablet by mouth 3 times a day for 30 days. 2/15/24 3/16/24  Chino Cole MD   hydrOXYzine (ATARAX) 25 MG tablet Take 1 tablet by mouth 4 (Four) Times a Day As Needed for Anxiety. 7/17/20   Sarbjit Paris MD   metoprolol tartrate (LOPRESSOR) 25 MG tablet Take 1 tablet by mouth 3 (Three) Times a Day for 30 days. 2/15/24 3/16/24  Chino Cole MD   multivitamin (THERAGRAN)  "tablet tablet Take 1 tablet by mouth Daily for 30 days. 2/16/24 3/17/24  Chino Cole MD   pantoprazole (PROTONIX) 40 MG EC tablet Take 1 tablet by mouth Daily. 7/17/20   ProviderSarbjit MD   rosuvastatin (CRESTOR) 5 MG tablet Take 1 tablet by mouth Every Night for 30 days. 3/1/24 3/31/24  Chino Cole MD   sucralfate (CARAFATE) 1 GM/10ML suspension Take 10 mL by mouth 4 (Four) Times a Day With Meals & at Bedtime.    Provider, MD Sarbjit   thiamine (VITAMIN B-1) 100 MG tablet  tablet Take 1 tablet by mouth Daily.    ProviderSarbjit MD       Allergies:  Allergies   Allergen Reactions    Mirtazapine Other (See Comments)     confused    Sertraline Anxiety, Diarrhea and Nausea And Vomiting     Also \"hallucinations\"       Objective     Vitals:   Temp:  [98 °F (36.7 °C)-98.7 °F (37.1 °C)] 98 °F (36.7 °C)  Heart Rate:  [40-54] 40  Resp:  [16-18] 16  BP: ()/(45-54) 77/46  No intake or output data in the 24 hours ending 03/03/24 1105    Physical Exam:    General Appearance: pleasant chronically ill appearing AAM no distress, alert  Skin: warm and dry  HEENT: oral mucosa normal, nonicteric sclera  Neck: supple, no JVD  Lungs: CTA bilat no rales  Heart: RRR, normal S1 and S2  Abdomen: soft, nontender, nondistended  : no palpable bladder  Extremities: no edema, cyanosis or clubbing  Neuro: normal speech and mental status     Scheduled Meds:     dextrose, 25 g, Intravenous, Once  insulin regular, 5 Units, Intravenous, Once  sodium bicarbonate, 50 mEq, Intravenous, Once  sodium zirconium cyclosilicate, 10 g, Oral, Once      IV Meds:        Results Reviewed:   I have personally reviewed the results from the time of this admission to 3/3/2024 11:05 EST     Lab Results   Component Value Date    GLUCOSE 195 (H) 03/03/2024    CALCIUM 10.3 03/03/2024     03/03/2024    K 6.8 (C) 03/03/2024    CO2 22.0 03/03/2024    CL 98 03/03/2024    BUN 26 (H) 03/03/2024    CREATININE 2.81 (H) 03/03/2024    EGFRIFAFRI " >60 10/11/2022    BCR 9.3 03/03/2024    ANIONGAP 17.0 (H) 03/03/2024      Lab Results   Component Value Date    MG 1.5 (L) 03/02/2024    PHOS 3.3 02/12/2024    ALBUMIN 4.2 03/03/2024           Assessment / Plan     ASSESSMENT:  Non olig CRISTIANO - suspect prerenal azotemia due to vol depletion and hypotension in assoc with accidental overdose (likely of metoprolol).  Cr was stable yesterday 1.7 in ER (when presented for N/V after drinking ETOH), but up to 2.8 today with SBP in 70s.  With IVF and correction of BP, Cr down slightly 2.6.  No acute indication for HD as K improving.    CKD stage 3B - BL Cr 1.5 to 1.7 range, likely hypertensive nephrosclerosis   Hyperkalemia - severe, K 6.8 (vs 4.3 yesterday).  Likely due to combination with CRISTIANO, suspected beta blocker overdose, and mod hyperglycemia (BG ~ 200).  No metabolic acidosis present.  K down to 5.2 with aggressive medical therapy  Hypotension (hx HTN) - again likely from beta blocker overdose and vol depletion.  BP down to 77/46 in ER, up to 120/53 now after bolus  Bradycardia (with hx AFIB) - related to likely metop overdose and severe hyperkalemia; HR still mid 40s but expect will improve further as K corrects  Hx ETOH abuse - level minimally elevated last night 15 mg/dL but normal on recheck today  Alzheimer's per recent psychiatry eval   Hyperglycemia, mod, ; beta blockers can inhibit insulin release.  Not diabetic (A1c was 4.3 last month and BG 99 yesterday)  Hx CVA  HypoMg (1.5) from last night in ER, doubt replaced then - recheck this    PLAN:  Agree with NS IVF, will inc rate slightly 100 cc/hr  Repeat BMP 4PM, watch for K rebound  Recheck Mg   Hold metoprolol   Add K restriction to diet    Current episode certainly raises concern about patient's ability to safely take meds, following prolonged hospitalization also characterized by encephalopathy, ETOH withdrawal, and suspicion of underlying dementia     Addendum: repeat K up slightly 5.3, as is Cr 2.7.   Will give another dose lokelma.  Recheck K in AM.  Check PVR for completeness.  HR up to mid 50s.  Discussed with RN    Thank you for involving us in the care of Yuval Loja.  Please feel free to call with any questions.    Holland Lindsay MD  03/03/24  11:05 [facility]    [facility]  [phone]

## 2024-03-03 NOTE — CONSULTS
[facility] Consult.    Chart reviewed extensively.     Majority of info obtained by chart & Pt's dtr, Celia WATERMAN), unless otherwise noted. Pt has been seen by Access & Psychiatry during past admissions & was seen & followed by Psychiatrist during the most recent admission.     The chart indicates Pt has a long hx of EtOH use and dtr reported Pt was recently dx'd with Dementia, secondary to his long term alcohol use. Pt was discharged from [facility] on 3/1/2024, following a nearly month long stay due primarily to alcohol withdrawal. He was discharged to [facility]. Pt was sent to & discharged from ED 2x's since that date until returning again mid-morning today (3/3) at which point he was admitted. Pt reportedly feared he'd taken too much of his prescribed Haldol. Additional information obtained and included in Pt's chart indicates he may also have his prescribed beta-blocker. There is every indication this was accidental and seems to indicate that Pt is cognitively unable to manage his own medications and needs more support & oversight.     Dtr shared that Pt exhibits more struggles & agitation as the day comes to a close, AEB increased paranoia, thinking someone is in his room, out to get him or is taking his belongings. Dtr even recommended alerting security before that point in the day. Dtr noted that Pt responds better to males than females, and black males in particular.    In approaching Pt directly, he was sitting in chair in the room, trying to untangle himself from monitor wires. Hair was unkempt. This writer assisted Pt in getting his legs out of the wires while introducing self & role. Pt initially agreed to talk with writer. Mood was irritable & distrustful w/congruent affect. Pt was alert to place & year. Pt refused to provide name,  or month, leading this writer to believe he was struggling to come up with the requested info & used avoidance tactics to cope.        Per dtr, Pt has no hx  of SI or HI or wishing to die. Pt has a long hx of EtOH use w/multiple admissions for alcohol withdrawal syndrome. Pt has no hx of MARQUIS tx.       Dtr is understandably overwhelmed by Pt's needs & wants Pt to have the care that will best suit him. Pt's dtr stated that [facility] has 2 addl levels of care & while she knew Pt would require it, she did not expect it to be so soon. Dtr does have plans to work with unit CCP for assistance in hopes of getting Pt stepped up to the next level of care at [facility] once he is medically ready for discharge.    Pt to be seen by Psychiatrist & Access will follow.

## 2024-03-03 NOTE — ED PROVIDER NOTES
SHARED VISIT: This visit was performed by BOTH a physician and an APC. The substantive portion of the medical decision making was performed by this attesting physician who made or approved the management plan and takes responsibility for patient management. All studies in the APC note (if performed) were independently interpreted by me.      The CHINYERE and I have discussed this patient's history, physical exam, and treatment plan.  I have reviewed the documentation and personally had a face to face interaction with the patient. I affirm the documentation and agree with the treatment and plan.  The attached note describes my personal findings.       I provided a substantive portion of the care of the patient.  I personally performed the physical exam in its entirety, and below are my findings.        History  73-year-old male with history of alcohol abuse, chronic kidney disease, hypertension and stroke returns to the hospital from nursing home with nausea after drinking alcohol.  He states that he is feeling better currently and denies current nausea or abdominal pain.  He does admit to drinking a beer earlier tonight.    Physical Exam  Vital Signs reviewed  GENERAL: Alert male in no obvious distress.  Triage vitals reviewed and are relatively unremarkable.  He is afebrile.  HENT: nares patent  EYES: no scleral icterus  CV: regular rhythm, regular rate  RESPIRATORY: normal effort  ABDOMEN: soft, nontender to palpation  MUSCULOSKELETAL: no deformity  NEURO: Strength sensation and coordination are grossly intact.  Speech and mentation are unremarkable  SKIN: warm, dry      Assessment and Data Review    ED Course as of 03/03/24 0003   Sat Mar 02, 2024   2329 Ethanol(!): 15 [AR]   2329 Magnesium(!): 1.5 [AR]   2329 Lipase(!): 66  Patient's lipase has been elevated previously after review in epic.  Patient has no left upper quadrant abdominal pain, no tenderness on exam. [AR]   2329 Creatinine(!): 1.70  Stable compared to  previous. [AR]   2329 HS Troponin T(!): 36  Baseline compared to previous. [AR]   2344 I discussed the case with Dr. Bush and they agree to evaluate the patient at the bedside.    [AR]   2352 The patient was reexamined.  They have had symptomatic improvement during their ED stay.  I discussed today's findings with the patient, explaining the pertinent positives and negatives from today's visit, and the plan of care.  Discussed plan for discharge as there is no emergent indication for admission.  Discussed limitation of the ED work-up and that this is to rule out life-threatening emergencies but that they could require further testing as determined by their primary care and or any referred specialist patient is agreeable and understands need for follow-up and repeat exam/testing.  Patient is aware that discharge does not mean there is nothing wrong, indicates no emergency is present, and that they must continue their care with their primary care physician and/or any referred specialist.  They were given appropriate follow-up with their primary care physician and/or specialist.  I had an extensive discussion on the expected clinical course and return precautions.  Patient understands to return to the emergency department for continuation, worsening, or new symptoms.  I answered any of the patient's questions. Patient was discharged home in a stable condition.     [AR]      ED Course User Index  [AR] Salma Grullon APRN         EKG independently interpreted by me  Time 10:14 PM  Sinus 50  Normal P waves and MA intervals  QRS-normal axis, normal QRS  ST, T wave-nonspecific changes  Not significant change when compared to 2/4/2024    Chest x-ray independently interpreted by me shows no obvious acute disease.  Official reading by Dr. Telles is in agreement.    Labs reviewed are near baseline.  He does have some alcohol in the system from alcohol drank earlier.  Alcohol level of 15.  Serum creatinine 1.7 is  improved from baseline and represents chronic renal failure rather than acute dehydration.    High-sensitivity troponin of 36 is similar to baseline and represents chronic coronary disease and not indicative of acute coronary syndrome.    At this point I really do not see any clear indication for hospitalization.  Patient will require nursing home care for rehabilitation and strengthening but I do not see any serious infection dehydration or other serious acute medical problem that would require hospitalization.    Care of this patient was discussed with RICHMOND Crowder.     Adrian Bush MD  03/02/24 2349       Adrian Bush MD  03/03/24 0003

## 2024-03-03 NOTE — CONSULTS
[facility]  Consult Note                                                                              3/3/2024  Chino Cole MD    Patient Identification:  Yuval Loja:   73 y.o.  male  1950     Date of Admission:3/3/2024    CC:  Consult requested by Dr. Cole for evaluation of bradycardia    History of Present Illness:  Patient is a 73-year-old gentleman with history of chronic renal insufficiency hyperlipidemia, fatty liver, gastric bleeds, hypertension, obstructive sleep apnea, stroke, COPD, paroxysmal atrial fibrillation (not on anticoagulation (and coronary artery disease (RCA stent 2000, LAD stent 2006) with prior myocardial infarction.  In 4/2023 he developed acute renal injury and developed heart block requiring temporary pacer.  He does not follow-up as an outpatient.  He then presented again in June 2023 with atrial fibrillation and rapid rates.  He was found to have possible pneumonia versus heart failure.  He converted during the hospitalization to sinus rhythm.  Anticoagulation was not pursued due to compliance concerns and history of GI bleed he was discharged to rehab facility with plans to follow-up at [facility].  It does not appear he did this as I cannot find records within the [facility] system..    He had been admitted in February 2024 with altered mental status and alcohol withdrawal with a long hospitalization.  He presented yesterday to the ER with drug overdose with Haldol and alcohol abuse.  He was hyperkalemic with bradycardia.  He was treated with atropine.  Mental status seem to have improved.  Troponin was elevated 54 and remains flat.  EKG showed sinus bradycardia rate of 43 beats minute.  Borderline ST elevation noted.  Cardizem and Haldol were discontinued.    He is currently resting in bed and does not believe he has had any concerns or issues.  Heart rate he believes is back to normal (sinus bradycardia rates in the upper 50s).  He denies any chest pain,  "shortness of breath palpitations syncope near syncope      Past Medical History:  Past Medical History:   Diagnosis Date    Alcohol abuse     Arthritis     CAD (coronary artery disease)     Chronic kidney disease, stage 3     COPD (chronic obstructive pulmonary disease)     Disease of thyroid gland     Elevated cholesterol     Fatty liver, alcoholic     GERD (gastroesophageal reflux disease)     History of transfusion     Hypertensive urgency     Metabolic acidosis     Myocardial infarction     Nausea vomiting and diarrhea 05/23/2017    Sinus tachycardia     Sleep apnea     Stroke        Past Surgical History:  Past Surgical History:   Procedure Laterality Date    BACK SURGERY      CARDIAC CATHETERIZATION      CARDIAC ELECTROPHYSIOLOGY PROCEDURE N/A 4/10/2023    Procedure: Temporary Pacemaker;  Surgeon: Vaibhav Bonilla MD;  Location: [facility] CATH INVASIVE LOCATION;  Service: Cardiovascular;  Laterality: N/A;    COLONOSCOPY      ENDOSCOPY      FRACTURE SURGERY      JOINT REPLACEMENT      SKIN BIOPSY      TOTAL HIP ARTHROPLASTY Right 06/27/2022    Procedure: TOTAL HIP ARTHROPLASTY ANTERIOR WITH HANA TABLE;  Surgeon: Willian Quiles MD;  Location: [facility] MAIN OR;  Service: Orthopedics;  Laterality: Right;  Depuy, carli. hana       Allergies:  Allergies   Allergen Reactions    Mirtazapine Other (See Comments)     confused    Sertraline Anxiety, Diarrhea and Nausea And Vomiting     Also \"hallucinations\"       Home Meds:  Medications Prior to Admission   Medication Sig Dispense Refill Last Dose    dilTIAZem CD (CARDIZEM CD) 120 MG 24 hr capsule Take 1 capsule by mouth Daily for 30 days. 30 capsule 0 Past Week    docusate sodium (COLACE) 100 MG capsule Take 1 capsule by mouth 2 (Two) Times a Day.   3/3/2024    folic acid (FOLVITE) 1 MG tablet Take 1 tablet by mouth Daily for 30 days. 30 tablet 0 3/3/2024    haloperidol (HALDOL) 2 MG tablet Take 1 tablet by mouth 3 times a day for 30 days. 90 tablet 0 3/3/2024 " "   hydrOXYzine (ATARAX) 25 MG tablet Take 1 tablet by mouth 4 (Four) Times a Day As Needed for Anxiety.   3/3/2024    multivitamin (THERAGRAN) tablet tablet Take 1 tablet by mouth Daily for 30 days. 30 tablet 0 3/3/2024    rosuvastatin (CRESTOR) 5 MG tablet Take 1 tablet by mouth Every Night for 30 days. 30 tablet 0 3/3/2024    sucralfate (CARAFATE) 1 GM/10ML suspension Take 10 mL by mouth 4 (Four) Times a Day With Meals & at Bedtime.   3/3/2024    thiamine (VITAMIN B-1) 100 MG tablet  tablet Take 1 tablet by mouth Daily.   3/3/2024    metoprolol tartrate (LOPRESSOR) 25 MG tablet Take 1 tablet by mouth 3 (Three) Times a Day for 30 days. 90 tablet 0 Unknown    pantoprazole (PROTONIX) 40 MG EC tablet Take 1 tablet by mouth Daily.   Unknown       Current Meds  Scheduled Meds:famotidine, 20 mg, Oral, BID  folic acid, 1 mg, Oral, Daily  insulin lispro, 2-7 Units, Subcutaneous, 4x Daily AC & at Bedtime  multivitamin, 1 tablet, Oral, Daily  thiamine, 100 mg, Oral, Daily      Continuous Infusions:sodium chloride, 100 mL/hr, Last Rate: 100 mL/hr (03/03/24 1341)      Social History     Socioeconomic History    Marital status: Single   Tobacco Use    Smoking status: Every Day     Current packs/day: 0.50     Types: Cigarettes    Smokeless tobacco: Never    Tobacco comments:     tried to provide education declined irritated w/ attempt smoked \" depends on what I feel like.\"   Vaping Use    Vaping status: Never Used   Substance and Sexual Activity    Alcohol use: Not Currently    Drug use: No    Sexual activity: Defer       Family History:  History reviewed. No pertinent family history.    REVIEW OF SYSTEMS:   CONSTITUTIONAL: No weight loss, fever, chills  HEENT: Eyes: No visual loss, blurred vision, double vision or yellow sclerae. Ears, Nose, Throat: No hearing loss, sneezing, congestion, runny nose or sore throat.   SKIN: No rash or itching.     RESPIRATORY: No shortness of breath, hemoptysis, cough or sputum. " "  GASTROINTESTINAL: No anorexia, nausea, vomiting or diarrhea. No abdominal pain, bright red blood per rectum or melena.  GENITOURINARY: No burning on urinatio  n, hematuria or increased frequency.  NEUROLOGICAL: No headache, dizziness, syncope, paralysis, ataxia, numbness or tingling in the extremities. No change in bowel or bladder control.   MUSCULOSKELETAL: No muscle, back pain, joint pain or stiffness.   HEMATOLOGIC: No anemia, bleeding or bruising.   LYMPHATICS: No enlarged nodes. No history of splenectomy.   PSYCHIATRIC: No history of depression, anxiety, hallucinations.   ENDOCRINOLOGIC: No reports of sweating, cold or heat intolerance. No polyuria or polydipsia.     Physical Exam    /92 (BP Location: Right arm, Patient Position: Lying)   Pulse (!) 43   Temp 97.5 °F (36.4 °C) (Oral)   Resp 16   Ht 175.3 cm (69\")   Wt 60.3 kg (133 lb)   SpO2 99%   BMI 19.64 kg/m²     General Appearance Well developed, cooperative and well nourished and no acute distress   Head Normocephalic, without abnormality, atraumatic   Ears Ears appear intact with no abnormalities noted   Throat No oral lesions, no thrush, oral mucosa moist   Neck No adenopathy, supple, trachea midline, no thyromegaly, no carotid bruit, no JVD   Back No skin lesions, erythema or scars, no tenderness to percussion or palpation,range of motion is normal   Lungs Clear to auscultation,respirations regular, even and unlabored   Heart Regular rhythm and normal rate, normal S1 and S2, no murmur, no gallop, no rub, no click   Chest wall No abnormalities observed   Abdomen Normal bowel sounds, no masses, no hepatomegaly,    Extremities Moves all extremities well, no edema, no cyanosis, no redness   Pulses Pulses palpable and equal bilaterally. Normal radial, carotid, femoral, dorsalis pedis and posterior tibial pulses bilaterally. Normal abdominal aorta   Skin No bleeding, bruising or rash   Psychiatric Alert and oriented x 2, slightly agitated "     Results from last 7 days   Lab Units 03/03/24  1213 03/03/24  1002   SODIUM mmol/L 137 137   POTASSIUM mmol/L 5.2 6.8*   CHLORIDE mmol/L 100 98   CO2 mmol/L 25.0 22.0   BUN mg/dL 26* 26*   CREATININE mg/dL 2.59* 2.81*   CALCIUM mg/dL 12.5* 10.3   BILIRUBIN mg/dL  --  0.3   ALK PHOS U/L  --  114   ALT (SGPT) U/L  --  67*   AST (SGOT) U/L  --  97*   GLUCOSE mg/dL 199* 195*     Results from last 7 days   Lab Units 03/03/24  1213 03/03/24  1002 03/02/24  2224   HSTROP T ng/L 47* 54* 36*     Results from last 7 days   Lab Units 03/03/24  1002 03/02/24  2224   WBC 10*3/mm3 6.59 4.42   HEMOGLOBIN g/dL 11.6* 10.5*   HEMATOCRIT % 36.7* 33.3*   PLATELETS 10*3/mm3 169 191         Results from last 7 days   Lab Units 03/03/24  1213   MAGNESIUM mg/dL 1.8     I personally viewed and interpreted the patient's EKG/Telemetry data  I have reviewed HPI and ROS above.      Assessment and Plan  1.  Recurrent bradycardia with prior heart block and now sinus bradycardia in the setting of hyperkalemia medical noncompliance.  Heart rate has improved.  Fortunately appears he will likely not need temporary pacer as last year.  He was on Cardizem but I am hesitant to use Cardizem especially long-acting agent in this gentleman with noncompliance and prior bradycardia.  If he is to go to an assisted living center where medications will be supplied and administered would be more reasonable to continue AV ashwin blocker therapy.  Otherwise I think the risks are greater than the benefits.  2.  Hyperkalemia.  Improved significantly.  3.  Elevated troponin which is flat and slightly lower.  Likely reflective of bradycardia and renal dysfunction.  He has had no chest pain whatsoever.  4.  Acute renal injury.  Recurrent event.  5.  History of paroxysmal atrial fibrillation.  Care definitely complicated by noncompliance with bradycardia.  Will have to hold AV ashwin blocker therapy until his home situation changes to involve more drug monitoring and  administration  6.  Disposition.  As above.  Ideally should be in a situation as above  7.  Anemia    Mini Borrego  3/3/2024  15:23 EST    55min spent in reviewing records, discussion and examination of the patient and discussion with other members of the patient's medical team.     Dictated utilizing Dragon dictation

## 2024-03-03 NOTE — ED PROVIDER NOTES
EMERGENCY DEPARTMENT ENCOUNTER  Room Number:  11/11  Date of encounter:  3/3/2024  PCP: Alessia Prescott APRN  Patient Care Team:  Alessia Prescott APRN as PCP - General (Family Medicine)  Jonny Bains MD as Referring Physician (Internal Medicine)  Carlos Villareal MD as Consulting Physician (Hematology and Oncology)     HPI:  Context: Yuval Loja is a 73 y.o. male who presents to the ED c/o chief complaint of possible overdose.  The patient reports that he believes that he took extra Haldol this morning, reports that he took by mistake when he was taking his medicines at approximately 4 5 this morning.  Per report patient was bradycardic in route, heart rate of 39, patient received 0.5 mg of atropine.  Patient's only complaint at present is fatigue.    MEDICAL HISTORY REVIEW  Reviewed in EPIC    PAST MEDICAL HISTORY  Active Ambulatory Problems     Diagnosis Date Noted    Alcoholic ketoacidosis 05/23/2017    Alcohol dependence 05/23/2017    Methamphetamine abuse 05/23/2017    Fatty liver, alcoholic 05/23/2017    Nausea vomiting and diarrhea 05/23/2017    Alcoholic liver disease 05/23/2017    Metabolic acidosis 05/23/2017    Tobacco abuse 05/23/2017    Coronary artery disease involving native coronary artery of native heart without angina pectoris 05/24/2017    Tachycardia 05/24/2017    Sinus tachycardia 04/17/2019    Chronic kidney disease, stage 3 04/17/2019    Medically noncompliant 04/18/2019    Visual hallucinations 04/18/2020    Psychosis 04/18/2020    Hyponatremia 04/18/2020    CAD (coronary artery disease) 04/18/2020    Leukopenia 04/18/2020    Symptomatic anemia 04/18/2020    Headache 04/18/2020    Substance abuse 04/18/2020    Gastrointestinal hemorrhage 10/22/2020    CRISTIANO (acute kidney injury) 10/23/2020    Hyperkalemia 10/23/2020    Right lower quadrant abdominal pain 03/15/2022    New onset atrial fibrillation 03/15/2022    History of drug abuse 03/15/2022    COPD (chronic obstructive  "pulmonary disease) 03/15/2022    Right inguinal hernia 03/15/2022    Constipation 03/15/2022    Status post right hip replacement 06/27/2022    Right hip pain 07/19/2022    Sinus bradycardia 04/10/2023    Generalized abdominal pain 06/01/2023    Altered mental status 02/04/2024    Altered mental state 02/05/2024     Resolved Ambulatory Problems     Diagnosis Date Noted    Dehydration 04/17/2019    Functional diarrhea 04/17/2019    Closed fracture of neck of right femur, initial encounter 06/25/2022     Past Medical History:   Diagnosis Date    Alcohol abuse     Arthritis     Disease of thyroid gland     Elevated cholesterol     GERD (gastroesophageal reflux disease)     History of transfusion     Hypertensive urgency     Myocardial infarction     Sleep apnea     Stroke        PAST SURGICAL HISTORY  Past Surgical History:   Procedure Laterality Date    BACK SURGERY      CARDIAC CATHETERIZATION      CARDIAC ELECTROPHYSIOLOGY PROCEDURE N/A 4/10/2023    Procedure: Temporary Pacemaker;  Surgeon: Vaibhav Bonilla MD;  Location: [facility] INVASIVE LOCATION;  Service: Cardiovascular;  Laterality: N/A;    COLONOSCOPY      ENDOSCOPY      FRACTURE SURGERY      JOINT REPLACEMENT      SKIN BIOPSY      TOTAL HIP ARTHROPLASTY Right 06/27/2022    Procedure: TOTAL HIP ARTHROPLASTY ANTERIOR WITH HANA TABLE;  Surgeon: Willian Quiles MD;  Location: [facility] OR;  Service: Orthopedics;  Laterality: Right;  Depuy, carli. hana       FAMILY HISTORY  No family history on file.    SOCIAL HISTORY  Social History     Socioeconomic History    Marital status: Single   Tobacco Use    Smoking status: Every Day     Current packs/day: 0.50     Types: Cigarettes    Smokeless tobacco: Never    Tobacco comments:     tried to provide education declined irritated w/ attempt smoked \" depends on what I feel like.\"   Vaping Use    Vaping status: Never Used   Substance and Sexual Activity    Alcohol use: Not Currently    Drug use: No    " Sexual activity: Defer       ALLERGIES  Mirtazapine and Sertraline    The patient's allergies have been reviewed    REVIEW OF SYSTEMS  All systems reviewed and negative except for those discussed in HPI.     PHYSICAL EXAM  I have reviewed the triage vital signs and nursing notes.  ED Triage Vitals [03/03/24 0928]   Temp Heart Rate Resp BP SpO2   98 °F (36.7 °C) 52 16 98/46 99 %      Temp src Heart Rate Source Patient Position BP Location FiO2 (%)   -- -- -- -- --       General: No acute distress.  Does appear drowsy but maintaining wakefulness without difficulty.  HENT: NCAT, PERRL, Nares patent.  Eyes: no scleral icterus.  Neck: trachea midline, no ROM limitations.  CV: regular rhythm, regular rate.  Respiratory: normal effort, CTAB.  Abdomen: soft, nondistended, NTTP, no rebound tenderness, no guarding or rigidity.  Musculoskeletal: no deformity.  Neuro: alert, moves all extremities, follows commands.  Skin: warm, dry.    LAB RESULTS  Recent Results (from the past 24 hour(s))   ECG 12 Lead Other; generalized weakness    Collection Time: 03/02/24 10:14 PM   Result Value Ref Range    QT Interval 493 ms    QTC Interval 450 ms   Comprehensive Metabolic Panel    Collection Time: 03/02/24 10:24 PM    Specimen: Blood   Result Value Ref Range    Glucose 99 65 - 99 mg/dL    BUN 20 8 - 23 mg/dL    Creatinine 1.70 (H) 0.76 - 1.27 mg/dL    Sodium 137 136 - 145 mmol/L    Potassium 4.3 3.5 - 5.2 mmol/L    Chloride 100 98 - 107 mmol/L    CO2 26.0 22.0 - 29.0 mmol/L    Calcium 10.1 8.6 - 10.5 mg/dL    Total Protein 6.6 6.0 - 8.5 g/dL    Albumin 4.0 3.5 - 5.2 g/dL    ALT (SGPT) 17 1 - 41 U/L    AST (SGOT) 23 1 - 40 U/L    Alkaline Phosphatase 64 39 - 117 U/L    Total Bilirubin <0.2 0.0 - 1.2 mg/dL    Globulin 2.6 gm/dL    A/G Ratio 1.5 g/dL    BUN/Creatinine Ratio 11.8 7.0 - 25.0    Anion Gap 11.0 5.0 - 15.0 mmol/L    eGFR 41.5 (L) >60.0 mL/min/1.73   Lipase    Collection Time: 03/02/24 10:24 PM    Specimen: Blood   Result Value  Ref Range    Lipase 66 (H) 13 - 60 U/L   Ethanol    Collection Time: 03/02/24 10:24 PM    Specimen: Blood   Result Value Ref Range    Ethanol 15 (H) 0 - 10 mg/dL    Ethanol % 0.015 %   Magnesium    Collection Time: 03/02/24 10:24 PM    Specimen: Blood   Result Value Ref Range    Magnesium 1.5 (L) 1.6 - 2.4 mg/dL   Single High Sensitivity Troponin T    Collection Time: 03/02/24 10:24 PM    Specimen: Blood   Result Value Ref Range    HS Troponin T 36 (H) <22 ng/L   CBC Auto Differential    Collection Time: 03/02/24 10:24 PM    Specimen: Blood   Result Value Ref Range    WBC 4.42 3.40 - 10.80 10*3/mm3    RBC 3.30 (L) 4.14 - 5.80 10*6/mm3    Hemoglobin 10.5 (L) 13.0 - 17.7 g/dL    Hematocrit 33.3 (L) 37.5 - 51.0 %    .9 (H) 79.0 - 97.0 fL    MCH 31.8 26.6 - 33.0 pg    MCHC 31.5 31.5 - 35.7 g/dL    RDW 12.9 12.3 - 15.4 %    RDW-SD 48.0 37.0 - 54.0 fl    MPV 9.6 6.0 - 12.0 fL    Platelets 191 140 - 450 10*3/mm3    Neutrophil % 37.6 (L) 42.7 - 76.0 %    Lymphocyte % 45.7 (H) 19.6 - 45.3 %    Monocyte % 11.5 5.0 - 12.0 %    Eosinophil % 4.5 0.3 - 6.2 %    Basophil % 0.5 0.0 - 1.5 %    Immature Grans % 0.2 0.0 - 0.5 %    Neutrophils, Absolute 1.66 (L) 1.70 - 7.00 10*3/mm3    Lymphocytes, Absolute 2.02 0.70 - 3.10 10*3/mm3    Monocytes, Absolute 0.51 0.10 - 0.90 10*3/mm3    Eosinophils, Absolute 0.20 0.00 - 0.40 10*3/mm3    Basophils, Absolute 0.02 0.00 - 0.20 10*3/mm3    Immature Grans, Absolute 0.01 0.00 - 0.05 10*3/mm3    nRBC 0.5 (H) 0.0 - 0.2 /100 WBC   ECG 12 Lead Other; hypotension    Collection Time: 03/03/24  9:37 AM   Result Value Ref Range    QT Interval 510 ms    QTC Interval 432 ms   Comprehensive Metabolic Panel    Collection Time: 03/03/24 10:02 AM    Specimen: Blood   Result Value Ref Range    Glucose 195 (H) 65 - 99 mg/dL    BUN 26 (H) 8 - 23 mg/dL    Creatinine 2.81 (H) 0.76 - 1.27 mg/dL    Sodium 137 136 - 145 mmol/L    Potassium 6.8 (C) 3.5 - 5.2 mmol/L    Chloride 98 98 - 107 mmol/L    CO2 22.0  22.0 - 29.0 mmol/L    Calcium 10.3 8.6 - 10.5 mg/dL    Total Protein 6.8 6.0 - 8.5 g/dL    Albumin 4.2 3.5 - 5.2 g/dL    ALT (SGPT) 67 (H) 1 - 41 U/L    AST (SGOT) 97 (H) 1 - 40 U/L    Alkaline Phosphatase 114 39 - 117 U/L    Total Bilirubin 0.3 0.0 - 1.2 mg/dL    Globulin 2.6 gm/dL    A/G Ratio 1.6 g/dL    BUN/Creatinine Ratio 9.3 7.0 - 25.0    Anion Gap 17.0 (H) 5.0 - 15.0 mmol/L    eGFR 22.7 (L) >60.0 mL/min/1.73   High Sensitivity Troponin T    Collection Time: 03/03/24 10:02 AM    Specimen: Blood   Result Value Ref Range    HS Troponin T 54 (C) <22 ng/L   Ethanol    Collection Time: 03/03/24 10:02 AM    Specimen: Blood   Result Value Ref Range    Ethanol <10 0 - 10 mg/dL    Ethanol % <0.010 %   Procalcitonin    Collection Time: 03/03/24 10:02 AM    Specimen: Blood   Result Value Ref Range    Procalcitonin 0.25 0.00 - 0.25 ng/mL   CBC Auto Differential    Collection Time: 03/03/24 10:02 AM    Specimen: Blood   Result Value Ref Range    WBC 6.59 3.40 - 10.80 10*3/mm3    RBC 3.60 (L) 4.14 - 5.80 10*6/mm3    Hemoglobin 11.6 (L) 13.0 - 17.7 g/dL    Hematocrit 36.7 (L) 37.5 - 51.0 %    .9 (H) 79.0 - 97.0 fL    MCH 32.2 26.6 - 33.0 pg    MCHC 31.6 31.5 - 35.7 g/dL    RDW 12.7 12.3 - 15.4 %    RDW-SD 48.7 37.0 - 54.0 fl    MPV 9.6 6.0 - 12.0 fL    Platelets 169 140 - 450 10*3/mm3    Neutrophil % 78.0 (H) 42.7 - 76.0 %    Lymphocyte % 13.7 (L) 19.6 - 45.3 %    Monocyte % 6.5 5.0 - 12.0 %    Eosinophil % 0.3 0.3 - 6.2 %    Basophil % 0.3 0.0 - 1.5 %    Immature Grans % 1.2 (H) 0.0 - 0.5 %    Neutrophils, Absolute 5.14 1.70 - 7.00 10*3/mm3    Lymphocytes, Absolute 0.90 0.70 - 3.10 10*3/mm3    Monocytes, Absolute 0.43 0.10 - 0.90 10*3/mm3    Eosinophils, Absolute 0.02 0.00 - 0.40 10*3/mm3    Basophils, Absolute 0.02 0.00 - 0.20 10*3/mm3    Immature Grans, Absolute 0.08 (H) 0.00 - 0.05 10*3/mm3    nRBC 1.2 (H) 0.0 - 0.2 /100 WBC   Salicylate Level    Collection Time: 03/03/24 10:02 AM    Specimen: Blood   Result  Value Ref Range    Salicylate <0.3 <=30.0 mg/dL   Acetaminophen Level    Collection Time: 03/03/24 10:02 AM    Specimen: Blood   Result Value Ref Range    Acetaminophen <5.0 0.0 - 30.0 mcg/mL   POC Glucose Once    Collection Time: 03/03/24 11:20 AM    Specimen: Blood   Result Value Ref Range    Glucose 150 (H) 70 - 130 mg/dL       I ordered the above labs and reviewed the results.    RADIOLOGY  XR Chest 1 View    Result Date: 3/3/2024  XR CHEST 1 VW-   INDICATION: Hypotension  COMPARISON: None  TECHNIQUE: 1 view chest  FINDINGS:  No focal opacity. No effusions. Normal mediastinal contour. Coronary stent. Heart is normal in size. Old left rib fractures.      No acute cardiopulmonary process  This report was finalized on 3/3/2024 10:40 AM by Dr. Holland Altamirano M.D on Workstation: [id number]      XR Chest 1 View    Result Date: 3/2/2024  SINGLE VIEW OF THE CHEST  HISTORY: Generalized infection  COMPARISON: February 4, 2020  FINDINGS: There is cardiomegaly. There is no vascular congestion. No pneumothorax, pleural effusion, or acute infiltrate is seen. There is an old left-sided rib fracture. Coronary stent is noted. There is calcification of the aorta.      No acute findings.  This report was finalized on 3/2/2024 11:22 PM by Dr. Martha Telles M.D on Workstation: [id number]       I ordered the above noted radiological studies. I reviewed the images and results. I agree with the radiologist interpretation.    PROCEDURES  Procedures    MEDICATIONS GIVEN IN ER  Medications   sodium chloride 0.9 % infusion 1,000 mL (0 mL Intravenous Stopped 3/3/24 1110)   folic acid 1 mg in sodium chloride 0.9 % 50 mL IVPB (0 mg Intravenous Stopped 3/3/24 1110)   thiamine (B-1) injection 200 mg (200 mg Intravenous Given 3/3/24 1016)   atropine sulfate injection 0.5 mg (0.5 mg Intravenous Given 3/3/24 1022)   calcium gluconate 1000 Mg/50ml 0.675% NaCl IV SOLN (1,000 mg Intravenous New Bag 3/3/24 1040)   sodium bicarbonate  injection 8.4% 50 mEq (50 mEq Intravenous Given 3/3/24 1121)   insulin regular (humuLIN R,novoLIN R) injection 5 Units (5 Units Intravenous Given 3/3/24 1120)   dextrose (D50W) (25 g/50 mL) IV injection 25 g (25 g Intravenous Given 3/3/24 1118)   sodium zirconium cyclosilicate (LOKELMA) pack 10 g (10 g Oral Given 3/3/24 1113)       PROGRESS, DATA ANALYSIS, CONSULTS, AND MEDICAL DECISION MAKING  A complete history and physical exam have been performed.  All available laboratory and imaging results have been reviewed by myself prior to disposition.    MDM    After the initial H&P, I discussed pertinent information from history and physical exam with patient/family.  Discussed differential diagnosis.  Discussed plan for ED evaluation/workup/treatment.  All questions answered.  Patient/family is agreeable with plan.  ED Course as of 03/03/24 1122   Sun Mar 03, 2024   0940 EKG independently viewed and contemporaneously interpreted by ED physician. Time: 9:37 AM.  Rate 43.  Interpretation: Normal sinus rhythm, normal axis, normal QRS, borderline ST elevation in V2 and V3, no ST depression or T wave inversion. [JG]   0940 When compared with prior EKG on 3/2/2024, no acute changes are present.  Borderline ST elevation was present on prior EKGs. [JG]   1012 Patient reports that he believes he took extra of Haldol but patient is bradycardic with borderline hypotension.  Review of medication shows patient is both on a beta-blocker as well as a calcium channel blocker.  I am suspicious that patient may have overdosed on 1 of these medications.  Obtaining lab work, monitoring, consulting with poison control, giving IV fluids. [JG]   1017 Patient bradycardic with hypotension, given atropine. [JG]   1037 Patient responding to atropine and IV fluids, blood pressure currently over 100. [JG]   1038 Nursing staff spoke with poison control, agree likely beta-blocker versus calcium channel blocker overdose, recommend 24-hour observation  period. [JG]   1047 Acute on chronic kidney injury with creatinine 2.81, was 1.7 yesterday.  Potassium 6.8.  Patient receiving IV fluids, giving hyperkalemia cocktail. [JG]   1100 Consulted nephrology. [JG]   1101 High-sensitivity troponin 54, increased from baseline which appears to be in the 30s.  EKG showed no acute findings.  Elevated troponin from baseline felt to be secondary to acute on chronic kidney injury.  Obtaining repeat troponin to evaluate. [JG]   1103 Phone call with Dr. Ramirez, nephrology.  Discussed the patient, relevant history, exam, diagnostics, ED findings/progress, and concerns.  They agree to consult.    [JG]   1119 Phone call with Dr. Cole.  Discussed the patient, relevant history, exam, diagnostics, ED findings/progress, and concerns. They agree to admit the patient to obs tele. Care assumed by the admitting physician at this time.     [JG]      ED Course User Index  [JG] Bill Anna MD       AS OF 11:22 EST VITALS:    BP - 114/53  HR - (!) 44  TEMP - 98 °F (36.7 °C)  O2 SATS - 96%    DIAGNOSIS  Final diagnoses:   Accidental overdose, initial encounter   Bradycardia   Hypotension, unspecified hypotension type   Acute renal failure superimposed on chronic kidney disease, unspecified acute renal failure type, unspecified CKD stage   Hyperkalemia     Critical care:  Total critical care time of 50 minutes is exclusive of any other billable procedures and includes time spent with direct patient care and observation, retrospective chart review, management of acute condition, and consultation with other physicians.      DISPOSITION  ADMISSION    Discussed treatment plan and reason for admission with pt/family and admitting physician.  Pt/family voiced understanding of the plan for admission for further testing/treatment as needed.        Bill Anna MD  03/03/24 [id number]

## 2024-03-03 NOTE — H&P
History and physical    Primary care physician      Chief complaint  Drug overdose  Alcohol abuse    History of present illness  73-year-old -American male who is well-known to our service and was recently discharged from the hospital after prolonged hospitalization due to alcohol withdrawal syndrome brought back to the emergency room twice in the last 24 hours with drug overdose with Haldol and started back on alcohol abuse and workup in ER revealed hyperkalemia with bradycardia and received atropine and hyperkalemia protocol admitted for management.  Patient fully alert oriented answer all question appropriately in no respite distress and denies any chest pain shortness of breath palpitation abdominal pain nausea vomiting diarrhea.    PAST MEDICAL HISTORY   Alcohol abuse      Arthritis      Disease of thyroid gland      Elevated cholesterol      GERD (gastroesophageal reflux disease)      History of transfusion      Hypertensive urgency      Myocardial infarction      Sleep apnea      Stroke        PAST SURGICAL HISTORY              Procedure Laterality Date    BACK SURGERY        CARDIAC CATHETERIZATION        CARDIAC ELECTROPHYSIOLOGY PROCEDURE N/A 4/10/2023     Procedure: Temporary Pacemaker;  Surgeon: Vaibhav Bonilla MD;  Location: [facility] INVASIVE LOCATION;  Service: Cardiovascular;  Laterality: N/A;    COLONOSCOPY        ENDOSCOPY        FRACTURE SURGERY        JOINT REPLACEMENT        SKIN BIOPSY        TOTAL HIP ARTHROPLASTY Right 06/27/2022     Procedure: TOTAL HIP ARTHROPLASTY ANTERIOR WITH HANA TABLE;  Surgeon: Willian Quiles MD;  Location: [facility] OR;  Service: Orthopedics;  Laterality: Right;  carli Bhatt. hana         FAMILY HISTORY  No family history on file.     SOCIAL HISTORY                 Socioeconomic History    Marital status: Single   Tobacco Use    Smoking status: Every Day       Current packs/day: 0.50       Types: Cigarettes    Smokeless tobacco:  "Never    Tobacco comments:       tried to provide education declined irritated w/ attempt smoked \" depends on what I feel like.\"   Vaping Use    Vaping status: Never Used   Substance and Sexual Activity    Alcohol use: Not Currently    Drug use: No    Sexual activity: Defer         ALLERGIES  Mirtazapine and Sertraline  Home medications reviewed     REVIEW OF SYSTEMS  All systems reviewed and negative except for those discussed in HPI.      PHYSICAL EXAM  Blood pressure 120/53, pulse (!) 43, temperature 98 °F (36.7 °C), resp. rate 16, height 175.3 cm (69\"), weight 60.3 kg (133 lb), SpO2 99%.    General: Awake and alert no acute distress.   HENT: NCAT, PERRL, Nares patent.  Eyes: no scleral icterus.  Neck: trachea midline, no ROM limitations.  CV: regular rhythm, regular rate.  Respiratory: normal effort, CTAB.  Abdomen: soft, nondistended, NTTP, no rebound tenderness, no guarding or rigidity.  Musculoskeletal: no deformity.  Neuro: alert, moves all extremities, follows commands.  Skin: warm, dry.     LAB RESULTS  Lab Results (last 24 hours)       Procedure Component Value Units Date/Time    Hepatitis B Surface Antigen [[id number]]  (Normal) Collected: 03/03/24 1213    Specimen: Blood Updated: 03/03/24 1252     Hepatitis B Surface Ag Non-Reactive    Basic Metabolic Panel [[id number]]  (Abnormal) Collected: 03/03/24 1213    Specimen: Blood Updated: 03/03/24 1244     Glucose 199 mg/dL      BUN 26 mg/dL      Creatinine 2.59 mg/dL      Sodium 137 mmol/L      Potassium 5.2 mmol/L      Chloride 100 mmol/L      CO2 25.0 mmol/L      Calcium 12.5 mg/dL      BUN/Creatinine Ratio 10.0     Anion Gap 12.0 mmol/L      eGFR 25.1 mL/min/1.73     Narrative:      GFR Normal >60  Chronic Kidney Disease <60  Kidney Failure <15    The GFR formula is only valid for adults with stable renal function between ages 18 and 70.    High Sensitivity Troponin T 2Hr [[id number]]  (Abnormal) Collected: 03/03/24 1213    Specimen: Blood Updated: 03/03/24 " 1244     HS Troponin T 47 ng/L      Troponin T Delta -7 ng/L     Narrative:      High Sensitive Troponin T Reference Range:  <14.0 ng/L- Negative Female for AMI  <22.0 ng/L- Negative Male for AMI  >=14 - Abnormal Female indicating possible myocardial injury.  >=22 - Abnormal Male indicating possible myocardial injury.   Clinicians would have to utilize clinical acumen, EKG, Troponin, and serial changes to determine if it is an Acute Myocardial Infarction or myocardial injury due to an underlying chronic condition.         POC Glucose Once [[id number]]  (Abnormal) Collected: 03/03/24 1120    Specimen: Blood Updated: 03/03/24 1122     Glucose 150 mg/dL     Comprehensive Metabolic Panel [[id number]]  (Abnormal) Collected: 03/03/24 1002    Specimen: Blood Updated: 03/03/24 1046     Glucose 195 mg/dL      BUN 26 mg/dL      Creatinine 2.81 mg/dL      Sodium 137 mmol/L      Potassium 6.8 mmol/L      Chloride 98 mmol/L      CO2 22.0 mmol/L      Calcium 10.3 mg/dL      Total Protein 6.8 g/dL      Albumin 4.2 g/dL      ALT (SGPT) 67 U/L      AST (SGOT) 97 U/L      Alkaline Phosphatase 114 U/L      Total Bilirubin 0.3 mg/dL      Globulin 2.6 gm/dL      A/G Ratio 1.6 g/dL      BUN/Creatinine Ratio 9.3     Anion Gap 17.0 mmol/L      eGFR 22.7 mL/min/1.73     Narrative:      GFR Normal >60  Chronic Kidney Disease <60  Kidney Failure <15    The GFR formula is only valid for adults with stable renal function between ages 18 and 70.    High Sensitivity Troponin T [[id number]]  (Abnormal) Collected: 03/03/24 1002    Specimen: Blood Updated: 03/03/24 1046     HS Troponin T 54 ng/L     Narrative:      High Sensitive Troponin T Reference Range:  <14.0 ng/L- Negative Female for AMI  <22.0 ng/L- Negative Male for AMI  >=14 - Abnormal Female indicating possible myocardial injury.  >=22 - Abnormal Male indicating possible myocardial injury.   Clinicians would have to utilize clinical acumen, EKG, Troponin, and serial changes to determine if it  "is an Acute Myocardial Infarction or myocardial injury due to an underlying chronic condition.         Procalcitonin [[id number]]  (Normal) Collected: 03/03/24 1002    Specimen: Blood Updated: 03/03/24 1037     Procalcitonin 0.25 ng/mL     Narrative:      As a Marker for Sepsis (Non-Neonates):    1. <0.5 ng/mL represents a low risk of severe sepsis and/or septic shock.  2. >2 ng/mL represents a high risk of severe sepsis and/or septic shock.    As a Marker for Lower Respiratory Tract Infections that require antibiotic therapy:    PCT on Admission    Antibiotic Therapy       6-12 Hrs later    >0.5                Strongly Recommended  >0.25 - <0.5        Recommended   0.1 - 0.25          Discouraged              Remeasure/reassess PCT  <0.1                Strongly Discouraged     Remeasure/reassess PCT    As 28 day mortality risk marker: \"Change in Procalcitonin Result\" (>80% or <=80%) if Day 0 (or Day 1) and Day 4 values are available. Refer to http://[email or web address]pct-[email or web address]    Change in PCT <=80%  A decrease of PCT levels below or equal to 80% defines a positive change in PCT test result representing a higher risk for 28-day all-cause mortality of patients diagnosed with severe sepsis for septic shock.    Change in PCT >80%  A decrease of PCT levels of more than 80% defines a negative change in PCT result representing a lower risk for 28-day all-cause mortality of patients diagnosed with severe sepsis or septic shock.       Ethanol [[id number]] Collected: 03/03/24 1002    Specimen: Blood Updated: 03/03/24 1033     Ethanol <10 mg/dL      Ethanol % <0.010 %     Salicylate Level [[id number]]  (Normal) Collected: 03/03/24 1002    Specimen: Blood Updated: 03/03/24 1033     Salicylate <0.3 mg/dL     Narrative:      Therapeutic range for Salicylates:  3.0 - 10.0 mg/dL for antipyretic/analgesic conditions  15.0 - 30.0 mg/dL for anti-inflammatory conditions    Acetaminophen Level [[id number]]  (Normal) Collected: 03/03/24 " 1002    Specimen: Blood Updated: 03/03/24 1033     Acetaminophen <5.0 mcg/mL     CBC & Differential [[id number]]  (Abnormal) Collected: 03/03/24 1002    Specimen: Blood Updated: 03/03/24 1013    Narrative:      The following orders were created for panel order CBC & Differential.  Procedure                               Abnormality         Status                     ---------                               -----------         ------                     CBC Auto Differential[[id number]]        Abnormal            Final result                 Please view results for these tests on the individual orders.    CBC Auto Differential [[id number]]  (Abnormal) Collected: 03/03/24 1002    Specimen: Blood Updated: 03/03/24 1013     WBC 6.59 10*3/mm3      RBC 3.60 10*6/mm3      Hemoglobin 11.6 g/dL      Hematocrit 36.7 %      .9 fL      MCH 32.2 pg      MCHC 31.6 g/dL      RDW 12.7 %      RDW-SD 48.7 fl      MPV 9.6 fL      Platelets 169 10*3/mm3      Neutrophil % 78.0 %      Lymphocyte % 13.7 %      Monocyte % 6.5 %      Eosinophil % 0.3 %      Basophil % 0.3 %      Immature Grans % 1.2 %      Neutrophils, Absolute 5.14 10*3/mm3      Lymphocytes, Absolute 0.90 10*3/mm3      Monocytes, Absolute 0.43 10*3/mm3      Eosinophils, Absolute 0.02 10*3/mm3      Basophils, Absolute 0.02 10*3/mm3      Immature Grans, Absolute 0.08 10*3/mm3      nRBC 1.2 /100 WBC           Imaging Results (Last 24 Hours)       Procedure Component Value Units Date/Time    XR Chest 1 View [[id number]] Collected: 03/03/24 1039     Updated: 03/03/24 1043    Narrative:      XR CHEST 1 VW-        INDICATION: Hypotension     COMPARISON: None     TECHNIQUE: 1 view chest     FINDINGS:      No focal opacity. No effusions. Normal mediastinal contour. Coronary  stent. Heart is normal in size. Old left rib fractures.       Impression:      No acute cardiopulmonary process     This report was finalized on 3/3/2024 10:40 AM by Dr. Holland Altamirano M.D on Workstation:  [id number]             Results  [facility] 3/3/2024 0938 [facility]         Author: -- Service: -- Author Type: --   Filed: Date of Service: Creation Time:   Status: (Other)   HEART RATE= 43  bpm  RR Interval= 1395  ms  AZ Interval= 255  ms  P Horizontal Axis= -27  deg  P Front Axis= 82  deg  QRSD Interval= 92  ms  QT Interval= 510  ms  QTcB= 432  ms  QRS Axis= 60  deg  T Wave Axis= 27  deg  - ABNORMAL ECG -  Sinus bradycardia  Prolonged AZ interval  Borderline ST elevation, anterior leads          Current Facility-Administered Medications:     famotidine (PEPCID) tablet 20 mg, 20 mg, Oral, BID, Jolie Cole MD    folic acid (FOLVITE) tablet 1 mg, 1 mg, Oral, Daily, Jolie Cole MD    multivitamin (THERAGRAN) tablet 1 tablet, 1 tablet, Oral, Daily, Jolie Cole MD    sodium chloride 0.9 % infusion, 75 mL/hr, Intravenous, Continuous, Jolie Cole MD    thiamine (VITAMIN B-1) tablet 100 mg, 100 mg, Oral, Daily, Jolie Cole MD     ASSESSMENT  Hyperkalemia  Sinus bradycardia  Acute kidney injury  Elevated troponin  Alcohol abuse  Drug overdose with Haldol  Coronary artery disease  Paroxysmal atrial fibrillation  History of CVA  Hyperlipidemia  Chronic kidney disease stage III  Gastroesophageal reflux disease    PLAN  Admit  Hyperkalemia protocol  Monitor  IVF  Discontinue Haldol and Cardizem  Serial cardiac enzyme EKG  Cardiology consult  Nephrology and psychiatry to follow patient  Adjust home medications  Stress ulcer DVT prophylaxis  Supportive care  Patient is full code   Discussed with family and nursing staff  Follow closely and further recommendation current hospital course    JOLIE COLE MD

## 2024-03-03 NOTE — ED NOTES
Nursing report ED to floor  Yuval A Loja  73 y.o.  male    HPI :  HPI (Adult)  Stated Reason for Visit: From assisted living.  staff suspects pt took or has been taking too much haldol.  20 tablets missing from med bottle.  states took 2 this morning but is supposed to take 1 tablet.  Was seen here yesterday for AMS.  Initial HR 39 per EMS.  Pt received 0.5mg Atropine en route.  History Obtained From: EMS    Chief Complaint  Chief Complaint   Patient presents with    Hypotension       Admitting doctor:   Chion Cole MD    Admitting diagnosis:   The primary encounter diagnosis was Accidental overdose, initial encounter. Diagnoses of Bradycardia, Hypotension, unspecified hypotension type, Acute renal failure superimposed on chronic kidney disease, unspecified acute renal failure type, unspecified CKD stage, and Hyperkalemia were also pertinent to this visit.    Code status:   Current Code Status       Date Active Code Status Order ID Comments User Context       Prior            Allergies:   Mirtazapine and Sertraline    Isolation:   No active isolations    Intake and Output    Intake/Output Summary (Last 24 hours) at 3/3/2024 1125  Last data filed at 3/3/2024 1110  Gross per 24 hour   Intake 50 ml   Output --   Net 50 ml       Weight:       03/03/24  0936   Weight: 60.3 kg (133 lb)       Most recent vitals:   Vitals:    03/03/24 1024 03/03/24 1100 03/03/24 1109 03/03/24 1111   BP: (!) 77/46 114/90 124/87 114/53   BP Location:   Right arm    Patient Position:   Lying    Pulse: (!) 40 (!) 46 (!) 42 (!) 44   Resp:   16    Temp:       SpO2: 98% 98% 99% 96%   Weight:       Height:           Active LDAs/IV Access:   Lines, Drains & Airways       Active LDAs       Name Placement date Placement time Site Days    Peripheral IV 03/03/24 1003 Anterior;Left;Upper Arm 03/03/24  1003  Arm  less than 1    Peripheral IV 03/03/24 1013 Right Antecubital 03/03/24  1013  Antecubital  less than 1                    Labs (abnormal labs  have a star):   Labs Reviewed   COMPREHENSIVE METABOLIC PANEL - Abnormal; Notable for the following components:       Result Value    Glucose 195 (*)     BUN 26 (*)     Creatinine 2.81 (*)     Potassium 6.8 (*)     ALT (SGPT) 67 (*)     AST (SGOT) 97 (*)     Anion Gap 17.0 (*)     eGFR 22.7 (*)     All other components within normal limits    Narrative:     GFR Normal >60  Chronic Kidney Disease <60  Kidney Failure <15    The GFR formula is only valid for adults with stable renal function between ages 18 and 70.   TROPONIN - Abnormal; Notable for the following components:    HS Troponin T 54 (*)     All other components within normal limits    Narrative:     High Sensitive Troponin T Reference Range:  <14.0 ng/L- Negative Female for AMI  <22.0 ng/L- Negative Male for AMI  >=14 - Abnormal Female indicating possible myocardial injury.  >=22 - Abnormal Male indicating possible myocardial injury.   Clinicians would have to utilize clinical acumen, EKG, Troponin, and serial changes to determine if it is an Acute Myocardial Infarction or myocardial injury due to an underlying chronic condition.        CBC WITH AUTO DIFFERENTIAL - Abnormal; Notable for the following components:    RBC 3.60 (*)     Hemoglobin 11.6 (*)     Hematocrit 36.7 (*)     .9 (*)     Neutrophil % 78.0 (*)     Lymphocyte % 13.7 (*)     Immature Grans % 1.2 (*)     Immature Grans, Absolute 0.08 (*)     nRBC 1.2 (*)     All other components within normal limits   POCT GLUCOSE FINGERSTICK - Abnormal; Notable for the following components:    Glucose 150 (*)     All other components within normal limits   PROCALCITONIN - Normal    Narrative:     As a Marker for Sepsis (Non-Neonates):    1. <0.5 ng/mL represents a low risk of severe sepsis and/or septic shock.  2. >2 ng/mL represents a high risk of severe sepsis and/or septic shock.    As a Marker for Lower Respiratory Tract Infections that require antibiotic therapy:    PCT on Admission    Antibiotic  "Therapy       6-12 Hrs later    >0.5                Strongly Recommended  >0.25 - <0.5        Recommended   0.1 - 0.25          Discouraged              Remeasure/reassess PCT  <0.1                Strongly Discouraged     Remeasure/reassess PCT    As 28 day mortality risk marker: \"Change in Procalcitonin Result\" (>80% or <=80%) if Day 0 (or Day 1) and Day 4 values are available. Refer to [email or web address][email or web address][facility][email or web address]    Change in PCT <=80%  A decrease of PCT levels below or equal to 80% defines a positive change in PCT test result representing a higher risk for 28-day all-cause mortality of patients diagnosed with severe sepsis for septic shock.    Change in PCT >80%  A decrease of PCT levels of more than 80% defines a negative change in PCT result representing a lower risk for 28-day all-cause mortality of patients diagnosed with severe sepsis or septic shock.      SALICYLATE LEVEL - Normal    Narrative:     Therapeutic range for Salicylates:  3.0 - 10.0 mg/dL for antipyretic/analgesic conditions  15.0 - 30.0 mg/dL for anti-inflammatory conditions   ACETAMINOPHEN LEVEL - Normal   ETHANOL   URINALYSIS W/ MICROSCOPIC IF INDICATED (NO CULTURE)   URINE DRUG SCREEN   HIGH SENSITIVITIY TROPONIN T 2HR   BASIC METABOLIC PANEL   POTASSIUM   HEPATITIS B SURFACE ANTIGEN   POCT GLUCOSE FINGERSTICK   POCT GLUCOSE FINGERSTICK   POCT GLUCOSE FINGERSTICK   POCT GLUCOSE FINGERSTICK   CBC AND DIFFERENTIAL    Narrative:     The following orders were created for panel order CBC & Differential.  Procedure                               Abnormality         Status                     ---------                               -----------         ------                     CBC Auto Differential[[id number]]        Abnormal            Final result                 Please view results for these tests on the individual orders.       EKG:   ECG 12 Lead Other; hypotension   Preliminary Result   HEART RATE= 43  bpm   RR Interval= 1395  ms   PA " "Interval= 255  ms   P Horizontal Axis= -27  deg   P Front Axis= 82  deg   QRSD Interval= 92  ms   QT Interval= 510  ms   QTcB= 432  ms   QRS Axis= 60  deg   T Wave Axis= 27  deg   - ABNORMAL ECG -   Sinus bradycardia   Prolonged AR interval   Borderline ST elevation, anterior leads   Electronically Signed By:    Date and Time of Study: 2024-03-03 09:37:28          Meds given in ED:   Medications   sodium chloride 0.9 % infusion 1,000 mL (0 mL Intravenous Stopped 3/3/24 1110)   folic acid 1 mg in sodium chloride 0.9 % 50 mL IVPB (0 mg Intravenous Stopped 3/3/24 1110)   thiamine (B-1) injection 200 mg (200 mg Intravenous Given 3/3/24 1016)   atropine sulfate injection 0.5 mg (0.5 mg Intravenous Given 3/3/24 1022)   calcium gluconate 1000 Mg/50ml 0.675% NaCl IV SOLN (1,000 mg Intravenous New Bag 3/3/24 1040)   sodium bicarbonate injection 8.4% 50 mEq (50 mEq Intravenous Given 3/3/24 1121)   insulin regular (humuLIN R,novoLIN R) injection 5 Units (5 Units Intravenous Given 3/3/24 1120)   dextrose (D50W) (25 g/50 mL) IV injection 25 g (25 g Intravenous Given 3/3/24 1118)   sodium zirconium cyclosilicate (LOKELMA) pack 10 g (10 g Oral Given 3/3/24 1113)       Imaging results:  XR Chest 1 View    Result Date: 3/3/2024  No acute cardiopulmonary process  This report was finalized on 3/3/2024 10:40 AM by Dr. Holland Altamirano M.D on Workstation: [id number]      XR Chest 1 View    Result Date: 3/2/2024  No acute findings.  This report was finalized on 3/2/2024 11:22 PM by Dr. Martha Telles M.D on Workstation: [id number]       Ambulatory status:   - x1    Social issues:   Social History     Socioeconomic History    Marital status: Single   Tobacco Use    Smoking status: Every Day     Current packs/day: 0.50     Types: Cigarettes    Smokeless tobacco: Never    Tobacco comments:     tried to provide education declined irritated w/ attempt smoked \" depends on what I feel like.\"   Vaping Use    Vaping status: Never Used "   Substance and Sexual Activity    Alcohol use: Not Currently    Drug use: No    Sexual activity: Defer       Peripheral Neurovascular  Peripheral Neurovascular (Adult)  Peripheral Neurovascular WDL: WDL    Neuro Cognitive  Neuro Cognitive (Adult)  Cognitive/Neuro/Behavioral WDL: WDL  Orientation: oriented x 4    Learning  Learning Assessment (Adult)  Learning Readiness and Ability: cognitive limitation noted    Respiratory  Respiratory (Adult)  Airway WDL: WDL  Respiratory WDL  Respiratory WDL: WDL    Abdominal Pain       Pain Assessments  Pain (Adult)  (0-10) Pain Rating: Rest: 0    NIH Stroke Scale       Moiz Miles RN  03/03/24 11:25 EST

## 2024-03-03 NOTE — DISCHARGE INSTRUCTIONS
You have been given emergency department evaluation.  This evaluation is intended to rule out life-threatening conditions.  Is not a complete evaluation.  You could require further testing as determined by your primary care physician or any referred specialist.  Please follow-up with all doctors that you are referred to.  Please be sure to take your prescribed medications and follow any specific instructions in the discharge instructions.  Please follow-up with your primary care physician within 48 hours.  Please have your primary care provider recheck your blood pressure.  Rest.  Increase your fluid intake.  Stop drinking alcohol.  Please return to the emergency department if you experience chest pain, shortness of breath, abdominal pain, fever greater than 102, intractable vomiting.  Please return to the emergency department if your symptoms continue or worsen, or if you begin to experience any other concerning symptom.

## 2024-03-03 NOTE — ED PROVIDER NOTES
EMERGENCY DEPARTMENT ENCOUNTER  Room Number:  03/03  PCP: Alessia Prescott APRN  Independent Historians: Patient and EMS      HPI:  Chief Complaint: had concerns including Nausea and Weakness - Generalized.     A complete HPI/ROS/PMH/PSH/SH/FH are unobtainable due to: None    Chronic or social conditions impacting patient care (Social Determinants of Health): None      Context: Yuval Loja is a 73 y.o. male with a medical history of alcoholic ketoacidosis, alcohol dependence, methamphetamine abuse, fatty liver, metabolic acidosis, CAD, CKD, psychosis, who presents to the emergency department complaining of generalized weakness.  Patient also verbalizes complaints of associated nausea.  Symptoms started earlier tonight.  EMS informs they were called by the patient who resides at a nursing facility.  Nursing facility staff did not know the patient had called EMS.  Patient admitted to drinking 1 beer earlier today with his daughter.  Patient advises he does not currently feel nauseous.  He states generalized weakness has been persistent for quite some time.  Patient denies fever, chills, headache, lightheadedness, dizziness, numbness, tingling, unilateral extremity weakness, syncope, shortness of breath, chest pain, abdominal pain, vomiting, diarrhea, dysuria, hematuria, or other complaints.        Review of prior external notes (non-ED) -and- Review of prior external test results outside of this encounter:   March 1, 2024: Discharge summary reviewed from [facility].  Patient was admitted from February 15, 2024 to March 1, 2024.  Patient was admitted for alcohol withdrawal syndrome, CRISTIANO, pression.      PAST MEDICAL HISTORY  Active Ambulatory Problems     Diagnosis Date Noted    Alcoholic ketoacidosis 05/23/2017    Alcohol dependence 05/23/2017    Methamphetamine abuse 05/23/2017    Fatty liver, alcoholic 05/23/2017    Nausea vomiting and diarrhea 05/23/2017    Alcoholic liver disease 05/23/2017     Metabolic acidosis 05/23/2017    Tobacco abuse 05/23/2017    Coronary artery disease involving native coronary artery of native heart without angina pectoris 05/24/2017    Tachycardia 05/24/2017    Sinus tachycardia 04/17/2019    Chronic kidney disease, stage 3 04/17/2019    Medically noncompliant 04/18/2019    Visual hallucinations 04/18/2020    Psychosis 04/18/2020    Hyponatremia 04/18/2020    CAD (coronary artery disease) 04/18/2020    Leukopenia 04/18/2020    Symptomatic anemia 04/18/2020    Headache 04/18/2020    Substance abuse 04/18/2020    Gastrointestinal hemorrhage 10/22/2020    CRISTIANO (acute kidney injury) 10/23/2020    Hyperkalemia 10/23/2020    Right lower quadrant abdominal pain 03/15/2022    New onset atrial fibrillation 03/15/2022    History of drug abuse 03/15/2022    COPD (chronic obstructive pulmonary disease) 03/15/2022    Right inguinal hernia 03/15/2022    Constipation 03/15/2022    Status post right hip replacement 06/27/2022    Right hip pain 07/19/2022    Sinus bradycardia 04/10/2023    Generalized abdominal pain 06/01/2023    Altered mental status 02/04/2024    Altered mental state 02/05/2024     Resolved Ambulatory Problems     Diagnosis Date Noted    Dehydration 04/17/2019    Functional diarrhea 04/17/2019    Closed fracture of neck of right femur, initial encounter 06/25/2022     Past Medical History:   Diagnosis Date    Alcohol abuse     Arthritis     Disease of thyroid gland     Elevated cholesterol     GERD (gastroesophageal reflux disease)     History of transfusion     Hypertensive urgency     Myocardial infarction     Sleep apnea     Stroke          PAST SURGICAL HISTORY  Past Surgical History:   Procedure Laterality Date    BACK SURGERY      CARDIAC CATHETERIZATION      CARDIAC ELECTROPHYSIOLOGY PROCEDURE N/A 4/10/2023    Procedure: Temporary Pacemaker;  Surgeon: Vaibhav Bonilla MD;  Location: [facility] CATH INVASIVE LOCATION;  Service: Cardiovascular;  Laterality: N/A;     "COLONOSCOPY      ENDOSCOPY      FRACTURE SURGERY      JOINT REPLACEMENT      SKIN BIOPSY      TOTAL HIP ARTHROPLASTY Right 06/27/2022    Procedure: TOTAL HIP ARTHROPLASTY ANTERIOR WITH HANA TABLE;  Surgeon: Willian Quiles MD;  Location: [facility] OR;  Service: Orthopedics;  Laterality: Right;  Nova, carli. hana         FAMILY HISTORY  No family history on file.      SOCIAL HISTORY  Social History     Socioeconomic History    Marital status: Single   Tobacco Use    Smoking status: Every Day     Current packs/day: 0.50     Types: Cigarettes    Smokeless tobacco: Never    Tobacco comments:     tried to provide education declined irritated w/ attempt smoked \" depends on what I feel like.\"   Vaping Use    Vaping status: Never Used   Substance and Sexual Activity    Alcohol use: Not Currently    Drug use: No    Sexual activity: Defer         ALLERGIES  Mirtazapine and Sertraline      REVIEW OF SYSTEMS  Included in HPI  All systems reviewed and negative except for those discussed in HPI.      PHYSICAL EXAM    I have reviewed the triage vital signs and nursing notes.    ED Triage Vitals [03/02/24 2138]   Temp Heart Rate Resp BP SpO2   98.7 °F (37.1 °C) 54 18 103/53 97 %      Temp src Heart Rate Source Patient Position BP Location FiO2 (%)   Oral Monitor -- -- --       Physical Exam  Vitals and nursing note reviewed.   Constitutional:       General: He is awake. He is not in acute distress.     Appearance: He is not toxic-appearing.   HENT:      Head: Normocephalic and atraumatic.   Eyes:      General: Lids are normal. Vision grossly intact.   Cardiovascular:      Rate and Rhythm: Normal rate and regular rhythm.      Heart sounds: Normal heart sounds.   Pulmonary:      Effort: Pulmonary effort is normal.      Breath sounds: Normal breath sounds and air entry.   Abdominal:      General: Bowel sounds are normal.      Palpations: Abdomen is soft.      Tenderness: There is no abdominal tenderness.   Musculoskeletal: "      Cervical back: Normal range of motion.   Skin:     General: Skin is warm and dry.      Coloration: Skin is not pale.   Neurological:      General: No focal deficit present.      Mental Status: He is alert and oriented to person, place, and time.      GCS: GCS eye subscore is 4. GCS verbal subscore is 5. GCS motor subscore is 6.   Psychiatric:         Attention and Perception: Attention normal.         Mood and Affect: Mood normal.         Speech: Speech normal.         Behavior: Behavior is cooperative.           LAB RESULTS  Recent Results (from the past 24 hour(s))   ECG 12 Lead Other; generalized weakness    Collection Time: 03/02/24 10:14 PM   Result Value Ref Range    QT Interval 493 ms    QTC Interval 450 ms   Comprehensive Metabolic Panel    Collection Time: 03/02/24 10:24 PM    Specimen: Blood   Result Value Ref Range    Glucose 99 65 - 99 mg/dL    BUN 20 8 - 23 mg/dL    Creatinine 1.70 (H) 0.76 - 1.27 mg/dL    Sodium 137 136 - 145 mmol/L    Potassium 4.3 3.5 - 5.2 mmol/L    Chloride 100 98 - 107 mmol/L    CO2 26.0 22.0 - 29.0 mmol/L    Calcium 10.1 8.6 - 10.5 mg/dL    Total Protein 6.6 6.0 - 8.5 g/dL    Albumin 4.0 3.5 - 5.2 g/dL    ALT (SGPT) 17 1 - 41 U/L    AST (SGOT) 23 1 - 40 U/L    Alkaline Phosphatase 64 39 - 117 U/L    Total Bilirubin <0.2 0.0 - 1.2 mg/dL    Globulin 2.6 gm/dL    A/G Ratio 1.5 g/dL    BUN/Creatinine Ratio 11.8 7.0 - 25.0    Anion Gap 11.0 5.0 - 15.0 mmol/L    eGFR 41.5 (L) >60.0 mL/min/1.73   Lipase    Collection Time: 03/02/24 10:24 PM    Specimen: Blood   Result Value Ref Range    Lipase 66 (H) 13 - 60 U/L   Ethanol    Collection Time: 03/02/24 10:24 PM    Specimen: Blood   Result Value Ref Range    Ethanol 15 (H) 0 - 10 mg/dL    Ethanol % 0.015 %   Magnesium    Collection Time: 03/02/24 10:24 PM    Specimen: Blood   Result Value Ref Range    Magnesium 1.5 (L) 1.6 - 2.4 mg/dL   Single High Sensitivity Troponin T    Collection Time: 03/02/24 10:24 PM    Specimen: Blood    Result Value Ref Range    HS Troponin T 36 (H) <22 ng/L   CBC Auto Differential    Collection Time: 03/02/24 10:24 PM    Specimen: Blood   Result Value Ref Range    WBC 4.42 3.40 - 10.80 10*3/mm3    RBC 3.30 (L) 4.14 - 5.80 10*6/mm3    Hemoglobin 10.5 (L) 13.0 - 17.7 g/dL    Hematocrit 33.3 (L) 37.5 - 51.0 %    .9 (H) 79.0 - 97.0 fL    MCH 31.8 26.6 - 33.0 pg    MCHC 31.5 31.5 - 35.7 g/dL    RDW 12.9 12.3 - 15.4 %    RDW-SD 48.0 37.0 - 54.0 fl    MPV 9.6 6.0 - 12.0 fL    Platelets 191 140 - 450 10*3/mm3    Neutrophil % 37.6 (L) 42.7 - 76.0 %    Lymphocyte % 45.7 (H) 19.6 - 45.3 %    Monocyte % 11.5 5.0 - 12.0 %    Eosinophil % 4.5 0.3 - 6.2 %    Basophil % 0.5 0.0 - 1.5 %    Immature Grans % 0.2 0.0 - 0.5 %    Neutrophils, Absolute 1.66 (L) 1.70 - 7.00 10*3/mm3    Lymphocytes, Absolute 2.02 0.70 - 3.10 10*3/mm3    Monocytes, Absolute 0.51 0.10 - 0.90 10*3/mm3    Eosinophils, Absolute 0.20 0.00 - 0.40 10*3/mm3    Basophils, Absolute 0.02 0.00 - 0.20 10*3/mm3    Immature Grans, Absolute 0.01 0.00 - 0.05 10*3/mm3    nRBC 0.5 (H) 0.0 - 0.2 /100 WBC         RADIOLOGY  XR Chest 1 View    Result Date: 3/2/2024  SINGLE VIEW OF THE CHEST  HISTORY: Generalized infection  COMPARISON: February 4, 2020  FINDINGS: There is cardiomegaly. There is no vascular congestion. No pneumothorax, pleural effusion, or acute infiltrate is seen. There is an old left-sided rib fracture. Coronary stent is noted. There is calcification of the aorta.      No acute findings.  This report was finalized on 3/2/2024 11:22 PM by Dr. Martha Telles M.D on Workstation: [id number]         MEDICATIONS GIVEN IN ER  Medications   sodium chloride 0.9 % bolus 500 mL (0 mL Intravenous Stopped 3/3/24 0015)   ondansetron (ZOFRAN) injection 4 mg (4 mg Intravenous Given 3/2/24 0095)           OUTPATIENT MEDICATION MANAGEMENT:  No current Epic-ordered facility-administered medications on file.     Current Outpatient Medications Ordered in Epic    Medication Sig Dispense Refill    dilTIAZem CD (CARDIZEM CD) 120 MG 24 hr capsule Take 1 capsule by mouth Daily for 30 days. 30 capsule 0    docusate sodium (COLACE) 100 MG capsule Take 1 capsule by mouth 2 (Two) Times a Day.      folic acid (FOLVITE) 1 MG tablet Take 1 tablet by mouth Daily for 30 days. 30 tablet 0    haloperidol (HALDOL) 2 MG tablet Take 1 tablet by mouth 3 times a day for 30 days. 90 tablet 0    hydrOXYzine (ATARAX) 25 MG tablet Take 1 tablet by mouth 4 (Four) Times a Day As Needed for Anxiety.      metoprolol tartrate (LOPRESSOR) 25 MG tablet Take 1 tablet by mouth 3 (Three) Times a Day for 30 days. 90 tablet 0    multivitamin (THERAGRAN) tablet tablet Take 1 tablet by mouth Daily for 30 days. 30 tablet 0    pantoprazole (PROTONIX) 40 MG EC tablet Take 1 tablet by mouth Daily.      rosuvastatin (CRESTOR) 5 MG tablet Take 1 tablet by mouth Every Night for 30 days. 30 tablet 0    sucralfate (CARAFATE) 1 GM/10ML suspension Take 10 mL by mouth 4 (Four) Times a Day With Meals & at Bedtime.      thiamine (VITAMIN B-1) 100 MG tablet  tablet Take 1 tablet by mouth Daily.         PROGRESS, DATA ANALYSIS, CONSULTS, AND MEDICAL DECISION MAKING  ORDERS PLACED DURING THIS VISIT:  Orders Placed This Encounter   Procedures    XR Chest 1 View    Comprehensive Metabolic Panel    Lipase    Ethanol    Magnesium    Single High Sensitivity Troponin T    CBC Auto Differential    Monitor Blood Pressure    Pulse Oximetry, Continuous    ECG 12 Lead Other; generalized weakness    CBC & Differential       All labs have been independently interpreted by me.  All radiology studies have been reviewed by me. All EKG's have been independently viewed and interpreted by me.  Discussion below represents my analysis of pertinent findings related to patient's condition, differential diagnosis, treatment plan and final disposition.    Differential diagnosis includes but is not limited to:   Alcohol intoxication, electrolyte  imbalance, anemia, pneumonia, etc.    ED Course:  ED Course as of 03/03/24 0526   Sat Mar 02, 2024   2329 Ethanol(!): 15 [AR]   2329 Magnesium(!): 1.5 [AR]   2329 Lipase(!): 66  Patient's lipase has been elevated previously after review in epic.  Patient has no left upper quadrant abdominal pain, no tenderness on exam. [AR]   2329 Creatinine(!): 1.70  Stable compared to previous. [AR]   2329 HS Troponin T(!): 36  Baseline compared to previous. [AR]   2344 I discussed the case with Dr. Bush and they agree to evaluate the patient at the bedside.    [AR]   2352 The patient was reexamined.  They have had symptomatic improvement during their ED stay.  I discussed today's findings with the patient, explaining the pertinent positives and negatives from today's visit, and the plan of care.  Discussed plan for discharge as there is no emergent indication for admission.  Discussed limitation of the ED work-up and that this is to rule out life-threatening emergencies but that they could require further testing as determined by their primary care and or any referred specialist patient is agreeable and understands need for follow-up and repeat exam/testing.  Patient is aware that discharge does not mean there is nothing wrong, indicates no emergency is present, and that they must continue their care with their primary care physician and/or any referred specialist.  They were given appropriate follow-up with their primary care physician and/or specialist.  I had an extensive discussion on the expected clinical course and return precautions.  Patient understands to return to the emergency department for continuation, worsening, or new symptoms.  I answered any of the patient's questions. Patient was discharged home in a stable condition.     [AR]      ED Course User Index  [AR] Salma Grullon APRN       MDM:  Patient is a 73-year-old male who presents emergency department with complaints of generalized weakness and nausea.   Nausea had resolved prior to my evaluation.  Labs stable compared to previous.  Imaging unremarkable.  Vital signs are stable.  Patient will be discharged, he is agreeable.  Strict return precautions given.      COMPLEXITY OF CARE  Admission was considered but after careful review of the patient's presentation, physical examination, diagnostic results, and response to treatment the patient may be safely discharged with outpatient follow-up.        DIAGNOSIS  Final diagnoses:   Nausea   Generalized weakness         DISPOSITION  ED Disposition       ED Disposition   Discharge    Condition   Stable    Comment   --                    Please note that portions of this document were completed with a voice recognition program.    Note Disclaimer: At [facility], we believe that sharing information builds trust and better relationships. You are receiving this note because you recently visited [facility]. It is possible you will see health information before a provider has talked with you about it. This kind of information can be easy to misunderstand. To help you fully understand what it means for your health, we urge you to discuss this note with your provider.       Salma Grullon, [place]  03/03/24 0548

## 2024-03-03 NOTE — ED NOTES
Patient to ED via EMS from assisted living c/o nausea and weakness x 3 days. Patient was recently seen here for the same, discharged yesterday but has not had any improvement of symptoms. Patient has hx dementia.

## 2024-03-04 LAB
ALBUMIN SERPL-MCNC: 4.1 G/DL (ref 3.5–5.2)
ALBUMIN/GLOB SERPL: 1.5 G/DL
ALP SERPL-CCNC: 104 U/L (ref 39–117)
ALT SERPL W P-5'-P-CCNC: 44 U/L (ref 1–41)
ANION GAP SERPL CALCULATED.3IONS-SCNC: 9 MMOL/L (ref 5–15)
AST SERPL-CCNC: 51 U/L (ref 1–40)
BASOPHILS # BLD AUTO: 0.02 10*3/MM3 (ref 0–0.2)
BASOPHILS NFR BLD AUTO: 0.3 % (ref 0–1.5)
BILIRUB SERPL-MCNC: 0.2 MG/DL (ref 0–1.2)
BUN SERPL-MCNC: 32 MG/DL (ref 8–23)
BUN/CREAT SERPL: 10.4 (ref 7–25)
CALCIUM SPEC-SCNC: 9.9 MG/DL (ref 8.6–10.5)
CHLORIDE SERPL-SCNC: 105 MMOL/L (ref 98–107)
CO2 SERPL-SCNC: 26 MMOL/L (ref 22–29)
CREAT SERPL-MCNC: 3.09 MG/DL (ref 0.76–1.27)
DEPRECATED RDW RBC AUTO: 43.5 FL (ref 37–54)
EGFRCR SERPLBLD CKD-EPI 2021: 20.3 ML/MIN/1.73
EOSINOPHIL # BLD AUTO: 0.06 10*3/MM3 (ref 0–0.4)
EOSINOPHIL NFR BLD AUTO: 1 % (ref 0.3–6.2)
ERYTHROCYTE [DISTWIDTH] IN BLOOD BY AUTOMATED COUNT: 12.6 % (ref 12.3–15.4)
GLOBULIN UR ELPH-MCNC: 2.7 GM/DL
GLUCOSE BLDC GLUCOMTR-MCNC: 105 MG/DL (ref 70–130)
GLUCOSE BLDC GLUCOMTR-MCNC: 115 MG/DL (ref 70–130)
GLUCOSE BLDC GLUCOMTR-MCNC: 132 MG/DL (ref 70–130)
GLUCOSE SERPL-MCNC: 98 MG/DL (ref 65–99)
HCT VFR BLD AUTO: 32 % (ref 37.5–51)
HGB BLD-MCNC: 10.5 G/DL (ref 13–17.7)
IMM GRANULOCYTES # BLD AUTO: 0.03 10*3/MM3 (ref 0–0.05)
IMM GRANULOCYTES NFR BLD AUTO: 0.5 % (ref 0–0.5)
LYMPHOCYTES # BLD AUTO: 1.13 10*3/MM3 (ref 0.7–3.1)
LYMPHOCYTES NFR BLD AUTO: 18.3 % (ref 19.6–45.3)
MAGNESIUM SERPL-MCNC: 1.8 MG/DL (ref 1.6–2.4)
MCH RBC QN AUTO: 31.3 PG (ref 26.6–33)
MCHC RBC AUTO-ENTMCNC: 32.8 G/DL (ref 31.5–35.7)
MCV RBC AUTO: 95.2 FL (ref 79–97)
MONOCYTES # BLD AUTO: 0.71 10*3/MM3 (ref 0.1–0.9)
MONOCYTES NFR BLD AUTO: 11.5 % (ref 5–12)
NEUTROPHILS NFR BLD AUTO: 4.21 10*3/MM3 (ref 1.7–7)
NEUTROPHILS NFR BLD AUTO: 68.4 % (ref 42.7–76)
NRBC BLD AUTO-RTO: 0 /100 WBC (ref 0–0.2)
PLATELET # BLD AUTO: 182 10*3/MM3 (ref 140–450)
PMV BLD AUTO: 10 FL (ref 6–12)
POTASSIUM SERPL-SCNC: 4.5 MMOL/L (ref 3.5–5.2)
PROT SERPL-MCNC: 6.8 G/DL (ref 6–8.5)
RBC # BLD AUTO: 3.36 10*6/MM3 (ref 4.14–5.8)
SODIUM SERPL-SCNC: 140 MMOL/L (ref 136–145)
TROPONIN T SERPL HS-MCNC: 48 NG/L
WBC NRBC COR # BLD AUTO: 6.16 10*3/MM3 (ref 3.4–10.8)

## 2024-03-04 PROCEDURE — G0378 HOSPITAL OBSERVATION PER HR: HCPCS

## 2024-03-04 PROCEDURE — 83735 ASSAY OF MAGNESIUM: CPT | Performed by: INTERNAL MEDICINE

## 2024-03-04 PROCEDURE — 80053 COMPREHEN METABOLIC PANEL: CPT | Performed by: HOSPITALIST

## 2024-03-04 PROCEDURE — 84484 ASSAY OF TROPONIN QUANT: CPT | Performed by: HOSPITALIST

## 2024-03-04 PROCEDURE — 99214 OFFICE O/P EST MOD 30 MIN: CPT | Performed by: NURSE PRACTITIONER

## 2024-03-04 PROCEDURE — 97535 SELF CARE MNGMENT TRAINING: CPT

## 2024-03-04 PROCEDURE — 97116 GAIT TRAINING THERAPY: CPT

## 2024-03-04 PROCEDURE — 82948 REAGENT STRIP/BLOOD GLUCOSE: CPT

## 2024-03-04 PROCEDURE — 85025 COMPLETE CBC W/AUTO DIFF WBC: CPT | Performed by: HOSPITALIST

## 2024-03-04 PROCEDURE — 97166 OT EVAL MOD COMPLEX 45 MIN: CPT

## 2024-03-04 PROCEDURE — 97161 PT EVAL LOW COMPLEX 20 MIN: CPT

## 2024-03-04 RX ORDER — CHOLECALCIFEROL (VITAMIN D3) 125 MCG
5 CAPSULE ORAL NIGHTLY PRN
Status: DISCONTINUED | OUTPATIENT
Start: 2024-03-04 | End: 2024-03-05

## 2024-03-04 RX ADMIN — FAMOTIDINE 20 MG: 20 TABLET, FILM COATED ORAL at 21:56

## 2024-03-04 NOTE — NURSING NOTE
Now has sitter. Patient got out of bed several times at the beginning of shift, insistent on walking around. After some conversation, he was agreeable to staying in the bed and did so for the remainder of the shift, though required the presence of the sitter to do so. Bradycardic in the 40s most of the night but asymptomatic. LAC IV inadvertently DCD by patient. RAC infiltrated around 0500. Patient is adamant that nephrology told him that his kidneys were in great shape and plans on being discharged today, thus is refusing to be stuck for IV or labs until he talks to the doctor. I tried explaining necessity but he is certain that I am wrong. VSS. RA.

## 2024-03-04 NOTE — PROGRESS NOTES
"Daily progress note    Primary care physician      Subjective  Awake and alert in no distress and following commands but remains pleasantly confused    History of present illness  73-year-old -American male who is well-known to our service and was recently discharged from the hospital after prolonged hospitalization due to alcohol withdrawal syndrome brought back to the emergency room twice in the last 24 hours with drug overdose with Haldol and started back on alcohol abuse and workup in ER revealed hyperkalemia with bradycardia and received atropine and hyperkalemia protocol admitted for management.  Patient fully alert oriented answer all question appropriately in no respite distress and denies any chest pain shortness of breath palpitation abdominal pain nausea vomiting diarrhea.     REVIEW OF SYSTEMS  All systems reviewed and negative except for those discussed in HPI.      PHYSICAL EXAM  Blood pressure 107/92, pulse (!) 43, temperature 97.5 °F (36.4 °C), temperature source Oral, resp. rate 16, height 175.3 cm (69\"), weight 60.3 kg (133 lb), SpO2 99%.    General: Awake and alert no acute distress.   HENT: NCAT, PERRL, Nares patent.  Eyes: no scleral icterus.  Neck: trachea midline, no ROM limitations.  CV: regular rhythm, regular rate.  Respiratory: normal effort, CTAB.  Abdomen: soft, nondistended, NTTP, no rebound tenderness, no guarding or rigidity.  Musculoskeletal: no deformity.  Neuro: alert, moves all extremities, follows commands.  Skin: warm, dry.     LAB RESULTS  Lab Results (last 24 hours)       Procedure Component Value Units Date/Time    POC Glucose Once [[id number]]  (Normal) Collected: 03/04/24 1135    Specimen: Blood Updated: 03/04/24 1136     Glucose 115 mg/dL     Comprehensive Metabolic Panel [[id number]]  (Abnormal) Collected: 03/04/24 1014    Specimen: Blood Updated: 03/04/24 1108     Glucose 98 mg/dL      BUN 32 mg/dL      Creatinine 3.09 mg/dL      Sodium 140 mmol/L      " Potassium 4.5 mmol/L      Chloride 105 mmol/L      CO2 26.0 mmol/L      Calcium 9.9 mg/dL      Total Protein 6.8 g/dL      Albumin 4.1 g/dL      ALT (SGPT) 44 U/L      AST (SGOT) 51 U/L      Alkaline Phosphatase 104 U/L      Total Bilirubin 0.2 mg/dL      Globulin 2.7 gm/dL      A/G Ratio 1.5 g/dL      BUN/Creatinine Ratio 10.4     Anion Gap 9.0 mmol/L      eGFR 20.3 mL/min/1.73     Narrative:      GFR Normal >60  Chronic Kidney Disease <60  Kidney Failure <15    The GFR formula is only valid for adults with stable renal function between ages 18 and 70.    Magnesium [[id number]]  (Normal) Collected: 03/04/24 1014    Specimen: Blood Updated: 03/04/24 1100     Magnesium 1.8 mg/dL     High Sensitivity Troponin T [[id number]]  (Abnormal) Collected: 03/04/24 1014    Specimen: Blood Updated: 03/04/24 1059     HS Troponin T 48 ng/L     Narrative:      High Sensitive Troponin T Reference Range:  <14.0 ng/L- Negative Female for AMI  <22.0 ng/L- Negative Male for AMI  >=14 - Abnormal Female indicating possible myocardial injury.  >=22 - Abnormal Male indicating possible myocardial injury.   Clinicians would have to utilize clinical acumen, EKG, Troponin, and serial changes to determine if it is an Acute Myocardial Infarction or myocardial injury due to an underlying chronic condition.         CBC & Differential [[id number]]  (Abnormal) Collected: 03/04/24 1014    Specimen: Blood Updated: 03/04/24 1051    Narrative:      The following orders were created for panel order CBC & Differential.  Procedure                               Abnormality         Status                     ---------                               -----------         ------                     CBC Auto Differential[[id number]]        Abnormal            Final result                 Please view results for these tests on the individual orders.    CBC Auto Differential [[id number]]  (Abnormal) Collected: 03/04/24 1014    Specimen: Blood Updated: 03/04/24 1051      WBC 6.16 10*3/mm3      RBC 3.36 10*6/mm3      Hemoglobin 10.5 g/dL      Hematocrit 32.0 %      MCV 95.2 fL      MCH 31.3 pg      MCHC 32.8 g/dL      RDW 12.6 %      RDW-SD 43.5 fl      MPV 10.0 fL      Platelets 182 10*3/mm3      Neutrophil % 68.4 %      Lymphocyte % 18.3 %      Monocyte % 11.5 %      Eosinophil % 1.0 %      Basophil % 0.3 %      Immature Grans % 0.5 %      Neutrophils, Absolute 4.21 10*3/mm3      Lymphocytes, Absolute 1.13 10*3/mm3      Monocytes, Absolute 0.71 10*3/mm3      Eosinophils, Absolute 0.06 10*3/mm3      Basophils, Absolute 0.02 10*3/mm3      Immature Grans, Absolute 0.03 10*3/mm3      nRBC 0.0 /100 WBC     POC Glucose Once [[id number]]  (Normal) Collected: 03/04/24 0633    Specimen: Blood Updated: 03/04/24 0635     Glucose 105 mg/dL     POC Glucose Once [[id number]]  (Abnormal) Collected: 03/03/24 1645    Specimen: Blood Updated: 03/03/24 1647     Glucose 136 mg/dL     Basic Metabolic Panel [[id number]]  (Abnormal) Collected: 03/03/24 1558    Specimen: Blood Updated: 03/03/24 1631     Glucose 128 mg/dL      BUN 26 mg/dL      Creatinine 2.71 mg/dL      Sodium 137 mmol/L      Potassium 5.3 mmol/L      Chloride 100 mmol/L      CO2 21.0 mmol/L      Calcium 9.8 mg/dL      BUN/Creatinine Ratio 9.6     Anion Gap 16.0 mmol/L      eGFR 23.7 mL/min/1.73     Narrative:      GFR Normal >60  Chronic Kidney Disease <60  Kidney Failure <15    The GFR formula is only valid for adults with stable renal function between ages 18 and 70.    Magnesium [[id number]]  (Normal) Collected: 03/03/24 1213    Specimen: Blood Updated: 03/03/24 1347     Magnesium 1.8 mg/dL           Imaging Results (Last 24 Hours)       ** No results found for the last 24 hours. **          Results  Scan on 3/3/2024 0938 by [employer], [place]: ECG 12-LEAD         Author: -- Service: -- Author Type: --   Filed: Date of Service: Creation Time:   Status: (Other)   HEART RATE= 43  bpm  RR Interval= 1395  ms  MA Interval= 255  ms  P  Horizontal Axis= -27  deg  P Front Axis= 82  deg  QRSD Interval= 92  ms  QT Interval= 510  ms  QTcB= 432  ms  QRS Axis= 60  deg  T Wave Axis= 27  deg  - ABNORMAL ECG -  Sinus bradycardia  Prolonged NM interval  Borderline ST elevation, anterior leads          Current Facility-Administered Medications:     dextrose (D50W) (25 g/50 mL) IV injection 25 g, 25 g, Intravenous, Q15 Min PRN, Jolie Bundy MD    dextrose (GLUTOSE) oral gel 15 g, 15 g, Oral, Q15 Min PRN, Jolie Bundy MD    famotidine (PEPCID) tablet 20 mg, 20 mg, Oral, BID, Jolie Bundy MD    folic acid (FOLVITE) tablet 1 mg, 1 mg, Oral, Daily, Jolie Bundy MD, 1 mg at 03/03/24 1341    glucagon (GLUCAGEN) injection 1 mg, 1 mg, Intramuscular, Q15 Min PRN, Jolie Bundy MD    insulin lispro (HUMALOG/ADMELOG) injection 2-7 Units, 2-7 Units, Subcutaneous, 4x Daily AC & at Bedtime, Jolie Bundy MD    multivitamin (THERAGRAN) tablet 1 tablet, 1 tablet, Oral, Daily, Jolie Bundy MD, 1 tablet at 03/03/24 1341    sodium chloride 0.9 % infusion, 100 mL/hr, Intravenous, Continuous, Holland Lindsay MD, Stopped at 03/04/24 0557    thiamine (VITAMIN B-1) tablet 100 mg, 100 mg, Oral, Daily, Jolie Bundy MD, 100 mg at 03/03/24 1418     ASSESSMENT  Hyperkalemia resolved  Sinus bradycardia improved  Acute kidney injury  Elevated troponin  Alcohol abuse  Drug overdose with Haldol  Coronary artery disease  Paroxysmal atrial fibrillation  History of CVA  Hyperlipidemia  Chronic kidney disease stage III  Gastroesophageal reflux disease    PLAN  CPM  Continue IVF  Discontinue Haldol and Cardizem  Serial cardiac enzyme EKG  Cardiology consult appreciated  Nephrology and psychiatry to follow patient  Adjust home medications  Stress ulcer DVT prophylaxis  Supportive care  PT/OT  Discussed with family and nursing staff  Follow closely and further recommendation current hospital course    JOLIE BUNDY MD    Copied text in this note has been reviewed and is accurate as of  03/04/24

## 2024-03-04 NOTE — PLAN OF CARE
Goal Outcome Evaluation:  Plan of Care Reviewed With: patient           Outcome Evaluation: Pt is a 74 yo F admitted from [facility] with accidental drug overdose. Work-up revealed hyperkalemia with bradycardia. Per chart, pt with recently length admission and was DC'd to [facility], though there is some concern he may need a higher level of care at his facility for assist with medication management, etc. Pt presents to PT with impaired strength, endurance, and balance limiting overall mobility. Pt sitting UI on arrival, sitter present. Pt reports walking 12-15 miles/day but questionable historian. Pt stood with SV and ambulated 150ft with rwx and SBA-SV. Pt fairly steady but with slow pace and very shuffled steps. Cued for increased foot clearance and pt c/o B ingrown toenails that are painful and impair toe-off. Pt returned to room and left sitting UI with needs met. PT will continue to follow peripherally, anticipate return to [facility] with HHPT at [place].      Anticipated Discharge Disposition (PT): home with home health, assisted living

## 2024-03-04 NOTE — CONSULTS
IDENTIFYING INFORMATION: The patient is a 73-year-old -American male with a history of alcoholism and dementia brought to the emergency room following an ingestion of Haldol which was apparently unintentional    CHIEF COMPLAINT: None given    INFORMANT: Patient and chart    RELIABILITY: Fair    HISTORY OF PRESENT ILLNESS: The patient is a 73-year-old -American male admitted to the hospital after he had reportedly ingested approximately 10 Haldol tablets.  The patient denies that this was a suicide attempt and denies current suicidal or homicidal ideation.  The patient was previously seen by this physician earlier this year with some behavioral issues but there have been none since this admission.  He is pleasantly confused when seen today and vehemently denies any suicidal ideations in his ingestion.  For more complete history of present illness please refer to previous dictated notes    PAST PSYCHIATRIC HISTORY: Reviewed no changes    PAST MEDICAL HISTORY: Reviewed no changes    MEDICATIONS:   Current Facility-Administered Medications   Medication Dose Route Frequency Provider Last Rate Last Admin    dextrose (D50W) (25 g/50 mL) IV injection 25 g  25 g Intravenous Q15 Min PRN Chino Cole MD        dextrose (GLUTOSE) oral gel 15 g  15 g Oral Q15 Min PRN Chino Cole MD        famotidine (PEPCID) tablet 20 mg  20 mg Oral BID Chino Cole MD        folic acid (FOLVITE) tablet 1 mg  1 mg Oral Daily Chino Cole MD   1 mg at 03/03/24 1341    glucagon (GLUCAGEN) injection 1 mg  1 mg Intramuscular Q15 Min PRN Chino Cole MD        insulin lispro (HUMALOG/ADMELOG) injection 2-7 Units  2-7 Units Subcutaneous 4x Daily AC & at Bedtime Chino Cole MD        multivitamin (THERAGRAN) tablet 1 tablet  1 tablet Oral Daily Chino Cole MD   1 tablet at 03/03/24 1341    sodium chloride 0.9 % infusion  100 mL/hr Intravenous Continuous Holland Lindsay MD   Stopped at 03/04/24 0557    thiamine (VITAMIN  B-1) tablet 100 mg  100 mg Oral Daily Chino Cole MD   100 mg at 03/03/24 7088         ALLERGIES: Mirtazapine and sertraline    FAMILY HISTORY: Reviewed no changes    SOCIAL HISTORY: Reviewed no changes    MENTAL STATUS EXAM: Patient is an elderly -American male appearing his stated age.  He has no apparent physical distress at the time of examination.  He is awake alert and oriented to person only.  His mood is calm and euthymic his affect congruent.  Speech is generally relevant and coherent though impoverished.  The patient does not comply with formal testing of memory and cognition.  He does not appear to be responding to internal stimuli.  He vehemently denies any suicidal intent in his ingestion and denies current suicidal ideation.  Judgment and insight appear to be impaired.    IMP: Primary dementia Alzheimer's type with behavioral disturbance, alcohol use disorder by history, medical problems as have been previously documented    PLAN: I would recommend continuation of the patient's previously prescribed psychotropic medications.  The patient vehemently denies that this was a suicide attempt and I am fully accepting of this.  When discharged, I would certainly suggest that the patient's medications be safeguarded as he does not in the opinion of this physician have the capacity to consistently and responsibly comply with medications.  From a psychiatric standpoint the patient appears to be at baseline and I will sign off.

## 2024-03-04 NOTE — PROGRESS NOTES
[facility] Progress Note      Patient Name: Yuavl Loja  : 1950  MRN: [id number]  Primary Care Physician:  Alessia Prescott APRN  Date of admission: 3/3/2024    Subjective     Interval History:   States he is eating well, but aide at bedside reports he just picks at food  Denies dizziness with walking  Breathing is comfortable on room air    Review of Systems:   As noted above    Objective     Vitals:        Intake/Output Summary (Last 24 hours) at 3/4/2024 1519  Last data filed at 3/4/2024 1321  Gross per 24 hour   Intake 220 ml   Output --   Net 220 ml       Physical Exam:    Constitutional: Awake, appropriate, frail, chronically ill  HEENT: Sclera anicteric, no conjunctival injection, MMM  Neck: Supple, no carotid bruit, trachea at midline, no JVD  Respiratory: Diminished, no crackles; not labored  Cardiovascular: Irregularly irregular, bradycardic, no rub  Gastrointestinal: BS +, soft, nondistended, nontender  : No palpable bladder  Musculoskeletal: No edema, no clubbing or cyanosis  Neurologic: Alert, oriented, moves all limbs  Skin: Warm and dry    Psychiatric: Odd affect    Scheduled Meds:     famotidine, 20 mg, Oral, BID  folic acid, 1 mg, Oral, Daily  insulin lispro, 2-7 Units, Subcutaneous, 4x Daily AC & at Bedtime  multivitamin, 1 tablet, Oral, Daily  thiamine, 100 mg, Oral, Daily      IV Meds:   sodium chloride, 125 mL/hr, Last Rate: 125 mL/hr (24 1341)        Results Reviewed:   I have personally reviewed the results from the time of this admission to 3/4/2024 15:19 EST     Results from last 7 days   Lab Units 24  1014 24  1558 24  1213 24  1002 24  2224   SODIUM mmol/L 140 137 137 137 137   POTASSIUM mmol/L 4.5 5.3* 5.2 6.8* 4.3   CHLORIDE mmol/L 105 100 100 98 100   CO2 mmol/L 26.0 21.0* 25.0 22.0 26.0   BUN mg/dL 32* 26* 26* 26* 20   CREATININE mg/dL 3.09* 2.71* 2.59* 2.81* 1.70*   CALCIUM mg/dL 9.9 9.8 12.5* 10.3 10.1    BILIRUBIN mg/dL 0.2  --   --  0.3 <0.2   ALK PHOS U/L 104  --   --  114 64   ALT (SGPT) U/L 44*  --   --  67* 17   AST (SGOT) U/L 51*  --   --  97* 23   GLUCOSE mg/dL 98 128* 199* 195* 99       Estimated Creatinine Clearance: 17.6 mL/min (A) (by C-G formula based on SCr of 3.09 mg/dL (H)).    Results from last 7 days   Lab Units 03/04/24  1014 03/03/24  1213 03/02/24  2224   MAGNESIUM mg/dL 1.8 1.8 1.5*             Results from last 7 days   Lab Units 03/04/24  1014 03/03/24  1002 03/02/24  2224   WBC 10*3/mm3 6.16 6.59 4.42   HEMOGLOBIN g/dL 10.5* 11.6* 10.5*   PLATELETS 10*3/mm3 182 169 191             Assessment / Plan     ASSESSMENT:  1.  CRISTIANO on CKD3a, nonoliguric, worse:  CRISTIANO prerenal from decreased oral intake, labile blood pressure, and fluctuating volume status.  Volume status stable by exam; potassium now normal  2.  Confusion/encephalopathy, with suspected beta-blocker overdose   3.  Alcoholism and liver disease currently on folic acid, thiamine  4.  Hypertension, acceptable  5.  DDD with prior stroke  6.  Chronic atrial fibrillation: Rate controlled on diltiazem and metoprolol  7.  Alzheimer's disease as per psychiatry    PLAN:  1.  FENa  2.  Orthostatics  3.  Surveillance labs    Thank you for involving us in the care of Yuval Loja.  Please feel free to call with any questions.    Thomas Weiner MD  03/04/24  15:19 [facility]    [facility]  [phone]    Please note that portions of this note were completed with a voice recognition program.

## 2024-03-04 NOTE — PROGRESS NOTES
"    Patient Name: Yuval Loja  :1950  73 y.o.      Patient Care Team:  Alessia Prescott APRN as PCP - General (Family Medicine)  Jonny Bains MD as Referring Physician (Internal Medicine)  Carlos Villareal MD as Consulting Physician (Hematology and Oncology)    Chief Complaint: follow up PAF, bradycardia, ETOH use.     Interval History: sitting up in the chair. Hr has been stable but currently off the monitor.        Objective   Vital Signs       Intake/Output Summary (Last 24 hours) at 3/4/2024 1505  Last data filed at 3/4/2024 1321  Gross per 24 hour   Intake 220 ml   Output --   Net 220 ml     Flowsheet Rows      Flowsheet Row First Filed Value   Admission Height 175.3 cm (69\") Documented at 2024 0936   Admission Weight 60.3 kg (133 lb) Documented at 2024 0936            Physical Exam:   General Appearance:    Alert, cooperative, in no acute distress   Lungs:     Clear to auscultation.  Normal respiratory effort and rate.      Heart:    Regular rhythm and normal rate, normal S1 and S2, no murmurs, gallops or rubs.     Chest Wall:    No abnormalities observed   Abdomen:     Soft, nontender, positive bowel sounds.     Extremities:   no cyanosis, clubbing or edema.  No marked joint deformities.  Adequate musculoskeletal strength.       Results Review:    Results from last 7 days   Lab Units 24  1014   SODIUM mmol/L 140   POTASSIUM mmol/L 4.5   CHLORIDE mmol/L 105   CO2 mmol/L 26.0   BUN mg/dL 32*   CREATININE mg/dL 3.09*   GLUCOSE mg/dL 98   CALCIUM mg/dL 9.9     Results from last 7 days   Lab Units 24  1014 24  1213 24  1002   HSTROP T ng/L 48* 47* 54*     Results from last 7 days   Lab Units 24  1014   WBC 10*3/mm3 6.16   HEMOGLOBIN g/dL 10.5*   HEMATOCRIT % 32.0*   PLATELETS 10*3/mm3 182         Results from last 7 days   Lab Units 24  1014   MAGNESIUM mg/dL 1.8                   Medication Review:   famotidine, 20 mg, Oral, BID  folic acid, 1 " mg, Oral, Daily  insulin lispro, 2-7 Units, Subcutaneous, 4x Daily AC & at Bedtime  multivitamin, 1 tablet, Oral, Daily  thiamine, 100 mg, Oral, Daily         sodium chloride, 125 mL/hr, Last Rate: 125 mL/hr (03/04/24 1341)        Assessment & Plan   Nausea, weakness  Bradycardia, reviewed atropine in ER - HR appears stable currently. May try a low dose short acting calcium channel blocker tomorrow.   Hyperkalemia , 6.8 on admit . Now better after treatment.   Elevated troponin - not ACS.   Paroxysmal atrial fibrillation , not on anticoagulation due to gastric bleeding.   Coronary artery disease status post RCA stent 2000 and LAD 2006.   ETOH use   History of complete heart block in setting of CRISTIANO/electrolyte imbalance. Required temporary venous pacemaker. Did not require PPM.   Chronic kidney disease   History of stroke   Obstructive sleep apnea.     RICHMOND Gaona  [facility]  03/04/24  15:05 EST

## 2024-03-04 NOTE — THERAPY EVALUATION
Patient Name: Yuval Loja  : 1950    MRN: [id number]                              Today's Date: 3/4/2024       Admit Date: 3/3/2024    Visit Dx:     ICD-10-CM ICD-9-CM   1. Accidental overdose, initial encounter  T50.901A 977.9     E858.9   2. Bradycardia  R00.1 427.89   3. Hypotension, unspecified hypotension type  I95.9 458.9   4. Acute renal failure superimposed on chronic kidney disease, unspecified acute renal failure type, unspecified CKD stage  N17.9 584.9    N18.9 585.9   5. Hyperkalemia  E87.5 276.7     Patient Active Problem List   Diagnosis    Alcoholic ketoacidosis    Alcohol dependence    Methamphetamine abuse    Fatty liver, alcoholic    Nausea vomiting and diarrhea    Alcoholic liver disease    Metabolic acidosis    Tobacco abuse    Coronary artery disease involving native coronary artery of native heart without angina pectoris    Tachycardia    Sinus tachycardia    Chronic kidney disease, stage 3    Medically noncompliant    Visual hallucinations    Psychosis    Hyponatremia    CAD (coronary artery disease)    Leukopenia    Symptomatic anemia    Headache    Substance abuse    Gastrointestinal hemorrhage    CRISTIANO (acute kidney injury)    Hyperkalemia    Right lower quadrant abdominal pain    New onset atrial fibrillation    History of drug abuse    COPD (chronic obstructive pulmonary disease)    Right inguinal hernia    Constipation    Status post right hip replacement    Right hip pain    Sinus bradycardia    Generalized abdominal pain    Altered mental status    Altered mental state    Accidental overdose     Past Medical History:   Diagnosis Date    Alcohol abuse     Arthritis     CAD (coronary artery disease)     Chronic kidney disease, stage 3     COPD (chronic obstructive pulmonary disease)     Disease of thyroid gland     Elevated cholesterol     Fatty liver, alcoholic     GERD (gastroesophageal reflux disease)     History of transfusion     Hypertensive urgency     Metabolic acidosis      Myocardial infarction     Nausea vomiting and diarrhea 05/23/2017    Sinus tachycardia     Sleep apnea     Stroke      Past Surgical History:   Procedure Laterality Date    BACK SURGERY      CARDIAC CATHETERIZATION      CARDIAC ELECTROPHYSIOLOGY PROCEDURE N/A 4/10/2023    Procedure: Temporary Pacemaker;  Surgeon: Vaibhav Bonilla MD;  Location:  [facility];  Service: Cardiovascular;  Laterality: N/A;    COLONOSCOPY      ENDOSCOPY      FRACTURE SURGERY      JOINT REPLACEMENT      SKIN BIOPSY      TOTAL HIP ARTHROPLASTY Right 06/27/2022    Procedure: TOTAL HIP ARTHROPLASTY ANTERIOR WITH HANA TABLE;  Surgeon: Willian Quiles MD;  Location: [facility] MAIN OR;  Service: Orthopedics;  Laterality: Right;  carli Bhatt. [email or web address]      General Information       Row Name 03/04/24 1051          Physical Therapy Time and Intention    Document Type evaluation  -     Mode of Treatment individual therapy;physical therapy  -       Row Name 03/04/24 1051          General Information    Patient Profile Reviewed yes  -     Prior Level of Function independent:;gait;transfer;bed mobility  -     Existing Precautions/Restrictions fall  -     Barriers to Rehab cognitive status  -       Row Name 03/04/24 1051          Living Environment    People in Home facility resident  [facility]  -       Row Name 03/04/24 1051          Cognition    Orientation Status (Cognition) oriented to;person;place  -       Row Name 03/04/24 1051          Safety Issues, Functional Mobility    Impairments Affecting Function (Mobility) balance;cognition;endurance/activity tolerance;strength  -               User Key  (r) = Recorded By, (t) = Taken By, (c) = Cosigned By      Initials Name Provider Type     Page Garcia PT Physical Therapist                   Mobility       Row Name 03/04/24 1052          Bed Mobility    Comment, (Bed Mobility) NT - [facility]  -       Row Name 03/04/24 1052          Sit-Stand Transfer     Sit-Stand Violet (Transfers) supervision  -[facility] Name 03/04/24 1052          Gait/Stairs (Locomotion)    Violet Level (Gait) supervision;standby assist  -     Assistive Device (Gait) walker, front-wheeled  -     Distance in Feet (Gait) 150ft  -     Deviations/Abnormal Patterns (Gait) antalgic;alicia decreased;gait speed decreased;stride length decreased;weight shifting decreased;festinating/shuffling  -     Bilateral Gait Deviations forward flexed posture;heel strike decreased  -     Comment, (Gait/Stairs) Poor foot clearance from floor- very shuffled steps, reports B ingrown toenails  -               User Key  (r) = Recorded By, (t) = Taken By, (c) = Cosigned By      Initials Name Provider Type     Page Garcia PT Physical Therapist                   Obj/Interventions       [facility] Name 03/04/24 1054          Range of Motion Comprehensive    General Range of Motion bilateral lower extremity ROM WFL  -[facility] Name 03/04/24 1054          Strength Comprehensive (MMT)    General Manual Muscle Testing (MMT) Assessment lower extremity strength deficits identified  -     Comment, General Manual Muscle Testing (MMT) Assessment Generalized weakness, BLE grossly 4/5  -[facility] Name 03/04/24 1054          Balance    Balance Assessment sitting static balance;sitting dynamic balance;standing static balance;standing dynamic balance  -     Static Sitting Balance supervision  -     Dynamic Sitting Balance supervision  -     Position, Sitting Balance sitting in chair  -     Static Standing Balance supervision  -     Dynamic Standing Balance standby assist  -     Position/Device Used, Standing Balance supported;walker, front-wheeled  -     Balance Interventions sitting;standing;sit to stand;supported;static;dynamic  -[facility] Name 03/04/24 1054          Sensory Assessment (Somatosensory)    Sensory Assessment (Somatosensory) LE sensation intact  -               User  Key  (r) = Recorded By, (t) = Taken By, (c) = Cosigned By      Initials Name Provider Type     Page Garcia PT Physical Therapist                   Goals/Plan       Row Name 03/04/24 1059          Bed Mobility Goal 1 (PT)    Activity/Assistive Device (Bed Mobility Goal 1, PT) bed mobility activities, all  -     [place] Level/Cues Needed (Bed Mobility Goal 1, PT) independent  -     Time Frame (Bed Mobility Goal 1, PT) 1 week  -       Row Name 03/04/24 1059          Transfer Goal 1 (PT)    Activity/Assistive Device (Transfer Goal 1, PT) transfers, all  -     [place] Level/Cues Needed (Transfer Goal 1, PT) independent  -     Time Frame (Transfer Goal 1, PT) 1 week  -       Row Name 03/04/24 1059          Gait Training Goal 1 (PT)    Activity/Assistive Device (Gait Training Goal 1, PT) gait (walking locomotion)  -     [place] Level (Gait Training Goal 1, PT) modified independence  -     Distance (Gait Training Goal 1, PT) 300ft  -     Time Frame (Gait Training Goal 1, PT) 1 week  -       Row Name 03/04/24 1059          Therapy Assessment/Plan (PT)    Planned Therapy Interventions (PT) balance training;bed mobility training;gait training;home exercise program;patient/family education;strengthening;transfer training  -               User Key  (r) = Recorded By, (t) = Taken By, (c) = Cosigned By      Initials Name Provider Type     Page Garcia PT Physical Therapist                   Clinical Impression       Row Name 03/04/24 1055          Pain    Pretreatment Pain Rating 0/10 - no pain  -     Posttreatment Pain Rating 0/10 - no pain  -       Row Name 03/04/24 1055          Plan of Care Review    Plan of Care Reviewed With patient  -     Outcome Evaluation Pt is a 72 yo F admitted from [facility] with accidental drug overdose. Work-up revealed hyperkalemia with bradycardia. Per chart, pt with recently length admission and was DC'd to [facility], though there is some concern he  may need a higher level of care at his facility for assist with medication management, etc. Pt presents to PT with impaired strength, endurance, and balance limiting overall mobility. Pt sitting [facility] on arrival, sitter present. Pt reports walking 12-15 miles/day but questionable historian. Pt stood with SV and ambulated 150ft with rwx and SBA-SV. Pt fairly steady but with slow pace and very shuffled steps. Cued for increased foot clearance and pt c/o B ingrown toenails that are painful and impair toe-off. Pt returned to room and left sitting [facility] with needs met. PT will continue to follow peripherally, anticipate return to [facility] with HHPT at [place].  -       Row Name 03/04/24 1055          Therapy Assessment/Plan (PT)    Patient/Family Therapy Goals Statement (PT) Return to [facility]  -     Rehab Potential (PT) good, to achieve stated therapy goals  [facility]     Criteria for Skilled Interventions Met (PT) yes  -     Therapy Frequency (PT) 3 times/wk  -       Row Name 03/04/24 1055          Vital Signs    O2 Delivery Pre Treatment room air  -     O2 Delivery Intra Treatment room air  -     O2 Delivery Post Treatment room air  -       Row Name 03/04/24 1055          Positioning and Restraints    Pre-Treatment Position sitting in chair/recliner  -     Post Treatment Position chair  -     In Chair notified nsg;call light within reach;sitting;encouraged to call for assist;with nsg  with sitter  -               User Key  (r) = Recorded By, (t) = Taken By, (c) = Cosigned By      Initials Name Provider Type     Page Garcia, PT Physical Therapist                   Outcome Measures       Row Name 03/04/24 1059 03/04/24 0800       How much help from another person do you currently need...    Turning from your back to your side while in flat bed without using bedrails? 4  - 4  -KR    Moving from lying on back to sitting on the side of a flat bed without bedrails? 4  - 4  -KR    Moving to and from a bed to a chair  (including a wheelchair)? 3  - 3  -KR    Standing up from a chair using your arms (e.g., wheelchair, bedside chair)? 3  - 3  -KR    Climbing 3-5 steps with a railing? 3  - 3  -KR    To walk in hospital room? 3  -BH 3  -KR    AM-PAC 6 Clicks Score (PT) 20  - 20  -KR    Highest Level of Mobility Goal 6 --> Walk 10 steps or more  - 6 --> Walk 10 steps or more  -KR      Row Name 03/04/24 1059          Functional Assessment    Outcome Measure Options AM-PAC 6 Clicks Basic Mobility (PT)  -               User Key  (r) = Recorded By, (t) = Taken By, (c) = Cosigned By      Initials Name Provider Type     Page Garcia, PT Physical Therapist    Breann Almazan RN Registered Nurse                                 Physical Therapy Education       Title: PT OT SLP Therapies (In Progress)       Topic: Physical Therapy (In Progress)       Point: Mobility training (Done)       Learning Progress Summary             Patient Acceptance, E,TB,D, VU,NR by  at 3/4/2024 1100                         Point: Home exercise program (Not Started)       Learner Progress:  Not documented in this visit.              Point: Body mechanics (Done)       Learning Progress Summary             Patient Acceptance, E,TB,D, VU,NR by  at 3/4/2024 1100                         Point: Precautions (Done)       Learning Progress Summary             Patient Acceptance, E,TB,D, VU,NR by  at 3/4/2024 1100                                         User Key       Initials Effective Dates Name Provider Type [facility] 04/08/22 -  Page Garcia PT Physical Therapist PT                  PT Recommendation and Plan  Planned Therapy Interventions (PT): balance training, bed mobility training, gait training, home exercise program, patient/family education, strengthening, transfer training  Plan of Care Reviewed With: patient  Outcome Evaluation: Pt is a 74 yo F admitted from [facility] with accidental drug overdose. Work-up revealed hyperkalemia  with bradycardia. Per chart, pt with recently length admission and was DC'd to [facility], though there is some concern he may need a higher level of care at his facility for assist with medication management, etc. Pt presents to PT with impaired strength, endurance, and balance limiting overall mobility. Pt sitting [facility] on arrival, sitter present. Pt reports walking 12-15 miles/day but questionable historian. Pt stood with SV and ambulated 150ft with rwx and SBA-SV. Pt fairly steady but with slow pace and very shuffled steps. Cued for increased foot clearance and pt c/o B ingrown toenails that are painful and impair toe-off. Pt returned to room and left sitting [facility] with needs met. PT will continue to follow peripherally, anticipate return to [facility] with HHPT at [place].     Time Calculation:   PT Evaluation Complexity  History, PT Evaluation Complexity: 1-2 personal factors and/or comorbidities  Examination of Body Systems (PT Eval Complexity): total of 3 or more elements  Clinical Presentation (PT Evaluation Complexity): stable  Clinical Decision Making (PT Evaluation Complexity): low complexity  Overall Complexity (PT Evaluation Complexity): low complexity     PT Charges       Row Name 03/04/24 1101             Time Calculation    Start Time 1025  -      Stop Time 1035  -      Time Calculation (min) 10 min  -      PT Received On 03/04/24  -      PT - Next Appointment 03/06/24  -      PT Goal Re-Cert Due Date 03/11/24  -         Time Calculation- PT    Total Timed Code Minutes- PT 8 minute(s)  -         Timed Charges    34201 - Gait Training Minutes  8  -BH         Total Minutes    Timed Charges Total Minutes 8  -BH       Total Minutes 8  -BH                User Key  (r) = Recorded By, (t) = Taken By, (c) = Cosigned By      Initials Name Provider Type     Page Garcia, PT Physical Therapist                  Therapy Charges for Today       Code Description Service Date Service Provider Modifiers Qty     [id number]  GAIT TRAINING EA 15 MIN 3/4/2024 Page Garcia, PT GP 1    [id number] HC PT EVAL LOW COMPLEXITY 3 3/4/2024 Page Garica, PT GP 1            PT G-Codes  Outcome Measure Options: AM-PAC 6 Clicks Basic Mobility (PT)  AM-PAC 6 Clicks Score (PT): 20  PT Discharge Summary  Anticipated Discharge Disposition (PT): home with home health, assisted living    Page Garcia, PT  3/4/2024

## 2024-03-04 NOTE — PLAN OF CARE
The pt was admitted to [facility] 2/2 to an accidental drug overdose. Work-up revealed hyperkalemia with bradycardia. Per chart, pt with recently length admission and was DC'd to [facility], though there is some concern he may need a higher level of care at his facility for assist with medication management, etc. Today, the pt was up in his bathroom and demonstrated impulsive movements. CGA/Min A for standing balance with cues to slow down his pacing. He stood at sinkside for hand and oral hygiene. He returned to his bedside chair where he remained with his sitter. He demonstrated impaired insight into safety. Will continue to follow. Anticipate a d/c back to his facility with a higher level of care.

## 2024-03-04 NOTE — CASE MANAGEMENT/SOCIAL WORK
Discharge Planning Assessment  [facility]     Patient Name: Yuval Loja  MRN: [id number]  Today's Date: 3/4/2024    Admit Date: 3/3/2024    Plan: [facility] when increased care has been arranged and pt medically ready for DC.   Discharge Needs Assessment       Row Name 03/04/24 1509       Living Environment    People in Home facility resident    Current Living Arrangements assisted living facility    Family Caregiver if Needed child(starr), adult    Quality of Family Relationships helpful;involved;supportive    Able to Return to Prior Arrangements yes       Transition Planning    Patient/Family Anticipates Transition to home    Patient/Family Anticipated Services at Transition     Transportation Anticipated health plan transportation       Discharge Needs Assessment    Current Outpatient/Agency/Support Group assisted living facility    Equipment Currently Used at Home walker, rolling    Concerns to be Addressed discharge planning    Equipment Needed After Discharge none    Outpatient/Agency/Support Group Needs assisted living facility    Discharge Facility/Level of Care Needs assisted living facility                   Discharge Plan       Row Name 03/04/24 7541       Plan    Plan [facility] when increased care has been arranged and pt medically ready for DC.    Patient/Family in Agreement with Plan yes    Plan Comments CCP s/w Torri, pts daughter, via phone d/t pts confusion. Introduced self and role of CCP. Face sheet information and pharmacy updated. Pt lives in Assisted Living at [facility]. Pt does not drive and has a walker. Torri stated pt just recently placed in assisted living at [facility] and does not remember to complete basic daily task; bathing, changing clothes etc. Pt has a living will. Pt is enrolled in meds to beds and denies trouble affording medications. Pt has used HH and been to SNF in the past. CCP discussed with Torri pt may need more  assistance at [facility]. Torri verbalized understanding and agreeance to increased care. CCP explained we would reach out to [facility] and have someone at the facility discuss additional care/next levels of care with Torri. Torri is agreeable. CCP s/w [facility]. [facility] stated Memory Care unit available and would have someone reach out to Torri to discuss increasing pt to a higher level of care. [facility] asked that CCP fax DC summary to [phone] at time of DC. [facility] to notify CCP when next level of care has been arranged to [facility]. DC plan is to return to [facility], next level of care pending. Aidan RN/CCP                  Continued Care and Services - Admitted Since 3/3/2024    No active coordination exists for this encounter.       Expected Discharge Date and Time       Expected Discharge Date Expected Discharge Time    Mar 5, 2024            Demographic Summary    No documentation.                  Functional Status       Row Name 03/04/24 1509       Functional Status    Usual Activity Tolerance good    Current Activity Tolerance moderate       Assessment of Health Literacy    How often do you have someone help you read hospital materials? Always    How often do you have problems learning about your medical condition because of difficulty understanding written information? Always    How often do you have a problem understanding what is told to you about your medical condition? Always    How confident are you filling out medical forms by yourself? Not at all    Health Literacy Low                               Ashwini Johns, RN

## 2024-03-04 NOTE — THERAPY EVALUATION
Patient Name: Yuval Loja  : 1950    MRN: [id number]                              Today's Date: 3/4/2024       Admit Date: 3/3/2024    Visit Dx:     ICD-10-CM ICD-9-CM   1. Accidental overdose, initial encounter  T50.901A 977.9     E858.9   2. Bradycardia  R00.1 427.89   3. Hypotension, unspecified hypotension type  I95.9 458.9   4. Acute renal failure superimposed on chronic kidney disease, unspecified acute renal failure type, unspecified CKD stage  N17.9 584.9    N18.9 585.9   5. Hyperkalemia  E87.5 276.7     Patient Active Problem List   Diagnosis    Alcoholic ketoacidosis    Alcohol dependence    Methamphetamine abuse    Fatty liver, alcoholic    Nausea vomiting and diarrhea    Alcoholic liver disease    Metabolic acidosis    Tobacco abuse    Coronary artery disease involving native coronary artery of native heart without angina pectoris    Tachycardia    Sinus tachycardia    Chronic kidney disease, stage 3    Medically noncompliant    Visual hallucinations    Psychosis    Hyponatremia    CAD (coronary artery disease)    Leukopenia    Symptomatic anemia    Headache    Substance abuse    Gastrointestinal hemorrhage    CRISTIANO (acute kidney injury)    Hyperkalemia    Right lower quadrant abdominal pain    New onset atrial fibrillation    History of drug abuse    COPD (chronic obstructive pulmonary disease)    Right inguinal hernia    Constipation    Status post right hip replacement    Right hip pain    Sinus bradycardia    Generalized abdominal pain    Altered mental status    Altered mental state    Accidental overdose     Past Medical History:   Diagnosis Date    Alcohol abuse     Arthritis     CAD (coronary artery disease)     Chronic kidney disease, stage 3     COPD (chronic obstructive pulmonary disease)     Disease of thyroid gland     Elevated cholesterol     Fatty liver, alcoholic     GERD (gastroesophageal reflux disease)     History of transfusion     Hypertensive urgency     Metabolic acidosis      Myocardial infarction     Nausea vomiting and diarrhea 05/23/2017    Sinus tachycardia     Sleep apnea     Stroke      Past Surgical History:   Procedure Laterality Date    BACK SURGERY      CARDIAC CATHETERIZATION      CARDIAC ELECTROPHYSIOLOGY PROCEDURE N/A 4/10/2023    Procedure: Temporary Pacemaker;  Surgeon: Vaibhav Bonilla MD;  Location: [facility] CATH INVASIVE LOCATION;  Service: Cardiovascular;  Laterality: N/A;    COLONOSCOPY      ENDOSCOPY      FRACTURE SURGERY      JOINT REPLACEMENT      SKIN BIOPSY      TOTAL HIP ARTHROPLASTY Right 06/27/2022    Procedure: TOTAL HIP ARTHROPLASTY ANTERIOR WITH HANA TABLE;  Surgeon: Willian Quiles MD;  Location: [facility] MAIN OR;  Service: Orthopedics;  Laterality: Right;  mile Bhatt [place]      General Information       Row Name 03/04/24 1529          OT Time and Intention    Document Type evaluation  -RB     Mode of Treatment individual therapy;occupational therapy  -RB       Row Name 03/04/24 1529          General Information    Patient Profile Reviewed yes  -RB     Prior Level of Function independent:;ADL's;transfer  -RB     Existing Precautions/Restrictions fall  -RB     Barriers to Rehab cognitive status  -RB       Row Name 03/04/24 1529          Living Environment    People in Home facility resident  -RB       Row Name 03/04/24 1529          Cognition    Orientation Status (Cognition) oriented to;person;place;other (see comments)  declined to answer questions at times.  -RB       Row Name 03/04/24 1529          Safety Issues, Functional Mobility    Safety Issues Affecting Function (Mobility) safety precautions follow-through/compliance;safety precaution awareness;awareness of need for assistance;insight into deficits/self-awareness;judgment;problem-solving;sequencing abilities;impulsivity;at risk behavior observed  -RB     Impairments Affecting Function (Mobility) balance;cognition;endurance/activity tolerance;strength  -RB               User Key  (r)  = Recorded By, (t) = Taken By, (c) = Cosigned By      Initials Name Provider Type    Jessica Mullen OT Occupational Therapist                     Mobility/ADL's       Row Name 03/04/24 1530          Bed Mobility    Comment, (Bed Mobility) UIC  -RB       Row Name 03/04/24 1530          Transfers    Transfers sit-stand transfer;stand-sit transfer  -RB       Row Name 03/04/24 1530          Sit-Stand Transfer    Sit-Stand [place] (Transfers) contact guard;verbal cues  -RB       Row Name 03/04/24 1530          Stand-Sit Transfer    Stand-Sit [place] (Transfers) contact guard;verbal cues  -RB       Row Name 03/04/24 1530          Functional Mobility    Functional Mobility- Ind. Level contact guard assist;minimum assist (75% patient effort);verbal cues required  -RB     Functional Mobility- Device walker, front-wheeled  -RB     Functional Mobility- Comment quick movements and cues required to slow his pacing. impulsive.  -       Row Name 03/04/24 1530          Activities of Daily Living    BADL Assessment/Intervention grooming  -       Row Name 03/04/24 1530          Grooming Assessment/Training    [place] Level (Grooming) grooming skills;set up  -RB     Position (Grooming) sink side;supported standing  -RB               User Key  (r) = Recorded By, (t) = Taken By, (c) = Cosigned By      Initials Name Provider Type    Jessica Mullen OT Occupational Therapist                   Obj/Interventions       Row Name 03/04/24 1531          Sensory Assessment (Somatosensory)    Sensory Assessment (Somatosensory) sensation intact  -       Row Name 03/04/24 1531          Vision Assessment/Intervention    Visual Impairment/Limitations WFL  -RB       Row Name 03/04/24 1531          Range of Motion Comprehensive    General Range of Motion no range of motion deficits identified  -       Row Name 03/04/24 1531          Strength Comprehensive (MMT)    Comment, General Manual Muscle Testing (MMT)  Assessment Generalized BLE weakness - no focal UE deficits  -RB       Row Name 03/04/24 1531          Balance    Comment, Balance CGA/Min A standing balance - used a walker PRN in his room. placed it to the side at times.  -RB               User Key  (r) = Recorded By, (t) = Taken By, (c) = Cosigned By      Initials Name Provider Type    RB Jessica Hyde, OT Occupational Therapist                   Goals/Plan       Row Name 03/04/24 1533          Bed Mobility Goal 1 (OT)    Activity/Assistive Device (Bed Mobility Goal 1, OT) bed mobility activities, all  -RB     [place] Level/Cues Needed (Bed Mobility Goal 1, OT) modified independence  -RB     Time Frame (Bed Mobility Goal 1, OT) short term goal (STG);2 weeks  -RB     Progress/Outcomes (Bed Mobility Goal 1, OT) new goal  -RB       Row Name 03/04/24 1533          Transfer Goal 1 (OT)    Activity/Assistive Device (Transfer Goal 1, OT) transfers, all  -RB     [place] Level/Cues Needed (Transfer Goal 1, OT) modified independence  -RB     Time Frame (Transfer Goal 1, OT) short term goal (STG);2 weeks  -RB     Progress/Outcome (Transfer Goal 1, OT) new goal  -RB       Row Name 03/04/24 1533          Bathing Goal 1 (OT)    Activity/Device (Bathing Goal 1, OT) bathing skills, all  -RB     [place] Level/Cues Needed (Bathing Goal 1, OT) minimum assist (75% or more patient effort)  -RB     Time Frame (Bathing Goal 1, OT) short term goal (STG);2 weeks  -RB     Progress/Outcomes (Bathing Goal 1, OT) new goal  -RB       Row Name 03/04/24 1533          Dressing Goal 1 (OT)    Activity/Device (Dressing Goal 1, OT) dressing skills, all  -RB     [place]/Cues Needed (Dressing Goal 1, OT) minimum assist (75% or more patient effort)  -RB     Time Frame (Dressing Goal 1, OT) short term goal (STG);2 weeks  -RB     Progress/Outcome (Dressing Goal 1, OT) new goal  -RB       Row Name 03/04/24 1533          Toileting Goal 1 (OT)    Activity/Device (Toileting Goal 1,  OT) toileting skills, all  -RB     [place] Level/Cues Needed (Toileting Goal 1, OT) minimum assist (75% or more patient effort)  -RB     Time Frame (Toileting Goal 1, OT) short term goal (STG);2 weeks  -RB     Progress/Outcome (Toileting Goal 1, OT) new goal  -RB       Row Name 03/04/24 1533          Grooming Goal 1 (OT)    Activity/Device (Grooming Goal 1, OT) grooming skills, all  -RB     [place] (Grooming Goal 1, OT) standby assist  -RB     Time Frame (Grooming Goal 1, OT) short term goal (STG);2 weeks  -RB     Progress/Outcome (Grooming Goal 1, OT) new goal  -RB       Row Name 03/04/24 1533          Self-Feeding Goal 1 (OT)    Activity/Device (Self-Feeding Goal 1, OT) self-feeding skills, all  -RB     [place] Level/Cues Needed (Self-Feeding Goal 1, OT) standby assist  -RB     Time Frame (Self-Feeding Goal 1, OT) short term goal (STG);2 weeks  -RB     Progress/Outcomes (Self-Feeding Goal 1, OT) new goal  -RB       Row Name 03/04/24 1533          Therapy Assessment/Plan (OT)    Planned Therapy Interventions (OT) strengthening exercise;transfer/mobility retraining;ROM/therapeutic exercise;activity tolerance training;adaptive equipment training;BADL retraining;cognitive/visual perception retraining;patient/caregiver education/training;occupation/activity based interventions;functional balance retraining  -RB               User Key  (r) = Recorded By, (t) = Taken By, (c) = Cosigned By      Initials Name Provider Type    RB Jessica Hyde, OT Occupational Therapist                   Clinical Impression       Row Name 03/04/24 1532          Pain Assessment    Pretreatment Pain Rating 0/10 - no pain  -RB     Posttreatment Pain Rating 0/10 - no pain  -RB       Row Name 03/04/24 1532          Plan of Care Review    Plan of Care Reviewed With patient  -RB     Progress no change  -RB     Outcome Evaluation The pt was admitted to [facility] 2/2 to an accidental drug overdose. Work-up revealed hyperkalemia with  bradycardia. Per chart, pt with recently length admission [facility] though there is some concern he may need a higher level of care at his facility for assist with medication management, etc. Today, the pt was up in his bathroom and demonstrated impulsive movements. CGA/Min A for standing balance with cues to slow down his pacing. He stood at sinkside for hand and oral hygiene. He returned to his bedside chair where he remained with his sitter. He demonstrated impaired insight into safety. Will continue to follow. Anticipate a d/c back to his facility with a higher level of care.  -RB       Row Name 03/04/24 1532          Therapy Assessment/Plan (OT)    Rehab Potential (OT) good, to achieve stated therapy goals  -RB     Criteria for Skilled Therapeutic Interventions Met (OT) yes;skilled treatment is necessary  -RB     Therapy Frequency (OT) 5 times/wk  -RB       Row Name 03/04/24 1532          Therapy Plan Review/Discharge Plan (OT)    Anticipated Discharge Disposition (OT) extended care facility;assisted living  -RB       Row Name 03/04/24 1532          Vital Signs    O2 Delivery Pre Treatment room air  -RB     O2 Delivery Intra Treatment room air  -RB     O2 Delivery Post Treatment room air  -RB     Pre Patient Position Standing  -RB     Intra Patient Position Standing  -RB     Post Patient Position Sitting  -RB       Row Name 03/04/24 1532          Positioning and Restraints    Pre-Treatment Position standing in room  -RB     Post Treatment Position chair  -RB     In Chair call light within reach;encouraged to call for assist  1:1 sitter  -RB               User Key  (r) = Recorded By, (t) = Taken By, (c) = Cosigned By      Initials Name Provider Type    Jessica Mullen, OT Occupational Therapist                   Outcome Measures       Row Name 03/04/24 1538          How much help from another is currently needed...    Putting on and taking off regular lower body clothing? 2  -RB     Bathing  (including washing, rinsing, and drying) 2  -RB     Toileting (which includes using toilet bed pan or urinal) 3  -RB     Putting on and taking off regular upper body clothing 3  -RB     Taking care of personal grooming (such as brushing teeth) 3  -RB     Eating meals 3  -RB     AM-PAC 6 Clicks Score (OT) 16  -RB       Row Name 03/04/24 1059 03/04/24 0800       How much help from another person do you currently need...    Turning from your back to your side while in flat bed without using bedrails? 4  - 4  -KR    Moving from lying on back to sitting on the side of a flat bed without bedrails? 4  - 4  -KR    Moving to and from a bed to a chair (including a wheelchair)? 3  - 3  -KR    Standing up from a chair using your arms (e.g., wheelchair, bedside chair)? 3  - 3  -KR    Climbing 3-5 steps with a railing? 3  - 3  -KR    To walk in hospital room? 3  - 3  -KR    AM-PAC 6 Clicks Score (PT) 20  - 20  -KR    Highest Level of Mobility Goal 6 --> Walk 10 steps or more  - 6 --> Walk 10 steps or more  -KR      Row Name 03/04/24 1534          Modified Mobile Scale    Modified Mary Scale 4 - Moderately severe disability.  Unable to walk without assistance, and unable to attend to own bodily needs without assistance.  -       Row Name 03/04/24 1534 03/04/24 1059       Functional Assessment    Outcome Measure Options AM-PAC 6 Clicks Daily Activity (OT);Modified Mobile  - AM-PAC 6 Clicks Basic Mobility (PT)  -              User Key  (r) = Recorded By, (t) = Taken By, (c) = Cosigned By      Initials Name Provider Type    RB Jessica Hyde, OT Occupational Therapist    Page Ellis, PT Physical Therapist    Breann Almazan, RN Registered Nurse                    Occupational Therapy Education       Title: PT OT SLP Therapies (In Progress)       Topic: Occupational Therapy (Not Started)       Point: ADL training (Not Started)       Description:   Instruct learner(s) on proper safety adaptation and  remediation techniques during self care or transfers.   Instruct in proper use of assistive devices.                  Learner Progress:  Not documented in this visit.              Point: Home exercise program (Not Started)       Description:   Instruct learner(s) on appropriate technique for monitoring, assisting and/or progressing therapeutic exercises/activities.                  Learner Progress:  Not documented in this visit.              Point: Precautions (Not Started)       Description:   Instruct learner(s) on prescribed precautions during self-care and functional transfers.                  Learner Progress:  Not documented in this visit.              Point: Body mechanics (Not Started)       Description:   Instruct learner(s) on proper positioning and spine alignment during self-care, functional mobility activities and/or exercises.                  Learner Progress:  Not documented in this visit.                                  OT Recommendation and Plan  Planned Therapy Interventions (OT): strengthening exercise, transfer/mobility retraining, ROM/therapeutic exercise, activity tolerance training, adaptive equipment training, BADL retraining, cognitive/visual perception retraining, patient/caregiver education/training, occupation/activity based interventions, functional balance retraining  Therapy Frequency (OT): 5 times/wk  Plan of Care Review  Plan of Care Reviewed With: patient  Progress: no change  Outcome Evaluation: The pt was admitted to [facility] 2/2 to an accidental drug overdose. Work-up revealed hyperkalemia with bradycardia. Per chart, pt with recently length admission and was DC'd to [facility], though there is some concern he may need a higher level of care at his facility for assist with medication management, etc. Today, the pt was up in his bathroom and demonstrated impulsive movements. CGA/Min A for standing balance with cues to slow down his pacing. He stood at sinkside for hand and oral hygiene.  He returned to his bedside chair where he remained with his sitter. He demonstrated impaired insight into safety. Will continue to follow. Anticipate a d/c back to his facility with a higher level of care.     Time Calculation:   Evaluation Complexity (OT)  Review Occupational Profile/Medical/Therapy History Complexity: expanded/moderate complexity  Assessment, Occupational Performance/Identification of Deficit Complexity: 3-5 performance deficits  Clinical Decision Making Complexity (OT): detailed assessment/moderate complexity  Overall Complexity of Evaluation (OT): moderate complexity     Time Calculation- OT       Row Name 03/04/24 1528 03/04/24 1101          Time Calculation- OT    OT Start Time 0906  -RB --     OT Stop Time 0929  -RB --     OT Time Calculation (min) 23 min  -RB --     Total Timed Code Minutes- OT 10 minute(s)  -RB --     OT Received On 03/05/24  -RB --     OT - Next Appointment 03/04/24  -RB --     OT Goal Re-Cert Due Date 03/18/24  -RB --        Timed Charges    76840 - Gait Training Minutes  -- 8  -     18799 - OT Self Care/Mgmt Minutes 10  -RB --        Untimed Charges    OT Eval/Re-eval Minutes 13  -RB --        Total Minutes    Timed Charges Total Minutes 10  -RB 8  -BH     Untimed Charges Total Minutes 13  -RB --      Total Minutes 23  -RB 8  -BH               User Key  (r) = Recorded By, (t) = Taken By, (c) = Cosigned By      Initials Name Provider Type    RB Jessica Hyde OT Occupational Therapist     Page Garcia, PT Physical Therapist                  Therapy Charges for Today       Code Description Service Date Service Provider Modifiers Qty    [id number]  OT SELF CARE/MGMT/TRAIN EA 15 MIN 3/4/2024 Jessica Hyde OT GO 1    [id number] HC OT EVAL MOD COMPLEXITY 2 3/4/2024 Jessica Hyde OT GO 1                 Jessica Hyde OT  3/4/2024

## 2024-03-04 NOTE — NURSING NOTE
"Access Center follow-up.    Patient sitting up in chair. The patient reported he feels \"wonderful.\" The patient reported good sleep. The patient denied anxiety and depression. The patient stated he has had a difficult time since his hip surgery. Access following.   "

## 2024-03-05 VITALS
HEIGHT: 69 IN | RESPIRATION RATE: 16 BRPM | SYSTOLIC BLOOD PRESSURE: 143 MMHG | DIASTOLIC BLOOD PRESSURE: 65 MMHG | TEMPERATURE: 99.3 F | OXYGEN SATURATION: 98 % | BODY MASS INDEX: 19.7 KG/M2 | HEART RATE: 81 BPM | WEIGHT: 133 LBS

## 2024-03-05 PROCEDURE — G0378 HOSPITAL OBSERVATION PER HR: HCPCS

## 2024-03-05 PROCEDURE — 99214 OFFICE O/P EST MOD 30 MIN: CPT | Performed by: NURSE PRACTITIONER

## 2024-03-05 RX ORDER — FAMOTIDINE 20 MG/1
20 TABLET, FILM COATED ORAL 2 TIMES DAILY
Qty: 60 TABLET | Refills: 0 | Status: SHIPPED | OUTPATIENT
Start: 2024-03-05 | End: 2024-04-04

## 2024-03-05 RX ADMIN — Medication 1 TABLET: at 08:23

## 2024-03-05 RX ADMIN — DILTIAZEM HYDROCHLORIDE 30 MG: 30 TABLET, FILM COATED ORAL at 13:18

## 2024-03-05 RX ADMIN — FAMOTIDINE 20 MG: 20 TABLET, FILM COATED ORAL at 08:23

## 2024-03-05 RX ADMIN — Medication 100 MG: at 08:23

## 2024-03-05 RX ADMIN — FOLIC ACID 1 MG: 1 TABLET ORAL at 08:23

## 2024-03-05 NOTE — PROGRESS NOTES
"    Patient Name: Yuval Loja  :1950  73 y.o.      Patient Care Team:  Alessia Prescott APRN as PCP - General (Family Medicine)  Jonny Bains MD as Referring Physician (Internal Medicine)  Carlos iVllareal MD as Consulting Physician (Hematology and Oncology)    Chief Complaint: follow up PAF, bradycardia, ETOH use.     Interval History: sitting in the chair. On room air. No CP or SOA. Feeling stronger.     Objective   Vital Signs  Temp:  [98.1 °F (36.7 °C)-99.3 °F (37.4 °C)] 99.3 °F (37.4 °C)  Heart Rate:  [81-92] 81  Resp:  [16-18] 16  BP: (142-162)/(65-66) 143/65    Intake/Output Summary (Last 24 hours) at 3/5/2024 1213  Last data filed at 3/5/2024 0100  Gross per 24 hour   Intake 50 ml   Output 400 ml   Net -350 ml     Flowsheet Rows      Flowsheet Row First Filed Value   Admission Height 175.3 cm (69\") Documented at 2024 0936   Admission Weight 60.3 kg (133 lb) Documented at 2024 0936            Physical Exam:   General Appearance:    Alert, cooperative, in no acute distress   Lungs:     Clear to auscultation.  Normal respiratory effort and rate.      Heart:    Regular rhythm and normal rate, normal S1 and S2, no murmurs, gallops or rubs.     Chest Wall:    No abnormalities observed   Abdomen:     Soft, nontender, positive bowel sounds.     Extremities:   no cyanosis, clubbing or edema.  No marked joint deformities.  Adequate musculoskeletal strength.       Results Review:    Results from last 7 days   Lab Units 24  1014   SODIUM mmol/L 140   POTASSIUM mmol/L 4.5   CHLORIDE mmol/L 105   CO2 mmol/L 26.0   BUN mg/dL 32*   CREATININE mg/dL 3.09*   GLUCOSE mg/dL 98   CALCIUM mg/dL 9.9     Results from last 7 days   Lab Units 24  1014 24  1213 24  1002   HSTROP T ng/L 48* 47* 54*     Results from last 7 days   Lab Units 24  1014   WBC 10*3/mm3 6.16   HEMOGLOBIN g/dL 10.5*   HEMATOCRIT % 32.0*   PLATELETS 10*3/mm3 182         Results from last 7 days   Lab " Units 03/04/24  1014   MAGNESIUM mg/dL 1.8                   Medication Review:   dilTIAZem, 30 mg, Oral, Q12H  famotidine, 20 mg, Oral, BID  folic acid, 1 mg, Oral, Daily  insulin lispro, 2-7 Units, Subcutaneous, 4x Daily AC & at Bedtime  multivitamin, 1 tablet, Oral, Daily  thiamine, 100 mg, Oral, Daily         sodium chloride, 125 mL/hr, Last Rate: 125 mL/hr (03/04/24 1341)        Assessment & Plan   Nausea, weakness  Bradycardia, received atropine in ER - HR appears stable currently.    Hyperkalemia , 6.8 on admit . Now better after treatment.   Elevated troponin - not ACS.   Paroxysmal atrial fibrillation , not on anticoagulation due to gastric bleeding.   Coronary artery disease status post RCA stent 2000 and LAD 2006.   ETOH use   History of complete heart block in setting of CRISTIANO/electrolyte imbalance. Required temporary venous pacemaker. Did not require PPM.   Chronic kidney disease   History of stroke   Obstructive sleep apnea.     He is going to be discharged to memory care where medications will be managed by staff.     Will start low dose diltiazem and follow. HR has been very stable.     RICHMOND Gaona  [facility]  03/05/24  12:13 EST

## 2024-03-05 NOTE — PROGRESS NOTES
"Daily progress note    Primary care physician      Subjective  Doing better with no new complaints and agreeable to go to [facility] care facility    History of present illness  73-year-old -American male who is well-known to our service and was recently discharged from the hospital after prolonged hospitalization due to alcohol withdrawal syndrome brought back to the emergency room twice in the last 24 hours with drug overdose with Haldol and started back on alcohol abuse and workup in ER revealed hyperkalemia with bradycardia and received atropine and hyperkalemia protocol admitted for management.  Patient fully alert oriented answer all question appropriately in no respite distress and denies any chest pain shortness of breath palpitation abdominal pain nausea vomiting diarrhea.     REVIEW OF SYSTEMS  Unable to obtain     PHYSICAL EXAM  Blood pressure 143/65, pulse 81, temperature 99.3 °F (37.4 °C), temperature source Oral, resp. rate 16, height 175.3 cm (69\"), weight 60.3 kg (133 lb), SpO2 98%.    General: Awake and alert no acute distress.   HENT: NCAT, PERRL, Nares patent.  Eyes: no scleral icterus.  Neck: trachea midline, no ROM limitations.  CV: regular rhythm, regular rate.  Respiratory: normal effort, CTAB.  Abdomen: soft, nondistended, NTTP, no rebound tenderness, no guarding or rigidity.  Musculoskeletal: no deformity.  Neuro: alert, moves all extremities, follows commands.  Skin: warm, dry.     LAB RESULTS  Lab Results (last 24 hours)       Procedure Component Value Units Date/Time    POC Glucose Once [[id number]]  (Abnormal) Collected: 03/04/24 2141    Specimen: Blood Updated: 03/04/24 2142     Glucose 132 mg/dL           Imaging Results (Last 24 Hours)       ** No results found for the last 24 hours. **          Results  Scan on 3/3/2024 0938 by [facility] [place]: ECG 12-LEAD         Author: -- Service: -- Author Type: --   Filed: Date of Service: Creation Time:   Status: (Other)   HEART " RATE= 43  bpm  RR Interval= 1395  ms  OR Interval= 255  ms  P Horizontal Axis= -27  deg  P Front Axis= 82  deg  QRSD Interval= 92  ms  QT Interval= 510  ms  QTcB= 432  ms  QRS Axis= 60  deg  T Wave Axis= 27  deg  - ABNORMAL ECG -  Sinus bradycardia  Prolonged OR interval  Borderline ST elevation, anterior leads          Current Facility-Administered Medications:     dextrose (D50W) (25 g/50 mL) IV injection 25 g, 25 g, Intravenous, Q15 Min PRN, Jolie Cole MD    dextrose (GLUTOSE) oral gel 15 g, 15 g, Oral, Q15 Min PRN, Jolie Cole MD    dilTIAZem (CARDIZEM) tablet 30 mg, 30 mg, Oral, Q12H, Alma Ngo APRN, 30 mg at 03/05/24 1318    famotidine (PEPCID) tablet 20 mg, 20 mg, Oral, BID, Jolie Cole MD, 20 mg at 03/05/24 0823    folic acid (FOLVITE) tablet 1 mg, 1 mg, Oral, Daily, Jolie Cole MD, 1 mg at 03/05/24 0823    glucagon (GLUCAGEN) injection 1 mg, 1 mg, Intramuscular, Q15 Min PRN, Jolie Cole MD    insulin lispro (HUMALOG/ADMELOG) injection 2-7 Units, 2-7 Units, Subcutaneous, 4x Daily AC & at Bedtime, Jolie Cole MD    melatonin tablet 5 mg, 5 mg, Oral, Nightly PRN, Jolie Cole MD    multivitamin (THERAGRAN) tablet 1 tablet, 1 tablet, Oral, Daily, Jolie Cole MD, 1 tablet at 03/05/24 0823    sodium chloride 0.9 % infusion, 75 mL/hr, Intravenous, Continuous, Thomas Weiner MD, Last Rate: 75 mL/hr at 03/05/24 1247, 75 mL/hr at 03/05/24 1247    thiamine (VITAMIN B-1) tablet 100 mg, 100 mg, Oral, Daily, Jolie Cole MD, 100 mg at 03/05/24 0823     ASSESSMENT  Hyperkalemia resolved  Sinus bradycardia improved  Acute kidney injury  Elevated troponin  Alcohol abuse  Drug overdose with Haldol  Coronary artery disease  Paroxysmal atrial fibrillation  History of CVA  Hyperlipidemia  Chronic kidney disease stage III  Gastroesophageal reflux disease    PLAN  Discharge to nursing facility  Discharge summary dictated    JOLIE COLE MD    Copied text in this note has been reviewed and is  accurate as of 03/05/24

## 2024-03-05 NOTE — CASE MANAGEMENT/SOCIAL WORK
Continued Stay Note  [facility]     Patient Name: Yuval Loja  MRN: [id number]  Today's Date: 3/5/2024    Admit Date: 3/3/2024    Plan: [facility]   Discharge Plan       Row Name 03/05/24 1301       Plan    Plan [facility]    Patient/Family in Agreement with Plan yes    Plan Comments CCP s/w [facility] who confirmed they s/w Torri, pts daughter. Per [facility] plan is for pt to move to their Memory Care unit when his is DC'd. [facility] stated they have transportation available and may be able to transport pt back to facility. CCP s/w Torri, pts daughter, to confirm change in LOC at [facility]. Torri stated plan is for pt to move to Memory Care when he returns to [facility]. Pharmacy update to [facility] in [facility] and partial packet given to RN. Aidan RN/CCP                   Discharge Codes    No documentation.                 Expected Discharge Date and Time       Expected Discharge Date Expected Discharge Time    Mar 5, 2024               Ashwini Johns RN

## 2024-03-05 NOTE — PLAN OF CARE
Goal Outcome Evaluation:              Outcome Evaluation: D/C back to [facility]. Transport scheduled for 1600.

## 2024-03-05 NOTE — DISCHARGE SUMMARY
Discharge summary    Date of admission 3/3/2024  Date of discharge 3/5/2024    Final diagnosis  Hyperkalemia resolved  Sinus bradycardia improved  Acute kidney injury resolved  Elevated troponin  Alcohol abuse  Drug overdose with Haldol  Coronary artery disease  Paroxysmal atrial fibrillation  History of CVA  Hyperlipidemia  Chronic kidney disease stage III  Gastroesophageal reflux disease    Discharge medications    Current Facility-Administered Medications:     dilTIAZem (CARDIZEM) tablet 30 mg, 30 mg, Oral, Q12H, Alma Ngo APRN, 30 mg at 03/05/24 1318    famotidine (PEPCID) tablet 20 mg, 20 mg, Oral, BID, Chino Cole MD, 20 mg at 03/05/24 0823    folic acid (FOLVITE) tablet 1 mg, 1 mg, Oral, Daily, Chino Cole MD, 1 mg at 03/05/24 0823    multivitamin (THERAGRAN) tablet 1 tablet, 1 tablet, Oral, Daily, Chino Cole MD, 1 tablet at 03/05/24 0823    thiamine (VITAMIN B-1) tablet 100 mg, 100 mg, Oral, Daily, Chino Cole MD, 100 mg at 03/05/24 0823     Consults obtained  Cardiology  Nephrology  Psychiatry  Access center    Procedures  None    Hospital course  73-year-old -American male with history of alcohol abuse who was recently discharged from the hospital after prolonged hospitalization to the assisted living admitted to emergency room with overdose of Haldol and alcohol intoxication.  Patient workup in ER revealed bradycardia hyperkalemia and acute kidney injury.  Patient admitted treated with IV fluid hyperkalemia protocol supportive care and adjusting her medication and further evaluated by cardiology nephrology and psychiatry.  Patient Cardizem was held and restarted with a small dose and his Haldol discontinued and he will be discharged to memory care facility on above medication.  Patient refusing labs and treatment and cleared for discharge from all consultant to memory care facility.    Discharge diet regular    Activity as tolerated    Medication as above    Further care per  accepting physician at nursing home and follow-up with cardiology nephrology and psychiatry per the instructions and take medication as directed.    JOLIE BUNDY MD

## 2024-03-05 NOTE — PROGRESS NOTES
Access did follow up on pt. Pt was sleeping soundly with a sitter in the room. Pt's nurse states pt has been alert and oriented. Nurse states pt has refused some labs and an IV.Access will continue to follow.

## 2024-03-05 NOTE — PROGRESS NOTES
[facility] Progress Note      Patient Name: Yuval Loja  : 1950  MRN: [id number]  Primary Care Physician:  Alessia Prescott APRN  Date of admission: 3/3/2024    Subjective     Interval History:   States he is eating well; no N/V  Denies dizziness with walking  Breathing is comfortable on room air    Review of Systems:   As noted above    Objective     Vitals:   Temp:  [98.1 °F (36.7 °C)-99.3 °F (37.4 °C)] 99.3 °F (37.4 °C)  Heart Rate:  [81-92] 81  Resp:  [16-18] 16  BP: (142-162)/(65-66) 143/65    Intake/Output Summary (Last 24 hours) at 3/5/2024 1240  Last data filed at 3/5/2024 0100  Gross per 24 hour   Intake 50 ml   Output 400 ml   Net -350 ml       Physical Exam:    Constitutional: Awake, appropriate, frail, chronically ill  HEENT: Sclera anicteric, no conjunctival injection, MMM  Neck: Supple, no carotid bruit, trachea at midline, no JVD  Respiratory: Diminished, no crackles; not labored  Cardiovascular: Irregularly irregular, no rub  Gastrointestinal: BS +, soft, nondistended, nontender  : No palpable bladder  Musculoskeletal: No edema, no clubbing or cyanosis  Neurologic: Alert, oriented, moves all limbs  Skin: Warm and dry    Psychiatric: Odd affect    Scheduled Meds:     dilTIAZem, 30 mg, Oral, Q12H  famotidine, 20 mg, Oral, BID  folic acid, 1 mg, Oral, Daily  insulin lispro, 2-7 Units, Subcutaneous, 4x Daily AC & at Bedtime  multivitamin, 1 tablet, Oral, Daily  thiamine, 100 mg, Oral, Daily      IV Meds:   sodium chloride, 125 mL/hr, Last Rate: 125 mL/hr (24 1341)        Results Reviewed:   I have personally reviewed the results from the time of this admission to 3/5/2024 12:40 EST     Results from last 7 days   Lab Units 24  1014 24  1558 24  1213 24  1002 24  2224   SODIUM mmol/L 140 137 137 137 137   POTASSIUM mmol/L 4.5 5.3* 5.2 6.8* 4.3   CHLORIDE mmol/L 105 100 100 98 100   CO2 mmol/L 26.0 21.0* 25.0 22.0 26.0   BUN  mg/dL 32* 26* 26* 26* 20   CREATININE mg/dL 3.09* 2.71* 2.59* 2.81* 1.70*   CALCIUM mg/dL 9.9 9.8 12.5* 10.3 10.1   BILIRUBIN mg/dL 0.2  --   --  0.3 <0.2   ALK PHOS U/L 104  --   --  114 64   ALT (SGPT) U/L 44*  --   --  67* 17   AST (SGOT) U/L 51*  --   --  97* 23   GLUCOSE mg/dL 98 128* 199* 195* 99       Estimated Creatinine Clearance: 17.6 mL/min (A) (by C-G formula based on SCr of 3.09 mg/dL (H)).    Results from last 7 days   Lab Units 03/04/24  1014 03/03/24  1213 03/02/24  2224   MAGNESIUM mg/dL 1.8 1.8 1.5*             Results from last 7 days   Lab Units 03/04/24  1014 03/03/24  1002 03/02/24  2224   WBC 10*3/mm3 6.16 6.59 4.42   HEMOGLOBIN g/dL 10.5* 11.6* 10.5*   PLATELETS 10*3/mm3 182 169 191             Assessment / Plan     ASSESSMENT:  1.  CRISTIANO on CKD3a, nonoliguric, with no labs today (patient refused lab draw): CRISTIANO prerenal from decreased oral intake, labile blood pressure, and fluctuating volume status.  Volume status stable by exam; potassium now normal  2.  Confusion/encephalopathy, with suspected beta-blocker overdose   3.  Alcoholism and liver disease currently on folic acid, thiamine  4.  Hypertension, acceptable  5.  DDD with prior stroke  6.  Paroxysmal atrial fibrillation: rate controlled on diltiazem and metoprolol  7.  Alzheimer's disease as per psychiatry    PLAN:  1.  Obtain labs when he permits  2.  Difficult disposition    Thank you for involving us in the care of Yuval Loja.  Please feel free to call with any questions.    Thomas Weiner MD  03/05/24  12:40 [facility]    [facility]  [phone]    Please note that portions of this note were completed with a voice recognition program.

## 2024-03-05 NOTE — CASE MANAGEMENT/SOCIAL WORK
Case Management Discharge Note      Final Note: [facility], transportation via [facility] at 4pm.         Selected Continued Care - Admitted Since 3/3/2024       Destination    No services have been selected for the patient.                Durable Medical Equipment    No services have been selected for the patient.                Dialysis/Infusion    No services have been selected for the patient.                Home Medical Care    No services have been selected for the patient.                Therapy    No services have been selected for the patient.                Community Resources    No services have been selected for the patient.                Community & DME    No services have been selected for the patient.                    Transportation Services  [facility]: Other ([facility])    Final Discharge Disposition Code: 01 - home or self-care

## 2024-03-27 NOTE — PROGRESS NOTES
Enter Query Response Below      Query Response: Unable to determine             If applicable, please update the problem list.     Patient: Yuval Loja        : 1950  Account: [id number]           Admit Date: 2024        How to Respond to this query:       a. Click New Note     b. Answer query within the yellow box.                c. Update the Problem List, if applicable.      If you have any questions about this query contact me at: [email or web address]    Dr. Ramon,     Patient admitted with ETOH withdrawal, CRISTIANO/CKD.  History of Alzheimer's disease and dementia, lives independently at baseline.  Noted to be oriented to person, place, time on admission.  Patient developed confusion and oriented x 1, which has now resolved to oriented x 3 again.  Noted to be agitated and aggressive at times. Treatment includes Haldol, Ativan and restraints.      After study, can the dementia be further specified as:  Alzheimer's disease with mild dementia with agitation  Other- specify_____________  Unable to determine    By submitting this query, we are merely seeking further clarification of documentation to accurately reflect all conditions that you are monitoring, evaluating, treating or that extend the hospitalization or utilize additional resources of care. Please utilize your independent clinical judgment when addressing the question(s) above.     This query and your response, once completed, will be entered into the legal medical record.    Sincerely,  Daina Barnhart RN BSN  Clinical Documentation Integrity Program

## 2024-04-06 ENCOUNTER — HOSPITAL ENCOUNTER (EMERGENCY)
Facility: HOSPITAL | Age: 74
Discharge: HOME | End: 2024-04-07
Attending: EMERGENCY MEDICINE | Admitting: EMERGENCY MEDICINE
Payer: MEDICARE

## 2024-04-06 DIAGNOSIS — F03.90 DEMENTIA, UNSPECIFIED DEMENTIA SEVERITY, UNSPECIFIED DEMENTIA TYPE, UNSPECIFIED WHETHER BEHAVIORAL, PSYCHOTIC, OR MOOD DISTURBANCE OR ANXIETY: Primary | ICD-10-CM

## 2024-04-06 LAB
ALBUMIN SERPL-MCNC: 4.4 G/DL (ref 3.5–5.2)
ALBUMIN/GLOB SERPL: 1.3 G/DL
ALP SERPL-CCNC: 71 U/L (ref 39–117)
ALT SERPL W P-5'-P-CCNC: 8 U/L (ref 1–41)
AMPHET+METHAMPHET UR QL: NEGATIVE
ANION GAP SERPL CALCULATED.3IONS-SCNC: 12.8 MMOL/L (ref 5–15)
AST SERPL-CCNC: 21 U/L (ref 1–40)
BACTERIA UR QL AUTO: NORMAL /HPF
BARBITURATES UR QL SCN: NEGATIVE
BASOPHILS # BLD AUTO: 0.02 10*3/MM3 (ref 0–0.2)
BASOPHILS NFR BLD AUTO: 0.3 % (ref 0–1.5)
BENZODIAZ UR QL SCN: NEGATIVE
BILIRUB SERPL-MCNC: 0.3 MG/DL (ref 0–1.2)
BILIRUB UR QL STRIP: NEGATIVE
BUN SERPL-MCNC: 21 MG/DL (ref 8–23)
BUN/CREAT SERPL: 13 (ref 7–25)
CALCIUM SPEC-SCNC: 10.2 MG/DL (ref 8.6–10.5)
CANNABINOIDS SERPL QL: NEGATIVE
CHLORIDE SERPL-SCNC: 104 MMOL/L (ref 98–107)
CLARITY UR: CLEAR
CO2 SERPL-SCNC: 24.2 MMOL/L (ref 22–29)
COCAINE UR QL: NEGATIVE
COLOR UR: YELLOW
CREAT SERPL-MCNC: 1.61 MG/DL (ref 0.76–1.27)
DEPRECATED RDW RBC AUTO: 42.7 FL (ref 37–54)
EGFRCR SERPLBLD CKD-EPI 2021: 44.3 ML/MIN/1.73
EOSINOPHIL # BLD AUTO: 0.1 10*3/MM3 (ref 0–0.4)
EOSINOPHIL NFR BLD AUTO: 1.7 % (ref 0.3–6.2)
ERYTHROCYTE [DISTWIDTH] IN BLOOD BY AUTOMATED COUNT: 12 % (ref 12.3–15.4)
ETHANOL BLD-MCNC: <10 MG/DL (ref 0–10)
ETHANOL UR QL: <0.01 %
FENTANYL UR-MCNC: NEGATIVE NG/ML
GLOBULIN UR ELPH-MCNC: 3.3 GM/DL
GLUCOSE SERPL-MCNC: 88 MG/DL (ref 65–99)
GLUCOSE UR STRIP-MCNC: NEGATIVE MG/DL
HCT VFR BLD AUTO: 36.8 % (ref 37.5–51)
HGB BLD-MCNC: 11.7 G/DL (ref 13–17.7)
HGB UR QL STRIP.AUTO: NEGATIVE
HYALINE CASTS UR QL AUTO: NORMAL /LPF
IMM GRANULOCYTES # BLD AUTO: 0.02 10*3/MM3 (ref 0–0.05)
IMM GRANULOCYTES NFR BLD AUTO: 0.3 % (ref 0–0.5)
KETONES UR QL STRIP: NEGATIVE
LEUKOCYTE ESTERASE UR QL STRIP.AUTO: NEGATIVE
LYMPHOCYTES # BLD AUTO: 1.51 10*3/MM3 (ref 0.7–3.1)
LYMPHOCYTES NFR BLD AUTO: 25.4 % (ref 19.6–45.3)
MCH RBC QN AUTO: 31 PG (ref 26.6–33)
MCHC RBC AUTO-ENTMCNC: 31.8 G/DL (ref 31.5–35.7)
MCV RBC AUTO: 97.4 FL (ref 79–97)
METHADONE UR QL SCN: NEGATIVE
MONOCYTES # BLD AUTO: 0.54 10*3/MM3 (ref 0.1–0.9)
MONOCYTES NFR BLD AUTO: 9.1 % (ref 5–12)
NEUTROPHILS NFR BLD AUTO: 3.76 10*3/MM3 (ref 1.7–7)
NEUTROPHILS NFR BLD AUTO: 63.2 % (ref 42.7–76)
NITRITE UR QL STRIP: NEGATIVE
NRBC BLD AUTO-RTO: 0 /100 WBC (ref 0–0.2)
OPIATES UR QL: NEGATIVE
OXYCODONE UR QL SCN: NEGATIVE
PH UR STRIP.AUTO: 6.5 [PH] (ref 5–8)
PLATELET # BLD AUTO: 199 10*3/MM3 (ref 140–450)
PMV BLD AUTO: 10.5 FL (ref 6–12)
POTASSIUM SERPL-SCNC: 4.5 MMOL/L (ref 3.5–5.2)
PROT SERPL-MCNC: 7.7 G/DL (ref 6–8.5)
PROT UR QL STRIP: ABNORMAL
RBC # BLD AUTO: 3.78 10*6/MM3 (ref 4.14–5.8)
RBC # UR STRIP: NORMAL /HPF
REF LAB TEST METHOD: NORMAL
SODIUM SERPL-SCNC: 141 MMOL/L (ref 136–145)
SP GR UR STRIP: 1.02 (ref 1–1.03)
SQUAMOUS #/AREA URNS HPF: NORMAL /HPF
UROBILINOGEN UR QL STRIP: ABNORMAL
WBC # UR STRIP: NORMAL /HPF
WBC NRBC COR # BLD AUTO: 5.95 10*3/MM3 (ref 3.4–10.8)

## 2024-04-06 PROCEDURE — 80053 COMPREHEN METABOLIC PANEL: CPT | Performed by: EMERGENCY MEDICINE

## 2024-04-06 PROCEDURE — 82077 ASSAY SPEC XCP UR&BREATH IA: CPT | Performed by: EMERGENCY MEDICINE

## 2024-04-06 PROCEDURE — 85025 COMPLETE CBC W/AUTO DIFF WBC: CPT | Performed by: EMERGENCY MEDICINE

## 2024-04-06 PROCEDURE — 80307 DRUG TEST PRSMV CHEM ANLYZR: CPT | Performed by: EMERGENCY MEDICINE

## 2024-04-06 PROCEDURE — 81001 URINALYSIS AUTO W/SCOPE: CPT | Performed by: EMERGENCY MEDICINE

## 2024-04-06 PROCEDURE — 36415 COLL VENOUS BLD VENIPUNCTURE: CPT

## 2024-04-06 PROCEDURE — 99283 EMERGENCY DEPT VISIT LOW MDM: CPT

## 2024-04-06 RX ORDER — HALOPERIDOL 1 MG/1
1 TABLET ORAL ONCE
Status: COMPLETED | OUTPATIENT
Start: 2024-04-06 | End: 2024-04-06

## 2024-04-06 RX ORDER — ACETAMINOPHEN 500 MG
1000 TABLET ORAL ONCE
Status: COMPLETED | OUTPATIENT
Start: 2024-04-06 | End: 2024-04-06

## 2024-04-06 RX ORDER — OLANZAPINE 7.5 MG/1
7.5 TABLET, FILM COATED ORAL ONCE
Status: DISCONTINUED | OUTPATIENT
Start: 2024-04-06 | End: 2024-04-07 | Stop reason: SDUPTHER

## 2024-04-06 RX ADMIN — HALOPERIDOL 1 MG: 1 TABLET ORAL at 22:57

## 2024-04-06 RX ADMIN — ACETAMINOPHEN 1000 MG: 500 TABLET ORAL at 20:52

## 2024-04-07 VITALS
OXYGEN SATURATION: 98 % | BODY MASS INDEX: 22.53 KG/M2 | RESPIRATION RATE: 18 BRPM | TEMPERATURE: 98.3 F | DIASTOLIC BLOOD PRESSURE: 98 MMHG | SYSTOLIC BLOOD PRESSURE: 135 MMHG | WEIGHT: 152.12 LBS | HEART RATE: 101 BPM | HEIGHT: 69 IN

## 2024-04-07 PROCEDURE — 90791 PSYCH DIAGNOSTIC EVALUATION: CPT

## 2024-04-07 RX ORDER — FOLIC ACID 1 MG/1
1 TABLET ORAL DAILY
COMMUNITY

## 2024-04-07 RX ORDER — FAMOTIDINE 20 MG/1
20 TABLET, FILM COATED ORAL 2 TIMES DAILY
COMMUNITY

## 2024-04-07 RX ORDER — OLANZAPINE 7.5 MG/1
7.5 TABLET, FILM COATED ORAL ONCE
Status: COMPLETED | OUTPATIENT
Start: 2024-04-07 | End: 2024-04-07

## 2024-04-07 RX ORDER — UREA 10 %
6 LOTION (ML) TOPICAL NIGHTLY
COMMUNITY

## 2024-04-07 RX ORDER — ACETAMINOPHEN 325 MG/1
650 TABLET ORAL EVERY 6 HOURS PRN
COMMUNITY

## 2024-04-07 RX ORDER — ASPIRIN 325 MG
325 TABLET ORAL ONCE
Status: COMPLETED | OUTPATIENT
Start: 2024-04-07 | End: 2024-04-07

## 2024-04-07 RX ORDER — UREA 10 %
3 LOTION (ML) TOPICAL NIGHTLY
COMMUNITY

## 2024-04-07 RX ORDER — HALOPERIDOL 1 MG/1
1 TABLET ORAL 3 TIMES DAILY
COMMUNITY

## 2024-04-07 RX ADMIN — ASPIRIN 325 MG: 325 TABLET ORAL at 04:01

## 2024-04-07 RX ADMIN — OLANZAPINE 7.5 MG: 7.5 TABLET, FILM COATED ORAL at 01:03

## 2024-04-07 NOTE — NURSING NOTE
"ED MD made this RN aware that pt was adamantly refusing to return to his facility. Went down to speak with patient and explain that the hospital is obligated to ensure his safe return to his facility. Pt alert x4, aware of situation. States he does not care that he has a guardian and that \"I can take care of myself\". ED MD went in to speak with patient along with this RN to explain that we do not have a choice as his guardian (Daughter - Torri) requested his return to [place] and she will follow up his doctor on Monday. Pt irritable but voiced acceptance. Transport was called and ETA is set for noon 4/7.   "

## 2024-04-07 NOTE — CASE MANAGEMENT/SOCIAL WORK
"Discharge Planning Assessment  [facility]     Patient Name: Yuval Loja  MRN: [id number]  Today's Date: 4/6/2024    Admit Date: 4/6/2024        Discharge Needs Assessment    No documentation.                  Discharge Plan       Row Name 04/06/24 2041       Plan    Plan Comments [facility]  ([phone]) requested to speak with this CCP regarding patient behaviors- incident today at facility where patient became \"combative\"- tried to \"escape\"- episode on Sunday when patient attempted  to \"pay somebody to take him out of there\".  is requesting a geropsych evaluation- He also noted patients daughter ( who is out of town) is patients legal guardian. Call placed to daughter Torri Loja who verified she is patients legal guardian- gave consent to treat; no paperwork or banner noted on file- requested daughter to email this CCP guardianship paperwork and daughter agreed; Call placed to Lanie CHEW- supervisor to update per protocol                  Continued Care and Services - Admitted Since 4/6/2024    No active coordination exists for this encounter.          Demographic Summary    No documentation.                  Functional Status    No documentation.                  Psychosocial    No documentation.                  Abuse/Neglect    No documentation.                  Legal    No documentation.                  Substance Abuse    No documentation.                  Patient Forms    No documentation.                     Reina Knight RN    " occupational therapy

## 2024-04-07 NOTE — ED PROVIDER NOTES
EMERGENCY DEPARTMENT ENCOUNTER    Room Number:  18/18  PCP: Alessia Prescott APRN  Patient Care Team:  Alessia Prescott APRN as PCP - General (Family Medicine)  Jonny Bains MD as Referring Physician (Internal Medicine)  Carlos Villareal MD as Consulting Physician (Hematology and Oncology)   Independent Historians: Patient, EMS, report from facility    HPI:  Chief Complaint: Agitation, combative    A complete HPI/ROS/PMH/PSH/SH/FH are unobtainable due to: None    Chronic or social conditions impacting patient care (Social Determinants of Health): None  (Financial Resource Strain / Food Insecurity / Transportation Needs / Physical Activity / Stress / Social Connections / Intimate Partner Violence / Housing Stability)    Context: Yuval Loja is a 73 y.o. male who presents to the ED c/o acute agitation and combative behavior.  Patient was seen earlier this afternoon at a [place] ER.  Patient seen at that time for some generalized weakness.  Was discharged back to his facility.  Patient has history of dementia and his daughter lives out of town and has power of .  Patient is in a memory care unit.  Patient attempted to convince hospital staff to discharge him to his house.  When patient was finally transported back to his memory care facility he apparently became aggressive and combative.  Patient apparently has a history of attempting to use deception to not be in the memory care unit.  Apparently was found trying to convince people to pretend to be family numbers and signed him out.  At this time patient is peaceful and cooperative.  States he does not like his current living arrangement would like to be discharged home.  Expresses frustration with his daughter having power of .    Review of prior external notes (non-ED) -and- Review of prior external test results outside of this encounter: I reviewed ED note from today.    Prescription drug monitoring program review:         PAST MEDICAL  HISTORY  Active Ambulatory Problems     Diagnosis Date Noted    Alcoholic ketoacidosis 05/23/2017    Alcohol dependence 05/23/2017    Methamphetamine abuse 05/23/2017    Fatty liver, alcoholic 05/23/2017    Nausea vomiting and diarrhea 05/23/2017    Alcoholic liver disease 05/23/2017    Metabolic acidosis 05/23/2017    Tobacco abuse 05/23/2017    Coronary artery disease involving native coronary artery of native heart without angina pectoris 05/24/2017    Tachycardia 05/24/2017    Sinus tachycardia 04/17/2019    Chronic kidney disease, stage 3 04/17/2019    Medically noncompliant 04/18/2019    Visual hallucinations 04/18/2020    Psychosis 04/18/2020    Hyponatremia 04/18/2020    CAD (coronary artery disease) 04/18/2020    Leukopenia 04/18/2020    Symptomatic anemia 04/18/2020    Headache 04/18/2020    Substance abuse 04/18/2020    Gastrointestinal hemorrhage 10/22/2020    CRISTIANO (acute kidney injury) 10/23/2020    Hyperkalemia 10/23/2020    Right lower quadrant abdominal pain 03/15/2022    New onset atrial fibrillation 03/15/2022    History of drug abuse 03/15/2022    COPD (chronic obstructive pulmonary disease) 03/15/2022    Right inguinal hernia 03/15/2022    Constipation 03/15/2022    Status post right hip replacement 06/27/2022    Right hip pain 07/19/2022    Sinus bradycardia 04/10/2023    Generalized abdominal pain 06/01/2023    Altered mental status 02/04/2024    Altered mental state 02/05/2024    Accidental overdose 03/03/2024     Resolved Ambulatory Problems     Diagnosis Date Noted    Dehydration 04/17/2019    Functional diarrhea 04/17/2019    Closed fracture of neck of right femur, initial encounter 06/25/2022     Past Medical History:   Diagnosis Date    Alcohol abuse     Arthritis     Disease of thyroid gland     Elevated cholesterol     GERD (gastroesophageal reflux disease)     History of transfusion     Hypertensive urgency     Myocardial infarction     Sleep apnea     Stroke          PAST SURGICAL  "HISTORY  Past Surgical History:   Procedure Laterality Date    BACK SURGERY      CARDIAC CATHETERIZATION      CARDIAC ELECTROPHYSIOLOGY PROCEDURE N/A 4/10/2023    Procedure: Temporary Pacemaker;  Surgeon: Vaibhav Bonilla MD;  Location: [facility] INVASIVE LOCATION;  Service: Cardiovascular;  Laterality: N/A;    COLONOSCOPY      ENDOSCOPY      FRACTURE SURGERY      JOINT REPLACEMENT      SKIN BIOPSY      TOTAL HIP ARTHROPLASTY Right 06/27/2022    Procedure: TOTAL HIP ARTHROPLASTY ANTERIOR WITH HANA TABLE;  Surgeon: Willian Quiles MD;  Location: [facility] OR;  Service: Orthopedics;  Laterality: Right;  Depuy, carli. hana         FAMILY HISTORY  History reviewed. No pertinent family history.      SOCIAL HISTORY  Social History     Socioeconomic History    Marital status: Single   Tobacco Use    Smoking status: Every Day     Current packs/day: 0.50     Types: Cigarettes    Smokeless tobacco: Never    Tobacco comments:     tried to provide education declined irritated w/ attempt smoked \" depends on what I feel like.\"   Vaping Use    Vaping status: Never Used   Substance and Sexual Activity    Alcohol use: Not Currently    Drug use: No    Sexual activity: Defer         ALLERGIES  Mirtazapine and Sertraline        REVIEW OF SYSTEMS  Review of Systems  Included in HPI  All systems reviewed and negative except for those discussed in HPI.      PHYSICAL EXAM    I have reviewed the triage vital signs and nursing notes.    ED Triage Vitals [04/06/24 2018]   Temp Heart Rate Resp BP SpO2   97.7 °F (36.5 °C) 90 16 180/90 97 %      Temp src Heart Rate Source Patient Position BP Location FiO2 (%)   Tympanic Monitor Lying Right arm --       Physical Exam  GENERAL: alert, no acute distress  SKIN: Warm, dry  HENT: Normocephalic, atraumatic  EYES: no scleral icterus  CV: regular rhythm, regular rate  RESPIRATORY: normal effort, lungs clear  ABDOMEN: soft, nontender, nondistended  MUSCULOSKELETAL: no deformity  NEURO: " alert, moves all extremities, follows commands                                                               LAB RESULTS  Recent Results (from the past 24 hour(s))   TROPONIN    Collection Time: 04/06/24  1:39 PM    Specimen: Fresh Frozen Plasma    Specimen type and source: Plasma, Blood specimen from patient (specimen)   Result Value Ref Range    Troponin I 0.016 0.000 - 0.028 ng/mL   LIPASE    Collection Time: 04/06/24  1:39 PM    Specimen: Fresh Frozen Plasma    Specimen type and source: Plasma, Blood specimen from patient (specimen)   Result Value Ref Range    Lipase 35 0 - 59 U/L   CBC AND DIFFERENTIAL    Collection Time: 04/06/24  1:39 PM    Specimen type and source: Whole Blood, Blood specimen from patient (specimen)   Result Value Ref Range    WBC 5.06 4.5 - 11.0 10*3/uL    RBC 3.45 (L) 4.5 - 5.9 10*6/uL    Hemoglobin 10.7 (L) 13.5 - 17.5 g/dL    Hematocrit 33.8 (L) 41.0 - 53.0 %    MCV 98.0 80.0 - 100.0 fL    MCH 31.0 26.0 - 34.0 pg    MCHC 31.7 31.0 - 37.0 g/dL    RDW 12.8 12.0 - 16.8 %    Platelets 213 140 - 440 10*3/uL    MPV 10.6 8.4 - 12.4 fL    Differential Type Hospital CBC w/AutoDiff (arb'U)    Neutrophil Rel % 60.2 45 - 80 %    Lymphocyte Rel % 26.1 15 - 50 %    Monocyte Rel % 10.5 0 - 15 %    Eosinophil % 2.2 0 - 7 %    Basophil Rel % 0.6 0 - 2 %    Immature Grans % 0.4 0.0 - 1.0 %    nRBC 0 0 /100(WBC)    Neutrophils Absolute 3.05 2.0 - 8.8 10*3/uL    Lymphocytes Absolute 1.32 0.7 - 5.5 10*3/uL    Monocytes Absolute 0.53 0.0 - 1.7 10*3/uL    Eosinophils Absolute 0.11 0.0 - 0.8 10*3/uL    Basophils Absolute 0.03 0.0 - 0.2 10*3/uL    Immature Grans, Absolute 0.02 0.00 - 0.10 10*3/uL   Comprehensive Metabolic Panel    Collection Time: 04/06/24  8:49 PM    Specimen: Blood   Result Value Ref Range    Glucose 88 65 - 99 mg/dL    BUN 21 8 - 23 mg/dL    Creatinine 1.61 (H) 0.76 - 1.27 mg/dL    Sodium 141 136 - 145 mmol/L    Potassium 4.5 3.5 - 5.2 mmol/L    Chloride 104 98 - 107 mmol/L    CO2 24.2 22.0 -  29.0 mmol/L    Calcium 10.2 8.6 - 10.5 mg/dL    Total Protein 7.7 6.0 - 8.5 g/dL    Albumin 4.4 3.5 - 5.2 g/dL    ALT (SGPT) 8 1 - 41 U/L    AST (SGOT) 21 1 - 40 U/L    Alkaline Phosphatase 71 39 - 117 U/L    Total Bilirubin 0.3 0.0 - 1.2 mg/dL    Globulin 3.3 gm/dL    A/G Ratio 1.3 g/dL    BUN/Creatinine Ratio 13.0 7.0 - 25.0    Anion Gap 12.8 5.0 - 15.0 mmol/L    eGFR 44.3 (L) >60.0 mL/min/1.73   Ethanol    Collection Time: 04/06/24  8:49 PM    Specimen: Blood   Result Value Ref Range    Ethanol <10 0 - 10 mg/dL    Ethanol % <0.010 %   CBC Auto Differential    Collection Time: 04/06/24  8:49 PM    Specimen: Blood   Result Value Ref Range    WBC 5.95 3.40 - 10.80 10*3/mm3    RBC 3.78 (L) 4.14 - 5.80 10*6/mm3    Hemoglobin 11.7 (L) 13.0 - 17.7 g/dL    Hematocrit 36.8 (L) 37.5 - 51.0 %    MCV 97.4 (H) 79.0 - 97.0 fL    MCH 31.0 26.6 - 33.0 pg    MCHC 31.8 31.5 - 35.7 g/dL    RDW 12.0 (L) 12.3 - 15.4 %    RDW-SD 42.7 37.0 - 54.0 fl    MPV 10.5 6.0 - 12.0 fL    Platelets 199 140 - 450 10*3/mm3    Neutrophil % 63.2 42.7 - 76.0 %    Lymphocyte % 25.4 19.6 - 45.3 %    Monocyte % 9.1 5.0 - 12.0 %    Eosinophil % 1.7 0.3 - 6.2 %    Basophil % 0.3 0.0 - 1.5 %    Immature Grans % 0.3 0.0 - 0.5 %    Neutrophils, Absolute 3.76 1.70 - 7.00 10*3/mm3    Lymphocytes, Absolute 1.51 0.70 - 3.10 10*3/mm3    Monocytes, Absolute 0.54 0.10 - 0.90 10*3/mm3    Eosinophils, Absolute 0.10 0.00 - 0.40 10*3/mm3    Basophils, Absolute 0.02 0.00 - 0.20 10*3/mm3    Immature Grans, Absolute 0.02 0.00 - 0.05 10*3/mm3    nRBC 0.0 0.0 - 0.2 /100 WBC   Urinalysis With Microscopic If Indicated (No Culture) - Urine, Clean Catch    Collection Time: 04/06/24  9:23 PM    Specimen: Urine, Clean Catch   Result Value Ref Range    Color, UA Yellow Yellow, Straw    Appearance, UA Clear Clear    pH, UA 6.5 5.0 - 8.0    Specific Gravity, UA 1.016 1.005 - 1.030    Glucose, UA Negative Negative    Ketones, UA Negative Negative    Bilirubin, UA Negative Negative     Blood, UA Negative Negative    Protein, UA 30 mg/dL (1+) (A) Negative    Leuk Esterase, UA Negative Negative    Nitrite, UA Negative Negative    Urobilinogen, UA 0.2 E.U./dL 0.2 - 1.0 E.U./dL   Urine Drug Screen - Urine, Clean Catch    Collection Time: 04/06/24  9:23 PM    Specimen: Urine, Clean Catch   Result Value Ref Range    Amphet/Methamphet, Screen Negative Negative    Barbiturates Screen, Urine Negative Negative    Benzodiazepine Screen, Urine Negative Negative    Cocaine Screen, Urine Negative Negative    Opiate Screen Negative Negative    THC, Screen, Urine Negative Negative    Methadone Screen, Urine Negative Negative    Oxycodone Screen, Urine Negative Negative    Fentanyl, Urine Negative Negative   Urinalysis, Microscopic Only - Urine, Clean Catch    Collection Time: 04/06/24  9:23 PM    Specimen: Urine, Clean Catch   Result Value Ref Range    RBC, UA 0-2 None Seen, 0-2 /HPF    WBC, UA 0-2 None Seen, 0-2 /HPF    Bacteria, UA None Seen None Seen /HPF    Squamous Epithelial Cells, UA 0-2 None Seen, 0-2 /HPF    Hyaline Casts, UA None Seen None Seen /LPF    Methodology Automated Microscopy        I ordered the above labs and independently reviewed the results.        RADIOLOGY  No Radiology Exams Resulted Within Past 24 Hours    I ordered the above noted radiological studies. Reviewed by me and discussed with radiologist.  See dictation for official radiology interpretation.      PROCEDURES    Procedures      MEDICATIONS GIVEN IN ER    Medications   aspirin tablet 325 mg (has no administration in time range)   acetaminophen (TYLENOL) tablet 1,000 mg (1,000 mg Oral Given 4/6/24 2052)   haloperidol (HALDOL) tablet 1 mg (1 mg Oral Given 4/6/24 2257)   OLANZapine (zyPREXA) tablet 7.5 mg (7.5 mg Oral Given 4/7/24 0103)         ORDERS PLACED DURING THIS VISIT:  Orders Placed This Encounter   Procedures    Comprehensive Metabolic Panel    Urinalysis With Microscopic If Indicated (No Culture) - Urine, Clean Catch     Ethanol    Urine Drug Screen - Urine, Clean Catch    CBC Auto Differential    Urinalysis, Microscopic Only - Urine, Clean Catch    Inpatient Access Center Consult    CBC & Differential         PROGRESS, DATA ANALYSIS, CONSULTS, AND MEDICAL DECISION MAKING    All labs have been independently interpreted by me.  All radiology studies have been reviewed by me and discussed with radiologist dictating the report.   EKG's independently viewed and interpreted by me.  Discussion below represents my analysis of pertinent findings related to patient's condition, differential diagnosis, treatment plan and final disposition.    Differential diagnosis includes but is not limited to dementia, UTI, psychosis.    Clinical Scores:              ED Course as of 04/07/24 0255   Sat Apr 06, 2024 2038 Supervisor from patient's [facility] has talked briefly with myself and nursing staff.  He is concerned that patient may need geriatric psychiatric consult. [TJ]   2204 I have placed access consult for patient. [TJ]   Sun Apr 07, 2024   0249 Patient is agreeable for transfer back to [facility]. [TJ]      ED Course User Index  [TJ] Chucky Hoyt MD               PPE: The patient wore a mask and I wore an N95 mask throughout the entire patient encounter.       AS OF 02:55 EDT VITALS:    BP - 149/73  HR - 88  TEMP - 98.3 °F (36.8 °C) (Oral)  O2 SATS - 97%        DIAGNOSIS  Final diagnoses:   Dementia, unspecified dementia severity, unspecified dementia type, unspecified whether behavioral, psychotic, or mood disturbance or anxiety         DISPOSITION  ED Disposition       ED Disposition   Discharge    Condition   Stable    Comment   --                  Note Disclaimer: At [facility], we believe that sharing information builds trust and better relationships. You are receiving this note because you recently visited [facility]. It is possible you will see health information before a provider has talked with you  about it. This kind of information can be easy to misunderstand. To help you fully understand what it means for your health, we urge you to discuss this note with your provider.         Chucky Hoyt MD  04/07/24 0255

## 2024-04-07 NOTE — CONSULTS
"Access center consult for 73 year old male seen in ED bed 18 for agitation. 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 Pt brought to the ED via EMS from [facility]. Reported from the , HARLAN Cohen (), that the patient was being transported back from [facility] where he was sent earlier in the day for stomach pain. Pt was medically cleared from [facility], and when returning to the facility the pt apparently tried to bribe the transporter to take the pt to his previous home address. Pt was agitated but Charbel reports he was not physically combative. When they arrived to [facility], the patient was on a stretcher being brought back to the memory unit. Pt attempted to remove his safety straps from the stretcher and jump off. When facility staff and EMS were attempted to place patient back on stretcher, he \"gestured like\" he was going to swing at a female nurse. Charbel states the pt did not physically touch any staff member, but that the pt \"gestured\" as if he was going to swing. Charbel reports he was concerned that the patient could become aggressive with staff or other residents, and therefore they called the police who then dispatched EMS. Charbel states he requested to EMS that he wished for the patient to be psychiatrically evaluated. UDS and BAL are negative.     Pt is alert and oriented x4. On approach the pt is standing in his doorway and watching as staff walked by the door. Softly approached patient and requested to speak in his room. Pt stated \"we can if you got a warm blanket\" as he smiled. This RN grabbed warm blanket and pt was cooperative with discussion. Pt was soft spoken and joking at times. Thought process linear and pt able to tell me that he recently moved into the memory care unit at [facility] and that he highly dislikes it because he has no freedom to \"cook a meal, do my laundry, I can't do anything " "myself\". Pt states that he now \"can't even keep my medicines in my room\". Pt states he accidentally took too many of his Haldol recently and now \"they don't trust me with anything\". This information is corroborated by previous charting that indicates the pt was admitted to this hospital from 3/3 to 3/5 from accidental Haldol overdose. Pt was seen by the inpatient psychiatrist 3/4 and determined to be at baseline and cleared by psychiatry. Psychiatry charting also indicates patient's agitation is consistent with Alzheimer's disease presentation and pt did not require geriatric psychiatric admission. When pt was returned to [facility] his level of care was adjusted to the memory care unit 3/5/24.     Pt admitted 2/4 to 3/1 at this hospital for alcohol withdrawal. Psychiatrist followed with patient from 2/5 to 2/16 where charting indicates pt was pleasantly confused to \"cantankerous\" and agitated at times. Pt was discharged to [facility] assisted [facility] 3/1/24.     Spoke with RIP Cohen at [facility] facility who indicated the patient had one incident of attempting to leave the facility. Last Sunday, 3/31, the patient apparently hired someone to pretend to be a family member and check the patient out of his facility so he could escape. The facility did not allow the person to check the patient out and they called the daughter who verified that she did not know the visitor.     Pt's daughter, Torri, was recently granted temporary guardianship. This RN called Torri for additional information. Torri denies pt has hx of mental illness or previous psychiatric admission. States it is upsetting how quickly her father's disease process has advanced. She verifies that the pt is currently residing in a locked memory care unit. Voiced that the pt is cooperative with his medications. Discussed that HARLAN Cohen stated the pt's scheduled Haldol dose was recently adjusted in the last few days from 2mg 3x daily down to 1mg " 3x daily. Torri was not aware of this adjustment and voices concern that the pt's presentation may be related to the recent decrease in medication. Unclear why the pt's Haldol was decreased. Torri voiced she will be speaking with the primary provider on Monday to inquire about agitation medications.     Discussed with both Torri and HARLAN Cohen that the pt's current presentation does not qualify for inpatient geriatric psychiatric admission. Current presentation is consistent with Alzheimer's disease process and discussed this with ED MD who is in agreement. Torri and manager Cohen agreeable with plan to provide agitation medication in the ED and transfer back to facility with plan to follow up with primary provider regarding agitation medication.

## 2024-04-07 NOTE — ED NOTES
Facility director at [facility] reports concern for combative behavior towards transport staff and potentially other elderly residents at his facility, they are requesting a geriatric psych evaluation. When discharged from [facility] earlier today, told transport staff to take him to a different address, when staff refused pt became combative.     Also reports pt guardian is daughter, Maya Loja

## 2024-04-28 ENCOUNTER — APPOINTMENT (OUTPATIENT)
Dept: CT IMAGING | Facility: HOSPITAL | Age: 74
End: 2024-04-28
Payer: MEDICARE

## 2024-04-28 ENCOUNTER — HOSPITAL ENCOUNTER (EMERGENCY)
Facility: HOSPITAL | Age: 74
Discharge: HOME | End: 2024-04-29
Attending: EMERGENCY MEDICINE | Admitting: EMERGENCY MEDICINE
Payer: MEDICARE

## 2024-04-28 VITALS
TEMPERATURE: 97.3 F | HEART RATE: 90 BPM | DIASTOLIC BLOOD PRESSURE: 69 MMHG | SYSTOLIC BLOOD PRESSURE: 147 MMHG | RESPIRATION RATE: 17 BRPM | OXYGEN SATURATION: 100 %

## 2024-04-28 DIAGNOSIS — K59.00 CONSTIPATION, UNSPECIFIED CONSTIPATION TYPE: Primary | ICD-10-CM

## 2024-04-28 DIAGNOSIS — Z86.59 HISTORY OF DEMENTIA: ICD-10-CM

## 2024-04-28 DIAGNOSIS — Z87.19 HISTORY OF CONSTIPATION: ICD-10-CM

## 2024-04-28 PROCEDURE — 74176 CT ABD & PELVIS W/O CONTRAST: CPT

## 2024-04-28 PROCEDURE — 99284 EMERGENCY DEPT VISIT MOD MDM: CPT

## 2024-04-28 RX ORDER — DIPHENOXYLATE HYDROCHLORIDE AND ATROPINE SULFATE 2.5; .025 MG/1; MG/1
TABLET ORAL DAILY
COMMUNITY

## 2024-04-28 RX ORDER — BISACODYL 10 MG
10 SUPPOSITORY, RECTAL RECTAL DAILY PRN
Status: DISCONTINUED | OUTPATIENT
Start: 2024-04-28 | End: 2024-04-28

## 2024-04-28 RX ORDER — BISACODYL 5 MG/1
5 TABLET, DELAYED RELEASE ORAL DAILY PRN
Status: DISCONTINUED | OUTPATIENT
Start: 2024-04-28 | End: 2024-04-28

## 2024-04-28 RX ORDER — HALOPERIDOL 1 MG/1
1 TABLET ORAL ONCE
Status: COMPLETED | OUTPATIENT
Start: 2024-04-28 | End: 2024-04-28

## 2024-04-28 RX ORDER — SODIUM CHLORIDE 0.9 % (FLUSH) 0.9 %
10 SYRINGE (ML) INJECTION AS NEEDED
Status: DISCONTINUED | OUTPATIENT
Start: 2024-04-28 | End: 2024-04-28

## 2024-04-28 RX ORDER — ONDANSETRON 4 MG/1
4 TABLET, ORALLY DISINTEGRATING ORAL EVERY 6 HOURS PRN
Status: DISCONTINUED | OUTPATIENT
Start: 2024-04-28 | End: 2024-04-28

## 2024-04-28 RX ORDER — POLYETHYLENE GLYCOL 3350 17 G/17G
17 POWDER, FOR SOLUTION ORAL DAILY
Qty: 20 EACH | Refills: 0 | Status: SHIPPED | OUTPATIENT
Start: 2024-04-28

## 2024-04-28 RX ORDER — ALUMINA, MAGNESIA, AND SIMETHICONE 2400; 2400; 240 MG/30ML; MG/30ML; MG/30ML
15 SUSPENSION ORAL EVERY 6 HOURS PRN
Status: DISCONTINUED | OUTPATIENT
Start: 2024-04-28 | End: 2024-04-28

## 2024-04-28 RX ORDER — ACETAMINOPHEN 325 MG/1
650 TABLET ORAL EVERY 4 HOURS PRN
Status: DISCONTINUED | OUTPATIENT
Start: 2024-04-28 | End: 2024-04-28

## 2024-04-28 RX ORDER — LORAZEPAM 1 MG/1
1 TABLET ORAL ONCE
Status: COMPLETED | OUTPATIENT
Start: 2024-04-28 | End: 2024-04-28

## 2024-04-28 RX ORDER — HALOPERIDOL 1 MG/1
1 TABLET ORAL 3 TIMES DAILY
Status: DISCONTINUED | OUTPATIENT
Start: 2024-04-28 | End: 2024-04-29 | Stop reason: HOSPADM

## 2024-04-28 RX ORDER — POLYETHYLENE GLYCOL 3350 17 G/17G
17 POWDER, FOR SOLUTION ORAL DAILY PRN
Status: DISCONTINUED | OUTPATIENT
Start: 2024-04-28 | End: 2024-04-28

## 2024-04-28 RX ORDER — AMOXICILLIN 250 MG
2 CAPSULE ORAL 2 TIMES DAILY
Status: DISCONTINUED | OUTPATIENT
Start: 2024-04-28 | End: 2024-04-28

## 2024-04-28 RX ORDER — ONDANSETRON 2 MG/ML
4 INJECTION INTRAMUSCULAR; INTRAVENOUS EVERY 6 HOURS PRN
Status: DISCONTINUED | OUTPATIENT
Start: 2024-04-28 | End: 2024-04-28

## 2024-04-28 RX ADMIN — LORAZEPAM 1 MG: 1 TABLET ORAL at 20:20

## 2024-04-28 RX ADMIN — HALOPERIDOL 1 MG: 1 TABLET ORAL at 17:26

## 2024-04-28 RX ADMIN — HALOPERIDOL 1 MG: 1 TABLET ORAL at 23:00

## 2024-04-28 NOTE — ED TRIAGE NOTES
Pt to ER from [facility] via [facility]; c/o nausea and constipation x5 weeks. Also endorses depression r/t living situation.

## 2024-04-28 NOTE — ED PROVIDER NOTES
EMERGENCY DEPARTMENT ENCOUNTER    Room Number:  18/18  PCP: Alessia Prescott APRN  Historian: Patient, EMS, daughter      HPI:  Chief Complaint: Constipation  A complete HPI/ROS/PMH/PSH/SH/FH are unobtainable due to: None    Context: Yuval Loja is a 73 y.o. male who presents to the ED via EMS from assisted living c/o constipation over the last several weeks.  Has had some loose stools per EMS.  No vomiting, maybe some nausea.  Patient with a history of dementia.  Patient here today because he does not want to go back to his facility.  He admits to some constipation.  No reported fevers or chills.  Daughter over the phone to the RN states that he frequently goes to the ER on Sundays when she goes to [employer].      MEDICAL RECORD REVIEW    External (non-ED) record review: Discharge summary from March 5, 2024 to being admitted March 3, 2024 for hyperkalemia, CRISTIANO              PAST MEDICAL HISTORY  Active Ambulatory Problems     Diagnosis Date Noted   • Alcoholic ketoacidosis 05/23/2017   • Alcohol dependence 05/23/2017   • Methamphetamine abuse 05/23/2017   • Fatty liver, alcoholic 05/23/2017   • Nausea vomiting and diarrhea 05/23/2017   • Alcoholic liver disease 05/23/2017   • Metabolic acidosis 05/23/2017   • Tobacco abuse 05/23/2017   • Coronary artery disease involving native coronary artery of native heart without angina pectoris 05/24/2017   • Tachycardia 05/24/2017   • Sinus tachycardia 04/17/2019   • Chronic kidney disease, stage 3 04/17/2019   • Medically noncompliant 04/18/2019   • Visual hallucinations 04/18/2020   • Psychosis 04/18/2020   • Hyponatremia 04/18/2020   • CAD (coronary artery disease) 04/18/2020   • Leukopenia 04/18/2020   • Symptomatic anemia 04/18/2020   • Headache 04/18/2020   • Substance abuse 04/18/2020   • Gastrointestinal hemorrhage 10/22/2020   • CRISTIANO (acute kidney injury) 10/23/2020   • Hyperkalemia 10/23/2020   • Right lower quadrant abdominal pain 03/15/2022   • New onset atrial  fibrillation 03/15/2022   • History of drug abuse 03/15/2022   • COPD (chronic obstructive pulmonary disease) 03/15/2022   • Right inguinal hernia 03/15/2022   • Constipation 03/15/2022   • Status post right hip replacement 06/27/2022   • Right hip pain 07/19/2022   • Sinus bradycardia 04/10/2023   • Generalized abdominal pain 06/01/2023   • Altered mental status 02/04/2024   • Altered mental state 02/05/2024   • Accidental overdose 03/03/2024     Resolved Ambulatory Problems     Diagnosis Date Noted   • Dehydration 04/17/2019   • Functional diarrhea 04/17/2019   • Closed fracture of neck of right femur, initial encounter 06/25/2022     Past Medical History:   Diagnosis Date   • Alcohol abuse    • Arthritis    • Dementia    • Disease of thyroid gland    • Elevated cholesterol    • GERD (gastroesophageal reflux disease)    • History of transfusion    • Hypertensive urgency    • Myocardial infarction    • Sleep apnea    • Stroke          PAST SURGICAL HISTORY  Past Surgical History:   Procedure Laterality Date   • BACK SURGERY     • CARDIAC CATHETERIZATION     • CARDIAC ELECTROPHYSIOLOGY PROCEDURE N/A 4/10/2023    Procedure: Temporary Pacemaker;  Surgeon: Vaibhav Bonilla MD;  Location: [facility] INVASIVE LOCATION;  Service: Cardiovascular;  Laterality: N/A;   • COLONOSCOPY     • ENDOSCOPY     • FRACTURE SURGERY     • JOINT REPLACEMENT     • SKIN BIOPSY     • TOTAL HIP ARTHROPLASTY Right 06/27/2022    Procedure: TOTAL HIP ARTHROPLASTY ANTERIOR WITH HANA TABLE;  Surgeon: Willian Quiles MD;  Location: [facility] OR;  Service: Orthopedics;  Laterality: Right;  Depuy, carli. hana         FAMILY HISTORY  History reviewed. No pertinent family history.      SOCIAL HISTORY  Social History     Socioeconomic History   • Marital status: Single   Tobacco Use   • Smoking status: Every Day     Current packs/day: 0.50     Types: Cigarettes   • Smokeless tobacco: Never   • Tobacco comments:     tried to provide  "education declined irritated w/ attempt smoked \" depends on what I feel like.\"   Vaping Use   • Vaping status: Never Used   Substance and Sexual Activity   • Alcohol use: Not Currently   • Drug use: No   • Sexual activity: Defer         ALLERGIES  Mirtazapine and Sertraline        REVIEW OF SYSTEMS  Review of Systems     All systems reviewed and negative except for those discussed in HPI.       PHYSICAL EXAM    I have reviewed the triage vital signs and nursing notes.    ED Triage Vitals [04/28/24 1515]   Temp Heart Rate Resp BP SpO2   97.3 °F (36.3 °C) 90 17 147/69 100 %      Temp src Heart Rate Source Patient Position BP Location FiO2 (%)   Tympanic -- -- -- --       Physical Exam  General: No acute distress, nontoxic  HEENT: Mucous membranes moist, atraumatic, EOMI  Neck: Full ROM  Pulm: Symmetric chest rise, nonlabored, lungs CTAB  Cardiovascular: Regular rate and rhythm, intact distal pulses  GI: Soft, nontender, nondistended, no rebound, no guarding, bowel sounds present  MSK: Full ROM, no deformity  Skin: Warm, dry  Neuro: Awake, alert, oriented x 4, GCS 15, ambulatory without difficulty moving all extremities, no focal deficits  Psych: Calm, cooperative        LAB RESULTS  No results found for this or any previous visit (from the past 24 hour(s)).    Ordered the above labs and independently interpreted results. My findings will be discussed in the medical decision making section below        RADIOLOGY  CT Abdomen Pelvis Without Contrast    Result Date: 4/28/2024  CT ABDOMEN AND PELVIS WITHOUT IV CONTRAST  HISTORY: 73-year-old male with constipation for several weeks.  TECHNIQUE: Radiation dose reduction techniques were utilized, including automated exposure control and exposure modulation based on body size. 3 mm images were obtained through the abdomen and pelvis without the administration of IV contrast. Compared with prior 6/1/2023.  FINDINGS: 1. There is a very large volume of formed stool within the " rectosigmoid colon, measuring 8.4 x 8.0 cm. There is otherwise an average volume of formed stool within the colon proximally. There is no bowel obstruction.  2. There is been significant increase in the moderate size right inguinal hernia containing a long segment of small bowel without obstruction or incarceration.  3. The large volume of stool within the rectum has significant anterior mass effect on the urinary bladder. Urinary bladder is partially collapsed. There is no hydronephrosis bilaterally no acute renal abnormality is seen.  4. There is cholelithiasis with the largest stone measuring 1.8 cm. There is no cholecystitis or biliary dilatation. Splenic size is normal. Noncontrasted pancreas and adrenals appear unremarkable. The superior most aspect of the dome of the liver is not fully included, but the liver appears otherwise unremarkable. There is no evidence for pneumonia or pleural effusions at the visualized lower chest.       Ordered the above noted radiological studies.  Independently interpreted by me and my independent review of findings can be found in the ED Course.  See dictation for official radiology interpretation.      PROCEDURES    Procedures        MEDICATIONS GIVEN IN ER    Medications   haloperidol (HALDOL) tablet 1 mg (1 mg Oral Given 4/28/24 1726)         PROGRESS, DATA ANALYSIS, CONSULTS, AND MEDICAL DECISION MAKING    Please note that this section constitutes my independent interpretation of clinical data including lab results, radiology, EKG's.  This constitutes my independent professional opinion regarding differential diagnosis and management of this patient.  It may include any factors such as history from outside sources, review of external records, social determinants of health, management of medications, response to those treatments, and discussions with other providers.    Differential Diagnosis and Plan: Plan for basic labs, CT scan, reevaluation with results.  Initial concern  for constipation, dehydration, renal failure, electrolyte abnormalities, among others.  Will reevaluate with results.    Additional sources:  - Discussed/ obtained information from independent historians: Daughter reports that he frequently complains of constipation, frequently on Sundays when she goes to [employer].     - (Social Determinants of Health): None     - Shared decision making:  Patient and daughter over the phone fully updated on and in agreement with the course and plan moving forward    ED Course as of 04/29/24 1842   Sun Apr 28, 2024   1706 CT Abdomen Pelvis Without Contrast  Per my independent interpretation of the CT abdomen pelvis, large gallstone noted in the gallbladder without overtly obvious signs of cholecystitis, no evidence of bowel obstruction, does show signs of rectal fecal impaction [DC]   1717 Discussed an enema, patient refusing. Would prefer to try miralax [DC]   1842 CT Abdomen Pelvis Without Contrast  Large amount of rectosigmoid colonic stool, no bowel obstruction, right inguinal hernia without obstruction or incarceration no hydronephrosis, cholelithiasis without cholecystitis or biliary ductal dilation. [DC]   2008 I assumed care of this patient at shift change.  Patient has been cleared to discharge back to his previous care facility.  However we are waiting on EMS transport opportunity to take him back.  Right now that is estimated to be 1:00 AM.  Patient has shown increasing signs of frustration and restlessness.  He is due to have Haldol nighttime routine medication within the next hour.  I am also going to give him a 1 mg Ativan p.o. to see if we can provide some additional anxiolytic effect and keep him comfortable until EMS arrives later tonight. [KAMILLE]   Mon Apr 29, 2024   0040 EMS just arrived to transport patient back to facility.  Patient agreed to go with the medics and sat down on their stretcher uneventfully.  Remained in good condition throughout this ED visit. [KAMILLE]       ED Course User Index  [DC] Dallas Feliciano MD  [KAMILLE] Charbel Gonzales MD       Hospitalization Considered?:     Orders Placed During This Visit:  Orders Placed This Encounter   Procedures   • CT Abdomen Pelvis Without Contrast   • Basic Metabolic Panel   • CBC Auto Differential   • CBC & Differential       Additional orders considered but not placed:      Independent interpretation of labs, radiology studies, and discussions with consultants: See ED Course        AS OF 19:06 EDT VITALS:    BP - 147/69  HR - 90  TEMP - 97.3 °F (36.3 °C) (Tympanic)  02 SATS - 100%          DIAGNOSIS  Final diagnoses:   Constipation, unspecified constipation type   History of constipation   History of dementia         DISPOSITION  ED Disposition       ED Disposition   Discharge    Condition   Stable    Comment   --                Please note that portions of this document were completed with a voice recognition program.    Note Disclaimer: At [facility], we believe that sharing information builds trust and better relationships. You are receiving this note because you recently visited [facility]. It is possible you will see health information before a provider has talked with you about it. This kind of information can be easy to misunderstand. To help you fully understand what it means for your health, we urge you to discuss this note with your provider.                       Dallas Feliciano MD  04/28/24 5934       Dallas Feliciano MD  04/29/24 7385
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
## 2024-04-28 NOTE — CASE MANAGEMENT/SOCIAL WORK
Discharge Planning Assessment  [facility]     Patient Name: Yuval Loja  MRN: [id number]  Today's Date: 4/28/2024    Admit Date: 4/28/2024        Discharge Needs Assessment    No documentation.                  Discharge Plan       Row Name 04/28/24 1914       Plan    Plan Comments See notes of HARLAN Sweet RN. She notified guardian that pt could return to [facility], they agreed on ambulance transport and she also spoke w/ facility. [facility] booked at 0200-RN updated-HARLAN Qureshi RN                  Continued Care and Services - Admitted Since 4/28/2024    No active coordination exists for this encounter.          Demographic Summary    No documentation.                  Functional Status    No documentation.                  Psychosocial    No documentation.                  Abuse/Neglect    No documentation.                  Legal    No documentation.                  Substance Abuse    No documentation.                  Patient Forms    No documentation.                     Monica Qureshi RN

## 2024-04-28 NOTE — CASE MANAGEMENT/SOCIAL WORK
Discharge Planning Assessment  [facility]     Patient Name: Yuval Loja  MRN: [id number]  Today's Date: 4/28/2024    Admit Date: 4/28/2024        Discharge Needs Assessment    No documentation.                  Discharge Plan       Row Name 04/28/24 1608       Plan    Plan Comments Notified by both RN and Pt Access that pt has legal guardian. [facility] on chart. Guardianship paperwork on chart. Pt Access obtained consent to treat. Lanie CHEW, CCP mgr, notified. Dispo pending-AHRLAN Qureshi RN                  Continued Care and Services - Admitted Since 4/28/2024    No active coordination exists for this encounter.          Demographic Summary    No documentation.                  Functional Status    No documentation.                  Psychosocial    No documentation.                  Abuse/Neglect    No documentation.                  Legal    No documentation.                  Substance Abuse    No documentation.                  Patient Forms    No documentation.                     Monica Qureshi RN

## 2024-04-28 NOTE — DISCHARGE INSTRUCTIONS
I recommend daily MiraLAX 2-3 times daily which can then be titrated down as bowels move more regularly and soft.  May have some loose stools initially.  Recommend good hydration, outpatient PCP follow-up this week for recheck, ED return for worsening symptoms as needed.

## 2024-04-28 NOTE — ED NOTES
"Spoke to pt's daughter Torri Loja, guardian, states he typically asks people around the facility for beer, and when he can't get it he will c/o of constipation and get sent to the hospital, states \"it typically happens on a Sunday when I go to [employer]\". Pt used to drink a lot and that would constipate him. Pt has been known to be agitated and aggressive at times.     Pt was admitted for 30 days at one point after being diagnosed with dementia, states \"it was pretty bad\" to where he was unsafe at home and had to be placed in a facility. Daughter states he is in the best shape he has been in for the last 6 years.     Daughter asked if she would be able to come get him if DCd, states he becomes upset with her and states she would rather not \"because he would jump out of my car\".   "
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
## 2024-04-29 NOTE — ED NOTES
Pt up to nurses station multiple times requesting cab to go home. This RN informed pt that we have set up a ride for him to be here in a couple hours.

## 2024-04-29 NOTE — ED NOTES
This rn attempted to obtain updated vitals on this pt, pt very addiment that he did not want to be hooked up. This RN visualizes pt maintaining own airway and normal breathing pattern

## 2025-01-28 ENCOUNTER — HOSPITAL ENCOUNTER (EMERGENCY)
Facility: HOSPITAL | Age: 75
Discharge: NURSING, REHAB OR LONG-TERM CARE | End: 2025-01-28
Attending: STUDENT IN AN ORGANIZED HEALTH CARE EDUCATION/TRAINING PROGRAM | Admitting: STUDENT IN AN ORGANIZED HEALTH CARE EDUCATION/TRAINING PROGRAM
Payer: MEDICARE

## 2025-01-28 ENCOUNTER — APPOINTMENT (OUTPATIENT)
Dept: CT IMAGING | Facility: HOSPITAL | Age: 75
End: 2025-01-28
Payer: MEDICARE

## 2025-01-28 VITALS
HEIGHT: 69 IN | HEART RATE: 119 BPM | OXYGEN SATURATION: 97 % | RESPIRATION RATE: 16 BRPM | DIASTOLIC BLOOD PRESSURE: 97 MMHG | WEIGHT: 152.12 LBS | TEMPERATURE: 98.4 F | SYSTOLIC BLOOD PRESSURE: 157 MMHG | BODY MASS INDEX: 22.53 KG/M2

## 2025-01-28 DIAGNOSIS — R11.2 NAUSEA AND VOMITING, UNSPECIFIED VOMITING TYPE: Primary | ICD-10-CM

## 2025-01-28 LAB
ALBUMIN SERPL-MCNC: 4.1 G/DL (ref 3.5–5.2)
ALBUMIN/GLOB SERPL: 1.1 G/DL
ALP SERPL-CCNC: 82 U/L (ref 39–117)
ALT SERPL W P-5'-P-CCNC: 9 U/L (ref 1–41)
ANION GAP SERPL CALCULATED.3IONS-SCNC: 8.4 MMOL/L (ref 5–15)
AST SERPL-CCNC: 21 U/L (ref 1–40)
BACTERIA UR QL AUTO: NORMAL /HPF
BASOPHILS # BLD AUTO: 0.03 10*3/MM3 (ref 0–0.2)
BASOPHILS NFR BLD AUTO: 0.4 % (ref 0–1.5)
BILIRUB SERPL-MCNC: 0.2 MG/DL (ref 0–1.2)
BILIRUB UR QL STRIP: NEGATIVE
BUN SERPL-MCNC: 24 MG/DL (ref 8–23)
BUN/CREAT SERPL: 13.9 (ref 7–25)
CALCIUM SPEC-SCNC: 10.2 MG/DL (ref 8.6–10.5)
CHLORIDE SERPL-SCNC: 103 MMOL/L (ref 98–107)
CLARITY UR: CLEAR
CO2 SERPL-SCNC: 26.6 MMOL/L (ref 22–29)
COLOR UR: YELLOW
CREAT SERPL-MCNC: 1.73 MG/DL (ref 0.76–1.27)
DEPRECATED RDW RBC AUTO: 42.3 FL (ref 37–54)
EGFRCR SERPLBLD CKD-EPI 2021: 40.4 ML/MIN/1.73
EOSINOPHIL # BLD AUTO: 0.09 10*3/MM3 (ref 0–0.4)
EOSINOPHIL NFR BLD AUTO: 1.1 % (ref 0.3–6.2)
ERYTHROCYTE [DISTWIDTH] IN BLOOD BY AUTOMATED COUNT: 12.4 % (ref 12.3–15.4)
GLOBULIN UR ELPH-MCNC: 3.9 GM/DL
GLUCOSE SERPL-MCNC: 138 MG/DL (ref 65–99)
GLUCOSE UR STRIP-MCNC: NEGATIVE MG/DL
HCT VFR BLD AUTO: 38 % (ref 37.5–51)
HGB BLD-MCNC: 12.7 G/DL (ref 13–17.7)
HGB UR QL STRIP.AUTO: NEGATIVE
HYALINE CASTS UR QL AUTO: NORMAL /LPF
IMM GRANULOCYTES # BLD AUTO: 0.04 10*3/MM3 (ref 0–0.05)
IMM GRANULOCYTES NFR BLD AUTO: 0.5 % (ref 0–0.5)
KETONES UR QL STRIP: NEGATIVE
LEUKOCYTE ESTERASE UR QL STRIP.AUTO: NEGATIVE
LIPASE SERPL-CCNC: 46 U/L (ref 13–60)
LYMPHOCYTES # BLD AUTO: 1.16 10*3/MM3 (ref 0.7–3.1)
LYMPHOCYTES NFR BLD AUTO: 13.6 % (ref 19.6–45.3)
MCH RBC QN AUTO: 31.1 PG (ref 26.6–33)
MCHC RBC AUTO-ENTMCNC: 33.4 G/DL (ref 31.5–35.7)
MCV RBC AUTO: 93.1 FL (ref 79–97)
MONOCYTES # BLD AUTO: 0.57 10*3/MM3 (ref 0.1–0.9)
MONOCYTES NFR BLD AUTO: 6.7 % (ref 5–12)
NEUTROPHILS NFR BLD AUTO: 6.65 10*3/MM3 (ref 1.7–7)
NEUTROPHILS NFR BLD AUTO: 77.7 % (ref 42.7–76)
NITRITE UR QL STRIP: NEGATIVE
NRBC BLD AUTO-RTO: 0 /100 WBC (ref 0–0.2)
PH UR STRIP.AUTO: 7 [PH] (ref 5–8)
PLATELET # BLD AUTO: 184 10*3/MM3 (ref 140–450)
PMV BLD AUTO: 10.5 FL (ref 6–12)
POTASSIUM SERPL-SCNC: 4.9 MMOL/L (ref 3.5–5.2)
PROT SERPL-MCNC: 8 G/DL (ref 6–8.5)
PROT UR QL STRIP: ABNORMAL
RBC # BLD AUTO: 4.08 10*6/MM3 (ref 4.14–5.8)
RBC # UR STRIP: NORMAL /HPF
REF LAB TEST METHOD: NORMAL
SODIUM SERPL-SCNC: 138 MMOL/L (ref 136–145)
SP GR UR STRIP: 1.02 (ref 1–1.03)
SQUAMOUS #/AREA URNS HPF: NORMAL /HPF
UROBILINOGEN UR QL STRIP: ABNORMAL
WBC # UR STRIP: NORMAL /HPF
WBC NRBC COR # BLD AUTO: 8.54 10*3/MM3 (ref 3.4–10.8)

## 2025-01-28 PROCEDURE — 83690 ASSAY OF LIPASE: CPT | Performed by: PHYSICIAN ASSISTANT

## 2025-01-28 PROCEDURE — 80053 COMPREHEN METABOLIC PANEL: CPT | Performed by: PHYSICIAN ASSISTANT

## 2025-01-28 PROCEDURE — 81001 URINALYSIS AUTO W/SCOPE: CPT | Performed by: PHYSICIAN ASSISTANT

## 2025-01-28 PROCEDURE — 63710000001 ONDANSETRON ODT 4 MG TABLET DISPERSIBLE: Performed by: PHYSICIAN ASSISTANT

## 2025-01-28 PROCEDURE — 85025 COMPLETE CBC W/AUTO DIFF WBC: CPT | Performed by: PHYSICIAN ASSISTANT

## 2025-01-28 PROCEDURE — 36415 COLL VENOUS BLD VENIPUNCTURE: CPT

## 2025-01-28 PROCEDURE — 99284 EMERGENCY DEPT VISIT MOD MDM: CPT

## 2025-01-28 PROCEDURE — 74176 CT ABD & PELVIS W/O CONTRAST: CPT

## 2025-01-28 RX ORDER — ONDANSETRON 4 MG/1
4 TABLET, ORALLY DISINTEGRATING ORAL ONCE
Status: COMPLETED | OUTPATIENT
Start: 2025-01-28 | End: 2025-01-28

## 2025-01-28 RX ORDER — SODIUM CHLORIDE 0.9 % (FLUSH) 0.9 %
10 SYRINGE (ML) INJECTION AS NEEDED
Status: DISCONTINUED | OUTPATIENT
Start: 2025-01-28 | End: 2025-01-28 | Stop reason: HOSPADM

## 2025-01-28 RX ADMIN — ONDANSETRON 4 MG: 4 TABLET, ORALLY DISINTEGRATING ORAL at 16:39

## 2025-01-28 NOTE — ED PROVIDER NOTES
MD ATTESTATION NOTE    The CHINYERE and I have discussed this patient's history, physical exam, MDM, and treatment plan.  I have reviewed the documentation and personally had a face to face interaction with the patient. I affirm the documentation and agree with the treatment and plan.  The attached note describes my personal findings.      I provided a substantive portion of the medical decision making.        Brief HPI: 74-year-old male, history of alcoholic ketoacidosis, alcohol abuse, fatty liver, CAD, CKD, COPD, presents to the ED for evaluation of nausea and vomiting.  He states he has had it for 1 month but it was worse today.  No other complaints at this time.    PHYSICAL EXAM  ED Triage Vitals [01/28/25 1554]   Temp Heart Rate Resp BP SpO2   98.4 °F (36.9 °C) 95 16 161/86 99 %      Temp src Heart Rate Source Patient Position BP Location FiO2 (%)   Tympanic Monitor Lying Right arm --         GENERAL: no acute distress  HENT: nares patent  EYES: no scleral icterus  CV: regular rhythm, normal rate  RESPIRATORY: normal effort  ABDOMEN: soft, nontender nondistended  MUSCULOSKELETAL: no deformity  NEURO: alert, moves all extremities, follows commands  PSYCH:  calm, cooperative  SKIN: warm, dry    Vital signs and nursing notes reviewed.        Medical Decision Making:  ED Course as of 01/28/25 2042 Tue Jan 28, 2025 1620 Patient presents with a 1 day history of intermittent nausea, vomiting, mild diffuse abdominal pain.  Vitals stable.  Fairly benign exam.  Differential diagnoses include but limited to gastroenteritis, bowel obstruction, pancreatitis. [EE]   1637 WBC: 8.54 [EE]   1653 Creatinine(!): 1.73  At baseline [DN]   1711 Bacteria, UA: None Seen [EE]   1711 Lipase: 46 [EE]   1904 CT imaging of the abdomen independently interpreted myself shows no evidence of obstruction. [EE]   1904 Updated patient on workup.  He is had no further vomiting.  He is well-appearing.  Plan to discharge. [EE]      ED Course User  Index  [DN] Ernesto Ellison MD  [EE] Gabriele Marcelo, PA                Ernesto Ellison MD  01/28/25 1637       Ernesto Ellison MD  01/28/25 2042

## 2025-01-28 NOTE — ED PROVIDER NOTES
EMERGENCY DEPARTMENT ENCOUNTER    Room Number:  03/03  Date of encounter:  1/28/2025  PCP: Alessia Prescott APRN  Historian: Patient  Chronic or social conditions impacting care (social determinants of health): Full code from nursing facility    HPI:  Chief Complaint: Nausea, vomiting  A complete HPI/ROS/PMH/PSH/SH/FH are unobtainable due to: Nothing    Context: Yuval Loja is a 74 y.o. male with a history of alcoholism, chronic kidney disease coronary artery disease, who presents to the ED c/o acute nausea, vomiting intermittently since this morning.  Patient denies any overt diarrhea or significant abdominal pain.  He reports some cramping which she believes is from the vomiting.  He denies any fevers or chills, dysuria.  Patient believes that the food at the facility is what is causing his nausea and vomiting.  He denies any chest pain, shortness of breath.    Review of prior external notes (non-ED):   Reviewed primary care office visit dated 12/5/2024.  Patient being followed for right hip pain.    Review of prior external test results outside of this encounter:  I reviewed a CMP from 4/6/2024.  Creatinine 1.61, potassium 4.5    PAST MEDICAL HISTORY  Active Ambulatory Problems     Diagnosis Date Noted    Alcoholic ketoacidosis 05/23/2017    Alcohol dependence 05/23/2017    Methamphetamine abuse 05/23/2017    Fatty liver, alcoholic 05/23/2017    Nausea vomiting and diarrhea 05/23/2017    Alcoholic liver disease 05/23/2017    Metabolic acidosis 05/23/2017    Tobacco abuse 05/23/2017    Coronary artery disease involving native coronary artery of native heart without angina pectoris 05/24/2017    Tachycardia 05/24/2017    Sinus tachycardia 04/17/2019    Chronic kidney disease, stage 3 04/17/2019    Medically noncompliant 04/18/2019    Visual hallucinations 04/18/2020    Psychosis 04/18/2020    Hyponatremia 04/18/2020    CAD (coronary artery disease) 04/18/2020    Leukopenia 04/18/2020    Symptomatic anemia  04/18/2020    Headache 04/18/2020    Substance abuse 04/18/2020    Gastrointestinal hemorrhage 10/22/2020    CRISTIANO (acute kidney injury) 10/23/2020    Hyperkalemia 10/23/2020    Right lower quadrant abdominal pain 03/15/2022    New onset atrial fibrillation 03/15/2022    History of drug abuse 03/15/2022    COPD (chronic obstructive pulmonary disease) 03/15/2022    Right inguinal hernia 03/15/2022    Constipation 03/15/2022    Status post right hip replacement 06/27/2022    Right hip pain 07/19/2022    Sinus bradycardia 04/10/2023    Generalized abdominal pain 06/01/2023    Altered mental status 02/04/2024    Altered mental state 02/05/2024    Accidental overdose 03/03/2024     Resolved Ambulatory Problems     Diagnosis Date Noted    Dehydration 04/17/2019    Functional diarrhea 04/17/2019    Closed fracture of neck of right femur, initial encounter 06/25/2022     Past Medical History:   Diagnosis Date    Alcohol abuse     Arthritis     Dementia     Disease of thyroid gland     Elevated cholesterol     GERD (gastroesophageal reflux disease)     History of transfusion     Hypertensive urgency     Myocardial infarction     Sleep apnea     Stroke          PAST SURGICAL HISTORY  Past Surgical History:   Procedure Laterality Date    BACK SURGERY      CARDIAC CATHETERIZATION      CARDIAC ELECTROPHYSIOLOGY PROCEDURE N/A 4/10/2023    Procedure: Temporary Pacemaker;  Surgeon: Vaibhav Bonilla MD;  Location: [facility] CATH INVASIVE LOCATION;  Service: Cardiovascular;  Laterality: N/A;    COLONOSCOPY      ENDOSCOPY      FRACTURE SURGERY      JOINT REPLACEMENT      SKIN BIOPSY      TOTAL HIP ARTHROPLASTY Right 06/27/2022    Procedure: TOTAL HIP ARTHROPLASTY ANTERIOR WITH HANA TABLE;  Surgeon: Willian Quiles MD;  Location: [facility] OR;  Service: Orthopedics;  Laterality: Right;  carli Bhatt. hana         FAMILY HISTORY  History reviewed. No pertinent family history.      SOCIAL HISTORY  Social History  "    Socioeconomic History    Marital status: Single   Tobacco Use    Smoking status: Every Day     Current packs/day: 0.50     Types: Cigarettes    Smokeless tobacco: Never    Tobacco comments:     tried to provide education declined irritated w/ attempt smoked \" depends on what I feel like.\"   Vaping Use    Vaping status: Never Used   Substance and Sexual Activity    Alcohol use: Not Currently    Drug use: No    Sexual activity: Defer         ALLERGIES  Mirtazapine and Sertraline        REVIEW OF SYSTEMS  All systems reviewed and negative except for those discussed in HPI.       PHYSICAL EXAM    I have reviewed the triage vital signs and nursing notes.    ED Triage Vitals [01/28/25 1554]   Temp Heart Rate Resp BP SpO2   98.4 °F (36.9 °C) 95 16 161/86 99 %      Temp src Heart Rate Source Patient Position BP Location FiO2 (%)   Tympanic Monitor Lying Right arm --       Physical Exam  GENERAL: Alert, oriented, not distressed  HENT: head atraumatic, no nuchal rigidity  EYES: no scleral icterus, EOMI  CV: regular rhythm, regular rate, no murmur  RESPIRATORY: normal effort, CTA  ABDOMEN: soft, no significant tenderness.  Easily reducible right inguinal hernia  MUSCULOSKELETAL: no deformity, FROM, no calf swelling or tenderness  NEURO: alert, moves all extremities, follows commands  SKIN: warm, dry        LAB RESULTS  Recent Results (from the past 24 hours)   Comprehensive Metabolic Panel    Collection Time: 01/28/25  4:13 PM    Specimen: Blood   Result Value Ref Range    Glucose 138 (H) 65 - 99 mg/dL    BUN 24 (H) 8 - 23 mg/dL    Creatinine 1.73 (H) 0.76 - 1.27 mg/dL    Sodium 138 136 - 145 mmol/L    Potassium 4.9 3.5 - 5.2 mmol/L    Chloride 103 98 - 107 mmol/L    CO2 26.6 22.0 - 29.0 mmol/L    Calcium 10.2 8.6 - 10.5 mg/dL    Total Protein 8.0 6.0 - 8.5 g/dL    Albumin 4.1 3.5 - 5.2 g/dL    ALT (SGPT) 9 1 - 41 U/L    AST (SGOT) 21 1 - 40 U/L    Alkaline Phosphatase 82 39 - 117 U/L    Total Bilirubin 0.2 0.0 - 1.2 mg/dL "    Globulin 3.9 gm/dL    A/G Ratio 1.1 g/dL    BUN/Creatinine Ratio 13.9 7.0 - 25.0    Anion Gap 8.4 5.0 - 15.0 mmol/L    eGFR 40.4 (L) >60.0 mL/min/1.73   Lipase    Collection Time: 01/28/25  4:13 PM    Specimen: Blood   Result Value Ref Range    Lipase 46 13 - 60 U/L   CBC Auto Differential    Collection Time: 01/28/25  4:13 PM    Specimen: Blood   Result Value Ref Range    WBC 8.54 3.40 - 10.80 10*3/mm3    RBC 4.08 (L) 4.14 - 5.80 10*6/mm3    Hemoglobin 12.7 (L) 13.0 - 17.7 g/dL    Hematocrit 38.0 37.5 - 51.0 %    MCV 93.1 79.0 - 97.0 fL    MCH 31.1 26.6 - 33.0 pg    MCHC 33.4 31.5 - 35.7 g/dL    RDW 12.4 12.3 - 15.4 %    RDW-SD 42.3 37.0 - 54.0 fl    MPV 10.5 6.0 - 12.0 fL    Platelets 184 140 - 450 10*3/mm3    Neutrophil % 77.7 (H) 42.7 - 76.0 %    Lymphocyte % 13.6 (L) 19.6 - 45.3 %    Monocyte % 6.7 5.0 - 12.0 %    Eosinophil % 1.1 0.3 - 6.2 %    Basophil % 0.4 0.0 - 1.5 %    Immature Grans % 0.5 0.0 - 0.5 %    Neutrophils, Absolute 6.65 1.70 - 7.00 10*3/mm3    Lymphocytes, Absolute 1.16 0.70 - 3.10 10*3/mm3    Monocytes, Absolute 0.57 0.10 - 0.90 10*3/mm3    Eosinophils, Absolute 0.09 0.00 - 0.40 10*3/mm3    Basophils, Absolute 0.03 0.00 - 0.20 10*3/mm3    Immature Grans, Absolute 0.04 0.00 - 0.05 10*3/mm3    nRBC 0.0 0.0 - 0.2 /100 WBC   Urinalysis With Microscopic If Indicated (No Culture) - Urine, Clean Catch    Collection Time: 01/28/25  4:40 PM    Specimen: Urine, Clean Catch   Result Value Ref Range    Color, UA Yellow Yellow, Straw    Appearance, UA Clear Clear    pH, UA 7.0 5.0 - 8.0    Specific Gravity, UA 1.019 1.005 - 1.030    Glucose, UA Negative Negative    Ketones, UA Negative Negative    Bilirubin, UA Negative Negative    Blood, UA Negative Negative    Protein,  mg/dL (2+) (A) Negative    Leuk Esterase, UA Negative Negative    Nitrite, UA Negative Negative    Urobilinogen, UA 0.2 E.U./dL 0.2 - 1.0 E.U./dL   Urinalysis, Microscopic Only - Urine, Clean Catch    Collection Time: 01/28/25   4:40 PM    Specimen: Urine, Clean Catch   Result Value Ref Range    RBC, UA 0-2 None Seen, 0-2 /HPF    WBC, UA 0-2 None Seen, 0-2 /HPF    Bacteria, UA None Seen None Seen /HPF    Squamous Epithelial Cells, UA 0-2 None Seen, 0-2 /HPF    Hyaline Casts, UA None Seen None Seen /LPF    Methodology Automated Microscopy        Ordered the above labs and independently reviewed the results.        RADIOLOGY  CT Abdomen Pelvis Without Contrast    Result Date: 1/28/2025  CT ABDOMEN AND PELVIS WITHOUT IV CONTRAST  HISTORY: Nausea, vomiting and abdominal pain  TECHNIQUE: Radiation dose reduction techniques were utilized, including automated exposure control and exposure modulation based on body size. Axial images were obtained through the abdomen and pelvis without the administration of IV contrast. Coronal and sagittal reformatted images were obtained.  COMPARISON: 4/28/2024  FINDINGS:  ABDOMEN: Mild atelectasis or scarring in the lung bases. The liver is unremarkable. Cholelithiasis. The spleen is unremarkable. The kidneys, adrenal glands and pancreas are unremarkable. Mild ectasia of the infrarenal aorta measures about 2.3 cm and is stable.  PELVIS: There is a large right inguinal hernia containing multiple bowel loops without evidence of obstruction or inflammatory change. There is a large stool ball in the rectum. The bladder is unremarkable. Scattered diverticula in the colon without diverticulitis. Normal appendix. Bone windows show a right hip arthroplasty.      1. Cholelithiasis 2. Large right inguinal hernia containing multiple bowel loops without evidence for obstruction or inflammatory change  Radiation dose reduction techniques were utilized, including automated exposure control and exposure modulation based on body size.   This report was finalized on 1/28/2025 6:40 PM by Dr. Jacoby Jose M.D on Workstation: [id number]       I ordered the above noted radiological studies. Reviewed by me and discussed with  radiologist.  See dictation for official radiology interpretation.      MEDICATIONS GIVEN IN ER    Medications   ondansetron ODT (ZOFRAN-ODT) disintegrating tablet 4 mg (4 mg Oral Given 1/28/25 1639)         ADDITIONAL ORDERS CONSIDERED BUT NOT ORDERED:  Admission was considered but after careful review of the patient's presentation, physical examination, diagnostic results, and response to treatment the patient may be safely discharged with outpatient follow-up.       PROGRESS, DATA ANALYSIS, CONSULTS, AND MEDICAL DECISION MAKING    All labs have been independently interpreted by myself.  All radiology studies have been independently interpreted by myself and discussed with radiologist dictating the report.   EKGs independently interpreted by myself.  Discussion below represents my analysis of pertinent findings related to patient's condition, differential diagnosis, treatment plan and final disposition.    I have discussed case with Dr. Ellison, emergency room physician.  He has performed his own bedside examination and agrees with treatment plan.    ED Course as of 01/28/25 2310   Tue Jan 28, 2025   1620 Patient presents with a 1 day history of intermittent nausea, vomiting, mild diffuse abdominal pain.  Vitals stable.  Fairly benign exam.  Differential diagnoses include but limited to gastroenteritis, bowel obstruction, pancreatitis. [EE]   1637 WBC: 8.54 [EE]   1653 Creatinine(!): 1.73  At baseline [DN]   1711 Bacteria, UA: None Seen [EE]   1711 Lipase: 46 [EE]   1904 CT imaging of the abdomen independently interpreted myself shows no evidence of obstruction. [EE]   1904 Updated patient on workup.  He is had no further vomiting.  He is well-appearing.  Plan to discharge. [EE]      ED Course User Index  [DN] Ernesto Ellison MD  [EE] Gabriele Marcelo PA       AS OF 23:10 EST VITALS:    BP - 157/97  HR - 119  TEMP - 98.4 °F (36.9 °C) (Tympanic)  O2 SATS - 97%        DIAGNOSIS  Final diagnoses:   Nausea and  vomiting, unspecified vomiting type         DISPOSITION  Discharged    Admission was considered but after careful review of the patient's presentation, physical examination, diagnostic results, and response to treatment the patient may be safely discharged with outpatient follow-up.         Dictated utilizing Dragon dictation     Gabriele Marcelo PA  01/28/25 1505

## 2025-01-28 NOTE — ED NOTES
Called and spoke with Torri Freedom, legal guardian, and updated her on pt condition. All questions answered at this time

## 2025-01-29 NOTE — CASE MANAGEMENT/SOCIAL WORK
Discharge Planning Assessment  [facility]     Patient Name: Yuval Loja  MRN: [id number]  Today's Date: 1/28/2025    Admit Date: 1/28/2025        Discharge Needs Assessment    No documentation.                  Discharge Plan       Row Name 01/28/25 1926       Plan    Plan Comments Pt with legal guardian, consent obtained; banner and paperwork on file- [facility] to  pt upon d/c.Notifying Alexandria SHELLEY-supervisor per protocol                  Continued Care and Services - Admitted Since 1/28/2025    No active coordination exists for this encounter.          Demographic Summary    No documentation.                  Functional Status    No documentation.                  Psychosocial    No documentation.                  Abuse/Neglect    No documentation.                  Legal    No documentation.                  Substance Abuse    No documentation.                  Patient Forms    No documentation.                     Reina Knight RN

## 2025-06-21 ENCOUNTER — HOSPITAL ENCOUNTER (OUTPATIENT)
Facility: HOSPITAL | Age: 75
Setting detail: OBSERVATION
Discharge: HOME | End: 2025-06-23
Attending: EMERGENCY MEDICINE | Admitting: STUDENT IN AN ORGANIZED HEALTH CARE EDUCATION/TRAINING PROGRAM
Payer: MEDICARE

## 2025-06-21 ENCOUNTER — APPOINTMENT (OUTPATIENT)
Dept: CT IMAGING | Facility: HOSPITAL | Age: 75
End: 2025-06-21
Payer: MEDICARE

## 2025-06-21 DIAGNOSIS — R29.6 FREQUENT FALLS: ICD-10-CM

## 2025-06-21 DIAGNOSIS — R55 SYNCOPE AND COLLAPSE: ICD-10-CM

## 2025-06-21 DIAGNOSIS — M54.41 ACUTE BACK PAIN WITH SCIATICA, RIGHT: Primary | ICD-10-CM

## 2025-06-21 DIAGNOSIS — N18.9 CHRONIC KIDNEY DISEASE, UNSPECIFIED CKD STAGE: ICD-10-CM

## 2025-06-21 DIAGNOSIS — R53.1 GENERALIZED WEAKNESS: ICD-10-CM

## 2025-06-21 DIAGNOSIS — R79.89 ELEVATED TROPONIN: ICD-10-CM

## 2025-06-21 LAB
ALBUMIN SERPL-MCNC: 3.7 G/DL (ref 3.5–5.2)
ALBUMIN/GLOB SERPL: 1.1 G/DL
ALP SERPL-CCNC: 85 U/L (ref 39–117)
ALT SERPL W P-5'-P-CCNC: 6 U/L (ref 1–41)
ANION GAP SERPL CALCULATED.3IONS-SCNC: 10 MMOL/L (ref 5–15)
AST SERPL-CCNC: 17 U/L (ref 1–40)
BASOPHILS # BLD AUTO: 0.02 10*3/MM3 (ref 0–0.2)
BASOPHILS NFR BLD AUTO: 0.3 % (ref 0–1.5)
BILIRUB SERPL-MCNC: <0.2 MG/DL (ref 0–1.2)
BUN SERPL-MCNC: 25 MG/DL (ref 8–23)
BUN/CREAT SERPL: 14.2 (ref 7–25)
CALCIUM SPEC-SCNC: 9.4 MG/DL (ref 8.6–10.5)
CHLORIDE SERPL-SCNC: 104 MMOL/L (ref 98–107)
CO2 SERPL-SCNC: 23 MMOL/L (ref 22–29)
CREAT SERPL-MCNC: 1.76 MG/DL (ref 0.76–1.27)
DEPRECATED RDW RBC AUTO: 46.7 FL (ref 37–54)
EGFRCR SERPLBLD CKD-EPI 2021: 39.6 ML/MIN/1.73
EOSINOPHIL # BLD AUTO: 0.14 10*3/MM3 (ref 0–0.4)
EOSINOPHIL NFR BLD AUTO: 2.4 % (ref 0.3–6.2)
ERYTHROCYTE [DISTWIDTH] IN BLOOD BY AUTOMATED COUNT: 13.5 % (ref 12.3–15.4)
GEN 5 1HR TROPONIN T REFLEX: 93 NG/L
GLOBULIN UR ELPH-MCNC: 3.5 GM/DL
GLUCOSE SERPL-MCNC: 110 MG/DL (ref 65–99)
HCT VFR BLD AUTO: 33.3 % (ref 37.5–51)
HGB BLD-MCNC: 10.8 G/DL (ref 13–17.7)
HOLD SPECIMEN: NORMAL
HOLD SPECIMEN: NORMAL
IMM GRANULOCYTES # BLD AUTO: 0.03 10*3/MM3 (ref 0–0.05)
IMM GRANULOCYTES NFR BLD AUTO: 0.5 % (ref 0–0.5)
LYMPHOCYTES # BLD AUTO: 1.73 10*3/MM3 (ref 0.7–3.1)
LYMPHOCYTES NFR BLD AUTO: 29.3 % (ref 19.6–45.3)
MAGNESIUM SERPL-MCNC: 2.1 MG/DL (ref 1.6–2.4)
MCH RBC QN AUTO: 30.9 PG (ref 26.6–33)
MCHC RBC AUTO-ENTMCNC: 32.4 G/DL (ref 31.5–35.7)
MCV RBC AUTO: 95.4 FL (ref 79–97)
MONOCYTES # BLD AUTO: 0.58 10*3/MM3 (ref 0.1–0.9)
MONOCYTES NFR BLD AUTO: 9.8 % (ref 5–12)
NEUTROPHILS NFR BLD AUTO: 3.41 10*3/MM3 (ref 1.7–7)
NEUTROPHILS NFR BLD AUTO: 57.7 % (ref 42.7–76)
NRBC BLD AUTO-RTO: 0 /100 WBC (ref 0–0.2)
PLATELET # BLD AUTO: 215 10*3/MM3 (ref 140–450)
PMV BLD AUTO: 9.2 FL (ref 6–12)
POTASSIUM SERPL-SCNC: 4.4 MMOL/L (ref 3.5–5.2)
PROT SERPL-MCNC: 7.2 G/DL (ref 6–8.5)
RBC # BLD AUTO: 3.49 10*6/MM3 (ref 4.14–5.8)
SODIUM SERPL-SCNC: 137 MMOL/L (ref 136–145)
TROPONIN T % DELTA: 2
TROPONIN T NUMERIC DELTA: 2 NG/L
TROPONIN T SERPL HS-MCNC: 91 NG/L
WBC NRBC COR # BLD AUTO: 5.91 10*3/MM3 (ref 3.4–10.8)
WHOLE BLOOD HOLD COAG: NORMAL
WHOLE BLOOD HOLD SPECIMEN: NORMAL

## 2025-06-21 PROCEDURE — 84484 ASSAY OF TROPONIN QUANT: CPT | Performed by: EMERGENCY MEDICINE

## 2025-06-21 PROCEDURE — 85025 COMPLETE CBC W/AUTO DIFF WBC: CPT | Performed by: EMERGENCY MEDICINE

## 2025-06-21 PROCEDURE — 83735 ASSAY OF MAGNESIUM: CPT

## 2025-06-21 PROCEDURE — 74176 CT ABD & PELVIS W/O CONTRAST: CPT

## 2025-06-21 PROCEDURE — 93010 ELECTROCARDIOGRAM REPORT: CPT | Performed by: STUDENT IN AN ORGANIZED HEALTH CARE EDUCATION/TRAINING PROGRAM

## 2025-06-21 PROCEDURE — 84484 ASSAY OF TROPONIN QUANT: CPT

## 2025-06-21 PROCEDURE — 36415 COLL VENOUS BLD VENIPUNCTURE: CPT

## 2025-06-21 PROCEDURE — 99285 EMERGENCY DEPT VISIT HI MDM: CPT

## 2025-06-21 PROCEDURE — 82607 VITAMIN B-12: CPT | Performed by: INTERNAL MEDICINE

## 2025-06-21 PROCEDURE — 80053 COMPREHEN METABOLIC PANEL: CPT

## 2025-06-21 PROCEDURE — 93005 ELECTROCARDIOGRAM TRACING: CPT | Performed by: EMERGENCY MEDICINE

## 2025-06-21 PROCEDURE — 93005 ELECTROCARDIOGRAM TRACING: CPT

## 2025-06-21 PROCEDURE — 82746 ASSAY OF FOLIC ACID SERUM: CPT | Performed by: INTERNAL MEDICINE

## 2025-06-21 PROCEDURE — 72131 CT LUMBAR SPINE W/O DYE: CPT

## 2025-06-21 RX ORDER — SODIUM CHLORIDE 0.9 % (FLUSH) 0.9 %
10 SYRINGE (ML) INJECTION AS NEEDED
Status: DISCONTINUED | OUTPATIENT
Start: 2025-06-21 | End: 2025-06-23 | Stop reason: HOSPADM

## 2025-06-21 RX ORDER — HYDROCODONE BITARTRATE AND ACETAMINOPHEN 5; 325 MG/1; MG/1
1 TABLET ORAL ONCE
Refills: 0 | Status: COMPLETED | OUTPATIENT
Start: 2025-06-21 | End: 2025-06-21

## 2025-06-21 RX ADMIN — HYDROCODONE BITARTRATE AND ACETAMINOPHEN 1 TABLET: 5; 325 TABLET ORAL at 22:11

## 2025-06-22 ENCOUNTER — APPOINTMENT (OUTPATIENT)
Dept: GENERAL RADIOLOGY | Facility: HOSPITAL | Age: 75
End: 2025-06-22
Payer: MEDICARE

## 2025-06-22 PROBLEM — R55 SYNCOPE: Status: ACTIVE | Noted: 2025-06-22

## 2025-06-22 PROBLEM — F03.918 DEMENTIA WITH BEHAVIORAL DISTURBANCE: Status: ACTIVE | Noted: 2025-06-22

## 2025-06-22 PROBLEM — F10.11 HISTORY OF ALCOHOL ABUSE: Status: ACTIVE | Noted: 2025-06-22

## 2025-06-22 LAB
AMMONIA BLD-SCNC: 38 UMOL/L (ref 16–60)
AMPHET+METHAMPHET UR QL: NEGATIVE
AMPHETAMINES UR QL: NEGATIVE
ANION GAP SERPL CALCULATED.3IONS-SCNC: 11 MMOL/L (ref 5–15)
BARBITURATES UR QL SCN: NEGATIVE
BENZODIAZ UR QL SCN: NEGATIVE
BUN SERPL-MCNC: 20 MG/DL (ref 8–23)
BUN/CREAT SERPL: 13.3 (ref 7–25)
BUPRENORPHINE SERPL-MCNC: NEGATIVE NG/ML
CALCIUM SPEC-SCNC: 9.5 MG/DL (ref 8.6–10.5)
CANNABINOIDS SERPL QL: NEGATIVE
CHLORIDE SERPL-SCNC: 104 MMOL/L (ref 98–107)
CO2 SERPL-SCNC: 22 MMOL/L (ref 22–29)
COCAINE UR QL: NEGATIVE
CREAT SERPL-MCNC: 1.5 MG/DL (ref 0.76–1.27)
DEPRECATED RDW RBC AUTO: 48.1 FL (ref 37–54)
EGFRCR SERPLBLD CKD-EPI 2021: 48 ML/MIN/1.73
ERYTHROCYTE [DISTWIDTH] IN BLOOD BY AUTOMATED COUNT: 13.7 % (ref 12.3–15.4)
ETHANOL BLD-MCNC: <10 MG/DL (ref 0–10)
ETHANOL UR QL: <0.01 %
FENTANYL UR-MCNC: NEGATIVE NG/ML
FOLATE SERPL-MCNC: >20 NG/ML (ref 4.78–24.2)
GLUCOSE SERPL-MCNC: 109 MG/DL (ref 65–99)
HCT VFR BLD AUTO: 36 % (ref 37.5–51)
HGB BLD-MCNC: 11.6 G/DL (ref 13–17.7)
MCH RBC QN AUTO: 31.4 PG (ref 26.6–33)
MCHC RBC AUTO-ENTMCNC: 32.2 G/DL (ref 31.5–35.7)
MCV RBC AUTO: 97.6 FL (ref 79–97)
METHADONE UR QL SCN: NEGATIVE
OPIATES UR QL: POSITIVE
OXYCODONE UR QL SCN: NEGATIVE
PCP UR QL SCN: NEGATIVE
PLATELET # BLD AUTO: 211 10*3/MM3 (ref 140–450)
PMV BLD AUTO: 10 FL (ref 6–12)
POTASSIUM SERPL-SCNC: 4 MMOL/L (ref 3.5–5.2)
QT INTERVAL: 406 MS
QTC INTERVAL: 440 MS
RBC # BLD AUTO: 3.69 10*6/MM3 (ref 4.14–5.8)
SODIUM SERPL-SCNC: 137 MMOL/L (ref 136–145)
TRICYCLICS UR QL SCN: NEGATIVE
TROPONIN T SERPL HS-MCNC: 68 NG/L
VIT B12 BLD-MCNC: 894 PG/ML (ref 211–946)
WBC NRBC COR # BLD AUTO: 5.01 10*3/MM3 (ref 3.4–10.8)

## 2025-06-22 PROCEDURE — 96360 HYDRATION IV INFUSION INIT: CPT

## 2025-06-22 PROCEDURE — 71045 X-RAY EXAM CHEST 1 VIEW: CPT

## 2025-06-22 PROCEDURE — 84484 ASSAY OF TROPONIN QUANT: CPT | Performed by: NURSE PRACTITIONER

## 2025-06-22 PROCEDURE — 80048 BASIC METABOLIC PNL TOTAL CA: CPT | Performed by: NURSE PRACTITIONER

## 2025-06-22 PROCEDURE — 87481 CANDIDA DNA AMP PROBE: CPT | Performed by: INTERNAL MEDICINE

## 2025-06-22 PROCEDURE — 94799 UNLISTED PULMONARY SVC/PX: CPT

## 2025-06-22 PROCEDURE — 82140 ASSAY OF AMMONIA: CPT | Performed by: INTERNAL MEDICINE

## 2025-06-22 PROCEDURE — 85027 COMPLETE CBC AUTOMATED: CPT | Performed by: NURSE PRACTITIONER

## 2025-06-22 PROCEDURE — G0378 HOSPITAL OBSERVATION PER HR: HCPCS

## 2025-06-22 PROCEDURE — 96361 HYDRATE IV INFUSION ADD-ON: CPT

## 2025-06-22 PROCEDURE — 73030 X-RAY EXAM OF SHOULDER: CPT

## 2025-06-22 PROCEDURE — 73502 X-RAY EXAM HIP UNI 2-3 VIEWS: CPT

## 2025-06-22 PROCEDURE — 36415 COLL VENOUS BLD VENIPUNCTURE: CPT | Performed by: NURSE PRACTITIONER

## 2025-06-22 PROCEDURE — 86592 SYPHILIS TEST NON-TREP QUAL: CPT | Performed by: INTERNAL MEDICINE

## 2025-06-22 PROCEDURE — 99213 OFFICE O/P EST LOW 20 MIN: CPT | Performed by: INTERNAL MEDICINE

## 2025-06-22 PROCEDURE — 82077 ASSAY SPEC XCP UR&BREATH IA: CPT | Performed by: INTERNAL MEDICINE

## 2025-06-22 PROCEDURE — 80307 DRUG TEST PRSMV CHEM ANLYZR: CPT | Performed by: INTERNAL MEDICINE

## 2025-06-22 RX ORDER — KETOTIFEN FUMARATE 0.35 MG/ML
1 SOLUTION/ DROPS OPHTHALMIC 2 TIMES DAILY
Status: DISCONTINUED | OUTPATIENT
Start: 2025-06-22 | End: 2025-06-23 | Stop reason: HOSPADM

## 2025-06-22 RX ORDER — MIRTAZAPINE 15 MG/1
7.5 TABLET, FILM COATED ORAL NIGHTLY
COMMUNITY

## 2025-06-22 RX ORDER — IPRATROPIUM BROMIDE AND ALBUTEROL SULFATE 2.5; .5 MG/3ML; MG/3ML
3 SOLUTION RESPIRATORY (INHALATION) EVERY 6 HOURS PRN
Status: DISCONTINUED | OUTPATIENT
Start: 2025-06-22 | End: 2025-06-23 | Stop reason: HOSPADM

## 2025-06-22 RX ORDER — DIPHENOXYLATE HYDROCHLORIDE AND ATROPINE SULFATE 2.5; .025 MG/1; MG/1
1 TABLET ORAL DAILY
Status: DISCONTINUED | OUTPATIENT
Start: 2025-06-22 | End: 2025-06-23 | Stop reason: HOSPADM

## 2025-06-22 RX ORDER — ONDANSETRON 4 MG/1
4 TABLET, FILM COATED ORAL EVERY 8 HOURS PRN
COMMUNITY

## 2025-06-22 RX ORDER — FAMOTIDINE 20 MG/1
20 TABLET, FILM COATED ORAL 2 TIMES DAILY
Status: DISCONTINUED | OUTPATIENT
Start: 2025-06-22 | End: 2025-06-23

## 2025-06-22 RX ORDER — SODIUM CHLORIDE 9 MG/ML
40 INJECTION, SOLUTION INTRAVENOUS AS NEEDED
Status: DISCONTINUED | OUTPATIENT
Start: 2025-06-22 | End: 2025-06-23 | Stop reason: HOSPADM

## 2025-06-22 RX ORDER — CALCIUM CARBONATE 500 MG/1
2 TABLET, CHEWABLE ORAL 2 TIMES DAILY PRN
Status: DISCONTINUED | OUTPATIENT
Start: 2025-06-22 | End: 2025-06-23 | Stop reason: HOSPADM

## 2025-06-22 RX ORDER — CLONIDINE HYDROCHLORIDE 0.1 MG/1
0.1 TABLET ORAL ONCE
Status: COMPLETED | OUTPATIENT
Start: 2025-06-22 | End: 2025-06-22

## 2025-06-22 RX ORDER — BUDESONIDE AND FORMOTEROL FUMARATE DIHYDRATE 160; 4.5 UG/1; UG/1
2 AEROSOL RESPIRATORY (INHALATION)
Status: DISCONTINUED | OUTPATIENT
Start: 2025-06-22 | End: 2025-06-23 | Stop reason: HOSPADM

## 2025-06-22 RX ORDER — DILTIAZEM HCL 60 MG
30 TABLET ORAL EVERY 12 HOURS SCHEDULED
Status: DISCONTINUED | OUTPATIENT
Start: 2025-06-22 | End: 2025-06-23 | Stop reason: HOSPADM

## 2025-06-22 RX ORDER — SODIUM CHLORIDE 0.9 % (FLUSH) 0.9 %
10 SYRINGE (ML) INJECTION EVERY 12 HOURS SCHEDULED
Status: DISCONTINUED | OUTPATIENT
Start: 2025-06-22 | End: 2025-06-23 | Stop reason: HOSPADM

## 2025-06-22 RX ORDER — ACETAMINOPHEN 650 MG/1
650 SUPPOSITORY RECTAL EVERY 4 HOURS PRN
Status: DISCONTINUED | OUTPATIENT
Start: 2025-06-22 | End: 2025-06-23 | Stop reason: HOSPADM

## 2025-06-22 RX ORDER — DOCUSATE SODIUM 100 MG/1
100 CAPSULE, LIQUID FILLED ORAL NIGHTLY
COMMUNITY

## 2025-06-22 RX ORDER — POLYETHYLENE GLYCOL 3350 17 G/17G
17 POWDER, FOR SOLUTION ORAL DAILY PRN
Status: DISCONTINUED | OUTPATIENT
Start: 2025-06-22 | End: 2025-06-23 | Stop reason: HOSPADM

## 2025-06-22 RX ORDER — TRAZODONE HYDROCHLORIDE 100 MG/1
100 TABLET ORAL NIGHTLY
COMMUNITY

## 2025-06-22 RX ORDER — MIRTAZAPINE 15 MG/1
7.5 TABLET, FILM COATED ORAL NIGHTLY
Status: DISCONTINUED | OUTPATIENT
Start: 2025-06-22 | End: 2025-06-23 | Stop reason: HOSPADM

## 2025-06-22 RX ORDER — TRAZODONE HYDROCHLORIDE 100 MG/1
100 TABLET ORAL NIGHTLY
Status: DISCONTINUED | OUTPATIENT
Start: 2025-06-22 | End: 2025-06-23 | Stop reason: HOSPADM

## 2025-06-22 RX ORDER — SODIUM CHLORIDE 0.9 % (FLUSH) 0.9 %
10 SYRINGE (ML) INJECTION AS NEEDED
Status: DISCONTINUED | OUTPATIENT
Start: 2025-06-22 | End: 2025-06-23 | Stop reason: HOSPADM

## 2025-06-22 RX ORDER — BISACODYL 10 MG
10 SUPPOSITORY, RECTAL RECTAL DAILY PRN
Status: DISCONTINUED | OUTPATIENT
Start: 2025-06-22 | End: 2025-06-23 | Stop reason: HOSPADM

## 2025-06-22 RX ORDER — ACETAMINOPHEN 160 MG/5ML
650 SOLUTION ORAL EVERY 4 HOURS PRN
Status: DISCONTINUED | OUTPATIENT
Start: 2025-06-22 | End: 2025-06-23 | Stop reason: HOSPADM

## 2025-06-22 RX ORDER — NITROGLYCERIN 0.4 MG/1
0.4 TABLET SUBLINGUAL
Status: DISCONTINUED | OUTPATIENT
Start: 2025-06-22 | End: 2025-06-23 | Stop reason: HOSPADM

## 2025-06-22 RX ORDER — ACETAMINOPHEN 325 MG/1
650 TABLET ORAL EVERY 4 HOURS PRN
Status: DISCONTINUED | OUTPATIENT
Start: 2025-06-22 | End: 2025-06-23 | Stop reason: HOSPADM

## 2025-06-22 RX ORDER — OLOPATADINE HYDROCHLORIDE 2 MG/ML
1 SOLUTION OPHTHALMIC DAILY
COMMUNITY

## 2025-06-22 RX ORDER — FOLIC ACID 1 MG/1
1 TABLET ORAL DAILY
Status: DISCONTINUED | OUTPATIENT
Start: 2025-06-22 | End: 2025-06-23 | Stop reason: HOSPADM

## 2025-06-22 RX ORDER — AMOXICILLIN 250 MG
2 CAPSULE ORAL 2 TIMES DAILY PRN
Status: DISCONTINUED | OUTPATIENT
Start: 2025-06-22 | End: 2025-06-23 | Stop reason: HOSPADM

## 2025-06-22 RX ORDER — HALOPERIDOL 1 MG/1
1 TABLET ORAL 3 TIMES DAILY
Status: DISCONTINUED | OUTPATIENT
Start: 2025-06-22 | End: 2025-06-23

## 2025-06-22 RX ORDER — BISACODYL 5 MG/1
5 TABLET, DELAYED RELEASE ORAL DAILY PRN
Status: DISCONTINUED | OUTPATIENT
Start: 2025-06-22 | End: 2025-06-23 | Stop reason: HOSPADM

## 2025-06-22 RX ORDER — SODIUM CHLORIDE 9 MG/ML
100 INJECTION, SOLUTION INTRAVENOUS CONTINUOUS
Status: DISCONTINUED | OUTPATIENT
Start: 2025-06-22 | End: 2025-06-23 | Stop reason: HOSPADM

## 2025-06-22 RX ADMIN — Medication 100 MG: at 16:47

## 2025-06-22 RX ADMIN — FAMOTIDINE 20 MG: 20 TABLET, FILM COATED ORAL at 20:26

## 2025-06-22 RX ADMIN — HALOPERIDOL 1 MG: 1 TABLET ORAL at 20:27

## 2025-06-22 RX ADMIN — HALOPERIDOL 1 MG: 1 TABLET ORAL at 16:47

## 2025-06-22 RX ADMIN — TRAZODONE HYDROCHLORIDE 100 MG: 100 TABLET ORAL at 20:27

## 2025-06-22 RX ADMIN — MIRTAZAPINE 7.5 MG: 15 TABLET, FILM COATED ORAL at 20:27

## 2025-06-22 RX ADMIN — KETOTIFEN FUMARATE 1 DROP: 0.25 SOLUTION OPHTHALMIC at 20:28

## 2025-06-22 RX ADMIN — DILTIAZEM HYDROCHLORIDE 30 MG: 60 TABLET, FILM COATED ORAL at 20:26

## 2025-06-22 RX ADMIN — CLONIDINE HYDROCHLORIDE 0.1 MG: 0.1 TABLET ORAL at 06:06

## 2025-06-22 RX ADMIN — SODIUM CHLORIDE 100 ML/HR: 9 INJECTION, SOLUTION INTRAVENOUS at 20:20

## 2025-06-22 RX ADMIN — Medication 10 ML: at 12:03

## 2025-06-22 RX ADMIN — Medication 2.5 MG: at 20:26

## 2025-06-22 RX ADMIN — FOLIC ACID 1 MG: 1 TABLET ORAL at 16:47

## 2025-06-22 RX ADMIN — Medication 10 ML: at 20:28

## 2025-06-22 RX ADMIN — THERA TABS 1 TABLET: TAB at 16:47

## 2025-06-22 NOTE — PLAN OF CARE
Goal Outcome Evaluation:   Problem: Adult Inpatient Plan of Care  Goal: Plan of Care Review  Outcome: Progressing   VSS. Alert and disoriented to situation. Noncompliant at times. RA. Q4h neuro checks, CT Head and XR shoulder/pelvis tonight. Psych consult.

## 2025-06-22 NOTE — ED NOTES
Pt to ED via EMS from [facility] w/ c/o near syncope witnessed by staff. Staff reported to EMS they caught pt before falling to the ground, neg LOC. Pt also c/o bilateral foot pain that radiates to his lower back. Hx chronic back pain.

## 2025-06-22 NOTE — ED NOTES
Nursing report ED to floor  Yuval Loja  74 y.o.  male    HPI :  HPI  Stated Reason for Visit: near syncope, -LOC, bilateral foot pain that radiates to lower back.  History Obtained From: patient, EMS  Precipitating Event(s): unknown  Onset of Symptoms: sudden  Duration (Hours): 1    Chief Complaint  Chief Complaint   Patient presents with    Foot Pain     Bilateral foot pain     Syncope       Admitting doctor:   Lucio Elizondo MD    Admitting diagnosis:   The primary encounter diagnosis was Acute back pain with sciatica, right. Diagnoses of Frequent falls, Generalized weakness, Elevated troponin, and Chronic kidney disease, unspecified CKD stage were also pertinent to this visit.    Code status:   Current Code Status       Date Active Code Status Order ID Comments User Context       6/22/2025 0144 CPR (Attempt to Resuscitate) [id number]  Celeste Suero APRN ED        Question Answer    Code Status (Patient has no pulse and is not breathing) CPR (Attempt to Resuscitate)    Medical Interventions (Patient has pulse or is breathing) Full Support                    Allergies:   Mirtazapine and Sertraline    Isolation:   No active isolations    Intake and Output  No intake or output data in the 24 hours ending 06/22/25 0229    Weight:   There were no vitals filed for this visit.    Most recent vitals:   Vitals:    06/21/25 2036 06/21/25 2330 06/22/25 0000 06/22/25 0200   BP: 132/59 161/69 151/68 150/70   BP Location: Left arm Right arm Right arm Left arm   Patient Position: Sitting Lying Lying Lying   Pulse: 76 72 71 80   Resp: 15 16 14 18   Temp: 98.4 °F (36.9 °C)      TempSrc: Oral      SpO2: 97% 97% 99% 97%   Height:           Active LDAs/IV Access:   Lines, Drains & Airways       Active LDAs       Name Placement date Placement time Site Days    Peripheral IV 06/22/25 0121 20 G Left Antecubital 06/22/25  0121  Antecubital  less than 1                    Labs (abnormal labs have a star):   Labs Reviewed    COMPREHENSIVE METABOLIC PANEL - Abnormal; Notable for the following components:       Result Value    Glucose 110 (*)     BUN 25.0 (*)     Creatinine 1.76 (*)     eGFR 39.6 (*)     All other components within normal limits    Narrative:     GFR Categories in Chronic Kidney Disease (CKD)              GFR Category          GFR (mL/min/1.73)    Interpretation  G1                    90 or greater        Normal or high (1)  G2                    60-89                Mild decrease (1)  G3a                   45-59                Mild to moderate decrease  G3b                   30-44                Moderate to severe decrease  G4                    15-29                Severe decrease  G5                    14 or less           Kidney failure    (1)In the absence of evidence of kidney disease, neither GFR category G1 or G2 fulfill the criteria for CKD.    eGFR calculation 2021 CKD-EPI creatinine equation, which does not include race as a factor   TROPONIN - Abnormal; Notable for the following components:    HS Troponin T 91 (*)     All other components within normal limits    Narrative:     High Sensitive Troponin T Reference Range:  <14.0 ng/L- Negative Female for AMI  <22.0 ng/L- Negative Male for AMI  >=14 - Abnormal Female indicating possible myocardial injury.  >=22 - Abnormal Male indicating possible myocardial injury.   Clinicians would have to utilize clinical acumen, EKG, Troponin, and serial changes to determine if it is an Acute Myocardial Infarction or myocardial injury due to an underlying chronic condition.        CBC WITH AUTO DIFFERENTIAL - Abnormal; Notable for the following components:    RBC 3.49 (*)     Hemoglobin 10.8 (*)     Hematocrit 33.3 (*)     All other components within normal limits   HIGH SENSITIVITIY TROPONIN T 1HR - Abnormal; Notable for the following components:    HS Troponin T 93 (*)     All other components within normal limits    Narrative:     High Sensitive Troponin T Reference  Range:  <14.0 ng/L- Negative Female for AMI  <22.0 ng/L- Negative Male for AMI  >=14 - Abnormal Female indicating possible myocardial injury.  >=22 - Abnormal Male indicating possible myocardial injury.   Clinicians would have to utilize clinical acumen, EKG, Troponin, and serial changes to determine if it is an Acute Myocardial Infarction or myocardial injury due to an underlying chronic condition.        MAGNESIUM - Normal   RAINBOW DRAW    Narrative:     The following orders were created for panel order [place] Draw.  Procedure                               Abnormality         Status                     ---------                               -----------         ------                     Green Top (Gel)[[id number]]                                  Final result               Lavender Top[[id number]]                                     Final result               Gold Top - SST[[id number]]                                   Final result               Light Blue Top[[id number]]                                   Final result                 Please view results for these tests on the individual orders.   BASIC METABOLIC PANEL   CBC (NO DIFF)   TROPONIN   POCT GLUCOSE FINGERSTICK   CBC AND DIFFERENTIAL    Narrative:     The following orders were created for panel order CBC & Differential.  Procedure                               Abnormality         Status                     ---------                               -----------         ------                     CBC Auto Differential[[id number]]        Abnormal            Final result                 Please view results for these tests on the individual orders.   GREEN TOP   LAVENDER TOP   GOLD TOP - SST   LIGHT BLUE TOP       EKG:   ECG 12 Lead Other; near syncope   Preliminary Result   HEART RATE=71  bpm   RR Wqpjfezk=174  ms   OR Vxqwffsx=098  ms   P Horizontal Axis=-51  deg   P Front Axis=62  deg   QRSD Interval=76  ms   QT Yaywznyn=104  ms   CBsD=329  ms   QRS Axis=37   deg   T Wave Axis=27  deg   - ABNORMAL ECG -   Sinus rhythm   Atrial premature complex   Borderline prolonged IN interval   ST elevation, consider anterior injury   When compared with ECG of 03-Mar-2024 09:37:28,   Significant rate increase   Date and Time of Study:2025-06-21 20:52:24          Meds given in ED:   Medications   sodium chloride 0.9 % flush 10 mL (has no administration in time range)   sodium chloride 0.9 % flush 10 mL (has no administration in time range)   sodium chloride 0.9 % flush 10 mL (has no administration in time range)   sodium chloride 0.9 % infusion 40 mL (has no administration in time range)   nitroglycerin (NITROSTAT) SL tablet 0.4 mg (has no administration in time range)   acetaminophen (TYLENOL) tablet 650 mg (has no administration in time range)     Or   acetaminophen (TYLENOL) 160 MG/5ML oral solution 650 mg (has no administration in time range)     Or   acetaminophen (TYLENOL) suppository 650 mg (has no administration in time range)   sennosides-docusate (PERICOLACE) 8.6-50 MG per tablet 2 tablet (has no administration in time range)     And   polyethylene glycol (MIRALAX) packet 17 g (has no administration in time range)     And   bisacodyl (DULCOLAX) EC tablet 5 mg (has no administration in time range)     And   bisacodyl (DULCOLAX) suppository 10 mg (has no administration in time range)   calcium carbonate (TUMS) chewable tablet 500 mg (200 mg elemental) (has no administration in time range)   sodium chloride 0.9 % infusion (has no administration in time range)   HYDROcodone-acetaminophen (NORCO) 5-325 MG per tablet 1 tablet (1 tablet Oral Given 6/21/25 2211)       Imaging results:  CT Abdomen Pelvis Without Contrast  Result Date: 6/21/2025   1. No acute findings identified within the abdomen or pelvis. 2. No acute fracture or subluxation of the lumbar spine is seen.  Radiation dose reduction techniques were utilized, including automated exposure control and exposure modulation  "based on body size.   This report was finalized on 6/21/2025 11:27 PM by Dr. Martha Telles M.D on Workstation: [id number]      CT Lumbar Spine Without Contrast  Result Date: 6/21/2025   1. No acute findings identified within the abdomen or pelvis. 2. No acute fracture or subluxation of the lumbar spine is seen.  Radiation dose reduction techniques were utilized, including automated exposure control and exposure modulation based on body size.   This report was finalized on 6/21/2025 11:27 PM by Dr. Martha Telles M.D on Workstation: [id number]        Ambulatory status:   - asst 1 with cane    Social issues:   Social History     Socioeconomic History    Marital status: Single   Tobacco Use    Smoking status: Every Day     Current packs/day: 0.50     Types: Cigarettes    Smokeless tobacco: Never    Tobacco comments:     tried to provide education declined irritated w/ attempt smoked \" depends on what I feel like.\"   Vaping Use    Vaping status: Never Used   Substance and Sexual Activity    Alcohol use: Not Currently    Drug use: No    Sexual activity: Defer       Peripheral Neurovascular       Neuro Cognitive  Neuro Cognitive (Adult)  Cognitive/Neuro/Behavioral WDL: WDL, mood/behavior, orientation  Orientation: oriented x 4  Mood/Behavior: calm, cooperative  Sedation Group  POSS ([place] Opioid-Induced Sed Scale): S - Sleep, easy to arouse    Learning  Learning Assessment  Learning Readiness and Ability: no barriers identified  Education Provided  Person Taught: patient  Teaching Method: verbal instruction  Education Outcome Evaluation: verbalizes understanding    Respiratory  Respiratory  Airway WDL: WDL  Respiratory WDL  Respiratory WDL: WDL    Abdominal Pain       Pain Assessments  Pain (Adult)  (0-10) Pain Rating: Rest: 9  (0-10) Pain Rating: Activity: 9  Preferred Pain Scale: CPOT (Critical-Care Pain Observation Tool)  Patient requested Medication Prescribed for Lower Pain Scale: No  CPOT Facial " Expression: 0-->relaxed, neutral  CPOT Body Movements: 0-->absence of movements  CPOT Muscle Tension: 0-->relaxed  Ventilator Compliance/Vocalization: 0-->talking in normal tone or no sound  CPOT Score: 0  Pain Location: hip  Pain Side/Orientation: right  Pain Description: constant    NIH Stroke Scale       Arsenio Turner RN  06/22/25 02:29 EDT

## 2025-06-22 NOTE — CONSULTS
[facility]  Cardiology Consult Note    Patient Identification:  Name: Yuval Loja  Age: 74 y.o.  Sex: male  :  1950  MRN: [id number]             Requesting Physician: Dr. Saldana    Reason for Consultation / Chief Complaint: management recommendations fall questionable syncope    History of Present Illness:   74 years old transfer from facility, with questionable syncopal episode with cardiac risk factors of hypertension hyperlipidemia, patient also has significant dementia unable to get accurate history    Comorbid cardiac risk factors:     Past Medical History:  Past Medical History:   Diagnosis Date    Alcohol abuse     Arthritis     CAD (coronary artery disease)     Chronic kidney disease, stage 3     COPD (chronic obstructive pulmonary disease)     Dementia     Disease of thyroid gland     Elevated cholesterol     Fatty liver, alcoholic     GERD (gastroesophageal reflux disease)     History of transfusion     Hypertensive urgency     Metabolic acidosis     Myocardial infarction     Nausea vomiting and diarrhea 2017    Sinus tachycardia     Sleep apnea     Stroke      Past Surgical History:  Past Surgical History:   Procedure Laterality Date    BACK SURGERY      CARDIAC CATHETERIZATION      CARDIAC ELECTROPHYSIOLOGY PROCEDURE N/A 4/10/2023    Procedure: Temporary Pacemaker;  Surgeon: Vaibhav Bonilla MD;  Location: [facility] INVASIVE LOCATION;  Service: Cardiovascular;  Laterality: N/A;    COLONOSCOPY      ENDOSCOPY      FRACTURE SURGERY      JOINT REPLACEMENT      SKIN BIOPSY      TOTAL HIP ARTHROPLASTY Right 2022    Procedure: TOTAL HIP ARTHROPLASTY ANTERIOR WITH HANA TABLE;  Surgeon: Willian Quiles MD;  Location: [facility] OR;  Service: Orthopedics;  Laterality: Right;  carli Bhatt. hana      Allergies:  Allergies   Allergen Reactions    Mirtazapine Other (See Comments)     confused    Sertraline Anxiety, Diarrhea and Nausea And Vomiting     Also  "\"hallucinations\"     Home Meds:  Medications Prior to Admission   Medication Sig Dispense Refill Last Dose/Taking    acetaminophen (TYLENOL) 500 MG tablet Take 2 tablets by mouth 3 (Three) Times a Day.   Taking    dilTIAZem (CARDIZEM) 30 MG tablet Take 1 tablet by mouth Every 12 (Twelve) Hours for 30 days. 60 tablet 0 Taking    docusate sodium (COLACE) 100 MG capsule Take 1 capsule by mouth Every Night.   Taking    famotidine (PEPCID) 20 MG tablet Take 1 tablet by mouth 2 (Two) Times a Day.   Taking    folic acid (FOLVITE) 1 MG tablet Take 1 tablet by mouth Daily.   Taking    haloperidol (HALDOL) 1 MG tablet Take 1 tablet by mouth 3 (Three) Times a Day.   Taking    Melatonin 10 MG tablet Take 1 tablet by mouth Every Night.   Taking    mirtazapine (REMERON) 15 MG tablet Take 0.5 tablets by mouth Every Night.   Taking    multivitamin tablet tablet Take  by mouth Daily.   Taking    olopatadine (PATADAY) 0.2 % solution ophthalmic solution Administer 1 drop to both eyes Daily.   Taking    ondansetron (ZOFRAN) 4 MG tablet Take 1 tablet by mouth Every 8 (Eight) Hours As Needed for Nausea or Vomiting.   Taking As Needed    polyethylene glycol (MIRALAX) 17 g packet Take 17 g by mouth Daily. 1-2 packets daily until stools are regular and soft 20 each 0 Taking    thiamine (VITAMIN B-1) 100 MG tablet  tablet Take 1 tablet by mouth Daily.   Taking    traZODone (DESYREL) 100 MG tablet Take 1 tablet by mouth Every Night.   Taking     [facility]   [facility]  Social History:   Social History     Tobacco Use    Smoking status: Every Day     Current packs/day: 0.10     Average packs/day: 0.1 packs/day for 60.5 years (6.0 ttl pk-yrs)     Types: Cigarettes     Start date: 1965    Smokeless tobacco: Never   Substance Use Topics    Alcohol use: Not Currently      Family History:  History reviewed. No pertinent family history.   Review of Systems  Constitutional: No wt loss, fever   Gastrointestinal: No nausea , abdominal " "pain  Behavioral/Psych: No insomnia or anxiety   Cardiovascular ----positive for shortness of breath. All other systems reviewed and are negative    Hip pain        /63 (BP Location: Left arm, Patient Position: Lying)   Pulse 81   Temp 98.2 °F (36.8 °C) (Oral)   Resp 18   Ht 175.3 cm (69\")   Wt 71.4 kg (157 lb 8 oz)   SpO2 97%   BMI 23.26 kg/m²   General appearance: No acute changes   Neck: Trachea midline; NECK, supple, no thyromegaly or lymphadenopathy   Lungs: Normal size and shape, normal breath sounds, equal distribution of air, no rales and rhonchi   CV: S1-S2 regular, no murmurs, no rub, no gallop   Abdomen: Soft, nontender; no masses , no abnormal abdominal sounds   Extremities: No deformity , normal color , no peripheral edema   Skin: Normal temperature, turgor and texture; no rash, ulcers                Cardiographics  ECG:     Telemetry:    Echocardiogram:     Imaging  Chest X-ray:     Lab Review   Results from last 7 days   Lab Units 06/22/25  0706 06/21/25  2206 06/21/25  2049   HSTROP T ng/L 68* 93* 91*     Results from last 7 days   Lab Units 06/21/25  2049   MAGNESIUM mg/dL 2.1     Results from last 7 days   Lab Units 06/22/25  0706   SODIUM mmol/L 137   POTASSIUM mmol/L 4.0   BUN mg/dL 20.0   CREATININE mg/dL 1.50*   CALCIUM mg/dL 9.5     @LABRCNTIPbnp@  Results from last 7 days   Lab Units 06/22/25  0706 06/21/25  2049   WBC 10*3/mm3 5.01 5.91   HEMOGLOBIN g/dL 11.6* 10.8*   HEMATOCRIT % 36.0* 33.3*   PLATELETS 10*3/mm3 211 215             Assessment:  Hip pain  Fall questionable syncope  Hypertension  Dementia  CKD  Borderline elevated troponin probably secondary to CKD  Recommendations / Plan:   Elevated troponin appears to be due to CKD  Echocardiogram is pending  Due to elevated troponin we will consider ischemic workup        Mary Beverly MD  6/22/2025, 09:17 EDT      EMR Dragon/Transcription:   Dictated utilizing Dragon dictation    "

## 2025-06-22 NOTE — H&P
Patient Name:  Yuval Loja  YOB: 1950  MRN:  [id number]  Admit Date:  6/21/2025  Patient Care Team:  Alessia Prescott APRN as PCP - General (Family Medicine)  Jonny Bains MD as Referring Physician (Internal Medicine)  Carlos Villareal MD as Consulting Physician (Hematology and Oncology)      Subjective   History Present Illness     Chief Complaint   Patient presents with    Foot Pain     Bilateral foot pain     Syncope       Mr. Loja is a 74 y.o. male with a history of A-fib, anemia, hallucinations and psychosis, methamphetamine abuse, alcohol dependence, GI bleed, COPD, CAD, CKD, dementia that presents to [facility] complaining of acute on chronic low back and right hip pain.  Also with right arm and shoulder pain worsening lately due to leaning on his cane to ambulate.  He apparently had a fall at assisted living and staff reported concern of syncope/near syncope.  in the ED he denied any syncopal episode.  He reported pain making him fall and requested toenail trim.  Patient was admitted to the hospital.  Renal labs were found to be consistent with his baseline/CKD, troponin 40-70 and EKG similar to prior.  No white count and stable hemoglobin.  CT of the abdomen and pelvis and lumbar spine were negative acute.    Patient is uncooperative for me during interview.  He is able to state his name, location and the year.  He reports pain and wanting to know what is wrong with him.  He is uncooperative with further questioning asking me to leave him alone and stop asking so many questions.  He is minimally cooperative with exam pulling the covers up over his chest multiple times during auscultation.      Review of Systems   Unable to perform ROS: Dementia        Personal History     Past Medical History:   Diagnosis Date    Alcohol abuse     Arthritis     CAD (coronary artery disease)     Chronic kidney disease, stage 3     COPD (chronic obstructive pulmonary disease)      Dementia     Disease of thyroid gland     Elevated cholesterol     Fatty liver, alcoholic     GERD (gastroesophageal reflux disease)     History of transfusion     Hypertensive urgency     Metabolic acidosis     Myocardial infarction     Nausea vomiting and diarrhea 05/23/2017    Sinus tachycardia     Sleep apnea     Stroke      Past Surgical History:   Procedure Laterality Date    BACK SURGERY      CARDIAC CATHETERIZATION      CARDIAC ELECTROPHYSIOLOGY PROCEDURE N/A 4/10/2023    Procedure: Temporary Pacemaker;  Surgeon: Vaibhav Bonilla MD;  Location: [facility] CATH INVASIVE LOCATION;  Service: Cardiovascular;  Laterality: N/A;    COLONOSCOPY      ENDOSCOPY      FRACTURE SURGERY      JOINT REPLACEMENT      SKIN BIOPSY      TOTAL HIP ARTHROPLASTY Right 06/27/2022    Procedure: TOTAL HIP ARTHROPLASTY ANTERIOR WITH HANA TABLE;  Surgeon: Willian Quiles MD;  Location: [facility] MAIN OR;  Service: Orthopedics;  Laterality: Right;  Depuy, carli. hana     History reviewed. No pertinent family history.  Social History     Tobacco Use    Smoking status: Every Day     Current packs/day: 0.10     Average packs/day: 0.1 packs/day for 60.5 years (6.0 ttl pk-yrs)     Types: Cigarettes     Start date: 1965    Smokeless tobacco: Never   Vaping Use    Vaping status: Never Used   Substance Use Topics    Alcohol use: Not Currently    Drug use: No     No current facility-administered medications on file prior to encounter.     Current Outpatient Medications on File Prior to Encounter   Medication Sig Dispense Refill    acetaminophen (TYLENOL) 500 MG tablet Take 2 tablets by mouth 3 (Three) Times a Day.      dilTIAZem (CARDIZEM) 30 MG tablet Take 1 tablet by mouth Every 12 (Twelve) Hours for 30 days. 60 tablet 0    docusate sodium (COLACE) 100 MG capsule Take 1 capsule by mouth Every Night.      famotidine (PEPCID) 20 MG tablet Take 1 tablet by mouth 2 (Two) Times a Day.      folic acid (FOLVITE) 1 MG tablet Take 1 tablet by  "mouth Daily.      haloperidol (HALDOL) 1 MG tablet Take 1 tablet by mouth 3 (Three) Times a Day.      Melatonin 10 MG tablet Take 1 tablet by mouth Every Night.      mirtazapine (REMERON) 15 MG tablet Take 0.5 tablets by mouth Every Night.      multivitamin tablet tablet Take  by mouth Daily.      olopatadine (PATADAY) 0.2 % solution ophthalmic solution Administer 1 drop to both eyes Daily.      ondansetron (ZOFRAN) 4 MG tablet Take 1 tablet by mouth Every 8 (Eight) Hours As Needed for Nausea or Vomiting.      polyethylene glycol (MIRALAX) 17 g packet Take 17 g by mouth Daily. 1-2 packets daily until stools are regular and soft 20 each 0    thiamine (VITAMIN B-1) 100 MG tablet  tablet Take 1 tablet by mouth Daily.      traZODone (DESYREL) 100 MG tablet Take 1 tablet by mouth Every Night.      [DISCONTINUED] melatonin 1 MG tablet Take 3 tablets by mouth Every Night.       Allergies   Allergen Reactions    Mirtazapine Other (See Comments)     confused    Sertraline Anxiety, Diarrhea and Nausea And Vomiting     Also \"hallucinations\"       Objective    Objective     Vital Signs  Temp:  [97.4 °F (36.3 °C)-98.4 °F (36.9 °C)] 98.2 °F (36.8 °C)  Heart Rate:  [71-93] 81  Resp:  [14-18] 18  BP: (132-192)/(59-93) 139/63  SpO2:  [97 %-99 %] 97 %  on   ;   Device (Oxygen Therapy): room air  Body mass index is 23.26 kg/m².    Physical Exam  Constitutional:       Comments: Elderly and disheveled appearing   HENT:      Head: Normocephalic and atraumatic.      Mouth/Throat:      Mouth: Mucous membranes are moist.   Cardiovascular:      Rate and Rhythm: Normal rate and regular rhythm.      Pulses: Normal pulses.   Pulmonary:      Effort: No respiratory distress.      Breath sounds: Normal breath sounds.   Abdominal:      General: Bowel sounds are normal. There is no distension.      Palpations: Abdomen is soft.   Musculoskeletal:         General: No swelling.   Neurological:      Mental Status: He is alert and oriented to person, " place, and time.   Psychiatric:      Comments: Uncooperative         Results Review:  I reviewed the patient's new clinical results.      Lab Results (last 24 hours)       Procedure Component Value Units Date/Time    CBC & Differential [[id number]]  (Abnormal) Collected: 06/21/25 2049    Specimen: Blood from Arm, Left Updated: 06/21/25 2100    Narrative:      The following orders were created for panel order CBC & Differential.  Procedure                               Abnormality         Status                     ---------                               -----------         ------                     CBC Auto Differential[[id number]]        Abnormal            Final result                 Please view results for these tests on the individual orders.    Comprehensive Metabolic Panel [[id number]]  (Abnormal) Collected: 06/21/25 2049    Specimen: Blood from Arm, Left Updated: 06/21/25 2132     Glucose 110 mg/dL      BUN 25.0 mg/dL      Creatinine 1.76 mg/dL      Sodium 137 mmol/L      Potassium 4.4 mmol/L      Chloride 104 mmol/L      CO2 23.0 mmol/L      Calcium 9.4 mg/dL      Total Protein 7.2 g/dL      Albumin 3.7 g/dL      ALT (SGPT) 6 U/L      AST (SGOT) 17 U/L      Alkaline Phosphatase 85 U/L      Total Bilirubin <0.2 mg/dL      Globulin 3.5 gm/dL      A/G Ratio 1.1 g/dL      BUN/Creatinine Ratio 14.2     Anion Gap 10.0 mmol/L      eGFR 39.6 mL/min/1.73     Narrative:      GFR Categories in Chronic Kidney Disease (CKD)              GFR Category          GFR (mL/min/1.73)    Interpretation  G1                    90 or greater        Normal or high (1)  G2                    60-89                Mild decrease (1)  G3a                   45-59                Mild to moderate decrease  G3b                   30-44                Moderate to severe decrease  G4                    15-29                Severe decrease  G5                    14 or less           Kidney failure    (1)In the absence of evidence of kidney  disease, neither GFR category G1 or G2 fulfill the criteria for CKD.    eGFR calculation 2021 CKD-EPI creatinine equation, which does not include race as a factor    Magnesium [[id number]]  (Normal) Collected: 06/21/25 2049    Specimen: Blood from Arm, Left Updated: 06/21/25 2132     Magnesium 2.1 mg/dL     High Sensitivity Troponin T [[id number]]  (Abnormal) Collected: 06/21/25 2049    Specimen: Blood from Arm, Left Updated: 06/21/25 2133     HS Troponin T 91 ng/L     Narrative:      High Sensitive Troponin T Reference Range:  <14.0 ng/L- Negative Female for AMI  <22.0 ng/L- Negative Male for AMI  >=14 - Abnormal Female indicating possible myocardial injury.  >=22 - Abnormal Male indicating possible myocardial injury.   Clinicians would have to utilize clinical acumen, EKG, Troponin, and serial changes to determine if it is an Acute Myocardial Infarction or myocardial injury due to an underlying chronic condition.         CBC Auto Differential [[id number]]  (Abnormal) Collected: 06/21/25 2049    Specimen: Blood from Arm, Left Updated: 06/21/25 2100     WBC 5.91 10*3/mm3      RBC 3.49 10*6/mm3      Hemoglobin 10.8 g/dL      Hematocrit 33.3 %      MCV 95.4 fL      MCH 30.9 pg      MCHC 32.4 g/dL      RDW 13.5 %      RDW-SD 46.7 fl      MPV 9.2 fL      Platelets 215 10*3/mm3      Neutrophil % 57.7 %      Lymphocyte % 29.3 %      Monocyte % 9.8 %      Eosinophil % 2.4 %      Basophil % 0.3 %      Immature Grans % 0.5 %      Neutrophils, Absolute 3.41 10*3/mm3      Lymphocytes, Absolute 1.73 10*3/mm3      Monocytes, Absolute 0.58 10*3/mm3      Eosinophils, Absolute 0.14 10*3/mm3      Basophils, Absolute 0.02 10*3/mm3      Immature Grans, Absolute 0.03 10*3/mm3      nRBC 0.0 /100 WBC     High Sensitivity Troponin T 1Hr [[id number]]  (Abnormal) Collected: 06/21/25 2206    Specimen: Blood from Hand, Left Updated: 06/21/25 2250     HS Troponin T 93 ng/L      Troponin T Numeric Delta 2 ng/L      Troponin T % Delta 2     Narrative:      High Sensitive Troponin T Reference Range:  <14.0 ng/L- Negative Female for AMI  <22.0 ng/L- Negative Male for AMI  >=14 - Abnormal Female indicating possible myocardial injury.  >=22 - Abnormal Male indicating possible myocardial injury.   Clinicians would have to utilize clinical acumen, EKG, Troponin, and serial changes to determine if it is an Acute Myocardial Infarction or myocardial injury due to an underlying chronic condition.         Basic Metabolic Panel [[id number]]  (Abnormal) Collected: 06/22/25 0706    Specimen: Blood Updated: 06/22/25 0816     Glucose 109 mg/dL      BUN 20.0 mg/dL      Creatinine 1.50 mg/dL      Sodium 137 mmol/L      Potassium 4.0 mmol/L      Chloride 104 mmol/L      CO2 22.0 mmol/L      Calcium 9.5 mg/dL      BUN/Creatinine Ratio 13.3     Anion Gap 11.0 mmol/L      eGFR 48.0 mL/min/1.73     Narrative:      GFR Categories in Chronic Kidney Disease (CKD)              GFR Category          GFR (mL/min/1.73)    Interpretation  G1                    90 or greater        Normal or high (1)  G2                    60-89                Mild decrease (1)  G3a                   45-59                Mild to moderate decrease  G3b                   30-44                Moderate to severe decrease  G4                    15-29                Severe decrease  G5                    14 or less           Kidney failure    (1)In the absence of evidence of kidney disease, neither GFR category G1 or G2 fulfill the criteria for CKD.    eGFR calculation 2021 CKD-EPI creatinine equation, which does not include race as a factor    CBC (No Diff) [[id number]]  (Abnormal) Collected: 06/22/25 0706    Specimen: Blood Updated: 06/22/25 0754     WBC 5.01 10*3/mm3      RBC 3.69 10*6/mm3      Hemoglobin 11.6 g/dL      Hematocrit 36.0 %      MCV 97.6 fL      MCH 31.4 pg      MCHC 32.2 g/dL      RDW 13.7 %      RDW-SD 48.1 fl      MPV 10.0 fL      Platelets 211 10*3/mm3     High Sensitivity Troponin T  [[id number]]  (Abnormal) Collected: 06/22/25 0706    Specimen: Blood Updated: 06/22/25 0819     HS Troponin T 68 ng/L     Narrative:      High Sensitive Troponin T Reference Range:  <14.0 ng/L- Negative Female for AMI  <22.0 ng/L- Negative Male for AMI  >=14 - Abnormal Female indicating possible myocardial injury.  >=22 - Abnormal Male indicating possible myocardial injury.   Clinicians would have to utilize clinical acumen, EKG, Troponin, and serial changes to determine if it is an Acute Myocardial Infarction or myocardial injury due to an underlying chronic condition.         [facility] PCR - Swab, Axilla Right, Axilla Left and Groin [[id number]] Collected: 06/22/25 1202    Specimen: Swab from Axilla Right, Axilla Left and Groin Updated: 06/22/25 1238            Imaging Results (Last 24 Hours)       Procedure Component Value Units Date/Time    CT Abdomen Pelvis Without Contrast [[id number]] Collected: 06/21/25 2317     Updated: 06/21/25 2330    Narrative:      CT OF THE ABDOMEN PELVIS WITHOUT CONTRAST; CT OF THE LUMBAR SPINE     HISTORY: Fall. Back and flank pain.     COMPARISON: January 28, 2025     TECHNIQUE: Axial CT imaging was obtained through the abdomen and pelvis.  No IV contrast was administered. Axial CT imaging was obtained through  the lumbar spine. Coronal and sagittal reformatted images were obtained.     FINDINGS:  ABDOMEN AND PELVIS: Images through the lung bases demonstrate some mild  scarring. No suspicious hepatic lesions are seen. The stomach, duodenum,  adrenal glands, spleen, and pancreas are normal. There is  cholelithiasis. There are aortoiliac calcifications. Prostate gland is  within normal limits. The patient has a bowel containing right inguinal  hernia, without evidence of obstruction. The appendix is normal. No  acute osseous abnormalities are seen.     LUMBAR SPINE: No acute fracture or subluxation of the lumbar spine is  seen. There is mild anterolisthesis of L4 on L5.  Intervertebral disc  space narrowing is most significant at L4-L5 and L5-S1.     L1-L2: There is diffuse disc bulge, without significant canal stenosis  or neuroforaminal narrowing.  L2-L3: There is diffuse disc bulge. It is slightly more significant on  the left. There is some flattening of the thecal sac anteriorly. There  is some minimal left neuroforaminal narrowing.  L3-L4: There is diffuse disc bulge. There is moderate canal stenosis,  exacerbated by hypertrophy of the ligamentum flavum and facet  hypertrophy. There is moderate bilateral neuroforaminal narrowing.  L4-L5: There is diffuse disc bulge. There is severe canal stenosis.  There is severe bilateral neuroforaminal narrowing.  L5-S1: There is diffuse disc bulge. There is moderate canal stenosis.  There is severe bilateral neuroforaminal narrowing. There is a focus of  gas which is seen within the left lateral recess, stable when compared  to January 28, 2025. It may be related to a synovial cyst, as it is  adjacent to the left facet joint.       Impression:         1. No acute findings identified within the abdomen or pelvis.  2. No acute fracture or subluxation of the lumbar spine is seen.     Radiation dose reduction techniques were utilized, including automated  exposure control and exposure modulation based on body size.        This report was finalized on 6/21/2025 11:27 PM by Dr. Martha Telles M.D on Workstation: [id number]       CT Lumbar Spine Without Contrast [[id number]] Collected: 06/21/25 2317     Updated: 06/21/25 2330    Narrative:      CT OF THE ABDOMEN PELVIS WITHOUT CONTRAST; CT OF THE LUMBAR SPINE     HISTORY: Fall. Back and flank pain.     COMPARISON: January 28, 2025     TECHNIQUE: Axial CT imaging was obtained through the abdomen and pelvis.  No IV contrast was administered. Axial CT imaging was obtained through  the lumbar spine. Coronal and sagittal reformatted images were obtained.     FINDINGS:  ABDOMEN AND PELVIS: Images  through the lung bases demonstrate some mild  scarring. No suspicious hepatic lesions are seen. The stomach, duodenum,  adrenal glands, spleen, and pancreas are normal. There is  cholelithiasis. There are aortoiliac calcifications. Prostate gland is  within normal limits. The patient has a bowel containing right inguinal  hernia, without evidence of obstruction. The appendix is normal. No  acute osseous abnormalities are seen.     LUMBAR SPINE: No acute fracture or subluxation of the lumbar spine is  seen. There is mild anterolisthesis of L4 on L5. Intervertebral disc  space narrowing is most significant at L4-L5 and L5-S1.     L1-L2: There is diffuse disc bulge, without significant canal stenosis  or neuroforaminal narrowing.  L2-L3: There is diffuse disc bulge. It is slightly more significant on  the left. There is some flattening of the thecal sac anteriorly. There  is some minimal left neuroforaminal narrowing.  L3-L4: There is diffuse disc bulge. There is moderate canal stenosis,  exacerbated by hypertrophy of the ligamentum flavum and facet  hypertrophy. There is moderate bilateral neuroforaminal narrowing.  L4-L5: There is diffuse disc bulge. There is severe canal stenosis.  There is severe bilateral neuroforaminal narrowing.  L5-S1: There is diffuse disc bulge. There is moderate canal stenosis.  There is severe bilateral neuroforaminal narrowing. There is a focus of  gas which is seen within the left lateral recess, stable when compared  to January 28, 2025. It may be related to a synovial cyst, as it is  adjacent to the left facet joint.       Impression:         1. No acute findings identified within the abdomen or pelvis.  2. No acute fracture or subluxation of the lumbar spine is seen.     Radiation dose reduction techniques were utilized, including automated  exposure control and exposure modulation based on body size.        This report was finalized on 6/21/2025 11:27 PM by Dr. Martha Telles M.D  on Workstation: [id number]               Results for orders placed during the hospital encounter of 04/10/23    Adult Transthoracic Echo Complete W/ Cont if Necessary Per Protocol    Interpretation Summary    Left ventricular systolic function is normal. Left ventricular ejection fraction appears to be 56 - 60%.    Left ventricular diastolic function was indeterminate.    There is calcification of the aortic valve.    Estimated right ventricular systolic pressure from tricuspid regurgitation is normal (<35 mmHg).      Telemetry Scan   Final Result      Telemetry Scan   Final Result      ECG 12 Lead Other; near syncope   Final Result   HEART RATE=71  bpm   RR Eqecrovy=502  ms   KY Eeqhdwjk=232  ms   P Horizontal Axis=-51  deg   P Front Axis=62  deg   QRSD Interval=76  ms   QT Bsesnrji=881  ms   FXrL=213  ms   QRS Axis=37  deg   T Wave Axis=27  deg   - ABNORMAL ECG -   Sinus rhythm   Atrial premature complex   Borderline  prolonged KY interval   ST elev, probable normal early repol pattern   When compared with ECG of 03-Mar-2024 09:37:28,   Significant rate increase   Electronically Signed By: Jeff Maddox ([facility]) 2025-06-22 05:28:32   Date and Time of Study:2025-06-21 20:52:24           Assessment/Plan     Active Hospital Problems    Diagnosis  POA    **Syncope [R55]  Yes    History of alcohol abuse [F10.11]  Unknown    Dementia with behavioral disturbance [F03.918]  Unknown    COPD (chronic obstructive pulmonary disease) [J44.9]  Yes    History of substance abuse [F19.11]  Yes    CAD (coronary artery disease) [I25.10]  Yes    Chronic kidney disease, stage 3 [N18.30]  Yes    Alcoholic liver disease [K70.9]  Yes      Resolved Hospital Problems   No resolved problems to display.     74-year-old male with history of dementia, liver disease, CKD, substance abuse, COPD and multiple other problems comes in with possible syncopal episode reported by staff at assisted living.  He is a limited and uncooperative historian.  He  appears in no acute distress.  Cardiology has been consulted.  Further plans as clinical course progresses.    Resume diltiazem.  Blood pressure and heart rate stable.  Resume psychiatric meds Haldol, Remeron, trazodone  Resume Pepcid.  Resume vitamin replacement.    VTE Prophylaxis - SCDs.  Code Status - Full code.       RICHMOND Hansen  [facility]  06/22/25  15:34 EDT

## 2025-06-22 NOTE — ED PROVIDER NOTES
EMERGENCY DEPARTMENT ENCOUNTER    Room number:  14/14  Date Seen:  6/22/2025  PCP:  Alessia Prescott APRN    Laboratory Results:  Recent Results (from the past 24 hours)   Comprehensive Metabolic Panel    Collection Time: 06/21/25  8:49 PM    Specimen: Arm, Left; Blood   Result Value Ref Range    Glucose 110 (H) 65 - 99 mg/dL    BUN 25.0 (H) 8.0 - 23.0 mg/dL    Creatinine 1.76 (H) 0.76 - 1.27 mg/dL    Sodium 137 136 - 145 mmol/L    Potassium 4.4 3.5 - 5.2 mmol/L    Chloride 104 98 - 107 mmol/L    CO2 23.0 22.0 - 29.0 mmol/L    Calcium 9.4 8.6 - 10.5 mg/dL    Total Protein 7.2 6.0 - 8.5 g/dL    Albumin 3.7 3.5 - 5.2 g/dL    ALT (SGPT) 6 1 - 41 U/L    AST (SGOT) 17 1 - 40 U/L    Alkaline Phosphatase 85 39 - 117 U/L    Total Bilirubin <0.2 0.0 - 1.2 mg/dL    Globulin 3.5 gm/dL    A/G Ratio 1.1 g/dL    BUN/Creatinine Ratio 14.2 7.0 - 25.0    Anion Gap 10.0 5.0 - 15.0 mmol/L    eGFR 39.6 (L) >60.0 mL/min/1.73   Magnesium    Collection Time: 06/21/25  8:49 PM    Specimen: Arm, Left; Blood   Result Value Ref Range    Magnesium 2.1 1.6 - 2.4 mg/dL   High Sensitivity Troponin T    Collection Time: 06/21/25  8:49 PM    Specimen: Arm, Left; Blood   Result Value Ref Range    HS Troponin T 91 (C) <22 ng/L   Green Top (Gel)    Collection Time: 06/21/25  8:49 PM   Result Value Ref Range    Extra Tube Hold for add-ons.    Lavender Top    Collection Time: 06/21/25  8:49 PM   Result Value Ref Range    Extra Tube hold for add-on    Gold Top - SST    Collection Time: 06/21/25  8:49 PM   Result Value Ref Range    Extra Tube Hold for add-ons.    Light Blue Top    Collection Time: 06/21/25  8:49 PM   Result Value Ref Range    Extra Tube Hold for add-ons.    CBC Auto Differential    Collection Time: 06/21/25  8:49 PM    Specimen: Arm, Left; Blood   Result Value Ref Range    WBC 5.91 3.40 - 10.80 10*3/mm3    RBC 3.49 (L) 4.14 - 5.80 10*6/mm3    Hemoglobin 10.8 (L) 13.0 - 17.7 g/dL    Hematocrit 33.3 (L) 37.5 - 51.0 %    MCV 95.4 79.0 - 97.0  fL    MCH 30.9 26.6 - 33.0 pg    MCHC 32.4 31.5 - 35.7 g/dL    RDW 13.5 12.3 - 15.4 %    RDW-SD 46.7 37.0 - 54.0 fl    MPV 9.2 6.0 - 12.0 fL    Platelets 215 140 - 450 10*3/mm3    Neutrophil % 57.7 42.7 - 76.0 %    Lymphocyte % 29.3 19.6 - 45.3 %    Monocyte % 9.8 5.0 - 12.0 %    Eosinophil % 2.4 0.3 - 6.2 %    Basophil % 0.3 0.0 - 1.5 %    Immature Grans % 0.5 0.0 - 0.5 %    Neutrophils, Absolute 3.41 1.70 - 7.00 10*3/mm3    Lymphocytes, Absolute 1.73 0.70 - 3.10 10*3/mm3    Monocytes, Absolute 0.58 0.10 - 0.90 10*3/mm3    Eosinophils, Absolute 0.14 0.00 - 0.40 10*3/mm3    Basophils, Absolute 0.02 0.00 - 0.20 10*3/mm3    Immature Grans, Absolute 0.03 0.00 - 0.05 10*3/mm3    nRBC 0.0 0.0 - 0.2 /100 WBC   ECG 12 Lead Other; near syncope    Collection Time: 06/21/25  8:52 PM   Result Value Ref Range    QT Interval 406 ms    QTC Interval 440 ms   High Sensitivity Troponin T 1Hr    Collection Time: 06/21/25 10:06 PM    Specimen: Hand, Left; Blood   Result Value Ref Range    HS Troponin T 93 (C) <22 ng/L    Troponin T Numeric Delta 2 ng/L    Troponin T % Delta 2 Abnormal if >/= 20%     I reviewed the above results.    Radiology:  CT Abdomen Pelvis Without Contrast  CT Abdomen Pelvis Without Contrast, CT Lumbar Spine Without Contrast  Result Date: 6/21/2025  CT OF THE ABDOMEN PELVIS WITHOUT CONTRAST; CT OF THE LUMBAR SPINE  HISTORY: Fall. Back and flank pain.  COMPARISON: January 28, 2025  TECHNIQUE: Axial CT imaging was obtained through the abdomen and pelvis. No IV contrast was administered. Axial CT imaging was obtained through the lumbar spine. Coronal and sagittal reformatted images were obtained.  FINDINGS: ABDOMEN AND PELVIS: Images through the lung bases demonstrate some mild scarring. No suspicious hepatic lesions are seen. The stomach, duodenum, adrenal glands, spleen, and pancreas are normal. There is cholelithiasis. There are aortoiliac calcifications. Prostate gland is within normal limits. The patient has a  bowel containing right inguinal hernia, without evidence of obstruction. The appendix is normal. No acute osseous abnormalities are seen.  LUMBAR SPINE: No acute fracture or subluxation of the lumbar spine is seen. There is mild anterolisthesis of L4 on L5. Intervertebral disc space narrowing is most significant at L4-L5 and L5-S1.  L1-L2: There is diffuse disc bulge, without significant canal stenosis or neuroforaminal narrowing. L2-L3: There is diffuse disc bulge. It is slightly more significant on the left. There is some flattening of the thecal sac anteriorly. There is some minimal left neuroforaminal narrowing. L3-L4: There is diffuse disc bulge. There is moderate canal stenosis, exacerbated by hypertrophy of the ligamentum flavum and facet hypertrophy. There is moderate bilateral neuroforaminal narrowing. L4-L5: There is diffuse disc bulge. There is severe canal stenosis. There is severe bilateral neuroforaminal narrowing. L5-S1: There is diffuse disc bulge. There is moderate canal stenosis. There is severe bilateral neuroforaminal narrowing. There is a focus of gas which is seen within the left lateral recess, stable when compared to January 28, 2025. It may be related to a synovial cyst, as it is adjacent to the left facet joint.       1. No acute findings identified within the abdomen or pelvis. 2. No acute fracture or subluxation of the lumbar spine is seen.  Radiation dose reduction techniques were utilized, including automated exposure control and exposure modulation based on body size.   This report was finalized on 6/21/2025 11:27 PM by Dr. Martha Telles M.D on Workstation: [id number]      I reviewed the above results    Medications ordered in ED:  Medications   sodium chloride 0.9 % flush 10 mL (has no administration in time range)   sodium chloride 0.9 % flush 10 mL (has no administration in time range)   sodium chloride 0.9 % flush 10 mL (has no administration in time range)   sodium chloride  0.9 % infusion 40 mL (has no administration in time range)   nitroglycerin (NITROSTAT) SL tablet 0.4 mg (has no administration in time range)   acetaminophen (TYLENOL) tablet 650 mg (has no administration in time range)     Or   acetaminophen (TYLENOL) 160 MG/5ML oral solution 650 mg (has no administration in time range)     Or   acetaminophen (TYLENOL) suppository 650 mg (has no administration in time range)   sennosides-docusate (PERICOLACE) 8.6-50 MG per tablet 2 tablet (has no administration in time range)     And   polyethylene glycol (MIRALAX) packet 17 g (has no administration in time range)     And   bisacodyl (DULCOLAX) EC tablet 5 mg (has no administration in time range)     And   bisacodyl (DULCOLAX) suppository 10 mg (has no administration in time range)   calcium carbonate (TUMS) chewable tablet 500 mg (200 mg elemental) (has no administration in time range)   sodium chloride 0.9 % infusion (has no administration in time range)   HYDROcodone-acetaminophen (NORCO) 5-325 MG per tablet 1 tablet (1 tablet Oral Given 6/21/25 2211)       Progress and Consult Notes:  ED Course as of 06/22/25 0214   Sat Jun 21, 2025 2152 CKD at baseline.  Patient with troponin elevations between the 40s and 70s on previous checks attributed to renal dysfunction. [AR]   2155 EKG ER MD interpretation   Time: 20: 52  Rhythm and rate: Sinus rhythm at a rate of 71  Axis: Normal  P waves: Normal  QRS complexes: Normal  ST segments: Elevation V1 through V3  T waves: no flattening or inversions  Q waves in lead III and aVF  Comparison EKG is from March 24, 2024 and had similar changes including ST elevation in the anterior leads [AR]   Sun Jun 22, 2025   0140 Patient with RICHMOND Lay for LHA including history presentation workup and she agrees to admit on behalf of Dr. Elizondo [KA]      ED Course User Index  [AR] Alfreda Suarez MD  [KA] Cira Vital PA-C       I reassessed the patient.  He is a poor historian.   Though he initially denied that he had any lightheadedness or near syncope he now thinks that he might of passed out and that he still feeling a little lightheaded.  Ultimately his troponin is elevated, history unclear, recommend admission for further evaluation he is agreeable.      Diagnosis:  Final diagnoses:   Acute back pain with sciatica, right   Frequent falls   Generalized weakness   Elevated troponin   Chronic kidney disease, unspecified CKD stage                Cira Vitla PA-C  06/22/25 0680

## 2025-06-22 NOTE — PLAN OF CARE
Goal Outcome Evaluation:  Plan of Care Reviewed With: patient        Progress: no change     AO x4 poor historian/forgetful. SR, RA, standby assist. Pt states he didn't fall at care facility, he doesn't want to go back to his care facility because they aren't adequately staffed to take care of him. Pt is very suspicious of caregivers and states he gets nervous when people move in to the room and around him too fast. Clonidine given for BP of 190s/90s when brought to the floor. No c/o pain.

## 2025-06-22 NOTE — ED PROVIDER NOTES
" EMERGENCY DEPARTMENT ENCOUNTER    Room Number:  14/14  PCP: Alessia Prescott APRN  Independent Historians: EM_Historian: Patient    HPI:  Chief Complaint: had concerns including Foot Pain (Bilateral foot pain ) and Syncope.      A complete HPI/ROS/PMH/PSH/SH/FH are unobtainable due to: EM_Unobtainable History : Poor historian    Chronic or social conditions impacting patient care (Social Determinants of Health): None      Context: Yuval Loja is a 74 y.o. male with a medical history of CAD, COPD, Anemia, CKD, HTN, dementia, chronic right hip pain  who presents to the ED c/o acute on chronic right hip pain. Pt uses cane for ambulation and says he is having falls due to pain in right leg and low back.  Pt also with right arm/shoulder pain from leaning on his cane so much to ambulate. Pt had a fall in front of assisted living facility staff that they characterized as near syncope/syncope but patient denies this. They sent him in for syncope eval as they caught him and he sustained no injury from the fall.  Pt says he knows his hip is an issue but \"wants some answers\" as to why his low back and right hip are now in pain causing him falls.  Pt denies cp, sob, n/v/d, numbness/tingling, headache, or head injury.    Pt additionally thinks he needs his toenails trimmed--he suspects that may be why his leg and foot hurt.        Review of prior external notes (non-ED) -and- Review of prior external test results outside of this encounter: Pt last saw his pmd 5/20/25 and I reviewed office notes. Pt noted to be noncompliant with meds at his assisted living facility. Pt with sleep difficulty/insomnia and chronic right hip pain and PT was recommended.    Prescription drug monitoring program review:     EM_Kasmario : N/A    PAST MEDICAL HISTORY  Active Ambulatory Problems     Diagnosis Date Noted   • Alcoholic ketoacidosis 05/23/2017   • Alcohol dependence 05/23/2017   • Methamphetamine abuse 05/23/2017   • Fatty liver, alcoholic " 05/23/2017   • Nausea vomiting and diarrhea 05/23/2017   • Alcoholic liver disease 05/23/2017   • Metabolic acidosis 05/23/2017   • Tobacco abuse 05/23/2017   • Coronary artery disease involving native coronary artery of native heart without angina pectoris 05/24/2017   • Tachycardia 05/24/2017   • Sinus tachycardia 04/17/2019   • Chronic kidney disease, stage 3 04/17/2019   • Medically noncompliant 04/18/2019   • Visual hallucinations 04/18/2020   • Psychosis 04/18/2020   • Hyponatremia 04/18/2020   • CAD (coronary artery disease) 04/18/2020   • Leukopenia 04/18/2020   • Symptomatic anemia 04/18/2020   • Headache 04/18/2020   • Substance abuse 04/18/2020   • Gastrointestinal hemorrhage 10/22/2020   • CRISTIANO (acute kidney injury) 10/23/2020   • Hyperkalemia 10/23/2020   • Right lower quadrant abdominal pain 03/15/2022   • New onset atrial fibrillation 03/15/2022   • History of drug abuse 03/15/2022   • COPD (chronic obstructive pulmonary disease) 03/15/2022   • Right inguinal hernia 03/15/2022   • Constipation 03/15/2022   • Status post right hip replacement 06/27/2022   • Right hip pain 07/19/2022   • Sinus bradycardia 04/10/2023   • Generalized abdominal pain 06/01/2023   • Altered mental status 02/04/2024   • Altered mental state 02/05/2024   • Accidental overdose 03/03/2024     Resolved Ambulatory Problems     Diagnosis Date Noted   • Dehydration 04/17/2019   • Functional diarrhea 04/17/2019   • Closed fracture of neck of right femur, initial encounter 06/25/2022     Past Medical History:   Diagnosis Date   • Alcohol abuse    • Arthritis    • Dementia    • Disease of thyroid gland    • Elevated cholesterol    • GERD (gastroesophageal reflux disease)    • History of transfusion    • Hypertensive urgency    • Myocardial infarction    • Sleep apnea    • Stroke          PAST SURGICAL HISTORY  Past Surgical History:   Procedure Laterality Date   • BACK SURGERY     • CARDIAC CATHETERIZATION     • CARDIAC  "ELECTROPHYSIOLOGY PROCEDURE N/A 4/10/2023    Procedure: Temporary Pacemaker;  Surgeon: Vaibhav Bonilla MD;  Location: [facility] CATH INVASIVE LOCATION;  Service: Cardiovascular;  Laterality: N/A;   • COLONOSCOPY     • ENDOSCOPY     • FRACTURE SURGERY     • JOINT REPLACEMENT     • SKIN BIOPSY     • TOTAL HIP ARTHROPLASTY Right 06/27/2022    Procedure: TOTAL HIP ARTHROPLASTY ANTERIOR WITH HANA TABLE;  Surgeon: Willian Quiles MD;  Location: [facility] MAIN OR;  Service: Orthopedics;  Laterality: Right;  Depuy, carli. hana         FAMILY HISTORY  No family history on file.      SOCIAL HISTORY  Social History     Socioeconomic History   • Marital status: Single   Tobacco Use   • Smoking status: Every Day     Current packs/day: 0.50     Types: Cigarettes   • Smokeless tobacco: Never   • Tobacco comments:     tried to provide education declined irritated w/ attempt smoked \" depends on what I feel like.\"   Vaping Use   • Vaping status: Never Used   Substance and Sexual Activity   • Alcohol use: Not Currently   • Drug use: No   • Sexual activity: Defer         ALLERGIES  Mirtazapine and Sertraline        REVIEW OF SYSTEMS  Review of Systems  Included in HPI  All systems reviewed and negative except for those discussed in HPI.      PHYSICAL EXAM    I have reviewed the triage vital signs and nursing notes.    ED Triage Vitals [06/21/25 2036]   Temp Heart Rate Resp BP SpO2   98.4 °F (36.9 °C) 76 15 132/59 97 %      Temp src Heart Rate Source Patient Position BP Location FiO2 (%)   Oral Monitor Sitting Left arm --       Physical Exam  GENERAL: pleasant but poor historian aam, alert, no acute distress  SKIN: Warm, dry  HENT: Normocephalic, atraumatic  EYES: no scleral icterus  CV: regular rhythm, regular rate  RESPIRATORY: normal effort, lungs clear no wheezing  ABDOMEN: soft, nontender, nondistended  MUSCULOSKELETAL: no deformity, no calf ttp, 2+ pt pulses ble, pain with any attempts at rom right hip, diffuse lumbar ttp " without point tenderness  NEURO: alert, moves all extremities, follows commands                                                                 LAB RESULTS  Recent Results (from the past 24 hours)   Comprehensive Metabolic Panel    Collection Time: 06/21/25  8:49 PM    Specimen: Arm, Left; Blood   Result Value Ref Range    Glucose 110 (H) 65 - 99 mg/dL    BUN 25.0 (H) 8.0 - 23.0 mg/dL    Creatinine 1.76 (H) 0.76 - 1.27 mg/dL    Sodium 137 136 - 145 mmol/L    Potassium 4.4 3.5 - 5.2 mmol/L    Chloride 104 98 - 107 mmol/L    CO2 23.0 22.0 - 29.0 mmol/L    Calcium 9.4 8.6 - 10.5 mg/dL    Total Protein 7.2 6.0 - 8.5 g/dL    Albumin 3.7 3.5 - 5.2 g/dL    ALT (SGPT) 6 1 - 41 U/L    AST (SGOT) 17 1 - 40 U/L    Alkaline Phosphatase 85 39 - 117 U/L    Total Bilirubin <0.2 0.0 - 1.2 mg/dL    Globulin 3.5 gm/dL    A/G Ratio 1.1 g/dL    BUN/Creatinine Ratio 14.2 7.0 - 25.0    Anion Gap 10.0 5.0 - 15.0 mmol/L    eGFR 39.6 (L) >60.0 mL/min/1.73   Magnesium    Collection Time: 06/21/25  8:49 PM    Specimen: Arm, Left; Blood   Result Value Ref Range    Magnesium 2.1 1.6 - 2.4 mg/dL   High Sensitivity Troponin T    Collection Time: 06/21/25  8:49 PM    Specimen: Arm, Left; Blood   Result Value Ref Range    HS Troponin T 91 (C) <22 ng/L   Green Top (Gel)    Collection Time: 06/21/25  8:49 PM   Result Value Ref Range    Extra Tube Hold for add-ons.    Lavender Top    Collection Time: 06/21/25  8:49 PM   Result Value Ref Range    Extra Tube hold for add-on    Gold Top - SST    Collection Time: 06/21/25  8:49 PM   Result Value Ref Range    Extra Tube Hold for add-ons.    Light Blue Top    Collection Time: 06/21/25  8:49 PM   Result Value Ref Range    Extra Tube Hold for add-ons.    CBC Auto Differential    Collection Time: 06/21/25  8:49 PM    Specimen: Arm, Left; Blood   Result Value Ref Range    WBC 5.91 3.40 - 10.80 10*3/mm3    RBC 3.49 (L) 4.14 - 5.80 10*6/mm3    Hemoglobin 10.8 (L) 13.0 - 17.7 g/dL    Hematocrit 33.3 (L) 37.5 -  51.0 %    MCV 95.4 79.0 - 97.0 fL    MCH 30.9 26.6 - 33.0 pg    MCHC 32.4 31.5 - 35.7 g/dL    RDW 13.5 12.3 - 15.4 %    RDW-SD 46.7 37.0 - 54.0 fl    MPV 9.2 6.0 - 12.0 fL    Platelets 215 140 - 450 10*3/mm3    Neutrophil % 57.7 42.7 - 76.0 %    Lymphocyte % 29.3 19.6 - 45.3 %    Monocyte % 9.8 5.0 - 12.0 %    Eosinophil % 2.4 0.3 - 6.2 %    Basophil % 0.3 0.0 - 1.5 %    Immature Grans % 0.5 0.0 - 0.5 %    Neutrophils, Absolute 3.41 1.70 - 7.00 10*3/mm3    Lymphocytes, Absolute 1.73 0.70 - 3.10 10*3/mm3    Monocytes, Absolute 0.58 0.10 - 0.90 10*3/mm3    Eosinophils, Absolute 0.14 0.00 - 0.40 10*3/mm3    Basophils, Absolute 0.02 0.00 - 0.20 10*3/mm3    Immature Grans, Absolute 0.03 0.00 - 0.05 10*3/mm3    nRBC 0.0 0.0 - 0.2 /100 WBC   ECG 12 Lead Other; near syncope    Collection Time: 06/21/25  8:52 PM   Result Value Ref Range    QT Interval 406 ms    QTC Interval 440 ms   High Sensitivity Troponin T 1Hr    Collection Time: 06/21/25 10:06 PM    Specimen: Hand, Left; Blood   Result Value Ref Range    HS Troponin T 93 (C) <22 ng/L    Troponin T Numeric Delta 2 ng/L    Troponin T % Delta 2 Abnormal if >/= 20%         RADIOLOGY  CT Abdomen Pelvis Without Contrast  CT Abdomen Pelvis Without Contrast, CT Lumbar Spine Without Contrast  Result Date: 6/21/2025  CT OF THE ABDOMEN PELVIS WITHOUT CONTRAST; CT OF THE LUMBAR SPINE  HISTORY: Fall. Back and flank pain.  COMPARISON: January 28, 2025  TECHNIQUE: Axial CT imaging was obtained through the abdomen and pelvis. No IV contrast was administered. Axial CT imaging was obtained through the lumbar spine. Coronal and sagittal reformatted images were obtained.  FINDINGS: ABDOMEN AND PELVIS: Images through the lung bases demonstrate some mild scarring. No suspicious hepatic lesions are seen. The stomach, duodenum, adrenal glands, spleen, and pancreas are normal. There is cholelithiasis. There are aortoiliac calcifications. Prostate gland is within normal limits. The patient has a  bowel containing right inguinal hernia, without evidence of obstruction. The appendix is normal. No acute osseous abnormalities are seen.  LUMBAR SPINE: No acute fracture or subluxation of the lumbar spine is seen. There is mild anterolisthesis of L4 on L5. Intervertebral disc space narrowing is most significant at L4-L5 and L5-S1.  L1-L2: There is diffuse disc bulge, without significant canal stenosis or neuroforaminal narrowing. L2-L3: There is diffuse disc bulge. It is slightly more significant on the left. There is some flattening of the thecal sac anteriorly. There is some minimal left neuroforaminal narrowing. L3-L4: There is diffuse disc bulge. There is moderate canal stenosis, exacerbated by hypertrophy of the ligamentum flavum and facet hypertrophy. There is moderate bilateral neuroforaminal narrowing. L4-L5: There is diffuse disc bulge. There is severe canal stenosis. There is severe bilateral neuroforaminal narrowing. L5-S1: There is diffuse disc bulge. There is moderate canal stenosis. There is severe bilateral neuroforaminal narrowing. There is a focus of gas which is seen within the left lateral recess, stable when compared to January 28, 2025. It may be related to a synovial cyst, as it is adjacent to the left facet joint.       1. No acute findings identified within the abdomen or pelvis. 2. No acute fracture or subluxation of the lumbar spine is seen.  Radiation dose reduction techniques were utilized, including automated exposure control and exposure modulation based on body size.   This report was finalized on 6/21/2025 11:27 PM by Dr. Martha Telles M.D on Workstation: [id number]          MEDICATIONS GIVEN IN ER  Medications   sodium chloride 0.9 % flush 10 mL (has no administration in time range)   HYDROcodone-acetaminophen (NORCO) 5-325 MG per tablet 1 tablet (1 tablet Oral Given 6/21/25 1071)         ORDERS PLACED DURING THIS VISIT:  Orders Placed This Encounter   Procedures   • CT  Abdomen Pelvis Without Contrast   • CT Lumbar Spine Without Contrast   • [place] Draw   • Comprehensive Metabolic Panel   • Magnesium   • High Sensitivity Troponin T   • CBC Auto Differential   • High Sensitivity Troponin T 1Hr   • NPO Diet NPO Type: Strict NPO   • Undress & Gown   • Continuous Pulse Oximetry   • Vital Signs   • Orthostatic Blood Pressure   • Oxygen Therapy- Nasal Cannula; Titrate 1-6 LPM Per SpO2; 90 - 95%   • POC Glucose Once   • ECG 12 Lead Other; near syncope   • Insert Peripheral IV   • CBC & Differential   • Green Top (Gel)   • Lavender Top   • Gold Top - SST   • Light Blue Top         OUTPATIENT MEDICATION MANAGEMENT:  Current Facility-Administered Medications Ordered in Epic   Medication Dose Route Frequency Provider Last Rate Last Admin   • sodium chloride 0.9 % flush 10 mL  10 mL Intravenous PRN Alfreda Suarez MD         Current Outpatient Medications Ordered in Epic   Medication Sig Dispense Refill   • acetaminophen (TYLENOL) 325 MG tablet Take 2 tablets by mouth Every 6 (Six) Hours As Needed for Mild Pain.     • dilTIAZem (CARDIZEM) 30 MG tablet Take 1 tablet by mouth Every 12 (Twelve) Hours for 30 days. 60 tablet 0   • famotidine (PEPCID) 20 MG tablet Take 1 tablet by mouth 2 (Two) Times a Day.     • folic acid (FOLVITE) 1 MG tablet Take 1 tablet by mouth Daily.     • haloperidol (HALDOL) 1 MG tablet Take 1 tablet by mouth 3 (Three) Times a Day.     • melatonin 1 MG tablet Take 6 tablets by mouth Every Night.     • melatonin 1 MG tablet Take 3 tablets by mouth Every Night.     • multivitamin tablet tablet Take  by mouth Daily.     • polyethylene glycol (MIRALAX) 17 g packet Take 17 g by mouth Daily. 1-2 packets daily until stools are regular and soft 20 each 0   • thiamine (VITAMIN B-1) 100 MG tablet  tablet Take 1 tablet by mouth Daily.           PROCEDURES  Procedures            PROGRESS, DATA ANALYSIS, CONSULTS, AND MEDICAL DECISION MAKING  All labs have been independently  interpreted by me.  All radiology studies have been reviewed by me. All EKG's have been independently viewed and interpreted by me.  Discussion below represents my analysis of pertinent findings related to patient's condition, differential diagnosis, treatment plan and final disposition.    The differential diagnosis for generalized weakness or even near syncope/lightheadedness is quite broad and includes but is not limited to: hypoglycemia, orthostasis, arrhythmia, ACS, PE, electrolyte disturbances, dka, renal failure, profound anemia, aortic dissection, severe aortic stenosis, gi bleeding, intoxication, myasthenia gravis, sepsis, and medication effects--among other possibilities.  Pt c/o low back pain and rle pain with falls due to difficulty walking with cane.  Pt with h/o chronic right hip pain but not sciatica.  Given concern for frequent falls and possible syncope with fall tonight, plan EKG, CT A/P, CT lumbar spine, labs incl bnp and trop.  Pt amenable to plan.      ED Course as of 06/22/25 0150   Sat Jun 21, 2025 2152 CKD at baseline.  Patient with troponin elevations between the 40s and 70s on previous checks attributed to renal dysfunction. [AR]   2155 EKG ER MD interpretation   Time: 20: 52  Rhythm and rate: Sinus rhythm at a rate of 71  Axis: Normal  P waves: Normal  QRS complexes: Normal  ST segments: Elevation V1 through V3  T waves: no flattening or inversions  Q waves in lead III and aVF  Comparison EKG is from March 24, 2024 and had similar changes including ST elevation in the anterior leads [AR]   Sun Jun 22, 2025   0140 Patient with RICHMOND Lay for LHA including history presentation workup and she agrees to admit on behalf of Dr. Elizondo [KA]      ED Course User Index  [AR] Alfreda Suarez MD  [KA] Cira Vital PA-C             AS OF 00:51 EDT VITALS:    BP - 151/68  HR - 71  TEMP - 98.4 °F (36.9 °C) (Oral)  O2 SATS - 99%          DIAGNOSIS  Final diagnoses:   Acute back  pain with sciatica, right   Frequent falls   Generalized weakness   Elevated troponin   Chronic kidney disease, unspecified CKD stage         DISPOSITION  ED Disposition       None             Please note that portions of this document were completed with a voice recognition program.    Note Disclaimer: At [facility], we believe that sharing information builds trust and better relationships. You are receiving this note because you recently visited [facility]. It is possible you will see health information before a provider has talked with you about it. This kind of information can be easy to misunderstand. To help you fully understand what it means for your health, we urge you to discuss this note with your provider.         Alfreda Suarez MD  06/22/25 0051       Alfreda Suarez MD  06/22/25 0150

## 2025-06-23 ENCOUNTER — APPOINTMENT (OUTPATIENT)
Dept: CARDIOLOGY | Facility: HOSPITAL | Age: 75
End: 2025-06-23
Payer: MEDICARE

## 2025-06-23 ENCOUNTER — APPOINTMENT (OUTPATIENT)
Dept: CT IMAGING | Facility: HOSPITAL | Age: 75
End: 2025-06-23
Payer: MEDICARE

## 2025-06-23 VITALS
DIASTOLIC BLOOD PRESSURE: 70 MMHG | SYSTOLIC BLOOD PRESSURE: 170 MMHG | BODY MASS INDEX: 23.25 KG/M2 | HEIGHT: 69 IN | OXYGEN SATURATION: 98 % | WEIGHT: 157 LBS | HEART RATE: 90 BPM | RESPIRATION RATE: 16 BRPM | TEMPERATURE: 98.3 F

## 2025-06-23 LAB
AORTIC DIMENSIONLESS INDEX: 0.71 (DI)
ASCENDING AORTA: 2.9 CM
AV MEAN PRESS GRAD SYS DOP V1V2: 2.45 MMHG
AV VMAX SYS DOP: 106.7 CM/SEC
BH CV ECHO MEAS - ACS: 1.98 CM
BH CV ECHO MEAS - AI P1/2T: 404 MSEC
BH CV ECHO MEAS - AO MAX PG: 4.6 MMHG
BH CV ECHO MEAS - AO ROOT DIAM: 3 CM
BH CV ECHO MEAS - AO V2 VTI: 25 CM
BH CV ECHO MEAS - AVA(I,D): 2.21 CM2
BH CV ECHO MEAS - EDV(CUBED): 53.5 ML
BH CV ECHO MEAS - EDV(MOD-SP2): 63 ML
BH CV ECHO MEAS - EDV(MOD-SP4): 61 ML
BH CV ECHO MEAS - EF(MOD-SP2): 63.5 %
BH CV ECHO MEAS - EF(MOD-SP4): 54.1 %
BH CV ECHO MEAS - ESV(CUBED): 17.5 ML
BH CV ECHO MEAS - ESV(MOD-SP2): 23 ML
BH CV ECHO MEAS - ESV(MOD-SP4): 28 ML
BH CV ECHO MEAS - FS: 31 %
BH CV ECHO MEAS - IVS/LVPW: 1.04 CM
BH CV ECHO MEAS - IVSD: 1.16 CM
BH CV ECHO MEAS - LAT PEAK E' VEL: 8.7 CM/SEC
BH CV ECHO MEAS - LV DIASTOLIC VOL/BSA (35-75): 32.7 CM2
BH CV ECHO MEAS - LV MASS(C)D: 139 GRAMS
BH CV ECHO MEAS - LV MAX PG: 2.9 MMHG
BH CV ECHO MEAS - LV MEAN PG: 1.47 MMHG
BH CV ECHO MEAS - LV SYSTOLIC VOL/BSA (12-30): 15 CM2
BH CV ECHO MEAS - LV V1 MAX: 84.6 CM/SEC
BH CV ECHO MEAS - LV V1 VTI: 17.8 CM
BH CV ECHO MEAS - LVIDD: 3.8 CM
BH CV ECHO MEAS - LVIDS: 2.6 CM
BH CV ECHO MEAS - LVOT AREA: 3.1 CM2
BH CV ECHO MEAS - LVOT DIAM: 1.99 CM
BH CV ECHO MEAS - LVPWD: 1.11 CM
BH CV ECHO MEAS - MED PEAK E' VEL: 6.4 CM/SEC
BH CV ECHO MEAS - MV A DUR: 0.11 SEC
BH CV ECHO MEAS - MV A MAX VEL: 104.2 CM/SEC
BH CV ECHO MEAS - MV DEC SLOPE: 519.7 CM/SEC2
BH CV ECHO MEAS - MV DEC TIME: 0.17 SEC
BH CV ECHO MEAS - MV E MAX VEL: 100 CM/SEC
BH CV ECHO MEAS - MV E/A: 0.96
BH CV ECHO MEAS - MV MAX PG: 5.5 MMHG
BH CV ECHO MEAS - MV MEAN PG: 3 MMHG
BH CV ECHO MEAS - MV P1/2T: 59.6 MSEC
BH CV ECHO MEAS - MV V2 VTI: 21.5 CM
BH CV ECHO MEAS - MVA(P1/2T): 3.7 CM2
BH CV ECHO MEAS - MVA(VTI): 2.6 CM2
BH CV ECHO MEAS - PA ACC TIME: 0.15 SEC
BH CV ECHO MEAS - PA V2 MAX: 93.4 CM/SEC
BH CV ECHO MEAS - RAP SYSTOLE: 3 MMHG
BH CV ECHO MEAS - RV MAX PG: 2.29 MMHG
BH CV ECHO MEAS - RV V1 MAX: 75.7 CM/SEC
BH CV ECHO MEAS - RV V1 VTI: 19.3 CM
BH CV ECHO MEAS - RVSP: 31 MMHG
BH CV ECHO MEAS - SV(LVOT): 55.3 ML
BH CV ECHO MEAS - SV(MOD-SP2): 40 ML
BH CV ECHO MEAS - SV(MOD-SP4): 33 ML
BH CV ECHO MEAS - SVI(LVOT): 29.7 ML/M2
BH CV ECHO MEAS - SVI(MOD-SP2): 21.5 ML/M2
BH CV ECHO MEAS - SVI(MOD-SP4): 17.7 ML/M2
BH CV ECHO MEAS - TAPSE (>1.6): 2.26 CM
BH CV ECHO MEAS - TR MAX PG: 28.8 MMHG
BH CV ECHO MEAS - TR MAX VEL: 268.5 CM/SEC
BH CV ECHO MEASUREMENTS AVERAGE E/E' RATIO: 13.25
BH CV LOWER ARTERIAL LEFT ABI RATIO: 0.91
BH CV LOWER ARTERIAL LEFT DORSALIS PEDIS SYS MAX: 146
BH CV LOWER ARTERIAL LEFT POST TIBIAL SYS MAX: 132
BH CV LOWER ARTERIAL LEFT TBI RATIO: 0.61
BH CV LOWER ARTERIAL RIGHT ABI RATIO: 0.9
BH CV LOWER ARTERIAL RIGHT DORSALIS PEDIS SYS MAX: 145
BH CV LOWER ARTERIAL RIGHT POST TIBIAL SYS MAX: 130
BH CV LOWER ARTERIAL RIGHT TBI RATIO: 0.6
BH CV LOWER VASCULAR LEFT COMMON FEMORAL AUGMENT: NORMAL
BH CV LOWER VASCULAR LEFT COMMON FEMORAL COMPETENT: NORMAL
BH CV LOWER VASCULAR LEFT COMMON FEMORAL COMPRESS: NORMAL
BH CV LOWER VASCULAR LEFT COMMON FEMORAL PHASIC: NORMAL
BH CV LOWER VASCULAR LEFT COMMON FEMORAL SPONT: NORMAL
BH CV LOWER VASCULAR RIGHT COMMON FEMORAL AUGMENT: NORMAL
BH CV LOWER VASCULAR RIGHT COMMON FEMORAL COMPETENT: NORMAL
BH CV LOWER VASCULAR RIGHT COMMON FEMORAL COMPRESS: NORMAL
BH CV LOWER VASCULAR RIGHT COMMON FEMORAL PHASIC: NORMAL
BH CV LOWER VASCULAR RIGHT COMMON FEMORAL SPONT: NORMAL
BH CV LOWER VASCULAR RIGHT DISTAL FEMORAL COMPRESS: NORMAL
BH CV LOWER VASCULAR RIGHT GASTRONEMIUS COMPRESS: NORMAL
BH CV LOWER VASCULAR RIGHT GREATER SAPH AK COMPRESS: NORMAL
BH CV LOWER VASCULAR RIGHT GREATER SAPH BK COMPRESS: NORMAL
BH CV LOWER VASCULAR RIGHT LESSER SAPH COMPRESS: NORMAL
BH CV LOWER VASCULAR RIGHT MID FEMORAL AUGMENT: NORMAL
BH CV LOWER VASCULAR RIGHT MID FEMORAL COMPETENT: NORMAL
BH CV LOWER VASCULAR RIGHT MID FEMORAL COMPRESS: NORMAL
BH CV LOWER VASCULAR RIGHT MID FEMORAL PHASIC: NORMAL
BH CV LOWER VASCULAR RIGHT MID FEMORAL SPONT: NORMAL
BH CV LOWER VASCULAR RIGHT PERONEAL COMPRESS: NORMAL
BH CV LOWER VASCULAR RIGHT POPLITEAL AUGMENT: NORMAL
BH CV LOWER VASCULAR RIGHT POPLITEAL COMPETENT: NORMAL
BH CV LOWER VASCULAR RIGHT POPLITEAL COMPRESS: NORMAL
BH CV LOWER VASCULAR RIGHT POPLITEAL PHASIC: NORMAL
BH CV LOWER VASCULAR RIGHT POPLITEAL SPONT: NORMAL
BH CV LOWER VASCULAR RIGHT POSTERIOR TIBIAL COMPRESS: NORMAL
BH CV LOWER VASCULAR RIGHT PROFUNDA FEMORAL COMPRESS: NORMAL
BH CV LOWER VASCULAR RIGHT PROXIMAL FEMORAL COMPRESS: NORMAL
BH CV LOWER VASCULAR RIGHT SAPHENOFEMORAL JUNCTION COMPRESS: NORMAL
BH CV XLRA - RV BASE: 3.1 CM
BH CV XLRA - RV LENGTH: 7 CM
BH CV XLRA - RV MID: 3.3 CM
BH CV XLRA - TDI S': 10.6 CM/SEC
C AURIS DNA SPEC QL NAA+NON-PROBE: NOT DETECTED
LEFT ATRIUM VOLUME INDEX: 18.5 ML/M2
LV EF BIPLANE MOD: 57.5 %
RPR SER QL: NORMAL
SINUS: 3.3 CM
STJ: 2.9 CM
UPPER ARTERIAL LEFT ARM BRACHIAL SYS MAX: 161
UPPER ARTERIAL RIGHT ARM BRACHIAL SYS MAX: 160

## 2025-06-23 PROCEDURE — 93922 UPR/L XTREMITY ART 2 LEVELS: CPT

## 2025-06-23 PROCEDURE — 97162 PT EVAL MOD COMPLEX 30 MIN: CPT

## 2025-06-23 PROCEDURE — 93306 TTE W/DOPPLER COMPLETE: CPT

## 2025-06-23 PROCEDURE — 25810000003 SODIUM CHLORIDE 0.9 % SOLUTION: Performed by: NURSE PRACTITIONER

## 2025-06-23 PROCEDURE — 70450 CT HEAD/BRAIN W/O DYE: CPT

## 2025-06-23 PROCEDURE — 93922 UPR/L XTREMITY ART 2 LEVELS: CPT | Performed by: SURGERY

## 2025-06-23 PROCEDURE — G0378 HOSPITAL OBSERVATION PER HR: HCPCS

## 2025-06-23 PROCEDURE — 93971 EXTREMITY STUDY: CPT

## 2025-06-23 PROCEDURE — 99214 OFFICE O/P EST MOD 30 MIN: CPT | Performed by: NURSE PRACTITIONER

## 2025-06-23 PROCEDURE — 96361 HYDRATE IV INFUSION ADD-ON: CPT

## 2025-06-23 PROCEDURE — 93306 TTE W/DOPPLER COMPLETE: CPT | Performed by: INTERNAL MEDICINE

## 2025-06-23 PROCEDURE — 93971 EXTREMITY STUDY: CPT | Performed by: SURGERY

## 2025-06-23 RX ORDER — HALOPERIDOL 1 MG/1
2 TABLET ORAL 3 TIMES DAILY
Qty: 30 TABLET | Refills: 0 | Status: SHIPPED | OUTPATIENT
Start: 2025-06-23

## 2025-06-23 RX ORDER — HALOPERIDOL 5 MG/ML
5 INJECTION INTRAMUSCULAR EVERY 6 HOURS PRN
Status: DISCONTINUED | OUTPATIENT
Start: 2025-06-23 | End: 2025-06-23 | Stop reason: HOSPADM

## 2025-06-23 RX ORDER — HALOPERIDOL 2 MG/1
2 TABLET ORAL 3 TIMES DAILY
Status: DISCONTINUED | OUTPATIENT
Start: 2025-06-23 | End: 2025-06-23 | Stop reason: HOSPADM

## 2025-06-23 RX ORDER — FAMOTIDINE 20 MG/1
20 TABLET, FILM COATED ORAL DAILY
Status: DISCONTINUED | OUTPATIENT
Start: 2025-06-24 | End: 2025-06-23 | Stop reason: HOSPADM

## 2025-06-23 RX ADMIN — Medication 100 MG: at 09:19

## 2025-06-23 RX ADMIN — FOLIC ACID 1 MG: 1 TABLET ORAL at 09:19

## 2025-06-23 RX ADMIN — Medication 10 ML: at 09:20

## 2025-06-23 RX ADMIN — KETOTIFEN FUMARATE 1 DROP: 0.25 SOLUTION OPHTHALMIC at 09:20

## 2025-06-23 RX ADMIN — FAMOTIDINE 20 MG: 20 TABLET, FILM COATED ORAL at 09:19

## 2025-06-23 RX ADMIN — DILTIAZEM HYDROCHLORIDE 30 MG: 60 TABLET, FILM COATED ORAL at 09:20

## 2025-06-23 RX ADMIN — THERA TABS 1 TABLET: TAB at 09:19

## 2025-06-23 RX ADMIN — HALOPERIDOL 1 MG: 1 TABLET ORAL at 09:20

## 2025-06-23 RX ADMIN — SODIUM CHLORIDE 100 ML/HR: 9 INJECTION, SOLUTION INTRAVENOUS at 06:00

## 2025-06-23 NOTE — PLAN OF CARE
Goal Outcome Evaluation:  Plan of Care Reviewed With: patient           Outcome Evaluation: Pt is a 75 yo male admitted to [facility] for near syncope. Pt from memory care, is ambulatory with a cane. hx of copd, cad, alcoholic liver disease, dementia. Upon arrial to room, pt standing with no bed exit alarm activated. He was initially agreeable to ambulate further with PT, however when PT attempted to retrieve his cane for him, pt become agitated and upset. Pt stood back with sba and ambulated 15' to the door to escort PT out of his room and shut the door. Increased agitation noted with attempts to educate on safety policies. PT notified RN immediately. Pt did appear steady on his feet without use of assistive device, no overt balance deficits observed. Would recommend continued mobility with ns staff as tolerated. Anticipate return to facility at d/c.    Anticipated Discharge Disposition (PT): extended care facility

## 2025-06-23 NOTE — THERAPY EVALUATION
Patient Name: Yuval Loja  : 1950    MRN: [id number]                              Today's Date: 2025       Admit Date: 2025    Visit Dx:     ICD-10-CM ICD-9-CM   1. Acute back pain with sciatica, right  M54.41 724.3   2. Frequent falls  R29.6 V15.88   3. Generalized weakness  R53.1 780.79   4. Elevated troponin  R79.89 790.6   5. Chronic kidney disease, unspecified CKD stage  N18.9 585.9   6. Syncope and collapse  R55 780.2     Patient Active Problem List   Diagnosis    Alcoholic ketoacidosis    Alcohol dependence    Methamphetamine abuse    Fatty liver, alcoholic    Nausea vomiting and diarrhea    Alcoholic liver disease    Metabolic acidosis    Tobacco abuse    Coronary artery disease involving native coronary artery of native heart without angina pectoris    Tachycardia    Sinus tachycardia    Chronic kidney disease, stage 3    Medically noncompliant    Visual hallucinations    Psychosis    Hyponatremia    CAD (coronary artery disease)    Leukopenia    Symptomatic anemia    Headache    Substance abuse    Gastrointestinal hemorrhage    CRISTIANO (acute kidney injury)    Hyperkalemia    Right lower quadrant abdominal pain    New onset atrial fibrillation    History of substance abuse    COPD (chronic obstructive pulmonary disease)    Right inguinal hernia    Constipation    Status post right hip replacement    Right hip pain    Sinus bradycardia    Generalized abdominal pain    Altered mental status    Altered mental state    Accidental overdose    Syncope    History of alcohol abuse    Dementia with behavioral disturbance     Past Medical History:   Diagnosis Date    Alcohol abuse     Arthritis     CAD (coronary artery disease)     Chronic kidney disease, stage 3     COPD (chronic obstructive pulmonary disease)     Dementia     Disease of thyroid gland     Elevated cholesterol     Fatty liver, alcoholic     GERD (gastroesophageal reflux disease)     History of transfusion     Hypertensive urgency      Metabolic acidosis     Myocardial infarction     Nausea vomiting and diarrhea 05/23/2017    Sinus tachycardia     Sleep apnea     Stroke      Past Surgical History:   Procedure Laterality Date    BACK SURGERY      CARDIAC CATHETERIZATION      CARDIAC ELECTROPHYSIOLOGY PROCEDURE N/A 4/10/2023    Procedure: Temporary Pacemaker;  Surgeon: Vaibhav Bonilla MD;  Location: [facility] INVASIVE LOCATION;  Service: Cardiovascular;  Laterality: N/A;    COLONOSCOPY      ENDOSCOPY      FRACTURE SURGERY      JOINT REPLACEMENT      SKIN BIOPSY      TOTAL HIP ARTHROPLASTY Right 06/27/2022    Procedure: TOTAL HIP ARTHROPLASTY ANTERIOR WITH HANA TABLE;  Surgeon: Willian Quiles MD;  Location: [facility] OR;  Service: Orthopedics;  Laterality: Right;  carli Bhatt. everett      General Information       Row Name 06/23/25 1510          Physical Therapy Time and Intention    Document Type evaluation  -CW     Mode of Treatment individual therapy;physical therapy  -CW       Row Name 06/23/25 1510          General Information    Patient Profile Reviewed yes  -CW     Prior Level of Function --  ambulatory with cane per chart, pt did not provide reliable PLOF  -CW     Existing Precautions/Restrictions fall  multiple falls recently  -CW     Barriers to Rehab cognitive status;uncooperative  -CW       Row Name 06/23/25 1510          Living Environment    Current Living Arrangements --  memory care  -CW     People in Home facility resident  -CW       Row Name 06/23/25 1510          Home Main Entrance    Number of Stairs, Main Entrance none  -CW       Row Name 06/23/25 1510          Stairs Within Home, Primary    Number of Stairs, Within Home, Primary none  -CW       Row Name 06/23/25 1510          Cognition    Orientation Status (Cognition) oriented to;person  -CW       Row Name 06/23/25 1510          Safety Issues/Impairments Affecting Functional Mobility    Safety Issues Affecting Function (Mobility) safety precautions  follow-through/compliance;safety precaution awareness  -CW     Impairments Affecting Function (Mobility) endurance/activity tolerance;cognition;strength  -CW               User Key  (r) = Recorded By, (t) = Taken By, (c) = Cosigned By      Initials Name Provider Type    Katie Whitaker PT Physical Therapist                   Mobility       Row Name 06/23/25 1512          Bed Mobility    Comment, (Bed Mobility) standing in room upon arrival  -CW       Row Name 06/23/25 1512          Sit-Stand Transfer    Sit-Stand [place] (Transfers) standby assist  -CW       Row Name 06/23/25 1512          Gait/Stairs (Locomotion)    [place] Level (Gait) standby assist  -CW     Assistive Device (Gait) --  none  -CW     Distance in Feet (Gait) 15  -CW     Deviations/Abnormal Patterns (Gait) gait speed decreased;alicia decreased  -CW     Comment, (Gait/Stairs) no overt loss of balance, initally agreeable to go for a walk with PT but when PT attempted to bring him his cane he become agitated and escorted PT out of his room and shut the door. RN notified  -CW               User Key  (r) = Recorded By, (t) = Taken By, (c) = Cosigned By      Initials Name Provider Type    Katie Whitaker PT Physical Therapist                   Obj/Interventions       Row Name 06/23/25 1513          Range of Motion Comprehensive    General Range of Motion bilateral lower extremity ROM WFL  -CW       Row Name 06/23/25 1513          Strength Comprehensive (MMT)    Comment, General Manual Muscle Testing (MMT) Assessment generalized weakness, no focal deficits  -CW       Row Name 06/23/25 1513          Balance    Balance Assessment standing static balance;standing dynamic balance;sitting static balance  -CW     Static Sitting Balance independent  -CW     Position, Sitting Balance sitting edge of bed  -CW     Static Standing Balance standby assist  -CW     Dynamic Standing Balance standby assist  -CW     Position/Device Used, Standing  Balance unsupported  -CW               User Key  (r) = Recorded By, (t) = Taken By, (c) = Cosigned By      Initials Name Provider Type    Katie Whitaker PT Physical Therapist                   Goals/Plan       Row Name 06/23/25 1513          Bed Mobility Goal 1 (PT)    Activity/Assistive Device (Bed Mobility Goal 1, PT) bed mobility activities, all  -CW     [place] Level/Cues Needed (Bed Mobility Goal 1, PT) independent  -CW     Time Frame (Bed Mobility Goal 1, PT) 2 weeks  -CW       Row Name 06/23/25 1513          Transfer Goal 1 (PT)    Activity/Assistive Device (Transfer Goal 1, PT) sit-to-stand/stand-to-sit;bed-to-chair/chair-to-bed  -CW     [place] Level/Cues Needed (Transfer Goal 1, PT) modified independence  -CW     Time Frame (Transfer Goal 1, PT) 2 weeks  -CW       Row Name 06/23/25 1513          Gait Training Goal 1 (PT)    Activity/Assistive Device (Gait Training Goal 1, PT) gait (walking locomotion)  -CW     [place] Level (Gait Training Goal 1, PT) modified independence  -CW     Distance (Gait Training Goal 1, PT) 200  -CW     Time Frame (Gait Training Goal 1, PT) 2 weeks  -CW       Row Name 06/23/25 1513          Therapy Assessment/Plan (PT)    Planned Therapy Interventions (PT) balance training;bed mobility training;gait training;strengthening;patient/family education;transfer training  -CW               User Key  (r) = Recorded By, (t) = Taken By, (c) = Cosigned By      Initials Name Provider Type    Katie Whitaker PT Physical Therapist                   Clinical Impression       Row Name 06/23/25 1519          Pain    Pretreatment Pain Rating 0/10 - no pain  -CW     Posttreatment Pain Rating 0/10 - no pain  -CW       Row Name 06/23/25 1519          Plan of Care Review    Plan of Care Reviewed With patient  -CW     Outcome Evaluation Pt is a 73 yo male admitted to [facility] for near syncope. Pt from memory care, is ambulatory with a cane. hx of copd, cad, alcoholic liver  disease, dementia. Upon arrial to room, pt standing with no bed exit alarm activated. He was initially agreeable to ambulate further with PT, however when PT attempted to retrieve his cane for him, pt become agitated and upset. Pt stood back with sba and ambulated 15' to the door to escort PT out of his room and shut the door. Increased agitation noted with attempts to educate on safety policies. PT notified RN immediately. Pt did appear steady on his feet without use of assistive device, no overt balance deficits observed. Would recommend continued mobility with nsg staff as tolerated. Anticipate return to facility at d/c.  -CW       Row Name 06/23/25 1519          Therapy Assessment/Plan (PT)    Rehab Potential (PT) fair  -CW     Criteria for Skilled Interventions Met (PT) yes  -CW     Therapy Frequency (PT) 3 times/wk  -CW       Row Name 06/23/25 1519          Vital Signs    O2 Delivery Pre Treatment room air  -CW       Row Name 06/23/25 1519          Positioning and Restraints    Pre-Treatment Position standing in room  -CW     Post Treatment Position other  -CW     Other Position --  standing in room, RN notified. pt agitated with PT staff  -CW               User Key  (r) = Recorded By, (t) = Taken By, (c) = Cosigned By      Initials Name Provider Type    Katie Whitaker, PT Physical Therapist                   Outcome Measures       Row Name 06/23/25 1514          How much help from another person do you currently need...    Turning from your back to your side while in flat bed without using bedrails? 4  -CW     Moving from lying on back to sitting on the side of a flat bed without bedrails? 4  -CW     Moving to and from a bed to a chair (including a wheelchair)? 3  -CW     Standing up from a chair using your arms (e.g., wheelchair, bedside chair)? 4  -CW     Climbing 3-5 steps with a railing? 3  -CW     To walk in hospital room? 3  -CW     AM-PAC 6 Clicks Score (PT) 21  -CW     Highest Level of Mobility  Goal Walk 10 Steps or More-6  -CW       Row Name 06/23/25 1514          Functional Assessment    Outcome Measure Options AM-PAC 6 Clicks Basic Mobility (PT)  -CW               User Key  (r) = Recorded By, (t) = Taken By, (c) = Cosigned By      Initials Name Provider Type    CW Katie Álvarez PT Physical Therapist                                 Physical Therapy Education       Title: PT OT SLP Therapies (In Progress)       Topic: Physical Therapy (In Progress)       Point: Mobility training (In Progress)       Learning Progress Summary            Patient Nonacceptance, E, NR by CW at 6/23/2025 1514                      Point: Home exercise program (Not Started)       Learner Progress:  Not documented in this visit.              Point: Body mechanics (Not Started)       Learner Progress:  Not documented in this visit.              Point: Precautions (Not Started)       Learner Progress:  Not documented in this visit.                              User Key       Initials Effective Dates Name Provider Type Discipline     12/13/22 -  Katie Álvarez PT Physical Therapist PT                  PT Recommendation and Plan  Planned Therapy Interventions (PT): balance training, bed mobility training, gait training, strengthening, patient/family education, transfer training  Outcome Evaluation: Pt is a 73 yo male admitted to [facility] for near syncope. Pt from memory care, is ambulatory with a cane. hx of copd, cad, alcoholic liver disease, dementia. Upon arrial to room, pt standing with no bed exit alarm activated. He was initially agreeable to ambulate further with PT, however when PT attempted to retrieve his cane for him, pt become agitated and upset. Pt stood back with sba and ambulated 15' to the door to escort PT out of his room and shut the door. Increased agitation noted with attempts to educate on safety policies. PT notified RN immediately. Pt did appear steady on his feet without use of assistive device, no overt  balance deficits observed. Would recommend continued mobility with [facility] [place] [place] staff as tolerated. Anticipate return to facility at d/c.     Time Calculation:         PT Charges       Row Name 06/23/25 1509             Time Calculation    Start Time 1427  -CW      Stop Time 1441  -CW      Time Calculation (min) 14 min  -CW      PT Received On 06/23/25  -CW      PT - Next Appointment 06/25/25  -CW      PT Goal Re-Cert Due Date 07/07/25  -CW                User Key  (r) = Recorded By, (t) = Taken By, (c) = Cosigned By      Initials Name Provider Type    CW Katie Álvarez, PT Physical Therapist                  Therapy Charges for Today       Code Description Service Date Service Provider Modifiers Qty    08389880701 HC PT EVAL MOD COMPLEXITY 2 6/23/2025 Katie Álvarez, PT GP 1            PT G-Codes  Outcome Measure Options: AM-PAC 6 Clicks Basic Mobility (PT)  AM-PAC 6 Clicks Score (PT): 21  PT Discharge Summary  Anticipated Discharge Disposition (PT): extended care facility    Katie Álvarez PT  6/23/2025

## 2025-06-23 NOTE — PLAN OF CARE
Problem: Adult Inpatient Plan of Care  Goal: Plan of Care Review  Outcome: Progressing  Goal: Patient-Specific Goal (Individualized)  Outcome: Progressing  Goal: Absence of Hospital-Acquired Illness or Injury  Outcome: Progressing  Intervention: Identify and Manage Fall Risk  Recent Flowsheet Documentation  Taken 6/23/2025 0400 by Andrzej Cervantes RN  Safety Promotion/Fall Prevention:   activity supervised   assistive device/personal items within reach   clutter free environment maintained   fall prevention program maintained   nonskid shoes/slippers when out of bed   room organization consistent   safety round/check completed  Taken 6/23/2025 0209 by Andrzej Cervantes RN  Safety Promotion/Fall Prevention:   activity supervised   assistive device/personal items within reach   clutter free environment maintained   fall prevention program maintained   nonskid shoes/slippers when out of bed   room organization consistent   safety round/check completed  Taken 6/23/2025 0000 by Andrzej Cervantes RN  Safety Promotion/Fall Prevention:   activity supervised   assistive device/personal items within reach   clutter free environment maintained   fall prevention program maintained   nonskid shoes/slippers when out of bed   safety round/check completed   room organization consistent  Taken 6/22/2025 2200 by Andrzej Cervantes RN  Safety Promotion/Fall Prevention:   activity supervised   assistive device/personal items within reach   clutter free environment maintained   fall prevention program maintained   nonskid shoes/slippers when out of bed   safety round/check completed   room organization consistent  Taken 6/22/2025 2020 by Andrzej Cervantes, RN  Safety Promotion/Fall Prevention:   activity supervised   assistive device/personal items within reach   clutter free environment maintained   fall prevention program maintained   nonskid shoes/slippers when out of bed   room organization consistent   safety round/check  completed  Intervention: Prevent Skin Injury  Recent Flowsheet Documentation  Taken 6/23/2025 0400 by Andrzej Cervantes RN  Body Position: position changed independently  Taken 6/23/2025 0209 by Andrzej Cervantes RN  Body Position: position changed independently  Taken 6/23/2025 0000 by Andrzej Cervantes RN  Body Position: position changed independently  Taken 6/22/2025 2200 by Andrzej Cervantes RN  Body Position: position changed independently  Taken 6/22/2025 2020 by Andrzej Cervantes RN  Body Position:   dangle, side of bed   position changed independently  Intervention: Prevent Infection  Recent Flowsheet Documentation  Taken 6/23/2025 0400 by Andrzej Cervantes RN  Infection Prevention:   cohorting utilized   environmental surveillance performed   hand hygiene promoted   personal protective equipment utilized   rest/sleep promoted   single patient room provided  Taken 6/23/2025 0209 by Andrzej Cervantes RN  Infection Prevention:   cohorting utilized   environmental surveillance performed   hand hygiene promoted   personal protective equipment utilized   rest/sleep promoted   single patient room provided  Taken 6/23/2025 0000 by Andrzej Cervantes RN  Infection Prevention:   cohorting utilized   environmental surveillance performed   hand hygiene promoted   rest/sleep promoted   single patient room provided  Taken 6/22/2025 2200 by Andrzej Cervantes RN  Infection Prevention:   cohorting utilized   environmental surveillance performed   hand hygiene promoted   rest/sleep promoted   single patient room provided  Taken 6/22/2025 2020 by Andrzej Cervantes RN  Infection Prevention:   cohorting utilized   environmental surveillance performed   hand hygiene promoted   single patient room provided   rest/sleep promoted  Goal: Optimal Comfort and Wellbeing  Outcome: Progressing  Intervention: Provide Person-Centered Care  Recent Flowsheet Documentation  Taken 6/23/2025 0209 by Andrzej Cervantes RN  Trust Relationship/Rapport:   care  explained   choices provided  Taken 6/22/2025 2020 by Andrzej Cervantes RN  Trust Relationship/Rapport:   care explained   choices provided  Goal: Readiness for Transition of Care  Outcome: Progressing     Problem: Skin Injury Risk Increased  Goal: Skin Health and Integrity  Outcome: Progressing  Intervention: Optimize Skin Protection  Recent Flowsheet Documentation  Taken 6/23/2025 0400 by Andrzej Cervantes RN  Activity Management: activity minimized  Head of Bed (HOB) Positioning: HOB elevated  Taken 6/23/2025 0209 by Andrzej Cervantes RN  Activity Management: activity minimized  Head of Bed (HOB) Positioning: HOB at 30 degrees  Taken 6/23/2025 0000 by Andrzej Cervantes RN  Activity Management: activity encouraged  Head of Bed (HOB) Positioning: HOB at 30 degrees  Taken 6/22/2025 2200 by Andrzej Cervantes RN  Activity Management: activity encouraged  Head of Bed (HOB) Positioning: HOB at 30-45 degrees  Taken 6/22/2025 2020 by Andrzej Cervantes RN  Activity Management: sitting, edge of bed     Problem: Fall Injury Risk  Goal: Absence of Fall and Fall-Related Injury  Outcome: Progressing  Intervention: Identify and Manage Contributors  Recent Flowsheet Documentation  Taken 6/23/2025 0400 by Andrzej Cervantes RN  Medication Review/Management: medications reviewed  Taken 6/23/2025 0209 by Andrzej Cervantes RN  Medication Review/Management: medications reviewed  Taken 6/23/2025 0000 by Andrzej Cervantes RN  Medication Review/Management: medications reviewed  Taken 6/22/2025 2200 by Andrzej Cervantes RN  Medication Review/Management: medications reviewed  Taken 6/22/2025 2020 by Andrzej Cervantes RN  Medication Review/Management: medications reviewed  Intervention: Promote Injury-Free Environment  Recent Flowsheet Documentation  Taken 6/23/2025 0400 by Andrzej Cervantes RN  Safety Promotion/Fall Prevention:   activity supervised   assistive device/personal items within reach   clutter free environment maintained   fall prevention  program maintained   nonskid shoes/slippers when out of bed   room organization consistent   safety round/check completed  Taken 6/23/2025 0209 by Andrzej Cervantes, RN  Safety Promotion/Fall Prevention:   activity supervised   assistive device/personal items within reach   clutter free environment maintained   fall prevention program maintained   nonskid shoes/slippers when out of bed   room organization consistent   safety round/check completed  Taken 6/23/2025 0000 by Andrzej Cervantes, RN  Safety Promotion/Fall Prevention:   activity supervised   assistive device/personal items within reach   clutter free environment maintained   fall prevention program maintained   nonskid shoes/slippers when out of bed   safety round/check completed   room organization consistent  Taken 6/22/2025 2200 by Andrzej Cervantes, RN  Safety Promotion/Fall Prevention:   activity supervised   assistive device/personal items within reach   clutter free environment maintained   fall prevention program maintained   nonskid shoes/slippers when out of bed   safety round/check completed   room organization consistent  Taken 6/22/2025 2020 by Andrzej Cervantes, RN  Safety Promotion/Fall Prevention:   activity supervised   assistive device/personal items within reach   clutter free environment maintained   fall prevention program maintained   nonskid shoes/slippers when out of bed   room organization consistent   safety round/check completed   Goal Outcome Evaluation:

## 2025-06-23 NOTE — CONSULTS
IDENTIFYING INFORMATION: The patient is a 74-year-old -American male with a history of alcohol dependence and primary dementia Alzheimer's type admitted with complaints of hip and leg pain after suffering a fall.    CHIEF COMPLAINT: None given    INFORMANT: Patient and chart    RELIABILITY: Poor    HISTORY OF PRESENT ILLNESS: The patient is a 74-year-old -American male with a history of alcohol dependence and primary dementia Alzheimer's type.  He was seen by this physician in consultation in February and March 2024, and at that time was noted to be pleasantly confused.  When seen today, the patient is anything but.  He is angry and refuses to participate in interview profanely demanding that this physician leave the room.  He has apparently been similarly uncooperative with care from other.  He currently resides in a memory care unit at [facility].  His only prescribed psychotropic medication is Haldol 1 mg 3 times daily.  For more complete history present illness please refer to previous dictated notes    PAST PSYCHIATRIC HISTORY: Reviewed no changes    PAST MEDICAL HISTORY: Reviewed no changes    MEDICATIONS:   Current Facility-Administered Medications   Medication Dose Route Frequency Provider Last Rate Last Admin    acetaminophen (TYLENOL) tablet 650 mg  650 mg Oral Q4H PRN Celeste Suero APRN        Or    acetaminophen (TYLENOL) 160 MG/5ML oral solution 650 mg  650 mg Oral Q4H PRN Celeste Suero APRN        Or    acetaminophen (TYLENOL) suppository 650 mg  650 mg Rectal Q4H PRN Celeste Suero APRN        sennosides-docusate (PERICOLACE) 8.6-50 MG per tablet 2 tablet  2 tablet Oral BID PRN Celeste Suero APRN        And    polyethylene glycol (MIRALAX) packet 17 g  17 g Oral Daily PRN Celeste Suero APRN        And    bisacodyl (DULCOLAX) EC tablet 5 mg  5 mg Oral Daily PRN Celeste Suero APRN        And    bisacodyl (DULCOLAX) suppository 10 mg  10 mg  Rectal Daily PRN Celeste Suero APRN        budesonide-formoterol (SYMBICORT) 160-4.5 MCG/ACT inhaler 2 puff  2 puff Inhalation BID - RT Mc Vines MD        calcium carbonate (TUMS) chewable tablet 500 mg (200 mg elemental)  2 tablet Oral BID PRN Celeste Suero APRN        dilTIAZem (CARDIZEM) tablet 30 mg  30 mg Oral Q12H Lisha Do APRN   30 mg at 06/23/25 0920    famotidine (PEPCID) tablet 20 mg  20 mg Oral BID Lisha Do APRN   20 mg at 06/23/25 0919    folic acid (FOLVITE) tablet 1 mg  1 mg Oral Daily Lisha Do APRN   1 mg at 06/23/25 0919    haloperidol (HALDOL) tablet 2 mg  2 mg Oral TID Hipolito Ramon III, MD        haloperidol lactate (HALDOL) injection 5 mg  5 mg Intramuscular Q6H PRN Hipolito Ramon III, MD        ipratropium-albuterol (DUO-NEB) nebulizer solution 3 mL  3 mL Nebulization Q6H PRN Mc Vines MD        Ketotifen Fumarate (ZADITOR) 0.035 % ophthalmic solution 1 drop  1 drop Both Eyes BID Lisha Do APRN   1 drop at 06/23/25 0920    melatonin tablet 2.5 mg  2.5 mg Oral Nightly Lisha Do APRN   2.5 mg at 06/22/25 2026    mirtazapine (REMERON) tablet 7.5 mg  7.5 mg Oral Nightly Lisha Do APRN   7.5 mg at 06/22/25 2027    multivitamin (THERAGRAN) tablet 1 tablet  1 tablet Oral Daily Lisha Do APRN   1 tablet at 06/23/25 0919    nitroglycerin (NITROSTAT) SL tablet 0.4 mg  0.4 mg Sublingual Q5 Min PRN Celeste Suero APRN        sodium chloride 0.9 % flush 10 mL  10 mL Intravenous PRN Alfreda Suarez MD        sodium chloride 0.9 % flush 10 mL  10 mL Intravenous Q12H Celeste Suero APRN   10 mL at 06/23/25 0920    sodium chloride 0.9 % flush 10 mL  10 mL Intravenous PRN Celeste Suero APRN        sodium chloride 0.9 % infusion 40 mL  40 mL Intravenous PRN Celeste Suero APRN        sodium chloride 0.9 %  infusion  100 mL/hr Intravenous Continuous Celeste Suero APRN 100 mL/hr at 06/23/25 0600 100 mL/hr at 06/23/25 0600    thiamine (VITAMIN B-1) tablet 100 mg  100 mg Oral Daily Hi Lishaemely Payan APRN   100 mg at 06/23/25 0919    traZODone (DESYREL) tablet 100 mg  100 mg Oral Nightly Lisha Do APRN   100 mg at 06/22/25 2027         ALLERGIES: Mirtazapine and sertraline    FAMILY HISTORY: Not obtained    SOCIAL HISTORY: Reviewed no changes    MENTAL STATUS EXAM: The patient is a well-developed nourished -American male appearing his stated age.  He is in no apparent physical distress at the time of examination.  He refuses to answer questions regarding orientation or presents patient interview in any meaningful sense and concludes interviewed by angrily and profanely ordering this physician to leave the room.    ASSETS/LIABILITIES: To be assessed    DIAGNOSTIC IMPRESSION: Primary dementia Alzheimer's type with behavioral disturbance, medical problems as previously documented    PLAN: I suspect that a large degree of the patient's current presentation is based on a difficult premorbid personality style, though review of previous dictated notes from prior consultations indicated that the patient was pleasant and cooperative though exhibiting signs of advanced dementia.  I have increased his Haldol to 2 mg 3 times daily and have added as needed haloperidol for any aggressive or other problematic behaviors and will continue to follow with you.

## 2025-06-23 NOTE — DISCHARGE PLACEMENT REQUEST
"Hannah Altman (74 y.o. Male)       Date of Birth   1950    Social Security Number       Address   [address] [place] [address] [address] [place] [id number]    Home Phone       MRN   [id number]       [facility]    Marital Status   Single                            Admission Date   2025    Admission Type   Emergency    Admitting Provider   Mc Vines MD    Attending Provider   Phill Vergara MD    Department, Room/Bed   [address], E560/1       Discharge Date       Discharge Disposition   Long Term Care (DC - External)    Discharge Destination                                 Attending Provider: Phill Vergara MD    Allergies: Mirtazapine, Sertraline    Isolation: Contact   Infection: Candida Auris (rule out) (25)   Code Status: CPR    Ht: 175.3 cm (69\")   Wt: 71.2 kg (157 lb)    Admission Cmt: None   Principal Problem: Syncope [R55]                   Active Insurance as of 2025       Primary Coverage       Payor Plan Insurance Group Employer/Plan Group    HUMANA MEDICARE REPLACEMENT HUMANA MEDICARE ADVANTAGE [facility] HMO - NON PAR [id number]       Payor Plan Address Payor Plan Phone Number Payor Plan Fax Number Effective Dates       2025 - None Entered      Subscriber Name Subscriber Birth Date Member ID       HANNAH ALTMAN 1950 [id number]                     Emergency Contacts        (Rel.) Home Phone Work Phone Mobile Phone    Torri Altman (Daughter) [phone]                 Discharge Summary        Phill Vergara MD at 25 1515              Patient Name: Hannah Altman  : 1950  MRN: [id number]    Date of Admission: 2025  Date of Discharge:  2025  Primary Care Physician: Alessia Prescott APRN      Chief Complaint:   Foot Pain (Bilateral foot pain ) and Syncope      Discharge Diagnoses     Active Hospital Problems    Diagnosis  POA    **Syncope [R55]  Yes    " History of alcohol abuse [F10.11]  Yes    Dementia with behavioral disturbance [F03.918]  Yes    COPD (chronic obstructive pulmonary disease) [J44.9]  Yes    History of substance abuse [F19.11]  Yes    CAD (coronary artery disease) [I25.10]  Yes    Chronic kidney disease, stage 3 [N18.30]  Yes    Alcoholic liver disease [K70.9]  Yes      Resolved Hospital Problems   No resolved problems to display.        Hospital Course     This is a 74-year-old male with a past medical history of atrial fibrillation, Alzheimer's dementia with behavioral disturbances and psychosis, methamphetamine abuse, alcohol dependence, previous GI bleed  who was sent over to the hospital by staff at his memory care facility due to concerns of syncopal episode/near syncope.  The patient had to denied any syncopal episodes.  Cardiology was consulted and he underwent an echocardiogram that showed a preserved left ventricular ejection fraction.  No regional wall motion abnormalities or ischemic changes were noted on echocardiogram or EKG.  No further cardiac testing was required.  The patient was uncooperative and hostile this admission. He also underwent neuroimaging with a CT scan of the head that showed previous lacunar infarcts and volume loss Psychiatry was consulted patient's dose of Haldol was increased.  He had been hemodynamically stable and was subsequently discharged back to his memory care facility.    Physical Exam:  Temp:  [97 °F (36.1 °C)-98.3 °F (36.8 °C)] 98 °F (36.7 °C)  Heart Rate:  [] 85  Resp:  [16-18] 18  BP: (129-151)/(60-65) 132/60  Body mass index is 23.18 kg/m².  Physical Exam  Constitutional:       General: He is not in acute distress.  HENT:      Head: Normocephalic and atraumatic.   Cardiovascular:      Rate and Rhythm: Normal rate and regular rhythm.   Pulmonary:      Effort: Pulmonary effort is normal. No respiratory distress.   Musculoskeletal:      Right lower leg: No edema.      Left lower leg: No edema.    Neurological:      Mental Status: He is alert.   Psychiatric:      Comments: Uncooperative         Consultants     Consult Orders (all) (From admission, onward)       Start     Ordered    06/22/25 1721  Inpatient Psychiatrist Consult  Once        Specialty:  Psychiatry  Provider:  Hipolito Ramon III, MD    06/22/25 1720    06/22/25 0700  Inpatient Nutrition Consult  Once        Provider:  (Not yet assigned)    06/22/25 0639    06/22/25 0143  Inpatient Cardiology Consult  Once        Specialty:  Cardiology  Provider:  Comfort Borrego MD    06/22/25 0144    06/22/25 0109  [facility] (on-call MD unless specified) Details  Once        Specialty:  Hospitalist  Provider:  (Not yet assigned)    06/22/25 0109                  Procedures     Imaging Results (All)       Procedure Component Value Units Date/Time    CT Head Without Contrast - Preliminary [[id number]] Collected: 06/23/25 1040     Updated: 06/23/25 1040    This result has not been signed. Information might be incomplete.      Narrative:      CT SCAN OF THE HEAD WITHOUT CONTRAST 06/23/2025     CLINICAL HISTORY: This is a 74-year-old male patient who has possible  syncope.     TECHNIQUE: Spiral CT images were obtained from the base of the skull to  the vertex without intravenous contrast and images were reformatted and  submitted in 1 mm thick axial, sagittal and coronal CT sections with  brain algorithm using stereotactic protocol. Additional 1 mm thick axial  CT reconstructions were performed and submitted in high resolution bone  algorithm.     This is correlated to a prior head CT from [facility] on  02/04/2024 and 07/19/2022, and a head CT dating back to 04/17/2019.     FINDINGS: There are patchy and confluent areas in the periventricular  extending into the subcortical white matter of the cerebral hemispheres  consistent with moderate small vessel disease. There is a 5 mm old  lacunar infarct in the body of the left caudate nucleus, a tiny,  4 mm,  old lacunar infarct in the inferior medial left thalamus, and a 4 mm old  lacunar infarct in the posterior inferior right thalamus. The remainder  of the brain parenchyma is normal in attenuation. There is mild diffuse  cerebral atrophy. The lateral and third ventricles are mildly prominent  in size and this is most probably on the basis central volume loss or  atrophy. I see no mass effect and no midline shift and no extra-axial  fluid collections are identified and there is no evidence of acute  intracranial hemorrhage. The calvarium and skull base are normal in  appearance. There is minimal inferior left maxillary sinus mucosal  thickening, and a very tiny amount of fluid and mucosal thickening in  the posterior ethmoid sinuses and 5 mm retention cyst in the anterior  left sphenoid sinus.  The mastoid and middle ear cavities are clear. The  orbits are normal in appearance.       Impression:      No acute intracranial abnormality is identified. Since prior  head CT on 02/04/2024 the patient has developed a 4-5 mm old lacunar  infarct in the body of the left caudate nucleus, otherwise there is no  significant interval change. There is moderate small vessel disease in  the cerebral white matter and small old lacunar infarcts in the inferior  medial left thalamus and posterior inferior right thalamus. There is  diffuse cerebral atrophy and the lateral and third ventricles are  prominent in size, which I believe is most probably on the basis of  central volume loss or atrophy, and correlate clinically. There is a  tiny amount of fluid and mucosal thickening in the posterior ethmoid  sinuses bilaterally and minimal mucosal thickening in the inferior left  maxillary sinus, and a 5 mm retention cyst in the anterior left sphenoid  sinus. The remainder of the head CT is normal with no acute skull  fracture or intracranial hemorrhage identified.     Radiation dose reduction techniques were utilized, including  automated  exposure control and exposure modulation based on body size.          XR Hip With or Without Pelvis 2 - 3 View Right [[id number]] Collected: 06/22/25 1853     Updated: 06/22/25 1858    Narrative:      PROCEDURE:  XR SHOULDER 2+ VW RIGHT-, XR HIP W OR WO PELVIS 2-3 VIEW  RIGHT-     HISTORY: Fall, pain.     COMPARISON: Chest radiograph 6/22/2025 at 5:17 p.m. CT abdomen and  pelvis 6/21/2025.     FINDINGS:       2 views of the right shoulder were obtained.     No acute fracture or malalignment is identified. There is  acromioclavicular greater than glenohumeral osteoarthritis. There is a  subacromial spur, which can contribute to rotator cuff impingement. The  humeral head is slightly high riding, which could indicate underlying  rotator cuff pathology. This would be better evaluated with MRI, if  clinically indicated. No acute fracture is identified. No dislocation.  There is calcific atherosclerosis.        3 views of the pelvis and right hip were obtained.     There is degenerative disc disease in the visualized lower lumbar spine.  There is a right hip total arthroplasty. There is appropriate alignment.  There is no dislocation. No definite acute fracture identified. If there  is persistent clinical concern for acute fracture or inability to bear  weight, further evaluation with dedicated CT or MRI would be  recommended.  There is calcific atherosclerosis.           This report was finalized on 6/22/2025 6:55 PM by Dr. Marnie Ramirez M.D  on Workstation: [id number]       XR Shoulder 2+ View Right [[id number]] Collected: 06/22/25 1853     Updated: 06/22/25 1858    Narrative:      PROCEDURE:  XR SHOULDER 2+ VW RIGHT-, XR HIP W OR WO PELVIS 2-3 VIEW  RIGHT-     HISTORY: Fall, pain.     COMPARISON: Chest radiograph 6/22/2025 at 5:17 p.m. CT abdomen and  pelvis 6/21/2025.     FINDINGS:       2 views of the right shoulder were obtained.     No acute fracture or malalignment is identified. There  is  acromioclavicular greater than glenohumeral osteoarthritis. There is a  subacromial spur, which can contribute to rotator cuff impingement. The  humeral head is slightly high riding, which could indicate underlying  rotator cuff pathology. This would be better evaluated with MRI, if  clinically indicated. No acute fracture is identified. No dislocation.  There is calcific atherosclerosis.        3 views of the pelvis and right hip were obtained.     There is degenerative disc disease in the visualized lower lumbar spine.  There is a right hip total arthroplasty. There is appropriate alignment.  There is no dislocation. No definite acute fracture identified. If there  is persistent clinical concern for acute fracture or inability to bear  weight, further evaluation with dedicated CT or MRI would be  recommended.  There is calcific atherosclerosis.           This report was finalized on 6/22/2025 6:55 PM by Dr. Marnie Ramirez M.D  on Workstation: [id number]       XR Chest 1 View [[id number]] Collected: 06/22/25 1727     Updated: 06/22/25 1733    Narrative:      Stat portable view of the chest on 6/22/2025     CLINICAL HISTORY: COPD     This is correlated to a prior chest x-ray on 3/3/2024.     FINDINGS: The cardiomediastinal silhouette and pulmonary vasculature are  within normal limits. There is coronary stent projecting over the  superior left heart border. There is some minimal horizontal linear  stranding lower lung zones likely linear areas of atelectasis or  scarring. The remainder the lungs are clear. The costophrenic angles are  sharp.       Impression:      1. No definite active disease is seen in the chest. There is a coronary  stent projecting over the superior left heart border. There are  horizontal linear areas of stranding both lower lung zones likely linear  areas of atelectasis or scarring. The remainder of the chest x-ray is  unremarkable.     This report was finalized on 6/22/2025 5:29 PM by  Dr. Washington Jenkins M.D  on Workstation: [id number]       CT Abdomen Pelvis Without Contrast [[id number]] Collected: 06/21/25 2317     Updated: 06/21/25 2330    Narrative:      CT OF THE ABDOMEN PELVIS WITHOUT CONTRAST; CT OF THE LUMBAR SPINE     HISTORY: Fall. Back and flank pain.     COMPARISON: January 28, 2025     TECHNIQUE: Axial CT imaging was obtained through the abdomen and pelvis.  No IV contrast was administered. Axial CT imaging was obtained through  the lumbar spine. Coronal and sagittal reformatted images were obtained.     FINDINGS:  ABDOMEN AND PELVIS: Images through the lung bases demonstrate some mild  scarring. No suspicious hepatic lesions are seen. The stomach, duodenum,  adrenal glands, spleen, and pancreas are normal. There is  cholelithiasis. There are aortoiliac calcifications. Prostate gland is  within normal limits. The patient has a bowel containing right inguinal  hernia, without evidence of obstruction. The appendix is normal. No  acute osseous abnormalities are seen.     LUMBAR SPINE: No acute fracture or subluxation of the lumbar spine is  seen. There is mild anterolisthesis of L4 on L5. Intervertebral disc  space narrowing is most significant at L4-L5 and L5-S1.     L1-L2: There is diffuse disc bulge, without significant canal stenosis  or neuroforaminal narrowing.  L2-L3: There is diffuse disc bulge. It is slightly more significant on  the left. There is some flattening of the thecal sac anteriorly. There  is some minimal left neuroforaminal narrowing.  L3-L4: There is diffuse disc bulge. There is moderate canal stenosis,  exacerbated by hypertrophy of the ligamentum flavum and facet  hypertrophy. There is moderate bilateral neuroforaminal narrowing.  L4-L5: There is diffuse disc bulge. There is severe canal stenosis.  There is severe bilateral neuroforaminal narrowing.  L5-S1: There is diffuse disc bulge. There is moderate canal stenosis.  There is severe bilateral neuroforaminal  narrowing. There is a focus of  gas which is seen within the left lateral recess, stable when compared  to January 28, 2025. It may be related to a synovial cyst, as it is  adjacent to the left facet joint.       Impression:         1. No acute findings identified within the abdomen or pelvis.  2. No acute fracture or subluxation of the lumbar spine is seen.     Radiation dose reduction techniques were utilized, including automated  exposure control and exposure modulation based on body size.        This report was finalized on 6/21/2025 11:27 PM by Dr. Martha Telles M.D on Workstation: [id number]       CT Lumbar Spine Without Contrast [[id number]] Collected: 06/21/25 2317     Updated: 06/21/25 2330    Narrative:      CT OF THE ABDOMEN PELVIS WITHOUT CONTRAST; CT OF THE LUMBAR SPINE     HISTORY: Fall. Back and flank pain.     COMPARISON: January 28, 2025     TECHNIQUE: Axial CT imaging was obtained through the abdomen and pelvis.  No IV contrast was administered. Axial CT imaging was obtained through  the lumbar spine. Coronal and sagittal reformatted images were obtained.     FINDINGS:  ABDOMEN AND PELVIS: Images through the lung bases demonstrate some mild  scarring. No suspicious hepatic lesions are seen. The stomach, duodenum,  adrenal glands, spleen, and pancreas are normal. There is  cholelithiasis. There are aortoiliac calcifications. Prostate gland is  within normal limits. The patient has a bowel containing right inguinal  hernia, without evidence of obstruction. The appendix is normal. No  acute osseous abnormalities are seen.     LUMBAR SPINE: No acute fracture or subluxation of the lumbar spine is  seen. There is mild anterolisthesis of L4 on L5. Intervertebral disc  space narrowing is most significant at L4-L5 and L5-S1.     L1-L2: There is diffuse disc bulge, without significant canal stenosis  or neuroforaminal narrowing.  L2-L3: There is diffuse disc bulge. It is slightly more significant  on  the left. There is some flattening of the thecal sac anteriorly. There  is some minimal left neuroforaminal narrowing.  L3-L4: There is diffuse disc bulge. There is moderate canal stenosis,  exacerbated by hypertrophy of the ligamentum flavum and facet  hypertrophy. There is moderate bilateral neuroforaminal narrowing.  L4-L5: There is diffuse disc bulge. There is severe canal stenosis.  There is severe bilateral neuroforaminal narrowing.  L5-S1: There is diffuse disc bulge. There is moderate canal stenosis.  There is severe bilateral neuroforaminal narrowing. There is a focus of  gas which is seen within the left lateral recess, stable when compared  to January 28, 2025. It may be related to a synovial cyst, as it is  adjacent to the left facet joint.       Impression:         1. No acute findings identified within the abdomen or pelvis.  2. No acute fracture or subluxation of the lumbar spine is seen.     Radiation dose reduction techniques were utilized, including automated  exposure control and exposure modulation based on body size.        This report was finalized on 6/21/2025 11:27 PM by Dr. Martha Telles M.D on Workstation: [id number]               Pertinent Labs     Results from last 7 days   Lab Units 06/22/25  0706 06/21/25  2049   WBC 10*3/mm3 5.01 5.91   HEMOGLOBIN g/dL 11.6* 10.8*   PLATELETS 10*3/mm3 211 215     Results from last 7 days   Lab Units 06/22/25  0706 06/21/25  2049   SODIUM mmol/L 137 137   POTASSIUM mmol/L 4.0 4.4   CHLORIDE mmol/L 104 104   CO2 mmol/L 22.0 23.0   BUN mg/dL 20.0 25.0*   CREATININE mg/dL 1.50* 1.76*   GLUCOSE mg/dL 109* 110*   Estimated Creatinine Clearance: 42.2 mL/min (A) (by C-G formula based on SCr of 1.5 mg/dL (H)).  Results from last 7 days   Lab Units 06/21/25  2049   ALBUMIN g/dL 3.7   BILIRUBIN mg/dL <0.2   ALK PHOS U/L 85   AST (SGOT) U/L 17   ALT (SGPT) U/L 6     Results from last 7 days   Lab Units 06/22/25  0706 06/21/25  2049   CALCIUM mg/dL 9.5  "9.4   ALBUMIN g/dL  --  3.7   MAGNESIUM mg/dL  --  2.1     Results from last 7 days   Lab Units 06/22/25  1727   AMMONIA umol/L 38     Results from last 7 days   Lab Units 06/22/25  0706 06/21/25  2206 06/21/25 2049   HSTROP T ng/L 68* 93* 91*           Invalid input(s): \"LDLCALC\"        Test Results Pending at Discharge     Pending Labs       Order Current Status    RPR Qualitative with Reflex to Quant In process            Discharge Details        Discharge Medications        Changes to Medications        Instructions Start Date   haloperidol 1 MG tablet  Commonly known as: HALDOL  What changed: how much to take   2 mg, Oral, 3 Times Daily             Continue These Medications        Instructions Start Date   acetaminophen 500 MG tablet  Commonly known as: TYLENOL   2 tablets, 3 Times Daily      dilTIAZem 30 MG tablet  Commonly known as: CARDIZEM   30 mg, Oral, Every 12 Hours Scheduled      docusate sodium 100 MG capsule  Commonly known as: COLACE   100 mg, Nightly      famotidine 20 MG tablet  Commonly known as: PEPCID   20 mg, 2 Times Daily      folic acid 1 MG tablet  Commonly known as: FOLVITE   1 mg, Daily      Melatonin 10 MG tablet   1 tablet, Nightly      mirtazapine 15 MG tablet  Commonly known as: REMERON   7.5 mg, Nightly      multivitamin tablet tablet   Daily      olopatadine 0.2 % solution ophthalmic solution  Commonly known as: PATADAY   1 drop, Daily      ondansetron 4 MG tablet  Commonly known as: ZOFRAN   4 mg, Every 8 Hours PRN      polyethylene glycol 17 g packet  Commonly known as: MIRALAX   17 g, Oral, Daily, 1-2 packets daily until stools are regular and soft      thiamine 100 MG tablet  tablet  Commonly known as: VITAMIN B-1   100 mg, Daily      traZODone 100 MG tablet  Commonly known as: DESYREL   100 mg, Nightly               Allergies   Allergen Reactions    Mirtazapine Other (See Comments)     confused    Sertraline Anxiety, Diarrhea and Nausea And Vomiting     Also \"hallucinations\" "         Discharge Disposition:  Long Term Care (DC - External)    Discharge Diet:  Diet Order   Procedures    Diet: Regular/House; Fluid Consistency: Thin (IDDSI 0)       Discharge Activity:       CODE STATUS:    Code Status and Medical Interventions: CPR (Attempt to Resuscitate); Full Support   Ordered at: 06/22/25 0144     Code Status (Patient has no pulse and is not breathing):    CPR (Attempt to Resuscitate)     Medical Interventions (Patient has pulse or is breathing):    Full Support       No future appointments.   Follow-up Information       Alessia Prescott, APRN .    Specialty: Family Medicine  Contact information:  [address] [place] [address] [address]  [place] [id number] [phone]                             Time Spent on Discharge:  Greater than 30 minutes      Phill Vergara MD  [facility]  06/23/25  15:15 EDT                Electronically signed by Phill Vergara MD at 06/23/25 7468

## 2025-06-23 NOTE — PROGRESS NOTES
"    Name: Yuval Loja ADMIT: 2025   : 1950  PCP: KenyonAlessia APRN    MRN: [id number] LOS: 0 days   AGE/SEX: 74 y.o. male  ROOM: [facility]     Subjective   Difficult patient.  I asked him how he was doing today and he said to me something along the lines of  \"what makes you think of the right to ask me that?.  Needless to say the conversation was not very fruitful.  Objective   Objective   Vital Signs  Temp:  [97 °F (36.1 °C)-98.3 °F (36.8 °C)] 98.2 °F (36.8 °C)  Heart Rate:  [] 80  Resp:  [16] 16  BP: (129-151)/(60-65) 133/60  SpO2:  [95 %-99 %] 95 %  on   ;   Device (Oxygen Therapy): room air  Body mass index is 23.18 kg/m².  Physical Exam  Constitutional:       General: He is not in acute distress.  HENT:      Head: Normocephalic and atraumatic.   Eyes:      Extraocular Movements: Extraocular movements intact.   Pulmonary:      Effort: Pulmonary effort is normal. No respiratory distress.   Neurological:      Mental Status: He is alert.   Psychiatric:      Comments: Agitated          Results Review     I reviewed the patient's new clinical results.  Results from last 7 days   Lab Units 25  0706 25   WBC 10*3/mm3 5.01 5.91   HEMOGLOBIN g/dL 11.6* 10.8*   PLATELETS 10*3/mm3 211 215     Results from last 7 days   Lab Units 25  0706 25   SODIUM mmol/L 137 137   POTASSIUM mmol/L 4.0 4.4   CHLORIDE mmol/L 104 104   CO2 mmol/L 22.0 23.0   BUN mg/dL 20.0 25.0*   CREATININE mg/dL 1.50* 1.76*   GLUCOSE mg/dL 109* 110*   Estimated Creatinine Clearance: 42.2 mL/min (A) (by C-G formula based on SCr of 1.5 mg/dL (H)).  Results from last 7 days   Lab Units 25   ALBUMIN g/dL 3.7   BILIRUBIN mg/dL <0.2   ALK PHOS U/L 85   AST (SGOT) U/L 17   ALT (SGPT) U/L 6     Results from last 7 days   Lab Units 25  0706 25   CALCIUM mg/dL 9.5 9.4   ALBUMIN g/dL  --  3.7   MAGNESIUM mg/dL  --  2.1       COVID19   Date Value Ref Range Status   2022 " "Not Detected Not Detected - Ref. Range Final     SARS-CoV-2, CLARICE   Date Value Ref Range Status   10/11/2022 Negative Negative Final     Comment:     The 2019-CoV rRT-PCR Assay is only for use under a Food and Drug Administration Emergency Use Authorization. The performance characteristics of the assay were verified by the Clinical Microbiology Laboratory at the [facility].   Results should be used in conjunction with the patient's clinical symptoms, medical history, and other clinical/laboratory findings to determine an overall clinical diagnosis. Negative results do not preclude infection with SARS-CoV-2 (COVID-19).    Test parameters have not been validated for screening asymptomatic patients.     No results found for: \"HGBA1C\", \"POCGLU\"  Results for orders placed or performed during the hospital encounter of 04/10/23   Blood Culture - Blood, Arm, Right    Specimen: Arm, Right; Blood   Result Value Ref Range    Blood Culture No growth at 5 days          CT Head Without Contrast  Narrative: CT SCAN OF THE HEAD WITHOUT CONTRAST 06/23/2025     CLINICAL HISTORY: This is a 74-year-old male patient who has possible  syncope.     TECHNIQUE: Spiral CT images were obtained from the base of the skull to  the vertex without intravenous contrast and images were reformatted and  submitted in 1 mm thick axial, sagittal and coronal CT sections with  brain algorithm using stereotactic protocol. Additional 1 mm thick axial  CT reconstructions were performed and submitted in high resolution bone  algorithm.     This is correlated to a prior head CT from [facility] on  02/04/2024 and 07/19/2022, and a head CT dating back to 04/17/2019.     FINDINGS: There are patchy and confluent areas in the periventricular  extending into the subcortical white matter of the cerebral hemispheres  consistent with moderate small vessel disease. There is a 5 mm old  lacunar infarct in the body of the left caudate " nucleus, a tiny, 4 mm,  old lacunar infarct in the inferior medial left thalamus, and a 4 mm old  lacunar infarct in the posterior inferior right thalamus. The remainder  of the brain parenchyma is normal in attenuation. There is mild diffuse  cerebral atrophy. The lateral and third ventricles are mildly prominent  in size and this is most probably on the basis central volume loss or  atrophy. I see no mass effect and no midline shift and no extra-axial  fluid collections are identified and there is no evidence of acute  intracranial hemorrhage. The calvarium and skull base are normal in  appearance. There is minimal inferior left maxillary sinus mucosal  thickening, and a very tiny amount of fluid and mucosal thickening in  the posterior ethmoid sinuses and 5 mm retention cyst in the anterior  left sphenoid sinus.  The mastoid and middle ear cavities are clear. The  orbits are normal in appearance.     Impression: No acute intracranial abnormality is identified. Since prior  head CT on 02/04/2024 the patient has developed a 4-5 mm old lacunar  infarct in the body of the left caudate nucleus, otherwise there is no  significant interval change. There is moderate small vessel disease in  the cerebral white matter and small old lacunar infarcts in the inferior  medial left thalamus and posterior inferior right thalamus. There is  diffuse cerebral atrophy and the lateral and third ventricles are  prominent in size, which I believe is most probably on the basis of  central volume loss or atrophy, and correlate clinically. There is a  tiny amount of fluid and mucosal thickening in the posterior ethmoid  sinuses bilaterally and minimal mucosal thickening in the inferior left  maxillary sinus, and a 5 mm retention cyst in the anterior left sphenoid  sinus. The remainder of the head CT is normal with no acute skull  fracture or intracranial hemorrhage identified.     Radiation dose reduction techniques were utilized,  including automated  exposure control and exposure modulation based on body size.        Adult Transthoracic Echo Complete W/ Cont if Necessary Per Protocol    Left ventricular systolic function is normal. Left ventricular ejection   fraction appears to be 61 - 65%.    Left ventricular wall thickness is consistent with mild concentric   hypertrophy.    Left ventricular diastolic function was normal.    Normal right ventricular cavity size and systolic function noted.    The interatrial septum appears redundant.    The aortic valve leaflets are moderately calcified (aortic sclerosis)    Mild aortic valve regurgitation is present    Mild tricuspid valve regurgitation is present    Calculated right ventricular systolic pressure from tricuspid   regurgitation is 31 mmHg.    There is no evidence of pericardial effusion.  Doppler Ankle Brachial Index Single Level CAR    Right Conclusion: The right EDGAR is mildly reduced. Mild digital   insufficiency.    Left Conclusion: The left EDGAR is mildly reduced. Mild digital   insufficiency.  Duplex Venous Lower Extremity - Right CAR    Normal right lower extremity venous duplex scan.    Scheduled Medications  budesonide-formoterol, 2 puff, Inhalation, BID - RT  dilTIAZem, 30 mg, Oral, Q12H  famotidine, 20 mg, Oral, BID  folic acid, 1 mg, Oral, Daily  haloperidol, 1 mg, Oral, TID  Ketotifen Fumarate, 1 drop, Both Eyes, BID  melatonin, 2.5 mg, Oral, Nightly  mirtazapine, 7.5 mg, Oral, Nightly  multivitamin, 1 tablet, Oral, Daily  sodium chloride, 10 mL, Intravenous, Q12H  thiamine, 100 mg, Oral, Daily  traZODone, 100 mg, Oral, Nightly    Infusions  sodium chloride, 100 mL/hr, Last Rate: 100 mL/hr (06/23/25 0600)    Diet  Diet: Regular/House; Fluid Consistency: Thin (IDDSI 0)       Assessment/Plan     Active Hospital Problems    Diagnosis  POA    **Syncope [R55]  Yes    History of alcohol abuse [F10.11]  Yes    Dementia with behavioral disturbance [F03.918]  Yes    COPD (chronic  obstructive pulmonary disease) [J44.9]  Yes    History of substance abuse [F19.11]  Yes    CAD (coronary artery disease) [I25.10]  Yes    Chronic kidney disease, stage 3 [N18.30]  Yes    Alcoholic liver disease [K70.9]  Yes      Resolved Hospital Problems   No resolved problems to display.       74 y.o. male admitted with Syncope.    1.  Fall leading to acute on chronic right hip pain and right shoulder pain secondary to possible syncope or near syncope.  CT scan of the abdomen and pelvis and lumbar spine are negative for acute disease.  Normal magnesium and potassium.    -CT head showed a 4 to 5 mm old lacunar infarct in the body of the left caudate nucleus which is new from CT head from February 2024-final read is pending  -Check right shoulder and right hip and pelvis x-ray: no acute fractures  Check orthostatic blood pressures    2.  Chronic anemia.  Baseline hemoglobin is between 10.5 and 11.7.  Hemoglobin is at baseline.  Continue to monitor    3.  Elevated troponin in a patient with a history of A-fib/coronary artery disease/hypertension.  Elevated troponins mostly secondary to renal failure.  No clinical evidence of angina or congestive heart failure.  EKG without acute ischemia and in normal sinus rhythm.  TTE with EF 61-65.  Cardiology considering ischemic workup.  Will continue Cardizem.     4.  Hyperglycemia.  Check A1c.    5.  Stage IIIa-b chronic renal failure.  Patient appears euvolemic.  Baseline creatinine between 1.6 and 1.7.  CT scan of the abdomen pelvis does not reveal any obstruction.  Renal function about the baseline.  Avoid nephrotoxic agents and monitor.    6.  History of COPD and obstructive sleep apnea.  Stable respiratory status.  Check chest x-ray and initiate Symbicort and as needed DuoNebs.    7.  History of dementia/psychosis/alcohol and substance abuse/tobacco abuse/personality disorder psychopath behavior)..  Patient denies any alcohol or substance abuse anymore.  He was counseled  against smoking.  Will check urine tox screen and ethanol level.  Will continue trazodone/folate/thiamine/Haldol/Remeron.  Ask psychiatry to evaluate.  Check B12/folate/RPR/TSH/CT scan of the brain.  Consult psychiatry.    8.  History of alcoholic liver disease ( COTTRELL) /GERD/GI bleed.  Benign GI   examination.  Normal liver function test.  Will continue Pepcid.  No signs of GI bleed.    9.  VTE prophylaxis.  Sequential compression device.  Discussed my findings and plan of treatment with the patient.    Full Code  Discussed with patient     Phill Vergara MD  [facility]  06/23/25  11:40 EDT

## 2025-06-23 NOTE — DISCHARGE SUMMARY
Patient Name: Yuval Loja  : 1950  MRN: [id number]    Date of Admission: 2025  Date of Discharge:  2025  Primary Care Physician: Alessia Prescott APRN      Chief Complaint:   Foot Pain (Bilateral foot pain ) and Syncope      Discharge Diagnoses     Active Hospital Problems    Diagnosis  POA    **Syncope [R55]  Yes    History of alcohol abuse [F10.11]  Yes    Dementia with behavioral disturbance [F03.918]  Yes    COPD (chronic obstructive pulmonary disease) [J44.9]  Yes    History of substance abuse [F19.11]  Yes    CAD (coronary artery disease) [I25.10]  Yes    Chronic kidney disease, stage 3 [N18.30]  Yes    Alcoholic liver disease [K70.9]  Yes      Resolved Hospital Problems   No resolved problems to display.        Hospital Course     This is a 74-year-old male with a past medical history of atrial fibrillation, Alzheimer's dementia with behavioral disturbances and psychosis, methamphetamine abuse, alcohol dependence, previous GI bleed  who was sent over to the hospital by staff at his memory care facility due to concerns of syncopal episode/near syncope.  The patient had to denied any syncopal episodes.  Cardiology was consulted and he underwent an echocardiogram that showed a preserved left ventricular ejection fraction.  No regional wall motion abnormalities or ischemic changes were noted on echocardiogram or EKG.  No further cardiac testing was required.  The patient was uncooperative and hostile this admission. He also underwent neuroimaging with a CT scan of the head that showed previous lacunar infarcts and volume loss Psychiatry was consulted patient's dose of Haldol was increased.  He had been hemodynamically stable and was subsequently discharged back to his memory care facility.    Physical Exam:  Temp:  [97 °F (36.1 °C)-98.3 °F (36.8 °C)] 98 °F (36.7 °C)  Heart Rate:  [] 85  Resp:  [16-18] 18  BP: (129-151)/(60-65) 132/60  Body mass index is 23.18 kg/m².  Physical  Exam  Constitutional:       General: He is not in acute distress.  HENT:      Head: Normocephalic and atraumatic.   Cardiovascular:      Rate and Rhythm: Normal rate and regular rhythm.   Pulmonary:      Effort: Pulmonary effort is normal. No respiratory distress.   Musculoskeletal:      Right lower leg: No edema.      Left lower leg: No edema.   Neurological:      Mental Status: He is alert.   Psychiatric:      Comments: Uncooperative         Consultants     Consult Orders (all) (From admission, onward)       Start     Ordered    06/22/25 1721  Inpatient Psychiatrist Consult  Once        Specialty:  Psychiatry  Provider:  Hipolito Ramon III, MD    06/22/25 1720    06/22/25 0700  Inpatient Nutrition Consult  Once        Provider:  (Not yet assigned)    06/22/25 0639    06/22/25 0143  Inpatient Cardiology Consult  Once        Specialty:  Cardiology  Provider:  Comfort Borrego MD    06/22/25 0144    06/22/25 0109  [facility] (on-call MD unless specified) Details  Once        Specialty:  Hospitalist  Provider:  (Not yet assigned)    06/22/25 0109                  Procedures     Imaging Results (All)       Procedure Component Value Units Date/Time    CT Head Without Contrast - Preliminary [[id number]] Collected: 06/23/25 1040     Updated: 06/23/25 1040    This result has not been signed. Information might be incomplete.      Narrative:      CT SCAN OF THE HEAD WITHOUT CONTRAST 06/23/2025     CLINICAL HISTORY: This is a 74-year-old male patient who has possible  syncope.     TECHNIQUE: Spiral CT images were obtained from the base of the skull to  the vertex without intravenous contrast and images were reformatted and  submitted in 1 mm thick axial, sagittal and coronal CT sections with  brain algorithm using stereotactic protocol. Additional 1 mm thick axial  CT reconstructions were performed and submitted in high resolution bone  algorithm.     This is correlated to a prior head CT from [facility]  on  02/04/2024 and 07/19/2022, and a head CT dating back to 04/17/2019.     FINDINGS: There are patchy and confluent areas in the periventricular  extending into the subcortical white matter of the cerebral hemispheres  consistent with moderate small vessel disease. There is a 5 mm old  lacunar infarct in the body of the left caudate nucleus, a tiny, 4 mm,  old lacunar infarct in the inferior medial left thalamus, and a 4 mm old  lacunar infarct in the posterior inferior right thalamus. The remainder  of the brain parenchyma is normal in attenuation. There is mild diffuse  cerebral atrophy. The lateral and third ventricles are mildly prominent  in size and this is most probably on the basis central volume loss or  atrophy. I see no mass effect and no midline shift and no extra-axial  fluid collections are identified and there is no evidence of acute  intracranial hemorrhage. The calvarium and skull base are normal in  appearance. There is minimal inferior left maxillary sinus mucosal  thickening, and a very tiny amount of fluid and mucosal thickening in  the posterior ethmoid sinuses and 5 mm retention cyst in the anterior  left sphenoid sinus.  The mastoid and middle ear cavities are clear. The  orbits are normal in appearance.       Impression:      No acute intracranial abnormality is identified. Since prior  head CT on 02/04/2024 the patient has developed a 4-5 mm old lacunar  infarct in the body of the left caudate nucleus, otherwise there is no  significant interval change. There is moderate small vessel disease in  the cerebral white matter and small old lacunar infarcts in the inferior  medial left thalamus and posterior inferior right thalamus. There is  diffuse cerebral atrophy and the lateral and third ventricles are  prominent in size, which I believe is most probably on the basis of  central volume loss or atrophy, and correlate clinically. There is a  tiny amount of fluid and mucosal thickening in the  posterior ethmoid  sinuses bilaterally and minimal mucosal thickening in the inferior left  maxillary sinus, and a 5 mm retention cyst in the anterior left sphenoid  sinus. The remainder of the head CT is normal with no acute skull  fracture or intracranial hemorrhage identified.     Radiation dose reduction techniques were utilized, including automated  exposure control and exposure modulation based on body size.          XR Hip With or Without Pelvis 2 - 3 View Right [[id number]] Collected: 06/22/25 1853     Updated: 06/22/25 1858    Narrative:      PROCEDURE:  XR SHOULDER 2+ VW RIGHT-, XR HIP W OR WO PELVIS 2-3 VIEW  RIGHT-     HISTORY: Fall, pain.     COMPARISON: Chest radiograph 6/22/2025 at 5:17 p.m. CT abdomen and  pelvis 6/21/2025.     FINDINGS:       2 views of the right shoulder were obtained.     No acute fracture or malalignment is identified. There is  acromioclavicular greater than glenohumeral osteoarthritis. There is a  subacromial spur, which can contribute to rotator cuff impingement. The  humeral head is slightly high riding, which could indicate underlying  rotator cuff pathology. This would be better evaluated with MRI, if  clinically indicated. No acute fracture is identified. No dislocation.  There is calcific atherosclerosis.        3 views of the pelvis and right hip were obtained.     There is degenerative disc disease in the visualized lower lumbar spine.  There is a right hip total arthroplasty. There is appropriate alignment.  There is no dislocation. No definite acute fracture identified. If there  is persistent clinical concern for acute fracture or inability to bear  weight, further evaluation with dedicated CT or MRI would be  recommended.  There is calcific atherosclerosis.           This report was finalized on 6/22/2025 6:55 PM by Dr. Marnie Ramirez M.D  on Workstation: [id number]       XR Shoulder 2+ View Right [[id number]] Collected: 06/22/25 1853     Updated: 06/22/25 1858     Narrative:      PROCEDURE:  XR SHOULDER 2+ VW RIGHT-, XR HIP W OR WO PELVIS 2-3 VIEW  RIGHT-     HISTORY: Fall, pain.     COMPARISON: Chest radiograph 6/22/2025 at 5:17 p.m. CT abdomen and  pelvis 6/21/2025.     FINDINGS:       2 views of the right shoulder were obtained.     No acute fracture or malalignment is identified. There is  acromioclavicular greater than glenohumeral osteoarthritis. There is a  subacromial spur, which can contribute to rotator cuff impingement. The  humeral head is slightly high riding, which could indicate underlying  rotator cuff pathology. This would be better evaluated with MRI, if  clinically indicated. No acute fracture is identified. No dislocation.  There is calcific atherosclerosis.        3 views of the pelvis and right hip were obtained.     There is degenerative disc disease in the visualized lower lumbar spine.  There is a right hip total arthroplasty. There is appropriate alignment.  There is no dislocation. No definite acute fracture identified. If there  is persistent clinical concern for acute fracture or inability to bear  weight, further evaluation with dedicated CT or MRI would be  recommended.  There is calcific atherosclerosis.           This report was finalized on 6/22/2025 6:55 PM by Dr. Marnie Ramirez M.D  on Workstation: [id number]       XR Chest 1 View [[id number]] Collected: 06/22/25 1727     Updated: 06/22/25 1733    Narrative:      Stat portable view of the chest on 6/22/2025     CLINICAL HISTORY: COPD     This is correlated to a prior chest x-ray on 3/3/2024.     FINDINGS: The cardiomediastinal silhouette and pulmonary vasculature are  within normal limits. There is coronary stent projecting over the  superior left heart border. There is some minimal horizontal linear  stranding lower lung zones likely linear areas of atelectasis or  scarring. The remainder the lungs are clear. The costophrenic angles are  sharp.       Impression:      1. No definite active  disease is seen in the chest. There is a coronary  stent projecting over the superior left heart border. There are  horizontal linear areas of stranding both lower lung zones likely linear  areas of atelectasis or scarring. The remainder of the chest x-ray is  unremarkable.     This report was finalized on 6/22/2025 5:29 PM by Dr. Washington Jenkins M.D  on Workstation: [id number]       CT Abdomen Pelvis Without Contrast [[id number]] Collected: 06/21/25 2317     Updated: 06/21/25 2330    Narrative:      CT OF THE ABDOMEN PELVIS WITHOUT CONTRAST; CT OF THE LUMBAR SPINE     HISTORY: Fall. Back and flank pain.     COMPARISON: January 28, 2025     TECHNIQUE: Axial CT imaging was obtained through the abdomen and pelvis.  No IV contrast was administered. Axial CT imaging was obtained through  the lumbar spine. Coronal and sagittal reformatted images were obtained.     FINDINGS:  ABDOMEN AND PELVIS: Images through the lung bases demonstrate some mild  scarring. No suspicious hepatic lesions are seen. The stomach, duodenum,  adrenal glands, spleen, and pancreas are normal. There is  cholelithiasis. There are aortoiliac calcifications. Prostate gland is  within normal limits. The patient has a bowel containing right inguinal  hernia, without evidence of obstruction. The appendix is normal. No  acute osseous abnormalities are seen.     LUMBAR SPINE: No acute fracture or subluxation of the lumbar spine is  seen. There is mild anterolisthesis of L4 on L5. Intervertebral disc  space narrowing is most significant at L4-L5 and L5-S1.     L1-L2: There is diffuse disc bulge, without significant canal stenosis  or neuroforaminal narrowing.  L2-L3: There is diffuse disc bulge. It is slightly more significant on  the left. There is some flattening of the thecal sac anteriorly. There  is some minimal left neuroforaminal narrowing.  L3-L4: There is diffuse disc bulge. There is moderate canal stenosis,  exacerbated by hypertrophy of the  ligamentum flavum and facet  hypertrophy. There is moderate bilateral neuroforaminal narrowing.  L4-L5: There is diffuse disc bulge. There is severe canal stenosis.  There is severe bilateral neuroforaminal narrowing.  L5-S1: There is diffuse disc bulge. There is moderate canal stenosis.  There is severe bilateral neuroforaminal narrowing. There is a focus of  gas which is seen within the left lateral recess, stable when compared  to January 28, 2025. It may be related to a synovial cyst, as it is  adjacent to the left facet joint.       Impression:         1. No acute findings identified within the abdomen or pelvis.  2. No acute fracture or subluxation of the lumbar spine is seen.     Radiation dose reduction techniques were utilized, including automated  exposure control and exposure modulation based on body size.        This report was finalized on 6/21/2025 11:27 PM by Dr. Martha Telles M.D on Workstation: [id number]       CT Lumbar Spine Without Contrast [[id number]] Collected: 06/21/25 2317     Updated: 06/21/25 2330    Narrative:      CT OF THE ABDOMEN PELVIS WITHOUT CONTRAST; CT OF THE LUMBAR SPINE     HISTORY: Fall. Back and flank pain.     COMPARISON: January 28, 2025     TECHNIQUE: Axial CT imaging was obtained through the abdomen and pelvis.  No IV contrast was administered. Axial CT imaging was obtained through  the lumbar spine. Coronal and sagittal reformatted images were obtained.     FINDINGS:  ABDOMEN AND PELVIS: Images through the lung bases demonstrate some mild  scarring. No suspicious hepatic lesions are seen. The stomach, duodenum,  adrenal glands, spleen, and pancreas are normal. There is  cholelithiasis. There are aortoiliac calcifications. Prostate gland is  within normal limits. The patient has a bowel containing right inguinal  hernia, without evidence of obstruction. The appendix is normal. No  acute osseous abnormalities are seen.     LUMBAR SPINE: No acute fracture or  subluxation of the lumbar spine is  seen. There is mild anterolisthesis of L4 on L5. Intervertebral disc  space narrowing is most significant at L4-L5 and L5-S1.     L1-L2: There is diffuse disc bulge, without significant canal stenosis  or neuroforaminal narrowing.  L2-L3: There is diffuse disc bulge. It is slightly more significant on  the left. There is some flattening of the thecal sac anteriorly. There  is some minimal left neuroforaminal narrowing.  L3-L4: There is diffuse disc bulge. There is moderate canal stenosis,  exacerbated by hypertrophy of the ligamentum flavum and facet  hypertrophy. There is moderate bilateral neuroforaminal narrowing.  L4-L5: There is diffuse disc bulge. There is severe canal stenosis.  There is severe bilateral neuroforaminal narrowing.  L5-S1: There is diffuse disc bulge. There is moderate canal stenosis.  There is severe bilateral neuroforaminal narrowing. There is a focus of  gas which is seen within the left lateral recess, stable when compared  to January 28, 2025. It may be related to a synovial cyst, as it is  adjacent to the left facet joint.       Impression:         1. No acute findings identified within the abdomen or pelvis.  2. No acute fracture or subluxation of the lumbar spine is seen.     Radiation dose reduction techniques were utilized, including automated  exposure control and exposure modulation based on body size.        This report was finalized on 6/21/2025 11:27 PM by Dr. Martha Telles M.D on Workstation: [id number]               Pertinent Labs     Results from last 7 days   Lab Units 06/22/25  0706 06/21/25  2049   WBC 10*3/mm3 5.01 5.91   HEMOGLOBIN g/dL 11.6* 10.8*   PLATELETS 10*3/mm3 211 215     Results from last 7 days   Lab Units 06/22/25  0706 06/21/25  2049   SODIUM mmol/L 137 137   POTASSIUM mmol/L 4.0 4.4   CHLORIDE mmol/L 104 104   CO2 mmol/L 22.0 23.0   BUN mg/dL 20.0 25.0*   CREATININE mg/dL 1.50* 1.76*   GLUCOSE mg/dL 109* 110*  "  Estimated Creatinine Clearance: 42.2 mL/min (A) (by C-G formula based on SCr of 1.5 mg/dL (H)).  Results from last 7 days   Lab Units 06/21/25  2049   ALBUMIN g/dL 3.7   BILIRUBIN mg/dL <0.2   ALK PHOS U/L 85   AST (SGOT) U/L 17   ALT (SGPT) U/L 6     Results from last 7 days   Lab Units 06/22/25  0706 06/21/25  2049   CALCIUM mg/dL 9.5 9.4   ALBUMIN g/dL  --  3.7   MAGNESIUM mg/dL  --  2.1     Results from last 7 days   Lab Units 06/22/25  1727   AMMONIA umol/L 38     Results from last 7 days   Lab Units 06/22/25  0706 06/21/25  2206 06/21/25  2049   HSTROP T ng/L 68* 93* 91*           Invalid input(s): \"LDLCALC\"        Test Results Pending at Discharge     Pending Labs       Order Current Status    RPR Qualitative with Reflex to Quant In process            Discharge Details        Discharge Medications        Changes to Medications        Instructions Start Date   haloperidol 1 MG tablet  Commonly known as: HALDOL  What changed: how much to take   2 mg, Oral, 3 Times Daily             Continue These Medications        Instructions Start Date   acetaminophen 500 MG tablet  Commonly known as: TYLENOL   2 tablets, 3 Times Daily      dilTIAZem 30 MG tablet  Commonly known as: CARDIZEM   30 mg, Oral, Every 12 Hours Scheduled      docusate sodium 100 MG capsule  Commonly known as: COLACE   100 mg, Nightly      famotidine 20 MG tablet  Commonly known as: PEPCID   20 mg, 2 Times Daily      folic acid 1 MG tablet  Commonly known as: FOLVITE   1 mg, Daily      Melatonin 10 MG tablet   1 tablet, Nightly      mirtazapine 15 MG tablet  Commonly known as: REMERON   7.5 mg, Nightly      multivitamin tablet tablet   Daily      olopatadine 0.2 % solution ophthalmic solution  Commonly known as: PATADAY   1 drop, Daily      ondansetron 4 MG tablet  Commonly known as: ZOFRAN   4 mg, Every 8 Hours PRN      polyethylene glycol 17 g packet  Commonly known as: MIRALAX   17 g, Oral, Daily, 1-2 packets daily until stools are regular and " "soft      thiamine 100 MG tablet  tablet  Commonly known as: VITAMIN B-1   100 mg, Daily      traZODone 100 MG tablet  Commonly known as: DESYREL   100 mg, Nightly               Allergies   Allergen Reactions    Mirtazapine Other (See Comments)     confused    Sertraline Anxiety, Diarrhea and Nausea And Vomiting     Also \"hallucinations\"         Discharge Disposition:  Long Term Care (DC - External)    Discharge Diet:  Diet Order   Procedures    Diet: Regular/House; Fluid Consistency: Thin (IDDSI 0)       Discharge Activity:       CODE STATUS:    Code Status and Medical Interventions: CPR (Attempt to Resuscitate); Full Support   Ordered at: 06/22/25 0144     Code Status (Patient has no pulse and is not breathing):    CPR (Attempt to Resuscitate)     Medical Interventions (Patient has pulse or is breathing):    Full Support       No future appointments.   Follow-up Information       Alessia Prescott APRN .    Specialty: Family Medicine  Contact information:  [address]  [facility] [address]  [place] [id number] [phone]                             Time Spent on Discharge:  Greater than 30 minutes      Phill Vergara MD  [facility]  06/23/25  15:15 EDT              "

## 2025-06-23 NOTE — PROGRESS NOTES
"    Patient Name: Yuval Loja  :1950  74 y.o.      Patient Care Team:  Alessia Prescott APRN as PCP - General (Family Medicine)  Jonny Bains MD as Referring Physician (Internal Medicine)  Carlos Villareal MD as Consulting Physician (Hematology and Oncology)    Chief Complaint: follow up elevated troponin    Interval History:    He is sitting up in the chair. Denies CP / SOA. Asked me why I was even talking to him when the nurse knows what's going on. He has been uncooperative. Did not want me to examine him. Psych is seeing.     Echo with normal LVEF and no regional wall motion abnormalities.     Objective   Vital Signs  Temp:  [97 °F (36.1 °C)-98.3 °F (36.8 °C)] 98 °F (36.7 °C)  Heart Rate:  [] 85  Resp:  [16-18] 18  BP: (129-151)/(60-65) 132/60    Intake/Output Summary (Last 24 hours) at 2025 1442  Last data filed at 2025 0630  Gross per 24 hour   Intake 360 ml   Output 725 ml   Net -365 ml     Flowsheet Rows      Flowsheet Row First Filed Value   Admission Height 175.3 cm (69.02\") Documented at 2025 2032   Admission Weight 71.4 kg (157 lb 8 oz) Documented at 2025 0419            Physical Exam:   General Appearance:    Alert, cooperative, in no acute distress   Lungs:     Clear to auscultation.  Normal respiratory effort and rate.      Heart:    Regular rhythm and normal rate, normal S1 and S2, no murmurs, gallops or rubs.     Chest Wall:    No abnormalities observed   Abdomen:     Soft, nontender, positive bowel sounds.     Extremities:   no cyanosis, clubbing or edema.  No marked joint deformities.  Adequate musculoskeletal strength.       Results Review:    Results from last 7 days   Lab Units 25  0706   SODIUM mmol/L 137   POTASSIUM mmol/L 4.0   CHLORIDE mmol/L 104   CO2 mmol/L 22.0   BUN mg/dL 20.0   CREATININE mg/dL 1.50*   GLUCOSE mg/dL 109*   CALCIUM mg/dL 9.5     Results from last 7 days   Lab Units 25  0706 25  2206 25 "   HSTROP T ng/L 68* 93* 91*     Results from last 7 days   Lab Units 06/22/25  0706   WBC 10*3/mm3 5.01   HEMOGLOBIN g/dL 11.6*   HEMATOCRIT % 36.0*   PLATELETS 10*3/mm3 211         Results from last 7 days   Lab Units 06/21/25  2049   MAGNESIUM mg/dL 2.1                   Medication Review:   budesonide-formoterol, 2 puff, Inhalation, BID - RT  dilTIAZem, 30 mg, Oral, Q12H  [START ON 6/24/2025] famotidine, 20 mg, Oral, Daily  folic acid, 1 mg, Oral, Daily  haloperidol, 2 mg, Oral, TID  Ketotifen Fumarate, 1 drop, Both Eyes, BID  melatonin, 2.5 mg, Oral, Nightly  mirtazapine, 7.5 mg, Oral, Nightly  multivitamin, 1 tablet, Oral, Daily  sodium chloride, 10 mL, Intravenous, Q12H  thiamine, 100 mg, Oral, Daily  traZODone, 100 mg, Oral, Nightly         sodium chloride, 100 mL/hr, Last Rate: 100 mL/hr (06/23/25 0600)        Assessment & Plan   Elevated troponin of unclear significance. - renal insufficiency likely contributing. Echocardiogram with  preserved LVEF . No regional wall motion abnormality. No ischemic changes on EKG. Conservative management recommended in the absence of clear symptoms / advanced dementia.   Hypertension   Dementia , he has bene uncooperative.   History of PAF, not anticoagulated due to prior gastric bleeding. Continue low dose CCB.   Coronary artery disease, remote stenting to RCA in 200 and LAD 2006.     Vitals are stable. No additional cardiac testing recommended at this time. Will see as needed.     RICHMOND Gaona  [facility]  06/23/25  14:42 EDT

## 2025-06-25 NOTE — CASE MANAGEMENT/SOCIAL WORK
Case Management Discharge Note      Final Note: Return to [facility] via [facility]         Selected Continued Care - Discharged on 6/23/2025 Admission date: 6/21/2025 - Discharge disposition: Long Term Care (DC - External)      Destination    No services have been selected for the patient.                Durable Medical Equipment    No services have been selected for the patient.                Dialysis/Infusion    No services have been selected for the patient.                Home Medical Care    No services have been selected for the patient.                Therapy    No services have been selected for the patient.                Community Resources    No services have been selected for the patient.                Community & DME    No services have been selected for the patient.                    Transportation Services  Transportation: Ambulance  Ambulance: [facility]  [facility] Ambulance Status: Considering    Final Discharge Disposition Code: 01 - home or self-care  
Continued Stay Note  [facility]     Patient Name: Yuval Loja  MRN: [id number]  Today's Date: 6/23/2025    Admit Date: 6/21/2025    Plan: Return to [facility]   Discharge Plan       Row Name 06/23/25 1767       Plan    Plan Return to [facility]    Patient/Family in Agreement with Plan yes    Plan Comments Spoke with pt's daughter Torri on the phone as pt is confused. Introduced self, explained  role, verified face sheet. She stated that plan is for the pt to return home to [facility] at discharge. He will need EMS transportation due to aggitation and confusion. Spoke with Chinmay/ Ankur who confirmed the pt can return to his room anytime. Updated primary RN and MD. Discharge order noted. Packet and EMS form complete and given to primary RN. RN confirmed the pt is ready and report has been called to facility. EMS transortation requested through [facility] dispatch. No further needs assessed at this time. CCP following. Mono MURO RN                   Discharge Codes    No documentation.                 Expected Discharge Date and Time       Expected Discharge Date Expected Discharge Time    Jun 23, 2025               Mono Yan RN    
53

## 2025-06-28 ENCOUNTER — HOSPITAL ENCOUNTER (EMERGENCY)
Facility: HOSPITAL | Age: 75
Discharge: HOME | End: 2025-06-28
Attending: EMERGENCY MEDICINE
Payer: MEDICARE

## 2025-06-28 VITALS
OXYGEN SATURATION: 98 % | HEART RATE: 98 BPM | RESPIRATION RATE: 18 BRPM | SYSTOLIC BLOOD PRESSURE: 192 MMHG | DIASTOLIC BLOOD PRESSURE: 73 MMHG | TEMPERATURE: 97.7 F

## 2025-06-28 DIAGNOSIS — F03.918 DEMENTIA WITH BEHAVIORAL DISTURBANCE: Primary | ICD-10-CM

## 2025-06-28 LAB
ALBUMIN SERPL-MCNC: 4.3 G/DL (ref 3.5–5.2)
ALBUMIN/GLOB SERPL: 1.1 G/DL
ALP SERPL-CCNC: 84 U/L (ref 39–117)
ALT SERPL W P-5'-P-CCNC: 11 U/L (ref 1–41)
AMPHET+METHAMPHET UR QL: NEGATIVE
AMPHETAMINES UR QL: NEGATIVE
ANION GAP SERPL CALCULATED.3IONS-SCNC: 9.7 MMOL/L (ref 5–15)
AST SERPL-CCNC: 22 U/L (ref 1–40)
BARBITURATES UR QL SCN: NEGATIVE
BASOPHILS # BLD AUTO: 0.03 10*3/MM3 (ref 0–0.2)
BASOPHILS NFR BLD AUTO: 0.5 % (ref 0–1.5)
BENZODIAZ UR QL SCN: NEGATIVE
BILIRUB SERPL-MCNC: 0.2 MG/DL (ref 0–1.2)
BUN SERPL-MCNC: 19 MG/DL (ref 8–23)
BUN/CREAT SERPL: 10.8 (ref 7–25)
BUPRENORPHINE SERPL-MCNC: NEGATIVE NG/ML
CALCIUM SPEC-SCNC: 10 MG/DL (ref 8.6–10.5)
CANNABINOIDS SERPL QL: NEGATIVE
CHLORIDE SERPL-SCNC: 105 MMOL/L (ref 98–107)
CO2 SERPL-SCNC: 25.3 MMOL/L (ref 22–29)
COCAINE UR QL: NEGATIVE
CREAT SERPL-MCNC: 1.76 MG/DL (ref 0.76–1.27)
DEPRECATED RDW RBC AUTO: 49.8 FL (ref 37–54)
EGFRCR SERPLBLD CKD-EPI 2021: 39.6 ML/MIN/1.73
EOSINOPHIL # BLD AUTO: 0.21 10*3/MM3 (ref 0–0.4)
EOSINOPHIL NFR BLD AUTO: 3.6 % (ref 0.3–6.2)
ERYTHROCYTE [DISTWIDTH] IN BLOOD BY AUTOMATED COUNT: 13.5 % (ref 12.3–15.4)
ETHANOL BLD-MCNC: <10 MG/DL (ref 0–10)
ETHANOL UR QL: <0.01 %
FENTANYL UR-MCNC: NEGATIVE NG/ML
GLOBULIN UR ELPH-MCNC: 3.9 GM/DL
GLUCOSE SERPL-MCNC: 96 MG/DL (ref 65–99)
HCT VFR BLD AUTO: 38.9 % (ref 37.5–51)
HGB BLD-MCNC: 12.1 G/DL (ref 13–17.7)
IMM GRANULOCYTES # BLD AUTO: 0.03 10*3/MM3 (ref 0–0.05)
IMM GRANULOCYTES NFR BLD AUTO: 0.5 % (ref 0–0.5)
LYMPHOCYTES # BLD AUTO: 1.41 10*3/MM3 (ref 0.7–3.1)
LYMPHOCYTES NFR BLD AUTO: 24 % (ref 19.6–45.3)
MCH RBC QN AUTO: 30.9 PG (ref 26.6–33)
MCHC RBC AUTO-ENTMCNC: 31.1 G/DL (ref 31.5–35.7)
MCV RBC AUTO: 99.5 FL (ref 79–97)
METHADONE UR QL SCN: NEGATIVE
MONOCYTES # BLD AUTO: 0.42 10*3/MM3 (ref 0.1–0.9)
MONOCYTES NFR BLD AUTO: 7.1 % (ref 5–12)
NEUTROPHILS NFR BLD AUTO: 3.78 10*3/MM3 (ref 1.7–7)
NEUTROPHILS NFR BLD AUTO: 64.3 % (ref 42.7–76)
OPIATES UR QL: NEGATIVE
OXYCODONE UR QL SCN: NEGATIVE
PCP UR QL SCN: NEGATIVE
PLATELET # BLD AUTO: 234 10*3/MM3 (ref 140–450)
PMV BLD AUTO: 10 FL (ref 6–12)
POTASSIUM SERPL-SCNC: 5.1 MMOL/L (ref 3.5–5.2)
PROT SERPL-MCNC: 8.2 G/DL (ref 6–8.5)
RBC # BLD AUTO: 3.91 10*6/MM3 (ref 4.14–5.8)
SODIUM SERPL-SCNC: 140 MMOL/L (ref 136–145)
TRICYCLICS UR QL SCN: NEGATIVE
WBC NRBC COR # BLD AUTO: 5.88 10*3/MM3 (ref 3.4–10.8)

## 2025-06-28 PROCEDURE — 80053 COMPREHEN METABOLIC PANEL: CPT | Performed by: EMERGENCY MEDICINE

## 2025-06-28 PROCEDURE — 36415 COLL VENOUS BLD VENIPUNCTURE: CPT

## 2025-06-28 PROCEDURE — 82077 ASSAY SPEC XCP UR&BREATH IA: CPT | Performed by: EMERGENCY MEDICINE

## 2025-06-28 PROCEDURE — 90791 PSYCH DIAGNOSTIC EVALUATION: CPT

## 2025-06-28 PROCEDURE — 85007 BL SMEAR W/DIFF WBC COUNT: CPT | Performed by: EMERGENCY MEDICINE

## 2025-06-28 PROCEDURE — 99284 EMERGENCY DEPT VISIT MOD MDM: CPT

## 2025-06-28 PROCEDURE — 85025 COMPLETE CBC W/AUTO DIFF WBC: CPT | Performed by: EMERGENCY MEDICINE

## 2025-06-28 PROCEDURE — 80307 DRUG TEST PRSMV CHEM ANLYZR: CPT | Performed by: EMERGENCY MEDICINE

## 2025-06-28 NOTE — ED NOTES
Patient has a history of dementia. Per daughter (also guardian), patient tries to exit moving vehicles.

## 2025-06-28 NOTE — ED NOTES
"Patient states displeasure with his current living arrangements. Patient states, \"My daughter took all my money and put it in her account and spends $3,000 a month paying for this place.\" Patient states he argues with their staff frequently and does not like their food. Patient requests to see a psychiatrist. Patient denies SI/HI - states he loves life. Patient states he wants his medication checked because, \"I've been taking it for 14 months - I shouldn't have to take it anymore.\" Patient states, \"I want to see a psychiatrist. I can take care of myself.\"   "

## 2025-06-28 NOTE — ED NOTES
"Patient to ED via EMS from [facility]. Nursing staff reports \"patient was irate and wanting to commit suicide\". Upon EMS evaluation, patient has no suicidal or homicidal thoughts, he is frustrated with his living situation and the treatment he is receiving. Patient A & O x 4. Patient states the nursing home staff has not been giving him his medications.   "

## 2025-06-28 NOTE — LETTER
[facility]  [phone]    ACCESS CENTER STATEMENT OF DISPOSITION        I, Yuval Loja, was [facility] on 6/28/2025.  I understand the recommendations below and what follow-up action is expected of me.      Dr. Ben Schoenbachler with    [phone]    [facility]  [phone]    [facility]  [phone]          ________________________________  Clinician Signature    6/28/2025  15:16 EDT

## 2025-06-28 NOTE — DISCHARGE INSTRUCTIONS
Eat small, frequent meals and drink plenty of fluids. Take all of your regularly prescribed medication as instructed.     Monitor for any signs of recurrent symptoms or worsening and see your primary doctor to discuss your ER visit while returning to the ER if any concerns as we discussed.

## 2025-06-28 NOTE — ED PROVIDER NOTES
EMERGENCY DEPARTMENT ENCOUNTER    Room Number:  23/23  PCP: Alessia Prescott APRN  Independent Historians: EM_Historian: Patient and Caregiver facility report    HPI:  Chief Complaint: Concerns regarding patient taking his medicine and some suicidal ideation    A complete HPI/ROS/PMH/PSH/SH/FH are unobtainable due to: EM_Unobtainable History : Dementia    Chronic or social conditions impacting patient care (Social Determinants of Health): None      Context: Yuval Loja is a 74 y.o. male with a medical history of substance abuse, coronary artery disease, CKD, COPD, and dementia who presents to the ED c/o acute concerns regarding some expression of suicidality at his facility.  Patient with dementia with some behavioral disturbance on Haldol scheduled.  However, patient expressed suicidal ideation to facility staff and to his daughter.  Patient says he is just stressed and frustrated and wants to get out of his current living situation.  He does not feel like he needs a memory unit and feels like he can take care of his own finances and needs.  He denies being suicidal currently.        Review of prior external notes (non-ED) -and- Review of prior external test results outside of this encounter: Patient admitted here June 21 through June 23 after an episode of syncope versus fall where patient was also noted to have some behavioral disturbance at his memory care facility.  Patient had an echo and was seen by cardiology.  Patient was also evaluated by psychiatry and had his Haldol dose increased.  Dr. Ramon saw patient when he was admitted.    Prescription drug monitoring program review:     EM_[place] : N/A    PAST MEDICAL HISTORY  Active Ambulatory Problems     Diagnosis Date Noted    Alcoholic ketoacidosis 05/23/2017    Alcohol dependence 05/23/2017    Methamphetamine abuse 05/23/2017    Fatty liver, alcoholic 05/23/2017    Nausea vomiting and diarrhea 05/23/2017    Alcoholic liver disease 05/23/2017     Metabolic acidosis 05/23/2017    Tobacco abuse 05/23/2017    Coronary artery disease involving native coronary artery of native heart without angina pectoris 05/24/2017    Tachycardia 05/24/2017    Sinus tachycardia 04/17/2019    Chronic kidney disease, stage 3 04/17/2019    Medically noncompliant 04/18/2019    Visual hallucinations 04/18/2020    Psychosis 04/18/2020    Hyponatremia 04/18/2020    CAD (coronary artery disease) 04/18/2020    Leukopenia 04/18/2020    Symptomatic anemia 04/18/2020    Headache 04/18/2020    Substance abuse 04/18/2020    Gastrointestinal hemorrhage 10/22/2020    CRISTIANO (acute kidney injury) 10/23/2020    Hyperkalemia 10/23/2020    Right lower quadrant abdominal pain 03/15/2022    New onset atrial fibrillation 03/15/2022    History of substance abuse 03/15/2022    COPD (chronic obstructive pulmonary disease) 03/15/2022    Right inguinal hernia 03/15/2022    Constipation 03/15/2022    Status post right hip replacement 06/27/2022    Right hip pain 07/19/2022    Sinus bradycardia 04/10/2023    Generalized abdominal pain 06/01/2023    Altered mental status 02/04/2024    Altered mental state 02/05/2024    Accidental overdose 03/03/2024    Syncope 06/22/2025    History of alcohol abuse 06/22/2025    Dementia with behavioral disturbance 06/22/2025     Resolved Ambulatory Problems     Diagnosis Date Noted    Dehydration 04/17/2019    Functional diarrhea 04/17/2019    Closed fracture of neck of right femur, initial encounter 06/25/2022     Past Medical History:   Diagnosis Date    Alcohol abuse     Arthritis     Dementia     Disease of thyroid gland     Elevated cholesterol     GERD (gastroesophageal reflux disease)     History of transfusion     Hypertensive urgency     Myocardial infarction     Sleep apnea     Stroke          PAST SURGICAL HISTORY  Past Surgical History:   Procedure Laterality Date    BACK SURGERY      CARDIAC CATHETERIZATION      CARDIAC ELECTROPHYSIOLOGY PROCEDURE N/A 4/10/2023     Procedure: Temporary Pacemaker;  Surgeon: Vaibhav Bonilla MD;  Location: [facility] CATH INVASIVE LOCATION;  Service: Cardiovascular;  Laterality: N/A;    COLONOSCOPY      ENDOSCOPY      FRACTURE SURGERY      JOINT REPLACEMENT      SKIN BIOPSY      TOTAL HIP ARTHROPLASTY Right 06/27/2022    Procedure: TOTAL HIP ARTHROPLASTY ANTERIOR WITH HANA TABLE;  Surgeon: Willian Quiles MD;  Location: [facility] MAIN OR;  Service: Orthopedics;  Laterality: Right;  Depuy, carli. hana         FAMILY HISTORY  History reviewed. No pertinent family history.      SOCIAL HISTORY  Social History     Socioeconomic History    Marital status: Single   Tobacco Use    Smoking status: Every Day     Current packs/day: 0.10     Average packs/day: 0.1 packs/day for 60.5 years (6.0 ttl pk-yrs)     Types: Cigarettes     Start date: 1965    Smokeless tobacco: Never   Vaping Use    Vaping status: Never Used   Substance and Sexual Activity    Alcohol use: Not Currently    Drug use: No    Sexual activity: Defer         ALLERGIES  Mirtazapine and Sertraline        REVIEW OF SYSTEMS  Review of Systems  Included in HPI  All systems reviewed and negative except for those discussed in HPI.      PHYSICAL EXAM    I have reviewed the triage vital signs and nursing notes.    ED Triage Vitals [06/28/25 1206]   Temp Heart Rate Resp BP SpO2   97.7 °F (36.5 °C) 82 18 173/74 99 %      Temp src Heart Rate Source Patient Position BP Location FiO2 (%)   Oral Monitor Lying Right arm --       Physical Exam  GENERAL: Cooperative and conversant and appropriate male oriented to person and situation but was disoriented to place and time.  He asked me several times which hospital he was at thinking that he was at [facility] rather than [facility], Alert, no acute distress  SKIN: Warm, dry  HENT: Normocephalic, atraumatic  EYES: no scleral icterus  RESPIRATORY: Relaxed breathing  NEURO: alert, moves all extremities, follows commands  Pscyh: No SI, no HI, no hallucinations,  not responding to internal stimuli, cooperative                                                                 LAB RESULTS  Recent Results (from the past 24 hours)   Comprehensive Metabolic Panel    Collection Time: 06/28/25  1:08 PM    Specimen: Arm, Left; Blood   Result Value Ref Range    Glucose 96 65 - 99 mg/dL    BUN 19.0 8.0 - 23.0 mg/dL    Creatinine 1.76 (H) 0.76 - 1.27 mg/dL    Sodium 140 136 - 145 mmol/L    Potassium 5.1 3.5 - 5.2 mmol/L    Chloride 105 98 - 107 mmol/L    CO2 25.3 22.0 - 29.0 mmol/L    Calcium 10.0 8.6 - 10.5 mg/dL    Total Protein 8.2 6.0 - 8.5 g/dL    Albumin 4.3 3.5 - 5.2 g/dL    ALT (SGPT) 11 1 - 41 U/L    AST (SGOT) 22 1 - 40 U/L    Alkaline Phosphatase 84 39 - 117 U/L    Total Bilirubin 0.2 0.0 - 1.2 mg/dL    Globulin 3.9 gm/dL    A/G Ratio 1.1 g/dL    BUN/Creatinine Ratio 10.8 7.0 - 25.0    Anion Gap 9.7 5.0 - 15.0 mmol/L    eGFR 39.6 (L) >60.0 mL/min/1.73   Ethanol    Collection Time: 06/28/25  1:08 PM    Specimen: Arm, Left; Blood   Result Value Ref Range    Ethanol <10 0 - 10 mg/dL    Ethanol % <0.010 %   CBC Auto Differential    Collection Time: 06/28/25  1:08 PM    Specimen: Arm, Left; Blood   Result Value Ref Range    WBC 5.88 3.40 - 10.80 10*3/mm3    RBC 3.91 (L) 4.14 - 5.80 10*6/mm3    Hemoglobin 12.1 (L) 13.0 - 17.7 g/dL    Hematocrit 38.9 37.5 - 51.0 %    MCV 99.5 (H) 79.0 - 97.0 fL    MCH 30.9 26.6 - 33.0 pg    MCHC 31.1 (L) 31.5 - 35.7 g/dL    RDW 13.5 12.3 - 15.4 %    RDW-SD 49.8 37.0 - 54.0 fl    MPV 10.0 6.0 - 12.0 fL    Platelets 234 140 - 450 10*3/mm3    Neutrophil % 64.3 42.7 - 76.0 %    Lymphocyte % 24.0 19.6 - 45.3 %    Monocyte % 7.1 5.0 - 12.0 %    Eosinophil % 3.6 0.3 - 6.2 %    Basophil % 0.5 0.0 - 1.5 %    Immature Grans % 0.5 0.0 - 0.5 %    Neutrophils, Absolute 3.78 1.70 - 7.00 10*3/mm3    Lymphocytes, Absolute 1.41 0.70 - 3.10 10*3/mm3    Monocytes, Absolute 0.42 0.10 - 0.90 10*3/mm3    Eosinophils, Absolute 0.21 0.00 - 0.40 10*3/mm3    Basophils,  Absolute 0.03 0.00 - 0.20 10*3/mm3    Immature Grans, Absolute 0.03 0.00 - 0.05 10*3/mm3   Urine Drug Screen - Urine, Clean Catch    Collection Time: 06/28/25  1:26 PM    Specimen: Urine, Clean Catch   Result Value Ref Range    THC, Screen, Urine Negative Negative    Phencyclidine (PCP), Urine Negative Negative    Cocaine Screen, Urine Negative Negative    Methamphetamine, Ur Negative Negative    Opiate Screen Negative Negative    Amphetamine Screen, Urine Negative Negative    Benzodiazepine Screen, Urine Negative Negative    Tricyclic Antidepressants Screen Negative Negative    Methadone Screen, Urine Negative Negative    Barbiturates Screen, Urine Negative Negative    Oxycodone Screen, Urine Negative Negative    Buprenorphine, Screen, Urine Negative Negative   Fentanyl, Urine - Urine, Clean Catch    Collection Time: 06/28/25  1:26 PM    Specimen: Urine, Clean Catch   Result Value Ref Range    Fentanyl, Urine Negative Negative         RADIOLOGY  No Radiology Exams Resulted Within Past 24 Hours      MEDICATIONS GIVEN IN ER  Medications - No data to display      ORDERS PLACED DURING THIS VISIT:  Orders Placed This Encounter   Procedures    Comprehensive Metabolic Panel    Ethanol    Urine Drug Screen - Urine, Clean Catch    CBC Auto Differential    Fentanyl, Urine - Urine, Clean Catch    Scan Slide    Psych / Access Center    CBC & Differential         OUTPATIENT MEDICATION MANAGEMENT:  No current Epic-ordered facility-administered medications on file.     Current Outpatient Medications Ordered in Epic   Medication Sig Dispense Refill    acetaminophen (TYLENOL) 500 MG tablet Take 2 tablets by mouth 3 (Three) Times a Day.      dilTIAZem (CARDIZEM) 30 MG tablet Take 1 tablet by mouth Every 12 (Twelve) Hours for 30 days. 60 tablet 0    docusate sodium (COLACE) 100 MG capsule Take 1 capsule by mouth Every Night.      famotidine (PEPCID) 20 MG tablet Take 1 tablet by mouth 2 (Two) Times a Day.      folic acid (FOLVITE) 1  "MG tablet Take 1 tablet by mouth Daily.      haloperidol (HALDOL) 1 MG tablet Take 2 tablets by mouth 3 (Three) Times a Day. 30 tablet 0    Melatonin 10 MG tablet Take 1 tablet by mouth Every Night.      mirtazapine (REMERON) 15 MG tablet Take 0.5 tablets by mouth Every Night.      multivitamin tablet tablet Take  by mouth Daily.      olopatadine (PATADAY) 0.2 % solution ophthalmic solution Administer 1 drop to both eyes Daily.      polyethylene glycol (MIRALAX) 17 g packet Take 17 g by mouth Daily. 1-2 packets daily until stools are regular and soft 20 each 0    thiamine (VITAMIN B-1) 100 MG tablet  tablet Take 1 tablet by mouth Daily.      traZODone (DESYREL) 100 MG tablet Take 1 tablet by mouth Every Night.      ondansetron (ZOFRAN) 4 MG tablet Take 1 tablet by mouth Every 8 (Eight) Hours As Needed for Nausea or Vomiting.           PROCEDURES  Procedures            PROGRESS, DATA ANALYSIS, CONSULTS, AND MEDICAL DECISION MAKING  All labs have been independently interpreted by me.  All radiology studies have been reviewed by me. All EKG's have been independently viewed and interpreted by me.  Discussion below represents my analysis of pertinent findings related to patient's condition, differential diagnosis, treatment plan and final disposition.    Patient sent in for behavioral and psychiatry evaluation due to poor compliance of taking medications at his facility and expression of suicidal ideation.  Patient acknowledges seeing psychiatry while he was inpatient here and believes there is a problem with the medication he is taking because his brain feels \"foggy all the time\".  Patient denies any suicidal thoughts currently but does not deny that he might have said something to suggest that to staff and to his daughter.  Patient will require medical clearance before evaluation by psychiatry.  Plan for serum labs to include EtOH level and urine tox screen      ED Course as of 06/28/25 1611   Sat Jun 28, 2025   1343 " CKD stable creatinine, Anemia stable hemoglobin    Medically clear for psych assessment [AR]   1455 Pt evaluated by access with plan for outpt psych referrals.  Pt does not require inpt psych hospitalization or stabilization today. [AR]      ED Course User Index  [AR] Alfreda Suarez MD             AS OF 16:11 EDT VITALS:    BP - 160/65  HR - 85  TEMP - 97.7 °F (36.5 °C) (Oral)  O2 SATS - 100%      COMPLEXITY OF CARE  Admission was considered but after careful review of the patient's presentation, physical examination, diagnostic results, and response to treatment the patient may be safely discharged with outpatient follow-up.    DIAGNOSIS  Final diagnoses:   Dementia with behavioral disturbance         DISPOSITION  ED Disposition       ED Disposition   Discharge    Condition   Stable    Comment   --                Please note that portions of this document were completed with a voice recognition program.    Note Disclaimer: At [facility], we believe that sharing information builds trust and better relationships. You are receiving this note because you recently visited [facility]. It is possible you will see health information before a provider has talked with you about it. This kind of information can be easy to misunderstand. To help you fully understand what it means for your health, we urge you to discuss this note with your provider.         Alfreda Suarez MD  06/28/25 7542

## 2025-06-28 NOTE — CONSULTS
"[facility] consulted regarding being irate at nursing home and reported SI.  Patient sent from [facility] memory care unit. Patient known to Access for history of alcohol use. He was seen by psychiatrist Dr. Ramon during most recent admission and Haldol dose was increased.  He was discharged on 6/23.    He is a 73 yo  AA male. He has history of dementia and alcohol abuse. He is seen today alone in ED room 23. He is calm, pleasant and cooperative. He is able to answer questions appropriately. He is alert and oriented to self, month, year and place. He reports he came to the hospital \"just to see you\" referring to the fact that I work in psychiatry. He reports his daughter is \"concerned I have some type of amnesia\" referring to his dementia diagnosis. He disagrees with this diagnosis. Blames his daughter for putting him in the nursing home (she is his guardian).     He reports he became angry this morning at the nursing home because he was not allowed to use the phone. He is unhappy about having to live there, feels he can live on his own and wants to return to his home. He denies being violent.  He reports he's trying to get out of living there and wants to see a psychiatrist with hopes of them telling him he can move back home. When asked about SI he responded with \"hell no\" and stated \"I love myself.\" He denies history of SI and attempts. He does admit to history of anxiety and feels more anxious as a result of living in a nursing home. He denies HI, no overt psychosis noted at this time. He denies history of IP psych admissions. He reports poor sleep complains of \"bad dreams\". Reports decreased appetite related to poor food quality at the nursing home; reports weight loss as a result. He smokes 3 cigarettes daily, is a former alcoholic. He denies history of illicit drug use.     Unable to reach staff at [facility] for collateral; called several times, calls went unanswered. Spoke with daughter " by phone. She reports patient becomes easily agitated. Reports patient has been looking for someone to tell him he can return home; he consistently wants to talk with a psychiatrist for same. She reports history of anxiety. She feels he is not always compliant with medication. Has had a neuropsych eval that revealed dementia. She denies history of SI and attempts. Discussed that he does not meet IP psych criteria and he will be returning to [facility]. She voiced understanding. She reports [facility] does not have a mental health provider; provided resources for same.      Discussed plan with ED provider who is in agreement.  Informed patient of same, he was also in agreement to return to [facility] with resources.

## (undated) DEVICE — SUT MNCRYL 3/0 PS2 18IN MCP497G

## (undated) DEVICE — APPL CHLORAPREP HI/LITE 26ML ORNG

## (undated) DEVICE — Device

## (undated) DEVICE — SOL ISO/ALC RUB 70PCT 4OZ

## (undated) DEVICE — DECANTER BAG 9": Brand: MEDLINE INDUSTRIES, INC.

## (undated) DEVICE — OPTIFOAM GENTLE SA, POSTOP, 4X8: Brand: MEDLINE

## (undated) DEVICE — CATH ELECTRD PACE TEMP BI NONHEP 5F110CM

## (undated) DEVICE — MAT FLR ABSORBENT LG 4FT 10 2.5FT

## (undated) DEVICE — TBG PENCL TELESCP MEGADYNE SMOKE EVAC 10FT

## (undated) DEVICE — TRAP FLD MINIVAC MEGADYNE 100ML

## (undated) DEVICE — SHEET, DRAPE, SPLIT, STERILE: Brand: MEDLINE

## (undated) DEVICE — GLV SURG PREMIERPRO ORTHO LTX PF SZ8 BRN

## (undated) DEVICE — SKIN PREP TRAY W/CHG: Brand: MEDLINE INDUSTRIES, INC.

## (undated) DEVICE — GLV SURG BIOGEL LTX PF 8

## (undated) DEVICE — 1010 S-DRAPE TOWEL DRAPE 10/BX: Brand: STERI-DRAPE™

## (undated) DEVICE — SYS CLS SKIN PREMIERPRO EXOFINFUSION 22CM

## (undated) DEVICE — SYR CONTRL PRESS/LO FIX/M/LL W/THMB/RNG 10ML

## (undated) DEVICE — GLV SURG SIGNATURE ESSENTIAL PF LTX SZ8

## (undated) DEVICE — PK ANT HIP 40

## (undated) DEVICE — TRY INTRO PERC 6F

## (undated) DEVICE — ANTIBACTERIAL UNDYED BRAIDED (POLYGLACTIN 910), SYNTHETIC ABSORBABLE SUTURE: Brand: COATED VICRYL